# Patient Record
Sex: MALE | Race: WHITE | NOT HISPANIC OR LATINO | Employment: OTHER | URBAN - METROPOLITAN AREA
[De-identification: names, ages, dates, MRNs, and addresses within clinical notes are randomized per-mention and may not be internally consistent; named-entity substitution may affect disease eponyms.]

---

## 2017-03-08 DIAGNOSIS — M43.10 ACQUIRED SPONDYLOLISTHESIS: ICD-10-CM

## 2017-03-08 DIAGNOSIS — M50.20 DISPLACEMENT OF CERVICAL INTERVERTEBRAL DISC WITHOUT MYELOPATHY: ICD-10-CM

## 2017-03-08 DIAGNOSIS — M50.30 DEGENERATION OF CERVICAL INTERVERTEBRAL DISC: ICD-10-CM

## 2017-03-08 DIAGNOSIS — M47.812 CERVICAL SPONDYLOSIS WITHOUT MYELOPATHY: ICD-10-CM

## 2017-03-08 DIAGNOSIS — M54.2 CERVICALGIA: ICD-10-CM

## 2017-03-08 DIAGNOSIS — M47.812 CERVICAL SPONDYLOSIS: ICD-10-CM

## 2017-03-08 RX ORDER — GABAPENTIN 300 MG/1
CAPSULE ORAL
Qty: 90 CAPSULE | Refills: 2 | Status: SHIPPED | OUTPATIENT
Start: 2017-03-08 | End: 2017-12-16 | Stop reason: SDUPTHER

## 2017-04-07 DIAGNOSIS — M19.90 ARTHRITIS: ICD-10-CM

## 2017-04-07 RX ORDER — MELOXICAM 15 MG/1
TABLET ORAL
Qty: 30 TABLET | Refills: 7 | Status: SHIPPED | OUTPATIENT
Start: 2017-04-07 | End: 2017-12-25 | Stop reason: SDUPTHER

## 2017-08-28 ENCOUNTER — HOSPITAL ENCOUNTER (OUTPATIENT)
Dept: RADIOLOGY | Facility: HOSPITAL | Age: 62
Discharge: HOME | End: 2017-08-28
Attending: ORTHOPAEDIC SURGERY
Payer: COMMERCIAL

## 2017-08-28 ENCOUNTER — CLINICAL SUPPORT (OUTPATIENT)
Dept: INTERNAL MEDICINE | Facility: CLINIC | Age: 62
End: 2017-08-28
Attending: ORTHOPAEDIC SURGERY
Payer: COMMERCIAL

## 2017-08-28 ENCOUNTER — OFFICE VISIT (OUTPATIENT)
Dept: ORTHOPEDICS | Facility: CLINIC | Age: 62
End: 2017-08-28
Payer: COMMERCIAL

## 2017-08-28 ENCOUNTER — OFFICE VISIT (OUTPATIENT)
Dept: UROLOGY | Facility: CLINIC | Age: 62
End: 2017-08-28
Payer: COMMERCIAL

## 2017-08-28 VITALS
HEIGHT: 70 IN | WEIGHT: 154.56 LBS | BODY MASS INDEX: 22.13 KG/M2 | SYSTOLIC BLOOD PRESSURE: 124 MMHG | DIASTOLIC BLOOD PRESSURE: 77 MMHG | HEART RATE: 61 BPM

## 2017-08-28 DIAGNOSIS — N52.9 ERECTILE DYSFUNCTION OF ORGANIC ORIGIN: Primary | ICD-10-CM

## 2017-08-28 DIAGNOSIS — N50.89 SCROTAL MASS: Primary | ICD-10-CM

## 2017-08-28 DIAGNOSIS — M79.641 BILATERAL HAND PAIN: ICD-10-CM

## 2017-08-28 DIAGNOSIS — N52.9 ERECTILE DYSFUNCTION, UNSPECIFIED ERECTILE DYSFUNCTION TYPE: ICD-10-CM

## 2017-08-28 DIAGNOSIS — M19.049 ARTHRITIS PAIN, HAND: ICD-10-CM

## 2017-08-28 DIAGNOSIS — M79.642 BILATERAL HAND PAIN: ICD-10-CM

## 2017-08-28 DIAGNOSIS — M79.642 BILATERAL HAND PAIN: Primary | ICD-10-CM

## 2017-08-28 DIAGNOSIS — M79.641 BILATERAL HAND PAIN: Primary | ICD-10-CM

## 2017-08-28 LAB — COMPLEXED PSA SERPL-MCNC: 0.79 NG/ML

## 2017-08-28 PROCEDURE — 99999 PR PBB SHADOW E&M-EST. PATIENT-LVL III: CPT | Mod: PBBFAC,,, | Performed by: UROLOGY

## 2017-08-28 PROCEDURE — 99999 PR PBB SHADOW E&M-EST. PATIENT-LVL II: CPT | Mod: PBBFAC,,, | Performed by: PHYSICIAN ASSISTANT

## 2017-08-28 PROCEDURE — 84153 ASSAY OF PSA TOTAL: CPT

## 2017-08-28 PROCEDURE — 20600 DRAIN/INJ JOINT/BURSA W/O US: CPT | Mod: 50,S$GLB,, | Performed by: PHYSICIAN ASSISTANT

## 2017-08-28 PROCEDURE — 3008F BODY MASS INDEX DOCD: CPT | Mod: S$GLB,,, | Performed by: PHYSICIAN ASSISTANT

## 2017-08-28 PROCEDURE — 73130 X-RAY EXAM OF HAND: CPT | Mod: 50,TC

## 2017-08-28 PROCEDURE — 3008F BODY MASS INDEX DOCD: CPT | Mod: S$GLB,,, | Performed by: UROLOGY

## 2017-08-28 PROCEDURE — 73130 X-RAY EXAM OF HAND: CPT | Mod: 26,50,, | Performed by: RADIOLOGY

## 2017-08-28 PROCEDURE — 99213 OFFICE O/P EST LOW 20 MIN: CPT | Mod: 25,S$GLB,, | Performed by: PHYSICIAN ASSISTANT

## 2017-08-28 PROCEDURE — 99204 OFFICE O/P NEW MOD 45 MIN: CPT | Mod: S$GLB,,, | Performed by: UROLOGY

## 2017-08-28 RX ORDER — TADALAFIL 20 MG/1
20 TABLET ORAL EVERY OTHER DAY
Qty: 6 TABLET | Refills: 11 | Status: SHIPPED | OUTPATIENT
Start: 2017-08-28 | End: 2018-12-12 | Stop reason: SDUPTHER

## 2017-08-28 RX ORDER — BETAMETHASONE SODIUM PHOSPHATE AND BETAMETHASONE ACETATE 3; 3 MG/ML; MG/ML
6 INJECTION, SUSPENSION INTRA-ARTICULAR; INTRALESIONAL; INTRAMUSCULAR; SOFT TISSUE
Status: COMPLETED | OUTPATIENT
Start: 2017-08-28 | End: 2017-08-28

## 2017-08-28 RX ADMIN — BETAMETHASONE SODIUM PHOSPHATE AND BETAMETHASONE ACETATE 6 MG: 3; 3 INJECTION, SUSPENSION INTRA-ARTICULAR; INTRALESIONAL; INTRAMUSCULAR; SOFT TISSUE at 09:08

## 2017-08-28 NOTE — PROGRESS NOTES
Subjective:       Patient ID: Sabino Rubio is a 61 y.o. male.    Chief Complaint: scrotal mass (c/o growth on right testicle he state it have gotten larger he state he also need prostate check (lulú))    HPI patient is here for prostate check PSA right scrotal mass and erectile dysfunction.  He like to try some sialoliths.  He needs a PSA he has a history of a right spermatocele which is enlarging and causing more discomfort.  He was last seen 2013.  He has a good stream with nocturia ×1 but overall he is satisfied with his voiding symptoms    Past Medical History:   Diagnosis Date    Allergy     Arthritis     Family history of malignant neoplasm of gastrointestinal tract mat.gf    GERD (gastroesophageal reflux disease)     Restless leg syndrome     Tubulovillous adenoma 2013 due 2016 9/14/2015       Past Surgical History:   Procedure Laterality Date    CARPAL TUNNEL RELEASE         Family History   Problem Relation Age of Onset    Arthritis Mother      probable R.A.    Cancer Father      esophageal ca and brain tumor    Esophageal cancer Father     Melanoma Father     Cancer Brother      prostate cancer    Cancer Maternal Grandfather      colon    Colon cancer Maternal Grandfather     Irritable bowel syndrome Sister     Diabetes Neg Hx     Heart disease Neg Hx     Cirrhosis Neg Hx     Celiac disease Neg Hx     Crohn's disease Neg Hx     Ulcerative colitis Neg Hx     Stomach cancer Neg Hx     Rectal cancer Neg Hx     Liver cancer Neg Hx     Psoriasis Neg Hx     Lupus Neg Hx        Social History     Social History    Marital status: Single     Spouse name: N/A    Number of children: N/A    Years of education: N/A     Occupational History    Not on file.     Social History Main Topics    Smoking status: Never Smoker    Smokeless tobacco: Never Used      Comment: 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     Alcohol use 0.0 oz/week      Comment: 4 days weekly, up to 2 beers    Drug use: No    Sexual activity: Yes     Partners: Female     Other Topics Concern    Not on file     Social History Narrative    No narrative on file       Allergies:  Review of patient's allergies indicates no known allergies.    Medications:    Current Outpatient Prescriptions:     fluticasone (FLONASE) 50 mcg/actuation nasal spray, 1 spray by Each Nare route every 30 days., Disp: , Rfl:     gabapentin (NEURONTIN) 300 MG capsule, take 1 capsule by mouth three times a day, Disp: 90 capsule, Rfl: 2    meloxicam (MOBIC) 15 MG tablet, take 1 tablet by mouth once daily, Disp: 30 tablet, Rfl: 7    methocarbamol (ROBAXIN) 750 MG Tab, , Disp: , Rfl: 1    fexofenadine (ALLEGRA) 180 MG tablet, Take 1 tablet (180 mg total) by mouth once daily., Disp: , Rfl: 0    ropinirole (REQUIP) 1 MG tablet, TAKE 1 TABLET BY MOUTH 3 TIMES A DAY, Disp: 90 tablet, Rfl: 5    tadalafil (CIALIS) 20 MG Tab, Take 1 tablet (20 mg total) by mouth every other day. Take every other day as needed, Disp: 6 tablet, Rfl: 11    Review of Systems   Constitutional: Negative for activity change, appetite change, chills, diaphoresis, fatigue, fever and unexpected weight change.   HENT: Negative for congestion, dental problem, hearing loss, mouth sores, postnasal drip, rhinorrhea, sinus pressure and trouble swallowing.    Eyes: Negative for pain, discharge and itching.   Respiratory: Negative for apnea, cough, choking, chest tightness, shortness of breath and wheezing.    Cardiovascular: Negative for chest pain, palpitations and leg swelling.   Gastrointestinal: Negative for abdominal distention, abdominal pain, anal bleeding, blood in stool, constipation, diarrhea, nausea, rectal pain and vomiting.   Endocrine: Negative for polydipsia and polyuria.   Genitourinary: Negative for decreased urine volume, difficulty urinating, discharge, dysuria, enuresis, flank pain,  frequency, genital sores, hematuria, penile pain, penile swelling, scrotal swelling, testicular pain and urgency.   Musculoskeletal: Negative for arthralgias, back pain and myalgias.   Skin: Negative for color change, rash and wound.   Neurological: Negative for dizziness, syncope, speech difficulty, light-headedness and headaches.   Hematological: Negative for adenopathy. Does not bruise/bleed easily.   Psychiatric/Behavioral: Negative for behavioral problems, confusion, hallucinations and sleep disturbance.       Objective:      Physical Exam   Constitutional: He appears well-developed.   HENT:   Head: Normocephalic.   Cardiovascular: Normal rate.    Pulmonary/Chest: Effort normal.   Abdominal: Soft.   Genitourinary: Prostate normal.   Genitourinary Comments: 30 g benign no nodules   Neurological: He is alert.   Skin: Skin is warm.     Psychiatric: He has a normal mood and affect.       Assessment:       1. Scrotal mass    2. Erectile dysfunction, unspecified erectile dysfunction type        Plan:       Sabino was seen today for scrotal mass.    Diagnoses and all orders for this visit:    Scrotal mass  -     US Scrotum And Testicles; Future    Erectile dysfunction, unspecified erectile dysfunction type  -     Prostate Specific Antigen, Diagnostic; Future    Other orders  -     tadalafil (CIALIS) 20 MG Tab; Take 1 tablet (20 mg total) by mouth every other day. Take every other day as needed     await results of scrotal ultrasound and the patient can decide whether he wishes to have a spermatocele repair.  All risks and benefits were carefully explained to the patient including risk of recurrence loss of testicle bleeding infection, chronic pain etc.

## 2017-08-28 NOTE — PROGRESS NOTES
Subjective:      Patient ID: Sabino Rubio is a 61 y.o. male.    Chief Complaint: No chief complaint on file.    HPI    Patient is a 61 year old male who presents to clinic with chief complaint of a-traumatic intermittent bilateral hand pain. Pain is located mainly at CMC joint. Patient also reports intermittent tingling in fingers and clawing of his hands. Patient reports that he is taking Mobic as well as gabapentin without relief. He has history of neck pain. He has prior carpel tunnel release in his left hand.     Review of Systems   Constitution: Negative for chills and fever.   Cardiovascular: Negative for chest pain.   Respiratory: Negative for cough and shortness of breath.    Skin: Negative for color change, dry skin, itching, nail changes, poor wound healing and rash.   Musculoskeletal:        Bilateral hand pain.    Neurological: Negative for dizziness.   Psychiatric/Behavioral: Negative for altered mental status. The patient is not nervous/anxious.    All other systems reviewed and are negative.        Objective:      General    Constitutional: He is oriented to person, place, and time. He appears well-developed and well-nourished. No distress.   HENT:   Head: Atraumatic.   Eyes: Conjunctivae are normal.   Cardiovascular: Normal rate.    Pulmonary/Chest: Effort normal.   Neurological: He is alert and oriented to person, place, and time.   Psychiatric: He has a normal mood and affect. His behavior is normal.             Right Hand/Wrist Exam     Inspection   Scars: Hand -  present    Pain   Wrist - The patient exhibits pain of the CMC.    Tests   Phalens Sign: negative  Tinels Sign (Medial Nerve): negative  Finkelstein: negative        Left Hand/Wrist Exam     Inspection   Scars: Hand -  present    Pain   Wrist - The patient exhibits pain of the CMC.    Tests   Phalens Sign: negative  Tinels Sign (Medial Nerve): negative  Finkelstein: negative            Muscle Strength   Right Upper Extremity    Wrist Extension: 5/5/5   Wrist Flexion: 5/5/5   : 5/5/5   Index Finger: 5/5  Middle Finger: 5/5  Ring Finger: 5/5  Little Finger: 5/5  Thumb - APB: 5/5  Thumb - FPL: 5/5  Pinch Mechanism: 5/5  Left Upper Extremity  Wrist Extension: 5/5/5   Wrist Flexion: 5/5/5   :  5/5/5   Index Finger: 5/5  Middle Finger: 5/5  Ring Finger: 5/5  Little Finger: 5/5  Thumb - APB: 5/5  Thumb - FPL: 5/5  Pinch Mechanism: 5/5    Vascular Exam       Capillary Refill  Right Hand: normal capillary refill  Left Hand: normal capillary refill    RADS:  On the left there is narrowing at the first carpometacarpal joint.  Narrowing at several MCP and IP joints noted as well.  No erosions.  On the right again narrowing at the first carpometacarpal joint MCP and several IP joints.  No fractures.  Pression degenerative change.      Assessment:       Encounter Diagnoses   Name Primary?    Bilateral hand pain Yes    Arthritis pain, hand           Plan:       Discussed treatment options with patient. At this time he would like to get injection. He will consider surgical intervention and make appointment with the hand clinic of needed.     Procedure note: bilateral CMC joint injection  After sterile preparation of the skin with alcohol and surgical soap (betadine etc.)  A steroid injection was performed at CMC using 1% plain Lidocaine and 3mg Celestone. This was well tolerated.

## 2017-08-30 ENCOUNTER — HOSPITAL ENCOUNTER (OUTPATIENT)
Dept: RADIOLOGY | Facility: HOSPITAL | Age: 62
Discharge: HOME | End: 2017-08-30
Attending: UROLOGY
Payer: COMMERCIAL

## 2017-08-30 DIAGNOSIS — N50.89 SCROTAL MASS: ICD-10-CM

## 2017-08-30 PROCEDURE — 76870 US EXAM SCROTUM: CPT | Mod: 26,,, | Performed by: INTERNAL MEDICINE

## 2017-08-30 PROCEDURE — 76870 US EXAM SCROTUM: CPT | Mod: TC

## 2017-08-31 ENCOUNTER — TELEPHONE (OUTPATIENT)
Dept: UROLOGY | Facility: CLINIC | Age: 62
End: 2017-08-31

## 2017-08-31 NOTE — TELEPHONE ENCOUNTER
Pt informed. He is not sure how he would like to proceed. He has my name and number and will call me with a decision

## 2017-09-25 ENCOUNTER — OFFICE VISIT (OUTPATIENT)
Dept: INTERNAL MEDICINE | Facility: CLINIC | Age: 62
End: 2017-09-25
Payer: COMMERCIAL

## 2017-09-25 ENCOUNTER — CLINICAL SUPPORT (OUTPATIENT)
Dept: INTERNAL MEDICINE | Facility: CLINIC | Age: 62
End: 2017-09-25
Payer: COMMERCIAL

## 2017-09-25 ENCOUNTER — CLINICAL SUPPORT (OUTPATIENT)
Dept: INFECTIOUS DISEASES | Facility: CLINIC | Age: 62
End: 2017-09-25
Payer: COMMERCIAL

## 2017-09-25 VITALS
WEIGHT: 149.94 LBS | HEIGHT: 70 IN | SYSTOLIC BLOOD PRESSURE: 120 MMHG | BODY MASS INDEX: 21.47 KG/M2 | DIASTOLIC BLOOD PRESSURE: 80 MMHG

## 2017-09-25 DIAGNOSIS — D36.9 TUBULOVILLOUS ADENOMA: ICD-10-CM

## 2017-09-25 DIAGNOSIS — N28.1 KIDNEY CYST, ACQUIRED: ICD-10-CM

## 2017-09-25 DIAGNOSIS — Z00.00 ROUTINE GENERAL MEDICAL EXAMINATION AT A HEALTH CARE FACILITY: Primary | ICD-10-CM

## 2017-09-25 DIAGNOSIS — Z23 NEED FOR VACCINATION: Primary | ICD-10-CM

## 2017-09-25 DIAGNOSIS — M54.50 LUMBAR SPINE PAIN: ICD-10-CM

## 2017-09-25 DIAGNOSIS — M43.10 ACQUIRED SPONDYLOLISTHESIS: ICD-10-CM

## 2017-09-25 DIAGNOSIS — M25.511 ACUTE PAIN OF RIGHT SHOULDER: ICD-10-CM

## 2017-09-25 DIAGNOSIS — L57.8 ACTINIC SKIN DAMAGE: ICD-10-CM

## 2017-09-25 DIAGNOSIS — Z80.42 FAMILY HISTORY OF PROSTATE CANCER: ICD-10-CM

## 2017-09-25 DIAGNOSIS — Z00.00 ANNUAL PHYSICAL EXAM: Primary | ICD-10-CM

## 2017-09-25 LAB
ALBUMIN SERPL BCP-MCNC: 3.6 G/DL
ALP SERPL-CCNC: 63 U/L
ALT SERPL W/O P-5'-P-CCNC: 16 U/L
ANION GAP SERPL CALC-SCNC: 8 MMOL/L
AST SERPL-CCNC: 21 U/L
BILIRUB SERPL-MCNC: 0.6 MG/DL
BUN SERPL-MCNC: 17 MG/DL
CALCIUM SERPL-MCNC: 8.9 MG/DL
CHLORIDE SERPL-SCNC: 107 MMOL/L
CHOLEST SERPL-MCNC: 181 MG/DL
CHOLEST/HDLC SERPL: 2.2 {RATIO}
CO2 SERPL-SCNC: 25 MMOL/L
COMPLEXED PSA SERPL-MCNC: 0.74 NG/ML
CREAT SERPL-MCNC: 0.8 MG/DL
ERYTHROCYTE [DISTWIDTH] IN BLOOD BY AUTOMATED COUNT: 13.5 %
EST. GFR  (AFRICAN AMERICAN): >60 ML/MIN/1.73 M^2
EST. GFR  (NON AFRICAN AMERICAN): >60 ML/MIN/1.73 M^2
ESTIMATED AVG GLUCOSE: 105 MG/DL
GLUCOSE SERPL-MCNC: 126 MG/DL
HBA1C MFR BLD HPLC: 5.3 %
HCT VFR BLD AUTO: 43.7 %
HCV AB SERPL QL IA: NEGATIVE
HDLC SERPL-MCNC: 84 MG/DL
HDLC SERPL: 46.4 %
HGB BLD-MCNC: 14.7 G/DL
LDLC SERPL CALC-MCNC: 83 MG/DL
MCH RBC QN AUTO: 29.6 PG
MCHC RBC AUTO-ENTMCNC: 33.6 G/DL
MCV RBC AUTO: 88 FL
NONHDLC SERPL-MCNC: 97 MG/DL
PLATELET # BLD AUTO: 192 K/UL
PMV BLD AUTO: 10 FL
POTASSIUM SERPL-SCNC: 3.9 MMOL/L
PROT SERPL-MCNC: 6.9 G/DL
RBC # BLD AUTO: 4.96 M/UL
SODIUM SERPL-SCNC: 140 MMOL/L
TRIGL SERPL-MCNC: 70 MG/DL
TSH SERPL DL<=0.005 MIU/L-ACNC: 0.91 UIU/ML
WBC # BLD AUTO: 7.52 K/UL

## 2017-09-25 PROCEDURE — 86803 HEPATITIS C AB TEST: CPT

## 2017-09-25 PROCEDURE — 90471 IMMUNIZATION ADMIN: CPT | Mod: S$GLB,,, | Performed by: INTERNAL MEDICINE

## 2017-09-25 PROCEDURE — 85027 COMPLETE CBC AUTOMATED: CPT

## 2017-09-25 PROCEDURE — 99999 PR PBB SHADOW E&M-EST. PATIENT-LVL II: CPT | Mod: PBBFAC,,,

## 2017-09-25 PROCEDURE — 84153 ASSAY OF PSA TOTAL: CPT

## 2017-09-25 PROCEDURE — 80053 COMPREHEN METABOLIC PANEL: CPT

## 2017-09-25 PROCEDURE — 84443 ASSAY THYROID STIM HORMONE: CPT

## 2017-09-25 PROCEDURE — 99396 PREV VISIT EST AGE 40-64: CPT | Mod: S$GLB,,, | Performed by: INTERNAL MEDICINE

## 2017-09-25 PROCEDURE — 99999 PR PBB SHADOW E&M-EST. PATIENT-LVL IV: CPT | Mod: PBBFAC,,, | Performed by: INTERNAL MEDICINE

## 2017-09-25 PROCEDURE — 90686 IIV4 VACC NO PRSV 0.5 ML IM: CPT | Mod: S$GLB,,, | Performed by: INTERNAL MEDICINE

## 2017-09-25 PROCEDURE — 80061 LIPID PANEL: CPT

## 2017-09-25 PROCEDURE — 90736 HZV VACCINE LIVE SUBQ: CPT | Mod: S$GLB,,, | Performed by: INTERNAL MEDICINE

## 2017-09-25 PROCEDURE — 83036 HEMOGLOBIN GLYCOSYLATED A1C: CPT

## 2017-09-25 PROCEDURE — 90472 IMMUNIZATION ADMIN EACH ADD: CPT | Mod: S$GLB,,, | Performed by: INTERNAL MEDICINE

## 2017-09-25 RX ORDER — METHOCARBAMOL 750 MG/1
750 TABLET, FILM COATED ORAL 2 TIMES DAILY PRN
Qty: 42 TABLET | Refills: 1 | Status: SHIPPED | OUTPATIENT
Start: 2017-09-25 | End: 2017-11-06 | Stop reason: SDUPTHER

## 2017-09-25 NOTE — PATIENT INSTRUCTIONS
Causes of Lumbar (Low Back) Pain  Low back pain can be caused by problems with any part of the lumbar spine. A disk can herniate (push out) and press on a nerve. Vertebrae can rub against each other or slip out of place. This can irritate facet joints and nerves. It can also lead to stenosis, a narrowing of the spinal canal or foramen.  Pressure from a disk  Constant wear and tear on a disk can cause it to weaken and push outward. Part of the disk may then press on nearby nerves. There are two common types of herniated disks:  Contained means the soft nucleus is protruding outward.   Extruded means the firm annulus has torn, letting the soft center squeeze through.     Pressure from bone  An unstable spine   With age, a disk may thin and wear out. Vertebrae above and below the disk may begin to touch. This can put pressure on nerves. It can also cause bone spurs (growths) to form where the bones rub together.    Stenosis results when bone spurs narrow the foramen or spinal canal. This also puts pressure on nerves. Slipping vertebrae can irritate nerves and joints. They can also worsen stenosis.    In some cases, vertebrae become unstable and slip forward. This is called spondylolisthesis.     Date Last Reviewed: 10/12/2015  © 6353-6701 [employer]. [address] [place] [address], [place] [address]. All rights reserved. This information is not intended as a substitute for professional medical care. Always follow your healthcare professional's instructions.        Relieving Back Pain  Back pain is a common problem. You can strain back muscles by lifting too much weight or just by moving the wrong way. Back strain can be uncomfortable, even painful. And it can take weeks or months to improve. To help yourself feel better and prevent future back strains, try these tips.  Important Note: Do not give aspirin to children or teens without first discussing it with your healthcare provider.      ? Ice    Ice reduces  muscle pain and swelling. It helps most during the first 24 to 48 hours after an injury.  · Wrap an ice pack or a bag of frozen peas in a thin towel. (Never place ice directly on your skin.)  · Place the ice where your back hurts the most.  · Dont ice for more than 20 minutes at a time.  · You can use ice several times a day.  ? Medicines  Over-the-counter pain relievers can include acetaminophen and anti-inflammatory medicines, which includes aspirin or ibuprofen. They can help ease discomfort. Some also reduce swelling.  · Tell your healthcare provider about any medicines you are already taking.  · Take medicines only as directed.  ? Heat  After the first 48 hours, heat can relax sore muscles and improve blood flow.  · Try a warm bath or shower. Or use a heating pad set on low. To prevent a burn, keep a cloth between you and the heating pad.  · Dont use a heating pad for more than 15 minutes at a time. Never sleep on a heating pad.  Date Last Reviewed: 9/1/2015 © 2000-2017 [employer]. [address] [place] [address]. All rights reserved. This information is not intended as a substitute for professional medical care. Always follow your healthcare professional's instructions.        Caring for Your Back Throughout the Day  Take care of your back throughout the day. You will likely have fewer back problems if you do. Try to warm up before you move. Shift positions often. Also do your best to form healthy habits.    Warm up for the day  Do a few slow, catlike stretches before starting your day. This simple warmup can soften your disks, stretch your back muscles, and help prevent injuries.  Shift positions often  At work and at home, change positions often. This helps keep your body from getting stiff. Stand up or lean back while you sit. If you can, get up and move every 1/2 hour.  Form healthy habits  Here are some suggestions:   · Keep a healthy weight. When you weigh too much, your back  is under excess strain. But losing just a few extra pounds can help a lot.  · Try not to overeat. Learn about serving sizes. The size of a serving depends on the food and the food group. Many foods list serving sizes on the labels.  · Handle minor aches with cold and heat. Apply cold the first 24 to 48 hours. Use heat after that. Always place a thin cloth between your skin and the source of cold or heat.  · Take medicines as directed. This helps keep pain under control. Always read labels, and call your healthcare provider or pharmacist if you have any questions.  Walk each day  A daily walk keeps your back and thigh muscles stretched and strong. This gives your back better support. Be sure to walk with your spines three curves aligned, by keeping your head, hips, and toes connected by a vertical line.   Date Last Reviewed: 10/18/2015  © 4718-5437 [employer]. [address] [place] [address], [place] [address]. All rights reserved. This information is not intended as a substitute for professional medical care. Always follow your healthcare professional's instructions.        Prevention Guidelines, Men Ages 50 to 64  Screening tests and vaccines are an important part of managing your health. Health counseling is essential, too. Below are guidelines for these, for men ages 50 to 64. Talk with your healthcare provider to make sure youre up-to-date on what you need.  Screening Who needs it How often   Alcohol misuse All men in this age group At routine exams   Blood pressure All men in this age group Every 2 years if your blood pressure is less than 120/80 mm Hg; yearly if your systolic blood pressure is 120 to 139 mm Hg, or your diastolic blood pressure reading is 80 to 89 mm Hg   Colorectal cancer All men in this age group Flexible sigmoidoscopy every 5 years, or colonoscopy every 10 years, or double-contrast barium enema every 5 years; yearly fecal occult blood test or fecal immunochemical test; or a stool DNA  test as often as your healthcare provider advises; talk with your healthcare provider about which tests are best for you   Depression All men in this age group At routine exams   Type 2 diabetes or prediabetes All adults beginning at age 45 and adults without symptoms at any age who are overweight or obese and have 1 or more other risk factors for diabetes At least every 3 years (yearly if your blood sugar has already begun to rise)   Hepatitis C Men at increased risk for infection - talk with your healthcare provider At routine exams. All men ages 50 to 70 should be tested at least once for hepatitis C.   High cholesterol or triglycerides All men in this age group At least every 5 years   HIV Men at increased risk for infection - talk with your healthcare provider At routine exams   Lung cancer Adults age 55 to 80 who have smoked Yearly screening in smokers with 30 pack-year history of smoking or who quit within 15 years   Obesity All men in this age group At routine exams   Prostate cancer Starting at age 45, talk to healthcare provider about risks and benefits of digital rectal exam (KATHARINE) and prostate-specific antigen (PSA) screening1 At routine exams   Syphilis Men at increased risk for infection - talk with your healthcare provider At routine exams   Tuberculosis Men at increased risk for infection - talk with your healthcare provider Ask your healthcare provider   Vision All men in this age group Ask your healthcare provider   Vaccine Who needs it How often   Chickenpox (varicella) All men in this age group who have no record of this infection or vaccine 2 doses; second dose should be given at least 4 weeks after the first dose   Hepatitis A Men at increased risk for infection - talk with your healthcare provider 2 doses given at least 6 months apart   Hepatitis B Men at increased risk for infection - talk with your healthcare provider 3 doses over 6 months; second dose should be given 1 month after the first  dose; the third dose should be given at least 2 months after the second dose and at least 4 months after the first dose   Haemophilus influenzae Type B (HIB) Men at increased risk for infection - talk with your healthcare provider 1 to 3 doses   Influenza (flu) All men in this age group Once a year   Measles, mumps, rubella (MMR) Men in this age group through their late 50s who have no record of these infections or vaccines 1 or 2 doses; ask your healthcare provider   Meningococcal Men at increased risk for infection - talk with your healthcare provider 1 or more doses   Pneumococcal conjugate vaccine (PCV13) and pneumococcal polysaccharide vaccine (PPSV23) Men at increased risk for infection - talk with your healthcare provider PCV13: 1 dose ages 19 to 65 (protects against 13 types of pneumococcal bacteria)     PPSV23: 1 to 2 doses through age 64, or 1 dose at 65 or older (protects against 23 types of pneumococcal bacteria)      Tetanus/diphtheria/  pertussis (Td/Tdap) booster All men in this age group Td every 10 years, or a one-time dose of Tdap instead of a Td booster after age 18, then Td every 10 years   Zoster All men ages 60 and older 1 dose   Counseling Who needs it How often   Diet and exercise Men who are overweight or obese When diagnosed, and then at routine exams   Sexually transmitted infection prevention Men at increased risk for infection - talk with your healthcare provider At routine exams   Use of daily aspirin Men in this age group at risk for cardiovascular health problems At routine exams   Use of tobacco and the health effects it can cause All men in this age group Every visit   [address] [place] [address] [facility]  Date Last Reviewed: 2/1/2017  © 7802-2694 [employer]. [address] [place] [address], [place] [address]. All rights reserved. This information is not intended as a substitute for professional medical care. Always follow your healthcare professional's  instructions.

## 2017-09-25 NOTE — LETTER
September 25, 2017    Sabino Rubio  [facility] [address]             [facility] - Internal Medicine  [address]  [place] [address]  Phone: [phone]  Fax: [phone] Dear Mr. Rubio:    Thank you for allowing me to serve you and perform your Executive Health exam on 9/25/2017.  This letter will serve a brief summary of the history, physical findings, and laboratory/studies performed and recommendations at that time.    Reason for Visit: Executive Health Preventive Physical Examination    Past Medical History:   Diagnosis Date    Allergy     Arthritis     Family history of malignant neoplasm of gastrointestinal tract mat.gf    Family history of prostate cancer: 1/2 brother 9/25/2017    GERD (gastroesophageal reflux disease)     Kidney cyst, acquired: R 1.2 cm 2015 9/25/2017    Restless leg syndrome     Tubulovillous adenoma 2013 due 2016 9/14/2015       Past Surgical History:   Procedure Laterality Date    CARPAL TUNNEL RELEASE         Family History   Problem Relation Age of Onset    Arthritis Mother      probable R.A.    Cancer Father      esophageal ca and brain tumor    Esophageal cancer Father     Melanoma Father     Cancer Brother      prostate cancer    Cancer Maternal Grandfather      colon    Colon cancer Maternal Grandfather     Irritable bowel syndrome Sister     Arthritis Sister     No Known Problems Son     No Known Problems Son     Diabetes Neg Hx     Heart disease Neg Hx     Cirrhosis Neg Hx     Celiac disease Neg Hx     Crohn's disease Neg Hx     Ulcerative colitis Neg Hx     Stomach cancer Neg Hx     Rectal cancer Neg Hx     Liver cancer Neg Hx     Psoriasis Neg Hx     Lupus Neg Hx             Review of patient's allergies indicates:  No Known Allergies      Current Outpatient Prescriptions:     fluticasone (FLONASE) 50 mcg/actuation nasal spray, 1 spray by Each Nare route every 30 days., Disp: , Rfl:     gabapentin (NEURONTIN)  300 MG capsule, take 1 capsule by mouth three times a day, Disp: 90 capsule, Rfl: 2    meloxicam (MOBIC) 15 MG tablet, take 1 tablet by mouth once daily, Disp: 30 tablet, Rfl: 7    methocarbamol (ROBAXIN) 750 MG Tab, Take 1 tablet (750 mg total) by mouth 2 (two) times daily as needed., Disp: 42 tablet, Rfl: 1    ropinirole (REQUIP) 1 MG tablet, TAKE 1 TABLET BY MOUTH 3 TIMES A DAY, Disp: 90 tablet, Rfl: 5    tadalafil (CIALIS) 20 MG Tab, Take 1 tablet (20 mg total) by mouth every other day. Take every other day as needed, Disp: 6 tablet, Rfl: 11     Review of Systems  Review of Systems - Negative except for some shoulder pain on the right as well as ongoing neck and back pain.    Physical Exam:  General: General appearance: alert, well appearing, and in no distress.   Skin: Skin exam - normal coloration and turgor, no rashes, no suspicious skin lesions noted. Sun damaged skin noted.  HEENT: Ears - bilateral TM's and external ear canals normal. , ENT exam reveals - ENT exam normal, no neck nodes or sinus tenderness.   Lungs: Chest: clear to auscultation, no wheezes, rales or rhonchi, symmetric air entry.   Heart: CVS exam: normal rate, regular rhythm, normal S1, S2, no murmurs, rubs, clicks or gallops.   Extremities: Exam of extremities: peripheral pulses normal, no pedal edema, no clubbing or cyanosis    Labs:  Results for orders placed or performed in visit on 09/25/17   Comprehensive metabolic panel   Result Value Ref Range    Sodium 140 136 - 145 mmol/L    Potassium 3.9 3.5 - 5.1 mmol/L    Chloride 107 95 - 110 mmol/L    CO2 25 23 - 29 mmol/L    Glucose 126 (H) 70 - 110 mg/dL    BUN, Bld 17 6 - 20 mg/dL    Creatinine 0.8 0.5 - 1.4 mg/dL    Calcium 8.9 8.7 - 10.5 mg/dL    Total Protein 6.9 6.0 - 8.4 g/dL    Albumin 3.6 3.5 - 5.2 g/dL    Total Bilirubin 0.6 0.1 - 1.0 mg/dL    Alkaline Phosphatase 63 55 - 135 U/L    AST 21 10 - 40 U/L    ALT 16 10 - 44 U/L    Anion Gap 8 8 - 16 mmol/L    eGFR if African American  >60.0 >60 mL/min/1.73 m^2    eGFR if non African American >60.0 >60 mL/min/1.73 m^2   CBC Without Differential   Result Value Ref Range    WBC 7.52 3.90 - 12.70 K/uL    RBC 4.96 4.60 - 6.20 M/uL    Hemoglobin 14.7 14.0 - 18.0 g/dL    Hematocrit 43.7 40.0 - 54.0 %    MCV 88 82 - 98 fL    MCH 29.6 27.0 - 31.0 pg    MCHC 33.6 32.0 - 36.0 g/dL    RDW 13.5 11.5 - 14.5 %    Platelets 192 150 - 350 K/uL    MPV 10.0 9.2 - 12.9 fL   Lipid panel   Result Value Ref Range    Cholesterol 181 120 - 199 mg/dL    Triglycerides 70 30 - 150 mg/dL    HDL 84 (H) 40 - 75 mg/dL    LDL Cholesterol 83.0 63.0 - 159.0 mg/dL    HDL/Chol Ratio 46.4 20.0 - 50.0 %    Total Cholesterol/HDL Ratio 2.2 2.0 - 5.0    Non-HDL Cholesterol 97 mg/dL   TSH   Result Value Ref Range    TSH 0.914 0.400 - 4.000 uIU/mL   PSA, Screening (every year)   Result Value Ref Range    PSA, SCREEN 0.74 0.00 - 4.00 ng/mL   Hemoglobin A1c   Result Value Ref Range    Hemoglobin A1C 5.3 4.0 - 5.6 %    Estimated Avg Glucose 105 68 - 131 mg/dL   Hepatitis C antibody   Result Value Ref Range    Hepatitis C Ab Negative       Labs acceptable other than sugar was elevated at 126; you indicated this was nonfasting blood work.  A1c was acceptable at 5.3, this does not suggest diabetes.    You had a Urology assessment recently.  You also had a flu vaccine as well as a shingles vaccine today.    We discussed an Orthopedic assessment for your shoulder as well as a Spine clinic follow-up for your various spine issues.    Assessment/Recommendations:  Routine Health Maintenance: You are due for a colonoscopy.  I would also recommend a Dermatology follow-up.    At this time, you appear to be in good medical condition.  The next time that you do blood work, please arrange to have this done fasting.  I look forward to seeing you again next year.  Please contact me should you have any questions or concerns regarding physical findings, or my recommendations.      Sincerely,          Alba NETTLES  MD Bautista, FACP

## 2017-09-25 NOTE — PROGRESS NOTES
Subjective:       Patient ID: Sabino Rubio is a 61 y.o. male.    Chief Complaint: Executive Health     PE    Girlfriend is Ana Laura Webster.    Some C spine issues also low back pain    Over due for colonoscopy- adenoma 2013    Saw Urology this year; FH prostate cancer    Some allergies and sinus sx    Glucose elevated today but not fasting.    Lots of orthopedic issues- neck, R shoulder, back.  Has seen Ortho and Spine clinic.    CTS surgery L hand; got injections both hands recently for arthritis.    Patient Active Problem List:     Restless leg syndrome     Cervical spondylosis without myelopathy     Degeneration of cervical intervertebral disc     Brachial neuritis or radiculitis NOS     Acquired spondylolisthesis     Tubulovillous adenoma: 2013            Review of Systems   Constitutional: Negative.    HENT: Positive for postnasal drip and rhinorrhea. Negative for congestion and sinus pressure.    Eyes: Negative for redness and visual disturbance.   Respiratory: Negative for apnea, choking, shortness of breath and stridor.    Cardiovascular: Negative for chest pain, palpitations and leg swelling.   Gastrointestinal: Negative for abdominal pain, constipation, diarrhea and nausea.   Genitourinary: Negative for difficulty urinating, flank pain, frequency, testicular pain and urgency.   Musculoskeletal: Positive for arthralgias and back pain. Negative for myalgias.   Skin: Negative for color change and rash.   Neurological: Negative.    Psychiatric/Behavioral: Negative.        Objective:      Physical Exam   Constitutional: He is oriented to person, place, and time. He appears well-developed and well-nourished.   HENT:   Head: Normocephalic and atraumatic.   Right Ear: External ear normal.   Left Ear: External ear normal.   Eyes: Conjunctivae and EOM are normal. Pupils are equal, round, and reactive to light.   Neck: Normal range of motion. Neck supple. No thyromegaly present.   Cardiovascular: Normal rate  and regular rhythm.    No murmur heard.  Pulmonary/Chest: Effort normal and breath sounds normal. No respiratory distress. He has no wheezes.   Abdominal: Soft. He exhibits no distension. There is no tenderness.   Musculoskeletal: He exhibits no edema or tenderness.   Limited ROM R shoulder  Negative SLR  Strength UE and LE wnl  Scar L wrist   Lymphadenopathy:     He has no cervical adenopathy.   Neurological: He is alert and oriented to person, place, and time. No cranial nerve deficit.   Skin: Skin is warm and dry.   Actinic skin damage   Psychiatric: He has a normal mood and affect. His behavior is normal.       Assessment:       1. Annual physical exam    2. Tubulovillous adenoma: 2013    3. Actinic skin damage    4. Lumbar spine pain    5. Acute pain of right shoulder    6. Acquired spondylolisthesis    7. Kidney cyst, acquired: R 1.2 cm 2015    8. Family history of prostate cancer: 1/2 brother        Plan:         Annual physical exam    Tubulovillous adenoma: 2013  -     Case request GI: COLONOSCOPY    Actinic skin damage  -     Ambulatory referral to Dermatology    Lumbar spine pain  -     Ambulatory Consult to Back & Spine Clinic    Acute pain of right shoulder  -     Ambulatory referral to Orthopedics    Acquired spondylolisthesis    Kidney cyst, acquired: R 1.2 cm 2015    Family history of prostate cancer: 1/2 brother    Other orders  -     methocarbamol (ROBAXIN) 750 MG Tab; Take 1 tablet (750 mg total) by mouth 2 (two) times daily as needed.  Dispense: 42 tablet; Refill: 1

## 2017-09-29 ENCOUNTER — TELEPHONE (OUTPATIENT)
Dept: SPINE | Facility: CLINIC | Age: 62
End: 2017-09-29

## 2017-09-29 DIAGNOSIS — M54.5 LOW BACK PAIN, UNSPECIFIED BACK PAIN LATERALITY, UNSPECIFIED CHRONICITY, WITH SCIATICA PRESENCE UNSPECIFIED: Primary | ICD-10-CM

## 2017-10-03 ENCOUNTER — HOSPITAL ENCOUNTER (OUTPATIENT)
Dept: RADIOLOGY | Facility: OTHER | Age: 62
Discharge: HOME | End: 2017-10-03
Attending: PHYSICIAN ASSISTANT
Payer: COMMERCIAL

## 2017-10-03 DIAGNOSIS — M54.5 LOW BACK PAIN, UNSPECIFIED BACK PAIN LATERALITY, UNSPECIFIED CHRONICITY, WITH SCIATICA PRESENCE UNSPECIFIED: ICD-10-CM

## 2017-10-03 PROCEDURE — 72120 X-RAY BEND ONLY L-S SPINE: CPT | Mod: TC

## 2017-10-03 PROCEDURE — 72120 X-RAY BEND ONLY L-S SPINE: CPT | Mod: 26,,, | Performed by: RADIOLOGY

## 2017-10-03 PROCEDURE — 72100 X-RAY EXAM L-S SPINE 2/3 VWS: CPT | Mod: 26,,, | Performed by: RADIOLOGY

## 2017-10-04 ENCOUNTER — OFFICE VISIT (OUTPATIENT)
Dept: SPINE | Facility: CLINIC | Age: 62
End: 2017-10-04
Payer: COMMERCIAL

## 2017-10-04 VITALS
SYSTOLIC BLOOD PRESSURE: 121 MMHG | DIASTOLIC BLOOD PRESSURE: 80 MMHG | HEART RATE: 80 BPM | BODY MASS INDEX: 21.33 KG/M2 | HEIGHT: 70 IN | WEIGHT: 149 LBS

## 2017-10-04 DIAGNOSIS — M51.37 DDD (DEGENERATIVE DISC DISEASE), LUMBOSACRAL: ICD-10-CM

## 2017-10-04 DIAGNOSIS — M47.819 SPONDYLOSIS WITHOUT MYELOPATHY: ICD-10-CM

## 2017-10-04 DIAGNOSIS — G89.29 CHRONIC BILATERAL LOW BACK PAIN WITHOUT SCIATICA: ICD-10-CM

## 2017-10-04 DIAGNOSIS — M54.50 CHRONIC BILATERAL LOW BACK PAIN WITHOUT SCIATICA: ICD-10-CM

## 2017-10-04 PROCEDURE — 99999 PR PBB SHADOW E&M-EST. PATIENT-LVL III: CPT | Mod: PBBFAC,,, | Performed by: PHYSICIAN ASSISTANT

## 2017-10-04 PROCEDURE — 99214 OFFICE O/P EST MOD 30 MIN: CPT | Mod: S$GLB,,, | Performed by: PHYSICIAN ASSISTANT

## 2017-10-04 NOTE — LETTER
October 4, 2017      Alba Baum MD  [address]  [facility] [address]           [facility]  [address], [address]  [facility] [address]  Phone: [phone]  Fax: [phone]          Patient: Sabino Rubio   MR Number: [id number]   YOB: 1955   Date of Visit: 10/4/2017       Dear Dr. Alba Baum:    Thank you for referring Sabino Rubio to me for evaluation. Attached you will find relevant portions of my assessment and plan of care.    If you have questions, please do not hesitate to call me. I look forward to following Sabino Rubio along with you.    Sincerely,    Justa Moses PA-C    Enclosure  CC:  No Recipients    If you would like to receive this communication electronically, please contact [email or web address] or [phone] to request more information on [employer] Link access.    For providers and/or their staff who would like to refer a patient to [facility], please contact us through our one-stop-shop provider referral line, [facility] [place], at [phone].    If you feel you have received this communication in error or would no longer like to receive these types of communications, please e-mail [email or web address]

## 2017-10-04 NOTE — PROGRESS NOTES
Subjective:     Patient ID:  Sabino Rubio is a 61 y.o. male.    Patient referred by Dr. Baum    Chief Complaint: Back pain    HPI    Sabino Rubio is a 61 y.o. male who presents with the above CC.  I have seen him before for his neck and he is here today for his low back pain.  Pain started 2 years ago in the low back rated 4/10 that is worse when going from sitting to standing and especially in the morning and better with walking.  Pain eases up throughout the day some.  No leg pain.    Patient has not had PT or ESIs.  No spine surgery.  Patient is currently taking mobic, neurontin, and robaxin mainly for his neck but does help some for his low back.    Patient denies any recent accidents or trauma, no saddle anesthesias, and no bowel or bladder incontinence.      Review of Systems:  Please refer to page three of the spine center intake form for a complete review of systems.    Past Medical History:   Diagnosis Date    Allergy     Arthritis     Family history of malignant neoplasm of gastrointestinal tract mat.gf    Family history of prostate cancer: 1/2 brother 9/25/2017    GERD (gastroesophageal reflux disease)     Kidney cyst, acquired: R 1.2 cm 2015 9/25/2017    Restless leg syndrome     Tubulovillous adenoma 2013 due 2016 9/14/2015     Past Surgical History:   Procedure Laterality Date    CARPAL TUNNEL RELEASE       Current Outpatient Prescriptions on File Prior to Visit   Medication Sig Dispense Refill    fluticasone (FLONASE) 50 mcg/actuation nasal spray 1 spray by Each Nare route every 30 days.      gabapentin (NEURONTIN) 300 MG capsule take 1 capsule by mouth three times a day 90 capsule 2    meloxicam (MOBIC) 15 MG tablet take 1 tablet by mouth once daily 30 tablet 7    methocarbamol (ROBAXIN) 750 MG Tab Take 1 tablet (750 mg total) by mouth 2 (two) times daily as needed. 42 tablet 1    ropinirole (REQUIP) 1 MG tablet TAKE 1 TABLET BY MOUTH 3 TIMES A DAY 90 tablet 5    tadalafil  "(CIALIS) 20 MG Tab Take 1 tablet (20 mg total) by mouth every other day. Take every other day as needed 6 tablet 11     No current facility-administered medications on file prior to visit.      Review of patient's allergies indicates:  No Known Allergies  Social History     Social History    Marital status: Single     Spouse name: N/A    Number of children: N/A    Years of education: N/A     Occupational History    Not on file.     Social History Main Topics    Smoking status: Never Smoker    Smokeless tobacco: Never Used      Comment: 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    Alcohol use 0.0 oz/week      Comment: 4 days weekly, up to 2 beers    Drug use: No    Sexual activity: Yes     Partners: Female     Other Topics Concern    Not on file     Social History Narrative    No narrative on file     Family History   Problem Relation Age of Onset    Arthritis Mother      probable R.A.    Cancer Father      esophageal ca and brain tumor    Esophageal cancer Father     Melanoma Father     Cancer Brother      prostate cancer    Cancer Maternal Grandfather      colon    Colon cancer Maternal Grandfather     Irritable bowel syndrome Sister     Arthritis Sister     No Known Problems Son     No Known Problems Son     Diabetes Neg Hx     Heart disease Neg Hx     Cirrhosis Neg Hx     Celiac disease Neg Hx     Crohn's disease Neg Hx     Ulcerative colitis Neg Hx     Stomach cancer Neg Hx     Rectal cancer Neg Hx     Liver cancer Neg Hx     Psoriasis Neg Hx     Lupus Neg Hx        Objective:      Vitals:    10/04/17 0730   BP: 121/80   Pulse: 80   Weight: 67.6 kg (149 lb)   Height: 5' 10" (1.778 m)   PainSc:   4   PainLoc: Back         Physical Exam:    General:  Sabino Rubio is well-developed, well-nourished, appears stated age, in no acute distress, alert and oriented to person, place, and " time.    Pulmonary/Chest:  Respiratory effort normal  Abdominal: Exhibits no distension  Psychiatric:  Normal mood and affect.  Behavior is normal.  Judgement and thought content normal    Musculoskeletal:    Patient arises from a sitting to standing position without difficulty.  Patient walks to the door without evidence of limp, pain, or abnormality of gait. Patient is able to walk on heels and toes without difficulty.    Lumbar ROM:   Pain in lumbar flexion, extension, right lateral bending, and left lateral bending.  Worse in flexion and extension.    Lumbar Spine Inspection:  Normal with no surgical scars and no visible rashes.    Lumbar Spine Palpation:  No tenderness to low back palpation.    SI Joint Palpation:  No tenderness to SI Joint palpation.    Straight Leg Raise:  Negative right and left SLR.    Neurological: Alert and oriented to person, place, and time    Muscle strength against resistance:     Right Left   Hip flexion  5 / 5 5 / 5   Hip extension 5 / 5 5 / 5   Hip abduction 5 / 5 5 / 5   Hip adduction  5 / 5 5 / 5   Knee extension  5 / 5 5 / 5   Knee flexion 5 / 5 5 / 5   Dorsiflexion  5 / 5 5 / 5   EHL  5 / 5 5 / 5   Plantar flexion  5 / 5 5 / 5   Inversion of the feet 5 / 5 5 / 5   Eversion of the feet  5 / 5 5 / 5     Reflexes:     Right Left   Patellar 2+ 2+   Achilles 2+ 2+     Clonus:  Negative bilaterally    On gross examination of the bilateral upper extremities, patient has full painfree ROM with no signs of clubbing, cyanosis, edema, or weakness.     XRAY Interpretation:     Lumbar spine ap/lateral/flexion/extension xrays were personally reviewed today.  No fractures.  No movement on flexion and extension.  Multilevel DDD and spondylosis worse at L4-5 and L5-S1.  Old compression deformity at the superior endplate of L3.      Assessment:          1. Spondylosis without myelopathy    2. DDD (degenerative disc disease), lumbosacral    3. Chronic bilateral low back pain without sciatica              Plan:          Orders Placed This Encounter    Ambulatory referral to Physical Therapy - Lumbar       Lumbar spondylosis/DDD  Worse at L4-5 and L5-S1    -Continue Mobic, Neurontin, and Robaxin  -PT through [facility]  -Fu in three months and if no improvement will get MRI lumbar spine to assess for injections    Follow-Up:  Return in 3 months (on 1/4/2018). If there are any questions prior to this, the patient was instructed to contact the office.       GERARD Santos, PA-C  Neurosurgery  [facility]  [facility]

## 2017-10-05 ENCOUNTER — TELEPHONE (OUTPATIENT)
Dept: ORTHOPEDICS | Facility: CLINIC | Age: 62
End: 2017-10-05

## 2017-10-05 NOTE — TELEPHONE ENCOUNTER
Spoke to patient and made him aware that rachid will not be in clinic tomorrow due to an emergency. Patient agreed to reschedule for Monday 10/16.

## 2017-10-16 ENCOUNTER — OFFICE VISIT (OUTPATIENT)
Dept: ORTHOPEDICS | Facility: CLINIC | Age: 62
End: 2017-10-16
Payer: COMMERCIAL

## 2017-10-16 ENCOUNTER — HOSPITAL ENCOUNTER (OUTPATIENT)
Dept: RADIOLOGY | Facility: HOSPITAL | Age: 62
Discharge: HOME | End: 2017-10-16
Attending: PHYSICIAN ASSISTANT
Payer: COMMERCIAL

## 2017-10-16 VITALS
SYSTOLIC BLOOD PRESSURE: 135 MMHG | DIASTOLIC BLOOD PRESSURE: 82 MMHG | HEIGHT: 70 IN | BODY MASS INDEX: 21.74 KG/M2 | WEIGHT: 151.88 LBS | HEART RATE: 78 BPM

## 2017-10-16 DIAGNOSIS — R52 PAIN: Primary | ICD-10-CM

## 2017-10-16 DIAGNOSIS — M25.511 ACUTE PAIN OF RIGHT SHOULDER: ICD-10-CM

## 2017-10-16 DIAGNOSIS — R52 PAIN: ICD-10-CM

## 2017-10-16 PROCEDURE — 73030 X-RAY EXAM OF SHOULDER: CPT | Mod: TC,RT

## 2017-10-16 PROCEDURE — 20610 DRAIN/INJ JOINT/BURSA W/O US: CPT | Mod: RT,S$GLB,, | Performed by: PHYSICIAN ASSISTANT

## 2017-10-16 PROCEDURE — 73030 X-RAY EXAM OF SHOULDER: CPT | Mod: 26,RT,, | Performed by: RADIOLOGY

## 2017-10-16 PROCEDURE — 99214 OFFICE O/P EST MOD 30 MIN: CPT | Mod: 25,S$GLB,, | Performed by: PHYSICIAN ASSISTANT

## 2017-10-16 PROCEDURE — 99999 PR PBB SHADOW E&M-EST. PATIENT-LVL III: CPT | Mod: PBBFAC,,, | Performed by: PHYSICIAN ASSISTANT

## 2017-10-16 RX ORDER — BETAMETHASONE SODIUM PHOSPHATE AND BETAMETHASONE ACETATE 3; 3 MG/ML; MG/ML
6 INJECTION, SUSPENSION INTRA-ARTICULAR; INTRALESIONAL; INTRAMUSCULAR; SOFT TISSUE
Status: COMPLETED | OUTPATIENT
Start: 2017-10-16 | End: 2017-10-16

## 2017-10-16 RX ADMIN — BETAMETHASONE SODIUM PHOSPHATE AND BETAMETHASONE ACETATE 6 MG: 3; 3 INJECTION, SUSPENSION INTRA-ARTICULAR; INTRALESIONAL; INTRAMUSCULAR; SOFT TISSUE at 10:10

## 2017-10-16 NOTE — PROGRESS NOTES
SUBJECTIVE:     Chief Complaint & History of Present Illness:  Sabino uRbio is a  New  patient 61 y.o. male who is seen here today with a complaint of    Chief Complaint   Patient presents with    Right Shoulder - Pain    .  She developed pain and soreness in the lateral posterior aspect of his right shoulder over the past several months.  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  He notices the shoulders most bothersome after repeated overhead activities particularly lifting ladders.  He is currently on meloxicam for home cervical spondylosis and degenerative disc disease L but continues to have soreness in the shoulder  On a scale of 1-10, with 10 being worst pain imaginable, he rates this pain as 2 on good days and 7 on bad days.  he describes the pain as tender and sore.    Review of patient's allergies indicates:  No Known Allergies      Current Outpatient Prescriptions   Medication Sig Dispense Refill    fluticasone (FLONASE) 50 mcg/actuation nasal spray 1 spray by Each Nare route every 30 days.      gabapentin (NEURONTIN) 300 MG capsule take 1 capsule by mouth three times a day 90 capsule 2    meloxicam (MOBIC) 15 MG tablet take 1 tablet by mouth once daily 30 tablet 7    methocarbamol (ROBAXIN) 750 MG Tab Take 1 tablet (750 mg total) by mouth 2 (two) times daily as needed. 42 tablet 1    ropinirole (REQUIP) 1 MG tablet TAKE 1 TABLET BY MOUTH 3 TIMES A DAY 90 tablet 5    tadalafil (CIALIS) 20 MG Tab Take 1 tablet (20 mg total) by mouth every other day. Take every other day as needed 6 tablet 11     No current facility-administered medications for this visit.        Past Medical History:   Diagnosis Date    Allergy     Arthritis     Family history of malignant neoplasm of gastrointestinal tract mat.gf    Family history of prostate cancer: 1/2 brother 9/25/2017    GERD (gastroesophageal  "reflux disease)     Kidney cyst, acquired: R 1.2 cm 2015 9/25/2017    Restless leg syndrome     Tubulovillous adenoma 2013 due 2016 9/14/2015       Past Surgical History:   Procedure Laterality Date    CARPAL TUNNEL RELEASE         Vital Signs (Most Recent)  Vitals:    10/16/17 0931   BP: 135/82   Pulse: 78       Review of Systems:  ROS:  Constitutional: no fever or chills  Eyes: no visual changes  ENT: no nasal congestion or sore throat  Respiratory: no cough or shortness of breath  Cardiovascular: no chest pain or palpitations  Gastrointestinal: no nausea or vomiting, tolerating diet, Positive for GERD  Genitourinary: no hematuria or dysuria  Integument/Breast: no rash or pruritis  Hematologic/Lymphatic: no easy bruising or lymphadenopathy  Musculoskeletal: positive for arthralgias, back pain, myalgias, neck pain and stiff joints, negative for bone pain and muscle weakness  Neurological: no seizures or tremors  Behavioral/Psych: no auditory or visual hallucinations  Endocrine: no heat or cold intolerance      OBJECTIVE:     PHYSICAL EXAM:  Height: 5' 10" (177.8 cm) Weight: 68.9 kg (151 lb 14.4 oz), General Appearance: Well nourished, well developed, in no acute distress.  Neurological: Mood & affect are normal.  Shoulder exam: right  Tenderness: biceps tendon, lateral acromial  ROM: forward flexion 180/180, extension 45/45, full abduction 180/180, abduction-glenohumeral 90/90, external rotation 50/50  Shoulder Strength: biceps 5/5, triceps 5/5, abduction 5/5, adduction 5/5, external rotation 5/5 with shoulder at side, flexion 5/5, and extension 5/5  negative for tenderness about the glenohumeral joint, negative for tenderness over the acromioclavicular joint and negative for impingement sign  Stability tests: anterior apprehension test positive for pain only and posterior apprehension test negative  Special Tests:Cross-chest abduction: negative                     RADIOGRAPHS:  X-rays taken today films viewed " by me demonstrate mild glenohumeral joint space narrowing no other evidence of fracture dislocation or advanced degenerative joint disease    ASSESSMENT/PLAN:     Plan: We discussed with the patient at length all the different treatment options available for his rightshoulder including anti-inflammatories, acetaminophen, rest, ice, Physical therapy to include strengthening exercise, occasional cortisone injections for temporary relief, arthroscopic surgical repair, and finally shoulder arthroplasty.   We'll proceed with therapeutic diagnostic cortisone injection of the right shoulder      The injection site was identified and the skin was prepared with an ETOH solution. The    right  shoulder was injected with 1 ml of Celestone and 5 ml Lidocaine under sterile technique. Sabino Rubio tolerated the procedure well, he was advised to rest the  shoulder  today, ice and support. he did receive immediate relief of the pain in and about his  shoulder  he was told this would be short lived and is secondary to the lidocaine. he may have an increase in his discomfort tonight followed by steady improvement over the next several days. It may take 1-3 weeks following the injection to get the full benefit of the medication.  I will see him back in 3-6 months. Sooner if he has any problems or concerns.

## 2017-10-16 NOTE — LETTER
October 16, 2017      Alba Baum MD  [address]  [facility] [address]           [facility] Orthoped[facility]  [address]  [facility] [address]  Phone: [phone]          Patient: Sabino Rubio   MR Number: [id number]   YOB: 1955   Date of Visit: 10/16/2017       Dear Dr. Alba Baum:    Thank you for referring Sabino Rubio to me for evaluation. Attached you will find relevant portions of my assessment and plan of care.    If you have questions, please do not hesitate to call me. I look forward to following Sabino Rubio along with you.    Sincerely,    Donn Emery PA-C    Enclosure  CC:  No Recipients    If you would like to receive this communication electronically, please contact [email or web address] or [phone] to request more information on [employer] Link access.    For providers and/or their staff who would like to refer a patient to [facility], please contact us through our one-stop-shop provider referral line, [facility], at [phone].    If you feel you have received this communication in error or would no longer like to receive these types of communications, please e-mail [email or web address]

## 2017-10-24 ENCOUNTER — TELEPHONE (OUTPATIENT)
Dept: INTERNAL MEDICINE | Facility: CLINIC | Age: 62
End: 2017-10-24

## 2017-10-24 NOTE — TELEPHONE ENCOUNTER
He is overdue for colonoscopy, this was ordered in September.  Can you please contact him to get this scheduled, or we can have him do the fit kit?  Thank you

## 2017-10-25 NOTE — TELEPHONE ENCOUNTER
Spoke to pt. He attempted to schedule colonoscopy but was told to wait until 4/2018. Per last colonoscopy report that is when he is due. Pt says he will schedule closer to that time. Health maintenance updated.

## 2017-10-25 NOTE — TELEPHONE ENCOUNTER
Attempted to contact pt about colon cancer screening. No answer, left voice mail for return call.

## 2017-10-25 NOTE — TELEPHONE ENCOUNTER
----- Message from Doris Alvarez sent at 10/25/2017  9:49 AM CDT -----  Contact: pt [phone]  Patient is returning a phone call.  Who left a message for the patient: Marcosia  Does patient know what this is regarding: a message was left   Comments:

## 2017-10-30 ENCOUNTER — CLINICAL SUPPORT (OUTPATIENT)
Dept: REHABILITATION | Facility: HOSPITAL | Age: 62
End: 2017-10-30
Attending: PHYSICIAN ASSISTANT
Payer: COMMERCIAL

## 2017-10-30 DIAGNOSIS — G89.29 CHRONIC BILATERAL LOW BACK PAIN WITHOUT SCIATICA: ICD-10-CM

## 2017-10-30 DIAGNOSIS — M47.819 SPONDYLOSIS WITHOUT MYELOPATHY: ICD-10-CM

## 2017-10-30 DIAGNOSIS — M51.37 DDD (DEGENERATIVE DISC DISEASE), LUMBOSACRAL: ICD-10-CM

## 2017-10-30 DIAGNOSIS — M54.50 CHRONIC BILATERAL LOW BACK PAIN WITHOUT SCIATICA: ICD-10-CM

## 2017-10-30 PROCEDURE — 97161 PT EVAL LOW COMPLEX 20 MIN: CPT | Performed by: INTERNAL MEDICINE

## 2017-10-30 PROCEDURE — G8979 MOBILITY GOAL STATUS: HCPCS | Mod: CJ | Performed by: INTERNAL MEDICINE

## 2017-10-30 PROCEDURE — 97110 THERAPEUTIC EXERCISES: CPT | Performed by: INTERNAL MEDICINE

## 2017-10-30 PROCEDURE — G8978 MOBILITY CURRENT STATUS: HCPCS | Mod: CJ | Performed by: INTERNAL MEDICINE

## 2017-10-30 NOTE — PROGRESS NOTES
See treatment note    GERARD Santos, PA-C  Neurosurgery  [facility]  [facility]  10/31/17

## 2017-10-31 NOTE — PLAN OF CARE
Please sign and return plan of care. Thank you for this referral.    Physician:Justa Moses, *  Diagnosis: LBP  Orders:  Physical Therapy evaluate and treat  Date of eval: 10/30/2017    Subjective:    Onset of pain /Mechanism of Onset:  2 yrs chronic back pain. 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    Medication:  Reviewed in EPIC  Medical history : OA knees, shoulder  Special tests:    MRI:   na  Xray    10/3/2017 Results: Convex left curvature lumbar spine. There is a mild superior endplate deformity of the L3 vertebral body concerning for age-indeterminate fracture. The remaining lumbar vertebral body heights and contours are within normal limits allowing for degenerative change. There is prominent degenerative change L4/L5 and L5/S1 with endplate degeneration vacuum phenomena. No significant listhesis with flexion and extension positioning. There is slight convex left curvature of the lumbar spine. Further evaluation is warranted clinically. Electronic notification system activated    Educational needs:no barriers noted   Spiritual/Cultural: no specific needs identified      Work:     Construction work                     Job description includes  See current functional status  Pain ranges from 2 to 10 on VAS scale of 0- 10.    Pts goals:  Decrease pain    OBJECTIVE :      10/30 Functional Limitations Reports - G Codes  Category: mobility  Tool: FOTO   Score: 65  Current/ 20-40%  Goal/ : 20-40%  Discharge/   NA      MMT:    Hip flex B 5  Hip abd B 5  Hip  ext  R 4, L 5  Knee ext B 5  Knee flex B 5  DF B 5      AROM:  Lumbar flex 100 % discomfort lb  Lumbar ext 50 % tight  LB SB R 75% tight  LB SB L  75% tight      Flexibility testing:  HS flex wfl B  Piriformis flex mild tightness B  B calf tightness    Special tests:    slr neg B  fabers neg B    Posture- decreased LB lordosis, scoliosis LB      Palpation/ joint mob:  Decreased jm L spine, no ttp    TREATMENT:    Pt was provided with a written copy of exercises to perform as tolerated at home.  Pt performed rom and strengthening ex for le and core including:    Butt winks 10x  Sl clams 10x   SL transverse abdominals 10x  Quad UE/ LE 5x  gss wall 20 sec x 3  ltr stretch 20 sec x 3    Ther ex time: 14 min  Exercises were reviewed and pt was able to demonstrate them prior to the end of the session.   Modalities:  np    Pt   was provided educational information, including: mirella trans abd with ex,  adls such as car t/f , sit to stand, and lifting/ carrying. Ed in DN    ASSESSMENT:  PT diagnosis: LBP, core weakness, scoliosis   Patient can benefit from outpatient physical therapy and a home program  Prognosis is Good.    Medical necessity is demonstrated by the following  IMPAIRMENTS:  Unable to participate in daily activities, Pain limits function of effected part for some activities, Requires skilled supervision to complete and progress HEP and Weakness    GOALS:    12_   weeks. Pt agrees with goals.  1. Independent with HEP.  2. Report decreased  LBP    pain  <   / =  5  /10 with adls such as sit to stand, climbing ladders  3. Increased MMT  for  B HE   To 5/5  4. Increased arom  for  LB ext to 75%  5.   Improved FOTO score mobility  to 20-40%    History  Co-morbidities and personal factors that may impact the plan of care Low    Stable Clinical Presentation     Co-morbidities:       Personal Factors:     Body regions    Body systems    Activity Limitations    Participation Restrictons   No personal factors and/  or  comorbidites          Address 1-2 elements:     ROM impaired  MMT impaired    Decreased FOTO score           PLAN:  Outpatient physical therapy 1-2 times weekly to include: pt ed, hep, therapeutic exercises, neuromuscular re-education/ balance exercises, joint mobilizations, modalities prn, and aquatic therapy.  Pt may see PTA as part of treatment plan.  Cont PT for  6-12         weeks.   I certify the need for these services   furnished under this plan of treatment and while under my   care.____________________________________ Physician/Referring Practitioner Date   of Signature

## 2017-11-06 RX ORDER — METHOCARBAMOL 750 MG/1
750 TABLET, FILM COATED ORAL 2 TIMES DAILY PRN
Qty: 42 TABLET | Refills: 0 | Status: SHIPPED | OUTPATIENT
Start: 2017-11-06 | End: 2017-12-16 | Stop reason: SDUPTHER

## 2017-11-07 ENCOUNTER — CLINICAL SUPPORT (OUTPATIENT)
Dept: REHABILITATION | Facility: HOSPITAL | Age: 62
End: 2017-11-07
Attending: PHYSICIAN ASSISTANT
Payer: COMMERCIAL

## 2017-11-07 DIAGNOSIS — G89.29 CHRONIC BILATERAL LOW BACK PAIN WITHOUT SCIATICA: Primary | ICD-10-CM

## 2017-11-07 DIAGNOSIS — M54.50 CHRONIC BILATERAL LOW BACK PAIN WITHOUT SCIATICA: Primary | ICD-10-CM

## 2017-11-07 PROCEDURE — 97110 THERAPEUTIC EXERCISES: CPT | Performed by: INTERNAL MEDICINE

## 2017-11-09 NOTE — PROGRESS NOTES
Physician:Justa Moses, *  Diagnosis: LBP  Orders:  Physical Therapy evaluate and treat  Date of eval: 10/30/2017     Subjective:   No c/o presently, did hep a few times. Achy in am when getting oob.        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    Pts goals:  Decrease pain     OBJECTIVE :        10/30 Functional Limitations Reports - G Codes  Category: mobility  Tool: FOTO   Score: 65  Current/ 20-40%  Goal/ : 20-40%  Discharge/   NA        10/30 MMT:     Hip flex B 5  Hip abd B 5  Hip ext  R 4, L 5  Knee ext B 5  Knee flex B 5  DF B 5        10/30 AROM:  Lumbar flex 100 % discomfort lb  Lumbar ext 50 % tight  LB SB R 75% tight  LB SB L  75% tight        10/30 Flexibility testing:  HS flex wfl B  Piriformis flex mild tightness B  B calf tightness      Posture- decreased LB lordosis, scoliosis LB        Palpation/ joint mob:  Decreased jm L spine, no ttp     TREATMENT:     Pt was provided with a written copy of exercises to perform as tolerated at home.  Pt performed rom and strengthening ex for le and core including:     ube 6 min  Butt winks 10x np  B shd ext gtb 30x  Prone hip ext BTB 30x  Bridges tball 30x  hss seated 90 sec each  Sl clams 10x 3 rtb   SL transverse abdominals 10x np  Quad UE/ LE  3 min  gss slant  20 sec x 3  ltr stretch 20 sec x 3     Ther ex time:40 min  Exercises were reviewed and pt was able to demonstrate them prior to the end of the session.   Modalities:  np      ASSESSMENT:  Progressing with decreased pain and improved ex ability.   Patient can benefit from outpatient physical therapy and a home program  Prognosis is Good.     Medical necessity is demonstrated by the following  IMPAIRMENTS:  Unable to participate in daily activities, Pain limits function of effected part for some activities, Requires skilled supervision to complete and progress HEP  and Weakness     GOALS:    12_   weeks. Pt agrees with goals.  1. Independent with HEP.  2. Report decreased  LBP    pain  <   / =  5  /10 with adls such as sit to stand, climbing ladders  3. Increased MMT  for  B HE   To 5/5  4. Increased arom  for  LB ext to 75%            5.   Improved FOTO score mobility  to 20-40%      PLAN:  Outpatient physical therapy 1-2 times weekly to include: pt ed, hep, therapeutic exercises, neuromuscular re-education/ balance exercises, joint mobilizations, modalities prn, and aquatic therapy.  Pt may see PTA as part of treatment plan.  Cont PT for  11         weeks.

## 2017-11-14 ENCOUNTER — CLINICAL SUPPORT (OUTPATIENT)
Dept: REHABILITATION | Facility: HOSPITAL | Age: 62
End: 2017-11-14
Attending: PHYSICIAN ASSISTANT
Payer: COMMERCIAL

## 2017-11-14 DIAGNOSIS — M54.50 CHRONIC BILATERAL LOW BACK PAIN WITHOUT SCIATICA: Primary | ICD-10-CM

## 2017-11-14 DIAGNOSIS — G89.29 CHRONIC BILATERAL LOW BACK PAIN WITHOUT SCIATICA: Primary | ICD-10-CM

## 2017-11-14 PROCEDURE — 97110 THERAPEUTIC EXERCISES: CPT | Performed by: INTERNAL MEDICINE

## 2017-11-14 PROCEDURE — G8978 MOBILITY CURRENT STATUS: HCPCS | Mod: CJ | Performed by: INTERNAL MEDICINE

## 2017-11-14 PROCEDURE — G8979 MOBILITY GOAL STATUS: HCPCS | Mod: CJ | Performed by: INTERNAL MEDICINE

## 2017-11-14 NOTE — PROGRESS NOTES
Physician:Justa Moses, *  Diagnosis: LBP  Orders:  Physical Therapy evaluate and treat  Date of eval: 10/30/2017     Subjective:   No c/o presently.. Achy in am when getting oob, takes 15 min to loosen up.        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    Pts goals:  Decrease pain     OBJECTIVE :        11/14  Functional Limitations Reports - G Codes  Category: mobility  Tool: FOTO   Score: 72  Current/ 32%  Goal/ : 28%  Discharge/   NA        10/30 MMT:     Hip flex B 5  Hip abd B 5  Hip ext  R 4, L 5  Knee ext B 5  Knee flex B 5  DF B 5        10/30 AROM:  Lumbar flex 100 % discomfort lb  Lumbar ext 50 % tight  LB SB R 75% tight  LB SB L  75% tight        10/30 Flexibility testing:  HS flex wfl B  Piriformis flex mild tightness B  B calf tightness      Posture- decreased LB lordosis, scoliosis LB        Palpation/ joint mob:  Decreased jm L spine, no ttp     TREATMENT:     Pt was provided with a written copy of exercises to perform as tolerated at home.  Pt performed rom and strengthening ex for le and core including:     ube 6 min  Butt winks 10x np  B shd ext btb 30x  m squats tball 30x  Prone hip ext BTB 30x  Bridges U 10 sec x 10  hss seated 90 sec each  Sl clams 10x 3 rtb   SL transverse abdominals 10x np  Quad UE/ LE  15x  cord  gss slant  20 sec x 3  ltr stretch 20 sec x 3  np  ltr tball 20x  Obliques ball sq 15x  Plank elbows 30 sec x 3     Ther ex time:45 min  Exercises were reviewed and pt was able to demonstrate them prior to the end of the session.   Modalities:  np      ASSESSMENT:  Progressing with decreased pain and improved ex ability.   Patient can benefit from outpatient physical therapy and a home program  Prognosis is Good.     Medical necessity is demonstrated by the following  IMPAIRMENTS:  Unable to participate in daily activities, Pain limits function of  effected part for some activities, Requires skilled supervision to complete and progress HEP and Weakness     GOALS:    12_   weeks. Pt agrees with goals.  1. Independent with HEP.  2. Report decreased  LBP    pain  <   / =  5  /10 with adls such as sit to stand, climbing ladders  3. Increased MMT  for  B HE   To 5/5  4. Increased arom  for  LB ext to 75%            5.   Improved FOTO score mobility  to 20-40%      PLAN:  Outpatient physical therapy 1-2 times weekly to include: pt ed, hep, therapeutic exercises, neuromuscular re-education/ balance exercises, joint mobilizations, modalities prn, and aquatic therapy.  Pt may see PTA as part of treatment plan.  Cont PT for  9        weeks.

## 2017-11-21 ENCOUNTER — CLINICAL SUPPORT (OUTPATIENT)
Dept: REHABILITATION | Facility: HOSPITAL | Age: 62
End: 2017-11-21
Attending: PHYSICIAN ASSISTANT
Payer: COMMERCIAL

## 2017-11-21 DIAGNOSIS — G89.29 CHRONIC BILATERAL LOW BACK PAIN WITHOUT SCIATICA: Primary | ICD-10-CM

## 2017-11-21 DIAGNOSIS — M54.50 CHRONIC BILATERAL LOW BACK PAIN WITHOUT SCIATICA: Primary | ICD-10-CM

## 2017-11-21 PROCEDURE — 97110 THERAPEUTIC EXERCISES: CPT | Performed by: INTERNAL MEDICINE

## 2017-11-21 NOTE — PROGRESS NOTES
Physician:Justa Moses, *  Diagnosis: LBP  Orders:  Physical Therapy evaluate and treat  Date of eval: 10/30/2017     Subjective:   No c/o presently.. Achy in am when getting oob, takes 15 min to loosen up.        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    Pts goals:  Decrease pain     OBJECTIVE :     11/14  Functional Limitations Reports - G Codes  Category: mobility  Tool: FOTO   Score: 72  Current/ 32%  Goal/ : 28%  Discharge/   NA        11/21 MMT:     Hip flex B 5  Hip abd B 5  Hip ext  R 4+, L 5 ( tightness B hip flexors)  Knee ext B 5  Knee flex B 5  DF B 5      11/21  Lumbar ext 75 % tight     10/30 AROM:   Lumbar flex 100 % discomfort lb  Lumbar ext 50 % tight  LB SB R 75% tight  LB SB L  75% tight        10/30 Flexibility testing:  HS flex wfl B  Piriformis flex mild tightness B  B calf tightness      Posture- decreased LB lordosis, scoliosis LB        Palpation/ joint mob:  Decreased jm L spine, no ttp     TREATMENT:     Pt was provided with a written copy of exercises to perform as tolerated at home.  Pt performed rom and strengthening ex for le and core including:     ube 6 min  Butt winks 10x np  B shd ext 20x reformer 1 spring  m squats tball 30x  Prone quad stretch 1/2 foam roll 20 sec x 4  Bridges U 10 sec x 10  hss seated 90 sec each  Sl clams 10x 3 rtb np   SL transverse abdominals 10x np  Quad UE/ LE  20x  Cord purple  gss slant  20 sec x 3  ltr stretch 20 sec x 3  np   ltr tball 20x np  Obliques ball sq 20x  Plank elbows 30   Side plank 30 sec      Ther ex time:45 min  Exercises were reviewed and pt was able to demonstrate them prior to the end of the session.   Modalities:  np      ASSESSMENT:  Progressing with decreased pain and improved LB ext arom.   Patient can benefit from outpatient physical therapy and a home program  Prognosis is Good.     Medical  necessity is demonstrated by the following  IMPAIRMENTS:  Unable to participate in daily activities, Pain limits function of effected part for some activities, Requires skilled supervision to complete and progress HEP and Weakness     GOALS:    12_   weeks. Pt agrees with goals.  1. Independent with HEP.  2. Report decreased  LBP    pain  <   / =  5  /10 with adls such as sit to stand, climbing ladders  3. Increased MMT  for  B HE   To 5/5  4. Increased arom  for  LB ext to 75%            5.   Improved FOTO score mobility  to 20-40%      PLAN:  Outpatient physical therapy 1-2 times weekly to include: pt ed, hep, therapeutic exercises, neuromuscular re-education/ balance exercises, joint mobilizations, modalities prn, and aquatic therapy.  Pt may see PTA as part of treatment plan.  Cont PT for  9        weeks.

## 2017-11-28 ENCOUNTER — CLINICAL SUPPORT (OUTPATIENT)
Dept: REHABILITATION | Facility: HOSPITAL | Age: 62
End: 2017-11-28
Attending: PHYSICIAN ASSISTANT
Payer: COMMERCIAL

## 2017-11-28 DIAGNOSIS — G89.29 CHRONIC BILATERAL LOW BACK PAIN WITHOUT SCIATICA: Primary | ICD-10-CM

## 2017-11-28 DIAGNOSIS — M54.50 CHRONIC BILATERAL LOW BACK PAIN WITHOUT SCIATICA: Primary | ICD-10-CM

## 2017-11-28 PROCEDURE — G8979 MOBILITY GOAL STATUS: HCPCS | Mod: CJ | Performed by: INTERNAL MEDICINE

## 2017-11-28 PROCEDURE — 97110 THERAPEUTIC EXERCISES: CPT | Performed by: INTERNAL MEDICINE

## 2017-11-28 PROCEDURE — G8978 MOBILITY CURRENT STATUS: HCPCS | Mod: CJ | Performed by: INTERNAL MEDICINE

## 2017-11-28 NOTE — PROGRESS NOTES
Physician:Justa Moses, *  Diagnosis: LBP  Orders:  Physical Therapy evaluate and treat  Date of eval: 10/30/2017     Subjective:   No c/o presently.. Achy in am when getting oob, takes 15 min to loosen up. Will cont I and call with any change in status.        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    Pts goals:  Decrease pain     OBJECTIVE :     11/28 Functional Limitations Reports - G Codes  Category: mobility  Tool: FOTO   Score: 72  Current/ 32%  Goal/ : 28%  Discharge/   NA        11/27 MMT:     Hip flex B 5  Hip abd B 5  Hip ext  R 5, L 5  Knee ext B 5  Knee flex B 5  DF B 5      11/28  Lumbar ext 100 % tight   LB SB R 100% tight  LB SB L  100% tight        10/30 Flexibility testing:  HS flex wfl B  Piriformis flex mild tightness B  B calf tightness      Posture- decreased LB lordosis, scoliosis LB        Palpation/ joint mob:  Decreased jm L spine, no ttp     TREATMENT:     Pt was provided with a written copy of exercises to perform as tolerated at home.  Pt performed rom and strengthening ex for le and core including:     ube 6 min  B shd ext 20x reformer 1 spring  thball trunk rot 10# 15x B  m squats tball 30x  supine quad stretch  strap roll 20 sec x 4  Bridges U 10 sec x 10 np  hss seated 90 sec each  Sl clams 10x 3 rtb np  Quad UE/ LE  20x  Cord purple  gss slant  20 sec x 3  ltr stretch 20 sec x 3  np   ltr tball 20x np  Obliques ball sq 20x   Plank elbows 30   Side plank 30 sec  X 2     Ther ex time:45 min  Exercises were reviewed and pt was able to demonstrate them prior to the end of the session.   Modalities:  np      ASSESSMENT:  Progressing with decreased pain and improved LB ext arom.   Patient can benefit from outpatient physical therapy and a home program  Prognosis is Good.     Medical necessity is demonstrated by the following  IMPAIRMENTS:  Requires  skilled supervision to complete and progress HEP and Weakness     GOALS:    12_   weeks. Pt  Appears to have met goals.  1. Independent with HEP.  2. Report decreased  LBP    pain  <   / =  5  /10 with adls such as sit to stand, climbing ladders (MET)  3. Increased MMT  for  B HE   To 5/5 (MET)  4. Increased arom  for  LB ext to 75% (MET)            5.   Improved FOTO score mobility  to 20-40% (MET)      PLAN:  Pt will cont with HEP and call with change in status or if he feels additional PT is needed in next 1-2 mos.

## 2017-12-16 DIAGNOSIS — M54.2 CERVICALGIA: ICD-10-CM

## 2017-12-16 DIAGNOSIS — M47.812 CERVICAL SPONDYLOSIS WITHOUT MYELOPATHY: ICD-10-CM

## 2017-12-16 DIAGNOSIS — M50.30 DEGENERATION OF CERVICAL INTERVERTEBRAL DISC: ICD-10-CM

## 2017-12-16 DIAGNOSIS — M47.812 CERVICAL SPONDYLOSIS: ICD-10-CM

## 2017-12-16 DIAGNOSIS — M50.20 DISPLACEMENT OF CERVICAL INTERVERTEBRAL DISC WITHOUT MYELOPATHY: ICD-10-CM

## 2017-12-16 DIAGNOSIS — M43.10 ACQUIRED SPONDYLOLISTHESIS: ICD-10-CM

## 2017-12-17 RX ORDER — METHOCARBAMOL 750 MG/1
750 TABLET, FILM COATED ORAL 2 TIMES DAILY PRN
Qty: 42 TABLET | Refills: 0 | Status: SHIPPED | OUTPATIENT
Start: 2017-12-17 | End: 2018-01-10 | Stop reason: SDUPTHER

## 2017-12-18 RX ORDER — GABAPENTIN 300 MG/1
CAPSULE ORAL
Qty: 90 CAPSULE | Refills: 2 | Status: SHIPPED | OUTPATIENT
Start: 2017-12-18 | End: 2018-02-23 | Stop reason: SDUPTHER

## 2017-12-21 ENCOUNTER — DOCUMENTATION ONLY (OUTPATIENT)
Dept: REHABILITATION | Facility: HOSPITAL | Age: 62
End: 2017-12-21

## 2017-12-25 DIAGNOSIS — M19.90 ARTHRITIS: ICD-10-CM

## 2017-12-26 RX ORDER — MELOXICAM 15 MG/1
TABLET ORAL
Qty: 30 TABLET | Refills: 7 | Status: SHIPPED | OUTPATIENT
Start: 2017-12-26 | End: 2018-12-12 | Stop reason: SDUPTHER

## 2018-01-04 ENCOUNTER — TELEPHONE (OUTPATIENT)
Dept: SPINE | Facility: CLINIC | Age: 63
End: 2018-01-04

## 2018-01-04 ENCOUNTER — HOSPITAL ENCOUNTER (OUTPATIENT)
Dept: RADIOLOGY | Facility: OTHER | Age: 63
Discharge: HOME | End: 2018-01-04
Attending: PHYSICIAN ASSISTANT
Payer: COMMERCIAL

## 2018-01-04 ENCOUNTER — OFFICE VISIT (OUTPATIENT)
Dept: SPINE | Facility: CLINIC | Age: 63
End: 2018-01-04
Payer: COMMERCIAL

## 2018-01-04 VITALS
DIASTOLIC BLOOD PRESSURE: 70 MMHG | SYSTOLIC BLOOD PRESSURE: 125 MMHG | BODY MASS INDEX: 21.62 KG/M2 | HEIGHT: 70 IN | WEIGHT: 151 LBS | HEART RATE: 82 BPM

## 2018-01-04 DIAGNOSIS — M47.812 CERVICAL SPONDYLOSIS WITHOUT MYELOPATHY: ICD-10-CM

## 2018-01-04 DIAGNOSIS — M54.2 NECK PAIN: ICD-10-CM

## 2018-01-04 DIAGNOSIS — M50.20 HNP (HERNIATED NUCLEUS PULPOSUS), CERVICAL: ICD-10-CM

## 2018-01-04 DIAGNOSIS — M43.10 ACQUIRED SPONDYLOLISTHESIS: ICD-10-CM

## 2018-01-04 DIAGNOSIS — M54.12 CERVICAL RADICULITIS: ICD-10-CM

## 2018-01-04 DIAGNOSIS — M50.30 DEGENERATION OF CERVICAL INTERVERTEBRAL DISC: ICD-10-CM

## 2018-01-04 DIAGNOSIS — M50.20 HNP (HERNIATED NUCLEUS PULPOSUS), CERVICAL: Primary | ICD-10-CM

## 2018-01-04 PROCEDURE — 99999 PR PBB SHADOW E&M-EST. PATIENT-LVL IV: CPT | Mod: PBBFAC,,, | Performed by: PHYSICIAN ASSISTANT

## 2018-01-04 PROCEDURE — 72050 X-RAY EXAM NECK SPINE 4/5VWS: CPT | Mod: TC,FY

## 2018-01-04 PROCEDURE — 99214 OFFICE O/P EST MOD 30 MIN: CPT | Mod: S$GLB,,, | Performed by: PHYSICIAN ASSISTANT

## 2018-01-04 PROCEDURE — 72050 X-RAY EXAM NECK SPINE 4/5VWS: CPT | Mod: 26,FY,, | Performed by: RADIOLOGY

## 2018-01-04 NOTE — PROGRESS NOTES
"Subjective:     Patient ID:  Sabino Rubio is a 62 y.o. male.      Chief Complaint:  Neck pain, Bilateral hand paresthesias,  Low back pain and left leg pain    HPI    Sabino Rubio is a 62 y.o. male who presents for follow up for low back and left leg pain.  Has been going to PT which has been helping his low back pain.  Still has some pain and stiffness in the morning or with increased activity.  Occasionally has left lateral leg pain to the ankle that comes and goes.  No right leg pain.  Overall is doing better from a low back standpoint.    States that his neck is bothering him more at this point.  I saw him for neck pain in 2014 which had improved.  In the last 6 months he has started to have more neck pain that radiates up with a shocking pain behind the left ear.  He has some pain in the left arm to the elbow that comes and goes.  Paresthesias in the bilateral hand in the middle, ring, and pinky fingers right greater than left.    Neurontin TID, Mobic daily, and Robaxin all which help.    Pain today rated 4/10    Patient denies any recent accidents or trauma, no saddle anesthesias, and no bowel or bladder incontinence.    Review of Systems:  Constitution: Negative for chills, fever, night sweats and weight loss.   Musculoskeletal: Negative for falls.   Gastrointestinal: Negative for bowel incontinence, nausea and vomiting.   Genitourinary: Negative for bladder incontinence.   Neurological: Negative for disturbances in coordination and loss of balance.      Objective:      Vitals:    01/04/18 0726   BP: 125/70   Pulse: 82   Weight: 68.5 kg (151 lb)   Height: 5' 10" (1.778 m)   PainSc:   4   PainLoc: Neck       Physical Exam:    General:  Sabino Rubio is well-developed, well-nourished, appears stated age, in no acute distress, alert and oriented to person, place, and time.    Patient sits comfortably in the exam room and answers questions appropriately. Grossly patient is able to move bilateral " upper and lower extremities without difficulty.     XRAY Interpretation:      Lumbar spine ap/lateral/flexion/extension xrays were personally reviewed today.  No fractures.  No movement on flexion and extension.  Multilevel DDD and spondylosis worse at L4-5 and L5-S1.  Old compression deformity at the superior endplate of L3.    XRAY/MRI Interpretation:  (From 2014)  Cervical spine ap/lateral xrays were personally reviewed today. There is DDD at C3-4, C5-6, C6-7. No fractures. Slight retrolisthesis at C3-4 and anterolisthesis at C4-5. Anterolisthesis at C7-T1.     Cervical spine MRI was personally reviewed today. Multilevel DDD and spondylosis. Severe bilateral NFS at C3-4 from DOC and left HNP at C4-5 causing nerve compression.    Assessment:          1. HNP (herniated nucleus pulposus), cervical    2. Cervical radiculitis    3. Cervical spondylosis without myelopathy    4. Degeneration of cervical intervertebral disc    5. Neck pain    6. Acquired spondylolisthesis            Plan:          Orders Placed This Encounter    X-Ray Cervical Spine AP Lat with Flex Ex    Ambulatory referral to Physical Therapy - Cervical     Lumbar spondylosis/DDD  Worse at L4-5 and L5-S1     -Continue Mobic, Neurontin, and Robaxin  -PT exercises at home    Previous cervical spondylosis issue now worse in the last 6 months    -Update cspine xrays today  -PT through [facility]  -FU with Arabella Jackson PA-C in two months while I am out on leave  -If no improvement would need MRI cspine to assess for injections        Follow-Up:  Return in about 2 months (around 3/4/2018). If there are any questions prior to this, the patient was instructed to contact the office.       Justa Moses, [facility], PA-C  Neurosurgery  [facility]  [facility]

## 2018-01-04 NOTE — TELEPHONE ENCOUNTER
Please call patient.    Cervical xrays do not show any fractures.    More arthritis and degenerative disc disease from C3-7 compared to last set of xrays.    Slight movement at C2-3 on flexion and extension but no instability.    Ok to proceed with physical therapy.    Justa Moses, GERARD, PA-C  Neurosurgery  [facility]  [facility]

## 2018-01-11 RX ORDER — METHOCARBAMOL 750 MG/1
750 TABLET, FILM COATED ORAL 2 TIMES DAILY PRN
Qty: 42 TABLET | Refills: 0 | Status: SHIPPED | OUTPATIENT
Start: 2018-01-11 | End: 2018-03-18 | Stop reason: SDUPTHER

## 2018-01-27 DIAGNOSIS — M19.90 ARTHRITIS: ICD-10-CM

## 2018-01-27 RX ORDER — MELOXICAM 15 MG/1
15 TABLET ORAL DAILY
Qty: 30 TABLET | Refills: 11 | Status: SHIPPED | OUTPATIENT
Start: 2018-01-27 | End: 2018-12-12 | Stop reason: SDUPTHER

## 2018-01-29 ENCOUNTER — OFFICE VISIT (OUTPATIENT)
Dept: URGENT CARE | Facility: CLINIC | Age: 63
End: 2018-01-29
Payer: COMMERCIAL

## 2018-01-29 VITALS
SYSTOLIC BLOOD PRESSURE: 138 MMHG | HEIGHT: 70 IN | RESPIRATION RATE: 18 BRPM | TEMPERATURE: 99 F | WEIGHT: 150 LBS | OXYGEN SATURATION: 97 % | BODY MASS INDEX: 21.47 KG/M2 | HEART RATE: 100 BPM | DIASTOLIC BLOOD PRESSURE: 85 MMHG

## 2018-01-29 DIAGNOSIS — J10.1 INFLUENZA B: Primary | ICD-10-CM

## 2018-01-29 DIAGNOSIS — R05.9 COUGH: ICD-10-CM

## 2018-01-29 LAB
CTP QC/QA: YES
FLUAV AG NPH QL: NEGATIVE
FLUBV AG NPH QL: POSITIVE

## 2018-01-29 PROCEDURE — 99214 OFFICE O/P EST MOD 30 MIN: CPT | Mod: S$GLB,,, | Performed by: NURSE PRACTITIONER

## 2018-01-29 PROCEDURE — 87804 INFLUENZA ASSAY W/OPTIC: CPT | Mod: QW,S$GLB,, | Performed by: NURSE PRACTITIONER

## 2018-01-29 RX ORDER — FLUTICASONE PROPIONATE 50 MCG
1 SPRAY, SUSPENSION (ML) NASAL 2 TIMES DAILY
Qty: 1 BOTTLE | Refills: 3 | Status: SHIPPED | OUTPATIENT
Start: 2018-01-29 | End: 2018-12-12 | Stop reason: SDUPTHER

## 2018-01-29 NOTE — PATIENT INSTRUCTIONS
You may continue taking Tylenol (acetaminophen) or ibuprofen for pain and fever.      Influenza (Adult)    Influenza is also called the flu. It is a viral illness that affects the air passages of your lungs. It is different from the common cold. The flu can easily be passed from one to person to another. It may be spread through the air by coughing and sneezing. Or it can be spread by touching the sick person and then touching your own eyes, nose, or mouth.  The flu starts 1 to 3 days after you are exposed to the flu virus. It may last for 1 to 2 weeks but many people feel tired or fatigued for many weeks afterward. You usually dont need to take antibiotics unless you have a complication. This might be an ear or sinus infection or pneumonia.  Symptoms of the flu may be mild or severe. They can include extreme tiredness (wanting to stay in bed all day), chills, fevers, muscle aches, soreness with eye movement, headache, and a dry, hacking cough.  Home care  Follow these guidelines when caring for yourself at home:  · Avoid being around cigarette smoke, whether yours or other peoples.  · Acetaminophen or ibuprofen will help ease your fever, muscle aches, and headache. Dont give aspirin to anyone younger than 18 who has the flu. Aspirin can harm the liver.  · Nausea and loss of appetite are common with the flu. Eat light meals. Drink 6 to 8 glasses of liquids every day. Good choices are water, sport drinks, soft drinks without caffeine, juices, tea, and soup. Extra fluids will also help loosen secretions in your nose and lungs.  · Over-the-counter cold medicines will not make the flu go away faster. But the medicines may help with coughing, sore throat, and congestion in your nose and sinuses. Dont use a decongestant if you have high blood pressure.  · Stay home until your fever has been gone for at least 24 hours without using medicine to reduce fever.  Follow-up care  Follow up with your healthcare provider, or  as advised, if you are not getting better over the next week.  If you are age 65 or older, talk with your provider about getting a pneumococcal vaccine every 5 years. You should also get this vaccine if you have chronic asthma or COPD. All adults should get a flu vaccine every fall. Ask your provider about this.  When to seek medical advice  Call your healthcare provider right away if any of these occur:  · Cough with lots of colored mucus (sputum) or blood in your mucus  · Chest pain, shortness of breath, wheezing, or trouble breathing  · Severe headache, or face, neck, or ear pain  · New rash with fever  · Fever of 100.4°F (38°C) or higher, or as directed by your healthcare provider  · Confusion, behavior change, or seizure  · Severe weakness or dizziness  · You get a new fever or cough after getting better for a few days  Date Last Reviewed: 1/1/2017  © 3112-2348 [employer]. [address] [place] [address], [place] [address]. All rights reserved. This information is not intended as a substitute for professional medical care. Always follow your healthcare professional's instructions.

## 2018-01-29 NOTE — PROGRESS NOTES
"Subjective:       Patient ID: Sabino Rubio is a 62 y.o. male.    Vitals:  height is 5' 10" (1.778 m) and weight is 68 kg (150 lb). His temperature is 99.1 °F (37.3 °C). His blood pressure is 138/85 and his pulse is 100. His respiration is 18 and oxygen saturation is 97%.     Chief Complaint: Cough    3 days of myalgias, headache and cough. Taken mobic (prescribed for other issue) and Tylenol and OTC Cough and Cold with min results. Seen at Urgent Care yesterday and tested neg for flu.      Cough   This is a new problem. The current episode started in the past 7 days. The problem has been gradually worsening. The problem occurs every few minutes. The cough is productive of sputum. Associated symptoms include chills, a fever, myalgias, nasal congestion, postnasal drip and a sore throat. Pertinent negatives include no chest pain, ear pain, eye redness, headaches, shortness of breath or wheezing. Nothing aggravates the symptoms. He has tried OTC cough suppressant for the symptoms. The treatment provided no relief.     Review of Systems   Constitution: Positive for chills, fever and malaise/fatigue.   HENT: Positive for congestion, postnasal drip and sore throat. Negative for ear pain and hoarse voice.    Eyes: Negative for discharge and redness.   Cardiovascular: Negative for chest pain, dyspnea on exertion and leg swelling.   Respiratory: Positive for cough and sputum production. Negative for shortness of breath and wheezing.    Musculoskeletal: Positive for myalgias.   Gastrointestinal: Negative for abdominal pain and nausea.   Neurological: Negative for headaches.       Objective:      Physical Exam   Constitutional: He is oriented to person, place, and time. He appears well-developed and well-nourished. He is cooperative.  Non-toxic appearance. He does not appear ill. No distress.   HENT:   Head: Normocephalic and atraumatic.   Right Ear: Hearing, tympanic membrane, external ear and ear canal normal.   Left " Ear: Hearing, tympanic membrane, external ear and ear canal normal.   Nose: Nose normal. No mucosal edema, rhinorrhea or nasal deformity. No epistaxis. Right sinus exhibits no maxillary sinus tenderness and no frontal sinus tenderness. Left sinus exhibits no maxillary sinus tenderness and no frontal sinus tenderness.   Mouth/Throat: Uvula is midline and mucous membranes are normal. No trismus in the jaw. Normal dentition. No uvula swelling. Posterior oropharyngeal erythema present.   Eyes: Conjunctivae and lids are normal. No scleral icterus.   Sclera clear bilat   Neck: Trachea normal, full passive range of motion without pain and phonation normal. Neck supple.   Cardiovascular: Normal rate, regular rhythm, normal heart sounds, intact distal pulses and normal pulses.    Pulmonary/Chest: Effort normal and breath sounds normal. No respiratory distress. He has no wheezes. He has no rhonchi. He has no rales.   Abdominal: Soft. Normal appearance and bowel sounds are normal. He exhibits no distension. There is no tenderness. There is no guarding.   Musculoskeletal: Normal range of motion. He exhibits no edema or deformity.   Neurological: He is alert and oriented to person, place, and time. He exhibits normal muscle tone. Coordination normal.   Skin: Skin is warm, dry and intact. He is not diaphoretic. No pallor.   Psychiatric: He has a normal mood and affect. His speech is normal and behavior is normal. Judgment and thought content normal. Cognition and memory are normal.   Nursing note and vitals reviewed.        POCT Influenza A/B   Order: [id number]   Status:  Final result   Visible to patient:  No (Not Released)   Next appt:  02/21/2018 at 08:15 AM in Dermatology (Essence Emanuel MD)   Dx:  Cough    Ref Range & Units 09:48   Rapid Influenza A Ag Negative Negative    Rapid Influenza B Ag Negative Positive      Acceptable  Yes    Resulting Agency  [facility]             Assessment:       1. Influenza B    2.  Cough        Plan:         Influenza B  -     fluticasone (FLONASE) 50 mcg/actuation nasal spray; 1 spray (50 mcg total) by Each Nare route 2 (two) times daily.  Dispense: 1 Bottle; Refill: 3    Cough  -     POCT Influenza A/B      Patient Instructions     You may continue taking Tylenol (acetaminophen) or ibuprofen for pain and fever.      Influenza (Adult)    Influenza is also called the flu. It is a viral illness that affects the air passages of your lungs. It is different from the common cold. The flu can easily be passed from one to person to another. It may be spread through the air by coughing and sneezing. Or it can be spread by touching the sick person and then touching your own eyes, nose, or mouth.  The flu starts 1 to 3 days after you are exposed to the flu virus. It may last for 1 to 2 weeks but many people feel tired or fatigued for many weeks afterward. You usually dont need to take antibiotics unless you have a complication. This might be an ear or sinus infection or pneumonia.  Symptoms of the flu may be mild or severe. They can include extreme tiredness (wanting to stay in bed all day), chills, fevers, muscle aches, soreness with eye movement, headache, and a dry, hacking cough.  Home care  Follow these guidelines when caring for yourself at home:  · Avoid being around cigarette smoke, whether yours or other peoples.  · Acetaminophen or ibuprofen will help ease your fever, muscle aches, and headache. Dont give aspirin to anyone younger than 18 who has the flu. Aspirin can harm the liver.  · Nausea and loss of appetite are common with the flu. Eat light meals. Drink 6 to 8 glasses of liquids every day. Good choices are water, sport drinks, soft drinks without caffeine, juices, tea, and soup. Extra fluids will also help loosen secretions in your nose and lungs.  · Over-the-counter cold medicines will not make the flu go away faster. But the medicines may help with coughing, sore throat, and  congestion in your nose and sinuses. Dont use a decongestant if you have high blood pressure.  · Stay home until your fever has been gone for at least 24 hours without using medicine to reduce fever.  Follow-up care  Follow up with your healthcare provider, or as advised, if you are not getting better over the next week.  If you are age 65 or older, talk with your provider about getting a pneumococcal vaccine every 5 years. You should also get this vaccine if you have chronic asthma or COPD. All adults should get a flu vaccine every fall. Ask your provider about this.  When to seek medical advice  Call your healthcare provider right away if any of these occur:  · Cough with lots of colored mucus (sputum) or blood in your mucus  · Chest pain, shortness of breath, wheezing, or trouble breathing  · Severe headache, or face, neck, or ear pain  · New rash with fever  · Fever of 100.4°F (38°C) or higher, or as directed by your healthcare provider  · Confusion, behavior change, or seizure  · Severe weakness or dizziness  · You get a new fever or cough after getting better for a few days  Date Last Reviewed: 1/1/2017  © 7586-7002 [employer]. [address] [place] [address], [place] [address]. All rights reserved. This information is not intended as a substitute for professional medical care. Always follow your healthcare professional's instructions.

## 2018-02-21 ENCOUNTER — INITIAL CONSULT (OUTPATIENT)
Dept: DERMATOLOGY | Facility: CLINIC | Age: 63
End: 2018-02-21
Payer: COMMERCIAL

## 2018-02-21 DIAGNOSIS — D22.9 MULTIPLE BENIGN NEVI: ICD-10-CM

## 2018-02-21 DIAGNOSIS — Z12.83 SKIN CANCER SCREENING: ICD-10-CM

## 2018-02-21 DIAGNOSIS — D18.00 ANGIOMA: Primary | ICD-10-CM

## 2018-02-21 DIAGNOSIS — L82.1 SK (SEBORRHEIC KERATOSIS): ICD-10-CM

## 2018-02-21 PROCEDURE — 99213 OFFICE O/P EST LOW 20 MIN: CPT | Mod: S$GLB,,, | Performed by: DERMATOLOGY

## 2018-02-21 PROCEDURE — 3008F BODY MASS INDEX DOCD: CPT | Mod: S$GLB,,, | Performed by: DERMATOLOGY

## 2018-02-21 PROCEDURE — 99999 PR PBB SHADOW E&M-EST. PATIENT-LVL II: CPT | Mod: PBBFAC,,, | Performed by: DERMATOLOGY

## 2018-02-21 NOTE — LETTER
February 21, 2018      Alba Baum MD  [address]  [facility] [address]           [facility] - Dermatology  [address]  [facility] [address]  Phone: [phone]  Fax: [phone]          Patient: Sabino Rubio   MR Number: [id number]   YOB: 1955   Date of Visit: 2/21/2018       Dear Dr. Alba Baum:    Thank you for referring Sabino Rubio to me for evaluation. Attached you will find relevant portions of my assessment and plan of care.    If you have questions, please do not hesitate to call me. I look forward to following Sabino Rubio along with you.    Sincerely,    Essence Emanuel MD    Enclosure  CC:  No Recipients    If you would like to receive this communication electronically, please contact [email or web address] or [phone] to request more information on [employer] access.    For providers and/or their staff who would like to refer a patient to [facility], please contact us through our one-stop-shop provider referral line, [facility], at [phone].    If you feel you have received this communication in error or would no longer like to receive these types of communications, please e-mail [email or web address]

## 2018-02-21 NOTE — PATIENT INSTRUCTIONS

## 2018-02-21 NOTE — PROGRESS NOTES
Subjective:       Patient ID:  Sabino Rubio is a 62 y.o. male who presents for   Chief Complaint   Patient presents with    Skin Check     ubse     Patient presents for UBSE. Last seen 1/2016 at which time biopsy of lesion on right forearm showed scar. No lesions of concern today.  No personal history of skin cancer. Positive family history of skin cancer (father, pt believes he had melanoma).         Review of Systems   Constitutional: Negative for fever, chills and fatigue.   Skin: Positive for daily sunscreen use. Negative for itching and rash.   Hematologic/Lymphatic: Bruises/bleeds easily.        Objective:    Physical Exam   Constitutional: He appears well-developed and well-nourished. No distress.   Neurological: He is alert and oriented to person, place, and time. He is not disoriented.   Psychiatric: He has a normal mood and affect.   Skin:   Areas Examined (abnormalities noted in diagram):   Scalp / Hair Palpated and Inspected  Head / Face Inspection Performed  Neck Inspection Performed  Chest / Axilla Inspection Performed  Abdomen Inspection Performed  Back Inspection Performed  RUE Inspected  LUE Inspection Performed                   Diagram Legend     Erythematous scaling macule/papule c/w actinic keratosis       Vascular papule c/w angioma      Pigmented verrucoid papule/plaque c/w seborrheic keratosis      Yellow umbilicated papule c/w sebaceous hyperplasia      Irregularly shaped tan macule c/w lentigo     1-2 mm smooth white papules consistent with Milia      Movable subcutaneous cyst with punctum c/w epidermal inclusion cyst      Subcutaneous movable cyst c/w pilar cyst      Firm pink to brown papule c/w dermatofibroma      Pedunculated fleshy papule(s) c/w skin tag(s)      Evenly pigmented macule c/w junctional nevus     Mildly variegated pigmented, slightly irregular-bordered macule c/w mildly atypical nevus      Flesh colored to evenly pigmented papule c/w intradermal nevus       Pink  pearly papule/plaque c/w basal cell carcinoma      Erythematous hyperkeratotic cursted plaque c/w SCC      Surgical scar with no sign of skin cancer recurrence      Open and closed comedones      Inflammatory papules and pustules      Verrucoid papule consistent consistent with wart     Erythematous eczematous patches and plaques     Dystrophic onycholytic nail with subungual debris c/w onychomycosis     Umbilicated papule    Erythematous-base heme-crusted tan verrucoid plaque consistent with inflamed seborrheic keratosis     Erythematous Silvery Scaling Plaque c/w Psoriasis     See annotation      Assessment / Plan:        Angioma  This is a benign vascular lesion. Reassurance given. No treatment required. Treatment of benign, asymptomatic lesions may be considered cosmetic.    Multiple benign nevi  Benign-appearing on exam today. Counseled pt to monitor mole(s) and return to clinic if any changes noted or symptoms (bleeding, itching, pain, etc) noted. Brochure provided.    SK (seborrheic keratosis)  These are benign inherited growths without a malignant potential. Reassurance given to patient. No treatment is necessary.   Treatment of benign, asymptomatic lesions may be considered cosmetic.  Warned about risk of hypo- or hyperpigmentation with treatment and risk of recurrence.    Skin cancer screening  Upper body skin examination performed today including at least 6 points as noted in physical examination. No lesions suspicious for malignancy noted.  Patient instructed in importance of daily broad spectrum sunscreen use with spf at least 30. Sun avoidance and topical protection/protective clothing discussed.\      Follow-up in about 1 year (around 2/21/2019) for skin check or sooner for any concerns.

## 2018-02-23 DIAGNOSIS — M54.2 CERVICALGIA: ICD-10-CM

## 2018-02-23 DIAGNOSIS — M47.812 CERVICAL SPONDYLOSIS WITHOUT MYELOPATHY: ICD-10-CM

## 2018-02-23 DIAGNOSIS — M50.30 DEGENERATION OF CERVICAL INTERVERTEBRAL DISC: ICD-10-CM

## 2018-02-23 DIAGNOSIS — M43.10 ACQUIRED SPONDYLOLISTHESIS: ICD-10-CM

## 2018-02-23 DIAGNOSIS — M50.20 DISPLACEMENT OF CERVICAL INTERVERTEBRAL DISC WITHOUT MYELOPATHY: ICD-10-CM

## 2018-02-23 DIAGNOSIS — M47.812 SPONDYLOSIS OF CERVICAL REGION WITHOUT MYELOPATHY OR RADICULOPATHY: ICD-10-CM

## 2018-02-23 RX ORDER — GABAPENTIN 300 MG/1
300 CAPSULE ORAL 3 TIMES DAILY
Qty: 90 CAPSULE | Refills: 2 | Status: SHIPPED | OUTPATIENT
Start: 2018-02-23 | End: 2018-12-12 | Stop reason: SDUPTHER

## 2018-03-19 RX ORDER — METHOCARBAMOL 750 MG/1
750 TABLET, FILM COATED ORAL 2 TIMES DAILY PRN
Qty: 42 TABLET | Refills: 0 | Status: SHIPPED | OUTPATIENT
Start: 2018-03-19 | End: 2019-06-27 | Stop reason: CLARIF

## 2018-04-09 ENCOUNTER — OFFICE VISIT (OUTPATIENT)
Dept: URGENT CARE | Facility: CLINIC | Age: 63
End: 2018-04-09
Payer: COMMERCIAL

## 2018-04-09 VITALS
HEART RATE: 77 BPM | HEIGHT: 70 IN | SYSTOLIC BLOOD PRESSURE: 119 MMHG | TEMPERATURE: 99 F | WEIGHT: 154 LBS | RESPIRATION RATE: 18 BRPM | OXYGEN SATURATION: 97 % | BODY MASS INDEX: 22.05 KG/M2 | DIASTOLIC BLOOD PRESSURE: 81 MMHG

## 2018-04-09 DIAGNOSIS — S61.012A LACERATION OF LEFT THUMB WITHOUT FOREIGN BODY WITHOUT DAMAGE TO NAIL, INITIAL ENCOUNTER: Primary | ICD-10-CM

## 2018-04-09 PROCEDURE — 99214 OFFICE O/P EST MOD 30 MIN: CPT | Mod: 25,S$GLB,, | Performed by: NURSE PRACTITIONER

## 2018-04-09 PROCEDURE — 12001 RPR S/N/AX/GEN/TRNK 2.5CM/<: CPT | Mod: S$GLB,,, | Performed by: NURSE PRACTITIONER

## 2018-04-09 RX ORDER — MUPIROCIN 20 MG/G
OINTMENT TOPICAL
Qty: 22 G | Refills: 0 | Status: SHIPPED | OUTPATIENT
Start: 2018-04-09 | End: 2018-12-12

## 2018-04-09 RX ORDER — METHOCARBAMOL 750 MG/1
750 TABLET, FILM COATED ORAL 2 TIMES DAILY PRN
Qty: 42 TABLET | Refills: 0 | OUTPATIENT
Start: 2018-04-09

## 2018-04-09 NOTE — PROGRESS NOTES
"Subjective:       Patient ID: Sabino Rubio is a 62 y.o. male.    Vitals:  height is 5' 10" (1.778 m) and weight is 69.9 kg (154 lb). His temperature is 98.5 °F (36.9 °C). His blood pressure is 119/81 and his pulse is 77. His respiration is 18 and oxygen saturation is 97%.     Chief Complaint: Trauma    Trauma   The incident occurred less than 1 hour ago. The incident occurred at work. The injury mechanism was a direct blow. Context: Screw Gun. No protective equipment was used. There is an injury to the left thumb (Left Thumb). The pain is moderate. It is unknown if a foreign body is present. Pertinent negatives include no abdominal pain, chest pain, neck pain, numbness or weakness. There have been no prior injuries to these areas. His tetanus status is unknown.     Review of Systems   Constitution: Negative for weakness and malaise/fatigue.   HENT: Negative for nosebleeds.    Cardiovascular: Negative for chest pain and syncope.   Respiratory: Negative for shortness of breath.    Musculoskeletal: Positive for joint pain. Negative for back pain and neck pain.   Gastrointestinal: Negative for abdominal pain.   Genitourinary: Negative for hematuria.   Neurological: Negative for dizziness and numbness.       Objective:      Physical Exam   Constitutional: He is oriented to person, place, and time. He appears well-developed and well-nourished.   HENT:   Head: Normocephalic and atraumatic. Head is without abrasion, without contusion and without laceration.   Right Ear: External ear normal.   Left Ear: External ear normal.   Nose: Nose normal.   Mouth/Throat: Oropharynx is clear and moist.   Eyes: Conjunctivae, EOM and lids are normal. Pupils are equal, round, and reactive to light.   Neck: Trachea normal, full passive range of motion without pain and phonation normal. Neck supple.   Cardiovascular: Normal rate, regular rhythm and normal heart sounds.    Pulmonary/Chest: Effort normal and breath sounds normal. No " stridor. No respiratory distress.   Musculoskeletal: Normal range of motion.   Neurological: He is alert and oriented to person, place, and time.   Skin: Skin is warm and dry. Capillary refill takes less than 2 seconds. Laceration (1.5 CM CURVILINEAR SUPERFICIAL LACERATION ABOUT DORSUM OF LEFT THUMB OVERLYING DIP JOINT) noted. No abrasion, no bruising, no burn, no ecchymosis, no lesion and no rash noted. No erythema.   Psychiatric: He has a normal mood and affect. His speech is normal and behavior is normal. Judgment and thought content normal. Cognition and memory are normal.   Nursing note and vitals reviewed.    Laceration Repair  Date/Time: 2018 3:48 PM  Performed by: TEETEE HERNANDEZ  Authorized by: TEETEE HERNANDEZ   Consent Done: Yes  Consent: Verbal consent obtained.  Risks and benefits: risks, benefits and alternatives were discussed  Consent given by: patient  Patient understanding: patient states understanding of the procedure being performed  Patient consent: the patient's understanding of the procedure matches consent given  Procedure consent: procedure consent matches procedure scheduled  Patient identity confirmed:  and name  Body area: upper extremity  Location details: left thumb  Laceration length: 1.5 cm  Foreign bodies: no foreign bodies  Tendon involvement: none  Nerve involvement: none  Vascular damage: no  Anesthesia: digital block    Anesthesia:  Local Anesthetic: lidocaine 1% without epinephrine  Anesthetic total: 2.5 mL  Preparation: Patient was prepped and draped in the usual sterile fashion.  Irrigation solution: saline  Irrigation method: syringe  Amount of cleaning: standard  Debridement: none  Degree of undermining: none  Skin closure: 4-0 nylon  Number of sutures: 3  Technique: simple  Approximation: close  Approximation difficulty: simple  Dressing: antibiotic ointment and non-stick sterile dressing  Patient tolerance: Patient tolerated the procedure well with no immediate  complications        Assessment:       1. Laceration of left thumb without foreign body without damage to nail, initial encounter        Plan:         Laceration of left thumb without foreign body without damage to nail, initial encounter  -     mupirocin (BACTROBAN) 2 % ointment; Apply to affected area 2 times daily  Dispense: 22 g; Refill: 0      Patient Instructions   KEEP WOUND SITE CLEAN AND DRY OVER NIGHT.  BEGIN APPLYING BACTROBAN OINTMENT TWICE A DAY X 2 DAYS STARTING TOMORROW. THEN APPLY ONLY A DRY BANDAGE.  YOU MAY ALSO LEAVE OPEN TO AIR AT NIGHT IN 2 DAYS  Extremity Laceration: Sutures, Staples, or Tape  A laceration is a cut through the skin. If it is deep, it may require stitches (sutures) or staples to close so it can heal. Minor cuts may be treated with surgical tape closures.   X-rays may be done if something may have entered the skin through the cut. You may also need a tetanus shot if you are not up to date on this vaccination.  Home care  · Follow the health care providers instructions on how to care for the cut.  · Wash your hands with soap and warm water before and after caring for your wound. This is to help prevent infection.  · Keep the wound clean and dry. If a bandage was applied and it becomes wet or dirty, replace it. Otherwise, leave it in place for the first 24 hours, then change it once a day or as directed.  · If sutures or staples were used, clean the wound daily:  · After removing the bandage, wash the area with soap and water. Use a wet cotton swab to loosen and remove any blood or crust that forms.  · After cleaning, keep the wound clean and dry. Talk with your doctor before applying any antibiotic ointment to the wound. Reapply the bandage.  · You may remove the bandage to shower as usual after the first 24 hours, but do not soak the area in water (no swimming) until the stitches or staples are removed.  · If surgical tape closures were used, keep the area clean and dry. If it  becomes wet, blot it dry with a towel.  · The doctor may prescribe an antibiotic cream or ointment to prevent infection. Do not stop taking this medication until you have finished the prescribed course or the doctor tells you to stop. The doctor may also prescribe medications for pain. Follow the doctors instructions for taking these medications.  · Avoid activities that may reopen your wound.  Follow-up care  Follow up with your health care provider. Most skin wounds heal within ten days. However, an infection may sometimes occur despite proper treatment. Therefore, check the wound daily for the signs of infection listed below. Stitches and staples should be removed within 7-14 days. If surgical tape closures were used, you may remove them after 10 days if they have not fallen off by then.   When to seek medical advice  Call your health care provider right away if any of these occur:  · Wound bleeding not controlled by direct pressure  · Signs of infection, including increasing pain in the wound, increasing wound redness or swelling, or pus or bad odor coming from the wound  · Fever of 100.4°F (38ºC) or higher or as directed by your healthcare provider  · Stitches or staples come apart or fall out or surgical tape falls off before 7 days  · Wound edges re-open  · Wound changes colors  · Numbness around the wound   · Decreased movement around the injured area  Date Last Reviewed: 6/14/2015  © 0012-4873 [employer]. [address] [place] [address], [place] [address]. All rights reserved. This information is not intended as a substitute for professional medical care. Always follow your healthcare professional's instructions.

## 2018-04-09 NOTE — PATIENT INSTRUCTIONS
KEEP WOUND SITE CLEAN AND DRY OVER NIGHT.  BEGIN APPLYING BACTROBAN OINTMENT TWICE A DAY X 2 DAYS STARTING TOMORROW. THEN APPLY ONLY A DRY BANDAGE.  YOU MAY ALSO LEAVE OPEN TO AIR AT NIGHT IN 2 DAYS  Extremity Laceration: Sutures, Staples, or Tape  A laceration is a cut through the skin. If it is deep, it may require stitches (sutures) or staples to close so it can heal. Minor cuts may be treated with surgical tape closures.   X-rays may be done if something may have entered the skin through the cut. You may also need a tetanus shot if you are not up to date on this vaccination.  Home care  · Follow the health care providers instructions on how to care for the cut.  · Wash your hands with soap and warm water before and after caring for your wound. This is to help prevent infection.  · Keep the wound clean and dry. If a bandage was applied and it becomes wet or dirty, replace it. Otherwise, leave it in place for the first 24 hours, then change it once a day or as directed.  · If sutures or staples were used, clean the wound daily:  · After removing the bandage, wash the area with soap and water. Use a wet cotton swab to loosen and remove any blood or crust that forms.  · After cleaning, keep the wound clean and dry. Talk with your doctor before applying any antibiotic ointment to the wound. Reapply the bandage.  · You may remove the bandage to shower as usual after the first 24 hours, but do not soak the area in water (no swimming) until the stitches or staples are removed.  · If surgical tape closures were used, keep the area clean and dry. If it becomes wet, blot it dry with a towel.  · The doctor may prescribe an antibiotic cream or ointment to prevent infection. Do not stop taking this medication until you have finished the prescribed course or the doctor tells you to stop. The doctor may also prescribe medications for pain. Follow the doctors instructions for taking these medications.  · Avoid activities that  may reopen your wound.  Follow-up care  Follow up with your health care provider. Most skin wounds heal within ten days. However, an infection may sometimes occur despite proper treatment. Therefore, check the wound daily for the signs of infection listed below. Stitches and staples should be removed within 7-14 days. If surgical tape closures were used, you may remove them after 10 days if they have not fallen off by then.   When to seek medical advice  Call your health care provider right away if any of these occur:  · Wound bleeding not controlled by direct pressure  · Signs of infection, including increasing pain in the wound, increasing wound redness or swelling, or pus or bad odor coming from the wound  · Fever of 100.4°F (38ºC) or higher or as directed by your healthcare provider  · Stitches or staples come apart or fall out or surgical tape falls off before 7 days  · Wound edges re-open  · Wound changes colors  · Numbness around the wound   · Decreased movement around the injured area  Date Last Reviewed: 6/14/2015 © 2000-2017 [employer]. [address] [place] [address], [place] [address]. All rights reserved. This information is not intended as a substitute for professional medical care. Always follow your healthcare professional's instructions.

## 2018-04-24 RX ORDER — METHOCARBAMOL 750 MG/1
750 TABLET, FILM COATED ORAL 2 TIMES DAILY PRN
Qty: 42 TABLET | Refills: 0 | OUTPATIENT
Start: 2018-04-24

## 2018-05-01 RX ORDER — METHOCARBAMOL 750 MG/1
750 TABLET, FILM COATED ORAL 2 TIMES DAILY PRN
Qty: 42 TABLET | Refills: 0 | OUTPATIENT
Start: 2018-05-01

## 2018-06-13 DIAGNOSIS — J10.1 INFLUENZA B: ICD-10-CM

## 2018-06-13 RX ORDER — FLUTICASONE PROPIONATE 50 MCG
SPRAY, SUSPENSION (ML) NASAL
Qty: 16 ML | Refills: 3 | OUTPATIENT
Start: 2018-06-13

## 2018-09-18 DIAGNOSIS — Z12.11 SCREEN FOR COLON CANCER: Primary | ICD-10-CM

## 2018-10-02 DIAGNOSIS — J10.1 INFLUENZA B: ICD-10-CM

## 2018-10-04 RX ORDER — FLUTICASONE PROPIONATE 50 MCG
SPRAY, SUSPENSION (ML) NASAL
Qty: 16 ML | Refills: 3 | OUTPATIENT
Start: 2018-10-04

## 2018-12-12 ENCOUNTER — CLINICAL SUPPORT (OUTPATIENT)
Dept: INTERNAL MEDICINE | Facility: CLINIC | Age: 63
End: 2018-12-12
Payer: COMMERCIAL

## 2018-12-12 ENCOUNTER — OFFICE VISIT (OUTPATIENT)
Dept: INTERNAL MEDICINE | Facility: CLINIC | Age: 63
End: 2018-12-12
Payer: COMMERCIAL

## 2018-12-12 VITALS
BODY MASS INDEX: 21.6 KG/M2 | SYSTOLIC BLOOD PRESSURE: 125 MMHG | WEIGHT: 154.31 LBS | DIASTOLIC BLOOD PRESSURE: 70 MMHG | HEIGHT: 71 IN

## 2018-12-12 DIAGNOSIS — N28.1 KIDNEY CYST, ACQUIRED: ICD-10-CM

## 2018-12-12 DIAGNOSIS — Z00.00 ROUTINE GENERAL MEDICAL EXAMINATION AT A HEALTH CARE FACILITY: Primary | ICD-10-CM

## 2018-12-12 DIAGNOSIS — L57.8 ACTINIC DERMATITIS: ICD-10-CM

## 2018-12-12 DIAGNOSIS — M43.10 ACQUIRED SPONDYLOLISTHESIS: ICD-10-CM

## 2018-12-12 DIAGNOSIS — M50.30 DEGENERATION OF CERVICAL INTERVERTEBRAL DISC: ICD-10-CM

## 2018-12-12 DIAGNOSIS — M67.431 GANGLION CYST OF DORSUM OF RIGHT WRIST: ICD-10-CM

## 2018-12-12 DIAGNOSIS — N44.2 TESTICULAR CYST: ICD-10-CM

## 2018-12-12 DIAGNOSIS — Z00.00 ANNUAL PHYSICAL EXAM: Primary | ICD-10-CM

## 2018-12-12 LAB
ALBUMIN SERPL BCP-MCNC: 3.7 G/DL
ALP SERPL-CCNC: 57 U/L
ALT SERPL W/O P-5'-P-CCNC: 13 U/L
ANION GAP SERPL CALC-SCNC: 6 MMOL/L
AST SERPL-CCNC: 21 U/L
BILIRUB SERPL-MCNC: 0.5 MG/DL
BUN SERPL-MCNC: 16 MG/DL
CALCIUM SERPL-MCNC: 9.3 MG/DL
CHLORIDE SERPL-SCNC: 104 MMOL/L
CHOLEST SERPL-MCNC: 200 MG/DL
CHOLEST/HDLC SERPL: 2.1 {RATIO}
CO2 SERPL-SCNC: 29 MMOL/L
COMPLEXED PSA SERPL-MCNC: 0.64 NG/ML
CREAT SERPL-MCNC: 0.9 MG/DL
ERYTHROCYTE [DISTWIDTH] IN BLOOD BY AUTOMATED COUNT: 12.9 %
EST. GFR  (AFRICAN AMERICAN): >60 ML/MIN/1.73 M^2
EST. GFR  (NON AFRICAN AMERICAN): >60 ML/MIN/1.73 M^2
ESTIMATED AVG GLUCOSE: 103 MG/DL
GLUCOSE SERPL-MCNC: 94 MG/DL
HBA1C MFR BLD HPLC: 5.2 %
HCT VFR BLD AUTO: 44.9 %
HDLC SERPL-MCNC: 95 MG/DL
HDLC SERPL: 47.5 %
HGB BLD-MCNC: 14.8 G/DL
LDLC SERPL CALC-MCNC: 96.8 MG/DL
MCH RBC QN AUTO: 30.4 PG
MCHC RBC AUTO-ENTMCNC: 33 G/DL
MCV RBC AUTO: 92 FL
NONHDLC SERPL-MCNC: 105 MG/DL
PLATELET # BLD AUTO: 216 K/UL
PMV BLD AUTO: 9.5 FL
POTASSIUM SERPL-SCNC: 4.4 MMOL/L
PROT SERPL-MCNC: 6.9 G/DL
RBC # BLD AUTO: 4.87 M/UL
SODIUM SERPL-SCNC: 139 MMOL/L
TRIGL SERPL-MCNC: 41 MG/DL
TSH SERPL DL<=0.005 MIU/L-ACNC: 1.66 UIU/ML
WBC # BLD AUTO: 6.19 K/UL

## 2018-12-12 PROCEDURE — 80061 LIPID PANEL: CPT

## 2018-12-12 PROCEDURE — 99999 PR PBB SHADOW E&M-EST. PATIENT-LVL V: CPT | Mod: PBBFAC,,, | Performed by: INTERNAL MEDICINE

## 2018-12-12 PROCEDURE — 84443 ASSAY THYROID STIM HORMONE: CPT

## 2018-12-12 PROCEDURE — 99396 PREV VISIT EST AGE 40-64: CPT | Mod: S$GLB,,, | Performed by: INTERNAL MEDICINE

## 2018-12-12 PROCEDURE — 84153 ASSAY OF PSA TOTAL: CPT

## 2018-12-12 PROCEDURE — 80053 COMPREHEN METABOLIC PANEL: CPT

## 2018-12-12 PROCEDURE — 85027 COMPLETE CBC AUTOMATED: CPT

## 2018-12-12 PROCEDURE — 83036 HEMOGLOBIN GLYCOSYLATED A1C: CPT

## 2018-12-12 RX ORDER — GABAPENTIN 300 MG/1
300 CAPSULE ORAL 3 TIMES DAILY
Qty: 90 CAPSULE | Refills: 2 | Status: SHIPPED | OUTPATIENT
Start: 2018-12-12 | End: 2019-03-15 | Stop reason: SDUPTHER

## 2018-12-12 RX ORDER — MELOXICAM 15 MG/1
15 TABLET ORAL DAILY PRN
Qty: 30 TABLET | Refills: 3 | Status: SHIPPED | OUTPATIENT
Start: 2018-12-12 | End: 2019-06-30 | Stop reason: SDUPTHER

## 2018-12-12 RX ORDER — FLUTICASONE PROPIONATE 50 MCG
1 SPRAY, SUSPENSION (ML) NASAL 2 TIMES DAILY
Qty: 1 BOTTLE | Refills: 3 | Status: SHIPPED | OUTPATIENT
Start: 2018-12-12 | End: 2019-04-24 | Stop reason: SDUPTHER

## 2018-12-12 RX ORDER — TADALAFIL 20 MG/1
20 TABLET ORAL EVERY OTHER DAY
Qty: 20 TABLET | Refills: 11 | Status: SHIPPED | OUTPATIENT
Start: 2018-12-12 | End: 2019-04-30

## 2018-12-12 NOTE — PATIENT INSTRUCTIONS
Prevention Guidelines, Men Ages 50 to 64  Screening tests and vaccines are an important part of managing your health. Health counseling is essential, too. Below are guidelines for these, for men ages 50 to 64. Talk with your healthcare provider to make sure youre up-to-date on what you need.  Screening Who needs it How often   Alcohol misuse All men in this age group At routine exams   Blood pressure All men in this age group Every 2 years if your blood pressure is less than 120/80 mm Hg; yearly if your systolic blood pressure is 120 to 139 mm Hg, or your diastolic blood pressure reading is 80 to 89 mm Hg   Colorectal cancer All men in this age group Flexible sigmoidoscopy every 5 years, or colonoscopy every 10 years, or double-contrast barium enema every 5 years; yearly fecal occult blood test or fecal immunochemical test; or a stool DNA test as often as your healthcare provider advises; talk with your healthcare provider about which tests are best for you   Depression All men in this age group At routine exams   Type 2 diabetes or prediabetes All adults beginning at age 45 and adults without symptoms at any age who are overweight or obese and have 1 or more other risk factors for diabetes At least every 3 years (yearly if your blood sugar has already begun to rise)   Hepatitis C Men at increased risk for infection - talk with your healthcare provider At routine exams. All men ages 50 to 70 should be tested at least once for hepatitis C.   High cholesterol or triglycerides All men in this age group At least every 5 years   HIV Men at increased risk for infection - talk with your healthcare provider At routine exams   Lung cancer Adults age 55 to 80 who have smoked Yearly screening in smokers with 30 pack-year history of smoking or who quit within 15 years   Obesity All men in this age group At routine exams   Prostate cancer Starting at age 45, talk to healthcare provider about risks and benefits of digital  rectal exam (KATHARINE) and prostate-specific antigen (PSA) screening1 At routine exams   Syphilis Men at increased risk for infection - talk with your healthcare provider At routine exams   Tuberculosis Men at increased risk for infection - talk with your healthcare provider Ask your healthcare provider   Vision All men in this age group Ask your healthcare provider   Vaccine Who needs it How often   Chickenpox (varicella) All men in this age group who have no record of this infection or vaccine 2 doses; second dose should be given at least 4 weeks after the first dose   Hepatitis A Men at increased risk for infection - talk with your healthcare provider 2 doses given at least 6 months apart   Hepatitis B Men at increased risk for infection - talk with your healthcare provider 3 doses over 6 months; second dose should be given 1 month after the first dose; the third dose should be given at least 2 months after the second dose and at least 4 months after the first dose   Haemophilus influenzae Type B (HIB) Men at increased risk for infection - talk with your healthcare provider 1 to 3 doses   Influenza (flu) All men in this age group Once a year   Measles, mumps, rubella (MMR) Men in this age group through their late 50s who have no record of these infections or vaccines 1 or 2 doses; ask your healthcare provider   Meningococcal Men at increased risk for infection - talk with your healthcare provider 1 or more doses   Pneumococcal conjugate vaccine (PCV13) and pneumococcal polysaccharide vaccine (PPSV23) Men at increased risk for infection - talk with your healthcare provider PCV13: 1 dose ages 19 to 65 (protects against 13 types of pneumococcal bacteria)     PPSV23: 1 to 2 doses through age 64, or 1 dose at 65 or older (protects against 23 types of pneumococcal bacteria)      Tetanus/diphtheria/  pertussis (Td/Tdap) booster All men in this age group Td every 10 years, or a one-time dose of Tdap instead of a Td booster  after age 18, then Td every 10 years   Zoster All men ages 60 and older 1 dose   Counseling Who needs it How often   Diet and exercise Men who are overweight or obese When diagnosed, and then at routine exams   Sexually transmitted infection prevention Men at increased risk for infection - talk with your healthcare provider At routine exams   Use of daily aspirin Men in this age group at risk for cardiovascular health problems At routine exams   Use of tobacco and the health effects it can cause All men in this age group Every visit   [address] [place] [address] [facility]  Date Last Reviewed: 2/1/2017 © 2000-2017 [employer] [address] [place] [address]. All rights reserved. This information is not intended as a substitute for professional medical care. Always follow your healthcare professional's instructions.

## 2018-12-12 NOTE — PROGRESS NOTES
Subjective:       Patient ID: Sabino Rubio is a 63 y.o. male.    Chief Complaint: Executive Health     PE    Ongoing back pain; sometimes with climbing stairs has severe pain, almost brings him to his knees.  No recent injury or trauma.    Last xray showed curvature and possible fracture.  Has been out of meds.    Also neck pain, DJD    Testicular cyst has recurred then goes away; will see Urology soon.    Has seen DERM as well.    Ganglion cyst R wrist is painful.    Due for colonoscopy.    Patient Active Problem List:     Restless leg syndrome     Cervical spondylosis without myelopathy     Degeneration of cervical intervertebral disc     Brachial neuritis or radiculitis NOS     Acquired spondylolisthesis     Tubulovillous adenoma: 2013     Kidney cyst, acquired: R 1.2 cm 2015     Family history of prostate cancer: 1/2 brother     Chronic bilateral low back pain without sciatica        Review of Systems   Constitutional: Negative.    HENT: Negative for congestion, postnasal drip, rhinorrhea and sinus pressure.    Eyes: Negative for redness and visual disturbance.   Respiratory: Negative for apnea, choking, shortness of breath and stridor.    Cardiovascular: Negative for chest pain, palpitations and leg swelling.   Gastrointestinal: Negative for abdominal pain, constipation, diarrhea and nausea.   Genitourinary: Negative for difficulty urinating, flank pain, frequency, testicular pain and urgency.   Musculoskeletal: Positive for arthralgias, back pain and neck pain. Negative for myalgias.   Skin: Negative for color change and rash.   Neurological: Negative.    Psychiatric/Behavioral: Negative.        Objective:      Physical Exam   Constitutional: He is oriented to person, place, and time. He appears well-developed and well-nourished.   HENT:   Head: Normocephalic and atraumatic.   Right Ear: External ear normal.   Left Ear: External ear normal.   Eyes: Conjunctivae and EOM are normal. Pupils are equal,  round, and reactive to light.   Neck: Normal range of motion. Neck supple. No thyromegaly present.   Cardiovascular: Normal rate and regular rhythm.   No murmur heard.  Pulmonary/Chest: Effort normal and breath sounds normal. No respiratory distress. He has no wheezes.   Abdominal: Soft. He exhibits no distension. There is no tenderness.   Musculoskeletal: He exhibits no edema or tenderness.   Ganglion cyst R wrist   Lymphadenopathy:     He has no cervical adenopathy.   Neurological: He is alert and oriented to person, place, and time. No cranial nerve deficit.   Skin: Skin is warm and dry.   Actinic changes   Psychiatric: He has a normal mood and affect. His behavior is normal.       Assessment:       1. Annual physical exam    2. Degeneration of cervical intervertebral disc    3. Acquired spondylolisthesis    4. Ganglion cyst of dorsum of right wrist    5. Testicular cyst    6. Kidney cyst, acquired: R 1.2 cm 2015    7. Actinic dermatitis        Plan:         Sabino was seen today for [facility].    Diagnoses and all orders for this visit:    Annual physical exam    Degeneration of cervical intervertebral disc  -     meloxicam (MOBIC) 15 MG tablet; Take 1 tablet (15 mg total) by mouth daily as needed for Pain (Take with food or a meal).  -     gabapentin (NEURONTIN) 300 MG capsule; Take 1 capsule (300 mg total) by mouth 3 (three) times daily.  -     MRI Cervical Spine Without Contrast; Future  -     Ambulatory referral to Neurosurgery    Acquired spondylolisthesis  -     gabapentin (NEURONTIN) 300 MG capsule; Take 1 capsule (300 mg total) by mouth 3 (three) times daily.  -     MRI Lumbar Spine Without Contrast; Future  -     Ambulatory referral to Neurosurgery    Ganglion cyst of dorsum of right wrist  -     Ambulatory referral to Orthopedics    Testicular cyst  -     Ambulatory referral to Urology    Kidney cyst, acquired: R 1.2 cm 2015  -     US Retroperitoneal Complete (Kidney and; Future    Actinic  dermatitis  -     Ambulatory referral to Dermatology    Other orders  -     tadalafil (CIALIS) 20 MG Tab; Take 1 tablet (20 mg total) by mouth every other day. Take every other day as needed  -     fluticasone (FLONASE) 50 mcg/actuation nasal spray; 1 spray (50 mcg total) by Each Nare route 2 (two) times daily.    Flu shot today  I will review all studies and determine further tx depending on findings

## 2018-12-12 NOTE — LETTER
December 13, 2018    Sabino Rubio  [facility] [address]             [facility] - Internal Medicine  [address]  [place] [address]  Phone: [phone]  Fax: [phone] Dear Mr. Rubio:    Thank you for allowing me to serve you and perform your Executive Health exam on 12/12/2018.  This letter will serve a brief summary of the history, physical findings, and laboratory/studies performed and recommendations at that time.    Reason for Visit: Executive Health Preventive Physical Examination    Past Medical History:   Diagnosis Date    Allergy     Arthritis     Family history of malignant neoplasm of gastrointestinal tract mat.gf    Family history of prostate cancer: 1/2 brother 9/25/2017    GERD (gastroesophageal reflux disease)     Kidney cyst, acquired: R 1.2 cm 2015 9/25/2017    Restless leg syndrome     Tubulovillous adenoma 2013 due 2016 9/14/2015       Past Surgical History:   Procedure Laterality Date    CARPAL TUNNEL RELEASE      COLONOSCOPY N/A 4/5/2013    [facility]       Family History   Problem Relation Age of Onset    Arthritis Mother         probable R.A.    Cancer Father         esophageal ca and brain tumor    Esophageal cancer Father     Melanoma Father     Cancer Brother         prostate cancer    Cancer Maternal Grandfather         colon    Colon cancer Maternal Grandfather     Irritable bowel syndrome Sister     Arthritis Sister     No Known Problems Son     No Known Problems Son     Diabetes Neg Hx     Heart disease Neg Hx     Cirrhosis Neg Hx     Celiac disease Neg Hx     Crohn's disease Neg Hx     Ulcerative colitis Neg Hx     Stomach cancer Neg Hx     Rectal cancer Neg Hx     Liver cancer Neg Hx     Psoriasis Neg Hx     Lupus Neg Hx             Review of patient's allergies indicates:  No Known Allergies      Current Outpatient Medications:     fluticasone (FLONASE) 50 mcg/actuation nasal  spray, 1 spray (50 mcg total) by Each Nare route 2 (two) times daily., Disp: 1 Bottle, Rfl: 3    gabapentin (NEURONTIN) 300 MG capsule, Take 1 capsule (300 mg total) by mouth 3 (three) times daily., Disp: 90 capsule, Rfl: 2    meloxicam (MOBIC) 15 MG tablet, Take 1 tablet (15 mg total) by mouth daily as needed for Pain (Take with food or a meal)., Disp: 30 tablet, Rfl: 3    methocarbamol (ROBAXIN) 750 MG Tab, TAKE 1 TABLET (750 MG TOTAL) BY MOUTH 2 (TWO) TIMES DAILY AS NEEDED., Disp: 42 tablet, Rfl: 0    ropinirole (REQUIP) 1 MG tablet, TAKE 1 TABLET BY MOUTH 3 TIMES A DAY, Disp: 90 tablet, Rfl: 5    tadalafil (CIALIS) 20 MG Tab, Take 1 tablet (20 mg total) by mouth every other day. Take every other day as needed, Disp: 20 tablet, Rfl: 11     Review of Systems  Review of Systems - Negative except for back and neck pain, as well as a ganglion cyst    Physical Exam:  General: General appearance: alert, well appearing, and in no distress.   Skin: Skin exam - normal coloration and turgor, no rashes, no suspicious skin lesions noted.  Sun damaged skin  HEENT: Ears - bilateral TM's and external ear canals normal. , ENT exam reveals - ENT exam normal, no neck nodes or sinus tenderness.   Lungs: Chest: clear to auscultation, no wheezes, rales or rhonchi, symmetric air entry.   Heart: CVS exam: normal rate, regular rhythm, normal S1, S2, no murmurs, rubs, clicks or gallops.   Extremities: Exam of extremities: peripheral pulses normal, no pedal edema, no clubbing or cyanosis. Ganglion cyst    Labs:  Results for orders placed or performed in visit on 12/12/18   Comprehensive metabolic panel   Result Value Ref Range    Sodium 139 136 - 145 mmol/L    Potassium 4.4 3.5 - 5.1 mmol/L    Chloride 104 95 - 110 mmol/L    CO2 29 23 - 29 mmol/L    Glucose 94 70 - 110 mg/dL    BUN, Bld 16 8 - 23 mg/dL    Creatinine 0.9 0.5 - 1.4 mg/dL    Calcium 9.3 8.7 - 10.5 mg/dL    Total Protein 6.9 6.0 - 8.4 g/dL    Albumin 3.7 3.5 - 5.2 g/dL     Total Bilirubin 0.5 0.1 - 1.0 mg/dL    Alkaline Phosphatase 57 55 - 135 U/L    AST 21 10 - 40 U/L    ALT 13 10 - 44 U/L    Anion Gap 6 (L) 8 - 16 mmol/L    eGFR if African American >60.0 >60 mL/min/1.73 m^2    eGFR if non African American >60.0 >60 mL/min/1.73 m^2   CBC Without Differential   Result Value Ref Range    WBC 6.19 3.90 - 12.70 K/uL    RBC 4.87 4.60 - 6.20 M/uL    Hemoglobin 14.8 14.0 - 18.0 g/dL    Hematocrit 44.9 40.0 - 54.0 %    MCV 92 82 - 98 fL    MCH 30.4 27.0 - 31.0 pg    MCHC 33.0 32.0 - 36.0 g/dL    RDW 12.9 11.5 - 14.5 %    Platelets 216 150 - 350 K/uL    MPV 9.5 9.2 - 12.9 fL   Lipid panel   Result Value Ref Range    Cholesterol 200 (H) 120 - 199 mg/dL    Triglycerides 41 30 - 150 mg/dL    HDL 95 (H) 40 - 75 mg/dL    LDL Cholesterol 96.8 63.0 - 159.0 mg/dL    HDL/Chol Ratio 47.5 20.0 - 50.0 %    Total Cholesterol/HDL Ratio 2.1 2.0 - 5.0    Non-HDL Cholesterol 105 mg/dL   TSH   Result Value Ref Range    TSH 1.663 0.400 - 4.000 uIU/mL   PSA, Screening (every year)   Result Value Ref Range    PSA, SCREEN 0.64 0.00 - 4.00 ng/mL   Hemoglobin A1c   Result Value Ref Range    Hemoglobin A1C 5.2 4.0 - 5.6 %    Estimated Avg Glucose 103 68 - 131 mg/dL        Assessment/Recommendations:  Routine Health Maintenance:  You had a flu shot today.  Colonoscopy is scheduled.    I am recommending MRIs of the neck and lumbar spine as well as a follow-up in the Spine Clinic.  I am recommending an orthopedic assessment for the ganglion cyst as well as a Dermatology follow-up for your sun damaged skin.  Lastly, I am recommending an ultrasound to evaluate your kidney cysts.    At this time, you appear to be in good medical condition.  I look forward to seeing you again next year.  Please contact me should you have any questions or concerns regarding physical findings, or my recommendations.    Sincerely,            Alba Baum MD, FACP

## 2018-12-18 ENCOUNTER — TELEPHONE (OUTPATIENT)
Dept: ORTHOPEDICS | Facility: CLINIC | Age: 63
End: 2018-12-18

## 2019-01-09 ENCOUNTER — HOSPITAL ENCOUNTER (OUTPATIENT)
Dept: RADIOLOGY | Facility: HOSPITAL | Age: 64
Discharge: HOME | End: 2019-01-09
Attending: INTERNAL MEDICINE
Payer: COMMERCIAL

## 2019-01-09 DIAGNOSIS — N28.1 KIDNEY CYST, ACQUIRED: ICD-10-CM

## 2019-01-09 DIAGNOSIS — M50.30 DEGENERATION OF CERVICAL INTERVERTEBRAL DISC: ICD-10-CM

## 2019-01-09 DIAGNOSIS — M43.10 ACQUIRED SPONDYLOLISTHESIS: ICD-10-CM

## 2019-01-09 PROCEDURE — 76770 US EXAM ABDO BACK WALL COMP: CPT | Mod: 26,,, | Performed by: RADIOLOGY

## 2019-01-09 PROCEDURE — 76770 US EXAM ABDO BACK WALL COMP: CPT | Mod: TC

## 2019-01-09 PROCEDURE — 72141 MRI NECK SPINE W/O DYE: CPT | Mod: 26,,, | Performed by: RADIOLOGY

## 2019-01-09 PROCEDURE — 72141 MRI CERVICAL SPINE WITHOUT CONTRAST: ICD-10-PCS | Mod: 26,,, | Performed by: RADIOLOGY

## 2019-01-09 PROCEDURE — 72148 MRI LUMBAR SPINE W/O DYE: CPT | Mod: 26,,, | Performed by: RADIOLOGY

## 2019-01-09 PROCEDURE — 72148 MRI LUMBAR SPINE WITHOUT CONTRAST: ICD-10-PCS | Mod: 26,,, | Performed by: RADIOLOGY

## 2019-01-09 PROCEDURE — 72148 MRI LUMBAR SPINE W/O DYE: CPT | Mod: TC

## 2019-01-09 PROCEDURE — 72141 MRI NECK SPINE W/O DYE: CPT | Mod: TC

## 2019-01-09 PROCEDURE — 76770 US RETROPERITONEAL COMPLETE: ICD-10-PCS | Mod: 26,,, | Performed by: RADIOLOGY

## 2019-01-10 DIAGNOSIS — M25.531 RIGHT WRIST PAIN: Primary | ICD-10-CM

## 2019-01-11 ENCOUNTER — TELEPHONE (OUTPATIENT)
Dept: ORTHOPEDICS | Facility: CLINIC | Age: 64
End: 2019-01-11

## 2019-01-11 NOTE — TELEPHONE ENCOUNTER
Called patient to confirm appt on 1/14/2019 with Dr Birmingham at [facility]. Also informed patient that we need him to stop at the 1st floor of the [place] building prior to coming up to our office for some x-rays. Patient verbalized understanding of appt details.

## 2019-01-14 ENCOUNTER — OFFICE VISIT (OUTPATIENT)
Dept: ORTHOPEDICS | Facility: CLINIC | Age: 64
End: 2019-01-14
Payer: COMMERCIAL

## 2019-01-14 ENCOUNTER — HOSPITAL ENCOUNTER (OUTPATIENT)
Dept: RADIOLOGY | Facility: OTHER | Age: 64
Discharge: HOME | End: 2019-01-14
Attending: ORTHOPAEDIC SURGERY
Payer: COMMERCIAL

## 2019-01-14 ENCOUNTER — OFFICE VISIT (OUTPATIENT)
Dept: UROLOGY | Facility: CLINIC | Age: 64
End: 2019-01-14
Payer: COMMERCIAL

## 2019-01-14 ENCOUNTER — OFFICE VISIT (OUTPATIENT)
Dept: NEUROSURGERY | Facility: CLINIC | Age: 64
End: 2019-01-14
Payer: COMMERCIAL

## 2019-01-14 VITALS
WEIGHT: 160.25 LBS | SYSTOLIC BLOOD PRESSURE: 139 MMHG | BODY MASS INDEX: 22.35 KG/M2 | HEART RATE: 80 BPM | TEMPERATURE: 97 F | DIASTOLIC BLOOD PRESSURE: 82 MMHG

## 2019-01-14 VITALS
BODY MASS INDEX: 21.94 KG/M2 | HEART RATE: 75 BPM | WEIGHT: 156.75 LBS | SYSTOLIC BLOOD PRESSURE: 125 MMHG | DIASTOLIC BLOOD PRESSURE: 85 MMHG | HEIGHT: 71 IN

## 2019-01-14 VITALS
HEIGHT: 71 IN | BODY MASS INDEX: 21.94 KG/M2 | WEIGHT: 156.75 LBS | HEART RATE: 73 BPM | SYSTOLIC BLOOD PRESSURE: 126 MMHG | DIASTOLIC BLOOD PRESSURE: 78 MMHG

## 2019-01-14 DIAGNOSIS — N50.89 SCROTAL MASS: Primary | ICD-10-CM

## 2019-01-14 DIAGNOSIS — M50.30 DEGENERATION OF CERVICAL INTERVERTEBRAL DISC: ICD-10-CM

## 2019-01-14 DIAGNOSIS — M25.531 RIGHT WRIST PAIN: ICD-10-CM

## 2019-01-14 DIAGNOSIS — M18.11 ARTHRITIS OF CARPOMETACARPAL (CMC) JOINT OF RIGHT THUMB: Primary | ICD-10-CM

## 2019-01-14 DIAGNOSIS — M54.2 CHRONIC NECK AND BACK PAIN: ICD-10-CM

## 2019-01-14 DIAGNOSIS — M47.816 SPONDYLOSIS OF LUMBAR REGION WITHOUT MYELOPATHY OR RADICULOPATHY: ICD-10-CM

## 2019-01-14 DIAGNOSIS — M51.36 LUMBAR DEGENERATIVE DISC DISEASE: ICD-10-CM

## 2019-01-14 DIAGNOSIS — M47.812 CERVICAL SPONDYLOSIS WITHOUT MYELOPATHY: Primary | ICD-10-CM

## 2019-01-14 DIAGNOSIS — M54.9 CHRONIC NECK AND BACK PAIN: ICD-10-CM

## 2019-01-14 DIAGNOSIS — G89.29 CHRONIC NECK AND BACK PAIN: ICD-10-CM

## 2019-01-14 PROBLEM — M51.369 LUMBAR DEGENERATIVE DISC DISEASE: Status: ACTIVE | Noted: 2019-01-14

## 2019-01-14 PROCEDURE — 99213 OFFICE O/P EST LOW 20 MIN: CPT | Mod: S$GLB,,, | Performed by: UROLOGY

## 2019-01-14 PROCEDURE — 99999 PR PBB SHADOW E&M-EST. PATIENT-LVL IV: CPT | Mod: PBBFAC,,, | Performed by: PHYSICIAN ASSISTANT

## 2019-01-14 PROCEDURE — 99999 PR PBB SHADOW E&M-EST. PATIENT-LVL IV: ICD-10-PCS | Mod: PBBFAC,,, | Performed by: PHYSICIAN ASSISTANT

## 2019-01-14 PROCEDURE — 3008F PR BODY MASS INDEX (BMI) DOCUMENTED: ICD-10-PCS | Mod: CPTII,S$GLB,, | Performed by: UROLOGY

## 2019-01-14 PROCEDURE — 99203 PR OFFICE/OUTPT VISIT, NEW, LEVL III, 30-44 MIN: ICD-10-PCS | Mod: 25,S$GLB,, | Performed by: ORTHOPAEDIC SURGERY

## 2019-01-14 PROCEDURE — 73110 X-RAY EXAM OF WRIST: CPT | Mod: TC,FY,RT

## 2019-01-14 PROCEDURE — 3008F PR BODY MASS INDEX (BMI) DOCUMENTED: ICD-10-PCS | Mod: CPTII,S$GLB,, | Performed by: ORTHOPAEDIC SURGERY

## 2019-01-14 PROCEDURE — 20600 DRAIN/INJ JOINT/BURSA W/O US: CPT | Mod: F5,S$GLB,, | Performed by: ORTHOPAEDIC SURGERY

## 2019-01-14 PROCEDURE — 99999 PR PBB SHADOW E&M-EST. PATIENT-LVL III: CPT | Mod: PBBFAC,,, | Performed by: ORTHOPAEDIC SURGERY

## 2019-01-14 PROCEDURE — 73110 X-RAY EXAM OF WRIST: CPT | Mod: 26,RT,, | Performed by: RADIOLOGY

## 2019-01-14 PROCEDURE — 99999 PR PBB SHADOW E&M-EST. PATIENT-LVL III: CPT | Mod: PBBFAC,,, | Performed by: UROLOGY

## 2019-01-14 PROCEDURE — 99999 PR PBB SHADOW E&M-EST. PATIENT-LVL III: ICD-10-PCS | Mod: PBBFAC,,, | Performed by: ORTHOPAEDIC SURGERY

## 2019-01-14 PROCEDURE — 99213 PR OFFICE/OUTPT VISIT, EST, LEVL III, 20-29 MIN: ICD-10-PCS | Mod: S$GLB,,, | Performed by: UROLOGY

## 2019-01-14 PROCEDURE — 73110 XR WRIST COMPLETE 3 VIEWS RIGHT: ICD-10-PCS | Mod: 26,RT,, | Performed by: RADIOLOGY

## 2019-01-14 PROCEDURE — 3008F BODY MASS INDEX DOCD: CPT | Mod: CPTII,S$GLB,, | Performed by: UROLOGY

## 2019-01-14 PROCEDURE — 99214 OFFICE O/P EST MOD 30 MIN: CPT | Mod: S$GLB,,, | Performed by: PHYSICIAN ASSISTANT

## 2019-01-14 PROCEDURE — 99214 PR OFFICE/OUTPT VISIT, EST, LEVL IV, 30-39 MIN: ICD-10-PCS | Mod: S$GLB,,, | Performed by: PHYSICIAN ASSISTANT

## 2019-01-14 PROCEDURE — 99203 OFFICE O/P NEW LOW 30 MIN: CPT | Mod: 25,S$GLB,, | Performed by: ORTHOPAEDIC SURGERY

## 2019-01-14 PROCEDURE — 3008F PR BODY MASS INDEX (BMI) DOCUMENTED: ICD-10-PCS | Mod: CPTII,S$GLB,, | Performed by: PHYSICIAN ASSISTANT

## 2019-01-14 PROCEDURE — 99999 PR PBB SHADOW E&M-EST. PATIENT-LVL III: ICD-10-PCS | Mod: PBBFAC,,, | Performed by: UROLOGY

## 2019-01-14 PROCEDURE — 3008F BODY MASS INDEX DOCD: CPT | Mod: CPTII,S$GLB,, | Performed by: PHYSICIAN ASSISTANT

## 2019-01-14 PROCEDURE — 20600 SMALL JOINT ASPIRATION/INJECTION: ICD-10-PCS | Mod: F5,S$GLB,, | Performed by: ORTHOPAEDIC SURGERY

## 2019-01-14 PROCEDURE — 3008F BODY MASS INDEX DOCD: CPT | Mod: CPTII,S$GLB,, | Performed by: ORTHOPAEDIC SURGERY

## 2019-01-14 RX ORDER — DEXAMETHASONE SODIUM PHOSPHATE 4 MG/ML
4 INJECTION, SOLUTION INTRA-ARTICULAR; INTRALESIONAL; INTRAMUSCULAR; INTRAVENOUS; SOFT TISSUE
Status: DISCONTINUED | OUTPATIENT
Start: 2019-01-14 | End: 2019-01-14 | Stop reason: HOSPADM

## 2019-01-14 RX ADMIN — DEXAMETHASONE SODIUM PHOSPHATE 4 MG: 4 INJECTION, SOLUTION INTRA-ARTICULAR; INTRALESIONAL; INTRAMUSCULAR; INTRAVENOUS; SOFT TISSUE at 11:01

## 2019-01-14 NOTE — LETTER
January 18, 2019      Alba Baum MD  [address]  [facility] [address]           [facility] Fl  [address]  [facility] [address]  Phone: [phone]          Patient: Sabino Rubio   MR Number: [id number]   YOB: 1955   Date of Visit: 1/14/2019       Dear Dr. Alba Baum:    Thank you for referring Sabino Rubio to me for evaluation. Attached you will find relevant portions of my assessment and plan of care.    If you have questions, please do not hesitate to call me. I look forward to following Sabino Rubio along with you.    Sincerely,    ELIZABETH Booker    Enclosure  CC:  No Recipients    If you would like to receive this communication electronically, please contact [email or web address] or [phone] to request more information on [employer] Link access.    For providers and/or their staff who would like to refer a patient to [facility], please contact us through our one-stop-shop provider referral line, [facility] Dor[facility], at [phone].    If you feel you have received this communication in error or would no longer like to receive these types of communications, please e-mail [email or web address]

## 2019-01-14 NOTE — LETTER
January 14, 2019      Alba Baum MD  [address]  [facility] [address]           [facility]  [address]  [facility] [address]  Phone: [phone]          Patient: Sabino Rubio   MR Number: [id number]   YOB: 1955   Date of Visit: 1/14/2019       Dear Dr. Alba Baum:    Thank you for referring Sabino Rubio to me for evaluation. Attached you will find relevant portions of my assessment and plan of care.    If you have questions, please do not hesitate to call me. I look forward to following Sabino Rubio along with you.    Sincerely,    Juan Birmingham MD    Enclosure  CC:  No Recipients    If you would like to receive this communication electronically, please contact [email or web address] or [phone] to request more information on [employer] Link access.    For providers and/or their staff who would like to refer a patient to [facility], please contact us through our one-stop-shop provider referral line, [facility], at [phone].    If you feel you have received this communication in error or would no longer like to receive these types of communications, please e-mail [email or web address]

## 2019-01-14 NOTE — PROGRESS NOTES
[facility]  History & Physical  Orthopedics    SUBJECTIVE:      Chief Complaint: right thumb pain, right ganglion cyst    Referring Provider: Alba Baum MD       History of Present Illness:  Patient is a 63 y.o. right hand dominant male who presents today with complaints of right ganglion cyst over ulnar aspect of wrist, right 1st CMC pain. Both are chronic in nature and are not to the point that limits him significantly. Does take meloxicam for his back but has had no other therapy for this.      The patient is a/an contractor.    Onset of symptoms/DOI was 1 year ago and are intermittent.    Symptoms are aggravated by movement.    Symptoms are alleviated by activity.    Symptoms consist of pain and swelling.    The patient rates their pain as a 3/10.    Attempted treatment(s) and/or interventions include rest and anti-inflammatory medications.     The patient denies any fevers, chills, N/V, D/C and presents for evaluation.       Past Medical History:   Diagnosis Date    Allergy     Arthritis     Family history of malignant neoplasm of gastrointestinal tract mat.gf    Family history of prostate cancer: 1/2 brother 9/25/2017    GERD (gastroesophageal reflux disease)     Kidney cyst, acquired: R 1.2 cm 2015 9/25/2017    Restless leg syndrome     Tubulovillous adenoma 2013 due 2016 9/14/2015     Past Surgical History:   Procedure Laterality Date    CARPAL TUNNEL RELEASE      COLONOSCOPY N/A 4/5/2013    Performed by Ju[facility]arlos Foley MD at [facility] ([address] [place] [address])     Review of patient's allergies indicates:  No Known Allergies  Social History     Social History Narrative    Not on file     Family History   Problem Relation Age of Onset    Arthritis Mother         probable R.A.    Cancer Father         esophageal ca and brain tumor    Esophageal cancer Father     Melanoma Father     Cancer Brother         prostate cancer    Cancer Maternal Grandfather         colon    Colon cancer  "Maternal Grandfather     Irritable bowel syndrome Sister     Arthritis Sister     No Known Problems Son     No Known Problems Son     Diabetes Neg Hx     Heart disease Neg Hx     Cirrhosis Neg Hx     Celiac disease Neg Hx     Crohn's disease Neg Hx     Ulcerative colitis Neg Hx     Stomach cancer Neg Hx     Rectal cancer Neg Hx     Liver cancer Neg Hx     Psoriasis Neg Hx     Lupus Neg Hx          Current Outpatient Medications:     fluticasone (FLONASE) 50 mcg/actuation nasal spray, 1 spray (50 mcg total) by Each Nare route 2 (two) times daily., Disp: 1 Bottle, Rfl: 3    gabapentin (NEURONTIN) 300 MG capsule, Take 1 capsule (300 mg total) by mouth 3 (three) times daily., Disp: 90 capsule, Rfl: 2    meloxicam (MOBIC) 15 MG tablet, Take 1 tablet (15 mg total) by mouth daily as needed for Pain (Take with food or a meal)., Disp: 30 tablet, Rfl: 3    methocarbamol (ROBAXIN) 750 MG Tab, TAKE 1 TABLET (750 MG TOTAL) BY MOUTH 2 (TWO) TIMES DAILY AS NEEDED., Disp: 42 tablet, Rfl: 0    ropinirole (REQUIP) 1 MG tablet, TAKE 1 TABLET BY MOUTH 3 TIMES A DAY, Disp: 90 tablet, Rfl: 5    tadalafil (CIALIS) 20 MG Tab, Take 1 tablet (20 mg total) by mouth every other day. Take every other day as needed, Disp: 20 tablet, Rfl: 11      Review of Systems:  Constitutional: no fever or chills  Eyes: no visual changes  ENT: no nasal congestion or sore throat  Respiratory: no cough or shortness of breath  Cardiovascular: no chest pain  Gastrointestinal: no nausea or vomiting, tolerating diet  Musculoskeletal: arthralgias    OBJECTIVE:      Vital Signs (Most Recent):  Vitals:    01/14/19 1012   BP: 126/78   BP Location: Left arm   Patient Position: Sitting   BP Method: Medium (Automatic)   Pulse: 73   Weight: 71.1 kg (156 lb 12 oz)   Height: 5' 11" (1.803 m)     Body mass index is 21.86 kg/m².      Physical Exam:  Constitutional: The patient appears well-developed and well-nourished. No distress.   Head: Normocephalic " and atraumatic.   Nose: Nose normal.   Eyes: Conjunctivae and EOM are normal.   Neck: No tracheal deviation present.   Cardiovascular: Normal rate and intact distal pulses.    Pulmonary/Chest: Effort normal. No respiratory distress.   Abdominal: There is no guarding.   Neurological: The patient is alert.   Psychiatric: The patient has a normal mood and affect.     Right Hand/Wrist Examination:    Observation/Inspection:  Swelling  none    Deformity  There is ganglion cyst over ECU tendon at the wrist.   Discoloration  none     Scars   none    Atrophy  none    HAND/WRIST EXAMINATION:  Finkelstein's Test   Neg  Snuff box tenderness   Neg  Nam's Test    Neg  Hook of Hamate Tenderness  Neg  CMC grind    POSITIVE  Circumduction test   Neg    Neurovascular Exam:  Digits WWP, brisk CR < 3s throughout  NVI motor/LTS to M/R/U nerves, radial pulse 2+  Tinel's Test - Carpal Tunnel  Neg  Tinel's Test - Cubital Tunnel  Neg  Phalen's Test    Neg  Median Nerve Compression Test Neg    ROM hand/wrist/elbow full, painless      Diagnostic Results:     Xray - showing severe osteoarthritis right 1st CMC      ASSESSMENT/PLAN:      63 y.o. 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 Will likely U/S or MRI at some point to r/o solid mass as the ganglion is atypical in nature due to ulnar location over wrist.    3) F/U in 6 weeks        Juan Birmingham M.D.

## 2019-01-14 NOTE — LETTER
January 14, 2019      Alba Baum MD  [address]  [facility] [address]           [facility] - Urology 4th Floor  [address]  [facility] [address]  Phone: [phone]          Patient: Sabino Rubio   MR Number: [id number]   YOB: 1955   Date of Visit: 1/14/2019       Dear Dr. Alba Baum:    Thank you for referring Sabino Rubio to me for evaluation. Attached you will find relevant portions of my assessment and plan of care.    If you have questions, please do not hesitate to call me. I look forward to following Sabino Rubio along with you.    Sincerely,    Sheldon Araya Jr., MD    Enclosure  CC:  No Recipients    If you would like to receive this communication electronically, please contact [email or web address] or [phone] to request more information on [employer] Link access.    For providers and/or their staff who would like to refer a patient to [facility], please contact us through our one-stop-shop provider referral line, [facility] , at [phone].    If you feel you have received this communication in error or would no longer like to receive these types of communications, please e-mail [email or web address]

## 2019-01-14 NOTE — PROCEDURES
Small Joint Aspiration/Injection  Date/Time: 1/14/2019 11:16 AM  Performed by: Juan Birmingham MD  Authorized by: Juan Birmingham MD     Consent Done?:  Yes (Verbal)  Indications:  Pain  Site marked: The procedure site was marked    Timeout: Prior to procedure the correct patient, procedure, and site was verified      Location:  Thumb  Thumb joint: right thumb CMC.  Prep: Patient was prepped and draped in usual sterile fashion    Ultrasonic Guidance for needle placement: No  Needle size:  25 G  Approach:  Dorsal  Medications:  4 mg dexamethasone 4 mg/mL  Patient tolerance:  Patient tolerated the procedure well with no immediate complications

## 2019-01-14 NOTE — PROGRESS NOTES
Subjective:       Patient ID: Sabino Rubio is a 63 y.o. male.    Chief Complaint: scrotal mass (pt f/u  concern about scrotal mass he state it painful on if he sit wrong.)    HPI    Sabino Rubio is a 63 y.o. male with FHx of prostate cancer here for management and evaluation of scrotal mass. His PSA 1 month ago was 0.64. Testicular US on 08/30/17 shows possible right epididymal head cyst or spermatocele, and small left-sided epididymal head cyst. Notes over the past 6 months, his spermatocele disappeared and recently returned. Also notes he has testicular pain if he sits on it wrong.     Past Medical History:   Diagnosis Date    Allergy     Arthritis     Family history of malignant neoplasm of gastrointestinal tract mat.gf    Family history of prostate cancer: 1/2 brother 9/25/2017    GERD (gastroesophageal reflux disease)     Kidney cyst, acquired: R 1.2 cm 2015 9/25/2017    Restless leg syndrome     Tubulovillous adenoma 2013 due 2016 9/14/2015       Past Surgical History:   Procedure Laterality Date    CARPAL TUNNEL RELEASE      COLONOSCOPY N/A 4/5/2013    Performed by Juancarlos Foley MD at [facility] (4TH FLR)       Family History   Problem Relation Age of Onset    Arthritis Mother         probable R.A.    Cancer Father         esophageal ca and brain tumor    Esophageal cancer Father     Melanoma Father     Cancer Brother         prostate cancer    Cancer Maternal Grandfather         colon    Colon cancer Maternal Grandfather     Irritable bowel syndrome Sister     Arthritis Sister     No Known Problems Son     No Known Problems Son     Diabetes Neg Hx     Heart disease Neg Hx     Cirrhosis Neg Hx     Celiac disease Neg Hx     Crohn's disease Neg Hx     Ulcerative colitis Neg Hx     Stomach cancer Neg Hx     Rectal cancer Neg Hx     Liver cancer Neg Hx     Psoriasis Neg Hx     Lupus Neg Hx        Social History     Socioeconomic History    Marital status: Single     Spouse  name: Not on file    Number of children: Not on file    Years of education: Not on file    Highest education level: Not on file   Social Needs    Financial resource strain: Not on file    Food insecurity - worry: Not on file    Food insecurity - inability: Not on file    Transportation needs - medical: Not on file    Transportation needs - non-medical: Not on file   Occupational History    Not on file   Tobacco Use    Smoking status: Never Smoker    Smokeless tobacco: Never Used    Tobacco comment: 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   Substance and Sexual Activity    Alcohol use: Yes     Alcohol/week: 0.0 oz     Comment: 4 days weekly, up to 2 beers    Drug use: No    Sexual activity: Yes     Partners: Female   Other Topics Concern    Not on file   Social History Narrative    Not on file       Allergies:  Patient has no known allergies.    Medications:    Current Outpatient Medications:     fluticasone (FLONASE) 50 mcg/actuation nasal spray, 1 spray (50 mcg total) by Each Nare route 2 (two) times daily., Disp: 1 Bottle, Rfl: 3    gabapentin (NEURONTIN) 300 MG capsule, Take 1 capsule (300 mg total) by mouth 3 (three) times daily., Disp: 90 capsule, Rfl: 2    meloxicam (MOBIC) 15 MG tablet, Take 1 tablet (15 mg total) by mouth daily as needed for Pain (Take with food or a meal)., Disp: 30 tablet, Rfl: 3    ropinirole (REQUIP) 1 MG tablet, TAKE 1 TABLET BY MOUTH 3 TIMES A DAY, Disp: 90 tablet, Rfl: 5    tadalafil (CIALIS) 20 MG Tab, Take 1 tablet (20 mg total) by mouth every other day. Take every other day as needed, Disp: 20 tablet, Rfl: 11    methocarbamol (ROBAXIN) 750 MG Tab, TAKE 1 TABLET (750 MG TOTAL) BY MOUTH 2 (TWO) TIMES DAILY AS NEEDED., Disp: 42 tablet, Rfl: 0    Review of Systems   Constitutional: Negative for activity change, appetite change, chills, diaphoresis, fatigue, fever and unexpected weight  change.   HENT: Negative for congestion, dental problem, hearing loss, mouth sores, postnasal drip, rhinorrhea, sinus pressure and trouble swallowing.    Eyes: Negative for pain, discharge and itching.   Respiratory: Negative for apnea, cough, choking, chest tightness, shortness of breath and wheezing.    Cardiovascular: Negative for chest pain, palpitations and leg swelling.   Gastrointestinal: Negative for abdominal distention, abdominal pain, anal bleeding, blood in stool, constipation, diarrhea, nausea, rectal pain and vomiting.   Endocrine: Negative for polydipsia and polyuria.   Genitourinary: Positive for testicular pain. Negative for decreased urine volume, difficulty urinating, discharge, dysuria, enuresis, flank pain, frequency, genital sores, hematuria, penile pain, penile swelling and scrotal swelling.   Musculoskeletal: Negative for arthralgias, back pain and myalgias.   Skin: Negative for color change, rash and wound.   Neurological: Negative for dizziness, syncope, speech difficulty, light-headedness and headaches.   Hematological: Negative for adenopathy. Does not bruise/bleed easily.   Psychiatric/Behavioral: Negative for behavioral problems, confusion and sleep disturbance.       Objective:      Physical Exam   Constitutional: He appears well-developed.   HENT:   Head: Normocephalic.   Neck: Neck supple.   Cardiovascular: Normal rate.    Pulmonary/Chest: Effort normal.   Abdominal: Soft.   Genitourinary:   Genitourinary Comments: Moderate right spermatocele palpable.   Neurological: He is alert.   Skin: Skin is warm.     Psychiatric: He has a normal mood and affect.       Imaging:    Renal US 01/09/19  1.  Simple renal cyst on right.  No further follow-up is necessary.    2.  Mild prostatomegaly.    Testicular US 08/30/17  Large fluid collection within the right epididymal head, possibly reflecting presence of epididymal head cyst versus spermatocele.    Small left-sided epididymal head  cyst.    Imaging:     Ref Range & Units 1mo ago 1yr ago   PSA, SCREEN 0.00 - 4.00 ng/mL 0.64  0.74 CM     Assessment:       No diagnosis found.    Plan:       There are no diagnoses linked to this encounter.      Moderate right spermatocele palpable.  Discussed benefits and possible side effects of spermatocelectomy.  Patient will consider it and call when he decides.    IHilario, am acting as a scribe on this patient encounter in the presence and under the supervision of Dr. Araya.    01/14/2019 7:43 AM    I, Dr. Araya, personally performed the services described in this documentation.   All medical record entries made by the scribe were at my direction and in my presence.   I have reviewed the chart and agree that the record is accurate and complete.   Sheldon Araya MD.  7:54 AM 01/14/2019

## 2019-01-18 NOTE — PROGRESS NOTES
[facility] 7th Fl  Neurosurgery  Established Patient    Patient Name: Sabino Rubio  MRN: [id number]  Primary Care Provider: Alba Baum MD    Subjective:     Chief Complaint/Reason for Admission: Neck and back pain; transfer care from [facility] to [facility]    History of Present Illness:   Mr. Rubio is a 63 year old male with known neck and back pain, who presents to clinic today to establish care. [facility] was last seen [facility] on 1/4/18. At that time, he was referred to PT, given PO medications, and instructed to RTC in 2 months, but he was lost to follow up. He states that [facility] [place] a better location for him and that is why he would like to transfer care. He reports constant, aching low back pain (L=R), that worsens to sharp and stabbing with certain activities. Sometimes with prolonged standing or climbing, his back pain will increase and then shoot down the posterolateral aspect of his legs, L>R, causing them to feel as if they are going to give out. Reports near falls but denies any actual falls. Denies any other LE pain or paresthesias. 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 Pain decreases with lying down and stretching.     He also reports intermittent, left sided neck stiffness and tingling that radiate into his left shoulder. At times, the pain will also radiate into the left deltoid but he states this is rare. Denies any other UE pain or paresthesias. Denies any RUE complaints. He does notice that he is having increased difficulty opening jars/bottles, he is dropping items more frequently, and his hand writting has changed. He has known arthritis in his hands and received a cortisone injection in his right hand today. He is unsure if the hand symptoms are due to the progression of the arthritis or something else. His pain increases with looking up, extending his arms, and working over head.  Denies any balance or gait disturbance. Denies any bladder or bowel incontinence. Denies any symptoms of urinary retention (recent PVR was zero per patient). Back pain is more severe and limiting than the neck pain. He has not had any injections in his back or neck. He completed a course of PT for his back approximately 1 year ago but has not participated in therapy for his neck. Denies any history of anti plt or anti coag use.           (Not in a hospital admission)    Review of patient's allergies indicates:  No Known Allergies    Past Medical History:   Diagnosis Date    Allergy     Arthritis     Family history of malignant neoplasm of gastrointestinal tract mat.gf    Family history of prostate cancer: 1/2 brother 9/25/2017    GERD (gastroesophageal reflux disease)     Kidney cyst, acquired: R 1.2 cm 2015 9/25/2017    Restless leg syndrome     Tubulovillous adenoma 2013 due 2016 9/14/2015     Past Surgical History:   Procedure Laterality Date    CARPAL TUNNEL RELEASE      COLONOSCOPY N/A 4/5/2013    Performed by Juancarlos Foley MD at [facility] ([address] [place] [address])     Family History     Problem Relation (Age of Onset)    Arthritis Mother, Sister    Cancer Father, Brother, Maternal Grandfather    Colon cancer Maternal Grandfather    Esophageal cancer Father    Irritable bowel syndrome Sister    Melanoma Father    No Known Problems Son, Son        Tobacco Use    Smoking status: Never Smoker    Smokeless tobacco: Never Used    Tobacco comment: 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   Substance and Sexual Activity    Alcohol use: Yes     Alcohol/week: 0.0 oz     Comment: 4 days weekly, up to 2 beers    Drug use: No    Sexual activity: Yes     Partners: Female     Review of Systems  Objective:     Weight: 72.7 kg (160 lb 4.4 oz)  Body mass index is 22.35 kg/m².  Vital Signs (Most Recent):  Temp: 97.4 °F (36.3 °C) (01/14/19 1121)  Pulse: 80  (01/14/19 1121)  BP: 139/82 (01/14/19 1121) Vital Signs (24h Range):  [unfilled]       Neurosurgery Physical Exam  General: well developed, well nourished, no distress.   Head: normocephalic, atraumatic  Neurologic: Alert and oriented. Thought content appropriate.  GCS: Motor: 6/Verbal: 5/Eyes: 4 GCS Total: 15  Mental Status: Awake, Alert, Oriented x 4  Language: No aphasia  Speech: No dysarthria  Cranial nerves: face symmetric, tongue midline, CN II-XII grossly intact.   Eyes: pupils equal, round, reactive to light with accomodation, EOMI.   Pulmonary: normal respirations, no signs of respiratory distress  Abdomen: soft, non-distended, not tender to palpation    Sensory: intact to light touch throughout  Motor Strength:Moves all extremities spontaneously with good tone.  Full strength upper and lower extremities. No abnormal movements seen.   DTR's - 2 + and symmetric in UE and LE  Gallegos: absent  Clonus: absent    Skin: Skin is warm, dry and intact.  Straight leg raise: negative  Gait: normal  Tandem Gait: No difficulty         Able to walk on heels & toes  Cervical ROM: decreased; pain with extension    Lumbar ROM: decreased; pain with flexion and lateral bend. No pain with extension.  No cervical, thoracic, or lumbar TTP, midline or along paraspinal muscles   SI Joint tenderness: Negative     Greater trochanter TTP: Negative.  Tenderness with external/internal hip rotation: Negative.        Significant Diagnostics:  MRI cervical spine 1/9/2019:  I independently reviewed the imaging.     Mild progression of cervical lordosis compared to MRI cervical spine 02/24/2014.  Most significant level of degenerative change at C4-C5 which demonstrates moderate spinal canal stenosis and severe bilateral neural foraminal narrowing.  Additional details above.        MRI lumbar spine 1/9/2019:  I independently reviewed the imaging.     Multilevel degenerative change of the lumbar spine most significant at L3-L4 which  demonstrates moderate spinal canal stenosis and mild bilateral neural foraminal narrowing.  Moderate left and mild right neural foraminal narrowing seen at L4-L5 and L5-S1.  Additional details above.    Assessment/Plan:     Assessment:  Mr. Rubio is a 63 year old male with chronic neck and back pain that has progressed over the years. He has not participated in conservative treatment for his chronic pain besides one course of therapy over 1 year ago.    Plan:  -Patient neurologically stable on exam. No signs of myelopathy.   -MRI cervical spine shows multi level central and neuroforaminal narrowing most significant at C4/5  -MRI lumbar spine shows multi level degenerative disc disease and facet arthropathy contributing to central and neuroforaminal stenosis, most severe at L4/5 and L5/S1.  -No acute neurosurgical intervention indicated. Patient would likely benefit from a trial of conservative therapy.   -Referral to pain management to discuss cervical and lumbar injections  -Referral to PT for neck and back pain  -RTC in 4 months to discuss pain after injections and therapy. Will get xrays of cervical and lumbar spine to evaluate for any instability.   -Encouraged patient to call the clinic with any questions, concerns, or exam changes prior to follow up appt.       ELIZABETH Booker  Neurosurgery  [facility] - [facility] 7th Fl

## 2019-01-29 ENCOUNTER — CLINICAL SUPPORT (OUTPATIENT)
Dept: REHABILITATION | Facility: HOSPITAL | Age: 64
End: 2019-01-29
Attending: PHYSICIAN ASSISTANT
Payer: COMMERCIAL

## 2019-01-29 ENCOUNTER — OFFICE VISIT (OUTPATIENT)
Dept: SPINE | Facility: CLINIC | Age: 64
End: 2019-01-29
Attending: ANESTHESIOLOGY
Payer: COMMERCIAL

## 2019-01-29 VITALS
HEIGHT: 71 IN | DIASTOLIC BLOOD PRESSURE: 80 MMHG | SYSTOLIC BLOOD PRESSURE: 122 MMHG | BODY MASS INDEX: 22.4 KG/M2 | HEART RATE: 74 BPM | WEIGHT: 160 LBS

## 2019-01-29 DIAGNOSIS — M51.36 LUMBAR DEGENERATIVE DISC DISEASE: Primary | ICD-10-CM

## 2019-01-29 DIAGNOSIS — M47.812 CERVICAL SPONDYLOSIS WITHOUT MYELOPATHY: Primary | ICD-10-CM

## 2019-01-29 DIAGNOSIS — M47.816 LUMBAR SPONDYLOSIS: ICD-10-CM

## 2019-01-29 DIAGNOSIS — M50.30 DEGENERATION OF CERVICAL INTERVERTEBRAL DISC: ICD-10-CM

## 2019-01-29 DIAGNOSIS — M47.812 FACET ARTHROPATHY, CERVICAL: ICD-10-CM

## 2019-01-29 DIAGNOSIS — M51.36 DEGENERATION OF LUMBAR INTERVERTEBRAL DISC: ICD-10-CM

## 2019-01-29 DIAGNOSIS — M47.816 LUMBAR FACET ARTHROPATHY: ICD-10-CM

## 2019-01-29 DIAGNOSIS — M54.12 CERVICAL RADICULOPATHY: ICD-10-CM

## 2019-01-29 PROCEDURE — 99999 PR PBB SHADOW E&M-EST. PATIENT-LVL III: ICD-10-PCS | Mod: PBBFAC,,, | Performed by: ANESTHESIOLOGY

## 2019-01-29 PROCEDURE — 97161 PT EVAL LOW COMPLEX 20 MIN: CPT

## 2019-01-29 PROCEDURE — 99244 PR OFFICE CONSULTATION,LEVEL IV: ICD-10-PCS | Mod: S$GLB,,, | Performed by: ANESTHESIOLOGY

## 2019-01-29 PROCEDURE — 99244 OFF/OP CNSLTJ NEW/EST MOD 40: CPT | Mod: S$GLB,,, | Performed by: ANESTHESIOLOGY

## 2019-01-29 PROCEDURE — 99999 PR PBB SHADOW E&M-EST. PATIENT-LVL III: CPT | Mod: PBBFAC,,, | Performed by: ANESTHESIOLOGY

## 2019-01-29 PROCEDURE — 97110 THERAPEUTIC EXERCISES: CPT

## 2019-01-29 NOTE — LETTER
January 30, 2019      ELIZABETH Booker  [address]  [facility] [address]           [facility]  [address], [address]  [facility] [address]  Phone: [phone]  Fax: [phone]          Patient: Sabino Rubio   MR Number: [id number]   YOB: 1955   Date of Visit: 1/29/2019       Dear Jayna Childs:    Thank you for referring Sabino Rubio to me for evaluation. Attached you will find relevant portions of my assessment and plan of care.    If you have questions, please do not hesitate to call me. I look forward to following Sabino Rubio along with you.    Sincerely,    Kaiser Braxton MD    Enclosure  CC:  No Recipients    If you would like to receive this communication electronically, please contact [email or web address] or [phone] to request more information on [employer] Link access.    For providers and/or their staff who would like to refer a patient to [facility], please contact us through our one-stop-shop provider referral line, [facility], at [phone].    If you feel you have received this communication in error or would no longer like to receive these types of communications, please e-mail [email or web address]

## 2019-01-29 NOTE — PROGRESS NOTES
"[facility]  Physical Therapy Initial Evaluation    Name: Sabino Rubio  Clinic Number: [id number]    Therapy Diagnosis:   Encounter Diagnoses   Name Primary?    Cervical spondylosis without myelopathy Yes    Degeneration of cervical intervertebral disc     Degeneration of lumbar intervertebral disc     Lumbar spondylosis      Physician: Jayna Childs PA    Physician Orders: PT Eval and Treat   Medical Diagnosis: M47.812, M50.30, M51.36, M47.816  Evaluation Date: 1/29/2019  Authorization Period Expiration: 12/31/2019  Plan of Care Certification Period: 03/15/2019  Visit # / Visits authorized: 1/ 20    Time In: 07:15AM (pt arrived late)   Time Out: 08:20AM  Total Billable Time: 65 minutes    Precautions: standard     Subjective   Date of onset: LBP & neck for past 1.5 yrs with insidious onset   History of current condition - Sabino reports: stabbing B lumbar pain /c ambulating up stairs, sharp stinging pain. reports weakness in BLE.  C/o neck pain /c reaching overhead, tingling  & "spiders crawling over neck", radiating to L shoulder. H/o L CTS.  C/o difficulty with neck movements & performing job duties. PT prior for LBP 1 yr ago.        Past Medical History:   Diagnosis Date    Allergy     Arthritis     Family history of malignant neoplasm of gastrointestinal tract mat.gf    Family history of prostate cancer: 1/2 brother 9/25/2017    GERD (gastroesophageal reflux disease)     Kidney cyst, acquired: R 1.2 cm 2015 9/25/2017    Restless leg syndrome     Tubulovillous adenoma 2013 due 2016 9/14/2015     Sabino Rubio  has a past surgical history that includes Carpal tunnel release.    Sabino has a current medication list which includes the following prescription(s): fluticasone, gabapentin, meloxicam, methocarbamol, ropinirole, and tadalafil.    Review of patient's allergies indicates:  No Known Allergies     Imaging 12/12/18 MRI studies: Multilevel degenerative change of " the lumbar spine most significant at L3-L4 which demonstrates moderate spinal canal stenosis and mild bilateral neural foraminal narrowing.  Moderate left and mild right neural foraminal narrowing seen at L4-L5 and L5-S1.  Additional details above.    Mild progression of cervical lordosis compared to MRI cervical spine 02/24/2014.  Most significant level of degenerative change at C4-C5 which demonstrates moderate spinal canal stenosis and severe bilateral neural foraminal narrowing.  Additional details above.    Prior Therapy: PT 1 yr ago for LBP   Social History: single story story house, lives alone  Occupation: contractor full time   Prior Level of Function: (I), was long distance runner until 6 yrs ago   Current Level of Function: (I)     Pain:  Current 1/10, worst 10/10, best 0/10   Location: bilateral neck  and bilateral lumbar spine    Aggravating Factors: Standing and Morning, work duties (kneeling, climbing stairs/ladders, sitting on floor), forward bending, cervical movements  Easing Factors: heating pad, rest and stretching, leaning backwards, sitting     Pts goals: continue working with less pain     Objective         Range of Motion - MOVEMENT LOSS    ROM Loss   Flexion   45 deg   Extension   22 deg   Rotation Right   40 deg   Rotation Left   40 deg    Protraction within functional limits   Retraction  moderate loss       Upper Extremity Strength  (R) UE  (L) UE    Shoulder flexion: 4/5 Shoulder flexion: 4/5   Shoulder Abduction: 4/5 Shoulder abduction: 4/5   Shoulder IR 4/5 Shoulder IR 4/5   Shoulder ER 4/5 Shoulder ER 4/5    5/5 : 5/5       NEUROLOGICAL SCREEN    Sensory deficit: Negative    Special Tests:   Test Name  Testing Result   Compression (+)     POSTURE    Postural examination/scapula alignment: Rounded shoulder, Head forward, Posterior pelvic tilt and Slouched posture    Sitting: Poor  Standing: Fair  Correction of posture: reduced lumbar discomfort seated upright /c lumbar support  in chair       Lower Extremity Strength  Right LE  Left LE    Hip flexion: 4/5 Hip flexion: 4/5   Hip extension:  4-/5 Hip extension: 4-/5   Hip abduction: 4-/5 Hip abduction: 4-/5   Knee Flexion 4/5 Knee Flexion 4/5   Knee Extension 4/5 Knee Extension 4/5       GAIT:  Assistive Device used: None  Level of Assistance: Independent  Patient displays the following gait deviations: WNL gait       REPEATED TEST MOVEMENTS:  Repeated Extension in lying  end range pain  no worse       STATIC TESTS   Sitting slouched  worse   Sitting erect better   Lying prone in extension  no worse         CMS Impairment/Limitation/Restriction for FOTO ** Survey   TBA: will complete next visit     Therapist reviewed FOTO scores for Sabino Rubio on 1/29/2019.   FOTO documents entered into [employer] - see Media section.    Limitation Score: **%  Category: n/a    Current : n/a  Goal: n/a  Discharge: n/a         TREATMENT   Treatment Time In: 07:15AM  Treatment Time Out: 08:20AM  Total Treatment time separate from Evaluation time: 50    Sabino received therapeutic exercises to develop strength, ROM and posture for 50 minutes including:    scap rows x10  Seated chin tucks 2x10  Sustained prone on elbows  Prone press ups 2x10     Home Exercises and Patient Education Provided    Education provided re: Importance of seated posture & positioning /c use of lumbar roll and use of HEP to manage symptoms.     Written Home Exercises Provided: HEP2go handout code [id number]  Exercises were reviewed and Sabino was able to demonstrate them prior to the end of the session.   Pt received a written copy of exercises to perform at home. Sabino demonstrated good  understanding of the education provided.     Assessment   Sabino is a 63 y.o. male referred to outpatient Physical Therapy with a medical diagnosis of cervical & lumbar spondylosis with DDD . Pt presents with impaired cervical & lumbar AROM, strength deficits in B UE & B LE and impaired posture affecting ability  to perform work duties.     Pt prognosis is Good.   Pt will benefit from skilled outpatient Physical Therapy to address the deficits stated above and in the chart below, provide pt/family education, and to maximize pt's level of independence.     Plan of care discussed with patient: Yes  Pt's spiritual, cultural and educational needs considered and patient is agreeable to the plan of care and goals as stated below:     Anticipated Barriers for therapy: pain     Medical Necessity is demonstrated by the following  History  Co-morbidities and personal factors that may impact the plan of care Co-morbidities:   L carpal tunnel syndrome    Personal Factors:   lifestyle  work duties      low   Examination  Body Structures and Functions, activity limitations and participation restrictions that may impact the plan of care Body Regions:   neck  back  lower extremities  upper extremities    Body Systems:    ROM  strength  gross coordinated movement  motor learning    Participation Restrictions:   none    Activity limitations:     Mobility  lifting and carrying objects  walking  driving (bike, car, motorcycle)    Self care  no deficits    Domestic Life  no deficits           low   Clinical Presentation stable and uncomplicated low   Decision Making/ Complexity Score: low     Goals:  Short Term Goals: 2-3 weeks   1) pt will be compliant with HEP  2) pt will increase cervical Ext AROM by at least 10 deg in order to perform overhead work  3) pt will increase gross B UE strength to at least 4+/5 in order to improve posture & perform work duties    Long Term Goals: 6-8 weeks   1) pt will improve FOTO limitation score TBA**  2) pt will increase gross B LE strength by at least 1 MMT in order to perform work duties   3) pt will be able to climb stairs for work pain free     Plan   Certification Period/Plan of care expiration: 1/29/2019 to 03/15/2019.    Outpatient Physical Therapy 2 times weekly for 6 weeks to include the following  interventions: Cervical/Lumbar Traction, Electrical Stimulation TENS/IFC, Manual Therapy, Moist Heat/ Ice, Neuromuscular Re-ed, Patient Education, Therapeutic Activites and Therapeutic Exercise.     Sisi Soto, PT, DPT  Cosigner: Luis Miguel LOPEZT

## 2019-01-29 NOTE — PROGRESS NOTES
"[facility]  Physical Therapy Initial Evaluation    Name: Sabino Rubio  Clinic Number: [id number]    Therapy Diagnosis: No diagnosis found.  Physician: Jayna Childs PA    Physician Orders: PT Eval and Treat   Medical Diagnosis:   M47.812 (ICD-10-CM) - Cervical spondylosis without myelopathy   M50.30 (ICD-10-CM) - Degeneration of cervical intervertebral disc   M51.36 (ICD-10-CM) - Lumbar degenerative disc disease   M47.816 (ICD-10-CM) - Spondylosis of lumbar region without myelopathy or radiculopathy       Evaluation Date: 1/29/2019  Authorization Period Expiration: 12/31/18  Plan of Care Certification Period: 3/29/19  Visit # / Visits authorized: 1/ 20    Time In: ***  Time Out: ***  Total Billable Time: *** minutes    Precautions: ***    Subjective   Date of onset: ***  History of current condition - Sabino reports: ***       Past Medical History:   Diagnosis Date    Allergy     Arthritis     Family history of malignant neoplasm of gastrointestinal tract mat.gf    Family history of prostate cancer: 1/2 brother 9/25/2017    GERD (gastroesophageal reflux disease)     Kidney cyst, acquired: R 1.2 cm 2015 9/25/2017    Restless leg syndrome     Tubulovillous adenoma 2013 due 2016 9/14/2015     Sabino Rubio  has a past surgical history that includes Carpal tunnel release.    Sabino has a current medication list which includes the following prescription(s): fluticasone, gabapentin, meloxicam, methocarbamol, ropinirole, and tadalafil.    Review of patient's allergies indicates:  No Known Allergies     Imaging, {Mri/ctscan/bone scan:80484}: ***    Prior Therapy: None for current symptoms ***  Social History: *** {LIVES WITH:12998}  Occupation: ***  Prior Level of Function: ***  Current Level of Function: ***    Pain:  Current {0-10:20507::"0"}/10, worst {0-10:20507::"0"}/10, best {0-10:20507::"0"}/10   Location: {RIGHT LEFT BILATERAL:40600} {LOCATION ON BODY:32472}  Aggravating " "Factors: {Causes; Pain:88249}  Easing Factors: {Pain (activities that relieve):53304}    Pts goals: ***    Objective     **Posture Alignment: Sitting posture poor***.  Standing posture poor***.       Range of Motion - MOVEMENT LOSS    ROM Loss   Flexion {Movement Loss:90040}   Extension {Movement Loss:37812}   Side bending Right {Movement Loss:81369}   Side bending Left {Movement Loss:76820}   Rotation Right {Movement Loss:06640}   Rotation Left {Movement Loss:67405}   Protraction {Movement Loss:05977}   Retraction  {Movement Loss:03611}       Upper Extremity Strength  (R) UE  (L) UE    Shoulder flexion: 5/5 Shoulder flexion: 5/5   Shoulder Abduction: 5/5 Shoulder abduction: 5/5   Elbow flexion: 5/5 Elbow flexion: 5/5   Elbow extension: 5/5 Elbow extension: 5/5   Wrist flexion: 5/5 Wrist flexion: 5/5   Wrist extension: 5/5 Wrist extension: 5/5    5/5 : 5/5       NEUROLOGICAL SCREEN    Sensory deficit: Negative***    Special Tests:   Test Name  Testing Result   Compression {+ OR -:84714}   Distraction {+ OR -:37305}   Neural Tension Test {+ OR -:33958}   Saddle Sensation {+ OR -:45493}   *      CMS Impairment/Limitation/Restriction for FOTO *** Survey    Therapist reviewed FOTO scores for Sabino Rubio on 1/29/2019.   FOTO documents entered into [employer] - see Media section.    Limitation Score: ***%  Category: {Blank single:19490::"Other","Self Care","Body Position","Carrying","Mobility"}    Current : {G Codes:72368}  Goal: {G Codes:51451}  Discharge: {G Codes:14362}         TREATMENT   Treatment Time In: ***  Treatment Time Out: ***  Total Treatment time separate from Evaluation time:***    Sabino received therapeutic exercises to develop {AMB PT PROGRESS OBJECTIVE:49738} for *** minutes including:  ***    Sabino received the following manual therapy techniques: {AMB PT PROGRESS MANUAL THERAPY:62196} were applied to the: *** for {5-30:79539} minutes, including:  ***    Home Exercises and Patient Education " "Provided    Education provided re: Importance of ice and use of HEP to manage symptoms. ***    Written Home Exercises Provided: ***.  Exercises were reviewed and Sabino was able to demonstrate them prior to the end of the session.   Pt received a written copy of exercises to perform at home. Sabino demonstrated {Desc; good/fair/poor:88622} understanding of the education provided.     See EMR under {Blank single:79489::"Media","Patient Instructions"} for exercises provided {Blank single:05397::"1/29/2019","prior visit"}.  Assessment   Sabino is a 63 y.o. male referred to outpatient Physical Therapy with a medical diagnosis of ***. Pt presents with ***    Pt prognosis is {REHAB PROGNOSIS OHS:54465}.   Pt will benefit from skilled outpatient Physical Therapy to address the deficits stated above and in the chart below, provide pt/family education, and to maximize pt's level of independence.     Plan of care discussed with patient: {YES:02900}  Pt's spiritual, cultural and educational needs considered and patient is agreeable to the plan of care and goals as stated below:     Anticipated Barriers for therapy: None ***    Medical Necessity is demonstrated by the following  History  Co-morbidities and personal factors that may impact the plan of care Co-morbidities:   {Co-morbidities:45121}    Personal Factors:   {Personal Factors:95467}     {Desc; low/moderate/high:309688}   Examination  Body Structures and Functions, activity limitations and participation restrictions that may impact the plan of care Body Regions:   {Body Regions:64782}    Body Systems:    {Body Systems:28956}    Participation Restrictions:   ***    Activity limitations:     Mobility  {Mobility:65456}    Self care  {Self Care:27268}    Domestic Life  {Domestic Life:97354}           {Desc; low/moderate/high:128566}   Clinical Presentation {Clinical Presentation :97941} {Desc; low/moderate/high:313000}   Decision Making/ Complexity Score: {Desc; " "low/moderate/high:810669}     Goals:  Short Term Goals: 2-3 weeks   ***    Long Term Goals: 6-8 weeks   ***    Plan   Certification Period/Plan of care expiration: 1/29/2019 to ***.    Outpatient Physical Therapy {NUMBERS 1-5:10256} times weekly for {0-10:73113::"0"} weeks to include the following interventions: {TX PLAN:05210}.     Luis Miguel Braden PT    "

## 2019-01-29 NOTE — PLAN OF CARE
"[facility]  Physical Therapy Initial Evaluation    Name: Sabino Rubio  Clinic Number: [id number]    Therapy Diagnosis:   Encounter Diagnoses   Name Primary?    Cervical spondylosis without myelopathy Yes    Degeneration of cervical intervertebral disc     Degeneration of lumbar intervertebral disc     Lumbar spondylosis      Physician: Jayna Childs PA    Physician Orders: PT Eval and Treat   Medical Diagnosis: M47.812, M50.30, M51.36, M47.816  Evaluation Date: 1/29/2019  Authorization Period Expiration: 12/31/2019  Plan of Care Certification Period: 03/15/2019  Visit # / Visits authorized: 1/ 20    Time In: 07:15AM (pt arrived late)   Time Out: 08:20AM  Total Billable Time: 65 minutes    Precautions: standard     Subjective   Date of onset: LBP & neck for past 1.5 yrs with insidious onset   History of current condition - Sabino reports: stabbing B lumbar pain /c ambulating up stairs, sharp stinging pain. reports weakness in BLE.  C/o neck pain /c reaching overhead, tingling  & "spiders crawling over neck", radiating to L shoulder. H/o L CTS.  C/o difficulty with neck movements & performing job duties. PT prior for LBP 1 yr ago.        Past Medical History:   Diagnosis Date    Allergy     Arthritis     Family history of malignant neoplasm of gastrointestinal tract mat.gf    Family history of prostate cancer: 1/2 brother 9/25/2017    GERD (gastroesophageal reflux disease)     Kidney cyst, acquired: R 1.2 cm 2015 9/25/2017    Restless leg syndrome     Tubulovillous adenoma 2013 due 2016 9/14/2015     Sabino Rubio  has a past surgical history that includes Carpal tunnel release.    Sabino has a current medication list which includes the following prescription(s): fluticasone, gabapentin, meloxicam, methocarbamol, ropinirole, and tadalafil.    Review of patient's allergies indicates:  No Known Allergies     Imaging 12/12/18 MRI studies: Multilevel degenerative change of " the lumbar spine most significant at L3-L4 which demonstrates moderate spinal canal stenosis and mild bilateral neural foraminal narrowing.  Moderate left and mild right neural foraminal narrowing seen at L4-L5 and L5-S1.  Additional details above.    Mild progression of cervical lordosis compared to MRI cervical spine 02/24/2014.  Most significant level of degenerative change at C4-C5 which demonstrates moderate spinal canal stenosis and severe bilateral neural foraminal narrowing.  Additional details above.    Prior Therapy: PT 1 yr ago for LBP   Social History: single story story house, lives alone  Occupation: contractor full time   Prior Level of Function: (I), was long distance runner until 6 yrs ago   Current Level of Function: (I)     Pain:  Current 1/10, worst 10/10, best 0/10   Location: bilateral neck  and bilateral lumbar spine    Aggravating Factors: Standing and Morning, work duties (kneeling, climbing stairs/ladders, sitting on floor), forward bending, cervical movements  Easing Factors: heating pad, rest and stretching, leaning backwards, sitting     Pts goals: continue working with less pain     Objective         Range of Motion - MOVEMENT LOSS    ROM Loss   Flexion   45 deg   Extension   22 deg   Rotation Right   40 deg   Rotation Left   40 deg    Protraction within functional limits   Retraction  moderate loss       Upper Extremity Strength  (R) UE  (L) UE    Shoulder flexion: 4/5 Shoulder flexion: 4/5   Shoulder Abduction: 4/5 Shoulder abduction: 4/5   Shoulder IR 4/5 Shoulder IR 4/5   Shoulder ER 4/5 Shoulder ER 4/5    5/5 : 5/5       NEUROLOGICAL SCREEN    Sensory deficit: Negative    Special Tests:   Test Name  Testing Result   Compression (+)     POSTURE    Postural examination/scapula alignment: Rounded shoulder, Head forward, Posterior pelvic tilt and Slouched posture    Sitting: Poor  Standing: Fair  Correction of posture: reduced lumbar discomfort seated upright /c lumbar support  in chair       Lower Extremity Strength  Right LE  Left LE    Hip flexion: 4/5 Hip flexion: 4/5   Hip extension:  4-/5 Hip extension: 4-/5   Hip abduction: 4-/5 Hip abduction: 4-/5   Knee Flexion 4/5 Knee Flexion 4/5   Knee Extension 4/5 Knee Extension 4/5       GAIT:  Assistive Device used: None  Level of Assistance: Independent  Patient displays the following gait deviations: WNL gait       REPEATED TEST MOVEMENTS:  Repeated Extension in lying  end range pain  no worse       STATIC TESTS   Sitting slouched  worse   Sitting erect better   Lying prone in extension  no worse         CMS Impairment/Limitation/Restriction for FOTO ** Survey   TBA: will complete next visit     Therapist reviewed FOTO scores for Sabino Rubio on 1/29/2019.   FOTO documents entered into [employer] - see Media section.    Limitation Score: **%  Category: n/a    Current : n/a  Goal: n/a  Discharge: n/a         TREATMENT   Treatment Time In: 07:15AM  Treatment Time Out: 08:20AM  Total Treatment time separate from Evaluation time: 50    Sabino received therapeutic exercises to develop strength, ROM and posture for 50 minutes including:    scap rows x10  Seated chin tucks 2x10  Sustained prone on elbows  Prone press ups 2x10     Home Exercises and Patient Education Provided    Education provided re: Importance of seated posture & positioning /c use of lumbar roll and use of HEP to manage symptoms.     Written Home Exercises Provided: HEP2go handout code [id number]  Exercises were reviewed and Sabino was able to demonstrate them prior to the end of the session.   Pt received a written copy of exercises to perform at home. Sabino demonstrated good  understanding of the education provided.     Assessment   Sabino is a 63 y.o. male referred to outpatient Physical Therapy with a medical diagnosis of cervical & lumbar spondylosis with DDD . Pt presents with impaired cervical & lumbar AROM, strength deficits in B UE & B LE and impaired posture affecting ability  to perform work duties.     Pt prognosis is Good.   Pt will benefit from skilled outpatient Physical Therapy to address the deficits stated above and in the chart below, provide pt/family education, and to maximize pt's level of independence.     Plan of care discussed with patient: Yes  Pt's spiritual, cultural and educational needs considered and patient is agreeable to the plan of care and goals as stated below:     Anticipated Barriers for therapy: pain     Medical Necessity is demonstrated by the following  History  Co-morbidities and personal factors that may impact the plan of care Co-morbidities:   L carpal tunnel syndrome    Personal Factors:   lifestyle  work duties      low   Examination  Body Structures and Functions, activity limitations and participation restrictions that may impact the plan of care Body Regions:   neck  back  lower extremities  upper extremities    Body Systems:    ROM  strength  gross coordinated movement  motor learning    Participation Restrictions:   none    Activity limitations:     Mobility  lifting and carrying objects  walking  driving (bike, car, motorcycle)    Self care  no deficits    Domestic Life  no deficits           low   Clinical Presentation stable and uncomplicated low   Decision Making/ Complexity Score: low     Goals:  Short Term Goals: 2-3 weeks   1) pt will be compliant with HEP  2) pt will increase cervical Ext AROM by at least 10 deg in order to perform overhead work  3) pt will increase gross B UE strength to at least 4+/5 in order to improve posture & perform work duties    Long Term Goals: 6-8 weeks   1) pt will improve FOTO limitation score TBA**  2) pt will increase gross B LE strength by at least 1 MMT in order to perform work duties   3) pt will be able to climb stairs for work pain free     Plan   Certification Period/Plan of care expiration: 1/29/2019 to 03/15/2019.    Outpatient Physical Therapy 2 times weekly for 6 weeks to include the following  interventions: Cervical/Lumbar Traction, Electrical Stimulation TENS/IFC, Manual Therapy, Moist Heat/ Ice, Neuromuscular Re-ed, Patient Education, Therapeutic Activites and Therapeutic Exercise.     Sisi Soto, PT, DPT  Cosigner: Luis Miguel LOPEZT

## 2019-01-29 NOTE — PROGRESS NOTES
"Subjective:      Patient ID: Sabino Rubio is a 63 y.o. male.    Chief Complaint: Neck Pain    Referred by: Jayna Childs PA     Neck Pain    This is a chronic problem. The current episode started more than 1 year ago. The problem occurs constantly. The problem has been gradually worsening. Pain location: bilateral lateral posterior neck. The quality of the pain is described as aching and shooting (throbbing ). The pain is at a severity of 2/10. The pain is mild. The symptoms are aggravated by position (activity ). The pain is same all the time. Stiffness is present all day. He has tried muscle relaxants and NSAIDs for the symptoms.         History of Present Illness:   Mr. Rubio is a 63 year old right handed man who presents for initial evaluation of chronic neck and back pain. He is sent in consultation by ELIZABETH Childs with neurosurgery for consideration of neck and or back injections for his chronic pain. He reports constant, aching low back pain in the mid lumbar spine (L=R) that does not radiate. He also has occasional severe sharp stabbing pain in the mid sacral spine that radiates down the tip of the tailbone with stairs and is so painful that at times he thinks his legs may give out and he stops until this sensation passes. He denies any true radiation or paresthesias into the legs. Patient works as a contractor and has noticed that his pain increases when waking in the morning and with prolonged hunching, sitting/standing, kneeling, and working on the floor.     He also reports tingling that radiates into his left shoulder and intermittent, left-sided neck stiffness. Tingling is present about 80% of the time and stiffness and neck pain is only with certain positions. Pain in neck is posterior and bilateral. Neck is worse with overhead activities, looking up, and extending his arms. Denies any other UE pain or paresthesias. Denies any RUE complaints. Patient reports a feeling of "spiders" along the " "back of the neck that is frequent. Does report clumsiness in all finger tips and a loss of dexterity. He says this has been present for a number of years but thinks he has been dropping more objects than before. Has had CTS release on the left which cured n/t along the arm but did have some persistent weakness.  Denies any balance or gait disturbance. Denies any bladder or bowel incontinence or urinary retention. Back pain is more severe and limiting than the neck pain. He has not had any injections in his back or neck. He started PT today and tolerated it well. Does not take anticoagulation, has no contrast allergies, and has not required antibiotics recently. Denies f/c/unintentional weight loss.     Takes meloxicam, gabapentin, but is unsure if either helps as he takes meloxicam "religiously" and gabapentin he has only been taking generally BID is not able to compare what it would be like without these.       He had MRI of the Lspine and cspine recently on 1/9/19. MRI of the c spine shows decreased lordosis, degenerative changes at C4-C5, moderate spinal stenosis, and severe bilateral neural foraminal narrowing at this level. MRI of the Lspine shows degenerative changes, most severe at L3-L4 with moderate spinal canal stenosis. There are broad based disc bulges at all levels with mild bilateral neural foraminal narrowing at L3-L4, and moderate L and mild R neural foraminal narrowing at L4-5 and L5-S1. He has pending cspine and lspine flexion extension xrays ordered by his neurosurgery clinic.    Interventional Pain History  none  Past Medical History:   Diagnosis Date    Allergy     Arthritis     Family history of malignant neoplasm of gastrointestinal tract mat.gf    Family history of prostate cancer: 1/2 brother 9/25/2017    GERD (gastroesophageal reflux disease)     Kidney cyst, acquired: R 1.2 cm 2015 9/25/2017    Restless leg syndrome     Tubulovillous adenoma 2013 due 2016 9/14/2015       Past " Surgical History:   Procedure Laterality Date    CARPAL TUNNEL RELEASE      COLONOSCOPY N/A 4/5/2013    Performed by Juancarlos Foley MD at [facility] ([address] [place] [address])       Review of patient's allergies indicates:  No Known Allergies    Current Outpatient Medications   Medication Sig Dispense Refill    fluticasone (FLONASE) 50 mcg/actuation nasal spray 1 spray (50 mcg total) by Each Nare route 2 (two) times daily. 1 Bottle 3    gabapentin (NEURONTIN) 300 MG capsule Take 1 capsule (300 mg total) by mouth 3 (three) times daily. 90 capsule 2    meloxicam (MOBIC) 15 MG tablet Take 1 tablet (15 mg total) by mouth daily as needed for Pain (Take with food or a meal). 30 tablet 3    methocarbamol (ROBAXIN) 750 MG Tab TAKE 1 TABLET (750 MG TOTAL) BY MOUTH 2 (TWO) TIMES DAILY AS NEEDED. 42 tablet 0    ropinirole (REQUIP) 1 MG tablet TAKE 1 TABLET BY MOUTH 3 TIMES A DAY 90 tablet 5    tadalafil (CIALIS) 20 MG Tab Take 1 tablet (20 mg total) by mouth every other day. Take every other day as needed 20 tablet 11     No current facility-administered medications for this visit.        Family History   Problem Relation Age of Onset    Arthritis Mother         probable R.A.    Cancer Father         esophageal ca and brain tumor    Esophageal cancer Father     Melanoma Father     Cancer Brother         prostate cancer    Cancer Maternal Grandfather         colon    Colon cancer Maternal Grandfather     Irritable bowel syndrome Sister     Arthritis Sister     No Known Problems Son     No Known Problems Son     Diabetes Neg Hx     Heart disease Neg Hx     Cirrhosis Neg Hx     Celiac disease Neg Hx     Crohn's disease Neg Hx     Ulcerative colitis Neg Hx     Stomach cancer Neg Hx     Rectal cancer Neg Hx     Liver cancer Neg Hx     Psoriasis Neg Hx     Lupus Neg Hx        Social History     Socioeconomic History    Marital status: Single     Spouse name: Not on file    Number of children: Not on file    Years of  "education: Not on file    Highest education level: Not on file   Social Needs    Financial resource strain: Not on file    Food insecurity - worry: Not on file    Food insecurity - inability: Not on file    Transportation needs - medical: Not on file    Transportation needs - non-medical: Not on file   Occupational History    Not on file   Tobacco Use    Smoking status: Never Smoker    Smokeless tobacco: Never Used    Tobacco comment: 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   Substance and Sexual Activity    Alcohol use: Yes     Alcohol/week: 0.0 oz     Comment: 4 days weekly, up to 2 beers    Drug use: No    Sexual activity: Yes     Partners: Female   Other Topics Concern    Not on file   Social History Narrative    Not on file           Review of Systems   Musculoskeletal: Positive for back pain and neck pain.   All other systems reviewed and are negative.          Objective:   /80   Pulse 74   Ht 5' 11" (1.803 m)   Wt 72.6 kg (160 lb)   BMI 22.32 kg/m²   Pain Disability Index Review:  Last 3 PDI Scores 1/29/2019   Pain Disability Index (PDI) 13     Normocephalic.  Atraumatic.  Affect appropriate.  Breathing unlabored.  Extra ocular muscles intact.  /80   Pulse 74   Ht 5' 11" (1.803 m)   Wt 72.6 kg (160 lb)   BMI 22.32 kg/m²   GEN: No apparent distress. Affect normal.   HEENT: Normocephalic, Atraumatic  CV: S1 and S2 present  Resp:  no increased work of breathing  SKIN: intact, No rashes  C-spine:       Inspection: No erythema, bruising, surgical incisions. Straightening of c-spine      Palpation:. No TTP to cspine, paraspinals, ac joint b/l      ROM: Intact in flexion, extension, lateral bending or rotation.       (-) Spurling's    L-spine:       Inspection: No erythema, bruising, surgical incisions      Palpation:  No TTP of Lspine, paraspinals, SIJs, GTBs      ROM: Intact flexion, extension, lateral " bending with excellent ROM- left sided myofascial pain with twist      (+) Facet loading on the left      (-) SLR      (-) AJ      (-) Milgram's      (-) Femoral Nerve Stretch Test    DTRs: 2+ and symmetrical BUE and BLE   Sensation: Intact to light touch and symmetrical BUE/BLE  Strength: 4+/5 b/l HF, otherwise 5/5 and symmetrical BUE/BLE  Gait: Ambulates without difficulty without assistive device. Able to heel walk and toe walk.     Extremities: 2+pulses x 4.     Assessment:       Encounter Diagnoses   Name Primary?    Lumbar degenerative disc disease Yes    Degeneration of cervical intervertebral disc     Facet arthropathy, cervical     Lumbar facet arthropathy     Cervical radiculopathy          Plan:   We discussed with the patient the assessment and recommendations. The following is the plan we agreed on:    1. Patient would like to continue with therapy and will return in 6 weeks for follow-up. At that time if his pain continues to be bothersome, we will consider JOI C7-T1.  2. Patient's exam and history is more consistent with low back pain due to lumbar spondylosis and facet arthropathy and does not suggest cord compression. Will consider bilateral L4-5 and L5-S1 facet injections if pain is not significantly improved with therapy.   3. Will review cspine and lspine flexion extension films ordered by Neurosurgery at next visit and evaluate for instability.   4. Mr. Rubio currently appears to have no symptoms of compression. Reviewed alarm symptoms with him and discussed the need for urgent surgical evaluation if he does experience these symptoms.  4. RTC in 6 weeks.      Sabino was seen today for neck pain.    Diagnoses and all orders for this visit:    Lumbar degenerative disc disease    Degeneration of cervical intervertebral disc    Facet arthropathy, cervical    Lumbar facet arthropathy    Cervical radiculopathy       Jesusita Silva MD  PGY-2 [employer] [facility]  I have personally taken the history and  examined this patient and agree with the resident's note as stated above.

## 2019-02-13 ENCOUNTER — TELEPHONE (OUTPATIENT)
Dept: INTERNAL MEDICINE | Facility: CLINIC | Age: 64
End: 2019-02-13

## 2019-02-13 NOTE — TELEPHONE ENCOUNTER
Please contact him to schedule his colonoscopy which was ordered last year,  or we can offer brielle JAMES thanks

## 2019-02-14 NOTE — TELEPHONE ENCOUNTER
Spoke to pt. He is interested in having colonoscopy and would like to schedule now. Order is still good. Can you please call to schedule? I advised pt that endoscopy  would be contacting him soon.

## 2019-02-25 ENCOUNTER — OFFICE VISIT (OUTPATIENT)
Dept: ORTHOPEDICS | Facility: CLINIC | Age: 64
End: 2019-02-25
Payer: COMMERCIAL

## 2019-02-25 VITALS — BODY MASS INDEX: 22.41 KG/M2 | WEIGHT: 160.06 LBS | HEIGHT: 71 IN

## 2019-02-25 DIAGNOSIS — M25.831 MASS OF JOINT OF RIGHT WRIST: ICD-10-CM

## 2019-02-25 DIAGNOSIS — M18.11 ARTHRITIS OF CARPOMETACARPAL (CMC) JOINT OF RIGHT THUMB: Primary | ICD-10-CM

## 2019-02-25 PROBLEM — M18.12 ARTHRITIS OF CARPOMETACARPAL (CMC) JOINT OF LEFT THUMB: Status: ACTIVE | Noted: 2019-02-25

## 2019-02-25 PROCEDURE — 99999 PR PBB SHADOW E&M-EST. PATIENT-LVL III: CPT | Mod: PBBFAC,,, | Performed by: ORTHOPAEDIC SURGERY

## 2019-02-25 PROCEDURE — 99213 OFFICE O/P EST LOW 20 MIN: CPT | Mod: S$GLB,,, | Performed by: ORTHOPAEDIC SURGERY

## 2019-02-25 PROCEDURE — 99213 PR OFFICE/OUTPT VISIT, EST, LEVL III, 20-29 MIN: ICD-10-PCS | Mod: S$GLB,,, | Performed by: ORTHOPAEDIC SURGERY

## 2019-02-25 PROCEDURE — 99999 PR PBB SHADOW E&M-EST. PATIENT-LVL III: ICD-10-PCS | Mod: PBBFAC,,, | Performed by: ORTHOPAEDIC SURGERY

## 2019-02-25 PROCEDURE — 3008F PR BODY MASS INDEX (BMI) DOCUMENTED: ICD-10-PCS | Mod: CPTII,S$GLB,, | Performed by: ORTHOPAEDIC SURGERY

## 2019-02-25 PROCEDURE — 3008F BODY MASS INDEX DOCD: CPT | Mod: CPTII,S$GLB,, | Performed by: ORTHOPAEDIC SURGERY

## 2019-02-25 NOTE — PROGRESS NOTES
[facility]  History & Physical  Orthopedics    SUBJECTIVE:      Chief Complaint: right thumb pain, right ganglion cyst    Referring Provider: No ref. provider found     Interval History 2/25/19:  Here for 6 week follow-up of R first CMC pain and R ulnar sided ganglion cyst. Had injection on 1/14/19 to first CMC joint. Injection site was fairly sore for about 2 weeks, but then subsided. States pain has not changed much from injection. No pain from ulnar sided ganglion. Has not been using thumb spica much because interferes with his work. CMC joint particularly painful when working, eg swinging a hammer.    History of Present Illness 1/14/19:  Patient is a 63 y.o. right hand dominant male who presents today with complaints of right ganglion cyst over ulnar aspect of wrist, right 1st CMC pain. Both are chronic in nature and are not to the point that limits him significantly. Does take meloxicam for his back but has had no other therapy for this.      The patient is a/an contractor.    Onset of symptoms/DOI was 1 year ago and are intermittent.    Symptoms are aggravated by movement.    Symptoms are alleviated by activity.    Symptoms consist of pain and swelling.    The patient rates their pain as a 3/10.    Attempted treatment(s) and/or interventions include rest and anti-inflammatory medications.     The patient denies any fevers, chills, N/V, D/C and presents for evaluation.       Past Medical History:   Diagnosis Date    Allergy     Arthritis     Family history of malignant neoplasm of gastrointestinal tract mat.gf    Family history of prostate cancer: 1/2 brother 9/25/2017    GERD (gastroesophageal reflux disease)     Kidney cyst, acquired: R 1.2 cm 2015 9/25/2017    Restless leg syndrome     Tubulovillous adenoma 2013 due 2016 9/14/2015     Past Surgical History:   Procedure Laterality Date    CARPAL TUNNEL RELEASE      COLONOSCOPY N/A 4/5/2013    Performed by [facility] MD at [facility]  ENDO (4TH FLR)     Review of patient's allergies indicates:  No Known Allergies  Social History     Social History Narrative    Not on file     Family History   Problem Relation Age of Onset    Arthritis Mother         probable R.A.    Cancer Father         esophageal ca and brain tumor    Esophageal cancer Father     Melanoma Father     Cancer Brother         prostate cancer    Cancer Maternal Grandfather         colon    Colon cancer Maternal Grandfather     Irritable bowel syndrome Sister     Arthritis Sister     No Known Problems Son     No Known Problems Son     Diabetes Neg Hx     Heart disease Neg Hx     Cirrhosis Neg Hx     Celiac disease Neg Hx     Crohn's disease Neg Hx     Ulcerative colitis Neg Hx     Stomach cancer Neg Hx     Rectal cancer Neg Hx     Liver cancer Neg Hx     Psoriasis Neg Hx     Lupus Neg Hx          Current Outpatient Medications:     fluticasone (FLONASE) 50 mcg/actuation nasal spray, 1 spray (50 mcg total) by Each Nare route 2 (two) times daily., Disp: 1 Bottle, Rfl: 3    gabapentin (NEURONTIN) 300 MG capsule, Take 1 capsule (300 mg total) by mouth 3 (three) times daily., Disp: 90 capsule, Rfl: 2    meloxicam (MOBIC) 15 MG tablet, Take 1 tablet (15 mg total) by mouth daily as needed for Pain (Take with food or a meal)., Disp: 30 tablet, Rfl: 3    methocarbamol (ROBAXIN) 750 MG Tab, TAKE 1 TABLET (750 MG TOTAL) BY MOUTH 2 (TWO) TIMES DAILY AS NEEDED., Disp: 42 tablet, Rfl: 0    ropinirole (REQUIP) 1 MG tablet, TAKE 1 TABLET BY MOUTH 3 TIMES A DAY, Disp: 90 tablet, Rfl: 5    tadalafil (CIALIS) 20 MG Tab, Take 1 tablet (20 mg total) by mouth every other day. Take every other day as needed, Disp: 20 tablet, Rfl: 11      Review of Systems:  Constitutional: no fever or chills  Eyes: no visual changes  ENT: no nasal congestion or sore throat  Respiratory: no cough or shortness of breath  Cardiovascular: no chest pain  Gastrointestinal: no nausea or vomiting,  "tolerating diet  Musculoskeletal: arthralgias    OBJECTIVE:      Vital Signs (Most Recent):  Vitals:    02/25/19 1029   Weight: 72.6 kg (160 lb 0.9 oz)   Height: 5' 11" (1.803 m)     Body mass index is 22.32 kg/m².      Physical Exam:  Constitutional: The patient appears well-developed and well-nourished. No distress.   Head: Normocephalic and atraumatic.   Nose: Nose normal.   Eyes: Conjunctivae and EOM are normal.   Neck: No tracheal deviation present.   Cardiovascular: Normal rate and intact distal pulses.    Pulmonary/Chest: Effort normal. No respiratory distress.   Abdominal: There is no guarding.   Neurological: The patient is alert.   Psychiatric: The patient has a normal mood and affect.     Right Hand/Wrist Examination:    Observation/Inspection:  Swelling  none    Deformity  There is ganglion cyst over ECU tendon at the wrist.   Discoloration  none     Scars   none    Atrophy  none    HAND/WRIST EXAMINATION:  Finkelstein's Test   Neg  Snuff box tenderness   Neg  Nam's Test    Neg  Hook of Hamate Tenderness  Neg  CMC grind    POSITIVE  Circumduction test   Neg    Neurovascular Exam:  Digits WWP, brisk CR < 3s throughout  NVI motor/LTS to M/R/U nerves, radial pulse 2+  Tinel's Test - Carpal Tunnel  Neg  Tinel's Test - Cubital Tunnel  Neg  Phalen's Test    Neg  Median Nerve Compression Test Neg    ROM hand/wrist/elbow full, painless      Diagnostic Results:     Xray - showing severe osteoarthritis right 1st CMC      ASSESSMENT/PLAN:      63 y.o. 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 May order U/S or MRI at some point to r/o solid mass as the ganglion is atypical in nature due to ulnar location over wrist.    3) F/U in clinic PRN or when patient has time to 3 months to dedicate to surgery recovery        Juan Birmingham, " M.D.

## 2019-02-28 DIAGNOSIS — G89.29 CHRONIC PAIN OF RIGHT THUMB: Primary | ICD-10-CM

## 2019-02-28 DIAGNOSIS — M79.644 CHRONIC PAIN OF RIGHT THUMB: Primary | ICD-10-CM

## 2019-03-01 ENCOUNTER — HOSPITAL ENCOUNTER (OUTPATIENT)
Dept: RADIOLOGY | Facility: OTHER | Age: 64
Discharge: HOME | End: 2019-03-01
Attending: ORTHOPAEDIC SURGERY
Payer: COMMERCIAL

## 2019-03-01 DIAGNOSIS — M25.831 MASS OF JOINT OF RIGHT WRIST: ICD-10-CM

## 2019-03-01 PROCEDURE — A9585 GADOBUTROL INJECTION: HCPCS | Performed by: ORTHOPAEDIC SURGERY

## 2019-03-01 PROCEDURE — 73223 MRI JOINT UPR EXTR W/O&W/DYE: CPT | Mod: TC,RT

## 2019-03-01 PROCEDURE — 73223 MRI JOINT UPR EXTR W/O&W/DYE: CPT | Mod: 26,RT,, | Performed by: RADIOLOGY

## 2019-03-01 PROCEDURE — 73223 MRI WRIST W WO CONTRAST RIGHT: ICD-10-PCS | Mod: 26,RT,, | Performed by: RADIOLOGY

## 2019-03-01 PROCEDURE — 25500020 PHARM REV CODE 255: Performed by: ORTHOPAEDIC SURGERY

## 2019-03-01 RX ORDER — GADOBUTROL 604.72 MG/ML
7.5 INJECTION INTRAVENOUS
Status: COMPLETED | OUTPATIENT
Start: 2019-03-01 | End: 2019-03-01

## 2019-03-01 RX ADMIN — GADOBUTROL 7.5 ML: 604.72 INJECTION INTRAVENOUS at 12:03

## 2019-03-04 ENCOUNTER — TELEPHONE (OUTPATIENT)
Dept: ORTHOPEDICS | Facility: CLINIC | Age: 64
End: 2019-03-04

## 2019-03-15 DIAGNOSIS — M50.30 DEGENERATION OF CERVICAL INTERVERTEBRAL DISC: ICD-10-CM

## 2019-03-15 DIAGNOSIS — M43.10 ACQUIRED SPONDYLOLISTHESIS: ICD-10-CM

## 2019-03-15 RX ORDER — GABAPENTIN 300 MG/1
CAPSULE ORAL
Qty: 90 CAPSULE | Refills: 2 | Status: SHIPPED | OUTPATIENT
Start: 2019-03-15 | End: 2019-06-14 | Stop reason: SDUPTHER

## 2019-03-28 ENCOUNTER — TELEPHONE (OUTPATIENT)
Dept: INTERNAL MEDICINE | Facility: CLINIC | Age: 64
End: 2019-03-28

## 2019-03-28 NOTE — TELEPHONE ENCOUNTER
Please contact him to schedule his colonoscopy which was ordered last year.  Last month he said he was ready to get it done but has not yet been scheduled.  Order should be good.  Or we can offer him a FITKIT thanks

## 2019-04-02 NOTE — TELEPHONE ENCOUNTER
Attempted to contact pt to discuss colon cancer screening. No answer, left voice mail for return call.

## 2019-04-05 ENCOUNTER — TELEPHONE (OUTPATIENT)
Dept: INTERNAL MEDICINE | Facility: CLINIC | Age: 64
End: 2019-04-05

## 2019-04-05 NOTE — TELEPHONE ENCOUNTER
----- Message from Rosemary Jansen sent at 4/4/2019  4:20 PM CDT -----  Contact: Patient [phone]  Type: Returning a call    Who left a message?Leilani Rodrigues LPN    When did the practice call?03/28/19    Comments:Please call back.      Thanks

## 2019-04-08 DIAGNOSIS — Z12.11 SPECIAL SCREENING FOR MALIGNANT NEOPLASMS, COLON: Primary | ICD-10-CM

## 2019-04-08 RX ORDER — POLYETHYLENE GLYCOL 3350, SODIUM SULFATE ANHYDROUS, SODIUM BICARBONATE, SODIUM CHLORIDE, POTASSIUM CHLORIDE 236; 22.74; 6.74; 5.86; 2.97 G/4L; G/4L; G/4L; G/4L; G/4L
4 POWDER, FOR SOLUTION ORAL ONCE
Qty: 4000 ML | Refills: 0 | Status: SHIPPED | OUTPATIENT
Start: 2019-04-08 | End: 2019-04-08

## 2019-04-16 ENCOUNTER — INITIAL CONSULT (OUTPATIENT)
Dept: DERMATOLOGY | Facility: CLINIC | Age: 64
End: 2019-04-16
Payer: COMMERCIAL

## 2019-04-16 DIAGNOSIS — Z12.83 SCREENING EXAM FOR SKIN CANCER: ICD-10-CM

## 2019-04-16 DIAGNOSIS — L82.1 SEBORRHEIC KERATOSES: ICD-10-CM

## 2019-04-16 DIAGNOSIS — D18.00 ANGIOMA: ICD-10-CM

## 2019-04-16 DIAGNOSIS — D48.5 NEOPLASM OF UNCERTAIN BEHAVIOR OF SKIN: Primary | ICD-10-CM

## 2019-04-16 PROCEDURE — 99213 OFFICE O/P EST LOW 20 MIN: CPT | Mod: 25,S$GLB,, | Performed by: DERMATOLOGY

## 2019-04-16 PROCEDURE — 88305 TISSUE SPECIMEN TO PATHOLOGY, DERMATOLOGY: ICD-10-PCS | Mod: 26,,, | Performed by: PATHOLOGY

## 2019-04-16 PROCEDURE — 88305 TISSUE EXAM BY PATHOLOGIST: CPT | Performed by: PATHOLOGY

## 2019-04-16 PROCEDURE — 11102 TANGNTL BX SKIN SINGLE LES: CPT | Mod: S$GLB,,, | Performed by: DERMATOLOGY

## 2019-04-16 PROCEDURE — 99999 PR PBB SHADOW E&M-EST. PATIENT-LVL II: ICD-10-PCS | Mod: PBBFAC,,, | Performed by: DERMATOLOGY

## 2019-04-16 PROCEDURE — 99999 PR PBB SHADOW E&M-EST. PATIENT-LVL II: CPT | Mod: PBBFAC,,, | Performed by: DERMATOLOGY

## 2019-04-16 PROCEDURE — 99213 PR OFFICE/OUTPT VISIT, EST, LEVL III, 20-29 MIN: ICD-10-PCS | Mod: 25,S$GLB,, | Performed by: DERMATOLOGY

## 2019-04-16 PROCEDURE — 11102 PR TANGENTIAL BIOPSY, SKIN, SINGLE LESION: ICD-10-PCS | Mod: S$GLB,,, | Performed by: DERMATOLOGY

## 2019-04-16 RX ORDER — POLYETHYLENE GLYCOL-3350 AND ELECTROLYTES 236; 6.74; 5.86; 2.97; 22.74 G/274.31G; G/274.31G; G/274.31G; G/274.31G; G/274.31G
POWDER, FOR SOLUTION ORAL
Refills: 0 | Status: ON HOLD | COMMUNITY
Start: 2019-04-08 | End: 2019-05-22 | Stop reason: HOSPADM

## 2019-04-16 NOTE — PROGRESS NOTES
Subjective:       Patient ID:  Sabino Rubio is a 63 y.o. male who presents for   Chief Complaint   Patient presents with    Skin Check     TBSE     62 yo male presents today for a skin check. No history of skin cancer. Skin cancer in his father. No new/enlarging lesions. Works outdoors in construction; wears hat and long sleeves.  The patient denies any moles or growths of the skin that are rapidly growing, hurting, itching, bleeding, or changing colors.      Review of Systems   Skin: Positive for activity-related sunscreen use and wears hat. Negative for daily sunscreen use and recent sunburn.   Hematologic/Lymphatic: Bruises/bleeds easily.        Objective:    Physical Exam   Constitutional: He appears well-developed and well-nourished. No distress.   Neurological: He is alert and oriented to person, place, and time. He is not disoriented.   Psychiatric: He has a normal mood and affect.   Skin:   Areas Examined (abnormalities noted in diagram):   Scalp / Hair Palpated and Inspected  Head / Face Inspection Performed  Neck Inspection Performed  Chest / Axilla Inspection Performed  Abdomen Inspection Performed  Genitals / Buttocks / Groin Inspection Performed  Back Inspection Performed  RUE Inspected  LUE Inspection Performed  RLE Inspected  LLE Inspection Performed  Nails and Digits Inspection Performed                           Diagram Legend     Erythematous scaling macule/papule c/w actinic keratosis       Vascular papule c/w angioma      Pigmented verrucoid papule/plaque c/w seborrheic keratosis      Yellow umbilicated papule c/w sebaceous hyperplasia      Irregularly shaped tan macule c/w lentigo     1-2 mm smooth white papules consistent with Milia      Movable subcutaneous cyst with punctum c/w epidermal inclusion cyst      Subcutaneous movable cyst c/w pilar cyst      Firm pink to brown papule c/w dermatofibroma      Pedunculated fleshy papule(s) c/w skin tag(s)      Evenly pigmented macule c/w  junctional nevus     Mildly variegated pigmented, slightly irregular-bordered macule c/w mildly atypical nevus      Flesh colored to evenly pigmented papule c/w intradermal nevus       Pink pearly papule/plaque c/w basal cell carcinoma      Erythematous hyperkeratotic cursted plaque c/w SCC      Surgical scar with no sign of skin cancer recurrence      Open and closed comedones      Inflammatory papules and pustules      Verrucoid papule consistent consistent with wart     Erythematous eczematous patches and plaques     Dystrophic onycholytic nail with subungual debris c/w onychomycosis     Umbilicated papule    Erythematous-base heme-crusted tan verrucoid plaque consistent with inflamed seborrheic keratosis     Erythematous Silvery Scaling Plaque c/w Psoriasis     See annotation          Assessment / Plan:      Pathology Orders:     Normal Orders This Visit    Tissue Specimen To Pathology, Dermatology     Questions:    Directional Terms:  Other(comment)    Clinical Information:  stuck on pink waxy papule ISK r/o BCC 3 mm    Specific Site:  left upper arm        Neoplasm of uncertain behavior of skin - left upper arm  -     Tissue Specimen To Pathology, Dermatology  Shave biopsy procedure note:    Shave biopsy performed after verbal consent including risk of infection, scar, recurrence, need for additional treatment of site. Area prepped with alcohol, anesthetized with approximately 1.0cc of 1% lidocaine with epinephrine. Lesional tissue shaved with razor blade. Hemostasis achieved with application of aluminum chloride. No complications. Dressing applied. Wound care explained.      Screening exam for skin cancer      Total body skin examination performed today including at least 12 points as noted in physical examination. Suspicious lesions noted.    Angioma  This is a benign vascular lesion. Reassurance given. No treatment required.     Seborrheic keratoses  These are benign inherited growths without a malignant  potential. Reassurance given to patient. No treatment is necessary.     Patient instructed in importance in daily sun protection of at least spf 30. Sun avoidance and topical protection discussed.     Patient encouraged to wear hat for all outdoor exposure.     Also discussed sun protective clothing.             Follow up in about 1 year (around 4/16/2020) for for TBSE.

## 2019-04-16 NOTE — LETTER
April 16, 2019      Alba Baum MD  [address]  [place] [address]           [facility] - Dermatology  [address]  [facility] [address]  Phone: [phone]  Fax: [phone]          Patient: Sabino Rubio   MR Number: [id number]   YOB: 1955   Date of Visit: 4/16/2019       Dear Dr. Alba Baum:    Thank you for referring Sabino Rubio to me for evaluation. Attached you will find relevant portions of my assessment and plan of care.    If you have questions, please do not hesitate to call me. I look forward to following Sabino Rubio along with you.    Sincerely,    Jayna Dubois MD    Enclosure  CC:  No Recipients    If you would like to receive this communication electronically, please contact [email or web address] or [phone] to request more information on [employer] Link access.    For providers and/or their staff who would like to refer a patient to [facility], please contact us through our one-stop-shop provider referral line, [facility], at [phone].    If you feel you have received this communication in error or would no longer like to receive these types of communications, please e-mail [email or web address]

## 2019-04-24 DIAGNOSIS — J30.89 ALLERGIC RHINITIS DUE TO OTHER ALLERGIC TRIGGER, UNSPECIFIED SEASONALITY: Primary | ICD-10-CM

## 2019-04-24 PROBLEM — J30.9 ALLERGIC RHINITIS: Status: ACTIVE | Noted: 2019-04-24

## 2019-04-24 RX ORDER — FLUTICASONE PROPIONATE 50 MCG
1 SPRAY, SUSPENSION (ML) NASAL 2 TIMES DAILY
Qty: 16 ML | Refills: 3 | Status: SHIPPED | OUTPATIENT
Start: 2019-04-24 | End: 2019-08-20 | Stop reason: SDUPTHER

## 2019-04-30 ENCOUNTER — OFFICE VISIT (OUTPATIENT)
Dept: ORTHOPEDICS | Facility: CLINIC | Age: 64
End: 2019-04-30
Payer: COMMERCIAL

## 2019-04-30 VITALS
DIASTOLIC BLOOD PRESSURE: 80 MMHG | SYSTOLIC BLOOD PRESSURE: 131 MMHG | WEIGHT: 160 LBS | HEART RATE: 73 BPM | BODY MASS INDEX: 22.32 KG/M2

## 2019-04-30 DIAGNOSIS — M18.11 ARTHRITIS OF CARPOMETACARPAL (CMC) JOINT OF RIGHT THUMB: Primary | ICD-10-CM

## 2019-04-30 PROCEDURE — 3008F PR BODY MASS INDEX (BMI) DOCUMENTED: ICD-10-PCS | Mod: CPTII,S$GLB,, | Performed by: ORTHOPAEDIC SURGERY

## 2019-04-30 PROCEDURE — 99999 PR PBB SHADOW E&M-EST. PATIENT-LVL III: CPT | Mod: PBBFAC,,, | Performed by: ORTHOPAEDIC SURGERY

## 2019-04-30 PROCEDURE — 99214 PR OFFICE/OUTPT VISIT, EST, LEVL IV, 30-39 MIN: ICD-10-PCS | Mod: S$GLB,,, | Performed by: ORTHOPAEDIC SURGERY

## 2019-04-30 PROCEDURE — 3008F BODY MASS INDEX DOCD: CPT | Mod: CPTII,S$GLB,, | Performed by: ORTHOPAEDIC SURGERY

## 2019-04-30 PROCEDURE — 99999 PR PBB SHADOW E&M-EST. PATIENT-LVL III: ICD-10-PCS | Mod: PBBFAC,,, | Performed by: ORTHOPAEDIC SURGERY

## 2019-04-30 PROCEDURE — 99214 OFFICE O/P EST MOD 30 MIN: CPT | Mod: S$GLB,,, | Performed by: ORTHOPAEDIC SURGERY

## 2019-04-30 NOTE — PROGRESS NOTES
[facility]  History & Physical  Orthopedics    SUBJECTIVE:      Chief Complaint: right thumb pain, right ganglion cyst    Referring Provider: No ref. provider found     Interval History 4/30/19:   Patient here for follow-up of right CMC arthritis. Patient was previously seen by Dr. Birmingham, but decided to follow-up with us at the recommendation of his girlfriend. Received a brace previously, but does not use now secondary to difficulty with using at work. Denies numbness or tingling.  No change in ulnar sided wrist mass.    Interval History 2/25/19:  Here for 6 week follow-up of R first CMC pain and R ulnar sided ganglion cyst. Had injection on 1/14/19 to first CMC joint. Injection site was fairly sore for about 2 weeks, but then subsided. States pain has not changed much from injection. No pain from ulnar sided ganglion. Has not been using thumb spica much because interferes with his work. CMC joint particularly painful when working, eg swinging a hammer.    History of Present Illness 1/14/19:  Patient is a 63 y.o. right hand dominant male who presents today with complaints of right ganglion cyst over ulnar aspect of wrist, right 1st CMC pain. Both are chronic in nature and are not to the point that limits him significantly. Does take meloxicam for his back but has had no other therapy for this.      The patient is a/an contractor.    Onset of symptoms/DOI was 1 year ago and are intermittent.    Symptoms are aggravated by movement.    Symptoms are alleviated by activity.    Symptoms consist of pain and swelling.    The patient rates their pain as a 3/10.    Attempted treatment(s) and/or interventions include rest and anti-inflammatory medications.     The patient denies any fevers, chills, N/V, D/C and presents for evaluation.       Past Medical History:   Diagnosis Date    Allergy     Arthritis     Family history of malignant neoplasm of gastrointestinal tract mat.gf    Family history of  prostate cancer: 1/2 brother 9/25/2017    GERD (gastroesophageal reflux disease)     Kidney cyst, acquired: R 1.2 cm 2015 9/25/2017    Restless leg syndrome     Tubulovillous adenoma 2013 due 2016 9/14/2015     Past Surgical History:   Procedure Laterality Date    CARPAL TUNNEL RELEASE      COLONOSCOPY N/A 4/5/2013    Performed by Juancarlos Foley MD at [facility] ([facility])     Review of patient's allergies indicates:  No Known Allergies  Social History     Social History Narrative    Not on file     Family History   Problem Relation Age of Onset    Arthritis Mother         probable R.A.    Cancer Father         esophageal ca and brain tumor    Esophageal cancer Father     Melanoma Father     Cancer Brother         prostate cancer    Cancer Maternal Grandfather         colon    Colon cancer Maternal Grandfather     Irritable bowel syndrome Sister     Arthritis Sister     No Known Problems Son     No Known Problems Son     Diabetes Neg Hx     Heart disease Neg Hx     Cirrhosis Neg Hx     Celiac disease Neg Hx     Crohn's disease Neg Hx     Ulcerative colitis Neg Hx     Stomach cancer Neg Hx     Rectal cancer Neg Hx     Liver cancer Neg Hx     Psoriasis Neg Hx     Lupus Neg Hx          Current Outpatient Medications:     fluticasone (FLONASE) 50 mcg/actuation nasal spray, 1 SPRAY (50 MCG TOTAL) BY EACH NARE ROUTE 2 (TWO) TIMES DAILY., Disp: 16 mL, Rfl: 3    gabapentin (NEURONTIN) 300 MG capsule, TAKE 1 CAPSULE BY MOUTH THREE TIMES A DAY, Disp: 90 capsule, Rfl: 2    meloxicam (MOBIC) 15 MG tablet, Take 1 tablet (15 mg total) by mouth daily as needed for Pain (Take with food or a meal)., Disp: 30 tablet, Rfl: 3    ropinirole (REQUIP) 1 MG tablet, TAKE 1 TABLET BY MOUTH 3 TIMES A DAY, Disp: 90 tablet, Rfl: 5    GAVILYTE-G 236-22.74-6.74 -5.86 gram suspension, TAKE 4,000 MLS (4 L TOTAL) BY MOUTH ONCE. FOR 1 DOSE, Disp: , Rfl: 0    methocarbamol (ROBAXIN) 750 MG Tab, TAKE 1 TABLET (750 MG TOTAL)  BY MOUTH 2 (TWO) TIMES DAILY AS NEEDED., Disp: 42 tablet, Rfl: 0      Review of Systems:  Constitutional: no fever or chills  Eyes: no visual changes  ENT: no nasal congestion or sore throat  Respiratory: no cough or shortness of breath  Cardiovascular: no chest pain  Gastrointestinal: no nausea or vomiting, tolerating diet  Musculoskeletal: arthralgias    OBJECTIVE:      Vital Signs (Most Recent):  Vitals:    04/30/19 1403   BP: 131/80   Pulse: 73   Weight: 72.6 kg (160 lb)     Body mass index is 22.32 kg/m².      Physical Exam:  Constitutional: The patient appears well-developed and well-nourished. No distress.   Head: Normocephalic and atraumatic.   Nose: Nose normal.   Eyes: Conjunctivae and EOM are normal.   Neck: No tracheal deviation present.   Cardiovascular: Normal rate and intact distal pulses.    Pulmonary/Chest: Effort normal. No respiratory distress.   Abdominal: There is no guarding.   Neurological: The patient is alert.   Psychiatric: The patient has a normal mood and affect.     Right Hand/Wrist Examination:    Observation/Inspection:  Swelling  none    Deformity  There is ganglion cyst over ECU tendon at the wrist.   Discoloration  none     Scars   none    Atrophy  none    HAND/WRIST EXAMINATION:  Finkelstein's Test   Neg  Snuff box tenderness   Neg  Nam's Test    Neg  Hook of Hamate Tenderness  Neg  CMC grind    POSITIVE  Circumduction test   Neg    Neurovascular Exam:  Digits WWP, brisk CR < 3s throughout  NVI motor/LTS to M/R/U nerves, radial pulse 2+  Tinel's Test - Carpal Tunnel  Neg  Tinel's Test - Cubital Tunnel  Neg  Phalen's Test    Neg  Median Nerve Compression Test Neg    ROM hand/wrist/elbow full, painless      Diagnostic Results:     Xray - showing severe osteoarthritis right 1st CMC  MRI right wrist:   1. Subcutaneous ulnar-sided mass favored to represent ganglion cyst, though evaluation is limited due to absence of contrast (likely IV infiltration).  Patient will be brought back  for additional imaging with intravenous contrast.  2. Multifocal degenerative changes, most severe at the base of thumb.  3. Possible TFCC tear.  4. Several ganglion cysts.  5. Flexor pollicis longus tenosynovitis.  6. Additional findings above.    ASSESSMENT/PLAN:      63 y.o. 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 Surgical consents signed in clinic. I have explained the risks, benefits, and alternatives of the procedure in detail.  The patient voices understanding and all questions have been answered.  The patient agrees to proceed as planned.

## 2019-05-01 ENCOUNTER — HOSPITAL ENCOUNTER (OUTPATIENT)
Dept: RADIOLOGY | Facility: HOSPITAL | Age: 64
Discharge: HOME | End: 2019-05-01
Attending: RADIOLOGY
Payer: COMMERCIAL

## 2019-05-01 DIAGNOSIS — M25.831 MASS OF JOINT OF RIGHT WRIST: ICD-10-CM

## 2019-05-01 PROCEDURE — A9585 GADOBUTROL INJECTION: HCPCS | Performed by: RADIOLOGY

## 2019-05-01 PROCEDURE — 25500020 PHARM REV CODE 255: Performed by: RADIOLOGY

## 2019-05-01 RX ORDER — GADOBUTROL 604.72 MG/ML
8 INJECTION INTRAVENOUS
Status: COMPLETED | OUTPATIENT
Start: 2019-05-01 | End: 2019-05-01

## 2019-05-01 RX ADMIN — GADOBUTROL 8 ML: 604.72 INJECTION INTRAVENOUS at 07:05

## 2019-05-06 NOTE — PROGRESS NOTES
I have personally taken the history and examined this patient. I agree witthe resident's note as stated above.  63 y.o. yo male with with end stage OA 1st CMC right wrist, right ganglion cyst that is intermittently painful     1) Repeat MRI given lack of contrast  2) Follow-up in clinic after MRI  3) Patient would like to proceed with R 1st CMC arthroplasty and ganglion cyst removal. Surgical consents signed in clinic. I have explained the risks, benefits, and alternatives of the procedure in detail.  The patient voices understanding and all questions have been answered.  The patient agrees to proceed as planned.h the resident's note as stated above.

## 2019-05-21 ENCOUNTER — ANESTHESIA EVENT (OUTPATIENT)
Dept: ENDOSCOPY | Facility: HOSPITAL | Age: 64
End: 2019-05-21
Payer: COMMERCIAL

## 2019-05-22 ENCOUNTER — ANESTHESIA (OUTPATIENT)
Dept: ENDOSCOPY | Facility: HOSPITAL | Age: 64
End: 2019-05-22
Payer: COMMERCIAL

## 2019-05-22 ENCOUNTER — HOSPITAL ENCOUNTER (OUTPATIENT)
Facility: HOSPITAL | Age: 64
Discharge: HOME | End: 2019-05-22
Attending: INTERNAL MEDICINE | Admitting: INTERNAL MEDICINE
Payer: COMMERCIAL

## 2019-05-22 VITALS
OXYGEN SATURATION: 97 % | RESPIRATION RATE: 16 BRPM | SYSTOLIC BLOOD PRESSURE: 134 MMHG | HEART RATE: 91 BPM | BODY MASS INDEX: 21.47 KG/M2 | HEIGHT: 70 IN | WEIGHT: 150 LBS | TEMPERATURE: 98 F | DIASTOLIC BLOOD PRESSURE: 77 MMHG

## 2019-05-22 DIAGNOSIS — K63.5 COLON POLYPS: ICD-10-CM

## 2019-05-22 DIAGNOSIS — K63.5 POLYP OF COLON, UNSPECIFIED PART OF COLON, UNSPECIFIED TYPE: Primary | ICD-10-CM

## 2019-05-22 PROCEDURE — 45380 COLONOSCOPY AND BIOPSY: CPT | Performed by: INTERNAL MEDICINE

## 2019-05-22 PROCEDURE — 88305 TISSUE SPECIMEN TO PATHOLOGY - SURGERY: ICD-10-PCS | Mod: 26,,, | Performed by: PATHOLOGY

## 2019-05-22 PROCEDURE — 25000003 PHARM REV CODE 250: Performed by: INTERNAL MEDICINE

## 2019-05-22 PROCEDURE — 45380 PR COLONOSCOPY,BIOPSY: ICD-10-PCS | Mod: 33,,, | Performed by: INTERNAL MEDICINE

## 2019-05-22 PROCEDURE — 88305 TISSUE EXAM BY PATHOLOGIST: CPT | Performed by: PATHOLOGY

## 2019-05-22 PROCEDURE — 27201012 HC FORCEPS, HOT/COLD, DISP: Performed by: INTERNAL MEDICINE

## 2019-05-22 PROCEDURE — 37000008 HC ANESTHESIA 1ST 15 MINUTES: Performed by: INTERNAL MEDICINE

## 2019-05-22 PROCEDURE — E9220 PRA ENDO ANESTHESIA: ICD-10-PCS | Mod: 33,,, | Performed by: NURSE ANESTHETIST, CERTIFIED REGISTERED

## 2019-05-22 PROCEDURE — 37000009 HC ANESTHESIA EA ADD 15 MINS: Performed by: INTERNAL MEDICINE

## 2019-05-22 PROCEDURE — 63600175 PHARM REV CODE 636 W HCPCS: Performed by: NURSE ANESTHETIST, CERTIFIED REGISTERED

## 2019-05-22 PROCEDURE — E9220 PRA ENDO ANESTHESIA: HCPCS | Mod: 33,,, | Performed by: NURSE ANESTHETIST, CERTIFIED REGISTERED

## 2019-05-22 PROCEDURE — 45380 COLONOSCOPY AND BIOPSY: CPT | Mod: 33,,, | Performed by: INTERNAL MEDICINE

## 2019-05-22 RX ORDER — PROPOFOL 10 MG/ML
VIAL (ML) INTRAVENOUS CONTINUOUS PRN
Status: DISCONTINUED | OUTPATIENT
Start: 2019-05-22 | End: 2019-05-22

## 2019-05-22 RX ORDER — LIDOCAINE HCL/PF 100 MG/5ML
SYRINGE (ML) INTRAVENOUS
Status: DISCONTINUED | OUTPATIENT
Start: 2019-05-22 | End: 2019-05-22

## 2019-05-22 RX ORDER — SODIUM CHLORIDE 9 MG/ML
INJECTION, SOLUTION INTRAVENOUS CONTINUOUS
Status: DISCONTINUED | OUTPATIENT
Start: 2019-05-22 | End: 2019-05-22 | Stop reason: HOSPADM

## 2019-05-22 RX ORDER — PROPOFOL 10 MG/ML
VIAL (ML) INTRAVENOUS
Status: DISCONTINUED | OUTPATIENT
Start: 2019-05-22 | End: 2019-05-22

## 2019-05-22 RX ADMIN — PROPOFOL 50 MG: 10 INJECTION, EMULSION INTRAVENOUS at 09:05

## 2019-05-22 RX ADMIN — LIDOCAINE HYDROCHLORIDE 40 MG: 20 INJECTION, SOLUTION INTRAVENOUS at 09:05

## 2019-05-22 RX ADMIN — PROPOFOL 150 MCG/KG/MIN: 10 INJECTION, EMULSION INTRAVENOUS at 09:05

## 2019-05-22 RX ADMIN — SODIUM CHLORIDE: 0.9 INJECTION, SOLUTION INTRAVENOUS at 08:05

## 2019-05-22 RX ADMIN — SODIUM CHLORIDE: 0.9 INJECTION, SOLUTION INTRAVENOUS at 09:05

## 2019-05-22 RX ADMIN — PROPOFOL 80 MG: 10 INJECTION, EMULSION INTRAVENOUS at 09:05

## 2019-05-22 NOTE — ANESTHESIA PREPROCEDURE EVALUATION
"                                                                                                             05/22/2019  Sabino Rubio is a 63 y.o., male.  Patient Active Problem List   Diagnosis    Restless leg syndrome    Cervical spondylosis without myelopathy    Degeneration of cervical intervertebral disc    Brachial neuritis or radiculitis NOS    Acquired spondylolisthesis    Tubulovillous adenoma: 2013    Kidney cyst, acquired: R 1.2 cm 2015    Family history of prostate cancer: 1/2 brother    Chronic bilateral low back pain without sciatica    Lumbar degenerative disc disease    Spondylosis of lumbar region without myelopathy or radiculopathy    Arthritis of carpometacarpal (CMC) joint of right thumb    Allergic rhinitis    Colon polyps         Anesthesia Evaluation    I have reviewed the Patient Summary Reports.     I have reviewed the Medications.     Review of Systems  Anesthesia Hx:  No problems with previous Anesthesia  Denies Family Hx of Anesthesia complications.   Denies Personal Hx of Anesthesia complications.   Social:  Alcohol Use "a beer or 2 every day"     Hematology/Oncology:  Hematology Normal   Oncology Normal     EENT/Dental:EENT/Dental Normal   Cardiovascular:   Exercise tolerance: good    Pulmonary:  Pulmonary Normal    Renal/:   Chronic Renal Disease    Hepatic/GI:   Bowel Prep. GERD    Musculoskeletal:   Arthritis     Neurological:   Neuromuscular Disease,    Endocrine:  Endocrine Normal    Dermatological:  Skin Normal    Psych:  Psychiatric Normal           Physical Exam  General:  Well nourished    Airway/Jaw/Neck:  Airway Findings: Mouth Opening: Normal Tongue: Normal  General Airway Assessment: Adult  Mallampati: II  TM Distance: Normal, at least 6 cm       Chest/Lungs:  Chest/Lungs Clear    Heart/Vascular:  Heart Findings: Normal Heart murmur: negative            Anesthesia Plan  Type of Anesthesia, risks & benefits discussed:  Anesthesia Type:  " general  Patient's Preference:   Intra-op Monitoring Plan: standard ASA monitors  Intra-op Monitoring Plan Comments:   Post Op Pain Control Plan:   Post Op Pain Control Plan Comments:   Induction:   IV  Beta Blocker:  Patient is not currently on a Beta-Blocker (No further documentation required).       Informed Consent: Patient understands risks and agrees with Anesthesia plan.  Questions answered. Anesthesia consent signed with patient.  ASA Score: 2     Day of Surgery Review of History & Physical:    H&P update referred to the surgeon.         Ready For Surgery From Anesthesia Perspective.

## 2019-05-22 NOTE — H&P
Short Stay Endoscopy History and Physical    PCP - Alba Baum MD    Procedure - Colonoscopy  ASA - per anesthesia  Mallampati - per anesthesia  History of Anesthesia problems - no  Family history Anesthesia problems - no   Plan of anesthesia - General    HPI:  63 year old male with a history of GERD who presents for colonoscopy.      ROS:  Constitutional: No fevers, chills, No weight loss  CV: No chest pain  Pulm: No cough, No shortness of breath  GI: see HPI  Derm: No rash    Medical History:  has a past medical history of Allergy, Arthritis, Family history of malignant neoplasm of gastrointestinal tract (mat.gf), Family history of prostate cancer: 1/2 brother (9/25/2017), GERD (gastroesophageal reflux disease), Kidney cyst, acquired: R 1.2 cm 2015 (9/25/2017), Restless leg syndrome, and Tubulovillous adenoma 2013 due 2016 (9/14/2015).    Surgical History:  has a past surgical history that includes Carpal tunnel release.    Family History: family history includes Arthritis in his mother and sister; Cancer in his brother, father, and maternal grandfather; Colon cancer in his maternal grandfather; Esophageal cancer in his father; Irritable bowel syndrome in his sister; Melanoma in his father; No Known Problems in his son and son.. Otherwise no colon cancer, inflammatory bowel disease, or GI malignancies.    Social History:  reports that he has never smoked. He has never used smokeless tobacco. He reports that he drinks alcohol. He reports that he does not use drugs.    Review of patient's allergies indicates:  No Known Allergies    Medications:   Medications Prior to Admission   Medication Sig Dispense Refill Last Dose    fluticasone (FLONASE) 50 mcg/actuation nasal spray 1 SPRAY (50 MCG TOTAL) BY EACH NARE ROUTE 2 (TWO) TIMES DAILY. 16 mL 3 5/21/2019 at Unknown time    gabapentin (NEURONTIN) 300 MG capsule TAKE 1 CAPSULE BY MOUTH THREE TIMES A DAY 90 capsule 2 5/21/2019 at Unknown time    GAVILYTE-G  236-22.74-6.74 -5.86 gram suspension TAKE 4,000 MLS (4 L TOTAL) BY MOUTH ONCE. FOR 1 DOSE  0 5/22/2019 at Unknown time    meloxicam (MOBIC) 15 MG tablet Take 1 tablet (15 mg total) by mouth daily as needed for Pain (Take with food or a meal). 30 tablet 3 5/21/2019 at Unknown time    methocarbamol (ROBAXIN) 750 MG Tab TAKE 1 TABLET (750 MG TOTAL) BY MOUTH 2 (TWO) TIMES DAILY AS NEEDED. 42 tablet 0 5/21/2019 at Unknown time    ropinirole (REQUIP) 1 MG tablet TAKE 1 TABLET BY MOUTH 3 TIMES A DAY 90 tablet 5 More than a month at Unknown time         Physical Exam:    Vital Signs:   Vitals:    05/22/19 0858   BP: (!) 150/85   Pulse: 110   Resp: 16   Temp: 97.9 °F (36.6 °C)       General Appearance: Well appearing in no acute distress  Eyes:    No scleral icterus  ENT: Neck supple, Lips, mucosa, and tongue normal; teeth and gums normal  Lungs: CTA bilaterally  Heart:  Sinus tachycardia  Abdomen: Soft, non tender, non distended with positive bowel sounds. No hepatosplenomegaly, ascites, or mass.  Extremities: 2+ pulses, no clubbing, cyanosis or edema  Skin: No rash      Labs:  Lab Results   Component Value Date    WBC 6.19 12/12/2018    HGB 14.8 12/12/2018    HCT 44.9 12/12/2018     12/12/2018    CHOL 200 (H) 12/12/2018    TRIG 41 12/12/2018    HDL 95 (H) 12/12/2018    ALT 13 12/12/2018    AST 21 12/12/2018     03/01/2019    K 4.4 03/01/2019     03/01/2019    CREATININE 1.0 03/01/2019    BUN 19 03/01/2019    CO2 25 03/01/2019    TSH 1.663 12/12/2018    PSA 0.64 12/12/2018    HGBA1C 5.2 12/12/2018       I have explained the risks and benefits of endoscopy procedures to the patient including but not limited to bleeding, perforation, infection, and death.      Giovanni Swift M.D.  Gastroenterology Fellow, PGY-V  Pager: [phone]  [facility]

## 2019-05-22 NOTE — ANESTHESIA POSTPROCEDURE EVALUATION
Anesthesia Post Evaluation    Patient: Sabino Rubio    Procedure(s) Performed: Procedure(s) (LRB):  COLONOSCOPY (N/A)    Final Anesthesia Type: general  Patient location during evaluation: GI PACU  Patient participation: Yes- Able to Participate  Level of consciousness: awake and alert, awake and oriented  Post-procedure vital signs: reviewed and stable  Pain management: adequate  Airway patency: patent  PONV status at discharge: No PONV  Anesthetic complications: no      Cardiovascular status: stable  Respiratory status: unassisted, spontaneous ventilation and room air  Hydration status: euvolemic  Follow-up not needed.          Vitals Value Taken Time   /77 5/22/2019 10:12 AM   Temp 36.9 °C (98.4 °F) 5/22/2019  9:49 AM   Pulse 91 5/22/2019 10:12 AM   Resp 16 5/22/2019 10:12 AM   SpO2 97 % 5/22/2019 10:12 AM         Event Time     Out of Recovery 10:19:25          Pain/Pete Score: Pete Score: 10 (5/22/2019 10:17 AM)

## 2019-05-22 NOTE — DISCHARGE INSTRUCTIONS
Understanding Colon and Rectal Polyps    The colon (also called the large intestine) is a muscular tube that forms the last part of the digestive tract. It absorbs water and stores food waste. The colon is about 4 to 6 feet long. The rectum is the last 6 inches of the colon. The colon and rectum have a smooth lining composed of millions of cells. Changes in these cells can lead to growths in the colon that can become cancerous and should be removed. Multiple tests are available to screen for colon cancer, but the colonoscopy is the most recommended test. During colonoscopy, these polyps can be removed. How often you need this test depends on many things including your condition, your family history, symptoms, and what the findings were at the previous colonoscopy.   When the colon lining changes  Changes that happen in the cells that line the colon or rectum can lead to growths called polyps. Over a period of years, polyps can turn cancerous. Removing polyps early may prevent cancer from ever forming.  Polyps  Polyps are fleshy clumps of tissue that form on the lining of the colon or rectum. Small polyps are usually benign (not cancerous). However, over time, cells in a polyp can change and become cancerous. Certain types of polyps known as adenomatous polyps are premalignant. The risk for invasive cancer increases with the size of the polyp and certain cell and gene features. This means that they can become cancerous if they're not removed. Hyperplastic polyps are benign. They can grow quite large and not turn cancerous.   Cancer  Almost all colorectal cancers start when polyp cells begin growing abnormally. As a cancerous tumor grows, it may involve more and more of the colon or rectum. In time, cancer can also grow beyond the colon or rectum and spread to nearby organs or to glands called lymph nodes. The cells can also travel to other parts of the body. This is known as metastasis. The earlier a cancerous  tumor is removed, the better the chance of preventing its spread.    Date Last Reviewed: 8/1/2016  © 8933-0791 [employer]. [address] [place] [address], [place] [address]. All rights reserved. This information is not intended as a substitute for professional medical care. Always follow your healthcare professional's instructions.        Colonoscopy     A camera attached to a flexible tube with a viewing lens is used to take video pictures.     Colonoscopy is a test to view the inside of your lower digestive tract (colon and rectum). Sometimes it can show the last part of the small intestine (ileum). During the test, small pieces of tissue may be removed for testing. This is called a biopsy. Small growths, such as polyps, may also be removed.   Why is colonoscopy done?  The test is done to help look for colon cancer. And it can help find the source of abdominal pain, bleeding, and changes in bowel habits. It may be needed once a year, depending on factors such as your:  · Age  · Health history  · Family health history  · Symptoms  · Results from any prior colonoscopy  Risks and possible complications  These include:  · Bleeding               · A puncture or tear in the colon   · Risks of anesthesia  · A cancer lesion not being seen  Getting ready   To prepare for the test:  · Talk with your healthcare provider about the risks of the test (see below). Also ask your healthcare provider about alternatives to the test.  · Tell your healthcare provider about any medicines you take. Also tell him or her about any health conditions you may have.  · Make sure your rectum and colon are empty for the test. Follow the diet and bowel prep instructions exactly. If you dont, the test may need to be rescheduled.  · Plan for a friend or family member to drive you home after the test.     Colonoscopy provides an inside view of the entire colon.     You may discuss the results with your doctor right away or at a future  visit.  During the test   The test is usually done in the hospital on an outpatient basis. This means you go home the same day. The procedure takes about 30 minutes. During that time:  · You are given relaxing (sedating) medicine through an IV line. You may be drowsy, or fully asleep.  · The healthcare provider will first give you a physical exam to check for anal and rectal problems.  · Then the anus is lubricated and the scope inserted.  · If you are awake, you may have a feeling similar to needing to have a bowel movement. You may also feel pressure as air is pumped into the colon. Its OK to pass gas during the procedure.  · Biopsy, polyp removal, or other treatments may be done during the test.  After the test   You may have gas right after the test. It can help to try to pass it to help prevent later bloating. Your healthcare provider may discuss the results with you right away. Or you may need to schedule a follow-up visit to talk about the results. After the test, you can go back to your normal eating and other activities. You may be tired from the sedation and need to rest for a few hours.  Date Last Reviewed: 11/1/2016  © 8789-2836 [employer]. [address] [place] [address], [place] [address]. All rights reserved. This information is not intended as a substitute for professional medical care. Always follow your healthcare professional's instructions.

## 2019-05-22 NOTE — TRANSFER OF CARE
"Anesthesia Transfer of Care Note    Patient: Sabino Rubio    Procedure(s) Performed: Procedure(s) (LRB):  COLONOSCOPY (N/A)    Patient location: PACU    Anesthesia Type: general    Transport from OR: Transported from OR on room air with adequate spontaneous ventilation    Post pain: adequate analgesia    Post assessment: tolerated procedure well and no apparent anesthetic complications    Post vital signs: stable    Level of consciousness: awake, alert and oriented    Nausea/Vomiting: no nausea/vomiting    Complications: none    Transfer of care protocol was followed      Last vitals:   Visit Vitals  /74   Pulse 91   Temp 36.9 °C (98.4 °F)   Resp 16   Ht 5' 10" (1.778 m)   Wt 68 kg (150 lb)   SpO2 98%   BMI 21.52 kg/m²     "

## 2019-05-22 NOTE — PLAN OF CARE
Pt aaox3, fiance at bedside, d/c instructions reviewed, no distress noted.     SUBJECTIVE:                                                    Arielle Stallworth is a 86 year old female who presents to clinic today for the following health issues:    Chief Complaint   Patient presents with     RECHECK     check in       Problem list and histories reviewed & adjusted, as indicated.  Additional history: here for her 3 month check. Will check sodium and also will check her cbc she has been doing ok. No change in her medications per the psychiatrist   She is having some extra heartburn and she is no longer on the ppi she has been pretty stable on the ranitidine. Would like to take anextra dose when she eats something a little disagreeable.   Things are mostly stable. The skin is great no rash so far this winter.,       Patient Active Problem List   Diagnosis     Cardiac pacemaker in situ     Advanced directives, counseling/discussion     Hyponatremia     Intertrigo     Depression, major     Arthritis     Cellulitis of left leg     Sepsis (H)     [facility]     Anxiety     Anemia     Joint prosthesis infection or inflammation (H)     Lymphedema of left lower extremity     Hyponatremia     Past Surgical History   Procedure Laterality Date     Joint replacement       Left x2 and rt x1     Hysterectomy       Cystocele repair       Hysterectomy radical       2002     Orthopedic surgery       Bilat. knees     Hernia repair       Twice     Carpal tunnel release rt/lt Left 3/2015     and excision mucous cyst lt thumb     Hernia repair  3/2010     Reduction of a strangulated incisional inguinal hernia with repair of multiple incisional hernias with mesh.        Social History   Substance Use Topics     Smoking status: Former Smoker     Packs/day: 0.50     Years: 30.00     Types: Cigarettes     Quit date: 1/1/1960     Smokeless tobacco: Never Used     Alcohol use Yes      Comment: Occasional     History reviewed. No pertinent family history.        ROS:  Constitutional, HEENT, cardiovascular, pulmonary,  "gi and gu systems are negative, except as otherwise noted.    OBJECTIVE:                                                    /84 (BP Location: Right arm, Cuff Size: Adult Small)  Pulse 84  Temp 98.4  F (36.9  C) (Tympanic)  Ht 5' 5\" (1.651 m)  Wt 149 lb (67.6 kg)  BMI 24.79 kg/m2 Body mass index is 24.79 kg/(m^2).   GENERAL APPEARANCE: healthy, alert and no distress  NECK: no adenopathy, no asymmetry, masses, or scars and thyroid normal to palpation  RESP: lungs clear to auscultation - no rales, rhonchi or wheezes  CV: regular rates and rhythm, normal S1 S2, no S3 or S4 and no murmur, click or rub  ABDOMEN: soft, nontender, without hepatosplenomegaly or masses and bowel sounds normal  SKIN: no suspicious lesions or rashes and no eczema over left knee        ASSESSMENT/PLAN:                                                      1. Hyponatremia  Check today   - Comprehensive metabolic panel    2. Iron deficiency anemia due to chronic blood loss    - CBC with platelets  Due for recheck   3. Arthritis    - Comprehensive metabolic panel    4. Joint prosthesis infection or inflammation, sequela    - CBC with platelets  - Comprehensive metabolic panel    5. Gastroesophageal reflux disease without esophagitis  Ok to take a 3rd dose of the zantac prn   - CBC with platelets  - ranitidine (ZANTAC) 150 MG tablet; Take 1 tablet (150 mg) by mouth 2 times daily May have an additional third dose after lunch as needed  Dispense: 270 tablet; Refill: 3    6. Functional dyspepsia    - CBC with platelets  - Comprehensive metabolic panel  - ranitidine (ZANTAC) 150 MG tablet; Take 1 tablet (150 mg) by mouth 2 times daily May have an additional third dose after lunch as needed  Dispense: 270 tablet; Refill: 3     reports that she quit smoking about 57 years ago. Her smoking use included Cigarettes. She has a 15.00 pack-year smoking history. She has never used smokeless tobacco.          Constance Cohen M.D.    [facility]"

## 2019-05-23 DIAGNOSIS — M18.11 ARTHRITIS OF CARPOMETACARPAL (CMC) JOINT OF RIGHT THUMB: Primary | ICD-10-CM

## 2019-05-24 ENCOUNTER — OFFICE VISIT (OUTPATIENT)
Dept: SPINE | Facility: CLINIC | Age: 64
End: 2019-05-24
Payer: COMMERCIAL

## 2019-05-24 ENCOUNTER — TELEPHONE (OUTPATIENT)
Dept: PAIN MEDICINE | Facility: CLINIC | Age: 64
End: 2019-05-24

## 2019-05-24 ENCOUNTER — HOSPITAL ENCOUNTER (OUTPATIENT)
Dept: RADIOLOGY | Facility: OTHER | Age: 64
Discharge: HOME | End: 2019-05-24
Attending: PHYSICIAN ASSISTANT
Payer: COMMERCIAL

## 2019-05-24 VITALS — HEART RATE: 71 BPM | DIASTOLIC BLOOD PRESSURE: 67 MMHG | SYSTOLIC BLOOD PRESSURE: 107 MMHG

## 2019-05-24 DIAGNOSIS — M47.816 FACET HYPERTROPHY OF LUMBAR REGION: ICD-10-CM

## 2019-05-24 DIAGNOSIS — M47.816 SPONDYLOSIS OF LUMBAR REGION WITHOUT MYELOPATHY OR RADICULOPATHY: ICD-10-CM

## 2019-05-24 DIAGNOSIS — M51.37 DDD (DEGENERATIVE DISC DISEASE), LUMBOSACRAL: ICD-10-CM

## 2019-05-24 DIAGNOSIS — M47.816 FACET HYPERTROPHY OF LUMBAR REGION: Primary | ICD-10-CM

## 2019-05-24 DIAGNOSIS — M54.50 CHRONIC BILATERAL LOW BACK PAIN WITHOUT SCIATICA: ICD-10-CM

## 2019-05-24 DIAGNOSIS — G89.29 CHRONIC BILATERAL LOW BACK PAIN WITHOUT SCIATICA: ICD-10-CM

## 2019-05-24 PROCEDURE — 99213 PR OFFICE/OUTPT VISIT, EST, LEVL III, 20-29 MIN: ICD-10-PCS | Mod: S$GLB,,, | Performed by: PHYSICIAN ASSISTANT

## 2019-05-24 PROCEDURE — 99999 PR PBB SHADOW E&M-EST. PATIENT-LVL III: CPT | Mod: PBBFAC,,, | Performed by: PHYSICIAN ASSISTANT

## 2019-05-24 PROCEDURE — 72100 X-RAY EXAM L-S SPINE 2/3 VWS: CPT | Mod: 26,,, | Performed by: INTERNAL MEDICINE

## 2019-05-24 PROCEDURE — 99213 OFFICE O/P EST LOW 20 MIN: CPT | Mod: S$GLB,,, | Performed by: PHYSICIAN ASSISTANT

## 2019-05-24 PROCEDURE — 72120 XR LUMBAR SPINE AP AND LAT WITH FLEX/EXT: ICD-10-PCS | Mod: 26,,, | Performed by: INTERNAL MEDICINE

## 2019-05-24 PROCEDURE — 72120 X-RAY BEND ONLY L-S SPINE: CPT | Mod: TC,FY

## 2019-05-24 PROCEDURE — 72100 XR LUMBAR SPINE AP AND LAT WITH FLEX/EXT: ICD-10-PCS | Mod: 26,,, | Performed by: INTERNAL MEDICINE

## 2019-05-24 PROCEDURE — 72120 X-RAY BEND ONLY L-S SPINE: CPT | Mod: 26,,, | Performed by: INTERNAL MEDICINE

## 2019-05-24 PROCEDURE — 99999 PR PBB SHADOW E&M-EST. PATIENT-LVL III: ICD-10-PCS | Mod: PBBFAC,,, | Performed by: PHYSICIAN ASSISTANT

## 2019-05-24 RX ORDER — TRAMADOL HYDROCHLORIDE 50 MG/1
50 TABLET ORAL EVERY 8 HOURS PRN
Qty: 21 TABLET | Refills: 0 | Status: SHIPPED | OUTPATIENT
Start: 2019-05-24 | End: 2019-05-31

## 2019-05-24 NOTE — PATIENT INSTRUCTIONS
It was good to meet you today!    You have degeneration of the discs and arthritis of the joints at L4-L5 and L5-S1. This is likely causing your back pain.     I will call you with your xray results.     They will call you about facet injections with Dr. Braxton. You can continue on mobic, neurontin, and robaxin as needed. I gave you ultram to take for pain. Be careful, this can make you sleepy.     I will have you follow up with Dr. Braxton after your injections. Call me if you need anything.     Arabella

## 2019-05-24 NOTE — PROGRESS NOTES
"Subjective:      Patient ID: Sabino Rubio is a 63 y.o. male.    Chief Complaint: Back Pain      HPI  (Kalyvas)    History of chronic back/neck pain.     Known multi level degenerative disc disease and facet arthropathy contributing to central and neuroforaminal stenosis, most severe at L4/5 and L5/S1. Seen by Deb in January and referred to pain management. They sent him to PT and discussed possible facet injections.     He stopped PT after pulling a muscle in his back. He is also have surgery for his right thumb in June.     He has severe pain last Friday when his back "went out." He's had 3 episodes of this severe pain since Friday. This is a very sharp pinching pain that brings him to his knees that can last for about 5 minutes. He has constant pain in the back with no leg pain. No numbness, tingling, or weakness in his legs. Pain is a 4 now, it was a 7 walking up here, and its 10++++ when he has a flare up. Pain is sharp, shooting. Pain is better with laying flat, sitting. Pain is aggravated with going up stairs.     He is on mobic, neurontin, and robaxin. No lumbar ESIs or surgery. PT as above.       Review of Systems   Constitution: Negative for chills, fever, night sweats and weight gain.   Gastrointestinal: Negative for bowel incontinence, nausea and vomiting.   Genitourinary: Negative for bladder incontinence.   Neurological: Negative for disturbances in coordination and loss of balance.           Objective:        General: Sabino is well-developed, well-nourished, appears stated age, in no acute distress, alert and oriented to time, place and person.     Ortho/SPM Exam    Gait: normal    On exam of the lumbar spine, Inspection of back is normal, tenderness central lower lumbar spine.     Skin in lumbar region is warm to the touch without visible rashes.     muscle tone normal without spasm, limited range of motion with pain  Pain in extension.    Strength testing of the bilateral LEs shows  Right hip " "abduction:  +5/5  Left hip abduction:  +5/5  Right hip flexion:  +5/5   Left hip flexion:  +5/5  Right hip extensors:  +5/5  Left hip extensors:  +5/5  Right quadriceps:  +5/5  Left quadriceps:  +5/5  Right hamstring:  +5/5  Left hamstring:  +5/5  Right dorsiflexion:  +5/5  Left dorsiflexion:  +5/5  Right plantar flexion:  +5/5  Left plantar flexion:  +5/5   Right EHL:  +5/5   Left EHL:  +5/5    negative clonus of bilateral LEs.     negative straight leg raise on bilateral LEs.     DTRs:  Right patellar:  +2     Left patellar:  +2  Right achilles:  +2   Left achilles:  +2    Sensation is grossly intact in L2, L3, L4, L5, and S1 distribution.    Right hip has no pain with IR/ER. Left hip has no pain with IR/ER.          Assessment:       1. Facet hypertrophy of lumbar region    2. Spondylosis of lumbar region without myelopathy or radiculopathy    3. DDD (degenerative disc disease), lumbosacral    4. Chronic bilateral low back pain without sciatica           Plan:       Orders Placed This Encounter    Procedure Order to [facility]    X-Ray Lumbar Spine Ap Lateral w/Flex Ext    traMADol (ULTRAM) 50 mg tablet       He has severe pain last Friday when his back "went out." He's had 3 episodes of this severe pain since Friday. This is a very sharp pinching pain that brings him to his knees that can last for about 5 minutes. He has constant pain in the back with no leg pain. No numbness, tingling, or weakness in his legs. Known multi level degenerative disc disease and facet arthropathy contributing to central and neuroforaminal stenosis, most severe at L4/5 and L5/S1. LBP likely due to DDD/facets with myofascial component. Treatment options reviewed with patient and following plan made:     - Setup for bilateral L4-L5 and L5-S1 facet injections with Dr. Braxton.    - Continue on mobic, neurontin, and robaxin from PCP.   - One time prescription given for ultram to use for severe pain. Reviewed dosing and side " effects.  reviewed and is appropriate.   - No surgery recommended at this point, he will f/u with pain management after injection for continued care (Eissa).     Follow-up: Follow up if symptoms worsen or fail to improve. If there are any questions prior to this, the patient was instructed to contact the office.

## 2019-05-24 NOTE — LETTER
May 26, 2019      ELIZABETH Booker  [address]  [facility] [address]           [address]  [address], [address]  [facility] [address]  Phone: [phone]  Fax: [phone]          Patient: Sabino Rubio   MR Number: [id number]   YOB: 1955   Date of Visit: 5/24/2019       Dear Jayna Childs:    Thank you for referring Sabino Rubio to me for evaluation. Attached you will find relevant portions of my assessment and plan of care.    If you have questions, please do not hesitate to call me. I look forward to following Sabino Rubio along with you.    Sincerely,    Arabella Jackson PA-C    Enclosure  CC:  No Recipients    If you would like to receive this communication electronically, please contact [email or web address] or [phone] to request more information on [employer] access.    For providers and/or their staff who would like to refer a patient to [facility], please contact us through our one-stop-shop provider referral line, [facility], at [phone].    If you feel you have received this communication in error or would no longer like to receive these types of communications, please e-mail [email or web address]

## 2019-05-24 NOTE — TELEPHONE ENCOUNTER
Patient returned call to schedule injection. Date, time, and instructions given and mailed. Patient verbalized understanding.

## 2019-05-29 ENCOUNTER — TELEPHONE (OUTPATIENT)
Dept: ENDOSCOPY | Facility: HOSPITAL | Age: 64
End: 2019-05-29

## 2019-06-06 ENCOUNTER — HOSPITAL ENCOUNTER (OUTPATIENT)
Facility: OTHER | Age: 64
Discharge: HOME | End: 2019-06-06
Attending: ANESTHESIOLOGY | Admitting: ANESTHESIOLOGY
Payer: COMMERCIAL

## 2019-06-06 VITALS
BODY MASS INDEX: 21.76 KG/M2 | TEMPERATURE: 98 F | SYSTOLIC BLOOD PRESSURE: 153 MMHG | HEART RATE: 59 BPM | WEIGHT: 152 LBS | RESPIRATION RATE: 18 BRPM | HEIGHT: 70 IN | OXYGEN SATURATION: 99 % | DIASTOLIC BLOOD PRESSURE: 90 MMHG

## 2019-06-06 DIAGNOSIS — M47.9 OSTEOARTHRITIS OF SPINE, UNSPECIFIED SPINAL OSTEOARTHRITIS COMPLICATION STATUS, UNSPECIFIED SPINAL REGION: Primary | ICD-10-CM

## 2019-06-06 DIAGNOSIS — G89.29 CHRONIC PAIN: ICD-10-CM

## 2019-06-06 DIAGNOSIS — M47.816 SPONDYLOSIS OF LUMBAR REGION WITHOUT MYELOPATHY OR RADICULOPATHY: ICD-10-CM

## 2019-06-06 PROCEDURE — 25000003 PHARM REV CODE 250: Performed by: ANESTHESIOLOGY

## 2019-06-06 PROCEDURE — 64493 INJ PARAVERT F JNT L/S 1 LEV: CPT | Mod: 50 | Performed by: ANESTHESIOLOGY

## 2019-06-06 PROCEDURE — 25500020 PHARM REV CODE 255: Performed by: ANESTHESIOLOGY

## 2019-06-06 PROCEDURE — 64493 INJ PARAVERT F JNT L/S 1 LEV: CPT | Mod: 50,,, | Performed by: ANESTHESIOLOGY

## 2019-06-06 PROCEDURE — 64494 INJ PARAVERT F JNT L/S 2 LEV: CPT | Mod: 50,,, | Performed by: ANESTHESIOLOGY

## 2019-06-06 PROCEDURE — 64494 INJ PARAVERT F JNT L/S 2 LEV: CPT | Mod: 50 | Performed by: ANESTHESIOLOGY

## 2019-06-06 PROCEDURE — 64494 PR INJ DX/THER AGNT PARAVERT FACET JOINT,IMG GUIDE,LUMBAR/SAC, 2ND LEVEL: ICD-10-PCS | Mod: 50,,, | Performed by: ANESTHESIOLOGY

## 2019-06-06 PROCEDURE — 64493 PR INJ DX/THER AGNT PARAVERT FACET JOINT,IMG GUIDE,LUMBAR/SAC,1ST LVL: ICD-10-PCS | Mod: 50,,, | Performed by: ANESTHESIOLOGY

## 2019-06-06 PROCEDURE — 63600175 PHARM REV CODE 636 W HCPCS: Performed by: ANESTHESIOLOGY

## 2019-06-06 RX ORDER — BUPIVACAINE HYDROCHLORIDE 2.5 MG/ML
INJECTION, SOLUTION EPIDURAL; INFILTRATION; INTRACAUDAL
Status: DISCONTINUED | OUTPATIENT
Start: 2019-06-06 | End: 2019-06-06 | Stop reason: HOSPADM

## 2019-06-06 RX ORDER — TRIAMCINOLONE ACETONIDE 40 MG/ML
INJECTION, SUSPENSION INTRA-ARTICULAR; INTRAMUSCULAR
Status: DISCONTINUED | OUTPATIENT
Start: 2019-06-06 | End: 2019-06-06 | Stop reason: HOSPADM

## 2019-06-06 RX ORDER — ALPRAZOLAM 0.5 MG/1
1 TABLET ORAL ONCE
Status: COMPLETED | OUTPATIENT
Start: 2019-06-06 | End: 2019-06-06

## 2019-06-06 RX ORDER — ALPRAZOLAM 0.5 MG/1
0.5 TABLET ORAL
Status: DISCONTINUED | OUTPATIENT
Start: 2019-06-06 | End: 2019-06-06 | Stop reason: HOSPADM

## 2019-06-06 RX ORDER — LIDOCAINE HYDROCHLORIDE 10 MG/ML
INJECTION INFILTRATION; PERINEURAL
Status: DISCONTINUED | OUTPATIENT
Start: 2019-06-06 | End: 2019-06-06 | Stop reason: HOSPADM

## 2019-06-06 RX ADMIN — ALPRAZOLAM 1 MG: 0.5 TABLET ORAL at 09:06

## 2019-06-06 NOTE — DISCHARGE INSTRUCTIONS
Thank you for allowing us to care for you today. You may receive a survey about the care we provided. Your feedback is valuable and helps us provide excellent care throughout the community.     Home Care Instructions for Pain Management:    1. DIET:   You may resume your normal diet today.   2. BATHING:   You may shower with luke warm water. No tub baths or anything that will soak injection sites under water for the next 24 hours.  3. DRESSING:   You may remove your bandage today.   4. ACTIVITY LEVEL:   You may resume your normal activities 24 hrs after your procedure. Nothing strenuous today.  5. MEDICATIONS:   You may resume your normal medications today. To restart blood thinners, ask your doctor.  6. DRIVING    If you have received any sedatives by mouth today, you may not drive for 12 hours.    If you have received any sedation through your IV, you may not drive for 24 hrs.   7. SPECIAL INSTRUCTIONS:   No heat to the injection site for 24 hrs including, hot bath or shower, heating pad, moist heat, or hot tubs.    Use ice pack to injection site for any pain or discomfort.  Apply ice packs for 20 minute intervals as needed.    IF you have diabetes, be sure to monitor your blood sugar more closely. IF your injection contained steroids your blood sugar levels may become higher than normal.    If you are still having pain upon discharge:  Your pain may improve over the next 48 hours. The anesthetic (numbing medication) works immediately to 48 hours. IF your injection contained a steroid (anti-inflammatory medication), it takes approximately 3 days to start feeling relief and 7-10 days to see your greatest results from the medication. It is possible you may need subsequent injections. This would be discussed at your follow up appointment with pain management or your referring doctor.      PLEASE CALL YOUR DOCTOR IF:  1. Redness or swelling around the injection site.  2. Fever of 101 degrees or more  3. Drainage  (pus) from the injection site.  4. For any continuous bleeding (some dried blood over the incision is normal.)    FOR EMERGENCIES:   If any unusual problems or difficulties occur during clinic hours, call [phone] or [phone].

## 2019-06-06 NOTE — OP NOTE
Thoracic/Lumbar Facet Joint Injection Under Fluoroscopy  Time-out taken to identify patient and procedure side prior to starting the procedure.            I attest that I have reviewed the patient's home medications prior to the procedure and no contraindication have been identified. I  re-evaluated the patient after the patient was positioned for the procedure in the procedure room immediately before the procedural time-out. The vital signs are current and represent the current state of the patient which has not significantly changed since the preprocedure assessment.   Date of Service: 06/06/2019    PCP: Alba Baum MD    Referring Physician:                                                        PROCEDURE:  bilateral facet joint injection at L4-5 & L5-S1 under fluoroscopy.    REASON FOR PROCEDURE: Facet hypertrophy of lumbar region [M47.896]  1. Osteoarthritis of spine, unspecified spinal osteoarthritis complication status, unspecified spinal region    2. Spondylosis of lumbar region without myelopathy or radiculopathy    3. Chronic pain      POSTOP DIAGNOSIS: Facet hypertrophy of lumbar region [M47.896]  1. Osteoarthritis of spine, unspecified spinal osteoarthritis complication status, unspecified spinal region    2. Spondylosis of lumbar region without myelopathy or radiculopathy    3. Chronic pain      PHYSICIAN: Kaiser Braxton MD  ASSISTANTS: Mari Heath MD - fellow         MEDICATIONS INJECTED:  0.5ml Kenalog 40mg and Bupivacaine 0.25% 10ml. Injected 1.5ml  per level.  LOCAL ANESTHETIC USED:   Xylocaine 1% 9ml with Sodium Bicarbonate 1ml. 3ml per site.  SEDATION MEDICATIONS: None    ESTIMATED BLOOD LOSS:  None.    COMPLICATIONS:  None.    TECHNIQUE:   Lying in a prone position, the patient was prepped and draped in the usual sterile fashion using ChloraPrep and fenestrated drape.  The level was determined under fluoroscopic guidance.  Local anesthetic was given by going down to the hub of the 27-gauge  1.25in needle and raising a wheel.  The 3.5in 22-gauge needle was introduced into the  facet joints.  Negative pressure applied to make sure that there was no intravascular placement.  Omnipaque was injected to confirm placement.  It was also done to confirm that there was no vascular runoff.  Medication was then injected slowly.  The patient tolerated the procedure well.     PAIN BEFORE THE PROCEDURE:  5/10.    PAIN AFTER THE PROCEDURE: 2/10.    The patient was monitored after the procedure.  Patient was given post procedure and discharge instructions to follow at home.  We will see the patient back in two weeks or the patient may call to inform of status. The patient was discharged in a stable condition

## 2019-06-14 DIAGNOSIS — M43.10 ACQUIRED SPONDYLOLISTHESIS: ICD-10-CM

## 2019-06-14 DIAGNOSIS — M50.30 DEGENERATION OF CERVICAL INTERVERTEBRAL DISC: ICD-10-CM

## 2019-06-14 RX ORDER — GABAPENTIN 300 MG/1
CAPSULE ORAL
Qty: 90 CAPSULE | Refills: 2 | Status: SHIPPED | OUTPATIENT
Start: 2019-06-14 | End: 2019-09-16 | Stop reason: SDUPTHER

## 2019-06-27 ENCOUNTER — TELEPHONE (OUTPATIENT)
Dept: ORTHOPEDICS | Facility: CLINIC | Age: 64
End: 2019-06-27

## 2019-06-27 ENCOUNTER — ANESTHESIA EVENT (OUTPATIENT)
Dept: SURGERY | Facility: HOSPITAL | Age: 64
End: 2019-06-27
Payer: COMMERCIAL

## 2019-06-27 NOTE — TELEPHONE ENCOUNTER
Sabino Rubio notified of arrival time 0645 for surgery on 6/28/19 with Dr. LIANA Penn. At [facility]. Post Op appointment made, slip in mail. Reminded of need for a ride home from surgery.

## 2019-06-28 ENCOUNTER — ANESTHESIA (OUTPATIENT)
Dept: SURGERY | Facility: HOSPITAL | Age: 64
End: 2019-06-28
Payer: COMMERCIAL

## 2019-06-28 ENCOUNTER — HOSPITAL ENCOUNTER (OUTPATIENT)
Facility: HOSPITAL | Age: 64
Discharge: HOME | End: 2019-06-28
Attending: ORTHOPAEDIC SURGERY | Admitting: ORTHOPAEDIC SURGERY
Payer: COMMERCIAL

## 2019-06-28 VITALS
OXYGEN SATURATION: 99 % | RESPIRATION RATE: 18 BRPM | DIASTOLIC BLOOD PRESSURE: 73 MMHG | HEART RATE: 69 BPM | WEIGHT: 150 LBS | TEMPERATURE: 98 F | HEIGHT: 70 IN | SYSTOLIC BLOOD PRESSURE: 115 MMHG | BODY MASS INDEX: 21.47 KG/M2

## 2019-06-28 DIAGNOSIS — M19.049 CMC ARTHRITIS: ICD-10-CM

## 2019-06-28 PROCEDURE — 25111 REMOVE WRIST TENDON LESION: CPT | Mod: 51,RT,, | Performed by: ORTHOPAEDIC SURGERY

## 2019-06-28 PROCEDURE — D9220A PRA ANESTHESIA: ICD-10-PCS | Mod: ANES,,, | Performed by: ANESTHESIOLOGY

## 2019-06-28 PROCEDURE — 25111 PR EXCIS PRIMARY GANGLION WRIST: ICD-10-PCS | Mod: 51,RT,, | Performed by: ORTHOPAEDIC SURGERY

## 2019-06-28 PROCEDURE — 63600175 PHARM REV CODE 636 W HCPCS: Performed by: ORTHOPAEDIC SURGERY

## 2019-06-28 PROCEDURE — 36000708 HC OR TIME LEV III 1ST 15 MIN: Performed by: ORTHOPAEDIC SURGERY

## 2019-06-28 PROCEDURE — 64415 NJX AA&/STRD BRCH PLXS IMG: CPT | Mod: 59,RT,, | Performed by: ANESTHESIOLOGY

## 2019-06-28 PROCEDURE — 76942 ECHO GUIDE FOR BIOPSY: CPT | Mod: 26,,, | Performed by: ANESTHESIOLOGY

## 2019-06-28 PROCEDURE — 25000003 PHARM REV CODE 250: Performed by: ORTHOPAEDIC SURGERY

## 2019-06-28 PROCEDURE — 64415 INFRACLAVICULAR SINGLE SHOT: ICD-10-PCS | Mod: 59,RT,, | Performed by: ANESTHESIOLOGY

## 2019-06-28 PROCEDURE — 37000008 HC ANESTHESIA 1ST 15 MINUTES: Performed by: ORTHOPAEDIC SURGERY

## 2019-06-28 PROCEDURE — 88304 TISSUE EXAM BY PATHOLOGIST: CPT | Performed by: PATHOLOGY

## 2019-06-28 PROCEDURE — C1713 ANCHOR/SCREW BN/BN,TIS/BN: HCPCS | Performed by: ORTHOPAEDIC SURGERY

## 2019-06-28 PROCEDURE — D9220A PRA ANESTHESIA: Mod: ANES,,, | Performed by: ANESTHESIOLOGY

## 2019-06-28 PROCEDURE — S0020 INJECTION, BUPIVICAINE HYDRO: HCPCS | Performed by: STUDENT IN AN ORGANIZED HEALTH CARE EDUCATION/TRAINING PROGRAM

## 2019-06-28 PROCEDURE — 25447 PR REPAIR INTERCARP/CARP-METACARP JT: ICD-10-PCS | Mod: RT,,, | Performed by: ORTHOPAEDIC SURGERY

## 2019-06-28 PROCEDURE — 64415 NJX AA&/STRD BRCH PLXS IMG: CPT | Performed by: STUDENT IN AN ORGANIZED HEALTH CARE EDUCATION/TRAINING PROGRAM

## 2019-06-28 PROCEDURE — 37000009 HC ANESTHESIA EA ADD 15 MINS: Performed by: ORTHOPAEDIC SURGERY

## 2019-06-28 PROCEDURE — 36000709 HC OR TIME LEV III EA ADD 15 MIN: Performed by: ORTHOPAEDIC SURGERY

## 2019-06-28 PROCEDURE — 25000003 PHARM REV CODE 250: Performed by: NURSE ANESTHETIST, CERTIFIED REGISTERED

## 2019-06-28 PROCEDURE — 25000003 PHARM REV CODE 250: Performed by: STUDENT IN AN ORGANIZED HEALTH CARE EDUCATION/TRAINING PROGRAM

## 2019-06-28 PROCEDURE — 76942 ECHO GUIDE FOR BIOPSY: CPT | Performed by: STUDENT IN AN ORGANIZED HEALTH CARE EDUCATION/TRAINING PROGRAM

## 2019-06-28 PROCEDURE — D9220A PRA ANESTHESIA: ICD-10-PCS | Mod: CRNA,,, | Performed by: NURSE ANESTHETIST, CERTIFIED REGISTERED

## 2019-06-28 PROCEDURE — 71000044 HC DOSC ROUTINE RECOVERY FIRST HOUR: Performed by: ORTHOPAEDIC SURGERY

## 2019-06-28 PROCEDURE — 27201423 OPTIME MED/SURG SUP & DEVICES STERILE SUPPLY: Performed by: ORTHOPAEDIC SURGERY

## 2019-06-28 PROCEDURE — 71000015 HC POSTOP RECOV 1ST HR: Performed by: ORTHOPAEDIC SURGERY

## 2019-06-28 PROCEDURE — 63600175 PHARM REV CODE 636 W HCPCS: Performed by: NURSE ANESTHETIST, CERTIFIED REGISTERED

## 2019-06-28 PROCEDURE — D9220A PRA ANESTHESIA: Mod: CRNA,,, | Performed by: NURSE ANESTHETIST, CERTIFIED REGISTERED

## 2019-06-28 PROCEDURE — 63600175 PHARM REV CODE 636 W HCPCS: Performed by: STUDENT IN AN ORGANIZED HEALTH CARE EDUCATION/TRAINING PROGRAM

## 2019-06-28 PROCEDURE — 88304 TISSUE SPECIMEN TO PATHOLOGY - SURGERY: ICD-10-PCS | Mod: 26,,, | Performed by: PATHOLOGY

## 2019-06-28 PROCEDURE — 25000 PR INCIS TENDON SHEATH,RADIAL STYLOID: ICD-10-PCS | Mod: 51,RT,, | Performed by: ORTHOPAEDIC SURGERY

## 2019-06-28 PROCEDURE — 25447 ARTHRP NTRCRP/CRP/MTCR NTRPS: CPT | Mod: RT,,, | Performed by: ORTHOPAEDIC SURGERY

## 2019-06-28 PROCEDURE — 25000 INCISION OF TENDON SHEATH: CPT | Mod: 51,RT,, | Performed by: ORTHOPAEDIC SURGERY

## 2019-06-28 PROCEDURE — 76942 INFRACLAVICULAR SINGLE SHOT: ICD-10-PCS | Mod: 26,,, | Performed by: ANESTHESIOLOGY

## 2019-06-28 DEVICE — SYS CMC LIG RECON IMPLANT: Type: IMPLANTABLE DEVICE | Site: WRIST | Status: FUNCTIONAL

## 2019-06-28 DEVICE — SCREW PEEK TENODESIS 4 X 10: Type: IMPLANTABLE DEVICE | Site: WRIST | Status: FUNCTIONAL

## 2019-06-28 RX ORDER — HYDROMORPHONE HYDROCHLORIDE 1 MG/ML
0.2 INJECTION, SOLUTION INTRAMUSCULAR; INTRAVENOUS; SUBCUTANEOUS EVERY 5 MIN PRN
Status: DISCONTINUED | OUTPATIENT
Start: 2019-06-28 | End: 2019-06-28 | Stop reason: HOSPADM

## 2019-06-28 RX ORDER — ONDANSETRON 2 MG/ML
4 INJECTION INTRAMUSCULAR; INTRAVENOUS EVERY 12 HOURS PRN
Status: DISCONTINUED | OUTPATIENT
Start: 2019-06-28 | End: 2019-06-28 | Stop reason: HOSPADM

## 2019-06-28 RX ORDER — CEFAZOLIN SODIUM 1 G/3ML
2 INJECTION, POWDER, FOR SOLUTION INTRAMUSCULAR; INTRAVENOUS
Status: COMPLETED | OUTPATIENT
Start: 2019-06-28 | End: 2019-06-28

## 2019-06-28 RX ORDER — BACITRACIN 500 [USP'U]/G
OINTMENT TOPICAL
Status: DISCONTINUED
Start: 2019-06-28 | End: 2019-06-28 | Stop reason: HOSPADM

## 2019-06-28 RX ORDER — HYDROCODONE BITARTRATE AND ACETAMINOPHEN 10; 325 MG/1; MG/1
1 TABLET ORAL EVERY 6 HOURS
Qty: 40 TABLET | Refills: 0 | Status: SHIPPED | OUTPATIENT
Start: 2019-06-28 | End: 2019-11-07

## 2019-06-28 RX ORDER — HYDROCODONE BITARTRATE AND ACETAMINOPHEN 5; 325 MG/1; MG/1
1 TABLET ORAL EVERY 4 HOURS PRN
Status: DISCONTINUED | OUTPATIENT
Start: 2019-06-28 | End: 2019-06-28 | Stop reason: HOSPADM

## 2019-06-28 RX ORDER — HYDROCODONE BITARTRATE AND ACETAMINOPHEN 10; 325 MG/1; MG/1
1 TABLET ORAL EVERY 4 HOURS PRN
Status: DISCONTINUED | OUTPATIENT
Start: 2019-06-28 | End: 2019-06-28 | Stop reason: HOSPADM

## 2019-06-28 RX ORDER — BACITRACIN ZINC 500 UNIT/G
OINTMENT (GRAM) TOPICAL
Status: DISCONTINUED | OUTPATIENT
Start: 2019-06-28 | End: 2019-06-28 | Stop reason: HOSPADM

## 2019-06-28 RX ORDER — LIDOCAINE HYDROCHLORIDE 10 MG/ML
1 INJECTION, SOLUTION EPIDURAL; INFILTRATION; INTRACAUDAL; PERINEURAL ONCE
Status: DISCONTINUED | OUTPATIENT
Start: 2019-06-28 | End: 2019-06-28 | Stop reason: HOSPADM

## 2019-06-28 RX ORDER — BUPIVACAINE HYDROCHLORIDE 5 MG/ML
INJECTION, SOLUTION EPIDURAL; INTRACAUDAL
Status: COMPLETED | OUTPATIENT
Start: 2019-06-28 | End: 2019-06-28

## 2019-06-28 RX ORDER — SODIUM CHLORIDE 9 MG/ML
INJECTION, SOLUTION INTRAVENOUS CONTINUOUS
Status: DISCONTINUED | OUTPATIENT
Start: 2019-06-28 | End: 2019-06-28 | Stop reason: HOSPADM

## 2019-06-28 RX ORDER — LIDOCAINE HCL/PF 100 MG/5ML
SYRINGE (ML) INTRAVENOUS
Status: DISCONTINUED | OUTPATIENT
Start: 2019-06-28 | End: 2019-06-28

## 2019-06-28 RX ORDER — PROPOFOL 10 MG/ML
VIAL (ML) INTRAVENOUS
Status: DISCONTINUED | OUTPATIENT
Start: 2019-06-28 | End: 2019-06-28

## 2019-06-28 RX ORDER — PROPOFOL 10 MG/ML
VIAL (ML) INTRAVENOUS CONTINUOUS PRN
Status: DISCONTINUED | OUTPATIENT
Start: 2019-06-28 | End: 2019-06-28

## 2019-06-28 RX ORDER — SODIUM CHLORIDE 0.9 % (FLUSH) 0.9 %
10 SYRINGE (ML) INJECTION
Status: DISCONTINUED | OUTPATIENT
Start: 2019-06-28 | End: 2019-06-28 | Stop reason: HOSPADM

## 2019-06-28 RX ORDER — MIDAZOLAM HYDROCHLORIDE 1 MG/ML
0.5 INJECTION INTRAMUSCULAR; INTRAVENOUS
Status: DISCONTINUED | OUTPATIENT
Start: 2019-06-28 | End: 2019-06-28 | Stop reason: HOSPADM

## 2019-06-28 RX ORDER — SODIUM CHLORIDE 0.9 % (FLUSH) 0.9 %
5 SYRINGE (ML) INJECTION
Status: DISCONTINUED | OUTPATIENT
Start: 2019-06-28 | End: 2019-06-28 | Stop reason: HOSPADM

## 2019-06-28 RX ORDER — MUPIROCIN 20 MG/G
1 OINTMENT TOPICAL 2 TIMES DAILY
Status: DISCONTINUED | OUTPATIENT
Start: 2019-06-28 | End: 2019-06-28 | Stop reason: HOSPADM

## 2019-06-28 RX ORDER — MUPIROCIN 20 MG/G
OINTMENT TOPICAL
Status: DISCONTINUED | OUTPATIENT
Start: 2019-06-28 | End: 2019-06-28 | Stop reason: HOSPADM

## 2019-06-28 RX ORDER — FENTANYL CITRATE 50 UG/ML
25 INJECTION, SOLUTION INTRAMUSCULAR; INTRAVENOUS EVERY 5 MIN PRN
Status: DISCONTINUED | OUTPATIENT
Start: 2019-06-28 | End: 2019-06-28 | Stop reason: HOSPADM

## 2019-06-28 RX ORDER — KETAMINE HCL IN 0.9 % NACL 50 MG/5 ML
SYRINGE (ML) INTRAVENOUS
Status: DISCONTINUED | OUTPATIENT
Start: 2019-06-28 | End: 2019-06-28

## 2019-06-28 RX ADMIN — PROPOFOL 50 MG: 10 INJECTION, EMULSION INTRAVENOUS at 09:06

## 2019-06-28 RX ADMIN — MUPIROCIN: 20 OINTMENT TOPICAL at 07:06

## 2019-06-28 RX ADMIN — MIDAZOLAM HYDROCHLORIDE 2 MG: 1 INJECTION, SOLUTION INTRAMUSCULAR; INTRAVENOUS at 07:06

## 2019-06-28 RX ADMIN — Medication 10 MG: at 10:06

## 2019-06-28 RX ADMIN — LIDOCAINE HYDROCHLORIDE 75 MG: 20 INJECTION, SOLUTION INTRAVENOUS at 09:06

## 2019-06-28 RX ADMIN — Medication 20 MG: at 09:06

## 2019-06-28 RX ADMIN — CEFAZOLIN 2 G: 330 INJECTION, POWDER, FOR SOLUTION INTRAMUSCULAR; INTRAVENOUS at 09:06

## 2019-06-28 RX ADMIN — HYDROCODONE BITARTRATE AND ACETAMINOPHEN 1 TABLET: 5; 325 TABLET ORAL at 11:06

## 2019-06-28 RX ADMIN — SODIUM CHLORIDE 1000 ML: 0.9 INJECTION, SOLUTION INTRAVENOUS at 07:06

## 2019-06-28 RX ADMIN — BUPIVACAINE HYDROCHLORIDE 30 ML: 5 INJECTION, SOLUTION EPIDURAL; INTRACAUDAL; PERINEURAL at 08:06

## 2019-06-28 RX ADMIN — PROPOFOL 100 MCG/KG/MIN: 10 INJECTION, EMULSION INTRAVENOUS at 09:06

## 2019-06-28 RX ADMIN — Medication 10 MG: at 09:06

## 2019-06-28 RX ADMIN — PROPOFOL 40 MG: 10 INJECTION, EMULSION INTRAVENOUS at 09:06

## 2019-06-28 NOTE — ANESTHESIA POSTPROCEDURE EVALUATION
Anesthesia Post Evaluation    Patient: Sabino Rubio    Procedure(s) Performed: Procedure(s) (LRB):  INTERPOSITION ARTHROPLASTY, CMC JOINT right (Right)  EXCISION, GANGLION CYST, WRIST right (Right)    Final Anesthesia Type: general  Patient location during evaluation: PACU  Patient participation: Yes- Able to Participate  Level of consciousness: awake and alert and oriented  Post-procedure vital signs: reviewed and stable  Pain management: adequate  Airway patency: patent  PONV status at discharge: No PONV  Anesthetic complications: no      Cardiovascular status: blood pressure returned to baseline  Respiratory status: unassisted  Hydration status: euvolemic  Follow-up not needed.          Vitals Value Taken Time   /73 6/28/2019 12:02 PM   Temp 36.6 °C (97.9 °F) 6/28/2019 12:00 PM   Pulse 67 6/28/2019 12:07 PM   Resp 19 6/28/2019 12:05 PM   SpO2 100 % 6/28/2019 12:07 PM   Vitals shown include unvalidated device data.      No case tracking events are documented in the log.      Pain/Pete Score: Pain Rating Prior to Med Admin: 0 (6/28/2019 11:39 AM)  Pete Score: 9 (6/28/2019 11:30 AM)

## 2019-06-28 NOTE — INTERVAL H&P NOTE
The patient has been examined and the H&P has been reviewed:    I concur with the findings and no changes have occurred since H&P was written.    Anesthesia/Surgery risks, benefits and alternative options discussed and understood by patient/family.          Active Hospital Problems    Diagnosis  POA    CMC arthritis [M19.049]  Yes      Resolved Hospital Problems   No resolved problems to display.

## 2019-06-28 NOTE — H&P
06/28/2019  No chief complaint on file.    Interval History of Present Illness: Sabino Rubio is a 63 y.o. year old male patient here for operative management of right 1st CMC arthritis, right ganglion cyst. Symptoms have been refractory to conservative management and the pt has elected to proceed with surgical intervention. Symptoms continue to interfere with ADLs. Denies fevers, chills, nausea, vomiting. No significant change to medical history since last clinic visit.    Interval History 4/30/19:   Patient here for follow-up of right CMC arthritis. Patient was previously seen by Dr. [place], but decided to follow-up with us at the recommendation of his girlfriend. Received a brace previously, but does not use now secondary to difficulty with using at work. Denies numbness or tingling.  No change in ulnar sided wrist mass.     Interval History 2/25/19:  Here for 6 week follow-up of R first CMC pain and R ulnar sided ganglion cyst. Had injection on 1/14/19 to first CMC joint. Injection site was fairly sore for about 2 weeks, but then subsided. States pain has not changed much from injection. No pain from ulnar sided ganglion. Has not been using thumb spica much because interferes with his work. CMC joint particularly painful when working, eg swinging a hammer.     History of Present Illness 1/14/19:  Patient is a 63 y.o. right hand dominant male who presents today with complaints of right ganglion cyst over ulnar aspect of wrist, right 1st CMC pain. Both are chronic in nature and are not to the point that limits him significantly. Does take meloxicam for his back but has had no other therapy for this.       The patient is a/an contractor.     Onset of symptoms/DOI was 1 year ago and are intermittent.     Symptoms are aggravated by movement.     Symptoms are alleviated by activity.     Symptoms consist of pain and swelling.     The patient rates their pain as a 3/10.     Attempted treatment(s) and/or  interventions include rest and anti-inflammatory medications.     The patient denies any fevers, chills, N/V, D/C and presents for evaluation.      Past Medical History:  Active Ambulatory Problems     Diagnosis Date Noted    Restless leg syndrome 12/21/2012    Cervical spondylosis without myelopathy 01/05/2015    Degeneration of cervical intervertebral disc 01/05/2015    Brachial neuritis or radiculitis NOS 01/05/2015    Acquired spondylolisthesis 01/05/2015    Tubulovillous adenoma: 2013 09/14/2015    Kidney cyst, acquired: R 1.2 cm 2015 09/25/2017    Family history of prostate cancer: 1/2 brother 09/25/2017    Chronic bilateral low back pain without sciatica 10/30/2017    Lumbar degenerative disc disease 01/14/2019    Spondylosis of lumbar region without myelopathy or radiculopathy 01/14/2019    Arthritis of carpometacarpal (CMC) joint of right thumb 02/25/2019    Allergic rhinitis 04/24/2019    Colon polyps 05/22/2019    Chronic pain 06/06/2019     Resolved Ambulatory Problems     Diagnosis Date Noted    Osteoarthritis 12/21/2012    Foraminal stenosis of cervical region 01/05/2015    Cervicalgia 01/05/2015    Diarrhea 10/06/2015     Past Medical History:   Diagnosis Date    Allergy     Arthritis     Family history of malignant neoplasm of gastrointestinal tract mat.gf    Family history of prostate cancer: 1/2 brother 9/25/2017    GERD (gastroesophageal reflux disease)     Kidney cyst, acquired: R 1.2 cm 2015 9/25/2017    Restless leg syndrome     Tubulovillous adenoma 2013 due 2016 9/14/2015     Past Surgical History:   Procedure Laterality Date    CARPAL TUNNEL RELEASE      COLONOSCOPY N/A 5/22/2019    Performed [facility] (4TH FLR)    COLONOSCOPY N/A 4/5/2013    Performed by [facility] at [facility] END[facility] (4TH FLR)    INJECTION, FACET JOINT INJECTION (LUMBAR BLOCK) BILATERAL L4-L5 AND L5-S1 FACET INJECTIONS Bilateral 6/6/2019    [facility] by [facility] at [facility]  PAIN MGT     Medications:  Entered into EMR  Review of patient's allergies indicates:  No Known Allergies  ROS:   Negative except as stated in HPI.    Social History     Socioeconomic History    Marital status: Single     Spouse name: Not on file    Number of children: Not on file    Years of education: Not on file    Highest education level: Not on file   Occupational History    Not on file   Social Needs    Financial resource strain: Not on file    Food insecurity:     Worry: Not on file     Inability: Not on file    Transportation needs:     Medical: Not on file     Non-medical: Not on file   Tobacco Use    Smoking status: Never Smoker    Smokeless tobacco: Never Used    Tobacco comment: 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   Substance and Sexual Activity    Alcohol use: Yes     Alcohol/week: 0.0 oz     Comment: 4 days weekly, up to 2 beers    Drug use: No    Sexual activity: Yes     Partners: Female   Lifestyle    Physical activity:     Days per week: Not on file     Minutes per session: Not on file    Stress: Not on file   Relationships    Social connections:     Talks on phone: Not on file     Gets together: Not on file     Attends Rastafari service: Not on file     Active member of club or organization: Not on file     Attends meetings of clubs or organizations: Not on file     Relationship status: Not on file   Other Topics Concern    Not on file   Social History Narrative    Not on file     Family History   Problem Relation Age of Onset    Arthritis Mother         probable R.A.    Cancer Father         esophageal ca and brain tumor    Esophageal cancer Father     Melanoma Father     Cancer Brother         pancreatic cancer    Cancer Maternal Grandfather         colon    Colon cancer Maternal Grandfather     Irritable bowel syndrome Sister     Arthritis Sister     No Known Problems Son     No Known  Problems Son     Diabetes Neg Hx     Heart disease Neg Hx     Cirrhosis Neg Hx     Celiac disease Neg Hx     Crohn's disease Neg Hx     Ulcerative colitis Neg Hx     Stomach cancer Neg Hx     Rectal cancer Neg Hx     Liver cancer Neg Hx     Psoriasis Neg Hx     Lupus Neg Hx      Physical Examination:  Vital Signs: There were no vitals filed for this visit.  General: Awake, alert, oriented x3. NAD  Psych: Mood and affect normal  HEENT: NC/AT  Cardio: Regular rate  Respiratory: Clear, equal, and unlabored respirations  Skin/Neuro/MSK:  Ganglion cyst over ECU tendon at the wrist  Positive CMC grind    Xray - showing severe osteoarthritis right 1st CMC  MRI right wrist:   1. Subcutaneous ulnar-sided mass favored to represent ganglion cyst, though evaluation is limited due to absence of contrast (likely IV infiltration).  Patient will be brought back for additional imaging with intravenous contrast.  2. Multifocal degenerative changes, most severe at the base of thumb.  3. Possible TFCC tear.  4. Several ganglion cysts.  5. Flexor pollicis longus tenosynovitis.  6. Additional findings above.    Impression:  1st CMC arthritis right  RIght ganglion cyst     Discussion/Plan:  - NPO  - To OR today for CMC arthroplasty, ganglion cyst removal

## 2019-06-28 NOTE — ANESTHESIA PREPROCEDURE EVALUATION
06/28/2019  Sabino Rubio is a 63 y.o., male.    Anesthesia Evaluation    I have reviewed the Patient Summary Reports.    I have reviewed the Nursing Notes.   I have reviewed the Medications.     Review of Systems  Anesthesia Hx:  No problems with previous Anesthesia  History of prior surgery of interest to airway management or planning: Denies Family Hx of Anesthesia complications.   Denies Personal Hx of Anesthesia complications.   Hematology/Oncology:  Hematology Normal   Oncology Normal     EENT/Dental:EENT/Dental Normal   Cardiovascular:  Cardiovascular Normal     Pulmonary:  Pulmonary Normal    Renal/:   Chronic Renal Disease    Hepatic/GI:   GERD    Musculoskeletal:   Arthritis     Neurological:   Neuromuscular Disease,    Endocrine:  Endocrine Normal    Dermatological:  Skin Normal    Psych:  Psychiatric Normal           Physical Exam  General:  Well nourished    Airway/Jaw/Neck:  Airway Findings: Mouth Opening: Normal Tongue: Normal  General Airway Assessment: Adult  Mallampati: III  Improves to II with phonation.  TM Distance: Normal, at least 6 cm     Eyes/Ears/Nose:  EYES/EARS/NOSE FINDINGS: Normal    Chest/Lungs:  Chest/Lungs Clear    Heart/Vascular:  Heart Findings: Normal       Mental Status:  Mental Status Findings: Normal        Anesthesia Plan  Type of Anesthesia, risks & benefits discussed:  Anesthesia Type:  regional, MAC, general  Patient's Preference:   Intra-op Monitoring Plan: standard ASA monitors  Intra-op Monitoring Plan Comments:   Post Op Pain Control Plan: per primary service following discharge from PACU, IV/PO Opioids PRN, multimodal analgesia and peripheral nerve block  Post Op Pain Control Plan Comments:   Induction:   IV  Beta Blocker:  Patient is not currently on a Beta-Blocker (No further documentation required).       Informed Consent: Patient understands risks and  agrees with Anesthesia plan.  Questions answered. Anesthesia consent signed with patient.  ASA Score: 2     Day of Surgery Review of History & Physical:            Ready For Surgery From Anesthesia Perspective.

## 2019-06-28 NOTE — BRIEF OP NOTE
[facility]  Brief Operative Note     SUMMARY     Surgery Date: 6/28/2019     Surgeon(s) and Role:     * Alba Penn MD - Primary     * Louann Mendoza MD - Resident - Assisting        Pre-op Diagnosis:  Arthritis of carpometacarpal (CMC) joint of right thumb [M18.11]    Post-op Diagnosis:  Post-Op Diagnosis Codes:     * Arthritis of carpometacarpal (CMC) joint of right thumb [M18.11]    Procedure(s) (LRB):  INTERPOSITION ARTHROPLASTY, CMC JOINT right (Right)  EXCISION, GANGLION CYST, WRIST right (Right)    Anesthesia: Regional    Description of the findings of the procedure: see op note    Findings/Key Components: see op note    Estimated Blood Loss: minimal         Specimens:   Specimen (12h ago, onward)    Start     Ordered    06/28/19 1007  Specimen to Pathology - Surgery  Once     Comments:  1) right ganglion cyst (perm.)     Start Status     06/28/19 1007 Collected (06/28/19 1007) Order ID: [id number]       06/28/19 1007          Discharge Note    SUMMARY     Admit Date: 6/28/2019    Discharge Date and Time:  06/28/2019 11:08 AM    Hospital Course (synopsis of major diagnoses, care, treatment, and services provided during the course of the hospital stay): Patient presented for above procedure.  Tolerated it well and was discharged home POD0 after voiding, tolerating diet, ambulating, pain controlled.  Discharge instructions, follow-up appointment, and med rec are below.       Final Diagnosis: Post-Op Diagnosis Codes:     * Arthritis of carpometacarpal (CMC) joint of right thumb [M18.11]    Disposition: Home or Self Care    Follow Up/Patient Instructions:     Medications:  Reconciled Home Medications:      Medication List      START taking these medications    HYDROcodone-acetaminophen  mg per tablet  Commonly known as:  NORCO  Take 1 tablet by mouth every 6 (six) hours.        CONTINUE taking these medications    fluticasone propionate 50 mcg/actuation nasal  spray  Commonly known as:  FLONASE  1 SPRAY (50 MCG TOTAL) BY EACH NARE ROUTE 2 (TWO) TIMES DAILY.     gabapentin 300 MG capsule  Commonly known as:  NEURONTIN  TAKE 1 CAPSULE BY MOUTH THREE TIMES A DAY     meloxicam 15 MG tablet  Commonly known as:  MOBIC  Take 1 tablet (15 mg total) by mouth daily as needed for Pain (Take with food or a meal).     rOPINIRole 1 MG tablet  Commonly known as:  REQUIP  TAKE 1 TABLET BY MOUTH 3 TIMES A DAY          Discharge Procedure Orders   Diet general     Sponge bath only until clinic visit     Call MD for:  temperature >100.4     Call MD for:  persistent nausea and vomiting     Call MD for:  severe uncontrolled pain     Call MD for:  difficulty breathing, headache or visual disturbances     Call MD for:  redness, tenderness, or signs of infection (pain, swelling, redness, odor or green/yellow discharge around incision site)     Call MD for:  hives     Call MD for:  persistent dizziness or light-headedness     Call MD for:  extreme fatigue     Leave dressing on - Keep it clean, dry, and intact until clinic visit     Follow-up Information     ELIZABETH Mi In 2 weeks.    Specialties:  Hand Surgery, Orthopedic Surgery  Why:  For wound re-check  Contact information:  [address] [id number] [phone]

## 2019-06-28 NOTE — DISCHARGE INSTRUCTIONS
Discharge Instructions for Wrist Arthroscopy  You had a wrist arthroscopy. This is a surgical procedure that helps the doctor diagnose and treat wrist problems, such as fractures, cysts, and ligament and cartilage tears. Here are some instructions to help you care for your wrist after surgery.  Activity  · Avoid gripping objects tightly or lifting with your affected arm.  · Wear your bandage, splint, cast, or sling as directed by your doctor.  · Keep your hand raised above the level of your heart as much as possible for the first 2 to 3 days after surgery. This will help reduce swelling.  · Do the exercises taught to you in the hospital, or as instructed by your doctor.  · Do not drive a car until your doctor says its OK. And never drive if taking narcotic pain medicine.  · Ask your doctor when you can return to work. If your job requires heavy lifting, you may not be able to return for several weeks.  · Keep in mind that full recovery can take 3 to 6 weeks.  Home care  · Keep the dressing clean and dry. Your doctor will tell you when and how to change your dressing.  · Shower as needed. Cover your wrist with plastic to keep the dressing dry.  · Use an ice pack or bag of frozen peas wrapped in a thin towel to reduce swelling. Keep the ice pack in place for 20 minutes, then leave it off for 20 minutes. Repeat as needed.  · Take pain medicine as directed.     When to call your healthcare provider  Call 911 right away if you have:  · Chest pain  · Shortness of breath  Otherwise, call your doctor immediately if you have:  · Fever of 100.4 °F (38 °C) or higher, or as advised  · Shaking chills  · Fingers that are pale or blue  · Inability to move your fingers or hand  · Increased redness, tenderness, or swelling of the incision  · Drainage from or opening of the incision  · Increased pain with or without activity   Date Last Reviewed: 7/27/2016  © 3290-4551 [employer], [employer]. [address] [place] [address], [place]  [address]. All rights reserved. This information is not intended as a substitute for professional medical care. Always follow your healthcare professional's instructions.

## 2019-06-28 NOTE — ANESTHESIA PROCEDURE NOTES
Infraclavicular single shot    Patient location during procedure: pre-op   Block not for primary anesthetic.  Reason for block: at surgeon's request and post-op pain management   Post-op Pain Location: right hand pain  Start time: 6/28/2019 7:54 AM  Timeout: 6/28/2019 7:49 AM   End time: 6/28/2019 8:05 AM    Staffing  Authorizing Provider: Vikki Casey MD  Performing Provider: Meli Gilman MD    Preanesthetic Checklist  Completed: patient identified, site marked, surgical consent, pre-op evaluation, timeout performed, IV checked, risks and benefits discussed and monitors and equipment checked  Peripheral Block  Patient position: sitting  Prep: ChloraPrep  Patient monitoring: heart rate, cardiac monitor, continuous pulse ox, continuous capnometry and frequent blood pressure checks  Block type: infraclavicular  Laterality: right  Injection technique: single shot  Needle  Needle type: Stimuplex   Needle gauge: 21 G  Needle length: 4 in  Needle localization: ultrasound guidance and anatomical landmarks   -ultrasound image captured on disc.  Assessment  Injection assessment: negative aspiration and negative parasthesia  Paresthesia pain: none  Heart rate change: no  Slow fractionated injection: yes  Additional Notes  VSS.  [facility] RN monitoring vitals throughout procedure.  Patient tolerated procedure well.    30cc 0.5% bupi with epi

## 2019-06-28 NOTE — TRANSFER OF CARE
"Anesthesia Transfer of Care Note    Patient: Sabino Rubio    Procedure(s) Performed: Procedure(s) (LRB):  INTERPOSITION ARTHROPLASTY, CMC JOINT right (Right)  EXCISION, GANGLION CYST, WRIST right (Right)    Patient location: PACU    Anesthesia Type: general    Transport from OR: Transported from OR on room air with adequate spontaneous ventilation    Post pain: adequate analgesia    Post assessment: no apparent anesthetic complications and tolerated procedure well    Post vital signs: stable    Level of consciousness: awake, alert and oriented    Nausea/Vomiting: no nausea/vomiting    Complications: none    Transfer of care protocol was followed      Last vitals:   Visit Vitals  BP (!) 113/55 (BP Location: Left arm, Patient Position: Lying)   Pulse 86   Temp 37.1 °C (98.8 °F) (Oral)   Resp 13   Ht 5' 10" (1.778 m)   Wt 68 kg (150 lb)   SpO2 100%   BMI 21.52 kg/m²     "

## 2019-06-29 NOTE — OP NOTE
DATE OF PROCEDURE:  06/28/2019    SERVICE:  Orthopedics.    ATTENDING SURGEON:  Alba Penn M.D.    RESIDENT SURGEON:  Louann Mendoza M.D. (RES)    PREOPERATIVE DIAGNOSES:  1.  Right thumb CMC joint arthritis.  2.  Right volar wrist ganglion cyst.    POSTOPERATIVE DIAGNOSES:  1.  Right thumb CMC joint arthritis.  2.  Right volar wrist ganglion cyst.    PROCEDURES PERFORMED:  1.  Right thumb joint CMC arthroplasty.  2.  Right volar wrist ganglion cyst excisional biopsy.  3.  First dorsal compartment release, right wrist.  4.  [place] APL tendon for graft.  5.  Splint application, right upper extremity.  6.  Fluoro use 1 hour.    PACKS AND DRAINS:  None.    IMPLANTS:  Arthrex anchors x2.    COMPLICATIONS:  None.    INDICATION FOR PROCEDURE:  Mr. Rubio is a 63-year-old male.  He has failed   conservative treatment for CMC arthritis.  In addition, he had enlarging volar   ganglion cyst.  It was painful to the patient.  After much discussion with the   patient, we elected for surgical intervention.  Risks and benefits were   explained to the patient in clinic.  Consents were performed in the clinic.    PROCEDURE IN DETAIL:  After the correct site was marked with the patient's   participation in the holding area, the patient was brought to the Operating   Room, placed in supine position and underwent MAC anesthesia.  He had undergone   general anesthesia while in the holding area.  A well-padded nonsterile   tourniquet was placed on the right upper extremity.  The right upper extremity   was prepped and draped in normal sterile fashion.  A timeout was conducted for   the correct site and the procedure to be indicated.  IV antibiotics have been   given to the patient preoperatively.  Incision was marked out over the CMC joint   that extended over the first dorsal compartment as well as a longitudinal   incision over the volar ganglion.  Arm was exsanguinated with an Esmarch.    Tourniquet was insufflated to 250  mmHg.  An incision was made first over the   volar wrist ganglion.  Incision was made.  Careful dissection around the ganglia   was maintained.  The ganglion was on the ulnar aspect of the wrist.  It was a   ganglion cyst.  It was excised in completion, passed off to the back table and   measured about 2.5 x 2 cm.  The area was irrigated with copious amounts of   normal saline.  The stalk was cauterized and curette did extend down into the   wrist capsule.  Vicryl, Monocryl, Dermabond closed the skin.  Our attention was   then turned to the CMC joint.  The incision was made.  Careful dissection down   to the CMC joint was maintained.  The first dorsal compartment was first   released.  One of the slips of the APL tendon was harvested and tagged with a   FiberWire suture.  The capsule of the CMC joint was opened and under C-arm   fluoroscopy, the trapezium was removed in completion, passed off in the back   table.  Using the Arthrex system, a first metacarpal osseous tunnel was created   first with a pin and then the over reamer and once that was completed and all   bone debris was removed, the APL tendon was passed through the first metacarpal.    Once that was completed, our attention was then turned to the second   metacarpal.  Under C-arm fluoroscopy, the incision was marked out.  The incision   was made.  Careful dissection down to the base of the second metacarpal was   maintained.  The guidewire was first placed under C-arm fluoroscopy to confirm   placement of the guidewire.  It was over reamed at that point.  Bone debris was   then removed from the second metacarpal osseous tunnel.  Using a tendon passer,   the APL tendon was then slung from the first metacarpal osseous tunnel to second   metacarpal osseous tunnel.  Good tensioning was achieved.  The Arthrex anchors   were then placed as a tenodesis technique on the second metacarpal osseous   tunnel and the first metacarpal osseous tunnel.  MCP was assessed  at that time   and showed that it did not hyperextend greater than 30 degrees and it was   stable.  The area was irrigated with copious amounts of normal saline.  Vicryl,   Monocryl, Dermabond closed the skin.  Sterile dressing was applied.  Tourniquet   was deflated.  Brisk capillary refill ensued.  The patient was placed in a   well-padded thumb spica splint, tolerated the procedure well and was brought to   the recovery area in stable condition.    POSTOPERATIVE PLAN FOR THIS PATIENT:  Keep the dressing clean, dry and intact.    We will see him back in two weeks' time.  He will be placed in a cast at that   time.  Sutures to be clipped mainly at that time.  Pathology report to be given   to the patient.      LES/HN  dd: 06/28/2019 14:55:35 (CDT)  td: 06/28/2019 23:44:02 (CDT)  Doc ID   #[id number]  Job ID #[id number]    CC:

## 2019-06-30 DIAGNOSIS — M50.30 DEGENERATION OF CERVICAL INTERVERTEBRAL DISC: ICD-10-CM

## 2019-07-01 RX ORDER — MELOXICAM 15 MG/1
15 TABLET ORAL DAILY PRN
Qty: 30 TABLET | Refills: 3 | Status: SHIPPED | OUTPATIENT
Start: 2019-07-01 | End: 2019-12-09 | Stop reason: SDUPTHER

## 2019-07-12 ENCOUNTER — OFFICE VISIT (OUTPATIENT)
Dept: ORTHOPEDICS | Facility: CLINIC | Age: 64
End: 2019-07-12
Payer: COMMERCIAL

## 2019-07-12 ENCOUNTER — DOCUMENTATION ONLY (OUTPATIENT)
Dept: ORTHOPEDICS | Facility: CLINIC | Age: 64
End: 2019-07-12

## 2019-07-12 VITALS
DIASTOLIC BLOOD PRESSURE: 66 MMHG | RESPIRATION RATE: 18 BRPM | TEMPERATURE: 99 F | SYSTOLIC BLOOD PRESSURE: 116 MMHG | BODY MASS INDEX: 21.33 KG/M2 | HEART RATE: 76 BPM | WEIGHT: 149 LBS | HEIGHT: 70 IN

## 2019-07-12 DIAGNOSIS — M67.40 GANGLION CYST: ICD-10-CM

## 2019-07-12 DIAGNOSIS — M18.11 ARTHRITIS OF CARPOMETACARPAL (CMC) JOINT OF RIGHT THUMB: Primary | ICD-10-CM

## 2019-07-12 PROCEDURE — 29075 PR APPLY FOREARM CAST: ICD-10-PCS | Mod: 58,RT,S$GLB, | Performed by: PHYSICIAN ASSISTANT

## 2019-07-12 PROCEDURE — 99024 PR POST-OP FOLLOW-UP VISIT: ICD-10-PCS | Mod: S$GLB,,, | Performed by: PHYSICIAN ASSISTANT

## 2019-07-12 PROCEDURE — 99999 PR PBB SHADOW E&M-EST. PATIENT-LVL IV: ICD-10-PCS | Mod: PBBFAC,,, | Performed by: PHYSICIAN ASSISTANT

## 2019-07-12 PROCEDURE — 99999 PR PBB SHADOW E&M-EST. PATIENT-LVL IV: CPT | Mod: PBBFAC,,, | Performed by: PHYSICIAN ASSISTANT

## 2019-07-12 PROCEDURE — 29075 APPL CST ELBW FNGR SHORT ARM: CPT | Mod: 58,RT,S$GLB, | Performed by: PHYSICIAN ASSISTANT

## 2019-07-12 PROCEDURE — 99024 POSTOP FOLLOW-UP VISIT: CPT | Mod: S$GLB,,, | Performed by: PHYSICIAN ASSISTANT

## 2019-07-12 NOTE — ADDENDUM NOTE
Addendum  created 07/12/19 1300 by Meli Gilman MD    Diagnosis association updated, Intraprocedure Blocks edited, Sign clinical note

## 2019-07-12 NOTE — PROGRESS NOTES
"Mr. Rubio is here today for a post-operative visit.  He is 14 days status post Right thumb joint CMC arthroplasty, Right volar wrist ganglion cyst excisional biopsy, First dorsal compartment release, right wrist, [place] APL tendon for graft by Dr. Penn on 6/28/19. He reports that he is doing well.  Pain is mild, intermittent.  He is taking pain medication, Norco as needed, about 1-2 times daily.  He denies fever, chills, and sweats since the time of the surgery.     Physical exam:    Vitals:    07/12/19 0922   BP: 116/66   Pulse: 76   Resp: 18   Temp: 99.2 °F (37.3 °C)   Weight: 67.6 kg (149 lb)   Height: 5' 10" (1.778 m)   PainSc:   1     Vital signs are stable, patient is afebrile.  Patient is well dressed and well groomed, no acute distress.  Alert and oriented to person, place, and time.  Post op dressing taken down.  Incision is clean, dry and intact.  There is no erythema or exudate.  There is no sign of any infection. He is NVI. Sutures removed without difficulty. There is mild wound dehiscence of the ganglion incision.     FINAL PATHOLOGIC DIAGNOSIS  RIGHT GANGLION CYST, EXCISION:  Consistent with ganglion cyst    Assessment:  status post Right thumb joint CMC arthroplasty, Right volar wrist ganglion cyst excisional biopsy, First dorsal compartment release, right wrist, [place] APL tendon for graft by Dr. Penn on 6/28/19    Plan:  Sabino was seen today for hand pain.    Diagnoses and all orders for this visit:    Arthritis of carpometacarpal (CMC) joint of right thumb  -     X-Ray Hand Complete Right; Future  -     Ambulatory Referral to Physical/Occupational Therapy    Ganglion cyst  -     X-Ray Hand Complete Right; Future  -     Ambulatory Referral to Physical/Occupational Therapy    - PO instruction reviewed and provided to patient  -pt placed in right thumb spica cast  -OT ordered to begin after 6 wk post op   -pathology report reviewed  -norco  #30 refill given, can try taking half a " pill   -RTC 1 wk for incision check, 4 wks with reji Briceno PA-C  [facility]   [facility]  [place], [place]

## 2019-07-17 ENCOUNTER — TELEPHONE (OUTPATIENT)
Dept: ORTHOPEDICS | Facility: CLINIC | Age: 64
End: 2019-07-17

## 2019-07-17 ENCOUNTER — OFFICE VISIT (OUTPATIENT)
Dept: ORTHOPEDICS | Facility: CLINIC | Age: 64
End: 2019-07-17
Payer: COMMERCIAL

## 2019-07-17 ENCOUNTER — DOCUMENTATION ONLY (OUTPATIENT)
Dept: ORTHOPEDICS | Facility: CLINIC | Age: 64
End: 2019-07-17

## 2019-07-17 DIAGNOSIS — Z98.890 POST-OPERATIVE STATE: Primary | ICD-10-CM

## 2019-07-17 PROCEDURE — 99024 POSTOP FOLLOW-UP VISIT: CPT | Mod: S$GLB,,, | Performed by: PHYSICIAN ASSISTANT

## 2019-07-17 PROCEDURE — 99024 PR POST-OP FOLLOW-UP VISIT: ICD-10-PCS | Mod: S$GLB,,, | Performed by: PHYSICIAN ASSISTANT

## 2019-07-17 PROCEDURE — 99999 PR PBB SHADOW E&M-EST. PATIENT-LVL II: CPT | Mod: PBBFAC,,, | Performed by: PHYSICIAN ASSISTANT

## 2019-07-17 PROCEDURE — 29075 PR APPLY FOREARM CAST: ICD-10-PCS | Mod: 58,RT,S$GLB, | Performed by: PHYSICIAN ASSISTANT

## 2019-07-17 PROCEDURE — 99999 PR PBB SHADOW E&M-EST. PATIENT-LVL II: ICD-10-PCS | Mod: PBBFAC,,, | Performed by: PHYSICIAN ASSISTANT

## 2019-07-17 PROCEDURE — 29075 APPL CST ELBW FNGR SHORT ARM: CPT | Mod: 58,RT,S$GLB, | Performed by: PHYSICIAN ASSISTANT

## 2019-07-17 NOTE — TELEPHONE ENCOUNTER
----- Message from Dariela Burrell sent at 7/17/2019  8:02 AM CDT -----  Contact: pt   Name of Who is Calling: MARYLOU GERARD [[id number]]    What is the request in detail:Patient is calling in regards to discomfort from cast and would like to come today, tomorrow, or Friday to have it look at..... Please contact to further discuss and advise      Can the clinic reply by [facility]: No     What Number to Call Back [facility]: [phone]

## 2019-07-17 NOTE — PROGRESS NOTES
Mr. Rubio is here today for a cast change. He is 14 days status post Right thumb joint CMC arthroplasty, Right volar wrist ganglion cyst excisional biopsy, First dorsal compartment release, right wrist, [place] APL tendon for graft by Dr. Penn on 6/28/19. He feels the cast is rubbing on the wrist.     Cast removed. Incisions are clean, dry, intact. Prior mild wound dehiscence of the ganglion incision is much improved.     Plan:  -new right thumb spica cast  -RTC for 6 wk post op visit, will cancel upcoming appt for incision check as it looks good today       Lindsay Briceno PA-C  [facility]   [facility]  [place], [place]

## 2019-08-08 ENCOUNTER — TELEPHONE (OUTPATIENT)
Dept: ORTHOPEDICS | Facility: CLINIC | Age: 64
End: 2019-08-08

## 2019-08-08 DIAGNOSIS — M79.642 BILATERAL HAND PAIN: Primary | ICD-10-CM

## 2019-08-08 DIAGNOSIS — M79.641 BILATERAL HAND PAIN: Primary | ICD-10-CM

## 2019-08-08 NOTE — TELEPHONE ENCOUNTER
----- Message from Irwin Tong sent at 8/8/2019 11:57 AM CDT -----  Contact: Pt   Name of Who is Calling: MARYLOU GERARD [[id number]]    What is the request in detail:Pt is requesting a call back regarding his xray tomorrow 8/9 Pt will like to know can he also get his left hand x-ray also because he starting to have problems with it ....... Please contact to further discuss and advise      Can the clinic reply by [facility]: No     What Number to Call Back if not in [facility]:  [phone]

## 2019-08-08 NOTE — TELEPHONE ENCOUNTER
Called patient back in regard to phone message. I informed him that we will make sure that the x-ray for both hands is in the system prior to his appt tomorrow. He verbalized understanding.

## 2019-08-09 ENCOUNTER — HOSPITAL ENCOUNTER (OUTPATIENT)
Dept: RADIOLOGY | Facility: OTHER | Age: 64
Discharge: HOME | End: 2019-08-09
Attending: PHYSICIAN ASSISTANT
Payer: COMMERCIAL

## 2019-08-09 ENCOUNTER — OFFICE VISIT (OUTPATIENT)
Dept: ORTHOPEDICS | Facility: CLINIC | Age: 64
End: 2019-08-09
Payer: COMMERCIAL

## 2019-08-09 VITALS
BODY MASS INDEX: 21.33 KG/M2 | WEIGHT: 149 LBS | SYSTOLIC BLOOD PRESSURE: 122 MMHG | DIASTOLIC BLOOD PRESSURE: 75 MMHG | HEIGHT: 70 IN | HEART RATE: 80 BPM

## 2019-08-09 DIAGNOSIS — M79.641 BILATERAL HAND PAIN: ICD-10-CM

## 2019-08-09 DIAGNOSIS — M18.12 ARTHRITIS OF CARPOMETACARPAL (CMC) JOINT OF LEFT THUMB: ICD-10-CM

## 2019-08-09 DIAGNOSIS — M79.642 BILATERAL HAND PAIN: ICD-10-CM

## 2019-08-09 DIAGNOSIS — M18.11 ARTHRITIS OF CARPOMETACARPAL (CMC) JOINT OF RIGHT THUMB: Primary | ICD-10-CM

## 2019-08-09 PROCEDURE — 73130 X-RAY EXAM OF HAND: CPT | Mod: 50,TC,FY

## 2019-08-09 PROCEDURE — 99999 PR PBB SHADOW E&M-EST. PATIENT-LVL III: ICD-10-PCS | Mod: PBBFAC,,, | Performed by: PHYSICIAN ASSISTANT

## 2019-08-09 PROCEDURE — 99024 POSTOP FOLLOW-UP VISIT: CPT | Mod: S$GLB,,, | Performed by: PHYSICIAN ASSISTANT

## 2019-08-09 PROCEDURE — 73130 XR HAND COMPLETE 3 VIEWS BILATERAL: ICD-10-PCS | Mod: 26,,, | Performed by: INTERNAL MEDICINE

## 2019-08-09 PROCEDURE — 73130 X-RAY EXAM OF HAND: CPT | Mod: 26,,, | Performed by: INTERNAL MEDICINE

## 2019-08-09 PROCEDURE — 99024 PR POST-OP FOLLOW-UP VISIT: ICD-10-PCS | Mod: S$GLB,,, | Performed by: PHYSICIAN ASSISTANT

## 2019-08-09 PROCEDURE — 99999 PR PBB SHADOW E&M-EST. PATIENT-LVL III: CPT | Mod: PBBFAC,,, | Performed by: PHYSICIAN ASSISTANT

## 2019-08-09 NOTE — PROGRESS NOTES
"Mr. Rubio is here today for a post-operative visit.  He is 6 weeks status post Right thumb joint CMC arthroplasty, Right volar wrist ganglion cyst excisional biopsy, First dorsal compartment release, right wrist, [place] APL tendon for graft by Dr. Penn on 6/28/19. He reports that he is doing well.  Pain is mild, intermittent.  He is taking Ibuprofen as needed. Therapy scheduled to begin soon.  He denies fever, chills, and sweats since the time of the surgery.     He notes similar pain in the left thumb CMC joint. It is not as painful as the right side was prior to surgery.    Physical exam:    Vitals:    08/09/19 0827   BP: 122/75   Pulse: 80   Weight: 67.6 kg (149 lb)   Height: 5' 10" (1.778 m)   PainSc:   2     Vital signs are stable, patient is afebrile.  Patient is well dressed and well groomed, no acute distress.  Alert and oriented to person, place, and time.  Cast removed.  Incision is clean, dry and intact.  There is no erythema or exudate.  There is no sign of any infection. He is NVI.     FINAL PATHOLOGIC DIAGNOSIS  RIGHT GANGLION CYST, EXCISION:  Consistent with ganglion cyst    Xray bl hands 8/9/19  FINDINGS:  Cast material has now been placed about the distal forearm and proximal hand.  Cast material partly obscures osseous detail.  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  There is osteoarthritic degenerative change at the 1st carpal/metacarpal joint.  There is narrowing of the radiocarpal joint.  Well corticated osseous density adjacent to the ulnar styloid has been better demonstrated on prior exam.    Assessment:  status post Right thumb joint CMC arthroplasty, Right volar wrist ganglion cyst excisional biopsy, First dorsal compartment release, right wrist, [place] APL tendon for graft by Dr. Penn on 6/28/19    Plan:  Sabino was seen today for follow-up, pain, numbness, injury and pain.    Diagnoses and all orders for this visit:    Arthritis of " carpometacarpal (CMC) joint of right thumb    Arthritis of carpometacarpal (CMC) joint of left thumb    - PO instruction reviewed and provided to patient  -pt placed in right thumb spica brace  -OT to begin   -info on paraffin and bracing given for left CMC, will also have them include this in OT  -RTC 6 weeks         Lindsay Briceno PA-C  [facility]   [facility]  [place], [place]

## 2019-08-14 ENCOUNTER — CLINICAL SUPPORT (OUTPATIENT)
Dept: REHABILITATION | Facility: HOSPITAL | Age: 64
End: 2019-08-14
Attending: ORTHOPAEDIC SURGERY
Payer: COMMERCIAL

## 2019-08-14 DIAGNOSIS — M25.631 DECREASED RANGE OF MOTION OF RIGHT WRIST: ICD-10-CM

## 2019-08-14 DIAGNOSIS — M25.641 DECREASED RANGE OF MOTION OF RIGHT THUMB: Primary | ICD-10-CM

## 2019-08-14 DIAGNOSIS — M79.644 PAIN OF RIGHT THUMB: ICD-10-CM

## 2019-08-14 DIAGNOSIS — M25.431 EFFUSION, RIGHT WRIST: ICD-10-CM

## 2019-08-14 PROCEDURE — 97110 THERAPEUTIC EXERCISES: CPT

## 2019-08-14 PROCEDURE — 97165 OT EVAL LOW COMPLEX 30 MIN: CPT

## 2019-08-14 NOTE — PATIENT INSTRUCTIONS
"Active Wrist Flex/Ext with a Loose Fist        Make a loose fist and actively bend wrist forward. Hold 2-3 seconds. Keeping your loose fist, bend your wrist back and hold for 2-3 seconds.  Repeat ____ times per set. Do ____ sets per session. Do ____ sessions per day.    Copyright © [facility]. All rights reserved.     Wrist / Hand Radial / Ulnar Deviation        With fingers together, palm down, move right hand toward thumb with motion at wrist only. Then move toward little finger.  Repeat sequence ____ times per session. Do ____ sessions per week.  Variation: Palms facing each other, move right hand up toward thumb, then down toward little finger.    Copyright © [employer]. All rights reserved.   Tendon Glides        Start at position A and move through each position slowly attempting to achieve full glide.  A-E is ONE repetition.     IP Flexion (Active Blocked)        Brace thumb below tip joint. Bend joint as far as possible.  Repeat ____ times. Do ____ sessions per day.    Copyright © [employer]. All rights reserved.     Composite Extension (Active)        Bring thumb up and out in hitchhiker position. Hold ____ seconds.  Repeat ____ times. Do ____ sessions per day.    Copyright © [employer]. All rights reserved.     MP Flexion (Active Blocked)        Using other hand to brace base of thumb, bend as far as possible but do not let the tip of the thumb bend.  Repeat ____ times. Do ____ sessions per day.    Opposition (Active)        Touch tip of thumb to nail tip of each finger in turn, making an "O" shape. Going from index finger through small finger is ONE repetition.    AROM: Thumb Abduction / Adduction        Actively pull right thumb away from palm as far as possible. Then bring thumb back to touch fingers. Try not to bend fingers toward thumb.    Scar Massage      Massage scar using vitamin E cream (ex- [place] war[place] butter) for 3-4 minutes, 2x/day.                "

## 2019-08-14 NOTE — PLAN OF CARE
[facility] Occupational [facility]  Initial Evaluation     Date: 8/14/2019  Name: Sabino Rubio  Clinic Number: [id number]    Therapy Diagnosis:   Encounter Diagnoses   Name Primary?    Decreased range of motion of right thumb Yes    Decreased range of motion of right wrist     Effusion, right wrist     Pain of right thumb      Physician: Alba Penn, *    Physician Orders: status post Right thumb joint CMC arthroplasty, Right volar wrist ganglion cyst excisional biopsy, First dorsal compartment release, right wrist, [place] APL tendon for graft by Dr. Penn on 6/28/19; 2x/wk x 6 weeks; Modalities prn  Medical Diagnosis:   M18.11 (ICD-10-CM) - Arthritis of carpometacarpal (CMC) joint of right thumb   M67.40 (ICD-10-CM) - Ganglion cyst     Surgical Procedure and Date: R CMC arthroplasty, R 1st DC release, R ganglion cyst excision on 6/28/19  Evaluation Date: 8/14/2019  Insurance Authorization Period Expiration: 12/31/19  Plan of Care Certification Period: 10/18/19  Date of Return to MD: 9/16/19    Visit # / Visits authorized: 1/ 20   Time In:7:00AM  Time Out: 8:00AM  Total Billable Time: 60 minutes    Precautions:  Standard    S/p 6 weeks 5 days    Subjective     Involved Side: Right  Dominant Side: Right  Date of Onset: 6/28/19  History of Current Condition/Mechanism of Injury: Pt referred to OT 11 weeks 1 day s/p R CMC arthroplasty, R 1st DC release, and R ganglion cyst excision. He reports pain in thumb and wrist beginning several years ago with minimal response to steroid injections. He underwent above surgeries and transitioned into a hard thumb spica cast 2 weeks post op. He states the cast held his wrist in an ulnarly deviated position. He was casted for 4 weeks and transitioned into a pre deana thumb spica brace 8/9/19.  Imaging: X-ray on 8/9/19: Cast material has now been placed about the distal forearm and proximal hand.  Cast material partly obscures osseous detail.  " There are osteoarthritic degenerative changes within interphalangeal joints.  There is osteoarthritic degenerative change at the 1st carpal/metacarpal joint.  There is narrowing of the radiocarpal joint.  Well corticated osseous density adjacent to the ulnar styloid has been better demonstrated on prior exam.  Previous Therapy: no    Past Medical History/Physical Systems Review:   Sabino Rubio  has a past medical history of Allergy, Arthritis, Family history of malignant neoplasm of gastrointestinal tract, Family history of prostate cancer: 1/2 brother, GERD (gastroesophageal reflux disease), Kidney cyst, acquired: R 1.2 cm 2015, Restless leg syndrome, and Tubulovillous adenoma 2013 due 2016.    Sabino Rubio  has a past surgical history that includes Carpal tunnel release; Colonoscopy (N/A, 5/22/2019); Injection of facet joint (Bilateral, 6/6/2019); Interposition arthroplasty of carpometacarpal joints (Right, 6/28/2019); and Excision of ganglion of wrist (Right, 6/28/2019).    Sabino has a current medication list which includes the following prescription(s): fluticasone propionate, gabapentin, hydrocodone-acetaminophen, meloxicam, and ropinirole.    Review of patient's allergies indicates:  No Known Allergies     Patient's Goals for Therapy: "Get more range of motion and alleviate the pain as best I can."    Pain:  Functional Pain Scale Rating 0-10:   2/10 on average; 10/10 at worst  Location: R thumb  Description: Sharp    Occupation:  Construction  Duties: Manual, heavy lifting, working with tools    Functional Limitations/Social History:    Previous functional status includes: Independent with all ADLs.     Current FunctionalStatus   Home/Living environment : lives with their family      Limitation of Functional Status as follows:    ADLs: Dressing-buttoning pants/shirts, Bathing-holding washcloth painful, Eating-holding silverware and cutting food    IADLs: Opening doors and jars     Work: turning " screwdriver    Objective     Observation: Pt presents with pre fabricated thumb spica brace donned. Upon removal, surgical scars are well healed. There is moderate diffuse edema of the wrist and hand.     Palpation: Surgical scar on ulnar wrist moderately adhered to bone    RIGHT Wrist and Thumb AROM Measured in degrees   8/14/19   Wrist Ext/Flex 50/20   Wrist UD/RD 18/3   Thumb:  MP 35                  IP 20   Thumb Morales Abd 45   *Thumb opposition to within 2 cm of the small finger distal tip    CMS Impairment/Limitation/Restriction for FOTO Hand Survey    Therapist reviewed FOTO scores for Sabino Rubio on 8/14/2019.   FOTO documents entered into [employer] - see Media section.    Limitation Score: 60%  Category: Carrying    Current : 60%  Goal: 38%       Treatment     Treatment Time In: 7:40AM  Treatment Time Out: 8:00AM  Total Treatment time separate from Evaluation time:20 minutes    Sabino received the following supervised modalities after being cleared for contradictions for 8 minutes:   -Patient received paraffin bath to  hand to increase blood flow, circulation, pain management and for tissue elasticity prior to therex.     Sabino received therapeutic exercises for 12 minutes including:  -Initial HEP instruction and demonstration  -Patient fitted with size E tubigrip for swelling    Home Exercise Program/Education:  Issued HEP (see patient instructions in EMR) and educated on modality use for pain management . Exercises were reviewed and Sabino was able to demonstrate them prior to the end of the session.   Pt received a written copy of exercises to perform at home. Sabino demonstrated good  understanding of the education provided.  Pt was advised to perform these exercises free of pain, and to stop performing them if pain occurs.    Patient/Family Education: role of OT, goals for OT, scheduling/cancellations - pt verbalized understanding. Discussed insurance limitations with patient.    Additional Education  provided: Edema mgmt, lifting precautions    Assessment     Sabino Rubio is a 63 y.o. male referred to outpatient occupational therapy 6 weeks 5 days s/p above mentioned surgeries resulting in Decreased ROM, Decreased muscle strength, Decreased functional hand use, Increased pain, Edema, Joint Stiffness, Scar Adhesions and Diminished/Impaired Sensation and demonstrates limitations as described in the chart below. Following medical record review it is determined that pt will benefit from occupational therapy services in order to maximize pain free and/or functional use of right UE. The following goals were discussed with the patient and patient is in agreement with them as to be addressed in the treatment plan. The patient's rehab potential is Excellent.     Anticipated barriers to occupational therapy: Length of time immobilized in hard cast following surgery  Pt has no cultural, educational or language barriers to learning provided.    Profile and History Assessment of Occupational Performance Level of Clinical Decision Making Complexity Score   Occupational Profile:   Sabino Rubio is a 63 y.o. 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 Sabino Rubio has difficulty with  ADLs and IADLs as listed previously, which  affecting his/her daily functional abilities.      Comorbidities:    has a past medical history of Allergy, Arthritis, Family history of malignant neoplasm of gastrointestinal tract, Family history of prostate cancer: 1/2 brother, GERD (gastroesophageal reflux disease), Kidney cyst, acquired: R 1.2 cm 2015, Restless leg syndrome, and Tubulovillous adenoma 2013 due 2016.    Medical and Therapy History Review:   Brief Performance Deficits    Physical:  Joint Mobility  Joint Stability  Skin Integrity/Scar Formation  Edema  Fine Motor Coordination  Pain    Cognitive:  No Deficits  Communication    Psychosocial:    No Deficits     Clinical Decision Making:  low    Assessment  Process:  Problem-Focused Assessments    Modification/Need for Assistance:  Not Necessary    Intervention Selection:  Limited Treatment Options       low  Based on PMHX, co morbidities , data from assessments and functional level of assistance required with task and clinical presentation directly impacting function.       The following goals were discussed with the patient and patient is in agreement with them as to be addressed in the treatment plan.     Short Term Goals:   To be completed in 1 week:  1) Patient will be independent in HEP   To be completed in 2-4 weeks:  2) Increase thumb opposition AROM to the distal tip of the small finger to manipulate silverware while eating  3) Increase wrist flexion AROM to 45 degrees for ADL performance  4) Increase wrist radial deviation AROM to at least 10 degrees for computer typing performance  5) Increase thumb IP AROM to 45 degrees for fastening buttons while dressing  6) Assess /pinch strength and set appropriate goals  Long Term Goals:   To be completed by discharge:  7) Return to previous ADLs, IADLs, and work activities independently and without increased pain  8) Decrease FOTO limitation score to 38%, indicative of improved functional independence      Plan   Certification Period/Plan of care expiration: 8/14/2019 to 10/18/19.    Outpatient Occupational Therapy 2 times weekly for 10 visits to include the following interventions: Paraffin, Fluidotherapy, Manual therapy/joint mobilizations, Modalities for pain management, US 3 mhz, Therapeutic exercises/activities., Strengthening, Edema Control, Scar Management and Joint Protection.      Madina Pelayo, OT

## 2019-08-16 ENCOUNTER — CLINICAL SUPPORT (OUTPATIENT)
Dept: REHABILITATION | Facility: HOSPITAL | Age: 64
End: 2019-08-16
Attending: ORTHOPAEDIC SURGERY
Payer: COMMERCIAL

## 2019-08-16 DIAGNOSIS — M25.631 DECREASED RANGE OF MOTION OF RIGHT WRIST: ICD-10-CM

## 2019-08-16 DIAGNOSIS — M25.431 EFFUSION, RIGHT WRIST: ICD-10-CM

## 2019-08-16 DIAGNOSIS — M25.641 DECREASED RANGE OF MOTION OF RIGHT THUMB: Primary | ICD-10-CM

## 2019-08-16 DIAGNOSIS — M79.644 PAIN OF RIGHT THUMB: ICD-10-CM

## 2019-08-16 PROCEDURE — 97022 WHIRLPOOL THERAPY: CPT

## 2019-08-16 PROCEDURE — 97110 THERAPEUTIC EXERCISES: CPT

## 2019-08-16 NOTE — PROGRESS NOTES
Occupational Therapy Daily Treatment Note     Date: 8/16/2019  Name: Sabino Rubio  Clinic Number: [id number]    Therapy Diagnosis:   Encounter Diagnoses   Name Primary?    Decreased range of motion of right thumb Yes    Decreased range of motion of right wrist     Effusion, right wrist     Pain of right thumb      Physician: Jayna Childs PA    Physician Orders: status post Right thumb joint CMC arthroplasty, Right volar wrist ganglion cyst excisional biopsy, First dorsal compartment release, right wrist, [place] APL tendon for graft by Dr. Penn on 6/28/19; 2x/wk x 6 weeks; Modalities prn  Medical Diagnosis:   M18.11 (ICD-10-CM) - Arthritis of carpometacarpal (CMC) joint of right thumb   M67.40 (ICD-10-CM) - Ganglion cyst      Surgical Procedure and Date: R CMC arthroplasty, R 1st DC release, R ganglion cyst excision on 6/28/19  Evaluation Date: 8/14/2019  Insurance Authorization Period Expiration: 12/31/19  Plan of Care Certification Period: 10/18/19  Date of Return to MD: 9/16/19     Visit # / Visits authorized: 2/ 20   Time In:8:00AM  Time Out: 9:00AM  Total Billable Time: 60 minutes      Subjective     Patient reports compliance with HEP.    Pain: 2/10; 10/10 at worst  Location: R thumb    Objective     RIGHT Wrist and Thumb AROM Measured in degrees    8/14/19   Wrist Ext/Flex 50/20   Wrist UD/RD 18/3   Thumb:  MP 35                  IP 20   Thumb Morales Abd 45   *Thumb opposition to within 2 cm of the small finger distal tip    Sabino received the following supervised modalities after being cleared for contradictions for 15 minutes:   -Patient received fluidotherapy to RUE increase blood flow, circulation, desensitization, sensory re-education and for pain management.     Sabino received therapeutic exercises for 45 minutes including:  -AROM wrist flex/ext, with and without composite fist, wrist ulnar/radial deviation, wrist circles  -TGEs x 10  -Scar massage, retrograde massage  -AROM thumb IP  joint blocking, MP joint blocking, radial/mills abd/add, composite flex/ext, circles, opposition to fingertips  -wrist wheel flex/ext x 2'  -nesting with large pom poms x 2  -opposition to ring and small fingers with large pom poms x 2  -towel walking x 2'  -intrinsic pumping with yellow foam x 2'  -coban applied to thumb and thenar eminence for edema    Home Exercises and Education Provided     Education provided: Reviewed HEP    Written Home Exercises Provided: Yes  Exercises were reviewed and Sabino was able to demonstrate them prior to the end of the session.  Sabino demonstrated good  understanding of the HEP provided.   .   See EMR under Patient Instructions for exercises provided during initial evaluation.     Assessment     Pt demos good understanding of exercises. Improved motion today.     Sabino is progressing well towards his goals and there are no updates to goals at this time. Pt prognosis is Excellent.     Pt will continue to benefit from skilled outpatient occupational therapy to address the deficits listed in the problem list on initial evaluation provide pt/family education and to maximize pt's level of independence in the home and community environment.     Pt's spiritual, cultural and educational needs considered and pt agreeable to plan of care and goals.    Goals:  Short Term Goals:   To be completed in 1 week:  1) Patient will be independent in HEP   To be completed in 2-4 weeks:  2) Increase thumb opposition AROM to the distal tip of the small finger to manipulate silverware while eating  3) Increase wrist flexion AROM to 45 degrees for ADL performance  4) Increase wrist radial deviation AROM to at least 10 degrees for computer typing performance  5) Increase thumb IP AROM to 45 degrees for fastening buttons while dressing  6) Assess /pinch strength and set appropriate goals  Long Term Goals:   To be completed by discharge:  7) Return to previous ADLs, IADLs, and work activities independently  and without increased pain  8) Decrease FOTO limitation score to 38%, indicative of improved functional independence      Plan   Outpatient Occupational Therapy 2 times weekly for 10 visits     Updates/Grading for next session:       Madina Pelayo, OT

## 2019-08-20 DIAGNOSIS — J30.89 ALLERGIC RHINITIS DUE TO OTHER ALLERGIC TRIGGER, UNSPECIFIED SEASONALITY: ICD-10-CM

## 2019-08-20 RX ORDER — FLUTICASONE PROPIONATE 50 MCG
1 SPRAY, SUSPENSION (ML) NASAL 2 TIMES DAILY
Qty: 16 ML | Refills: 3 | Status: SHIPPED | OUTPATIENT
Start: 2019-08-20 | End: 2019-12-09 | Stop reason: SDUPTHER

## 2019-08-21 ENCOUNTER — CLINICAL SUPPORT (OUTPATIENT)
Dept: REHABILITATION | Facility: HOSPITAL | Age: 64
End: 2019-08-21
Attending: ORTHOPAEDIC SURGERY
Payer: COMMERCIAL

## 2019-08-21 DIAGNOSIS — M25.431 EFFUSION, RIGHT WRIST: ICD-10-CM

## 2019-08-21 DIAGNOSIS — M25.631 DECREASED RANGE OF MOTION OF RIGHT WRIST: ICD-10-CM

## 2019-08-21 DIAGNOSIS — M25.641 DECREASED RANGE OF MOTION OF RIGHT THUMB: Primary | ICD-10-CM

## 2019-08-21 DIAGNOSIS — M79.644 PAIN OF RIGHT THUMB: ICD-10-CM

## 2019-08-21 PROCEDURE — 97022 WHIRLPOOL THERAPY: CPT

## 2019-08-21 PROCEDURE — 97110 THERAPEUTIC EXERCISES: CPT

## 2019-08-21 NOTE — PROGRESS NOTES
Occupational Therapy Daily Treatment Note     Date: 8/21/2019  Name: Sabino Rubio  Clinic Number: [id number]    Therapy Diagnosis:   No diagnosis found.  Physician: Alba Penn, *    Physician Orders: status post Right thumb joint CMC arthroplasty, Right volar wrist ganglion cyst excisional biopsy, First dorsal compartment release, right wrist, [place] APL tendon for graft by Dr. Penn on 6/28/19; 2x/wk x 6 weeks; Modalities prn  Medical Diagnosis:   M18.11 (ICD-10-CM) - Arthritis of carpometacarpal (CMC) joint of right thumb   M67.40 (ICD-10-CM) - Ganglion cyst      Surgical Procedure and Date: R CMC arthroplasty, R 1st DC release, R ganglion cyst excision on 6/28/19  Evaluation Date: 8/14/2019  Insurance Authorization Period Expiration: 12/31/19  Plan of Care Certification Period: 10/18/19  Date of Return to MD: 9/16/19     Visit # / Visits authorized: 3/ 20   Time In:8:30AM  Time Out: 9:30AM  Total Billable Time: 60 minutes    S/p 7 weeks 5 days on 8/21/19    Subjective     Patient reports compliance with HEP.    Pain: 2/10; 10/10 at worst  Location: R thumb    Objective     RIGHT Wrist and Thumb AROM Measured in degrees    8/14/19   Wrist Ext/Flex 50/20   Wrist UD/RD 18/3   Thumb:  MP 35                  IP 20   Thumb Mills Abd 45   *Thumb opposition to within 2 cm of the small finger distal tip    Sabino received the following supervised modalities after being cleared for contradictions for 15 minutes:   -Patient received fluidotherapy to RUE increase blood flow, circulation, desensitization, sensory re-education and for pain management.     Sabino received therapeutic exercises for 45 minutes including:  -AROM wrist flex/ext, with and without composite fist, wrist ulnar/radial deviation, wrist circles  -TGEs x 10  -Scar massage, retrograde massage  -AROM thumb IP joint blocking, MP joint blocking, radial/mills abd/add, composite flex/ext, circles, opposition to fingertips  -wrist wheel  flex/ext x 2'  -nesting with small pom poms x 2'  -intrinsic pumping with yellow foam x 2'  -dextercisor x 2'  -isospheres palm down x 2'  -yellow putty molding x 2'  -coban applied to thumb and thenar eminence for edema    Home Exercises and Education Provided     Education provided: Reviewed HEP    Written Home Exercises Provided: Yes  Exercises were reviewed and Sabino was able to demonstrate them prior to the end of the session.  Sabino demonstrated good  understanding of the HEP provided.   .   See EMR under Patient Instructions for exercises provided during initial evaluation.     Assessment     Pt making gradual progress with ROM.    Sabino is progressing well towards his goals and there are no updates to goals at this time. Pt prognosis is Excellent.     Pt will continue to benefit from skilled outpatient occupational therapy to address the deficits listed in the problem list on initial evaluation provide pt/family education and to maximize pt's level of independence in the home and community environment.     Pt's spiritual, cultural and educational needs considered and pt agreeable to plan of care and goals.    Goals:  Short Term Goals:   To be completed in 1 week:  1) Patient will be independent in HEP   To be completed in 2-4 weeks:  2) Increase thumb opposition AROM to the distal tip of the small finger to manipulate silverware while eating  3) Increase wrist flexion AROM to 45 degrees for ADL performance  4) Increase wrist radial deviation AROM to at least 10 degrees for computer typing performance  5) Increase thumb IP AROM to 45 degrees for fastening buttons while dressing  6) Assess /pinch strength and set appropriate goals  Long Term Goals:   To be completed by discharge:  7) Return to previous ADLs, IADLs, and work activities independently and without increased pain  8) Decrease FOTO limitation score to 38%, indicative of improved functional independence      Plan   Outpatient Occupational Therapy 2  times weekly for 10 visits     Updates/Grading for next session:       Madina Pelayo, OT

## 2019-08-23 ENCOUNTER — CLINICAL SUPPORT (OUTPATIENT)
Dept: REHABILITATION | Facility: HOSPITAL | Age: 64
End: 2019-08-23
Attending: INTERNAL MEDICINE
Payer: COMMERCIAL

## 2019-08-23 DIAGNOSIS — M79.644 PAIN OF RIGHT THUMB: ICD-10-CM

## 2019-08-23 DIAGNOSIS — M25.431 EFFUSION, RIGHT WRIST: ICD-10-CM

## 2019-08-23 DIAGNOSIS — M25.631 DECREASED RANGE OF MOTION OF RIGHT WRIST: ICD-10-CM

## 2019-08-23 DIAGNOSIS — M25.641 DECREASED RANGE OF MOTION OF RIGHT THUMB: Primary | ICD-10-CM

## 2019-08-23 PROCEDURE — 97022 WHIRLPOOL THERAPY: CPT

## 2019-08-23 PROCEDURE — 97110 THERAPEUTIC EXERCISES: CPT

## 2019-08-23 NOTE — PROGRESS NOTES
Occupational Therapy Daily Treatment Note     Date: 8/23/2019  Name: Sabino Rubio  Clinic Number: [id number]    Therapy Diagnosis:   No diagnosis found.  Physician: Alba Penn, *    Physician Orders: status post Right thumb joint CMC arthroplasty, Right volar wrist ganglion cyst excisional biopsy, First dorsal compartment release, right wrist, [place] APL tendon for graft by Dr. Penn on 6/28/19; 2x/wk x 6 weeks; Modalities prn  Medical Diagnosis:   M18.11 (ICD-10-CM) - Arthritis of carpometacarpal (CMC) joint of right thumb   M67.40 (ICD-10-CM) - Ganglion cyst      Surgical Procedure and Date: R CMC arthroplasty, R 1st DC release, R ganglion cyst excision on 6/28/19  Evaluation Date: 8/14/2019  Insurance Authorization Period Expiration: 12/31/19  Plan of Care Certification Period: 10/18/19  Date of Return to MD: 9/16/19     Visit # / Visits authorized: 4/ 20   Time In:8:00AM  Time Out: 9:30AM  Total Billable Time: 60 minutes    S/p 8 weeks 0 days on 8/23/19    Subjective     Patient reports compliance with HEP.    Pain: 2/10; 10/10 at worst  Location: R thumb    Objective     RIGHT Wrist and Thumb AROM Measured in degrees    8/14/19   Wrist Ext/Flex 50/20   Wrist UD/RD 18/3   Thumb:  MP 35                  IP 20   Thumb Mills Abd 45   *Thumb opposition to within 2 cm of the small finger distal tip    Sabino received the following supervised modalities after being cleared for contradictions for 15 minutes:   -Patient received fluidotherapy to RUE increase blood flow, circulation, desensitization, sensory re-education and for pain management.     Sabino received therapeutic exercises for 45 minutes including:  -AROM wrist flex/ext, with and without composite fist, wrist ulnar/radial deviation, wrist circles  -TGEs x 10  -Scar massage, retrograde massage  -AROM thumb IP joint blocking, MP joint blocking, radial/mills abd/add, composite flex/ext, circles, opposition to fingertips  -wrist wheel  flex/ext x 2'  -nesting with small pom poms x 2'  -intrinsic pumping with yellow foam x 2'  -dextercisor x 2'  -isospheres palm down x 2'  -yellow putty molding x 2'  -coban applied to thumb and thenar eminence for edema    Home Exercises and Education Provided     Education provided: Reviewed HEP    Written Home Exercises Provided: Yes  Exercises were reviewed and Sabino was able to demonstrate them prior to the end of the session.  Sabino demonstrated good  understanding of the HEP provided.   .   See EMR under Patient Instructions for exercises provided during initial evaluation.     Assessment     Pt making gradual progress with ROM.    Sabino is progressing well towards his goals and there are no updates to goals at this time. Pt prognosis is Excellent.     Pt will continue to benefit from skilled outpatient occupational therapy to address the deficits listed in the problem list on initial evaluation provide pt/family education and to maximize pt's level of independence in the home and community environment.     Pt's spiritual, cultural and educational needs considered and pt agreeable to plan of care and goals.    Goals:  Short Term Goals:   To be completed in 1 week:  1) Patient will be independent in HEP   To be completed in 2-4 weeks:  2) Increase thumb opposition AROM to the distal tip of the small finger to manipulate silverware while eating  3) Increase wrist flexion AROM to 45 degrees for ADL performance  4) Increase wrist radial deviation AROM to at least 10 degrees for computer typing performance  5) Increase thumb IP AROM to 45 degrees for fastening buttons while dressing  6) Assess /pinch strength and set appropriate goals  Long Term Goals:   To be completed by discharge:  7) Return to previous ADLs, IADLs, and work activities independently and without increased pain  8) Decrease FOTO limitation score to 38%, indicative of improved functional independence      Plan   Outpatient Occupational Therapy 2  times weekly for 10 visits     Updates/Grading for next session:       Madina Pelayo, OT

## 2019-08-28 ENCOUNTER — CLINICAL SUPPORT (OUTPATIENT)
Dept: REHABILITATION | Facility: HOSPITAL | Age: 64
End: 2019-08-28
Attending: ORTHOPAEDIC SURGERY
Payer: COMMERCIAL

## 2019-08-28 DIAGNOSIS — M79.644 PAIN OF RIGHT THUMB: ICD-10-CM

## 2019-08-28 DIAGNOSIS — M25.431 EFFUSION, RIGHT WRIST: ICD-10-CM

## 2019-08-28 DIAGNOSIS — M25.631 DECREASED RANGE OF MOTION OF RIGHT WRIST: ICD-10-CM

## 2019-08-28 DIAGNOSIS — M25.641 DECREASED RANGE OF MOTION OF RIGHT THUMB: Primary | ICD-10-CM

## 2019-08-28 PROCEDURE — 97110 THERAPEUTIC EXERCISES: CPT

## 2019-08-28 PROCEDURE — 97022 WHIRLPOOL THERAPY: CPT

## 2019-08-30 ENCOUNTER — CLINICAL SUPPORT (OUTPATIENT)
Dept: REHABILITATION | Facility: HOSPITAL | Age: 64
End: 2019-08-30
Attending: ORTHOPAEDIC SURGERY
Payer: COMMERCIAL

## 2019-08-30 DIAGNOSIS — M25.641 DECREASED RANGE OF MOTION OF RIGHT THUMB: Primary | ICD-10-CM

## 2019-08-30 DIAGNOSIS — M25.431 EFFUSION, RIGHT WRIST: ICD-10-CM

## 2019-08-30 DIAGNOSIS — M79.644 PAIN OF RIGHT THUMB: ICD-10-CM

## 2019-08-30 DIAGNOSIS — M25.631 DECREASED RANGE OF MOTION OF RIGHT WRIST: ICD-10-CM

## 2019-08-30 PROCEDURE — 97022 WHIRLPOOL THERAPY: CPT

## 2019-08-30 PROCEDURE — 97110 THERAPEUTIC EXERCISES: CPT

## 2019-08-30 NOTE — PROGRESS NOTES
Occupational Therapy Daily Treatment Note     Date: 8/30/2019  Name: Sabino Rubio  Clinic Number: [id number]    Therapy Diagnosis:   No diagnosis found.  Physician: Alba Penn, *    Physician Orders: status post Right thumb joint CMC arthroplasty, Right volar wrist ganglion cyst excisional biopsy, First dorsal compartment release, right wrist, [place] APL tendon for graft by Dr. Penn on 6/28/19; 2x/wk x 6 weeks; Modalities prn  Medical Diagnosis:   M18.11 (ICD-10-CM) - Arthritis of carpometacarpal (CMC) joint of right thumb   M67.40 (ICD-10-CM) - Ganglion cyst      Surgical Procedure and Date: R CMC arthroplasty, R 1st DC release, R ganglion cyst excision on 6/28/19  Evaluation Date: 8/14/2019  Insurance Authorization Period Expiration: 12/31/19  Plan of Care Certification Period: 10/18/19  Date of Return to MD: 9/16/19     Visit # / Visits authorized: 6/ 20   Time In:10:30AM  Time Out: 11:30AM  Total Billable Time: 60 minutes    S/p 9 weeks 0 days on 8/30/19    Subjective     Patient reports compliance with HEP.    Pain: 2/10; 10/10 at worst  Location: R thumb    Objective     RIGHT Wrist and Thumb AROM Measured in degrees    8/14/19   Wrist Ext/Flex 50/20   Wrist UD/RD 18/3   Thumb:  MP 35                  IP 20   Thumb Mills Abd 45   *Thumb opposition to within 2 cm of the small finger distal tip    Sabino received the following supervised modalities after being cleared for contradictions for 15 minutes:   -Patient received fluidotherapy to RUE increase blood flow, circulation, desensitization, sensory re-education and for pain management.     Sabino received therapeutic exercises for 45 minutes including:  -AROM wrist flex/ext, with and without composite fist, wrist ulnar/radial deviation, wrist circles  -TGEs x 10  -Scar massage, retrograde massage  -AROM thumb IP joint blocking, MP joint blocking, radial/mills abd/add, composite flex/ext, circles, opposition to fingertips  -wrist wheel  flex/ext x 2'  -nesting with small pom poms x 2'  -intrinsic pumping with yellow foam x 2'  -dextercisor x 2'  -isospheres palm down x 2'  -yellow putty gripping x 2'  -yellow putty rolling and pinching, key and 3 pt  -wrist flex/ext/rd 1# x 10  -coban applied to thumb and thenar eminence for edema    Home Exercises and Education Provided     Education provided: Reviewed HEP    Written Home Exercises Provided: Yes  Exercises were reviewed and Sabino was able to demonstrate them prior to the end of the session.  Sabino demonstrated good  understanding of the HEP provided.   .   See EMR under Patient Instructions for exercises provided during initial evaluation.     Assessment     Pt making gradual progress with ROM. Began light strengthening with good tolerance.    Sabino is progressing well towards his goals and there are no updates to goals at this time. Pt prognosis is Excellent.     Pt will continue to benefit from skilled outpatient occupational therapy to address the deficits listed in the problem list on initial evaluation provide pt/family education and to maximize pt's level of independence in the home and community environment.     Pt's spiritual, cultural and educational needs considered and pt agreeable to plan of care and goals.    Goals:  Short Term Goals:   To be completed in 1 week:  1) Patient will be independent in HEP   To be completed in 2-4 weeks:  2) Increase thumb opposition AROM to the distal tip of the small finger to manipulate silverware while eating  3) Increase wrist flexion AROM to 45 degrees for ADL performance  4) Increase wrist radial deviation AROM to at least 10 degrees for computer typing performance  5) Increase thumb IP AROM to 45 degrees for fastening buttons while dressing  6) Assess /pinch strength and set appropriate goals  Long Term Goals:   To be completed by discharge:  7) Return to previous ADLs, IADLs, and work activities independently and without increased pain  8)  Decrease FOTO limitation score to 38%, indicative of improved functional independence      Plan   Outpatient Occupational Therapy 2 times weekly for 10 visits     Updates/Grading for next session:       Madina Pelayo, OT

## 2019-09-04 ENCOUNTER — CLINICAL SUPPORT (OUTPATIENT)
Dept: REHABILITATION | Facility: HOSPITAL | Age: 64
End: 2019-09-04
Attending: ORTHOPAEDIC SURGERY
Payer: COMMERCIAL

## 2019-09-04 DIAGNOSIS — M79.644 PAIN OF RIGHT THUMB: ICD-10-CM

## 2019-09-04 DIAGNOSIS — M25.631 DECREASED RANGE OF MOTION OF RIGHT WRIST: ICD-10-CM

## 2019-09-04 DIAGNOSIS — M25.641 DECREASED RANGE OF MOTION OF RIGHT THUMB: Primary | ICD-10-CM

## 2019-09-04 DIAGNOSIS — M25.431 EFFUSION, RIGHT WRIST: ICD-10-CM

## 2019-09-04 PROCEDURE — 97022 WHIRLPOOL THERAPY: CPT

## 2019-09-04 PROCEDURE — 97110 THERAPEUTIC EXERCISES: CPT

## 2019-09-04 NOTE — PROGRESS NOTES
Occupational Therapy Daily Treatment Note     Date: 9/4/2019  Name: Sabino Rubio  Clinic Number: [id number]    Therapy Diagnosis:   No diagnosis found.  Physician: Alba Penn, *    Physician Orders: status post Right thumb joint CMC arthroplasty, Right volar wrist ganglion cyst excisional biopsy, First dorsal compartment release, right wrist, [place] APL tendon for graft by Dr. Penn on 6/28/19; 2x/wk x 6 weeks; Modalities prn  Medical Diagnosis:   M18.11 (ICD-10-CM) - Arthritis of carpometacarpal (CMC) joint of right thumb   M67.40 (ICD-10-CM) - Ganglion cyst      Surgical Procedure and Date: R CMC arthroplasty, R 1st DC release, R ganglion cyst excision on 6/28/19  Evaluation Date: 8/14/2019  Insurance Authorization Period Expiration: 12/31/19  Plan of Care Certification Period: 10/18/19  Date of Return to MD: 9/16/19     Visit # / Visits authorized: 7/ 20   Time In:7AM  Time Out: 8:10AM  Total Billable Time: 70 minutes    S/p 9 weeks 5 days on 9/4/19    Subjective     Patient reports pain at ulnar wrist has not improved since cast removal. However, thumb cmc pain has is improving.    Pain: 5/10  Location: R thumb    Objective     RIGHT Wrist and Thumb AROM Measured in degrees    8/14/19   Wrist Ext/Flex 50/20   Wrist UD/RD 18/3   Thumb:  MP 35                  IP 20   Thumb Mills Abd 45   *Thumb opposition to within 2 cm of the small finger distal tip    Sabino received the following supervised modalities after being cleared for contradictions for 15 minutes:   -Patient received fluidotherapy to RUE increase blood flow, circulation, desensitization, sensory re-education and for pain management.     Sabino received therapeutic exercises for 55 minutes including:  -A/AAROM wrist flex/ext, with and without composite fist, wrist ulnar/radial deviation, wrist circles  -Scar massage, retrograde massage  -A/AAROM thumb IP joint blocking, MP joint blocking, radial/mills abd/add, composite flex/ext,  circles, opposition to fingertips  -wrist wheel flex/ext x 2'--not performed today  -nesting with small pom poms x 2'--not performed today  -dextercisor x 2'--not performed today  -isospheres palm down x 2'--not performed today  -yellow putty rolling and pinching, key and 3 pt  -wrist flex/ext/rd 1# x 10  -LLPS 2# wrist flex and ext x 1.5' ea.  -Yellow putty drags for extension stretch x 2'  - Yellow putty blooming x 10  -Hand helper 1 red band x 20  -Red gripper x 20    Home Exercises and Education Provided     Education provided: Reviewed HEP    Written Home Exercises Provided: Yes  Exercises were reviewed and Sabino was able to demonstrate them prior to the end of the session.  Sabino demonstrated good  understanding of the HEP provided.   .   See EMR under Patient Instructions for exercises provided during initial evaluation.     Assessment     -Patient mushtaq continued pain and soft tissue restriction at ulnar wrist, limiting active and passive radial deviation of the wrist. Discussed importance of maintaining neutral wrist during gripping activities to decrease ulnar deviation deformity.    Sabino is progressing well towards his goals and there are no updates to goals at this time. Pt prognosis is Excellent.     Pt will continue to benefit from skilled outpatient occupational therapy to address the deficits listed in the problem list on initial evaluation provide pt/family education and to maximize pt's level of independence in the home and community environment.     Pt's spiritual, cultural and educational needs considered and pt agreeable to plan of care and goals.    Goals:  Short Term Goals:   To be completed in 1 week:  1) Patient will be independent in HEP-Ongoing   To be completed in 2-4 weeks:  2) Increase thumb opposition AROM to the distal tip of the small finger to manipulate silverware while eating--  3) Increase wrist flexion AROM to 45 degrees for ADL performance--  4) Increase wrist radial deviation  AROM to at least 10 degrees for computer typing performance--  5) Increase thumb IP AROM to 45 degrees for fastening buttons while dressing--  6) Assess /pinch strength and set appropriate goals--  Long Term Goals:   To be completed by discharge:  7) Return to previous ADLs, IADLs, and work activities independently and without increased pain  8) Decrease FOTO limitation score to 38%, indicative of improved functional independence      Plan   Outpatient Occupational Therapy 2 times weekly for 10 visits     Updates/Grading for next session:       Madina Pelayo, OT

## 2019-09-06 ENCOUNTER — CLINICAL SUPPORT (OUTPATIENT)
Dept: REHABILITATION | Facility: HOSPITAL | Age: 64
End: 2019-09-06
Attending: ORTHOPAEDIC SURGERY
Payer: COMMERCIAL

## 2019-09-06 DIAGNOSIS — M25.431 EFFUSION, RIGHT WRIST: ICD-10-CM

## 2019-09-06 DIAGNOSIS — M25.631 DECREASED RANGE OF MOTION OF RIGHT WRIST: ICD-10-CM

## 2019-09-06 DIAGNOSIS — M25.641 DECREASED RANGE OF MOTION OF RIGHT THUMB: Primary | ICD-10-CM

## 2019-09-06 DIAGNOSIS — M79.644 PAIN OF RIGHT THUMB: ICD-10-CM

## 2019-09-06 PROCEDURE — 97022 WHIRLPOOL THERAPY: CPT

## 2019-09-06 PROCEDURE — 97110 THERAPEUTIC EXERCISES: CPT

## 2019-09-06 NOTE — PROGRESS NOTES
Occupational Therapy Daily Treatment Note     Date: 9/6/2019  Name: Sabino Rubio  Clinic Number: [id number]    Therapy Diagnosis:   No diagnosis found.  Physician: Alba Penn, *    Physician Orders: status post Right thumb joint CMC arthroplasty, Right volar wrist ganglion cyst excisional biopsy, First dorsal compartment release, right wrist, [place] APL tendon for graft by Dr. Penn on 6/28/19; 2x/wk x 6 weeks; Modalities prn  Medical Diagnosis:   M18.11 (ICD-10-CM) - Arthritis of carpometacarpal (CMC) joint of right thumb   M67.40 (ICD-10-CM) - Ganglion cyst      Surgical Procedure and Date: R CMC arthroplasty, R 1st DC release, R ganglion cyst excision on 6/28/19  Evaluation Date: 8/14/2019  Insurance Authorization Period Expiration: 12/31/19  Plan of Care Certification Period: 10/18/19  Date of Return to MD: 9/16/19     Visit # / Visits authorized: 7/ 20   Time In:9:15AM  Time Out:10:05 AM  Total Billable Time: 50 minutes    S/p 9 weeks 5 days on 9/4/19    Subjective     Patient reports pain at ulnar wrist has not improved since cast removal. However, thumb cmc pain has is improving.    Pain: 5/10  Location: R thumb    Objective     RIGHT Wrist and Thumb AROM Measured in degrees    8/14/19   Wrist Ext/Flex 50/20   Wrist UD/RD 18/3   Thumb:  MP 35                  IP 20   Thumb Mills Abd 45   *Thumb opposition to within 2 cm of the small finger distal tip    Sabino received the following supervised modalities after being cleared for contradictions for 15 minutes:   -Patient received fluidotherapy to RUE increase blood flow, circulation, desensitization, sensory re-education and for pain management.     Sabino received therapeutic exercises for 35 minutes including:  -A/AAROM wrist flex/ext, with and without composite fist, wrist ulnar/radial deviation, wrist circles  -Scar massage, cupping performed to ulnar scar  -A/AAROM thumb IP joint blocking, MP joint blocking, radial/mills abd/add,  composite flex/ext, circles, opposition to fingertips  -wrist wheel flex/ext x 2'--not performed today  -nesting with small pom poms x 2'--not performed today  -dextercisor x 2'  -yellow putty rolling and pinching, key and 3 pt --not performed today  -wrist flex/ext/rd 1# x 1'  -LLPS 2# wrist flex and ext x 2' ea.  -Yellow putty drags for extension stretch x 2' --not performed today  - Yellow putty blooming x 10 --not performed today  -red clothespin pinching small pom poms x 2  -Red gripper x 20    Home Exercises and Education Provided     Education provided: Reviewed HEP    Written Home Exercises Provided: Yes  Exercises were reviewed and Sabino was able to demonstrate them prior to the end of the session.  Sabino demonstrated good  understanding of the HEP provided.   .   See EMR under Patient Instructions for exercises provided during initial evaluation.     Assessment     -Patient demos continued pain and soft tissue restriction at ulnar wrist, limiting active and passive radial deviation of the wrist. Discussed importance of maintaining neutral wrist during gripping activities to decrease ulnar deviation deformity.    Sabino is progressing well towards his goals and there are no updates to goals at this time. Pt prognosis is Excellent.     Pt will continue to benefit from skilled outpatient occupational therapy to address the deficits listed in the problem list on initial evaluation provide pt/family education and to maximize pt's level of independence in the home and community environment.     Pt's spiritual, cultural and educational needs considered and pt agreeable to plan of care and goals.    Goals:  Short Term Goals:   To be completed in 1 week:  1) Patient will be independent in HEP-Ongoing   To be completed in 2-4 weeks:  2) Increase thumb opposition AROM to the distal tip of the small finger to manipulate silverware while eating--  3) Increase wrist flexion AROM to 45 degrees for ADL performance--  4)  Increase wrist radial deviation AROM to at least 10 degrees for computer typing performance--  5) Increase thumb IP AROM to 45 degrees for fastening buttons while dressing--  6) Assess /pinch strength and set appropriate goals--  Long Term Goals:   To be completed by discharge:  7) Return to previous ADLs, IADLs, and work activities independently and without increased pain  8) Decrease FOTO limitation score to 38%, indicative of improved functional independence      Plan   Outpatient Occupational Therapy 2 times weekly for 10 visits     Updates/Grading for next session:       Madina Pelayo, OT

## 2019-09-11 ENCOUNTER — CLINICAL SUPPORT (OUTPATIENT)
Dept: REHABILITATION | Facility: HOSPITAL | Age: 64
End: 2019-09-11
Attending: ORTHOPAEDIC SURGERY
Payer: COMMERCIAL

## 2019-09-11 DIAGNOSIS — M25.431 EFFUSION, RIGHT WRIST: ICD-10-CM

## 2019-09-11 DIAGNOSIS — M79.644 PAIN OF RIGHT THUMB: ICD-10-CM

## 2019-09-11 DIAGNOSIS — M25.631 DECREASED RANGE OF MOTION OF RIGHT WRIST: ICD-10-CM

## 2019-09-11 DIAGNOSIS — M25.641 DECREASED RANGE OF MOTION OF RIGHT THUMB: Primary | ICD-10-CM

## 2019-09-11 PROCEDURE — 97110 THERAPEUTIC EXERCISES: CPT

## 2019-09-11 PROCEDURE — 97018 PARAFFIN BATH THERAPY: CPT

## 2019-09-11 NOTE — PROGRESS NOTES
Occupational Therapy Daily Treatment Note     Date: 9/11/2019  Name: Sabino Rubio  Clinic Number: [id number]    Therapy Diagnosis:   Encounter Diagnoses   Name Primary?    Decreased range of motion of right thumb Yes    Decreased range of motion of right wrist     Effusion, right wrist     Pain of right thumb      Physician: Alba Penn, *    Physician Orders: status post Right thumb joint CMC arthroplasty, Right volar wrist ganglion cyst excisional biopsy, First dorsal compartment release, right wrist, [place] APL tendon for graft by Dr. Penn on 6/28/19; 2x/wk x 6 weeks; Modalities prn  Medical Diagnosis:   M18.11 (ICD-10-CM) - Arthritis of carpometacarpal (CMC) joint of right thumb   M67.40 (ICD-10-CM) - Ganglion cyst      Surgical Procedure and Date: R CMC arthroplasty, R 1st DC release, R ganglion cyst excision on 6/28/19  Evaluation Date: 8/14/2019  Insurance Authorization Period Expiration: 12/31/19  Plan of Care Certification Period: 10/18/19  Date of Return to MD: 9/16/19     Visit # / Visits authorized: 8/ 20   Time In:7AM  Time Out:8AM  Total Billable Time: 60 minutes    S/p 10 weeks 5 days on 9/11/19    Subjective     Patient reports pain at ulnar wrist has not improved since cast removal. However, thumb cmc pain has is improving.    Pain: 3/10  Location: R thumb    Objective     RIGHT Wrist and Thumb AROM Measured in degrees    8/14/19 9/11/19   Wrist Ext/Flex 50/20 50/46   Wrist UD/RD 18/3 25/4   Thumb:  MP 35 52                  IP 20 28   Thumb Morales Abd 45 37      Strength: (FILOMENA Dynamometer in lbs.) Average 3 trials, Position II:  9/11/2019 Left Right    84 48     Pinch Strength (Measured in psi)  9/11/19 Left Right   Lateral 20 13   3 Jaw Kole 20 8     9/11/19: Post treatment wrist extension/flexion= 60/53    Sabino received the following supervised modalities after being cleared for contradictions for 15 minutes:   Patient received paraffin bath to R hand to increase  blood flow, circulation, pain management and for tissue elasticity prior to therex.     Sabino received therapeutic exercises for 45 minutes including:  -A/AA/PROM wrist flex/ext, with and without composite fist, wrist ulnar/radial deviation, wrist circles  -Scar massage, cupping performed to ulnar scar  -A/AAROM thumb IP joint blocking, MP joint blocking, radial/mills abd/add, composite flex/ext, circles, opposition to fingertips  -wrist wheel flex/ext x 2'  -nesting with small pom poms x 2'--not performed today  -dextercisor x 2'  -yellow putty rolling and pinching, key and 3 pt   -wrist flex/ext/rd 1# x 1' --not performed today  -LLPS 2# wrist flex and ext x 2' ea.  -Yellow putty drags for extension stretch x 2' --not performed today  - Yellow putty blooming x 10 --not performed today  -red clothespin pinching small pom poms x 2 --not performed today  -Red gripper x 20  -yellow flexbar sup/pro/twist x 10 each  -palm pushes into yellow putty for wrist extension and partial weightbearing x 2'    Home Exercises and Education Provided     Education provided: Reviewed HEP    Written Home Exercises Provided: Yes  Exercises were reviewed and Sabino was able to demonstrate them prior to the end of the session.  Sabino demonstrated good  understanding of the HEP provided.   .   See EMR under Patient Instructions for exercises provided during initial evaluation.     Assessment     Patient demos continued pain and soft tissue restriction at ulnar wrist, limiting active and passive radial deviation of the wrist. Discussed importance of maintaining neutral wrist during gripping activities to decrease ulnar deviation deformity.    Sabino is progressing well towards his goals and there are no updates to goals at this time. Pt prognosis is Excellent.     Pt will continue to benefit from skilled outpatient occupational therapy to address the deficits listed in the problem list on initial evaluation provide pt/family education and to  maximize pt's level of independence in the home and community environment.     Pt's spiritual, cultural and educational needs considered and pt agreeable to plan of care and goals.    Goals:  Short Term Goals:   To be completed in 1 week:  1) Patient will be independent in HEP-Ongoing   To be completed in 2-4 weeks:  2) Increase thumb opposition AROM to the distal tip of the small finger to manipulate silverware while eating--Met 9/11/19  3) Increase wrist flexion AROM to 45 degrees for ADL performance--Met 9/11/19  4) Increase wrist radial deviation AROM to at least 10 degrees for computer typing performance--Progressing  5) Increase thumb IP AROM to 45 degrees for fastening buttons while dressing--Progressing  6) Assess /pinch strength and set appropriate goals--Met 9/11/19  Added 9/11/19- To be completed 2-4 weeks:  7) Increase  strength R hand to 60 lbs for improved safety during yardwork  8) Increase key and 3 pt pinch by 4 lbs each for IADL performance  9) Increase wrist flexion and extension TROM by 15 degrees for ADL performance  Long Term Goals:   To be completed by discharge:  1) Return to previous ADLs, IADLs, and work activities independently and without increased pain--Progressing  2) Decrease FOTO limitation score to 38%, indicative of improved functional independence      Plan   Outpatient Occupational Therapy 2 times weekly for 10 visits     Updates/Grading for next session:       Madina Pelayo OT

## 2019-09-11 NOTE — PLAN OF CARE
[facility] Occupational Therapy  Plan of Care Update     Date: 9/11/2019  Patient: Sabino Rubio  Chart Number: [id number]  Referring Physician: Alba Penn, *  Therapy Diagnosis:   1. Decreased range of motion of right thumb     2. Decreased range of motion of right wrist     3. Effusion, right wrist     4. Pain of right thumb       Medical Diagnosis:   M18.11 (ICD-10-CM) - Arthritis of carpometacarpal (CMC) joint of right thumb   M67.40 (ICD-10-CM) - Ganglion cyst     Physician Orders: status post Right thumb joint CMC arthroplasty, Right volar wrist ganglion cyst excisional biopsy, First dorsal compartment release, right wrist, [place] APL tendon for graft by Dr. Penn on 6/28/19; 2x/wk x 6 weeks; Modalities prn  Evaluation Date: 9/11/2019  NEW Plan of Care Certification Date: 10/18/19  Authorization Period: 12/31/19  Date of Return to MD: 9/16/19    Visit #: 8 of 20  Time In: 7AM  Time Out: 8AM  Total Billable Time: 60    Precautions:  Standard    Subjective     Update: Pt reports improved motion and decreased pain at base of thumb; however, continued pain at ulnar wrist.    Pain: 3/10  Location: R thumb cmc and ulnar wrist    Objective     Update:   RIGHT Wrist and Thumb AROM Measured in degrees    8/14/19 9/11/19   Wrist Ext/Flex 50/20 50/46   Wrist UD/RD 18/3 25/4   Thumb:  MP 35 52                  IP 20 28   Thumb Morales Abd 45 37      Strength: (FILOMENA Dynamometer in lbs.) Average 3 trials, Position II:  9/11/2019 Left Right    84 48     Pinch Strength (Measured in psi)  9/11/19 Left Right   Lateral 20 13   3 Jaw Kole 20 8     9/11/19: Post treatment wrist extension/flexion= 60/53      Assessment     Since beginning OT, pt has made good, steady progress with his OT treatments and has worked hard towards all of his OT goals as evidenced by subjective and objective improvements. Despite these improvements,he remains with deficits with wrist/thumb ROM and /pinch  strength and will continue to benefit from skilled OT consisting of to address remaining limitations and increase functional mobility.    Goals:  Short Term Goals:   To be completed in 1 week:  1) Patient will be independent in HEP-Ongoing   To be completed in 2-4 weeks:  2) Increase thumb opposition AROM to the distal tip of the small finger to manipulate silverware while eating--Met 9/11/19  3) Increase wrist flexion AROM to 45 degrees for ADL performance--Met 9/11/19  4) Increase wrist radial deviation AROM to at least 10 degrees for computer typing performance--Progressing  5) Increase thumb IP AROM to 45 degrees for fastening buttons while dressing--Progressing  6) Assess /pinch strength and set appropriate goals--Met 9/11/19  Added 9/11/19- To be completed 2-4 weeks:  7) Increase  strength R hand to 60 lbs for improved safety during yardwork  8) Increase key and 3 pt pinch by 4 lbs each for IADL performance  9) Increase wrist flexion and extension TROM by 15 degrees for ADL performance  Long Term Goals:   To be completed by discharge:  1) Return to previous ADLs, IADLs, and work activities independently and without increased pain--Progressing  2) Decrease FOTO limitation score to 38%, indicative of improved functional independence      Reasons for Recertification of Therapy: Decreased ROM, Decreased  strength, Decreased pinch strength, Decreased functional hand use, Increased pain, Joint Stiffness and Scar Adhesions  New Certification Period: 9/11/2019 to 10/18/19  Recommended Treatment Plan: 2 times per week for 6 more visits   Other Recommendations: Paraffin, Fluidotherapy, Manual therapy/joint mobilizations, Modalities for pain management, Therapeutic exercises/activities., Strengthening, Edema Control, Scar Management and Joint Protection      I CERTIFY THE NEED FOR THESE SERVICES FURNISHED UNDER THIS PLAN OF TREATMENT AND WHILE UNDER MY CARE    Physician's comments:  ____________________________________________________________________________________________________________________________________________    Physician's Name: ___________________________________

## 2019-09-13 ENCOUNTER — CLINICAL SUPPORT (OUTPATIENT)
Dept: REHABILITATION | Facility: HOSPITAL | Age: 64
End: 2019-09-13
Attending: ORTHOPAEDIC SURGERY
Payer: COMMERCIAL

## 2019-09-13 DIAGNOSIS — M25.641 DECREASED RANGE OF MOTION OF RIGHT THUMB: Primary | ICD-10-CM

## 2019-09-13 DIAGNOSIS — M25.631 DECREASED RANGE OF MOTION OF RIGHT WRIST: ICD-10-CM

## 2019-09-13 DIAGNOSIS — M25.431 EFFUSION, RIGHT WRIST: ICD-10-CM

## 2019-09-13 DIAGNOSIS — M79.644 PAIN OF RIGHT THUMB: ICD-10-CM

## 2019-09-13 PROCEDURE — 97110 THERAPEUTIC EXERCISES: CPT

## 2019-09-13 PROCEDURE — 97022 WHIRLPOOL THERAPY: CPT

## 2019-09-13 NOTE — PATIENT INSTRUCTIONS
"Putty  Strengthening:       Squeeze for 3-4 minutes 1x/day  Squeeze putty in hand trying to keep it round by rotating putty after each squeeze. Push fingers through putty to palm each time. Do not "bear down" when squeezing.     3 Point Pinching:    Roll putty into a log and pinch the putty between your middle and index fingers and thumb. Repeat moving down the log. 15 pinches 1x/day.      Key Pinching:    Hold the putty at the top of your hand. Squeeze the putty between your thumb and the side of your 2nd finger as shown. 15 pinches 1x/day.   "

## 2019-09-13 NOTE — PROGRESS NOTES
Occupational Therapy Daily Treatment Note     Date: 9/13/2019  Name: Sabino Rubio  Clinic Number: [id number]    Therapy Diagnosis:   No diagnosis found.  Physician: Alba Penn, *    Physician Orders: status post Right thumb joint CMC arthroplasty, Right volar wrist ganglion cyst excisional biopsy, First dorsal compartment release, right wrist, [place] APL tendon for graft by Dr. Penn on 6/28/19; 2x/wk x 6 weeks; Modalities prn  Medical Diagnosis:   M18.11 (ICD-10-CM) - Arthritis of carpometacarpal (CMC) joint of right thumb   M67.40 (ICD-10-CM) - Ganglion cyst      Surgical Procedure and Date: R CMC arthroplasty, R 1st DC release, R ganglion cyst excision on 6/28/19  Evaluation Date: 8/14/2019  Insurance Authorization Period Expiration: 12/31/19  Plan of Care Certification Period: 10/18/19  Date of Return to MD: 9/16/19     Visit # / Visits authorized: 9/ 20   Time In:9AM  Time Out:10AM  Total Billable Time: 60 minutes    S/p 10 weeks 5 days on 9/11/19    Subjective     Patient reports pain at ulnar wrist has not improved since cast removal. However, thumb cmc pain has is improving.    Pain: 3/10  Location: R thumb    Objective     RIGHT Wrist and Thumb AROM Measured in degrees    8/14/19 9/11/19   Wrist Ext/Flex 50/20 50/46   Wrist UD/RD 18/3 25/4   Thumb:  MP 35 52                  IP 20 28   Thumb Morales Abd 45 37      Strength: (FILOMENA Dynamometer in lbs.) Average 3 trials, Position II:  9/11/2019 Left Right    84 48     Pinch Strength (Measured in psi)  9/11/19 Left Right   Lateral 20 13   3 Jaw Kole 20 8     9/11/19: Post treatment wrist extension/flexion= 60/53    Sabino received the following supervised modalities after being cleared for contradictions for 15 minutes:   Patient received paraffin bath to R hand to increase blood flow, circulation, pain management and for tissue elasticity prior to therex.     Sabino received therapeutic exercises for 45 minutes including:  -A/AA/PROM  Received refill request for fluoxetine.    Last refill:  1-28-17.  #90 with 0 refills.  Last CMP:  11-15-16  Last office visit:  10-14-16.   wrist flex/ext, with and without composite fist, wrist ulnar/radial deviation, wrist circles  -Scar massage, cupping performed to ulnar scar  -A/AAROM thumb IP joint blocking, MP joint blocking, radial/mills abd/add, composite flex/ext, circles, opposition to fingertips  -wrist wheel flex/ext x 2'  -nesting with small pom poms x 2'--not performed today  -dextercisor x 2'  -yellow putty rolling and pinching, key and 3 pt   -wrist flex/ext/rd 1# x 1' --not performed today  -LLPS 2# wrist flex and ext x 2' ea.  -Yellow putty drags for extension stretch x 2' --not performed today  - Yellow putty blooming x 10 --not performed today  -red clothespin pinching small pom poms x 2 --not performed today  -Red gripper x 20  -yellow flexbar sup/pro/twist x 10 each  -palm pushes into yellow putty for wrist extension and partial weightbearing x 2'    Home Exercises and Education Provided     Education provided: Reviewed HEP    Written Home Exercises Provided: Yes  Exercises were reviewed and Sabino was able to demonstrate them prior to the end of the session.  Sabino demonstrated good  understanding of the HEP provided.   .   See EMR under Patient Instructions for exercises provided during initial evaluation.     Assessment     Patient demos continued pain and soft tissue restriction at ulnar wrist, limiting active and passive radial deviation of the wrist. Discussed importance of maintaining neutral wrist during gripping activities to decrease ulnar deviation deformity.    Sabino is progressing well towards his goals and there are no updates to goals at this time. Pt prognosis is Excellent.     Pt will continue to benefit from skilled outpatient occupational therapy to address the deficits listed in the problem list on initial evaluation provide pt/family education and to maximize pt's level of independence in the home and community environment.     Pt's spiritual, cultural and educational needs considered and pt agreeable to plan of  care and goals.    Goals:  Short Term Goals:   To be completed in 1 week:  1) Patient will be independent in HEP-Ongoing   To be completed in 2-4 weeks:  2) Increase thumb opposition AROM to the distal tip of the small finger to manipulate silverware while eating--Met 9/11/19  3) Increase wrist flexion AROM to 45 degrees for ADL performance--Met 9/11/19  4) Increase wrist radial deviation AROM to at least 10 degrees for computer typing performance--Progressing  5) Increase thumb IP AROM to 45 degrees for fastening buttons while dressing--Progressing  6) Assess /pinch strength and set appropriate goals--Met 9/11/19  Added 9/11/19- To be completed 2-4 weeks:  7) Increase  strength R hand to 60 lbs for improved safety during yardwork  8) Increase key and 3 pt pinch by 4 lbs each for IADL performance  9) Increase wrist flexion and extension TROM by 15 degrees for ADL performance  Long Term Goals:   To be completed by discharge:  1) Return to previous ADLs, IADLs, and work activities independently and without increased pain--Progressing  2) Decrease FOTO limitation score to 38%, indicative of improved functional independence    Plan   Outpatient Occupational Therapy 2 times weekly for 16 visits     Updates/Grading for next session:       Madina Pelayo OT

## 2019-09-14 ENCOUNTER — PATIENT OUTREACH (OUTPATIENT)
Dept: ADMINISTRATIVE | Facility: OTHER | Age: 64
End: 2019-09-14

## 2019-09-16 ENCOUNTER — HOSPITAL ENCOUNTER (OUTPATIENT)
Dept: RADIOLOGY | Facility: OTHER | Age: 64
Discharge: HOME | End: 2019-09-16
Attending: PHYSICIAN ASSISTANT
Payer: COMMERCIAL

## 2019-09-16 ENCOUNTER — OFFICE VISIT (OUTPATIENT)
Dept: ORTHOPEDICS | Facility: CLINIC | Age: 64
End: 2019-09-16
Payer: COMMERCIAL

## 2019-09-16 VITALS
HEIGHT: 70 IN | DIASTOLIC BLOOD PRESSURE: 88 MMHG | WEIGHT: 149 LBS | HEART RATE: 67 BPM | BODY MASS INDEX: 21.33 KG/M2 | SYSTOLIC BLOOD PRESSURE: 142 MMHG

## 2019-09-16 DIAGNOSIS — Z98.890 POST-OPERATIVE STATE: Primary | ICD-10-CM

## 2019-09-16 DIAGNOSIS — M50.30 DEGENERATION OF CERVICAL INTERVERTEBRAL DISC: ICD-10-CM

## 2019-09-16 DIAGNOSIS — M43.10 ACQUIRED SPONDYLOLISTHESIS: ICD-10-CM

## 2019-09-16 DIAGNOSIS — M18.11 ARTHRITIS OF CARPOMETACARPAL (CMC) JOINT OF RIGHT THUMB: ICD-10-CM

## 2019-09-16 DIAGNOSIS — M67.40 GANGLION CYST: ICD-10-CM

## 2019-09-16 PROCEDURE — 99999 PR PBB SHADOW E&M-EST. PATIENT-LVL III: CPT | Mod: PBBFAC,,, | Performed by: PHYSICIAN ASSISTANT

## 2019-09-16 PROCEDURE — 99999 PR PBB SHADOW E&M-EST. PATIENT-LVL III: ICD-10-PCS | Mod: PBBFAC,,, | Performed by: PHYSICIAN ASSISTANT

## 2019-09-16 PROCEDURE — 73130 X-RAY EXAM OF HAND: CPT | Mod: 26,RT,, | Performed by: RADIOLOGY

## 2019-09-16 PROCEDURE — 73130 X-RAY EXAM OF HAND: CPT | Mod: TC,FY,RT

## 2019-09-16 PROCEDURE — 99024 PR POST-OP FOLLOW-UP VISIT: ICD-10-PCS | Mod: S$GLB,,, | Performed by: PHYSICIAN ASSISTANT

## 2019-09-16 PROCEDURE — 73130 XR HAND COMPLETE 3 VIEW RIGHT: ICD-10-PCS | Mod: 26,RT,, | Performed by: RADIOLOGY

## 2019-09-16 PROCEDURE — 99024 POSTOP FOLLOW-UP VISIT: CPT | Mod: S$GLB,,, | Performed by: PHYSICIAN ASSISTANT

## 2019-09-16 RX ORDER — GABAPENTIN 300 MG/1
CAPSULE ORAL
Qty: 90 CAPSULE | Refills: 2 | Status: SHIPPED | OUTPATIENT
Start: 2019-09-16 | End: 2020-09-30

## 2019-09-16 NOTE — PROGRESS NOTES
"Mr. Rubio is here today for a post-operative visit.  He is 3 mos status post Right thumb joint CMC arthroplasty, Right volar wrist ganglion cyst excisional biopsy, First dorsal compartment release, right wrist, [place] APL tendon for graft by Dr. Penn on 6/28/19. He reports that he is doing well. He has been attending occupational therapy. He has mild decreased wrist motion compared to the left side with occasional soreness, this is gradually improving. He notes intermittent numbness over the radial sensory distribution, especially when wearing his brace- he continues to wear his brace at work. He denies fever, chills, and sweats since the time of the surgery.     He continues to have mild, intermittent pain in the left thumb CMC joint.    Physical exam:    Vitals:    09/16/19 0802   BP: (!) 142/88   Pulse: 67   Weight: 67.6 kg (149 lb)   Height: 5' 10" (1.778 m)   PainSc: 0-No pain     Vital signs are stable, patient is afebrile.  Patient is well dressed and well groomed, no acute distress.  Alert and oriented to person, place, and time.  Incision is clean, dry and intact.  There is no erythema or exudate.  There is no sign of any infection. He is NVI.     FINAL PATHOLOGIC DIAGNOSIS  RIGHT GANGLION CYST, EXCISION:  Consistent with ganglion cyst    Xray bl hands 8/9/19  FINDINGS:  Cast material has now been placed about the distal forearm and proximal hand.  Cast material partly obscures osseous detail.  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  There is osteoarthritic degenerative change at the 1st carpal/metacarpal joint.  There is narrowing of the radiocarpal joint.  Well corticated osseous density adjacent to the ulnar styloid has been better demonstrated on prior exam.    Assessment:  status post Right thumb joint CMC arthroplasty, Right volar wrist ganglion cyst excisional biopsy, First dorsal compartment release, right wrist, [place] APL tendon for graft by Dr." Demetrius-Wise on 6/28/19    Plan:  Sabino was seen today for pain and pain.    Diagnoses and all orders for this visit:    Post-operative state    -continue OT/HEP   -RTC if no improvement or if needed for the left side         Lindsay Briceno PA-C  [facility]   [facility]  [place], [place]

## 2019-09-18 ENCOUNTER — CLINICAL SUPPORT (OUTPATIENT)
Dept: REHABILITATION | Facility: HOSPITAL | Age: 64
End: 2019-09-18
Attending: ORTHOPAEDIC SURGERY
Payer: COMMERCIAL

## 2019-09-18 DIAGNOSIS — M25.431 EFFUSION, RIGHT WRIST: ICD-10-CM

## 2019-09-18 DIAGNOSIS — M25.641 DECREASED RANGE OF MOTION OF RIGHT THUMB: Primary | ICD-10-CM

## 2019-09-18 DIAGNOSIS — M25.631 DECREASED RANGE OF MOTION OF RIGHT WRIST: ICD-10-CM

## 2019-09-18 DIAGNOSIS — M79.644 PAIN OF RIGHT THUMB: ICD-10-CM

## 2019-09-18 PROCEDURE — 97140 MANUAL THERAPY 1/> REGIONS: CPT

## 2019-09-18 PROCEDURE — 97110 THERAPEUTIC EXERCISES: CPT

## 2019-09-18 PROCEDURE — 97022 WHIRLPOOL THERAPY: CPT

## 2019-09-18 NOTE — PROGRESS NOTES
Occupational Therapy Daily Treatment Note     Date: 9/18/2019  Name: Sabino Rubio  Clinic Number: [id number]    Therapy Diagnosis:   Encounter Diagnoses   Name Primary?    Decreased range of motion of right thumb Yes    Decreased range of motion of right wrist     Effusion, right wrist     Pain of right thumb      Physician: Alba Penn, *    Physician Orders: status post Right thumb joint CMC arthroplasty, Right volar wrist ganglion cyst excisional biopsy, First dorsal compartment release, right wrist, [place] APL tendon for graft by Dr. Penn on 6/28/19; 2x/wk x 6 weeks; Modalities prn  Medical Diagnosis:   M18.11 (ICD-10-CM) - Arthritis of carpometacarpal (CMC) joint of right thumb   M67.40 (ICD-10-CM) - Ganglion cyst      Surgical Procedure and Date: R CMC arthroplasty, R 1st DC release, R ganglion cyst excision on 6/28/19  Evaluation Date: 8/14/2019  Insurance Authorization Period Expiration: 12/31/19  Plan of Care Certification Period: 10/18/19  Date of Return to MD: 9/16/19     Visit # / Visits authorized: 10/ 20   Time In:7AM  Time Out:8AM  Total Billable Time: 60 minutes    Subjective     Patient reports pain at ulnar wrist has not improved since cast removal. However, thumb cmc pain has is improving.    Pain: 3/10  Location: R thumb    Objective     RIGHT Wrist and Thumb AROM Measured in degrees    8/14/19 9/11/19   Wrist Ext/Flex 50/20 50/46   Wrist UD/RD 18/3 25/4   Thumb:  MP 35 52                  IP 20 28   Thumb Morales Abd 45 37      Strength: (FILOMENA Dynamometer in lbs.) Average 3 trials, Position II:  9/11/2019 Left Right    84 48     Pinch Strength (Measured in psi)  9/11/19 Left Right   Lateral 20 13   3 Jaw Kole 20 8     9/11/19: Post treatment wrist extension/flexion= 60/53    Sabino received the following supervised modalities after being cleared for contradictions for 15 minutes:   Patient received paraffin bath to R hand to increase blood flow, circulation, pain  management and for tissue elasticity prior to therex.     Sabino received the following manual therapy techniques for 15 minutes:   -Distraction/grade 1 mobilization wrist, passive flexion/ext  -Passive thumb comp flex/ext  -IASTM framing both surgical scars to decrease adhesion    Sabino received therapeutic exercises for 35 minutes including:  -A/AA/PROM wrist flex/ext, with and without composite fist, wrist ulnar/radial deviation, wrist circles  -Scar massage, cupping performed to ulnar scar  -A/AAROM thumb IP joint blocking, MP joint blocking, radial/mills abd/add, composite flex/ext, circles, opposition to fingertips  -grooved pegboard x 1  -dextercisor x 2'  -pink putty gripping x 2'  -Weight well 1.5# flex/ext x 3  -LLPS 2# wrist flex and ext x 2' ea.  -Yellow putty drags for extension stretch x 2' --not performed today  - Yellow putty blooming x 10 --not performed today  -red clothespin pinching small pom poms x 2 --not performed today  -Forearm bar 1# sup/pro x 30  -yellow flexbar sup/pro/twist x 10 each --not performed today  -palm pushes into yellow putty for wrist extension and partial weightbearing x 2'    Home Exercises and Education Provided     Education provided: Addressed patient's concern regarding soreness    Written Home Exercises Provided: Yes  Exercises were reviewed and Sabino was able to demonstrate them prior to the end of the session.  Sabino demonstrated good  understanding of the HEP provided.   .   See EMR under Patient Instructions for exercises provided during initial evaluation.     Assessment     Patient with increased soreness secondary to decreased splint use.     Sabino is progressing well towards his goals and there are no updates to goals at this time. Pt prognosis is Excellent.     Pt will continue to benefit from skilled outpatient occupational therapy to address the deficits listed in the problem list on initial evaluation provide pt/family education and to maximize pt's level of  independence in the home and community environment.     Pt's spiritual, cultural and educational needs considered and pt agreeable to plan of care and goals.    Goals:  Short Term Goals:   To be completed in 1 week:  1) Patient will be independent in HEP-Ongoing   To be completed in 2-4 weeks:  2) Increase thumb opposition AROM to the distal tip of the small finger to manipulate silverware while eating--Met 9/11/19  3) Increase wrist flexion AROM to 45 degrees for ADL performance--Met 9/11/19  4) Increase wrist radial deviation AROM to at least 10 degrees for computer typing performance--Progressing  5) Increase thumb IP AROM to 45 degrees for fastening buttons while dressing--Progressing  6) Assess /pinch strength and set appropriate goals--Met 9/11/19  Added 9/11/19- To be completed 2-4 weeks:  7) Increase  strength R hand to 60 lbs for improved safety during yardwork  8) Increase key and 3 pt pinch by 4 lbs each for IADL performance  9) Increase wrist flexion and extension TROM by 15 degrees for ADL performance  Long Term Goals:   To be completed by discharge:  1) Return to previous ADLs, IADLs, and work activities independently and without increased pain--Progressing  2) Decrease FOTO limitation score to 38%, indicative of improved functional independence    Plan   Outpatient Occupational Therapy 2 times weekly for 16 visits     Updates/Grading for next session:       Madina Pelayo OT

## 2019-09-20 ENCOUNTER — CLINICAL SUPPORT (OUTPATIENT)
Dept: REHABILITATION | Facility: HOSPITAL | Age: 64
End: 2019-09-20
Attending: ORTHOPAEDIC SURGERY
Payer: COMMERCIAL

## 2019-09-20 DIAGNOSIS — M25.641 DECREASED RANGE OF MOTION OF RIGHT THUMB: Primary | ICD-10-CM

## 2019-09-20 DIAGNOSIS — M79.644 PAIN OF RIGHT THUMB: ICD-10-CM

## 2019-09-20 DIAGNOSIS — M25.431 EFFUSION, RIGHT WRIST: ICD-10-CM

## 2019-09-20 DIAGNOSIS — M25.631 DECREASED RANGE OF MOTION OF RIGHT WRIST: ICD-10-CM

## 2019-09-20 PROCEDURE — 97022 WHIRLPOOL THERAPY: CPT

## 2019-09-20 PROCEDURE — 97140 MANUAL THERAPY 1/> REGIONS: CPT

## 2019-09-20 PROCEDURE — 97110 THERAPEUTIC EXERCISES: CPT

## 2019-09-20 NOTE — PROGRESS NOTES
Occupational Therapy Daily Treatment Note     Date: 9/20/2019  Name: Sabino Rubio  Clinic Number: [id number]    Therapy Diagnosis:   No diagnosis found.  Physician: Alba Penn, *    Physician Orders: status post Right thumb joint CMC arthroplasty, Right volar wrist ganglion cyst excisional biopsy, First dorsal compartment release, right wrist, [place] APL tendon for graft by Dr. Penn on 6/28/19; 2x/wk x 6 weeks; Modalities prn  Medical Diagnosis:   M18.11 (ICD-10-CM) - Arthritis of carpometacarpal (CMC) joint of right thumb   M67.40 (ICD-10-CM) - Ganglion cyst      Surgical Procedure and Date: R CMC arthroplasty, R 1st DC release, R ganglion cyst excision on 6/28/19  Evaluation Date: 8/14/2019  Insurance Authorization Period Expiration: 12/31/19  Plan of Care Certification Period: 10/18/19  Date of Return to MD: 9/16/19     Visit # / Visits authorized: 10/ 20   Time In:9AM  Time Out:10AM  Total Billable Time: 60 minutes    Subjective     Patient reports pain at ulnar wrist has not improved since cast removal. However, thumb cmc pain has is improving.    Pain: 4/10  Location: R thumb    Objective     RIGHT Wrist and Thumb AROM Measured in degrees    8/14/19 9/11/19   Wrist Ext/Flex 50/20 50/46   Wrist UD/RD 18/3 25/4   Thumb:  MP 35 52                  IP 20 28   Thumb Morales Abd 45 37      Strength: (FILOMENA Dynamometer in lbs.) Average 3 trials, Position II:  9/11/2019 Left Right    84 48     Pinch Strength (Measured in psi)  9/11/19 Left Right   Lateral 20 13   3 Jaw Kole 20 8     9/11/19: Post treatment wrist extension/flexion= 60/53    Sabino received the following supervised modalities after being cleared for contradictions for 15 minutes:   Patient received paraffin bath to R hand to increase blood flow, circulation, pain management and for tissue elasticity prior to therex.     Sabino received the following manual therapy techniques for 15 minutes:   -Distraction/grade 1 mobilization  wrist, passive flexion/ext  -Passive thumb comp flex/ext  -IASTM framing both surgical scars to decrease adhesion    Sabino received therapeutic exercises for 35 minutes including:  -A/AA/PROM wrist flex/ext, with and without composite fist, wrist ulnar/radial deviation, wrist circles  -Scar massage, cupping performed to ulnar scar  -A/AAROM thumb IP joint blocking, MP joint blocking, radial/mills abd/add, composite flex/ext, circles, opposition to fingertips  -grooved pegboard x 1  -dextercisor x 2'  -pink putty gripping x 2'  -Weight well 1.5# flex/ext x 3  -LLPS 2# wrist flex and ext x 2' ea.  -Yellow putty drags for extension stretch x 2' --not performed today  - Yellow putty blooming x 10 --not performed today  -red clothespin pinching small pom poms x 2 --not performed today  -Forearm bar 1# sup/pro x 30  -yellow flexbar sup/pro/twist x 10 each --not performed today  -palm pushes into yellow putty for wrist extension and partial weightbearing x 2'    Home Exercises and Education Provided     Education provided: Addressed patient's concern regarding soreness    Written Home Exercises Provided: Yes  Exercises were reviewed and Sabino was able to demonstrate them prior to the end of the session.  Sabino demonstrated good  understanding of the HEP provided.   .   See EMR under Patient Instructions for exercises provided during initial evaluation.     Assessment     Patient with increased soreness secondary to decreased splint use.     Sabino is progressing well towards his goals and there are no updates to goals at this time. Pt prognosis is Excellent.     Pt will continue to benefit from skilled outpatient occupational therapy to address the deficits listed in the problem list on initial evaluation provide pt/family education and to maximize pt's level of independence in the home and community environment.     Pt's spiritual, cultural and educational needs considered and pt agreeable to plan of care and  goals.    Goals:  Short Term Goals:   To be completed in 1 week:  1) Patient will be independent in HEP-Ongoing   To be completed in 2-4 weeks:  2) Increase thumb opposition AROM to the distal tip of the small finger to manipulate silverware while eating--Met 9/11/19  3) Increase wrist flexion AROM to 45 degrees for ADL performance--Met 9/11/19  4) Increase wrist radial deviation AROM to at least 10 degrees for computer typing performance--Progressing  5) Increase thumb IP AROM to 45 degrees for fastening buttons while dressing--Progressing  6) Assess /pinch strength and set appropriate goals--Met 9/11/19  Added 9/11/19- To be completed 2-4 weeks:  7) Increase  strength R hand to 60 lbs for improved safety during yardwork  8) Increase key and 3 pt pinch by 4 lbs each for IADL performance  9) Increase wrist flexion and extension TROM by 15 degrees for ADL performance  Long Term Goals:   To be completed by discharge:  1) Return to previous ADLs, IADLs, and work activities independently and without increased pain--Progressing  2) Decrease FOTO limitation score to 38%, indicative of improved functional independence    Plan   Outpatient Occupational Therapy 2 times weekly for 16 visits     Updates/Grading for next session:       Madina Pelayo OT

## 2019-09-25 ENCOUNTER — CLINICAL SUPPORT (OUTPATIENT)
Dept: REHABILITATION | Facility: HOSPITAL | Age: 64
End: 2019-09-25
Attending: ORTHOPAEDIC SURGERY
Payer: COMMERCIAL

## 2019-09-25 DIAGNOSIS — M25.431 EFFUSION, RIGHT WRIST: ICD-10-CM

## 2019-09-25 DIAGNOSIS — M79.644 PAIN OF RIGHT THUMB: ICD-10-CM

## 2019-09-25 DIAGNOSIS — M25.631 DECREASED RANGE OF MOTION OF RIGHT WRIST: ICD-10-CM

## 2019-09-25 DIAGNOSIS — M25.641 DECREASED RANGE OF MOTION OF RIGHT THUMB: Primary | ICD-10-CM

## 2019-09-25 PROCEDURE — 97022 WHIRLPOOL THERAPY: CPT

## 2019-09-25 PROCEDURE — 97110 THERAPEUTIC EXERCISES: CPT

## 2019-09-25 PROCEDURE — 97140 MANUAL THERAPY 1/> REGIONS: CPT

## 2019-09-25 NOTE — PROGRESS NOTES
Occupational Therapy Daily Treatment Note     Date: 9/25/2019  Name: Sabino Rubio  Clinic Number: [id number]    Therapy Diagnosis:   Encounter Diagnoses   Name Primary?    Decreased range of motion of right thumb Yes    Decreased range of motion of right wrist     Pain of right thumb     Effusion, right wrist      Physician: Alba Penn, *    Physician Orders: status post Right thumb joint CMC arthroplasty, Right volar wrist ganglion cyst excisional biopsy, First dorsal compartment release, right wrist, [place] APL tendon for graft by Dr. Penn on 6/28/19; 2x/wk x 6 weeks; Modalities prn  Medical Diagnosis:   M18.11 (ICD-10-CM) - Arthritis of carpometacarpal (CMC) joint of right thumb   M67.40 (ICD-10-CM) - Ganglion cyst      Surgical Procedure and Date: R CMC arthroplasty, R 1st DC release, R ganglion cyst excision on 6/28/19  Evaluation Date: 8/14/2019  Insurance Authorization Period Expiration: 12/31/19  Plan of Care Certification Period: 10/18/19  Date of Return to MD: 9/16/19     Visit # / Visits authorized: 13/ 20   Time In:7AM  Time Out:8AM  Total Billable Time: 60 minutes    Subjective     Patient reports pain at ulnar wrist has not improved since cast removal. However, thumb cmc pain has is improving.    Pain: 4/10  Location: R thumb    Objective     RIGHT Wrist and Thumb AROM Measured in degrees    8/14/19 9/11/19   Wrist Ext/Flex 50/20 50/46   Wrist UD/RD 18/3 25/4   Thumb:  MP 35 52                  IP 20 28   Thumb Morales Abd 45 37      Strength: (FILOMENA Dynamometer in lbs.) Average 3 trials, Position II:  9/11/2019 Left Right    84 48     Pinch Strength (Measured in psi)  9/11/19 Left Right   Lateral 20 13   3 Jaw Kole 20 8     9/11/19: Post treatment wrist extension/flexion= 60/53    Sabino received the following supervised modalities after being cleared for contradictions for 15 minutes:   Patient received fluidotherapy to RUE increase blood flow, circulation,  desensitization, sensory re-education and for pain management.     Sabino received the following manual therapy techniques for 15 minutes:   -Distraction/grade 1 mobilization wrist, passive flexion/ext  -Passive thumb comp flex/ext  -IASTM framing both surgical scars to decrease adhesion --not performed today  -manual scar mobilization    Sabino received therapeutic exercises for 35 minutes including:  -AROM wrist and thumb, all planes  -grooved pegboard x 1 --not performed today  -dextercisor x 2'  -thumbcisor x 15  -pink putty gripping x 2'  -Weight well 1.5# flex/ext x 3  -LLPS 2# wrist flex and ext x 2' ea.  - Digi extend yellow band 2 x 15  -Hand helper 2 red 2 yellow bands x 20  -green clothespin pinching small pom poms x 2   -Forearm bar 1# sup/pro x 30  -Wrist flex/ext/rd 2# x 20 each  -yellow flexbar sup/pro/twist x 10 each --not performed today  -palm pushes into yellow putty for wrist extension and partial weightbearing x 2' --not performed today    Home Exercises and Education Provided     Education provided: cont HEP    Written Home Exercises Provided: Yes  Exercises were reviewed and Sabino was able to demonstrate them prior to the end of the session.  Sabino demonstrated good  understanding of the HEP provided.   .   See EMR under Patient Instructions for exercises provided during initial evaluation.     Assessment     Patient tolerated treatment well today. Wrist ROM continues to improve.    Sabino is progressing well towards his goals and there are no updates to goals at this time. Pt prognosis is Excellent.     Pt will continue to benefit from skilled outpatient occupational therapy to address the deficits listed in the problem list on initial evaluation provide pt/family education and to maximize pt's level of independence in the home and community environment.     Pt's spiritual, cultural and educational needs considered and pt agreeable to plan of care and goals.    Goals:  Short Term Goals:   To be  completed in 1 week:  1) Patient will be independent in HEP-Ongoing   To be completed in 2-4 weeks:  2) Increase thumb opposition AROM to the distal tip of the small finger to manipulate silverware while eating--Met 9/11/19  3) Increase wrist flexion AROM to 45 degrees for ADL performance--Met 9/11/19  4) Increase wrist radial deviation AROM to at least 10 degrees for computer typing performance--Progressing  5) Increase thumb IP AROM to 45 degrees for fastening buttons while dressing--Progressing  6) Assess /pinch strength and set appropriate goals--Met 9/11/19  Added 9/11/19- To be completed 2-4 weeks:  7) Increase  strength R hand to 60 lbs for improved safety during yardwork  8) Increase key and 3 pt pinch by 4 lbs each for IADL performance  9) Increase wrist flexion and extension TROM by 15 degrees for ADL performance  Long Term Goals:   To be completed by discharge:  1) Return to previous ADLs, IADLs, and work activities independently and without increased pain--Progressing  2) Decrease FOTO limitation score to 38%, indicative of improved functional independence    Plan   Outpatient Occupational Therapy 2 times weekly for 16 visits     Updates/Grading for next session:       Madina Pelayo, OT

## 2019-09-27 ENCOUNTER — CLINICAL SUPPORT (OUTPATIENT)
Dept: REHABILITATION | Facility: HOSPITAL | Age: 64
End: 2019-09-27
Attending: ORTHOPAEDIC SURGERY
Payer: COMMERCIAL

## 2019-09-27 DIAGNOSIS — M25.631 DECREASED RANGE OF MOTION OF RIGHT WRIST: ICD-10-CM

## 2019-09-27 DIAGNOSIS — M79.644 PAIN OF RIGHT THUMB: ICD-10-CM

## 2019-09-27 DIAGNOSIS — M25.431 EFFUSION, RIGHT WRIST: ICD-10-CM

## 2019-09-27 DIAGNOSIS — M25.641 DECREASED RANGE OF MOTION OF RIGHT THUMB: Primary | ICD-10-CM

## 2019-09-27 PROCEDURE — 97110 THERAPEUTIC EXERCISES: CPT

## 2019-09-27 PROCEDURE — 97140 MANUAL THERAPY 1/> REGIONS: CPT

## 2019-09-27 PROCEDURE — 97018 PARAFFIN BATH THERAPY: CPT

## 2019-09-27 NOTE — PROGRESS NOTES
Occupational Therapy Daily Treatment Note     Date: 9/27/2019  Name: Sabino Rubio  Clinic Number: [id number]    Therapy Diagnosis:   No diagnosis found.  Physician: Alba Penn, *    Physician Orders: status post Right thumb joint CMC arthroplasty, Right volar wrist ganglion cyst excisional biopsy, First dorsal compartment release, right wrist, [place] APL tendon for graft by Dr. Penn on 6/28/19; 2x/wk x 6 weeks; Modalities prn  Medical Diagnosis:   M18.11 (ICD-10-CM) - Arthritis of carpometacarpal (CMC) joint of right thumb   M67.40 (ICD-10-CM) - Ganglion cyst      Surgical Procedure and Date: R CMC arthroplasty, R 1st DC release, R ganglion cyst excision on 6/28/19  Evaluation Date: 8/14/2019  Insurance Authorization Period Expiration: 12/31/19  Plan of Care Certification Period: 10/18/19  Date of Return to MD: prn per MD     Visit # / Visits authorized: 13/ 20   Time In:9AM  Time Out:10:05AM  Total Billable Time: 65 minutes    Subjective     Pt reports soreness following last tx session.    Pain: 4/10  Location: R thumb    Objective     RIGHT Wrist and Thumb AROM Measured in degrees    8/14/19 9/11/19   Wrist Ext/Flex 50/20 50/46   Wrist UD/RD 18/3 25/4   Thumb:  MP 35 52                  IP 20 28   Thumb Morales Abd 45 37      Strength: (FILOMENA Dynamometer in lbs.) Average 3 trials, Position II:   Left Right   9/11/2019 84 48     Pinch Strength (Measured in psi)  9/11/19 Left Right   Lateral 20 13   3 Jaw Kole 20 8     9/11/19: Post treatment wrist extension/flexion= 60/53    Sabino received the following supervised modalities after being cleared for contradictions for 15 minutes:   Patient received paraffin bath with MHP to right hand w thumb taped into composite flexion to increase blood flow, circulation, pain management and for tissue elasticity prior to therex.     Sabino received the following manual therapy techniques for 10 minutes:   -Distraction/grade 1 mobilization wrist, passive  flexion/ext  -Passive thumb comp flex/ext  -IASTM framing both surgical scars to decrease adhesion --not performed today  -scar massage    Sabino received therapeutic exercises for 45 minutes including:  -AROM wrist and thumb, all planes  -grooved pegboard x 1 --not performed today  -dextercisor x 2'  -thumbcisor x 15 --not performed today  -pink putty gripping x 2'  -Weight well 1.5# flex/ext x 3  -LLPS 2# wrist flex and ext x 2' ea.  - Digi extend green band 2 x 15  -Hand helper 1 red 2 yellow bands x 20  -green clothespin key pinch x 15  -Forearm bar 1# sup/pro x 30 --not performed today  -Wrist flex/ext/rd 2# x 20 each --not performed today  -red flexbar sup/pro/twist x 10 each   -palm pushes into yellow putty for wrist extension and partial weightbearing x 2'     Home Exercises and Education Provided     Education provided: cont HEP    Written Home Exercises Provided: Yes  Exercises were reviewed and Sabino was able to demonstrate them prior to the end of the session.  Sabino demonstrated good  understanding of the HEP provided.   .   See EMR under Patient Instructions for exercises provided during initial evaluation.     Assessment     Patient tolerating progressive strengthening activities well and with mild c/o soreness. Wrist ROM continues to improve.    Sabino is progressing well towards his goals and there are no updates to goals at this time. Pt prognosis is Excellent.     Pt will continue to benefit from skilled outpatient occupational therapy to address the deficits listed in the problem list on initial evaluation provide pt/family education and to maximize pt's level of independence in the home and community environment.     Pt's spiritual, cultural and educational needs considered and pt agreeable to plan of care and goals.    Goals:  Short Term Goals:   To be completed in 1 week:  1) Patient will be independent in HEP-Ongoing   To be completed in 2-4 weeks:  2) Increase thumb opposition AROM to the distal  tip of the small finger to manipulate silverware while eating--Met 9/11/19  3) Increase wrist flexion AROM to 45 degrees for ADL performance--Met 9/11/19  4) Increase wrist radial deviation AROM to at least 10 degrees for computer typing performance--Progressing  5) Increase thumb IP AROM to 45 degrees for fastening buttons while dressing--Progressing  6) Assess /pinch strength and set appropriate goals--Met 9/11/19  Added 9/11/19- To be completed 2-4 weeks:  7) Increase  strength R hand to 60 lbs for improved safety during yardwork  8) Increase key and 3 pt pinch by 4 lbs each for IADL performance  9) Increase wrist flexion and extension TROM by 15 degrees for ADL performance  Long Term Goals:   To be completed by discharge:  1) Return to previous ADLs, IADLs, and work activities independently and without increased pain--Progressing  2) Decrease FOTO limitation score to 38%, indicative of improved functional independence    Plan   Outpatient Occupational Therapy 2 times weekly for 16 visits     Updates/Grading for next session: poc update      Madina Pelayo, OT

## 2019-10-02 ENCOUNTER — CLINICAL SUPPORT (OUTPATIENT)
Dept: REHABILITATION | Facility: HOSPITAL | Age: 64
End: 2019-10-02
Attending: ORTHOPAEDIC SURGERY
Payer: COMMERCIAL

## 2019-10-02 DIAGNOSIS — M25.431 EFFUSION, RIGHT WRIST: ICD-10-CM

## 2019-10-02 DIAGNOSIS — M79.644 PAIN OF RIGHT THUMB: ICD-10-CM

## 2019-10-02 DIAGNOSIS — M25.631 DECREASED RANGE OF MOTION OF RIGHT WRIST: ICD-10-CM

## 2019-10-02 DIAGNOSIS — M25.641 DECREASED RANGE OF MOTION OF RIGHT THUMB: Primary | ICD-10-CM

## 2019-10-02 PROCEDURE — 97110 THERAPEUTIC EXERCISES: CPT

## 2019-10-02 PROCEDURE — 97022 WHIRLPOOL THERAPY: CPT

## 2019-10-02 PROCEDURE — 97140 MANUAL THERAPY 1/> REGIONS: CPT

## 2019-10-02 NOTE — PROGRESS NOTES
Occupational Therapy Daily Treatment Note     Date: 10/2/2019  Name: Sabino Rubio  Clinic Number: [id number]    Therapy Diagnosis:   No diagnosis found.  Physician: Alba Penn, *    Physician Orders: status post Right thumb joint CMC arthroplasty, Right volar wrist ganglion cyst excisional biopsy, First dorsal compartment release, right wrist, [place] APL tendon for graft by Dr. Penn on 6/28/19; 2x/wk x 6 weeks; Modalities prn  Medical Diagnosis:   M18.11 (ICD-10-CM) - Arthritis of carpometacarpal (CMC) joint of right thumb   M67.40 (ICD-10-CM) - Ganglion cyst      Surgical Procedure and Date: R CMC arthroplasty, R 1st DC release, R ganglion cyst excision on 6/28/19  Evaluation Date: 8/14/2019  Insurance Authorization Period Expiration: 12/31/19  Plan of Care Certification Period: 10/18/19  Date of Return to MD: prn per MD     Visit # / Visits authorized: 14/ 20   Time In:7AM  Time Out:8AM  Total Billable Time: 60 minutes    Subjective     Pt reports soreness following last tx session.    Pain: 4/10  Location: R thumb    Objective     RIGHT Wrist and Thumb AROM Measured in degrees    8/14/19 9/11/19   Wrist Ext/Flex 50/20 50/46   Wrist UD/RD 18/3 25/4   Thumb:  MP 35 52                  IP 20 28   Thumb Morales Abd 45 37      Strength: (FILOMENA Dynamometer in lbs.) Average 3 trials, Position II:   Left Right   9/11/2019 84 48     Pinch Strength (Measured in psi)  9/11/19 Left Right   Lateral 20 13   3 Jaw Kole 20 8     9/11/19: Post treatment wrist extension/flexion= 60/53    Sabino received the following supervised modalities after being cleared for contradictions for 15 minutes:   Patient received fluidotherapy to RUE increase blood flow, circulation, desensitization, sensory re-education and for pain management.     Sabino received the following manual therapy techniques for 10 minutes:   -Distraction/grade 1 mobilization wrist, passive flexion/ext  -Passive thumb comp flex/ext  -IASTM framing  both surgical scars to decrease adhesion --not performed today  -scar massage    Sabino received therapeutic exercises for 35 minutes including:  -AROM wrist and thumb, all planes  -grooved pegboard x 1 --not performed today  -dextercisor x 2'  -thumbcisor x 15 --not performed today  -pink putty gripping x 2'  -Weight well 1.5# flex/ext x 3  -LLPS 2# wrist flex and ext x 2' ea.  - Digi extend green band 2 x 15  -Hand helper 1 red 2 yellow bands x 20  -green clothespin key pinch x 15  -Forearm bar 1# sup/pro x 30 --not performed today  -Wrist flex/ext/rd 2# x 20 each --not performed today  -red flexbar sup/pro/twist x 10 each --not performed today  -palm pushes into yellow putty for wrist extension and partial weightbearing x 2'     Home Exercises and Education Provided     Education provided: cont HEP    Written Home Exercises Provided: Yes  Exercises were reviewed and Sabino was able to demonstrate them prior to the end of the session.  Sabino demonstrated good  understanding of the HEP provided.   .   See EMR under Patient Instructions for exercises provided during initial evaluation.     Assessment     Patient tolerating progressive strengthening activities well and with mild c/o soreness. Wrist ROM continues to improve.    Sabino is progressing well towards his goals and there are no updates to goals at this time. Pt prognosis is Excellent.     Pt will continue to benefit from skilled outpatient occupational therapy to address the deficits listed in the problem list on initial evaluation provide pt/family education and to maximize pt's level of independence in the home and community environment.     Pt's spiritual, cultural and educational needs considered and pt agreeable to plan of care and goals.    Goals:  Short Term Goals:   To be completed in 1 week:  1) Patient will be independent in HEP-Ongoing   To be completed in 2-4 weeks:  2) Increase thumb opposition AROM to the distal tip of the small finger to manipulate  silverware while eating--Met 9/11/19  3) Increase wrist flexion AROM to 45 degrees for ADL performance--Met 9/11/19  4) Increase wrist radial deviation AROM to at least 10 degrees for computer typing performance--Progressing  5) Increase thumb IP AROM to 45 degrees for fastening buttons while dressing--Progressing  6) Assess /pinch strength and set appropriate goals--Met 9/11/19  Added 9/11/19- To be completed 2-4 weeks:  7) Increase  strength R hand to 60 lbs for improved safety during yardwork  8) Increase key and 3 pt pinch by 4 lbs each for IADL performance  9) Increase wrist flexion and extension TROM by 15 degrees for ADL performance  Long Term Goals:   To be completed by discharge:  1) Return to previous ADLs, IADLs, and work activities independently and without increased pain--Progressing  2) Decrease FOTO limitation score to 38%, indicative of improved functional independence    Plan   Outpatient Occupational Therapy 2 times weekly for 16 visits     Updates/Grading for next session: poc update      Madina Pelayo, OT

## 2019-10-04 ENCOUNTER — CLINICAL SUPPORT (OUTPATIENT)
Dept: REHABILITATION | Facility: HOSPITAL | Age: 64
End: 2019-10-04
Attending: ORTHOPAEDIC SURGERY
Payer: COMMERCIAL

## 2019-10-04 DIAGNOSIS — M25.431 EFFUSION, RIGHT WRIST: ICD-10-CM

## 2019-10-04 DIAGNOSIS — M25.641 DECREASED RANGE OF MOTION OF RIGHT THUMB: Primary | ICD-10-CM

## 2019-10-04 DIAGNOSIS — M25.631 DECREASED RANGE OF MOTION OF RIGHT WRIST: ICD-10-CM

## 2019-10-04 DIAGNOSIS — M79.644 PAIN OF RIGHT THUMB: ICD-10-CM

## 2019-10-04 PROCEDURE — 97110 THERAPEUTIC EXERCISES: CPT

## 2019-10-04 PROCEDURE — 97140 MANUAL THERAPY 1/> REGIONS: CPT

## 2019-10-04 PROCEDURE — 97022 WHIRLPOOL THERAPY: CPT

## 2019-10-04 NOTE — PROGRESS NOTES
Occupational Therapy Daily Treatment Note     Date: 10/4/2019  Name: Sabino Rubio  Clinic Number: [id number]    Therapy Diagnosis:   No diagnosis found.  Physician: Alba Penn, *    Physician Orders: status post Right thumb joint CMC arthroplasty, Right volar wrist ganglion cyst excisional biopsy, First dorsal compartment release, right wrist, [place] APL tendon for graft by Dr. Penn on 6/28/19; 2x/wk x 6 weeks; Modalities prn  Medical Diagnosis:   M18.11 (ICD-10-CM) - Arthritis of carpometacarpal (CMC) joint of right thumb   M67.40 (ICD-10-CM) - Ganglion cyst      Surgical Procedure and Date: R CMC arthroplasty, R 1st DC release, R ganglion cyst excision on 6/28/19  Evaluation Date: 8/14/2019  Insurance Authorization Period Expiration: 12/31/19  Plan of Care Certification Period: 10/18/19  Date of Return to MD: prn per MD     Visit # / Visits authorized: 14/ 20   Time In:9AM  Time Out:10AM  Total Billable Time: 60 minutes    Subjective     Continued soreness and wrist pain.    Pain: 4/10  Location: R thumb    Objective     RIGHT Wrist and Thumb AROM Measured in degrees    8/14/19 9/11/19 10/4/19   Wrist Ext/Flex 50/20 50/46 55/55   Wrist UD/RD 18/3 25/4 20/5   Thumb:  MP 35 52 58                  IP 20 28 66   Thumb Morales Abd 45 37 nt      Strength: (FILOMENA Dynamometer in lbs.) Average 3 trials, Position II:   Left Right   9/11/2019 84 48   10/4/19 91 58     Pinch Strength (Measured in psi)  9/11/19 Left Right   Lateral 20 13   3 Jaw Kole 20 8   10/4/19     Lateral 21 17   3 Jaw Kole 20 13     9/11/19: Post treatment wrist extension/flexion= 60/53    Sabino received the following supervised modalities after being cleared for contradictions for 15 minutes:   Patient received fluidotherapy to RUE increase blood flow, circulation, desensitization, sensory re-education and for pain management.     Sabino received the following manual therapy techniques for 15 minutes:   -Wrist distraction, joint  mobs  -Passive wrist extension, flex, radial deviation  -Passive thumb comp flex/ext  -IASTM framing both surgical scars to decrease adhesion --not performed today  -scar massage --not performed today    Sabino received therapeutic exercises for 30 minutes including:  -AROM wrist and thumb, all planes  -grooved pegboard x 1 --not performed today  -dextercisor x 2'  -thumbcisor x 15 --not performed today  -green putty gripping x 2'  -Weight well 1.5# flex/ext x 3 --not performed today  -LLPS 3# wrist flex and ext x 2' ea.  - Digi extend green band x 20  -Hand helper 1 red 2 yellow bands x 20  -green clothespin key pinch x 15  -Forearm bar 2# sup/pro x 30   -Wrist flex/ext/rd 2# x 1' each   -red flexbar sup/pro/twist x 10 each --not performed today  -palm pushes into yellow putty for wrist extension and partial weightbearing x 2' --not performed today    Home Exercises and Education Provided     Education provided: cont HEP    Written Home Exercises Provided: Yes  Exercises were reviewed and Sabino was able to demonstrate them prior to the end of the session.  Sabino demonstrated good  understanding of the HEP provided.   .   See EMR under Patient Instructions for exercises provided during initial evaluation.     Assessment     Patient demos good progress with ROM and strength measurements since last progress note. Greatest improvement demonstrated with wrist flexion, thumb IP flexion,  strength, and pinch strength. Despite improvements, pt continues to demo functional limitations secondary to decreased wrist and thumb ROM, decreased /pinch strength, and pain.     Sabino is progressing well towards his goals and there are no updates to goals at this time. Pt prognosis is Excellent.     Pt will continue to benefit from skilled outpatient occupational therapy to address the deficits listed in the problem list on initial evaluation provide pt/family education and to maximize pt's level of independence in the home and  community environment.     Pt's spiritual, cultural and educational needs considered and pt agreeable to plan of care and goals.    Goals:  Short Term Goals:   To be completed in 1 week:  1) Patient will be independent in HEP-Ongoing   To be completed in 2-4 weeks:  2) Increase thumb opposition AROM to the distal tip of the small finger to manipulate silverware while eating--Met 9/11/19  3) Increase wrist flexion AROM to 45 degrees for ADL performance--Met 9/11/19  4) Increase wrist radial deviation AROM to at least 10 degrees for computer typing performance--Progressing  5) Increase thumb IP AROM to 45 degrees for fastening buttons while dressing--Met 10/4/19  6) Assess /pinch strength and set appropriate goals--Met 9/11/19  Added 9/11/19- To be completed 2-4 weeks:  7) Increase  strength R hand to 60 lbs for improved safety during yardwork--Progressing  8) Increase key and 3 pt pinch by 4 lbs each for IADL performance--Met 10/4/19  9) Increase wrist flexion and extension TROM by 15 degrees for ADL performance--Progressing  Long Term Goals:   To be completed by discharge:  1) Return to previous ADLs, IADLs, and work activities independently and without increased pain--Not met  2) Decrease FOTO limitation score to 38%, indicative of improved functional independence    Plan   Outpatient Occupational Therapy 2 times weekly for 16 visits     Updates/Grading for next session: poc update      Madina Pelayo, OT

## 2019-10-09 ENCOUNTER — CLINICAL SUPPORT (OUTPATIENT)
Dept: REHABILITATION | Facility: HOSPITAL | Age: 64
End: 2019-10-09
Attending: ORTHOPAEDIC SURGERY
Payer: COMMERCIAL

## 2019-10-09 DIAGNOSIS — M79.644 PAIN OF RIGHT THUMB: ICD-10-CM

## 2019-10-09 DIAGNOSIS — M25.631 DECREASED RANGE OF MOTION OF RIGHT WRIST: ICD-10-CM

## 2019-10-09 DIAGNOSIS — M25.641 DECREASED RANGE OF MOTION OF RIGHT THUMB: Primary | ICD-10-CM

## 2019-10-09 DIAGNOSIS — M25.431 EFFUSION, RIGHT WRIST: ICD-10-CM

## 2019-10-09 PROCEDURE — 97022 WHIRLPOOL THERAPY: CPT

## 2019-10-09 PROCEDURE — 97110 THERAPEUTIC EXERCISES: CPT

## 2019-10-09 PROCEDURE — 97140 MANUAL THERAPY 1/> REGIONS: CPT

## 2019-10-09 NOTE — PROGRESS NOTES
"  Occupational Therapy Daily Treatment Note     Date: 10/9/2019  Name: Sabino Rubio  Clinic Number: [id number]    Therapy Diagnosis:   No diagnosis found.  Physician: Alba Penn, *    Physician Orders: status post Right thumb joint CMC arthroplasty, Right volar wrist ganglion cyst excisional biopsy, First dorsal compartment release, right wrist, [place] APL tendon for graft by Dr. Penn on 6/28/19; 2x/wk x 6 weeks; Modalities prn  Medical Diagnosis:   M18.11 (ICD-10-CM) - Arthritis of carpometacarpal (CMC) joint of right thumb   M67.40 (ICD-10-CM) - Ganglion cyst      Surgical Procedure and Date: R CMC arthroplasty, R 1st DC release, R ganglion cyst excision on 6/28/19  Evaluation Date: 8/14/2019  Insurance Authorization Period Expiration: 12/31/19  Plan of Care Certification Period: 10/18/19  Date of Return to MD: prn per MD     Visit # / Visits authorized: 15/ 20   Time In:7AM  Time Out:8AM  Total Billable Time: 60 minutes    Subjective     "The wrist is starting to move a little better"    Pain: 4/10  Location: R thumb    Objective     RIGHT Wrist and Thumb AROM Measured in degrees    8/14/19 9/11/19 10/4/19   Wrist Ext/Flex 50/20 50/46 55/55   Wrist UD/RD 18/3 25/4 20/5   Thumb:  MP 35 52 58                  IP 20 28 66   Thumb Morales Abd 45 37 nt      Strength: (FILOMENA Dynamometer in lbs.) Average 3 trials, Position II:   Left Right   9/11/2019 84 48   10/4/19 91 58     Pinch Strength (Measured in psi)  9/11/19 Left Right   Lateral 20 13   3 Jaw Kole 20 8   10/4/19     Lateral 21 17   3 Jaw Kole 20 13     9/11/19: Post treatment wrist extension/flexion= 60/53    Sabino received the following supervised modalities after being cleared for contradictions for 15 minutes:   Patient received fluidotherapy to RUE increase blood flow, circulation, desensitization, sensory re-education and for pain management.     Sabino received the following manual therapy techniques for 15 minutes:   -Wrist " distraction, joint mobs  -Passive wrist extension, flex, radial deviation  -Passive thumb comp flex/ext  -IASTM framing both surgical scars to decrease adhesion --not performed today  -scar massage    Sabino received therapeutic exercises for 30 minutes including:  -AROM composite wrist flex/ext  -AROM wrist radial deviation with 2 sec holds  -dextercisor x 2'  -thumbcisor x 15 --not performed today  -green putty gripping x 2'  -Weight well 1.5# flex/ext x 3 --not performed today  -LLPS 3# wrist flex and ext x 2' ea.  - Digi extend green band x 20  -Hand helper 1 red 2 yellow bands x 20  -green clothespin key pinch x 15 --not performed today  -Forearm bar 2# sup/pro x 30   -Wrist flex/ext/rd 3# x 15 each   -red flexbar sup/pro/twist x 10 each  -palm pushes into pink putty for wrist extension and partial weightbearing x 2'   -Large pegboard with power gripper level 3  -pink putty rolling and pinching, key and 3 pt    Home Exercises and Education Provided     Education provided: cont HEP    Written Home Exercises Provided: Yes  Exercises were reviewed and Sabino was able to demonstrate them prior to the end of the session.  Sabino demonstrated good  understanding of the HEP provided.   .   See EMR under Patient Instructions for exercises provided during initial evaluation.     Assessment     Patient demos good progress with ROM and strength measurements since last progress note. Greatest improvement demonstrated with wrist flexion, thumb IP flexion,  strength, and pinch strength. Despite improvements, pt continues to demo functional limitations secondary to decreased wrist and thumb ROM, decreased /pinch strength, and pain.     Sabino is progressing well towards his goals and there are no updates to goals at this time. Pt prognosis is Excellent.     Pt will continue to benefit from skilled outpatient occupational therapy to address the deficits listed in the problem list on initial evaluation provide pt/family  education and to maximize pt's level of independence in the home and community environment.     Pt's spiritual, cultural and educational needs considered and pt agreeable to plan of care and goals.    Goals:  Short Term Goals:   To be completed in 1 week:  1) Patient will be independent in HEP-Ongoing   To be completed in 2-4 weeks:  2) Increase thumb opposition AROM to the distal tip of the small finger to manipulate silverware while eating--Met 9/11/19  3) Increase wrist flexion AROM to 45 degrees for ADL performance--Met 9/11/19  4) Increase wrist radial deviation AROM to at least 10 degrees for computer typing performance--Progressing  5) Increase thumb IP AROM to 45 degrees for fastening buttons while dressing--Met 10/4/19  6) Assess /pinch strength and set appropriate goals--Met 9/11/19  Added 9/11/19- To be completed 2-4 weeks:  7) Increase  strength R hand to 60 lbs for improved safety during yardwork--Progressing  8) Increase key and 3 pt pinch by 4 lbs each for IADL performance--Met 10/4/19  9) Increase wrist flexion and extension TROM by 15 degrees for ADL performance--Progressing  Long Term Goals:   To be completed by discharge:  1) Return to previous ADLs, IADLs, and work activities independently and without increased pain--Not met  2) Decrease FOTO limitation score to 38%, indicative of improved functional independence    Plan   Outpatient Occupational Therapy 2 times weekly for 16 visits     Updates/Grading for next session: poc update      Madina Pelayo, OT

## 2019-10-16 ENCOUNTER — PATIENT OUTREACH (OUTPATIENT)
Dept: ADMINISTRATIVE | Facility: OTHER | Age: 64
End: 2019-10-16

## 2019-10-16 ENCOUNTER — CLINICAL SUPPORT (OUTPATIENT)
Dept: REHABILITATION | Facility: HOSPITAL | Age: 64
End: 2019-10-16
Attending: ORTHOPAEDIC SURGERY
Payer: COMMERCIAL

## 2019-10-16 DIAGNOSIS — M25.641 DECREASED RANGE OF MOTION OF RIGHT THUMB: Primary | ICD-10-CM

## 2019-10-16 DIAGNOSIS — M25.531 PAIN IN RIGHT WRIST: ICD-10-CM

## 2019-10-16 DIAGNOSIS — M25.631 DECREASED RANGE OF MOTION OF RIGHT WRIST: ICD-10-CM

## 2019-10-16 PROCEDURE — 97140 MANUAL THERAPY 1/> REGIONS: CPT

## 2019-10-16 PROCEDURE — 97022 WHIRLPOOL THERAPY: CPT

## 2019-10-16 PROCEDURE — 97110 THERAPEUTIC EXERCISES: CPT

## 2019-10-16 NOTE — PROGRESS NOTES
Occupational Therapy Daily Treatment Note     Date: 10/16/2019  Name: Sabino Rubio  Clinic Number: [id number]    Therapy Diagnosis:   Encounter Diagnoses   Name Primary?    Decreased range of motion of right thumb Yes    Decreased range of motion of right wrist     Pain in right wrist      Physician: Alba Penn, *    Physician Orders: status post Right thumb joint CMC arthroplasty, Right volar wrist ganglion cyst excisional biopsy, First dorsal compartment release, right wrist, [place] APL tendon for graft by Dr. Penn on 6/28/19; 2x/wk x 6 weeks; Modalities prn  Medical Diagnosis:   M18.11 (ICD-10-CM) - Arthritis of carpometacarpal (CMC) joint of right thumb   M67.40 (ICD-10-CM) - Ganglion cyst      Surgical Procedure and Date: R CMC arthroplasty, R 1st DC release, R ganglion cyst excision on 6/28/19  Evaluation Date: 8/14/2019  Insurance Authorization Period Expiration: 12/31/19  Plan of Care Certification Period: 10/18/19  Date of Return to MD: prn per MD     Visit # / Visits authorized: 16/ 20   Time In:7:30AM  Time Out:8:30AM  Total Billable Time: 60 minutes    Subjective     Patient reports he attempted to go fishing yesterday with moderate difficulty.    Pain: 4/10  Location: R thumb    Objective     RIGHT Wrist and Thumb AROM Measured in degrees    8/14/19 9/11/19 10/4/19 10/16/19   Wrist Ext/Flex 50/20 50/46 55/55 55/60   Wrist UD/RD 18/3 25/4 20/5 25/10   Thumb:  MP 35 52 58 60                  IP 20 28 66 67   Thumb Morales Abd 45 37 nt nt      Strength: (FILOMENA Dynamometer in lbs.) Average 3 trials, Position II:   Left Right   9/11/2019 84 48   10/4/19 91 58     Pinch Strength (Measured in psi)  9/11/19 Left Right   Lateral 20 13   3 Jaw Kole 20 8   10/4/19 -- --   Lateral 21 17   3 Jaw Kole 20 13     9/11/19: Post treatment wrist extension/flexion= 60/53    Sabino received the following supervised modalities after being cleared for contradictions for 15 minutes:   Patient  received fluidotherapy to RUE increase blood flow, circulation, desensitization, sensory re-education and for pain management.     Sabino received the following manual therapy techniques for 15 minutes:   -Wrist distraction, joint mobs  -Passive wrist extension, flex, radial deviation  -Passive thumb comp flex/ext  -IASTM framing both surgical scars to decrease adhesion --not performed today  -scar massage    Sabino received therapeutic exercises for 30 minutes including:  -AROM composite wrist flex/ext  -AROM wrist radial deviation with 2 sec holds  -dextercisor x 2'  -thumbcisor x 15 --not performed today  -green putty gripping x 2'  -Weight well 1.5# flex/ext x 3 --not performed today  -LLPS 3# wrist flex and ext x 2' ea.  - Digi extend green band x 20  -Hand helper 1 red 2 yellow bands x 20  -green clothespin key pinch x 15 --not performed today  -Forearm bar 2# sup/pro x 30   -Wrist flex/ext/rd 3# x 15 each   -red flexbar sup/pro/twist x 10 each  -palm pushes into pink putty for wrist extension and partial weightbearing x 2'   -Large pegboard with power gripper level 3  -pink putty rolling and pinching, key and 3 pt    Home Exercises and Education Provided     Education provided: cont HEP    Written Home Exercises Provided: Yes  Exercises were reviewed and Sabino was able to demonstrate them prior to the end of the session.  Sabino demonstrated good  understanding of the HEP provided.   .   See EMR under Patient Instructions for exercises provided during initial evaluation.     Assessment     Patient demos good progress with ROM and strength measurements since last progress note. Greatest improvement demonstrated with wrist flexion, thumb IP flexion,  strength, and pinch strength. Despite improvements, pt continues to demo functional limitations secondary to decreased wrist and thumb ROM, decreased /pinch strength, and pain.     Sabino is progressing well towards his goals and there are no updates to goals at  this time. Pt prognosis is Excellent.     Pt will continue to benefit from skilled outpatient occupational therapy to address the deficits listed in the problem list on initial evaluation provide pt/family education and to maximize pt's level of independence in the home and community environment.     Pt's spiritual, cultural and educational needs considered and pt agreeable to plan of care and goals.    Goals:  To be completed in 1 week:  1) Patient will be independent in [facility]-Met  To be completed in 2-4 weeks:  2) Increase thumb opposition AROM to the distal tip of the small finger to manipulate silverware while eating--Met 9/11/19  3) Increase wrist flexion AROM to 45 degrees for ADL performance--Met 9/11/19  4) Increase wrist radial deviation AROM to at least 10 degrees for computer typing performance--Met 10/16/19  5) Increase thumb IP AROM to 45 degrees for fastening buttons while dressing--Met 10/4/19  6) Assess /pinch strength and set appropriate goals--Met 9/11/19  Added 9/11/19- To be completed 2-4 weeks:  7) Increase  strength R hand to 60 lbs for improved safety during yardwork--Progressing  8) Increase key and 3 pt pinch by 4 lbs each for IADL performance--Met 10/4/19  9) Increase wrist flexion and extension TROM by 15 degrees for ADL performance--Progressing  Long Term Goals:   To be completed by discharge:  1) Return to previous ADLs, IADLs, and work activities independently and without increased pain--Not met  2) Decrease FOTO limitation score to 38%, indicative of improved functional independence    Plan   Outpatient Occupational Therapy 2 times weekly for 16 visits     Updates/Grading for next session:       Madina Pelayo, OT

## 2019-10-16 NOTE — PLAN OF CARE
Occupational Therapy POC Update     Date: 10/16/2019  Name: Sabino Rubio  Clinic Number: [id number]    Therapy Diagnosis:   Encounter Diagnoses   Name Primary?    Decreased range of motion of right thumb Yes    Decreased range of motion of right wrist     Pain in right wrist      Physician: Alba Penn, *    Physician Orders: status post Right thumb joint CMC arthroplasty, Right volar wrist ganglion cyst excisional biopsy, First dorsal compartment release, right wrist, [place] APL tendon for graft by Dr. Penn on 6/28/19; 2x/wk x 6 weeks; Modalities prn  Medical Diagnosis:   M18.11 (ICD-10-CM) - Arthritis of carpometacarpal (CMC) joint of right thumb   M67.40 (ICD-10-CM) - Ganglion cyst      Surgical Procedure and Date: R CMC arthroplasty, R 1st DC release, R ganglion cyst excision on 6/28/19  Evaluation Date: 8/14/2019  Insurance Authorization Period Expiration: 12/31/19  Date of Return to MD: prn per MD     Visit # / Visits authorized: 16/ 20   Time In:7:30AM  Time Out:8:30AM  Total Billable Time: 60 minutes    Subjective     Patient reports he attempted to go fishing yesterday with moderate difficulty.    Pain: 4/10  Location: R thumb    Objective     RIGHT Wrist and Thumb AROM Measured in degrees    8/14/19 9/11/19 10/4/19 10/16/19   Wrist Ext/Flex 50/20 50/46 55/55 55/60   Wrist UD/RD 18/3 25/4 20/5 25/10   Thumb:  MP 35 52 58 60                  IP 20 28 66 67   Thumb Morales Abd 45 37 nt nt      Strength: (FILOMENA Dynamometer in lbs.) Average 3 trials, Position II:   Left Right   9/11/2019 84 48   10/4/19 91 58     Pinch Strength (Measured in psi)  9/11/19 Left Right   Lateral 20 13   3 Jaw Kole 20 8   10/4/19 -- --   Lateral 21 17   3 Jaw Kole 20 13       Sabino received the following supervised modalities after being cleared for contradictions for 15 minutes:   Patient received fluidotherapy to RUE increase blood flow, circulation, desensitization, sensory re-education and for pain  management.     Sabino received the following manual therapy techniques for 15 minutes:   -Wrist distraction, joint mobs  -Passive wrist extension, flex, radial deviation  -Passive thumb comp flex/ext  -IASTM framing both surgical scars to decrease adhesion --not performed today  -scar massage    Sabino received therapeutic exercises for 30 minutes including:  -AROM composite wrist flex/ext  -AROM wrist radial deviation with 2 sec holds  -dextercisor x 2'  -thumbcisor x 15 --not performed today  -green putty gripping x 2'  -Weight well 1.5# flex/ext x 3 --not performed today  -LLPS 3# wrist flex and ext x 2' ea.  - Digi extend green band x 20  -Hand helper 1 red 2 yellow bands x 20  -green clothespin key pinch x 15 --not performed today  -Forearm bar 2# sup/pro x 30   -Wrist flex/ext/rd 3# x 15 each   -red flexbar sup/pro/twist x 10 each  -palm pushes into pink putty for wrist extension and partial weightbearing x 2'   -Large pegboard with power gripper level 3  -pink putty rolling and pinching, key and 3 pt    Home Exercises and Education Provided     Education provided: cont HEP    Written Home Exercises Provided: Yes  Exercises were reviewed and Sabino was able to demonstrate them prior to the end of the session.  Sabino demonstrated good  understanding of the HEP provided.   .   See EMR under Patient Instructions for exercises provided during initial evaluation.     Assessment     Since beginning therapy, merlin has been seen 16 times since initial evaluation on 8/14/19. Overall, he has made good, steady progress with hisOT treatments and has worked hard towards all of his goals as evidenced by subjective and objective improvements. Despite these improvements,he remains with deficits with wrist flexion, extension, and radial deviation ROM, thumb stiffness, increased pain, and /pinch weakness and will continue to benefit from skilled therapy to address remaining limitations and increase functional  mobility.      Goals:  To be completed in 1 week:  1) Patient will be independent in HEP-Met  To be completed in 2-4 weeks:  2) Increase thumb opposition AROM to the distal tip of the small finger to manipulate silverware while eating--Met 9/11/19  3) Increase wrist flexion AROM to 45 degrees for ADL performance--Met 9/11/19  4) Increase wrist radial deviation AROM to at least 10 degrees for computer typing performance--Met 10/16/19  5) Increase thumb IP AROM to 45 degrees for fastening buttons while dressing--Met 10/4/19  6) Assess /pinch strength and set appropriate goals--Met 9/11/19  Added 9/11/19- To be completed 2-4 weeks:  7) Increase  strength R hand to 60 lbs for improved safety during yardwork--Progressing  8) Increase key and 3 pt pinch by 4 lbs each for IADL performance--Met 10/4/19  9) Increase wrist flexion and extension TROM by 15 degrees for ADL performance--Progressing  Long Term Goals:   To be completed by discharge:  1) Return to previous ADLs, IADLs, and work activities independently and without increased pain--Not met  2) Decrease FOTO limitation score to 38%, indicative of improved functional independence    Reasons for Recertification of Therapy: Decreased ROM, Decreased  strength, Decreased pinch strength, Decreased muscle strength, Decreased functional hand use, Increased pain, Joint Stiffness and Scar Adhesions  New Certification Period: 10/16/2019 to 11/29/19  Recommended Treatment Plan: 2 times per week for 4 weeks:   Other Recommendations: Paraffin, Fluidotherapy, Manual therapy/joint mobilizations, Modalities for pain management, Therapeutic exercises/activities., Iontophoresis with 2.0 cc Dexamethasone, Strengthening, Scar Management and Joint Protection            I CERTIFY THE NEED FOR THESE SERVICES FURNISHED UNDER THIS PLAN OF TREATMENT AND WHILE UNDER MY CARE    Physician's comments:  ____________________________________________________________________________________________________________________________________________    Physician's Name: ___________________________________

## 2019-10-18 ENCOUNTER — CLINICAL SUPPORT (OUTPATIENT)
Dept: REHABILITATION | Facility: HOSPITAL | Age: 64
End: 2019-10-18
Attending: ORTHOPAEDIC SURGERY
Payer: COMMERCIAL

## 2019-10-18 DIAGNOSIS — M25.531 PAIN IN RIGHT WRIST: ICD-10-CM

## 2019-10-18 PROCEDURE — 97140 MANUAL THERAPY 1/> REGIONS: CPT

## 2019-10-18 PROCEDURE — 97022 WHIRLPOOL THERAPY: CPT

## 2019-10-18 PROCEDURE — 97110 THERAPEUTIC EXERCISES: CPT

## 2019-10-18 NOTE — PROGRESS NOTES
Occupational Therapy Daily Treatment Note     Date: 10/18/2019  Name: Sabino Rubio  Clinic Number: [id number]    Therapy Diagnosis:   Encounter Diagnosis   Name Primary?    Pain in right wrist      Physician: Alba Penn, *    Physician Orders: status post Right thumb joint CMC arthroplasty, Right volar wrist ganglion cyst excisional biopsy, First dorsal compartment release, right wrist, [place] APL tendon for graft by Dr. Penn on 6/28/19; 2x/wk x 6 weeks; Modalities prn  Medical Diagnosis:   M18.11 (ICD-10-CM) - Arthritis of carpometacarpal (CMC) joint of right thumb   M67.40 (ICD-10-CM) - Ganglion cyst      Surgical Procedure and Date: R CMC arthroplasty, R 1st DC release, R ganglion cyst excision on 6/28/19  Evaluation Date: 8/14/2019  Insurance Authorization Period Expiration: 12/31/19  Plan of Care Certification Period: 10/18/19  Date of Return to MD: prn per MD     Visit # / Visits authorized: 19/ 20   Time In:10:30AM  Time Out:11:30AM  Total Billable Time: 60 minutes    Subjective     Patient noted tender bony area at posterior left elbow, where he is currently experiencing bursitis. Discussed following up with MD.    Pain: 4/10  Location: R thumb    Objective     RIGHT Wrist and Thumb AROM Measured in degrees    8/14/19 9/11/19 10/4/19 10/16/19   Wrist Ext/Flex 50/20 50/46 55/55 55/60   Wrist UD/RD 18/3 25/4 20/5 25/10   Thumb:  MP 35 52 58 60                  IP 20 28 66 67   Thumb Morales Abd 45 37 nt nt      Strength: (FILOMENA Dynamometer in lbs.) Average 3 trials, Position II:   Left Right   9/11/2019 84 48   10/4/19 91 58     Pinch Strength (Measured in psi)  9/11/19 Left Right   Lateral 20 13   3 Jaw Kole 20 8   10/4/19 -- --   Lateral 21 17   3 Jaw Kole 20 13     9/11/19: Post treatment wrist extension/flexion= 60/53    Sabino received the following supervised modalities after being cleared for contradictions for 15 minutes:   Patient received fluidotherapy to RUE increase blood  flow, circulation, desensitization, sensory re-education and for pain management.     Sabino received the following manual therapy techniques for 10 minutes:   -Wrist distraction, joint mobs  -Passive wrist extension, flex, radial deviation  -Passive thumb comp flex/ext  -IASTM framing both surgical scars to decrease adhesion --not performed today  -scar massage    Sabino received therapeutic exercises for 35 minutes including:  -AROM composite wrist flex/ext  -AROM wrist radial deviation with 2 sec holds  -dextercisor x 2'  -green putty gripping x 2'  -Weight well 1.5# flex/ext x 3 --not performed today  -LLPS 3# wrist flex and ext x 2' ea.  - Digi extend green band x 20  -Hand helper 2 red 2 yellow bands x 20  -green clothespin key pinch x 15 --not performed today  -Forearm bar 2# sup/pro x 30   -Wrist flex/ext/rd 3# x 15 each   -green flexbar sup/pro/twist x 10 each  -palm pushes into pink putty for wrist extension and partial weightbearing x 2'   -Large pegboard with power gripper level 3 --not performed today  -pink putty rolling and pinching or green clothespin, key and 3 pt  -green power web passive flexor/extensor stretching    Home Exercises and Education Provided     Education provided: cont HEP    Written Home Exercises Provided: Yes  Exercises were reviewed and Sabino was able to demonstrate them prior to the end of the session.  Sabino demonstrated good  understanding of the HEP provided.   .   See EMR under Patient Instructions for exercises provided during initial evaluation.     Assessment     Patient reports increased soreness post session.    Patient demos good progress with ROM and strength measurements since last progress note. Greatest improvement demonstrated with wrist flexion, thumb IP flexion,  strength, and pinch strength. Despite improvements, pt continues to demo functional limitations secondary to decreased wrist and thumb ROM, decreased /pinch strength, and pain.     Sabino is progressing  well towards his goals and there are no updates to goals at this time. Pt prognosis is Excellent.     Pt will continue to benefit from skilled outpatient occupational therapy to address the deficits listed in the problem list on initial evaluation provide pt/family education and to maximize pt's level of independence in the home and community environment.     Pt's spiritual, cultural and educational needs considered and pt agreeable to plan of care and goals.    Goals:  To be completed in 1 week:  1) Patient will be independent in HEP-Met  To be completed in 2-4 weeks:  2) Increase thumb opposition AROM to the distal tip of the small finger to manipulate silverware while eating--Met 9/11/19  3) Increase wrist flexion AROM to 45 degrees for ADL performance--Met 9/11/19  4) Increase wrist radial deviation AROM to at least 10 degrees for computer typing performance--Met 10/16/19  5) Increase thumb IP AROM to 45 degrees for fastening buttons while dressing--Met 10/4/19  6) Assess /pinch strength and set appropriate goals--Met 9/11/19  Added 9/11/19- To be completed 2-4 weeks:  7) Increase  strength R hand to 60 lbs for improved safety during yardwork--Progressing  8) Increase key and 3 pt pinch by 4 lbs each for IADL performance--Met 10/4/19  9) Increase wrist flexion and extension TROM by 15 degrees for ADL performance--Progressing  Long Term Goals:   To be completed by discharge:  1) Return to previous ADLs, IADLs, and work activities independently and without increased pain--Not met  2) Decrease FOTO limitation score to 38%, indicative of improved functional independence    Plan   Outpatient Occupational Therapy 2 times weekly for 16 visits     Updates/Grading for next session:       Madina Pelayo, OT

## 2019-10-21 ENCOUNTER — TELEPHONE (OUTPATIENT)
Dept: UROLOGY | Facility: CLINIC | Age: 64
End: 2019-10-21

## 2019-10-21 ENCOUNTER — OFFICE VISIT (OUTPATIENT)
Dept: UROLOGY | Facility: CLINIC | Age: 64
End: 2019-10-21
Payer: COMMERCIAL

## 2019-10-21 VITALS — HEIGHT: 70 IN | BODY MASS INDEX: 22.25 KG/M2 | WEIGHT: 155.44 LBS

## 2019-10-21 DIAGNOSIS — N43.40 SPERMATOCELE: Primary | ICD-10-CM

## 2019-10-21 DIAGNOSIS — N50.89 SCROTAL MASS: Primary | ICD-10-CM

## 2019-10-21 PROCEDURE — 99999 PR PBB SHADOW E&M-EST. PATIENT-LVL III: ICD-10-PCS | Mod: PBBFAC,,, | Performed by: UROLOGY

## 2019-10-21 PROCEDURE — 3008F BODY MASS INDEX DOCD: CPT | Mod: CPTII,S$GLB,, | Performed by: UROLOGY

## 2019-10-21 PROCEDURE — 99214 PR OFFICE/OUTPT VISIT, EST, LEVL IV, 30-39 MIN: ICD-10-PCS | Mod: S$GLB,,, | Performed by: UROLOGY

## 2019-10-21 PROCEDURE — 3008F PR BODY MASS INDEX (BMI) DOCUMENTED: ICD-10-PCS | Mod: CPTII,S$GLB,, | Performed by: UROLOGY

## 2019-10-21 PROCEDURE — 99999 PR PBB SHADOW E&M-EST. PATIENT-LVL III: CPT | Mod: PBBFAC,,, | Performed by: UROLOGY

## 2019-10-21 PROCEDURE — 99214 OFFICE O/P EST MOD 30 MIN: CPT | Mod: S$GLB,,, | Performed by: UROLOGY

## 2019-10-21 NOTE — PROGRESS NOTES
Subjective:       Patient ID: Sabino Rubio is a 63 y.o. male.    Chief Complaint: scrotal mass (pt would like to discss possible surgery with Dr. linder to remove the scrotal mass he state he had it for 2years now . he say his brother had the same problem.)    HPI   Sabino Rubio is a 63 y.o. male with a PMHx of GERD and a renal cyst who presents to the clinic for evaluation of a scrotal mass. He explains that he has had the scrotal mass for two years now and would like to discuss surgery options to remove the mass. His AUA Sx score is a 10. US Scrotum and Testicle on 8/30/17 indicates a right spermatocele. He explains that the spermatocele bothers him if he sits on it and believes it to have increased in size. His PSA 10 months ago was 0.64.      Past Medical History:   Diagnosis Date    Allergy     Arthritis     Family history of malignant neoplasm of gastrointestinal tract mat.gf    Family history of prostate cancer: 1/2 brother 9/25/2017    GERD (gastroesophageal reflux disease)     Kidney cyst, acquired: R 1.2 cm 2015 9/25/2017    Restless leg syndrome     Tubulovillous adenoma 2013 due 2016 9/14/2015       Past Surgical History:   Procedure Laterality Date    CARPAL TUNNEL RELEASE      COLONOSCOPY N/A 5/22/2019    Procedure: COLONOSCOPY;  Surgeon: Juancarlos Foley MD;  Location: [facility] ([address] [place] [address]);  Service: Endoscopy;  Laterality: N/A;    EXCISION OF GANGLION OF WRIST Right 6/28/2019    Procedure: EXCISION, GANGLION CYST, WRIST right;  Surgeon: Alba Penn MD;  Location: [address];  Service: Orthopedics;  Laterality: Right;  regional MAC, stretcher, supine, hand pan 1 and 2,MINI C-arm, call Arthrex    INJECTION OF FACET JOINT Bilateral 6/6/2019    Procedure: INJECTION, FACET JOINT INJECTION (LUMBAR BLOCK) BILATERAL L4-L5 AND L5-S1 FACET INJECTIONS;  Surgeon: Kaiser Braxton MD;  [facility];  Service: Pain Management;  Laterality: Bilateral;  NEEDS CONSENT     INTERPOSITION ARTHROPLASTY OF CARPOMETACARPAL JOINTS Right 6/28/2019    Procedure: INTERPOSITION ARTHROPLASTY, CMC JOINT right;  Surgeon: Alba Penn MD;  Location: [facility] OR [address] [place] [address];  Service: Orthopedics;  Laterality: Right;  regional MAC, stretcher, supine, hand pan 1 and 2,MINI C-arm, call Arthrex       Family History   Problem Relation Age of Onset    Arthritis Mother         probable R.A.    Cancer Father         esophageal ca and brain tumor    Esophageal cancer Father     Melanoma Father     Cancer Brother         pancreatic cancer    Cancer Maternal Grandfather         colon    Colon cancer Maternal Grandfather     Irritable bowel syndrome Sister     Arthritis Sister     No Known Problems Son     No Known Problems Son     Diabetes Neg Hx     Heart disease Neg Hx     Cirrhosis Neg Hx     Celiac disease Neg Hx     Crohn's disease Neg Hx     Ulcerative colitis Neg Hx     Stomach cancer Neg Hx     Rectal cancer Neg Hx     Liver cancer Neg Hx     Psoriasis Neg Hx     Lupus Neg Hx        Social History     Socioeconomic History    Marital status: Single     Spouse name: Not on file    Number of children: Not on file    Years of education: Not on file    Highest education level: Not on file   Occupational History    Not on file   Social Needs    Financial resource strain: Not on file    Food insecurity:     Worry: Not on file     Inability: Not on file    Transportation needs:     Medical: Not on file     Non-medical: Not on file   Tobacco Use    Smoking status: Never Smoker    Smokeless tobacco: Never Used    Tobacco comment: 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   Substance and Sexual Activity    Alcohol use: Yes     Alcohol/week: 0.0 standard drinks     Comment: 4 days weekly, up to 2 beers    Drug use: No    Sexual activity: Yes     Partners: Female   Lifestyle    Physical activity:      Days per week: Not on file     Minutes per session: Not on file    Stress: Not on file   Relationships    Social connections:     Talks on phone: Not on file     Gets together: Not on file     Attends Quaker service: Not on file     Active member of club or organization: Not on file     Attends meetings of clubs or organizations: Not on file     Relationship status: Not on file   Other Topics Concern    Not on file   Social History Narrative    Not on file       Allergies:  Patient has no known allergies.    Medications:    Current Outpatient Medications:     fluticasone propionate (FLONASE) 50 mcg/actuation nasal spray, 1 SPRAY (50 MCG TOTAL) BY EACH NARE ROUTE 2 (TWO) TIMES DAILY., Disp: 16 mL, Rfl: 3    gabapentin (NEURONTIN) 300 MG capsule, TAKE 1 CAPSULE BY MOUTH THREE TIMES A DAY, Disp: 90 capsule, Rfl: 2    HYDROcodone-acetaminophen (NORCO)  mg per tablet, Take 1 tablet by mouth every 6 (six) hours., Disp: 40 tablet, Rfl: 0    meloxicam (MOBIC) 15 MG tablet, TAKE 1 TABLET (15 MG TOTAL) BY MOUTH DAILY AS NEEDED FOR PAIN (TAKE WITH FOOD OR A MEAL)., Disp: 30 tablet, Rfl: 3    ropinirole (REQUIP) 1 MG tablet, TAKE 1 TABLET BY MOUTH 3 TIMES A DAY, Disp: 90 tablet, Rfl: 5    Review of Systems   Constitutional: Negative for activity change, appetite change, chills, diaphoresis, fatigue, fever and unexpected weight change.   HENT: Negative for congestion, dental problem, hearing loss, mouth sores, postnasal drip, rhinorrhea, sinus pressure and trouble swallowing.    Eyes: Negative for pain, discharge and itching.   Respiratory: Negative for apnea, cough, choking, chest tightness, shortness of breath and wheezing.    Cardiovascular: Negative for chest pain, palpitations and leg swelling.   Gastrointestinal: Negative for abdominal distention, abdominal pain, anal bleeding, blood in stool, constipation, diarrhea, nausea, rectal pain and vomiting.   Endocrine: Negative for polydipsia and polyuria.    Genitourinary: Negative for decreased urine volume, difficulty urinating, discharge, dysuria, enuresis, flank pain, frequency, genital sores, hematuria, penile pain, penile swelling, scrotal swelling and urgency.   Musculoskeletal: Negative for arthralgias and back pain.   Skin: Negative for color change, rash and wound.   Neurological: Negative for dizziness, syncope, speech difficulty, light-headedness and headaches.   Hematological: Negative for adenopathy. Does not bruise/bleed easily.   Psychiatric/Behavioral: Negative for behavioral problems and confusion.       Objective:      Physical Exam   Constitutional: He appears well-developed.   HENT:   Head: Normocephalic.   Neck: Neck supple.   Cardiovascular: Normal rate.    Pulmonary/Chest: Effort normal.   Abdominal: Soft.   Genitourinary:   Genitourinary Comments: Spermatocele on right side appears to have increased in size  No hernia observed   Neurological: He is alert.   Skin: Skin is warm.     Psychiatric: He has a normal mood and affect.         Imaging  US Scrotum and Testicle 8/30/17  Impression       Large fluid collection within the right epididymal head, possibly reflecting presence of epididymal head cyst versus spermatocele.    Small left-sided epididymal head cyst.     Labs   Ref Range & Units 10mo ago   PSA, SCREEN 0.00 - 4.00 ng/mL 0.64        Assessment:       No diagnosis found.    Plan:       There are no diagnoses linked to this encounter.      Repeat US Scrotum and Testicle  Schedule Spermatocelectomy    Maile BECK, am acting as a scribe on this patient encounter in the presence and under the supervision of Dr. Araya.    10/21/2019 8:08 AM    I, Dr. Araya, personally performed the services described in this documentation.   All medical record entries made by the scribe were at my direction and in my presence.   I have reviewed the chart and agree that the record is accurate and complete.   Sheldon Araya MD.  8:08 AM 10/21/2019

## 2019-10-21 NOTE — H&P (VIEW-ONLY)
Subjective:       Patient ID: Sabino Rubio is a 63 y.o. male.    Chief Complaint: scrotal mass (pt would like to discss possible surgery with Dr. linder to remove the scrotal mass he state he had it for 2years now . he say his brother had the same problem.)    HPI   Sabino Rubio is a 63 y.o. male with a PMHx of GERD and a renal cyst who presents to the clinic for evaluation of a scrotal mass. He explains that he has had the scrotal mass for two years now and would like to discuss surgery options to remove the mass. His AUA Sx score is a 10. US Scrotum and Testicle on 8/30/17 indicates a right spermatocele. He explains that the spermatocele bothers him if he sits on it and believes it to have increased in size. His PSA 10 months ago was 0.64.      Past Medical History:   Diagnosis Date    Allergy     Arthritis     Family history of malignant neoplasm of gastrointestinal tract mat.gf    Family history of prostate cancer: 1/2 brother 9/25/2017    GERD (gastroesophageal reflux disease)     Kidney cyst, acquired: R 1.2 cm 2015 9/25/2017    Restless leg syndrome     Tubulovillous adenoma 2013 due 2016 9/14/2015       Past Surgical History:   Procedure Laterality Date    CARPAL TUNNEL RELEASE      COLONOSCOPY N/A 5/22/2019    Procedure: COLONOSCOPY;  Surgeon: Juancarlos Foley MD;  Location: [facility] ([address] [place] [address]);  Service: Endoscopy;  Laterality: N/A;    EXCISION OF GANGLION OF WRIST Right 6/28/2019    Procedure: EXCISION, GANGLION CYST, WRIST right;  Surgeon: Alba Penn MD;  Location: [address];  Service: Orthopedics;  Laterality: Right;  regional MAC, stretcher, supine, hand pan 1 and 2,MINI C-arm, call Arthrex    INJECTION OF FACET JOINT Bilateral 6/6/2019    Procedure: INJECTION, FACET JOINT INJECTION (LUMBAR BLOCK) BILATERAL L4-L5 AND L5-S1 FACET INJECTIONS;  Surgeon: Kaiser Braxton MD;  [place] [facility] [place] [facility]  Service: Pain Management;  Laterality: Bilateral;  NEEDS CONSENT     INTERPOSITION ARTHROPLASTY OF CARPOMETACARPAL JOINTS Right 6/28/2019    Procedure: INTERPOSITION ARTHROPLASTY, CMC JOINT right;  Surgeon: Alba Penn MD;  Location: [facility] OR [address] [place] [address];  Service: Orthopedics;  Laterality: Right;  regional MAC, stretcher, supine, hand pan 1 and 2,MINI C-arm, call Arthrex       Family History   Problem Relation Age of Onset    Arthritis Mother         probable R.A.    Cancer Father         esophageal ca and brain tumor    Esophageal cancer Father     Melanoma Father     Cancer Brother         pancreatic cancer    Cancer Maternal Grandfather         colon    Colon cancer Maternal Grandfather     Irritable bowel syndrome Sister     Arthritis Sister     No Known Problems Son     No Known Problems Son     Diabetes Neg Hx     Heart disease Neg Hx     Cirrhosis Neg Hx     Celiac disease Neg Hx     Crohn's disease Neg Hx     Ulcerative colitis Neg Hx     Stomach cancer Neg Hx     Rectal cancer Neg Hx     Liver cancer Neg Hx     Psoriasis Neg Hx     Lupus Neg Hx        Social History     Socioeconomic History    Marital status: Single     Spouse name: Not on file    Number of children: Not on file    Years of education: Not on file    Highest education level: Not on file   Occupational History    Not on file   Social Needs    Financial resource strain: Not on file    Food insecurity:     Worry: Not on file     Inability: Not on file    Transportation needs:     Medical: Not on file     Non-medical: Not on file   Tobacco Use    Smoking status: Never Smoker    Smokeless tobacco: Never Used    Tobacco comment: 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   Substance and Sexual Activity    Alcohol use: Yes     Alcohol/week: 0.0 standard drinks     Comment: 4 days weekly, up to 2 beers    Drug use: No    Sexual activity: Yes     Partners: Female   Lifestyle    Physical activity:      Days per week: Not on file     Minutes per session: Not on file    Stress: Not on file   Relationships    Social connections:     Talks on phone: Not on file     Gets together: Not on file     Attends Yazidism service: Not on file     Active member of club or organization: Not on file     Attends meetings of clubs or organizations: Not on file     Relationship status: Not on file   Other Topics Concern    Not on file   Social History Narrative    Not on file       Allergies:  Patient has no known allergies.    Medications:    Current Outpatient Medications:     fluticasone propionate (FLONASE) 50 mcg/actuation nasal spray, 1 SPRAY (50 MCG TOTAL) BY EACH NARE ROUTE 2 (TWO) TIMES DAILY., Disp: 16 mL, Rfl: 3    gabapentin (NEURONTIN) 300 MG capsule, TAKE 1 CAPSULE BY MOUTH THREE TIMES A DAY, Disp: 90 capsule, Rfl: 2    HYDROcodone-acetaminophen (NORCO)  mg per tablet, Take 1 tablet by mouth every 6 (six) hours., Disp: 40 tablet, Rfl: 0    meloxicam (MOBIC) 15 MG tablet, TAKE 1 TABLET (15 MG TOTAL) BY MOUTH DAILY AS NEEDED FOR PAIN (TAKE WITH FOOD OR A MEAL)., Disp: 30 tablet, Rfl: 3    ropinirole (REQUIP) 1 MG tablet, TAKE 1 TABLET BY MOUTH 3 TIMES A DAY, Disp: 90 tablet, Rfl: 5    Review of Systems   Constitutional: Negative for activity change, appetite change, chills, diaphoresis, fatigue, fever and unexpected weight change.   HENT: Negative for congestion, dental problem, hearing loss, mouth sores, postnasal drip, rhinorrhea, sinus pressure and trouble swallowing.    Eyes: Negative for pain, discharge and itching.   Respiratory: Negative for apnea, cough, choking, chest tightness, shortness of breath and wheezing.    Cardiovascular: Negative for chest pain, palpitations and leg swelling.   Gastrointestinal: Negative for abdominal distention, abdominal pain, anal bleeding, blood in stool, constipation, diarrhea, nausea, rectal pain and vomiting.   Endocrine: Negative for polydipsia and polyuria.    Genitourinary: Negative for decreased urine volume, difficulty urinating, discharge, dysuria, enuresis, flank pain, frequency, genital sores, hematuria, penile pain, penile swelling, scrotal swelling and urgency.   Musculoskeletal: Negative for arthralgias and back pain.   Skin: Negative for color change, rash and wound.   Neurological: Negative for dizziness, syncope, speech difficulty, light-headedness and headaches.   Hematological: Negative for adenopathy. Does not bruise/bleed easily.   Psychiatric/Behavioral: Negative for behavioral problems and confusion.       Objective:      Physical Exam   Constitutional: He appears well-developed.   HENT:   Head: Normocephalic.   Neck: Neck supple.   Cardiovascular: Normal rate.    Pulmonary/Chest: Effort normal.   Abdominal: Soft.   Genitourinary:   Genitourinary Comments: Spermatocele on right side appears to have increased in size  No hernia observed   Neurological: He is alert.   Skin: Skin is warm.     Psychiatric: He has a normal mood and affect.         Imaging  US Scrotum and Testicle 8/30/17  Impression       Large fluid collection within the right epididymal head, possibly reflecting presence of epididymal head cyst versus spermatocele.    Small left-sided epididymal head cyst.     Labs   Ref Range & Units 10mo ago   PSA, SCREEN 0.00 - 4.00 ng/mL 0.64        Assessment:       No diagnosis found.    Plan:       There are no diagnoses linked to this encounter.      Repeat US Scrotum and Testicle  Schedule Spermatocelectomy    Maile BECK, am acting as a scribe on this patient encounter in the presence and under the supervision of Dr. Araya.    10/21/2019 8:08 AM    I, Dr. Araya, personally performed the services described in this documentation.   All medical record entries made by the scribe were at my direction and in my presence.   I have reviewed the chart and agree that the record is accurate and complete.   Sheldon Araya MD.  8:08 AM 10/21/2019

## 2019-10-22 ENCOUNTER — HOSPITAL ENCOUNTER (OUTPATIENT)
Dept: RADIOLOGY | Facility: HOSPITAL | Age: 64
Discharge: HOME | End: 2019-10-22
Attending: PHYSICIAN ASSISTANT
Payer: COMMERCIAL

## 2019-10-22 ENCOUNTER — PATIENT OUTREACH (OUTPATIENT)
Dept: ADMINISTRATIVE | Facility: OTHER | Age: 64
End: 2019-10-22

## 2019-10-22 ENCOUNTER — OFFICE VISIT (OUTPATIENT)
Dept: ORTHOPEDICS | Facility: CLINIC | Age: 64
End: 2019-10-22
Payer: COMMERCIAL

## 2019-10-22 VITALS — WEIGHT: 155.44 LBS | BODY MASS INDEX: 22.25 KG/M2 | HEIGHT: 70 IN

## 2019-10-22 DIAGNOSIS — M25.522 LEFT ELBOW PAIN: Primary | ICD-10-CM

## 2019-10-22 DIAGNOSIS — M25.522 LEFT ELBOW PAIN: ICD-10-CM

## 2019-10-22 PROCEDURE — 99999 PR PBB SHADOW E&M-EST. PATIENT-LVL III: ICD-10-PCS | Mod: PBBFAC,,, | Performed by: PHYSICIAN ASSISTANT

## 2019-10-22 PROCEDURE — 73080 X-RAY EXAM OF ELBOW: CPT | Mod: TC,LT

## 2019-10-22 PROCEDURE — 73080 X-RAY EXAM OF ELBOW: CPT | Mod: 26,LT,, | Performed by: RADIOLOGY

## 2019-10-22 PROCEDURE — 73080 XR ELBOW COMPLETE 3 VIEW LEFT: ICD-10-PCS | Mod: 26,LT,, | Performed by: RADIOLOGY

## 2019-10-22 PROCEDURE — 99213 PR OFFICE/OUTPT VISIT, EST, LEVL III, 20-29 MIN: ICD-10-PCS | Mod: S$GLB,,, | Performed by: PHYSICIAN ASSISTANT

## 2019-10-22 PROCEDURE — 3008F BODY MASS INDEX DOCD: CPT | Mod: CPTII,S$GLB,, | Performed by: PHYSICIAN ASSISTANT

## 2019-10-22 PROCEDURE — 3008F PR BODY MASS INDEX (BMI) DOCUMENTED: ICD-10-PCS | Mod: CPTII,S$GLB,, | Performed by: PHYSICIAN ASSISTANT

## 2019-10-22 PROCEDURE — 99999 PR PBB SHADOW E&M-EST. PATIENT-LVL III: CPT | Mod: PBBFAC,,, | Performed by: PHYSICIAN ASSISTANT

## 2019-10-22 PROCEDURE — 99213 OFFICE O/P EST LOW 20 MIN: CPT | Mod: S$GLB,,, | Performed by: PHYSICIAN ASSISTANT

## 2019-10-23 ENCOUNTER — CLINICAL SUPPORT (OUTPATIENT)
Dept: REHABILITATION | Facility: HOSPITAL | Age: 64
End: 2019-10-23
Attending: ORTHOPAEDIC SURGERY
Payer: COMMERCIAL

## 2019-10-23 DIAGNOSIS — M25.531 PAIN IN RIGHT WRIST: ICD-10-CM

## 2019-10-23 PROCEDURE — 97140 MANUAL THERAPY 1/> REGIONS: CPT

## 2019-10-23 PROCEDURE — 97022 WHIRLPOOL THERAPY: CPT

## 2019-10-23 PROCEDURE — 97110 THERAPEUTIC EXERCISES: CPT

## 2019-10-23 NOTE — PROGRESS NOTES
Occupational Therapy Daily Treatment Note     Date: 10/23/2019  Name: Sabino Rubio  Clinic Number: [id number]    Therapy Diagnosis:   Encounter Diagnosis   Name Primary?    Pain in right wrist      Physician: Alba Penn, *    Physician Orders: status post Right thumb joint CMC arthroplasty, Right volar wrist ganglion cyst excisional biopsy, First dorsal compartment release, right wrist, [place] APL tendon for graft by Dr. Penn on 6/28/19; 2x/wk x 6 weeks; Modalities prn  Medical Diagnosis:   M18.11 (ICD-10-CM) - Arthritis of carpometacarpal (CMC) joint of right thumb   M67.40 (ICD-10-CM) - Ganglion cyst      Surgical Procedure and Date: R CMC arthroplasty, R 1st DC release, R ganglion cyst excision on 6/28/19  Evaluation Date: 8/14/2019  Insurance Authorization Period Expiration: 12/31/19  Plan of Care Certification Period: 10/18/19  Date of Return to MD: prn per MD     Visit # / Visits authorized: 20/ 20   Time In:7:30AM  Time Out:8:30AM  Total Billable Time: 60 minutes    Subjective     Patient reports he was seen at MD office for L elbow and they recommended continued compression.    Pain: 4/10  Location: R thumb    Objective     RIGHT Wrist and Thumb AROM Measured in degrees    8/14/19 9/11/19 10/4/19 10/16/19   Wrist Ext/Flex 50/20 50/46 55/55 55/60   Wrist UD/RD 18/3 25/4 20/5 25/10   Thumb:  MP 35 52 58 60                  IP 20 28 66 67   Thumb Morales Abd 45 37 nt nt      Strength: (FILOMENA Dynamometer in lbs.) Average 3 trials, Position II:   Left Right   9/11/2019 84 48   10/4/19 91 58     Pinch Strength (Measured in psi)  9/11/19 Left Right   Lateral 20 13   3 Jaw Kole 20 8   10/4/19 -- --   Lateral 21 17   3 Jaw Kole 20 13     9/11/19: Post treatment wrist extension/flexion= 60/53    Sabino received the following supervised modalities after being cleared for contradictions for 15 minutes:   Patient received fluidotherapy to RUE increase blood flow, circulation, desensitization,  sensory re-education and for pain management.     Sabino received the following manual therapy techniques for 15 minutes:   -Wrist distraction, joint mobs  -Passive wrist extension, flex, radial deviation  -Passive thumb comp flex/ext  -IASTM framing both surgical scars to decrease adhesion   -scar massage    Sabino received therapeutic exercises for 30 minutes including:  -AROM composite wrist flex/ext  -AROM wrist radial deviation with 2 sec holds  -dextercisor x 2'  -green putty gripping x 2'  -Weight well 1.5# flex/ext x 3   -LLPS 3# wrist flex and ext x 2' ea.  - Digi extend green band x 20  -Hand helper 2 red 2 yellow bands x 20  -green clothespin key pinch x 15 --not performed today  -Forearm bar 2# sup/pro x 30   -Wrist flex/ext/rd 3# x 15 each   -green flexbar sup/pro/twist x 10 each  -palm pushes into pink putty for wrist extension and partial weightbearing x 2'   -Large pegboard with power gripper level 3   -pink putty rolling and pinching or green clothespin, key and 3 pt  -green power web passive flexor/extensor stretching    Home Exercises and Education Provided     Education provided: cont HEP    Written Home Exercises Provided: Yes  Exercises were reviewed and Sabino was able to demonstrate them prior to the end of the session.  Sabino demonstrated good  understanding of the HEP provided.   .   See EMR under Patient Instructions for exercises provided during initial evaluation.     Assessment     IASTM performed today to address soreness at the radial wrist. Improved pain post session per pt report.    Patient demos good progress with ROM and strength measurements since last progress note. Greatest improvement demonstrated with wrist flexion, thumb IP flexion,  strength, and pinch strength. Despite improvements, pt continues to demo functional limitations secondary to decreased wrist and thumb ROM, decreased /pinch strength, and pain.     Sabino is progressing well towards his goals and there are no  updates to goals at this time. Pt prognosis is Excellent.     Pt will continue to benefit from skilled outpatient occupational therapy to address the deficits listed in the problem list on initial evaluation provide pt/family education and to maximize pt's level of independence in the home and community environment.     Pt's spiritual, cultural and educational needs considered and pt agreeable to plan of care and goals.    Goals:  To be completed in 1 week:  1) Patient will be independent in HEP-Met  To be completed in 2-4 weeks:  2) Increase thumb opposition AROM to the distal tip of the small finger to manipulate silverware while eating--Met 9/11/19  3) Increase wrist flexion AROM to 45 degrees for ADL performance--Met 9/11/19  4) Increase wrist radial deviation AROM to at least 10 degrees for computer typing performance--Met 10/16/19  5) Increase thumb IP AROM to 45 degrees for fastening buttons while dressing--Met 10/4/19  6) Assess /pinch strength and set appropriate goals--Met 9/11/19  Added 9/11/19- To be completed 2-4 weeks:  7) Increase  strength R hand to 60 lbs for improved safety during yardwork--Progressing  8) Increase key and 3 pt pinch by 4 lbs each for IADL performance--Met 10/4/19  9) Increase wrist flexion and extension TROM by 15 degrees for ADL performance--Progressing  Long Term Goals:   To be completed by discharge:  1) Return to previous ADLs, IADLs, and work activities independently and without increased pain--Not met  2) Decrease FOTO limitation score to 38%, indicative of improved functional independence    Plan   Outpatient Occupational Therapy 2 times weekly for 16 visits     Updates/Grading for next session:       Madina Pelayo, OT

## 2019-10-24 NOTE — PROGRESS NOTES
Subjective:      Patient ID: Sabino Rubio is a 63 y.o. male.    Chief Complaint: Pain of the Left Elbow    HPI  63 year old male presents with chief complaint of intermittent left elbow pain and swelling x couple of months. He thinks he fell and hit the elbow a couple of months ago. The swelling was bigger but he has been ace wrapping it and this has helped it to go down. He would have some discomfort if he hit the elbow or rested on it. He reports hitting the elbow a couple of times and he has some increased pain whenever he hits it now. He denies pain with use. He has to hit or touch a particular spot on the elbow for it to be painful. He denies n/t. He takes mobic daily.   Review of Systems   Constitution: Negative for chills, fever and night sweats.   Cardiovascular: Negative for chest pain.   Respiratory: Negative for cough and shortness of breath.    Hematologic/Lymphatic: Does not bruise/bleed easily.   Skin: Negative for color change.   Gastrointestinal: Negative for heartburn.   Genitourinary: Negative for dysuria.   Neurological: Negative for numbness and paresthesias.   Psychiatric/Behavioral: Negative for altered mental status.   Allergic/Immunologic: Negative for persistent infections.         Objective:            General    Vitals reviewed.  Constitutional: He is oriented to person, place, and time. He appears well-developed and well-nourished.   Cardiovascular: Normal rate.    Neurological: He is alert and oriented to person, place, and time.         Left Elbow Exam     Range of Motion   The patient has normal left elbow ROM.    Other   Sensation: normal    Comments:  TTP at the olecranon where a small nodule can be palpated. No swelling. No warmth or erythema.         Muscle Strength   Left Upper Extremity  Elbow Extension: 5/5  Elbow Flexion: 5/5      X-ray: ordered and reviewed by myself. No fx or dislocation.         Assessment:       Encounter Diagnosis   Name Primary?    Left elbow pain  Yes          Plan:       Discussed treatment options with patient. He will continue ace wrap and ice elbow. Avoid leaning on elbow. If symptoms worsen or do not improve, he will f/u with Dr. Romo.

## 2019-10-29 ENCOUNTER — PATIENT OUTREACH (OUTPATIENT)
Dept: ADMINISTRATIVE | Facility: OTHER | Age: 64
End: 2019-10-29

## 2019-10-30 ENCOUNTER — CLINICAL SUPPORT (OUTPATIENT)
Dept: REHABILITATION | Facility: HOSPITAL | Age: 64
End: 2019-10-30
Attending: ORTHOPAEDIC SURGERY
Payer: COMMERCIAL

## 2019-10-30 DIAGNOSIS — M25.531 PAIN IN RIGHT WRIST: ICD-10-CM

## 2019-10-30 PROCEDURE — 97110 THERAPEUTIC EXERCISES: CPT

## 2019-10-30 PROCEDURE — 97022 WHIRLPOOL THERAPY: CPT

## 2019-10-30 PROCEDURE — 97140 MANUAL THERAPY 1/> REGIONS: CPT

## 2019-10-30 NOTE — PROGRESS NOTES
"  Occupational Therapy Daily Treatment Note     Date: 10/30/2019  Name: Sabino Rubio  Clinic Number: [id number]    Therapy Diagnosis:   Encounter Diagnosis   Name Primary?    Pain in right wrist      Physician: Alba Penn, *    Physician Orders: status post Right thumb joint CMC arthroplasty, Right volar wrist ganglion cyst excisional biopsy, First dorsal compartment release, right wrist, [place] APL tendon for graft by Dr. Penn on 6/28/19; 2x/wk x 6 weeks; Modalities prn  Medical Diagnosis:   M18.11 (ICD-10-CM) - Arthritis of carpometacarpal (CMC) joint of right thumb   M67.40 (ICD-10-CM) - Ganglion cyst      Surgical Procedure and Date: R CMC arthroplasty, R 1st DC release, R ganglion cyst excision on 6/28/19  Evaluation Date: 8/14/2019  Insurance Authorization Period Expiration: 12/31/19  Plan of Care Certification Period: 10/18/19  Date of Return to MD: prn per MD     Visit #: 21   Time In:7:AM  Time Out:8AM  Total Billable Time: 60 minutes    Subjective     Patient reports a new "stinging" pain along dorsum of 1st proximal phalanx.    Pain: 4/10  Location: R thumb    Objective     RIGHT Wrist and Thumb AROM Measured in degrees    8/14/19 9/11/19 10/4/19 10/16/19   Wrist Ext/Flex 50/20 50/46 55/55 55/60   Wrist UD/RD 18/3 25/4 20/5 25/10   Thumb:  MP 35 52 58 60                  IP 20 28 66 67   Thumb Morales Abd 45 37 nt nt      Strength: (FILOMENA Dynamometer in lbs.) Average 3 trials, Position II:   Left Right   9/11/2019 84 48   10/4/19 91 58     Pinch Strength (Measured in psi)  9/11/19 Left Right   Lateral 20 13   3 Jaw Kole 20 8   10/4/19 -- --   Lateral 21 17   3 Jaw Kole 20 13     9/11/19: Post treatment wrist extension/flexion= 60/53    Sabino received the following supervised modalities after being cleared for contradictions for 15 minutes:   Patient received fluidotherapy to RUE increase blood flow, circulation, desensitization, sensory re-education and for pain management. "     Sabino received the following manual therapy techniques for 15 minutes:   -Wrist distraction, joint mobs  -Passive wrist extension, flex, radial deviation  -Passive thumb comp flex/ext  -IASTM framing both surgical scars to decrease adhesion --not performed today  -scar massage    Sabino received therapeutic exercises for 30 minutes including:  -AROM composite wrist flex/ext  -AROM wrist radial deviation with 2 sec holds  -dextercisor x 2'   -green putty gripping x 2'  np-Weight well 1.5# flex/ext x 3   np-LLPS 3# wrist flex and ext x 2' ea.  - Digi extend green band x 20  -Hand helper 2 red 2 yellow bands x 20  np-Forearm bar 2# sup/pro x 30   -Wrist flex/ext/rd 2# x 15 each   -green flexbar sup/pro/twist x 10 each  np-palm pushes into pink putty for wrist extension and partial weightbearing x 2'   np-Large pegboard with power gripper level 3   -green putty rolling and pinching or green clothespin, key and 3 pt  -green power web passive flexor/extensor stretching  -dart thrower motion with hammer x 20    Home Exercises and Education Provided     Education provided:  -Continue HEP  -Progress toward goals    Written Home Exercises Provided: Yes  Exercises were reviewed and Sabino was able to demonstrate them prior to the end of the session.  Sabino demonstrated good  understanding of the HEP provided.   .   See EMR under Patient Instructions for exercises provided during initial evaluation.     Assessment     Pt continues to make steady progress with ROM and strengtht. Pain gradually improving.    Patient demos good progress with ROM and strength measurements since last progress note. Greatest improvement demonstrated with wrist flexion, thumb IP flexion,  strength, and pinch strength. Despite improvements, pt continues to demo functional limitations secondary to decreased wrist and thumb ROM, decreased /pinch strength, and pain.     Sabino is progressing well towards his goals and there are no updates to goals at  this time. Pt prognosis is Excellent.     Pt will continue to benefit from skilled outpatient occupational therapy to address the deficits listed in the problem list on initial evaluation provide pt/family education and to maximize pt's level of independence in the home and community environment.     Pt's spiritual, cultural and educational needs considered and pt agreeable to plan of care and goals.    Goals:  To be completed in 1 week:  1) Patient will be independent in [facility]-Met  To be completed in 2-4 weeks:  2) Increase thumb opposition AROM to the distal tip of the small finger to manipulate silverware while eating--Met 9/11/19  3) Increase wrist flexion AROM to 45 degrees for ADL performance--Met 9/11/19  4) Increase wrist radial deviation AROM to at least 10 degrees for computer typing performance--Met 10/16/19  5) Increase thumb IP AROM to 45 degrees for fastening buttons while dressing--Met 10/4/19  6) Assess /pinch strength and set appropriate goals--Met 9/11/19  Added 9/11/19- To be completed 2-4 weeks:  7) Increase  strength R hand to 60 lbs for improved safety during yardwork--Progressing  8) Increase key and 3 pt pinch by 4 lbs each for IADL performance--Met 10/4/19  9) Increase wrist flexion and extension TROM by 15 degrees for ADL performance--Progressing  Long Term Goals:   To be completed by discharge:  1) Return to previous ADLs, IADLs, and work activities independently and without increased pain--Not met  2) Decrease FOTO limitation score to 38%, indicative of improved functional independence    Plan   Outpatient Occupational Therapy 2 times weekly for 16 visits     Updates/Grading for next session:       Madina Pelayo, OT

## 2019-11-01 ENCOUNTER — TELEPHONE (OUTPATIENT)
Dept: OTOLARYNGOLOGY | Facility: CLINIC | Age: 64
End: 2019-11-01

## 2019-11-01 ENCOUNTER — CLINICAL SUPPORT (OUTPATIENT)
Dept: AUDIOLOGY | Facility: CLINIC | Age: 64
End: 2019-11-01
Payer: COMMERCIAL

## 2019-11-01 ENCOUNTER — HOSPITAL ENCOUNTER (OUTPATIENT)
Dept: RADIOLOGY | Facility: HOSPITAL | Age: 64
Discharge: HOME | End: 2019-11-01
Attending: UROLOGY
Payer: COMMERCIAL

## 2019-11-01 ENCOUNTER — CLINICAL SUPPORT (OUTPATIENT)
Dept: REHABILITATION | Facility: HOSPITAL | Age: 64
End: 2019-11-01
Attending: ORTHOPAEDIC SURGERY
Payer: COMMERCIAL

## 2019-11-01 DIAGNOSIS — N50.89 SCROTAL MASS: ICD-10-CM

## 2019-11-01 DIAGNOSIS — H90.3 BILATERAL SENSORINEURAL HEARING LOSS: Primary | ICD-10-CM

## 2019-11-01 DIAGNOSIS — M25.531 PAIN IN RIGHT WRIST: ICD-10-CM

## 2019-11-01 PROCEDURE — 97110 THERAPEUTIC EXERCISES: CPT

## 2019-11-01 PROCEDURE — 76870 US EXAM SCROTUM: CPT | Mod: 26,,, | Performed by: RADIOLOGY

## 2019-11-01 PROCEDURE — 92567 PR TYMPA2METRY: ICD-10-PCS | Mod: S$GLB,,, | Performed by: AUDIOLOGIST

## 2019-11-01 PROCEDURE — 92557 COMPREHENSIVE HEARING TEST: CPT | Mod: S$GLB,,, | Performed by: AUDIOLOGIST

## 2019-11-01 PROCEDURE — 97140 MANUAL THERAPY 1/> REGIONS: CPT

## 2019-11-01 PROCEDURE — 97022 WHIRLPOOL THERAPY: CPT

## 2019-11-01 PROCEDURE — 76870 US EXAM SCROTUM: CPT | Mod: TC

## 2019-11-01 PROCEDURE — 92567 TYMPANOMETRY: CPT | Mod: S$GLB,,, | Performed by: AUDIOLOGIST

## 2019-11-01 PROCEDURE — 76870 US SCROTUM AND TESTICLES: ICD-10-PCS | Mod: 26,,, | Performed by: RADIOLOGY

## 2019-11-01 PROCEDURE — 92557 PR COMPREHENSIVE HEARING TEST: ICD-10-PCS | Mod: S$GLB,,, | Performed by: AUDIOLOGIST

## 2019-11-01 NOTE — PROGRESS NOTES
Occupational Therapy Daily Treatment Note & Discharge Summary     Date: 11/1/2019  Name: Sabino Rubio  Clinic Number: [id number]    Therapy Diagnosis:   Encounter Diagnosis   Name Primary?    Pain in right wrist      Physician: Alba Penn, *    Physician Orders: status post Right thumb joint CMC arthroplasty, Right volar wrist ganglion cyst excisional biopsy, First dorsal compartment release, right wrist, [place] APL tendon for graft by Dr. 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    Pain: 1/10  Location: R thumb    Objective     RIGHT Wrist and Thumb AROM Measured in degrees    8/14/19 9/11/19 10/4/19 10/16/19 11/1/19   Wrist Ext/Flex 50/20 50/46 55/55 55/60 60/66   Wrist UD/RD 18/3 25/4 20/5 25/10 20/12   Thumb:  MP 35 52 58 60 60                  IP 20 28 66 67 70   Thumb Morales Abd 45 37 nt nt nt      Strength: (FILOMENA Dynamometer in lbs.) Average 3 trials, Position II:   Left Right   9/11/2019 84 48   10/4/19 91 58     Pinch Strength (Measured in psi)  9/11/19 Left Right   Lateral 20 13   3 Jaw Kole 20 8   10/4/19 -- --   Lateral 21 17   3 Jaw Kole 20 13     * and pinch not assessed today secondary to pain*    CMS Impairment/Limitation/Restriction for FOTO Hand Survey    Therapist reviewed FOTO scores for Sabino Rubio on 11/1/2019.   FOTO documents entered  into [facility] - see Media section.    Limitation Score: 38%  Category: Carrying    Current : 38%  Goal: 38%  Discharge: 38%          Sabino received the following supervised modalities after being cleared for contradictions for 15 minutes:   Patient received fluidotherapy to RUE increase blood flow, circulation, desensitization, sensory re-education and for pain management.     Sabino received the following manual therapy techniques for 20 minutes:   -Passive wrist extension, flex, radial deviation  -Passive thumb comp flex/ext  -IASTM to hand and wrist to decrease pain      Sabino received therapeutic exercises for 15 minutes including:  -AROM composite wrist flex/ext  -AROM wrist radial deviation with 2 sec holds  -TGEs, joint blocking PIP and DIP  -Patient education    Home Exercises and Education Provided     Education provided:  -Continue HEP  -Progress toward goals    Written Home Exercises Provided: Yes  Exercises were reviewed and Sabino was able to demonstrate them prior to the end of the session.  Sabino demonstrated good  understanding of the HEP provided.   .   See EMR under Patient Instructions for exercises provided during initial evaluation.     Assessment     Overall patient has made good progress with ROM, strength, and pain with treatment. He has returned to all previous activities independently, with some soreness following forceful and repetitive activities. Pt has reached maximum benefit from OT and is appropriate for discharge to [facility] at this time.    Pt's spiritual, cultural and educational needs considered and pt agreeable to plan of care and goals.    Goals:  To be completed in 1 week:  1) Patient will be independent in HEP-Met  To be completed in 2-4 weeks:  2) Increase thumb opposition AROM to the distal tip of the small finger to manipulate silverware while eating--Met 9/11/19  3) Increase wrist flexion AROM to 45 degrees for ADL performance--Met 9/11/19  4) Increase wrist radial deviation AROM to at  least 10 degrees for computer typing performance--Met 10/16/19  5) Increase thumb IP AROM to 45 degrees for fastening buttons while dressing--Met 10/4/19  6) Assess /pinch strength and set appropriate goals--Met 9/11/19  Added 9/11/19- To be completed 2-4 weeks:  7) Increase  strength R hand to 60 lbs for improved safety during yardwork--Progressing  8) Increase key and 3 pt pinch by 4 lbs each for IADL performance--Met 10/4/19  9) Increase wrist flexion and extension TROM by 15 degrees for ADL performance--Progressing  Long Term Goals:   To be completed by discharge:  1) Return to previous ADLs, IADLs, and work activities independently and without increased pain--Not met  2) Decrease FOTO limitation score to 38%, indicative of improved functional independence --Met 11/1/19    Plan   Discharge to [facility]      Madina Pelayo, OT

## 2019-11-07 ENCOUNTER — TELEPHONE (OUTPATIENT)
Dept: UROLOGY | Facility: CLINIC | Age: 64
End: 2019-11-07

## 2019-11-07 NOTE — PRE-PROCEDURE INSTRUCTIONS
PREOP INSTRUCTIONS:No solid food ,milk or milk products for 8 hours prior to procedure.Clear liquids are allowed up to 2 hours beforeprocedure.Clear liquids are:water,apple juice,pedialyte,gatorade,& jello.Shower instructions as well as directions to the [facility] were given.Patient encouraged to wear loose fitting,comfortable clothing.Medication instructions for pm prior to and am of procedure reviewed.Instructed patient to avoid taking vitamins,supplements,aspirin and ibuprofen the morning of surgery.Patient stated an understanding.    Patient denies any side effects or issues with anesthesia or sedation.

## 2019-11-07 NOTE — TELEPHONE ENCOUNTER
Called pt to confirm arrival time of 730am for procedure on 11-8-19. Gave pt NPO instructions and gave pt opportunity to ask questions. Pt verbalized understanding.

## 2019-11-08 ENCOUNTER — HOSPITAL ENCOUNTER (OUTPATIENT)
Facility: HOSPITAL | Age: 64
Discharge: HOME | End: 2019-11-08
Attending: UROLOGY | Admitting: UROLOGY
Payer: COMMERCIAL

## 2019-11-08 ENCOUNTER — ANESTHESIA EVENT (OUTPATIENT)
Dept: SURGERY | Facility: HOSPITAL | Age: 64
End: 2019-11-08
Payer: COMMERCIAL

## 2019-11-08 ENCOUNTER — ANESTHESIA (OUTPATIENT)
Dept: SURGERY | Facility: HOSPITAL | Age: 64
End: 2019-11-08
Payer: COMMERCIAL

## 2019-11-08 VITALS
DIASTOLIC BLOOD PRESSURE: 63 MMHG | TEMPERATURE: 98 F | OXYGEN SATURATION: 99 % | HEART RATE: 70 BPM | BODY MASS INDEX: 21.9 KG/M2 | RESPIRATION RATE: 18 BRPM | WEIGHT: 153 LBS | HEIGHT: 70 IN | SYSTOLIC BLOOD PRESSURE: 110 MMHG

## 2019-11-08 DIAGNOSIS — N43.40 SPERMATOCELE: Primary | ICD-10-CM

## 2019-11-08 PROCEDURE — 25000003 PHARM REV CODE 250: Performed by: NURSE ANESTHETIST, CERTIFIED REGISTERED

## 2019-11-08 PROCEDURE — 71000016 HC POSTOP RECOV ADDL HR: Performed by: UROLOGY

## 2019-11-08 PROCEDURE — 88305 TISSUE EXAM BY PATHOLOGIST: CPT | Performed by: PATHOLOGY

## 2019-11-08 PROCEDURE — 63600175 PHARM REV CODE 636 W HCPCS: Performed by: STUDENT IN AN ORGANIZED HEALTH CARE EDUCATION/TRAINING PROGRAM

## 2019-11-08 PROCEDURE — D9220A PRA ANESTHESIA: Mod: ANES,,, | Performed by: ANESTHESIOLOGY

## 2019-11-08 PROCEDURE — 63600175 PHARM REV CODE 636 W HCPCS: Performed by: NURSE ANESTHETIST, CERTIFIED REGISTERED

## 2019-11-08 PROCEDURE — D9220A PRA ANESTHESIA: Mod: CRNA,,, | Performed by: NURSE ANESTHETIST, CERTIFIED REGISTERED

## 2019-11-08 PROCEDURE — 37000008 HC ANESTHESIA 1ST 15 MINUTES: Performed by: UROLOGY

## 2019-11-08 PROCEDURE — 36000706: Performed by: UROLOGY

## 2019-11-08 PROCEDURE — 88305 TISSUE EXAM BY PATHOLOGIST: CPT | Mod: 26,,, | Performed by: PATHOLOGY

## 2019-11-08 PROCEDURE — 88305 TISSUE SPECIMEN TO PATHOLOGY - SURGERY: ICD-10-PCS | Mod: 26,,, | Performed by: PATHOLOGY

## 2019-11-08 PROCEDURE — 71000044 HC DOSC ROUTINE RECOVERY FIRST HOUR: Performed by: UROLOGY

## 2019-11-08 PROCEDURE — 71000015 HC POSTOP RECOV 1ST HR: Performed by: UROLOGY

## 2019-11-08 PROCEDURE — 54840 REMOVE EPIDIDYMIS LESION: CPT | Mod: RT,,, | Performed by: UROLOGY

## 2019-11-08 PROCEDURE — 63600175 PHARM REV CODE 636 W HCPCS: Performed by: ANESTHESIOLOGY

## 2019-11-08 PROCEDURE — S0028 INJECTION, FAMOTIDINE, 20 MG: HCPCS | Performed by: NURSE ANESTHETIST, CERTIFIED REGISTERED

## 2019-11-08 PROCEDURE — 36000707: Performed by: UROLOGY

## 2019-11-08 PROCEDURE — 25000003 PHARM REV CODE 250: Performed by: STUDENT IN AN ORGANIZED HEALTH CARE EDUCATION/TRAINING PROGRAM

## 2019-11-08 PROCEDURE — D9220A PRA ANESTHESIA: ICD-10-PCS | Mod: ANES,,, | Performed by: ANESTHESIOLOGY

## 2019-11-08 PROCEDURE — 37000009 HC ANESTHESIA EA ADD 15 MINS: Performed by: UROLOGY

## 2019-11-08 PROCEDURE — D9220A PRA ANESTHESIA: ICD-10-PCS | Mod: CRNA,,, | Performed by: NURSE ANESTHETIST, CERTIFIED REGISTERED

## 2019-11-08 PROCEDURE — 54840 PR EXCIS SPERMATOCELE: ICD-10-PCS | Mod: RT,,, | Performed by: UROLOGY

## 2019-11-08 RX ORDER — FAMOTIDINE 10 MG/ML
INJECTION INTRAVENOUS
Status: DISCONTINUED | OUTPATIENT
Start: 2019-11-08 | End: 2019-11-08

## 2019-11-08 RX ORDER — HYDROMORPHONE HYDROCHLORIDE 1 MG/ML
0.2 INJECTION, SOLUTION INTRAMUSCULAR; INTRAVENOUS; SUBCUTANEOUS EVERY 5 MIN PRN
Status: DISCONTINUED | OUTPATIENT
Start: 2019-11-08 | End: 2019-11-08 | Stop reason: HOSPADM

## 2019-11-08 RX ORDER — SODIUM CHLORIDE 9 MG/ML
INJECTION, SOLUTION INTRAVENOUS CONTINUOUS PRN
Status: DISCONTINUED | OUTPATIENT
Start: 2019-11-08 | End: 2019-11-08

## 2019-11-08 RX ORDER — HYDROMORPHONE HYDROCHLORIDE 1 MG/ML
INJECTION, SOLUTION INTRAMUSCULAR; INTRAVENOUS; SUBCUTANEOUS
Status: DISCONTINUED
Start: 2019-11-08 | End: 2019-11-08 | Stop reason: HOSPADM

## 2019-11-08 RX ORDER — MIDAZOLAM HYDROCHLORIDE 1 MG/ML
INJECTION, SOLUTION INTRAMUSCULAR; INTRAVENOUS
Status: DISCONTINUED | OUTPATIENT
Start: 2019-11-08 | End: 2019-11-08

## 2019-11-08 RX ORDER — SODIUM CHLORIDE 9 MG/ML
INJECTION, SOLUTION INTRAVENOUS CONTINUOUS
Status: DISCONTINUED | OUTPATIENT
Start: 2019-11-08 | End: 2019-11-08 | Stop reason: HOSPADM

## 2019-11-08 RX ORDER — CEFAZOLIN SODIUM 1 G/3ML
2 INJECTION, POWDER, FOR SOLUTION INTRAMUSCULAR; INTRAVENOUS
Status: COMPLETED | OUTPATIENT
Start: 2019-11-08 | End: 2019-11-08

## 2019-11-08 RX ORDER — SODIUM CHLORIDE 0.9 % (FLUSH) 0.9 %
3 SYRINGE (ML) INJECTION
Status: DISCONTINUED | OUTPATIENT
Start: 2019-11-08 | End: 2019-11-08 | Stop reason: HOSPADM

## 2019-11-08 RX ORDER — HYDROCODONE BITARTRATE AND ACETAMINOPHEN 5; 325 MG/1; MG/1
TABLET ORAL
Status: DISCONTINUED
Start: 2019-11-08 | End: 2019-11-08 | Stop reason: HOSPADM

## 2019-11-08 RX ORDER — HYDROCODONE BITARTRATE AND ACETAMINOPHEN 5; 325 MG/1; MG/1
1 TABLET ORAL EVERY 6 HOURS PRN
Qty: 10 TABLET | Refills: 0 | Status: SHIPPED | OUTPATIENT
Start: 2019-11-08 | End: 2019-11-18

## 2019-11-08 RX ORDER — LIDOCAINE HCL/PF 100 MG/5ML
SYRINGE (ML) INTRAVENOUS
Status: DISCONTINUED | OUTPATIENT
Start: 2019-11-08 | End: 2019-11-08

## 2019-11-08 RX ORDER — FENTANYL CITRATE 50 UG/ML
INJECTION, SOLUTION INTRAMUSCULAR; INTRAVENOUS
Status: DISCONTINUED | OUTPATIENT
Start: 2019-11-08 | End: 2019-11-08

## 2019-11-08 RX ORDER — PROPOFOL 10 MG/ML
VIAL (ML) INTRAVENOUS
Status: DISCONTINUED | OUTPATIENT
Start: 2019-11-08 | End: 2019-11-08

## 2019-11-08 RX ORDER — ONDANSETRON 2 MG/ML
INJECTION INTRAMUSCULAR; INTRAVENOUS
Status: DISCONTINUED | OUTPATIENT
Start: 2019-11-08 | End: 2019-11-08

## 2019-11-08 RX ORDER — HYDROCODONE BITARTRATE AND ACETAMINOPHEN 5; 325 MG/1; MG/1
1 TABLET ORAL EVERY 6 HOURS PRN
Status: DISCONTINUED | OUTPATIENT
Start: 2019-11-08 | End: 2019-11-08 | Stop reason: HOSPADM

## 2019-11-08 RX ORDER — DEXAMETHASONE SODIUM PHOSPHATE 4 MG/ML
INJECTION, SOLUTION INTRA-ARTICULAR; INTRALESIONAL; INTRAMUSCULAR; INTRAVENOUS; SOFT TISSUE
Status: DISCONTINUED | OUTPATIENT
Start: 2019-11-08 | End: 2019-11-08

## 2019-11-08 RX ADMIN — MIDAZOLAM HYDROCHLORIDE 2 MG: 1 INJECTION, SOLUTION INTRAMUSCULAR; INTRAVENOUS at 10:11

## 2019-11-08 RX ADMIN — SODIUM CHLORIDE: 0.9 INJECTION, SOLUTION INTRAVENOUS at 09:11

## 2019-11-08 RX ADMIN — HYDROMORPHONE HYDROCHLORIDE 0.2 MG: 1 INJECTION, SOLUTION INTRAMUSCULAR; INTRAVENOUS; SUBCUTANEOUS at 12:11

## 2019-11-08 RX ADMIN — FENTANYL CITRATE 25 MCG: 50 INJECTION, SOLUTION INTRAMUSCULAR; INTRAVENOUS at 10:11

## 2019-11-08 RX ADMIN — ONDANSETRON 4 MG: 2 INJECTION INTRAMUSCULAR; INTRAVENOUS at 10:11

## 2019-11-08 RX ADMIN — CEFAZOLIN 2 G: 330 INJECTION, POWDER, FOR SOLUTION INTRAMUSCULAR; INTRAVENOUS at 10:11

## 2019-11-08 RX ADMIN — FAMOTIDINE 20 MG: 10 INJECTION, SOLUTION INTRAVENOUS at 10:11

## 2019-11-08 RX ADMIN — FENTANYL CITRATE 25 MCG: 50 INJECTION, SOLUTION INTRAMUSCULAR; INTRAVENOUS at 11:11

## 2019-11-08 RX ADMIN — HYDROMORPHONE HYDROCHLORIDE 0.2 MG: 1 INJECTION, SOLUTION INTRAMUSCULAR; INTRAVENOUS; SUBCUTANEOUS at 11:11

## 2019-11-08 RX ADMIN — DEXAMETHASONE SODIUM PHOSPHATE 4 MG: 4 INJECTION, SOLUTION INTRAMUSCULAR; INTRAVENOUS at 10:11

## 2019-11-08 RX ADMIN — LIDOCAINE HYDROCHLORIDE 50 MG: 20 INJECTION, SOLUTION INTRAVENOUS at 10:11

## 2019-11-08 RX ADMIN — SODIUM CHLORIDE, SODIUM GLUCONATE, SODIUM ACETATE, POTASSIUM CHLORIDE, MAGNESIUM CHLORIDE, SODIUM PHOSPHATE, DIBASIC, AND POTASSIUM PHOSPHATE: .53; .5; .37; .037; .03; .012; .00082 INJECTION, SOLUTION INTRAVENOUS at 11:11

## 2019-11-08 RX ADMIN — HYDROCODONE BITARTRATE AND ACETAMINOPHEN 1 TABLET: 5; 325 TABLET ORAL at 11:11

## 2019-11-08 RX ADMIN — PROPOFOL 150 MG: 10 INJECTION, EMULSION INTRAVENOUS at 10:11

## 2019-11-08 NOTE — PLAN OF CARE
Patient states they are ready to be discharged. Instructions and prescription given to patient and family. Both verbalize understanding. Patient tolerating po liquids with no difficulty. Patient states pain is at a tolerable level for them. Anesthesia consent and surgical consent in chart upon patient's discharge from [facility].

## 2019-11-08 NOTE — INTERVAL H&P NOTE
The patient has been examined and the H&P has been reviewed:    I concur with the findings and no changes have occurred since H&P was written.    Anesthesia/Surgery risks, benefits and alternative options discussed and understood by patient/family.          Active Hospital Problems    Diagnosis  POA    Spermatocele [N43.40]  Yes      Resolved Hospital Problems   No resolved problems to display.

## 2019-11-08 NOTE — OP NOTE
[facility]  Operative Note    Date: 11/08/2019    Pre-Op Diagnosis: Right spermatocele    Post-Op Diagnosis: same    Procedure(s) Performed:   Right spermatocelectomy    Specimen(s):    Right spermatocele    Staff Surgeon: Sheldon Araya MD    Assistant Surgeon: Grayson Myles MD    Anesthesia: General LMA anesthesia    Indications: Adeline Rubio is a 63 y.o. male with a right sided spermatocele that has grown in size and is bothersome and affecting him putting his pants on.     Findings:  Spermatocele removed intact    Estimated Blood Loss: min    Drains: none    Procedure in detail:  After risks benefits and possible complications of hydrocelectomy were explained, the patient elected to undergo the procedure and consent was obtained. All questions were answered in the pre-operative area. The patient was transferred to the operative suite and placed in supine position on the operative table.  SCDs were applied and working.  Anesthesia was administered and the patient was prepped and draped in the usual sterile fashion. Time out was performed, lenny-procedural antibiotics were confirmed.     The patient's spermatocele was palpated and brought to the anterior scrotal skin. A marking pen was used to adeline a 4 cm incision along the midline raphe.  A 15 blade was used to sharply incise the incision.  The underlying dartos and spermatic fascia was dissected with electrocautery until the spermatocele and testicle could be delivered through the scrotal incision. The spermatocele was dissected using blunt and electrosurgical dissection. Care was taken to preserve the spermatic cord and testicle. The stalk was identified and incised using bovie electrocautery. The spermatocele was sent off intact for pathologic analysis. The remnant of the tunica vaginalis was oversewn with 3-0 Vicryl running. The epididymis, vas, and vessels were inspected and found to be intact.     The scrotum was irrigated with NS.  Hemostasis was obtained with electrocautery. The testicle was returned to its orthotopic position. The dartos fascia was closed with a 3.0 chromic in a running fashion. The skin was re approximated with a 3-0 chromic suture in a horizontal matress fashion. Dermabond, fluffs and scrotal support was applied    The patient tolerated the procedure well and was transferred to the PACU in stable condition    Disposition:  The patient will follow up with Dr. Araya in 4 weeks .  He was given prescriptions for hydrocodone.  He was instructed to avoid heavy lifting x 2 weeks, ice his scrotum x 48 hours and wear scrotal support x 2 weeks.      Grayson Myles MD

## 2019-11-08 NOTE — DISCHARGE SUMMARY
[facility]  Discharge Note  Short Stay    Admit Date: 11/8/2019    Discharge Date and Time: 11/08/2019 11:37 AM      Attending Physician: Sheldon Araya Jr., MD     Discharge Provider: Grayson Myles    Diagnoses:  Active Hospital Problems    Diagnosis  POA    *Spermatocele [N43.40]  Yes      Resolved Hospital Problems   No resolved problems to display.       Discharged Condition: good    Hospital Course: Patient was admitted for spermatocelectomy and tolerated the procedure well with no complications. The patient was discharged home in good condition on the same day.       Final Diagnoses: Same as principal problem.    Disposition: Home or Self Care    Follow up/Patient Instructions:    Medications:  Reconciled Home Medications:   Current Discharge Medication List      START taking these medications    Details   HYDROcodone-acetaminophen (NORCO) 5-325 mg per tablet Take 1 tablet by mouth every 6 (six) hours as needed.  Qty: 10 tablet, Refills: 0    Comments: Quantity prescribed more than 7 day supply? No         CONTINUE these medications which have NOT CHANGED    Details   fluticasone propionate (FLONASE) 50 mcg/actuation nasal spray 1 SPRAY (50 MCG TOTAL) BY EACH NARE ROUTE 2 (TWO) TIMES DAILY.  Qty: 16 mL, Refills: 3    Associated Diagnoses: Allergic rhinitis due to other allergic trigger, unspecified seasonality      gabapentin (NEURONTIN) 300 MG capsule TAKE 1 CAPSULE BY MOUTH THREE TIMES A DAY  Qty: 90 capsule, Refills: 2    Associated Diagnoses: Degeneration of cervical intervertebral disc; Acquired spondylolisthesis      meloxicam (MOBIC) 15 MG tablet TAKE 1 TABLET (15 MG TOTAL) BY MOUTH DAILY AS NEEDED FOR PAIN (TAKE WITH FOOD OR A MEAL).  Qty: 30 tablet, Refills: 3    Associated Diagnoses: Degeneration of cervical intervertebral disc      ropinirole (REQUIP) 1 MG tablet TAKE 1 TABLET BY MOUTH 3 TIMES A DAY  Qty: 90 tablet, Refills: 5           Discharge Procedure Orders   Call MD for:   persistent nausea and vomiting or diarrhea     Call MD for:  severe uncontrolled pain     Call MD for:  persistent dizziness, light-headedness, or visual disturbances     Call MD for:   Order Comments: Temperature > 101F     Follow-up Information     Sheldon Araya Jr, MD In 4 weeks.    Specialty:  Urology  Why:  post op check  Contact information:  [address]  [facility] [address]  [phone]                   Discharge Procedure Orders (must include Diet, Follow-up, Activity):   Discharge Procedure Orders (must include Diet, Follow-up, Activity)   Call MD for:  persistent nausea and vomiting or diarrhea     Call MD for:  severe uncontrolled pain     Call MD for:  persistent dizziness, light-headedness, or visual disturbances     Call MD for:   Order Comments: Temperature > 101F

## 2019-11-08 NOTE — PROGRESS NOTES
Reviewed discharge instructions with pt and spouse, explained that prescription for pain medication was sent to his pharmacy of choice for . Printed education was given regarding procedure that was performed and how to care for self at home. Reminded pt of scheduled follow up appointment, pt and spouse state they understand and have no further questions.

## 2019-11-08 NOTE — DISCHARGE INSTRUCTIONS
Post Scrotal Surgery Instructions  Do not strain to have a bowel movement  No strenuous exercise x 7 days  No driving while you are on narcotic pain medications  catheter is in place    You can expect:  Some swelling in your scrotum but significant swelling or pain should be evaluated by an MD or ER  Swelling should resolve in the next few weeks    You can shower in 2 days    You can place ice on your scrotum for 30 min to 1 hour at a time for swelling  You can also elevate your scrotum under towels to help with swelling when you are sitting  You can wear a jock strap or tight white underwear to help with swelling      Call the doctor if:   Temperature is greater than 101F   Persistent vomiting and inability to keep food down   Inability to pee          PATIENT INSTRUCTIONS  POST-ANESTHESIA    IMMEDIATELY FOLLOWING SURGERY:  Do not drive or operate machinery for the first twenty four hours after surgery.  Do not make any important decisions for twenty four hours after surgery or while taking narcotic pain medications or sedatives.  If you develop intractable nausea and vomiting or a severe headache please notify your doctor immediately.    FOLLOW-UP:  Please make an appointment with your surgeon as instructed. You do not need to follow up with anesthesia unless specifically instructed to do so.    WOUND CARE INSTRUCTIONS (if applicable):  Keep a dry clean dressing on the anesthesia/puncture wound site if there is drainage.  Once the wound has quit draining you may leave it open to air.  Generally you should leave the bandage intact for twenty four hours unless there is drainage.  If the epidural site drains for more than 36-48 hours please call the anesthesia department.    QUESTIONS?:  Please feel free to call your physician or the hospital  if you have any questions, and they will be happy to assist you.       [facility] Anesthesia Department  [address]  [phone]

## 2019-11-08 NOTE — ANESTHESIA PREPROCEDURE EVALUATION
11/08/2019  Sabino Rubio is a 63 y.o., male.    Anesthesia Evaluation    I have reviewed the Patient Summary Reports.    I have reviewed the Nursing Notes.   I have reviewed the Medications.     Review of Systems  Anesthesia Hx:  No problems with previous Anesthesia    Cardiovascular:   Denies Hypertension.  Denies MI.  Denies CAD.       Pulmonary:   Denies COPD.  Denies Asthma.  Denies Sleep Apnea.    Hepatic/GI:   GERD    Musculoskeletal:   Arthritis  Cervical spondylosis   Neurological:   Denies TIA. Denies CVA. Denies Seizures.    Endocrine:   Denies Diabetes.        Physical Exam  General:  Well nourished    Airway/Jaw/Neck:  Airway Findings: Mouth Opening: Normal Tongue: Normal  General Airway Assessment: Adult  Mallampati: I  TM Distance: Normal, at least 6 cm  Jaw/Neck Findings:  Neck ROM: Normal ROM      Dental:  Dental Findings: In tact        Mental Status:  Mental Status Findings:  Cooperative, Alert and Oriented         Anesthesia Plan  Type of Anesthesia, risks & benefits discussed:  Anesthesia Type:  general  Patient's Preference:   Intra-op Monitoring Plan: standard ASA monitors  Intra-op Monitoring Plan Comments:   Post Op Pain Control Plan: per primary service following discharge from PACU and multimodal analgesia  Post Op Pain Control Plan Comments:   Induction:   IV  Beta Blocker:  Patient is not currently on a Beta-Blocker (No further documentation required).       Informed Consent: Patient understands risks and agrees with Anesthesia plan.  Questions answered. Anesthesia consent signed with patient.  ASA Score: 2     Day of Surgery Review of History & Physical:    H&P update referred to the surgeon.         Ready For Surgery From Anesthesia Perspective.

## 2019-11-10 ENCOUNTER — PATIENT OUTREACH (OUTPATIENT)
Dept: ADMINISTRATIVE | Facility: OTHER | Age: 64
End: 2019-11-10

## 2019-11-13 ENCOUNTER — OFFICE VISIT (OUTPATIENT)
Dept: OTOLARYNGOLOGY | Facility: CLINIC | Age: 64
End: 2019-11-13
Payer: COMMERCIAL

## 2019-11-13 VITALS — HEIGHT: 70 IN | WEIGHT: 153.44 LBS | BODY MASS INDEX: 21.97 KG/M2

## 2019-11-13 DIAGNOSIS — H83.3X3 NOISE-INDUCED HEARING LOSS OF BOTH EARS: ICD-10-CM

## 2019-11-13 DIAGNOSIS — H90.3 SENSORINEURAL HEARING LOSS (SNHL) OF BOTH EARS: Primary | ICD-10-CM

## 2019-11-13 PROCEDURE — 99999 PR PBB SHADOW E&M-EST. PATIENT-LVL II: ICD-10-PCS | Mod: PBBFAC,,, | Performed by: OTOLARYNGOLOGY

## 2019-11-13 PROCEDURE — 3008F PR BODY MASS INDEX (BMI) DOCUMENTED: ICD-10-PCS | Mod: CPTII,S$GLB,, | Performed by: OTOLARYNGOLOGY

## 2019-11-13 PROCEDURE — 99999 PR PBB SHADOW E&M-EST. PATIENT-LVL II: CPT | Mod: PBBFAC,,, | Performed by: OTOLARYNGOLOGY

## 2019-11-13 PROCEDURE — 3008F BODY MASS INDEX DOCD: CPT | Mod: CPTII,S$GLB,, | Performed by: OTOLARYNGOLOGY

## 2019-11-13 PROCEDURE — 99204 OFFICE O/P NEW MOD 45 MIN: CPT | Mod: S$GLB,,, | Performed by: OTOLARYNGOLOGY

## 2019-11-13 PROCEDURE — 99204 PR OFFICE/OUTPT VISIT, NEW, LEVL IV, 45-59 MIN: ICD-10-PCS | Mod: S$GLB,,, | Performed by: OTOLARYNGOLOGY

## 2019-11-13 NOTE — PROGRESS NOTES
Subjective:       Patient ID: Sabino Rubio is a 63 y.o. male.    Chief Complaint: Hearing Loss and Other (at night hears whistling sound, crickets)    HPI: Hx of B HL ?tinn.    Not too bothersome.    For yrs.    Hx of Noise exp.    Past Medical History: Patient has a past medical history of Allergy, Arthritis, Family history of malignant neoplasm of gastrointestinal tract (mat.gf), Family history of prostate cancer: 1/2 brother (9/25/2017), GERD (gastroesophageal reflux disease), Kidney cyst, acquired: R 1.2 cm 2015 (9/25/2017), Restless leg syndrome, and Tubulovillous adenoma 2013 due 2016 (9/14/2015).    Past Surgical History: Patient has a past surgical history that includes Carpal tunnel release; Colonoscopy (N/A, 5/22/2019); Injection of facet joint (Bilateral, 6/6/2019); Interposition arthroplasty of carpometacarpal joints (Right, 6/28/2019); Excision of ganglion of wrist (Right, 6/28/2019); and Spermatocelectomy (Right, 11/8/2019).    Social History: Patient reports that he has never smoked. He has never used smokeless tobacco. He reports that he drinks alcohol. He reports that he does not use drugs.    Family History: family history includes Arthritis in his mother and sister; Cancer in his brother, father, and maternal grandfather; Colon cancer in his maternal grandfather; Esophageal cancer in his father; Irritable bowel syndrome in his sister; Melanoma in his father; No Known Problems in his son and son.    Medications:   Current Outpatient Medications   Medication Sig    fluticasone propionate (FLONASE) 50 mcg/actuation nasal spray 1 SPRAY (50 MCG TOTAL) BY EACH NARE ROUTE 2 (TWO) TIMES DAILY.    gabapentin (NEURONTIN) 300 MG capsule TAKE 1 CAPSULE BY MOUTH THREE TIMES A DAY    HYDROcodone-acetaminophen (NORCO) 5-325 mg per tablet Take 1 tablet by mouth every 6 (six) hours as needed.    meloxicam (MOBIC) 15 MG tablet TAKE 1 TABLET (15 MG TOTAL) BY MOUTH DAILY AS NEEDED FOR PAIN (TAKE WITH FOOD  OR A MEAL).    ropinirole (REQUIP) 1 MG tablet TAKE 1 TABLET BY MOUTH 3 TIMES A DAY     No current facility-administered medications for this visit.        Allergies: Patient has No Known Allergies.      Review of Systems   Constitutional: Negative for activity change, appetite change, chills, diaphoresis, fatigue, fever and unexpected weight change.   HENT: Positive for hearing loss and tinnitus. Negative for congestion, ear discharge, ear pain, facial swelling, nosebleeds, postnasal drip, rhinorrhea, sinus pressure, sneezing, sore throat, trouble swallowing and voice change.    Eyes: Negative for photophobia, pain, discharge, redness and visual disturbance.   Respiratory: Negative for cough, chest tightness, shortness of breath and wheezing.    Cardiovascular: Negative for chest pain and palpitations.   Gastrointestinal: Negative for abdominal pain, constipation, diarrhea and nausea.   Genitourinary: Negative for dysuria and frequency.   Musculoskeletal: Negative for arthralgias, back pain, gait problem, joint swelling, myalgias, neck pain and neck stiffness.   Skin: Negative for color change, pallor and rash.   Neurological: Negative for dizziness, tremors, seizures, syncope, facial asymmetry, speech difficulty, weakness, light-headedness, numbness and headaches.   Hematological: Negative for adenopathy. Does not bruise/bleed easily.   Psychiatric/Behavioral: Negative for agitation, confusion, decreased concentration, dysphoric mood and sleep disturbance. The patient is not nervous/anxious and is not hyperactive.        Objective:      Physical Exam   Constitutional: He is oriented to person, place, and time. He appears well-developed and well-nourished.  Non-toxic appearance. He does not have a sickly appearance. He does not appear ill. No distress.   HENT:   Head: Not macrocephalic and not microcephalic. Head is without raccoon's eyes, without Herrera's sign, without abrasion, without contusion, without  laceration, without right periorbital erythema and without left periorbital erythema. Hair is normal.   Right Ear: No lacerations. No drainage, swelling or tenderness. No foreign bodies. No mastoid tenderness. Tympanic membrane is not injected, not scarred, not perforated, not erythematous, not retracted and not bulging. Tympanic membrane mobility is normal. No middle ear effusion. No hemotympanum. Decreased hearing is noted.   Left Ear: No lacerations. No drainage, swelling or tenderness. No foreign bodies. No mastoid tenderness. Tympanic membrane is not injected, not scarred, not perforated, not erythematous, not retracted and not bulging. Tympanic membrane mobility is normal.  No middle ear effusion. No hemotympanum. Decreased hearing is noted.   Nose: No mucosal edema, rhinorrhea, nose lacerations, sinus tenderness, nasal deformity, septal deviation or nasal septal hematoma. No epistaxis.  No foreign bodies. Right sinus exhibits no maxillary sinus tenderness. Left sinus exhibits no maxillary sinus tenderness.   Mouth/Throat: Uvula is midline and oropharynx is clear and moist. Mucous membranes are not pale, not dry and not cyanotic. No oral lesions. No trismus in the jaw. Normal dentition. No dental abscesses, uvula swelling, lacerations or dental caries. No oropharyngeal exudate or tonsillar abscesses.   Eyes: Right eye exhibits no chemosis, no discharge and no exudate. No foreign body present in the right eye. Left eye exhibits no chemosis, no discharge and no exudate. No foreign body present in the left eye. Right conjunctiva is not injected. Right conjunctiva has no hemorrhage. Left conjunctiva is not injected. Left conjunctiva has no hemorrhage. No scleral icterus. Right eye exhibits normal extraocular motion and no nystagmus. Left eye exhibits normal extraocular motion and no nystagmus. Right pupil is round and reactive. Left pupil is round and reactive. Pupils are equal.   Neck: No JVD present. No tracheal  tenderness and no muscular tenderness present. No neck rigidity. No tracheal deviation, no edema, no erythema and normal range of motion present. No thyroid mass and no thyromegaly present.   Cardiovascular: Normal rate and regular rhythm.   Pulmonary/Chest: Breath sounds normal. No accessory muscle usage or stridor. No apnea, no tachypnea and no bradypnea. No respiratory distress.   Abdominal: Soft. Normal appearance.   Musculoskeletal: Normal range of motion.   Lymphadenopathy:        Head (right side): No submental, no submandibular, no tonsillar, no preauricular, no posterior auricular and no occipital adenopathy present.        Head (left side): No submental, no submandibular, no tonsillar, no preauricular, no posterior auricular and no occipital adenopathy present.     He has no cervical adenopathy.        Right cervical: No superficial cervical, no deep cervical and no posterior cervical adenopathy present.       Left cervical: No superficial cervical, no deep cervical and no posterior cervical adenopathy present.   Neurological: He is alert and oriented to person, place, and time. He is not disoriented. He displays no atrophy and no tremor. No cranial nerve deficit or sensory deficit. He exhibits normal muscle tone. He displays no seizure activity.   Skin: No abrasion, no bruising, no burn, no ecchymosis, no laceration, no lesion and no rash noted. He is not diaphoretic. No erythema. No pallor.   Psychiatric: His behavior is normal. Thought content normal. His mood appears not anxious. His affect is not angry, not blunt, not labile and not inappropriate. His speech is not rapid and/or pressured, not delayed, not tangential and not slurred. He is not agitated, not aggressive, not hyperactive, not withdrawn and not combative. Cognition and memory are not impaired. He does not exhibit a depressed mood. He is communicative. He exhibits normal recent memory and normal remote memory.               Assessment:        1. Sensorineural hearing loss (SNHL) of both ears    2. Noise-induced hearing loss of both ears        Plan:         Discussed with patient multiple tinnitus management strategies including the use of maskers, instruments, background sound enrichment and other means. All questions answered and appropriate references given.    Patient to see Audiology for hearing aid evaluation.

## 2019-11-18 NOTE — ANESTHESIA POSTPROCEDURE EVALUATION
Anesthesia Post Evaluation    Patient: Sabino Rubio    Procedure(s) Performed: Procedure(s) (LRB):  EXCISION, SPERMATOCELE (Right)    Final Anesthesia Type: general    Patient location during evaluation: PACU  Patient participation: Yes- Able to Participate  Level of consciousness: awake and alert and oriented  Post-procedure vital signs: reviewed and stable  Pain management: adequate  Airway patency: patent    PONV status at discharge: No PONV  Anesthetic complications: no      Cardiovascular status: hemodynamically stable  Respiratory status: unassisted and spontaneous ventilation  Hydration status: euvolemic  Follow-up not needed.            No case tracking events are documented in the log.      Pain/Pete Score: No data recorded

## 2019-11-25 ENCOUNTER — CLINICAL SUPPORT (OUTPATIENT)
Dept: AUDIOLOGY | Facility: CLINIC | Age: 64
End: 2019-11-25
Payer: COMMERCIAL

## 2019-11-25 DIAGNOSIS — H90.3 BILATERAL SENSORINEURAL HEARING LOSS: Primary | ICD-10-CM

## 2019-11-25 PROCEDURE — 99499 UNLISTED E&M SERVICE: CPT | Mod: S$GLB,,, | Performed by: AUDIOLOGIST

## 2019-11-25 PROCEDURE — 99499 NO LOS: ICD-10-PCS | Mod: S$GLB,,, | Performed by: AUDIOLOGIST

## 2019-11-25 NOTE — PROGRESS NOTES
Mr. Rubio was seen for a hearing aid consultation. Recommended binaural FÁTIMA amplification. Discussed pros and cons of various styles and technology levels. Patient was most interested in [employer] M-50 Rs in silver grey with a TV connector. Patient would like to discuss with his jose alfredo, who is a nurse here, prior to proceeding. Patient acknowledged understanding of the 30 day trial period, $250 restocking fee from the time of order, and the fact that we do not file insurance on behalf of the patient. Patient was advised to call or email with any questions.

## 2019-12-02 ENCOUNTER — OFFICE VISIT (OUTPATIENT)
Dept: UROLOGY | Facility: CLINIC | Age: 64
End: 2019-12-02
Payer: COMMERCIAL

## 2019-12-02 VITALS
BODY MASS INDEX: 22.41 KG/M2 | SYSTOLIC BLOOD PRESSURE: 130 MMHG | HEIGHT: 70 IN | WEIGHT: 156.5 LBS | DIASTOLIC BLOOD PRESSURE: 68 MMHG | HEART RATE: 86 BPM

## 2019-12-02 DIAGNOSIS — Z98.890 POST-OPERATIVE STATE: Primary | ICD-10-CM

## 2019-12-02 PROCEDURE — 99024 PR POST-OP FOLLOW-UP VISIT: ICD-10-PCS | Mod: S$GLB,,, | Performed by: UROLOGY

## 2019-12-02 PROCEDURE — 99024 POSTOP FOLLOW-UP VISIT: CPT | Mod: S$GLB,,, | Performed by: UROLOGY

## 2019-12-02 PROCEDURE — 99999 PR PBB SHADOW E&M-EST. PATIENT-LVL III: CPT | Mod: PBBFAC,,, | Performed by: UROLOGY

## 2019-12-02 PROCEDURE — 99999 PR PBB SHADOW E&M-EST. PATIENT-LVL III: ICD-10-PCS | Mod: PBBFAC,,, | Performed by: UROLOGY

## 2019-12-02 NOTE — PROGRESS NOTES
Subjective:       Patient ID: Sabino Rubio is a 64 y.o. male.    Chief Complaint: Post-op Evaluation (excision of spermatocele. pt state no pain he doing well.)    HPI   Sabino Rubio is a 64 y.o. male with a PMHx of kidney cyst, spermatocele, and GERD who presents to the clinic s/p Right Spermatocelectomy on 11/8/19 here for a post-op evaluation. His PSA 11 months ago was 0.64. Patient explains he has mild discomfort and some groin/scrotal swelling.       Past Medical History:   Diagnosis Date    Allergy     Arthritis     Family history of malignant neoplasm of gastrointestinal tract mat.gf    Family history of prostate cancer: 1/2 brother 9/25/2017    GERD (gastroesophageal reflux disease)     Kidney cyst, acquired: R 1.2 cm 2015 9/25/2017    Restless leg syndrome     Tubulovillous adenoma 2013 due 2016 9/14/2015       Past Surgical History:   Procedure Laterality Date    CARPAL TUNNEL RELEASE      COLONOSCOPY N/A 5/22/2019    Procedure: COLONOSCOPY;  Surgeon: Juancarlos Foley MD;  [facility]  Service: Endoscopy;  Laterality: N/A;    EXCISION OF GANGLION OF WRIST Right 6/28/2019    Procedure: EXCISION, GANGLION CYST, WRIST right;  Surgeon: Alba Penn MD;  Location: [address];  Service: Orthopedics;  Laterality: Right;  regional MAC, stretcher, supine, hand pan 1 and 2,MINI C-arm, call [employer]    INJECTION OF FACET JOINT Bilateral 6/6/2019    Procedure: INJECTION, FACET JOINT INJECTION (LUMBAR BLOCK) BILATERAL L4-L5 AND L5-S1 FACET INJECTIONS;  Surgeon: aKiser Braxton MD;  Location: [facility];  Service: Pain Management;  Laterality: Bilateral;  NEEDS CONSENT    INTERPOSITION ARTHROPLASTY OF CARPOMETACARPAL JOINTS Right 6/28/2019    Procedure: INTERPOSITION ARTHROPLASTY, CMC JOINT right;  Surgeon: Alba Penn MD;  Location: [address];  Service: Orthopedics;  Laterality: Right;  regional MAC, stretcher, supine, hand pan 1 and 2,MINI C-arm, call  Ar[employer]    SPERMATOCELECTOMY Right 11/8/2019    Procedure: EXCISION, SPERMATOCELE;  Surgeon: Sheldon Araya Jr., MD;  Location: [facility] [address] [place] [address];  Service: Urology;  Laterality: Right;  1hr       Family History   Problem Relation Age of Onset    Arthritis Mother         probable R.A.    Cancer Father         esophageal ca and brain tumor    Esophageal cancer Father     Melanoma Father     Cancer Brother         pancreatic cancer    Cancer Maternal Grandfather         colon    Colon cancer Maternal Grandfather     Irritable bowel syndrome Sister     Arthritis Sister     No Known Problems Son     No Known Problems Son     Diabetes Neg Hx     Heart disease Neg Hx     Cirrhosis Neg Hx     Celiac disease Neg Hx     Crohn's disease Neg Hx     Ulcerative colitis Neg Hx     Stomach cancer Neg Hx     Rectal cancer Neg Hx     Liver cancer Neg Hx     Psoriasis Neg Hx     Lupus Neg Hx        Social History     Socioeconomic History    Marital status: Single     Spouse name: Not on file    Number of children: Not on file    Years of education: Not on file    Highest education level: Not on file   Occupational History    Not on file   Social Needs    Financial resource strain: Not on file    Food insecurity:     Worry: Not on file     Inability: Not on file    Transportation needs:     Medical: Not on file     Non-medical: Not on file   Tobacco Use    Smoking status: Never Smoker    Smokeless tobacco: Never Used    Tobacco comment: 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   Substance and Sexual Activity    Alcohol use: Yes     Alcohol/week: 0.0 standard drinks     Comment: 4 days weekly, up to 2 beers    Drug use: No    Sexual activity: Yes     Partners: Female   Lifestyle    Physical activity:     Days per week: Not on file     Minutes per session: Not on file    Stress: Not on file   Relationships    Social  connections:     Talks on phone: Not on file     Gets together: Not on file     Attends Jain service: Not on file     Active member of club or organization: Not on file     Attends meetings of clubs or organizations: Not on file     Relationship status: Not on file   Other Topics Concern    Not on file   Social History Narrative    Not on file       Allergies:  Patient has no known allergies.    Medications:    Current Outpatient Medications:     fluticasone propionate (FLONASE) 50 mcg/actuation nasal spray, 1 SPRAY (50 MCG TOTAL) BY EACH NARE ROUTE 2 (TWO) TIMES DAILY., Disp: 16 mL, Rfl: 3    gabapentin (NEURONTIN) 300 MG capsule, TAKE 1 CAPSULE BY MOUTH THREE TIMES A DAY, Disp: 90 capsule, Rfl: 2    meloxicam (MOBIC) 15 MG tablet, TAKE 1 TABLET (15 MG TOTAL) BY MOUTH DAILY AS NEEDED FOR PAIN (TAKE WITH FOOD OR A MEAL)., Disp: 30 tablet, Rfl: 3    ropinirole (REQUIP) 1 MG tablet, TAKE 1 TABLET BY MOUTH 3 TIMES A DAY, Disp: 90 tablet, Rfl: 5    Review of Systems   Constitutional: Negative for activity change, appetite change, chills, diaphoresis, fatigue, fever and unexpected weight change.   HENT: Negative for congestion, dental problem, hearing loss, mouth sores, postnasal drip, rhinorrhea, sinus pressure and trouble swallowing.    Eyes: Negative for pain, discharge and itching.   Respiratory: Negative for apnea, cough, choking, chest tightness, shortness of breath and wheezing.    Cardiovascular: Negative for chest pain, palpitations and leg swelling.   Gastrointestinal: Negative for abdominal distention, abdominal pain, anal bleeding, blood in stool, constipation, diarrhea, nausea, rectal pain and vomiting.   Endocrine: Negative for polydipsia and polyuria.   Genitourinary: Positive for scrotal swelling (post-surgical swelling). Negative for decreased urine volume, difficulty urinating, discharge, dysuria, enuresis, flank pain, frequency, genital sores, hematuria, penile pain, penile swelling and  urgency.   Musculoskeletal: Negative for arthralgias and back pain.   Skin: Negative for color change, rash and wound.   Neurological: Negative for dizziness, syncope, speech difficulty, light-headedness and headaches.   Hematological: Negative for adenopathy. Does not bruise/bleed easily.   Psychiatric/Behavioral: Negative for behavioral problems and confusion.       Objective:      Physical Exam   Constitutional: He appears well-developed.   HENT:   Head: Normocephalic.   Neck: Neck supple.   Cardiovascular: Normal rate.    Pulmonary/Chest: Effort normal.   Abdominal: Soft.   Genitourinary:   Genitourinary Comments: Small right hematoma with ecchymotic skin in upper scrotum.  Left side normal.  No hernias.   Neurological: He is alert.   Skin: Skin is warm.     Psychiatric: He has a normal mood and affect.         Procedures  Right Spermatocelectomy 11/8/19  Pre-Op Diagnosis: Right spermatocele     Post-Op Diagnosis: same     Procedure(s) Performed:   Right spermatocelectomy     Specimen(s):    Right spermatocele  Indications: Sabino Rubio is a 63 y.o. male with a right sided spermatocele that has grown in size and is bothersome and affecting him putting his pants on.      Findings:  Spermatocele removed intact  Disposition:  The patient will follow up with Dr. Araya in 4 weeks .  He was given prescriptions for hydrocodone.  He was instructed to avoid heavy lifting x 2 weeks, ice his scrotum x 48 hours and wear scrotal support x 2 weeks.       Labs  Ref Range & Units 11mo ago    PSA, SCREEN 0.00 - 4.00 ng/mL 0.64        Assessment:       1. Post-operative state        Plan:       Sabino was seen today for post-op evaluation.    Diagnoses and all orders for this visit:    Post-operative state          Suggested patient wear a jock strap and use sitz bath.   RTC prn.     Maile BECK, am acting as a scribe on this patient encounter in the presence and under the supervision of Dr. Araya.    12/02/2019 8:39  AM    I, Dr. Araya, personally performed the services described in this documentation.   All medical record entries made by the scribe were at my direction and in my presence.   I have reviewed the chart and agree that the record is accurate and complete.   Sheldon Araya MD.  8:39 AM 12/02/2019

## 2019-12-02 NOTE — LETTER
December 2, 2019      Alba Baum MD  [address]  [facility] [address]           [facility] - Urology 4th Floor  [address]  [facility] [address]  Phone: [phone]          Patient: Sabino Rubio   MR Number: [id number]   YOB: 1955   Date of Visit: 12/2/2019       Dear Dr. Alba Baum:    Thank you for referring Sabino Rubio to me for evaluation. Attached you will find relevant portions of my assessment and plan of care.    If you have questions, please do not hesitate to call me. I look forward to following Sabino Rbuio along with you.    Sincerely,    Sheldon Araya Jr., MD    Enclosure  CC:  No Recipients    If you would like to receive this communication electronically, please contact [email or web address] or [phone] to request more information on [facility] access.    For providers and/or their staff who would like to refer a patient to [facility], please contact us through our one-stop-shop provider referral line, [facility] , at [phone].    If you feel you have received this communication in error or would no longer like to receive these types of communications, please e-mail [email or web address]

## 2019-12-04 ENCOUNTER — PATIENT OUTREACH (OUTPATIENT)
Dept: ADMINISTRATIVE | Facility: HOSPITAL | Age: 64
End: 2019-12-04

## 2019-12-09 DIAGNOSIS — M50.30 DEGENERATION OF CERVICAL INTERVERTEBRAL DISC: ICD-10-CM

## 2019-12-09 DIAGNOSIS — J30.89 ALLERGIC RHINITIS DUE TO OTHER ALLERGIC TRIGGER, UNSPECIFIED SEASONALITY: ICD-10-CM

## 2019-12-09 RX ORDER — FLUTICASONE PROPIONATE 50 MCG
1 SPRAY, SUSPENSION (ML) NASAL 2 TIMES DAILY
Qty: 48 ML | Refills: 1 | Status: SHIPPED | OUTPATIENT
Start: 2019-12-09 | End: 2020-12-20

## 2019-12-09 RX ORDER — MELOXICAM 15 MG/1
15 TABLET ORAL DAILY PRN
Qty: 90 TABLET | Refills: 1 | Status: SHIPPED | OUTPATIENT
Start: 2019-12-09 | End: 2020-06-29

## 2019-12-12 ENCOUNTER — CLINICAL SUPPORT (OUTPATIENT)
Dept: AUDIOLOGY | Facility: CLINIC | Age: 64
End: 2019-12-12
Payer: COMMERCIAL

## 2019-12-12 DIAGNOSIS — H90.3 BILATERAL SENSORINEURAL HEARING LOSS: Primary | ICD-10-CM

## 2019-12-12 NOTE — PROGRESS NOTES
Mr. Rubio was seen today for a hearing aid delivery. Feedback was run at today's appointment. The patient was counseled on acclimatization. Insertion/removal of hearing aids in to ears, charging, turning on/off hearing aid and cleaning (changing wax traps, changing domes, cleaning microphones), and water precautions were reviewed at today's appointment. Manual VC was activated at today's appointment. Use of TV connector was reviewed at today's appointment. TV connector was paired to hearing aids. Hearing aids were paired to Mr. Rubio's phone and use of [employer] was reviewed at today's appointment. Mr. Rubio will return on for a HAFU. He was encouraged to call or e-mail if he has any issues prior to this appointment.      Hearing Aid Information:  Right ear  : Phonak   Model:  Audeo M50-R  Type:  FÁTIMA  Color: Silvery Grey  Battery: Rechargeable   Tube/ length & power: 2M  Dome size & style:  Small vented  Serial number: [id number]  Warranty expiration:  2/24/23   L and D expiration:  2/24/23      Left ear  : Phonak   Model:  Audeo M50-R  Type:  FÁTIMA  Color: Silvery Grey  Battery: Rechargeable   Tube/ length & power: 2M  Dome size & style:  Small vented  Serial number: [id number]  Warranty expiration:  2/24/23   L and D expiration:  2/24/23     TV Connector   SN [id number]

## 2019-12-30 ENCOUNTER — CLINICAL SUPPORT (OUTPATIENT)
Dept: AUDIOLOGY | Facility: CLINIC | Age: 64
End: 2019-12-30
Payer: COMMERCIAL

## 2019-12-30 DIAGNOSIS — H90.3 BILATERAL SENSORINEURAL HEARING LOSS: Primary | ICD-10-CM

## 2019-12-30 PROCEDURE — 99499 NO LOS: ICD-10-PCS | Mod: S$GLB,,, | Performed by: AUDIOLOGIST

## 2019-12-30 PROCEDURE — 99499 UNLISTED E&M SERVICE: CPT | Mod: S$GLB,,, | Performed by: AUDIOLOGIST

## 2019-12-30 NOTE — PROGRESS NOTES
Patient seen for HAFU. Increased to 90% of target gain, increased background noise reduction, decreased high frequency MPO slightly. Changed domes to small occluded smokey domes to see if this allows for further insertion. Patient did not feel the need to review cleaning at today's appointment. Activated button to allow for patient to answer calls with hearing aids. Reviewed use of button. He will return on 1/13/2019 for HAFU. He was encouraged to call or message if he has any issues prior to this appointment.

## 2020-01-17 ENCOUNTER — CLINICAL SUPPORT (OUTPATIENT)
Dept: AUDIOLOGY | Facility: CLINIC | Age: 65
End: 2020-01-17
Payer: COMMERCIAL

## 2020-01-17 DIAGNOSIS — H90.3 SENSORINEURAL HEARING LOSS, BILATERAL: Primary | ICD-10-CM

## 2020-01-17 PROCEDURE — 99499 NO LOS: ICD-10-PCS | Mod: S$GLB,,, | Performed by: AUDIOLOGIST

## 2020-01-17 PROCEDURE — 99499 UNLISTED E&M SERVICE: CPT | Mod: S$GLB,,, | Performed by: AUDIOLOGIST

## 2020-01-17 NOTE — PROGRESS NOTES
Pt was seen today for an end of trial check.  He reported that he felt he was hearing well overall.  He still felt that background noises were a bit loud.  He also noted that his bluetooth connection was coming un-paired several times a week and required him to repair the device.  Aids were adjusted to 100% and gain for soft sounds were additionally reduced.  A call was placed to [employer] support and on there recommendation we confirmed that the software was up to date and touch settings were correct.  We unpaired the device, rebooted the phone and repaired.  Bluetooth was working properly at the conclusion of today's visit.  Pt is doing well overall.  He will return for annual visits unless issues arise.

## 2020-01-29 ENCOUNTER — OFFICE VISIT (OUTPATIENT)
Dept: INTERNAL MEDICINE | Facility: CLINIC | Age: 65
End: 2020-01-29
Payer: COMMERCIAL

## 2020-01-29 ENCOUNTER — CLINICAL SUPPORT (OUTPATIENT)
Dept: INTERNAL MEDICINE | Facility: CLINIC | Age: 65
End: 2020-01-29
Payer: COMMERCIAL

## 2020-01-29 VITALS
DIASTOLIC BLOOD PRESSURE: 60 MMHG | WEIGHT: 158.75 LBS | HEIGHT: 70 IN | SYSTOLIC BLOOD PRESSURE: 100 MMHG | BODY MASS INDEX: 22.73 KG/M2

## 2020-01-29 DIAGNOSIS — L57.8 ACTINIC DERMATITIS: ICD-10-CM

## 2020-01-29 DIAGNOSIS — Z00.00 ROUTINE GENERAL MEDICAL EXAMINATION AT A HEALTH CARE FACILITY: Primary | ICD-10-CM

## 2020-01-29 DIAGNOSIS — Z00.00 ANNUAL PHYSICAL EXAM: Primary | ICD-10-CM

## 2020-01-29 DIAGNOSIS — M47.816 SPONDYLOSIS OF LUMBAR REGION WITHOUT MYELOPATHY OR RADICULOPATHY: ICD-10-CM

## 2020-01-29 PROBLEM — M25.531 PAIN IN RIGHT WRIST: Status: RESOLVED | Noted: 2019-10-16 | Resolved: 2020-01-29

## 2020-01-29 PROBLEM — K63.5 COLON POLYPS: Status: RESOLVED | Noted: 2019-05-22 | Resolved: 2020-01-29

## 2020-01-29 PROBLEM — K57.90 DIVERTICULOSIS: Status: ACTIVE | Noted: 2020-01-29

## 2020-01-29 PROBLEM — D12.6 TUBULAR ADENOMA OF COLON: Status: ACTIVE | Noted: 2020-01-29

## 2020-01-29 PROBLEM — G89.29 CHRONIC PAIN: Status: RESOLVED | Noted: 2019-06-06 | Resolved: 2020-01-29

## 2020-01-29 LAB
ALBUMIN SERPL BCP-MCNC: 3.8 G/DL (ref 3.5–5.2)
ALP SERPL-CCNC: 64 U/L (ref 55–135)
ALT SERPL W/O P-5'-P-CCNC: 15 U/L (ref 10–44)
ANION GAP SERPL CALC-SCNC: 6 MMOL/L (ref 8–16)
AST SERPL-CCNC: 23 U/L (ref 10–40)
BILIRUB SERPL-MCNC: 0.5 MG/DL (ref 0.1–1)
BUN SERPL-MCNC: 17 MG/DL (ref 8–23)
CALCIUM SERPL-MCNC: 8.8 MG/DL (ref 8.7–10.5)
CHLORIDE SERPL-SCNC: 106 MMOL/L (ref 95–110)
CHOLEST SERPL-MCNC: 181 MG/DL (ref 120–199)
CHOLEST/HDLC SERPL: 1.8 {RATIO} (ref 2–5)
CO2 SERPL-SCNC: 29 MMOL/L (ref 23–29)
COMPLEXED PSA SERPL-MCNC: 0.57 NG/ML (ref 0–4)
CREAT SERPL-MCNC: 0.8 MG/DL (ref 0.5–1.4)
ERYTHROCYTE [DISTWIDTH] IN BLOOD BY AUTOMATED COUNT: 13.2 % (ref 11.5–14.5)
EST. GFR  (AFRICAN AMERICAN): >60 ML/MIN/1.73 M^2
EST. GFR  (NON AFRICAN AMERICAN): >60 ML/MIN/1.73 M^2
ESTIMATED AVG GLUCOSE: 105 MG/DL (ref 68–131)
GLUCOSE SERPL-MCNC: 85 MG/DL (ref 70–110)
HBA1C MFR BLD HPLC: 5.3 % (ref 4–5.6)
HCT VFR BLD AUTO: 46.4 % (ref 40–54)
HDLC SERPL-MCNC: 99 MG/DL (ref 40–75)
HDLC SERPL: 54.7 % (ref 20–50)
HGB BLD-MCNC: 14.3 G/DL (ref 14–18)
LDLC SERPL CALC-MCNC: 75.8 MG/DL (ref 63–159)
MCH RBC QN AUTO: 29.4 PG (ref 27–31)
MCHC RBC AUTO-ENTMCNC: 30.8 G/DL (ref 32–36)
MCV RBC AUTO: 95 FL (ref 82–98)
NONHDLC SERPL-MCNC: 82 MG/DL
PLATELET # BLD AUTO: 221 K/UL (ref 150–350)
PMV BLD AUTO: 9.9 FL (ref 9.2–12.9)
POTASSIUM SERPL-SCNC: 4.4 MMOL/L (ref 3.5–5.1)
PROT SERPL-MCNC: 6.7 G/DL (ref 6–8.4)
RBC # BLD AUTO: 4.87 M/UL (ref 4.6–6.2)
SODIUM SERPL-SCNC: 141 MMOL/L (ref 136–145)
TRIGL SERPL-MCNC: 31 MG/DL (ref 30–150)
TSH SERPL DL<=0.005 MIU/L-ACNC: 0.91 UIU/ML (ref 0.4–4)
WBC # BLD AUTO: 5.64 K/UL (ref 3.9–12.7)

## 2020-01-29 PROCEDURE — 99396 PR PREVENTIVE VISIT,EST,40-64: ICD-10-PCS | Mod: S$GLB,,, | Performed by: INTERNAL MEDICINE

## 2020-01-29 PROCEDURE — 83036 HEMOGLOBIN GLYCOSYLATED A1C: CPT

## 2020-01-29 PROCEDURE — 84443 ASSAY THYROID STIM HORMONE: CPT

## 2020-01-29 PROCEDURE — 99396 PREV VISIT EST AGE 40-64: CPT | Mod: S$GLB,,, | Performed by: INTERNAL MEDICINE

## 2020-01-29 PROCEDURE — 80061 LIPID PANEL: CPT

## 2020-01-29 PROCEDURE — 80053 COMPREHEN METABOLIC PANEL: CPT

## 2020-01-29 PROCEDURE — 99999 PR PBB SHADOW E&M-EST. PATIENT-LVL V: CPT | Mod: PBBFAC,,, | Performed by: INTERNAL MEDICINE

## 2020-01-29 PROCEDURE — 85027 COMPLETE CBC AUTOMATED: CPT

## 2020-01-29 PROCEDURE — 84153 ASSAY OF PSA TOTAL: CPT

## 2020-01-29 PROCEDURE — 99999 PR PBB SHADOW E&M-EST. PATIENT-LVL V: ICD-10-PCS | Mod: PBBFAC,,, | Performed by: INTERNAL MEDICINE

## 2020-01-29 NOTE — PATIENT INSTRUCTIONS
Prevention Guidelines, Men Ages 50 to 64  Screening tests and vaccines are an important part of managing your health. Health counseling is essential, too. Below are guidelines for these, for men ages 50 to 64. Talk with your healthcare provider to make sure youre up-to-date on what you need.  Screening Who needs it How often   Alcohol misuse All men in this age group At routine exams   Blood pressure All men in this age group Every 2 years if your blood pressure is less than 120/80 mm Hg; yearly if your systolic blood pressure is 120 to 139 mm Hg, or your diastolic blood pressure reading is 80 to 89 mm Hg   Colorectal cancer All men in this age group Flexible sigmoidoscopy every 5 years, or colonoscopy every 10 years, or double-contrast barium enema every 5 years; yearly fecal occult blood test or fecal immunochemical test; or a stool DNA test as often as your healthcare provider advises; talk with your healthcare provider about which tests are best for you   Depression All men in this age group At routine exams   Type 2 diabetes or prediabetes All adults beginning at age 45 and adults without symptoms at any age who are overweight or obese and have 1 or more other risk factors for diabetes At least every 3 years (yearly if your blood sugar has already begun to rise)   Hepatitis C Men at increased risk for infection - talk with your healthcare provider At routine exams. All men ages 50 to 70 should be tested at least once for hepatitis C.   High cholesterol or triglycerides All men in this age group At least every 5 years   HIV Men at increased risk for infection - talk with your healthcare provider At routine exams   Lung cancer Adults age 55 to 80 who have smoked Yearly screening in smokers with 30 pack-year history of smoking or who quit within 15 years   Obesity All men in this age group At routine exams   Prostate cancer Starting at age 45, talk to healthcare provider about risks and benefits of digital  rectal exam (KATHARINE) and prostate-specific antigen (PSA) screening1 At routine exams   Syphilis Men at increased risk for infection - talk with your healthcare provider At routine exams   Tuberculosis Men at increased risk for infection - talk with your healthcare provider Ask your healthcare provider   Vision All men in this age group Ask your healthcare provider   Vaccine Who needs it How often   Chickenpox (varicella) All men in this age group who have no record of this infection or vaccine 2 doses; second dose should be given at least 4 weeks after the first dose   Hepatitis A Men at increased risk for infection - talk with your healthcare provider 2 doses given at least 6 months apart   Hepatitis B Men at increased risk for infection - talk with your healthcare provider 3 doses over 6 months; second dose should be given 1 month after the first dose; the third dose should be given at least 2 months after the second dose and at least 4 months after the first dose   Haemophilus influenzae Type B (HIB) Men at increased risk for infection - talk with your healthcare provider 1 to 3 doses   Influenza (flu) All men in this age group Once a year   Measles, mumps, rubella (MMR) Men in this age group through their late 50s who have no record of these infections or vaccines 1 or 2 doses; ask your healthcare provider   Meningococcal Men at increased risk for infection - talk with your healthcare provider 1 or more doses   Pneumococcal conjugate vaccine (PCV13) and pneumococcal polysaccharide vaccine (PPSV23) Men at increased risk for infection - talk with your healthcare provider PCV13: 1 dose ages 19 to 65 (protects against 13 types of pneumococcal bacteria)     PPSV23: 1 to 2 doses through age 64, or 1 dose at 65 or older (protects against 23 types of pneumococcal bacteria)      Tetanus/diphtheria/  pertussis (Td/Tdap) booster All men in this age group Td every 10 years, or a one-time dose of Tdap instead of a Td booster  after age 18, then Td every 10 years   Zoster All men ages 60 and older 1 dose   Counseling Who needs it How often   Diet and exercise Men who are overweight or obese When diagnosed, and then at routine exams   Sexually transmitted infection prevention Men at increased risk for infection - talk with your healthcare provider At routine exams   Use of daily aspirin Men in this age group at risk for cardiovascular health problems At routine exams   Use of tobacco and the health effects it can cause All men in this age group Every visit   [address] [place] [address] [facility]  Date Last Reviewed: 2/1/2017 © 2000-2017 [employer]. [address] [place] [address]. All rights reserved. This information is not intended as a substitute for professional medical care. Always follow your healthcare professional's instructions.        Understanding Diverticulosis and Diverticulitis     Pouches or diverticula usually occur in the lower part of the colon called the sigmoid.     The colon (large intestine) is the last part of the digestive tract. It absorbs water from stool and changes it from a liquid to a solid. In certain cases, small pouches called diverticula can form in the colon wall. This condition is called diverticulosis. The pouches can become infected. If this happens, it becomes a more serious problem called diverticulitis. These problems can be painful. But they can be managed.  Managing your condition  Diet changes or medicines may be prescribed.   If you have diverticulosis  Recommendations include:  · Diet changes are often enough to control symptoms. The main changes are adding fiber (roughage) and drinking more water. Fiber absorbs water as it travels through your colon. This helps your stool stay soft and move smoothly. Water helps this process.  · If needed, you may be told to take over-the-counter stool softeners.  · To help relieve pain, antispasmodic medicines may be  prescribed.  · Watch for changes in your bowel movements. Tell the healthcare provider if you notice any changes.  · Begin an exercise program. Ask your healthcare provider how to get started.  · Get plenty of rest and sleep.   If you have diverticulitis  Treatment depends on how bad your symptoms are.  · For mild symptoms. You may be put on a liquid diet for a short time. Antibiotics are usually prescribed. If these two steps relieve your symptoms, you may then be prescribed a high-fiber diet. If you still have symptoms, your healthcare provider will discuss more treatment choices with you.  · For severe symptoms. You may need to be admitted to the hospital. There, you can be given IV antibiotics and fluids. You will also be put on a low-fiber or liquid diet. Although not common, surgery is needed in some people with severe symptoms.  [place] to colon health     Diverticulitis occurs when the pouches become infected or inflamed.     Help keep your colon healthy with a diet that includes plenty of high-fiber fruits, vegetables, and whole grains. Drink plenty of liquids like water and juice. Maintain a healthy lifestyle including regular exercise, stress management, and adequate rest and sleep.   Date Last Reviewed: 7/1/2016  © 3203-0205 [employer]. [address] [place] [address], [place] [address]. All rights reserved. This information is not intended as a substitute for professional medical care. Always follow your healthcare professional's instructions.        Diverticulosis    Diverticulosis means that small pouches have formed in the wall of your large intestine (colon). Most often, this problem causes no symptoms and is common as people age. But the pouches in the colon are at risk of becoming infected. When this happens, the condition is called diverticulitis. Although most people with diverticulosis never develop diverticulitis, it is still not uncommon. Rectal bleeding can also occur and in less common  situations, a type of colon inflammation called colitis.  While most people do not have symptoms, some people with diverticulosis may have:  · Abdominal cramps and pain  · Bloating  · Constipation  · Change in bowel habits  Causes  The exact cause of diverticulosis (and diverticulitis) has not been proved, but a few things are associated with the condition:  · Low-fiber diet  · Constipation  · Lack of exercise  Your healthcare provider will talk with you about how to manage your condition. Diet changes may be all that are needed to help control diverticulosis and prevent progression to diverticulitis. If you develop diverticulitis, you will likely need other treatments.  Home care  You may be told to take fiber supplements daily. Fiber adds bulk to the stool so that it passes through the colon more easily. Stool softeners may be recommended. You may also be given medications for pain relief. Be sure to take all medications as directed.  In the past, people were told to avoid corn, nuts, and seeds. This is no longer necessary.  Follow these guidelines when caring for yourself at home:  · Eat unprocessed foods that are high in fiber. Whole grains, fruits, and vegetables are good choices.  · Drink 6 to 8 glasses of water every day unless your healthcare provider has you limit how much fluid you should have.  · Watch for changes in your bowel movements. Tell your provider if you notice any changes.  · Begin an exercise program. Ask your provider how to get started. Generally, walking is the best.  · Get plenty of rest and sleep.  Follow-up care  Follow up with your healthcare provider, or as advised. Regular visits may be needed to check on your health. Sometimes special procedures such as colonoscopy, are needed after an episode of diverticulitis or blooding. Be sure to keep all your appointments.  If a stool sample was taken, or cultures were done, you should be told if they are positive, or if your treatment needs to  be changed. You can call as directed for the results.  If X-rays were done, a radiologist will look at them. You will be told if there is a change in your treatment.  If antibiotics were prescribed, be sure to finish them all.  When to seek medical advice  Call your healthcare provider right away if any of these occur:  · Fever of 100.4°F (38°C) or higher, or as directed by your healthcare provider  · Severe cramps in the lower left side of the abdomen or pain that is getting worse  · Tenderness in the lower left side of the abdomen or worsening pain throughout the abdomen  · Diarrhea or constipation that doesn't get better within 24 hours  · Nausea and vomiting  · Bleeding from the rectum  Call 911  Call emergency services if any of the following occur:  · Trouble breathing  · Confusion  · Very drowsy or trouble awakening  · Fainting or loss of consciousness  · Rapid heart rate  · Chest pain  Date Last Reviewed: 12/30/2015  © 5783-7762 [employer]. [address] [place] [address], [place] [address]. All rights reserved. This information is not intended as a substitute for professional medical care. Always follow your healthcare professional's instructions.

## 2020-01-29 NOTE — LETTER
January 29, 2020    Sabino Rubio  [address] [facility] [address]             [facility] - Internal Medicine  [address]  [place] [address]  Phone: [phone]  Fax: [phone] Dear Mr. Rubio:    Thank you for allowing me to serve you and perform your Executive Health exam on 1/29/2020.  This letter will serve a brief summary of the history, physical findings, and laboratory/studies performed and recommendations at that time.    Reason for Visit: Executive Health Preventive Physical Examination    Past Medical History:   Diagnosis Date    Allergy     Arthritis     Family history of malignant neoplasm of gastrointestinal tract mat.gf    Family history of prostate cancer: 1/2 brother 9/25/2017    GERD (gastroesophageal reflux disease)     Kidney cyst, acquired: R 1.2 cm 2015 9/25/2017    Restless leg syndrome     Tubulovillous adenoma 2013 due 2016 9/14/2015       Past Surgical History:   Procedure Laterality Date    CARPAL TUNNEL RELEASE      COLONOSCOPY N/A 5/22/2019    Procedure: COLONOSCOPY;  Surgeon: Juancarlos Foley MD;  [facility]  Service: Endoscopy;  Laterality: N/A;    EXCISION OF GANGLION OF WRIST Right 6/28/2019    Procedure: EXCISION, GANGLION CYST, WRIST right;  Surgeon: Alba Penn MD;  Location: [address];  Service: Orthopedics;  Laterality: Right;  regional MAC, stretcher, supine, hand pan 1 and 2,MINI C-arm, call Arthrex    INJECTION OF FACET JOINT Bilateral 6/6/2019    Procedure: INJECTION, FACET JOINT INJECTION (LUMBAR BLOCK) BILATERAL L4-L5 AND L5-S1 FACET INJECTIONS;  Surgeon: Kaiser Braxton MD;  Location: [facility] PAIN T;  Service: Pain Management;  Laterality: Bilateral;  NEEDS CONSENT    INTERPOSITION ARTHROPLASTY OF CARPOMETACARPAL JOINTS Right 6/28/2019    Procedure: INTERPOSITION ARTHROPLASTY, CMC JOINT right;  Surgeon: Alba Penn MD;  Location: [address];  Service: Orthopedics;  Laterality: Right;  regional  MAC, stretcher, supine, hand pan 1 and 2,MINI C-arm, call Arthrex    SPERMATOCELECTOMY Right 11/8/2019    Procedure: EXCISION, SPERMATOCELE;  Surgeon: Sheldon Araya Jr., MD;  Location: [facility] OR [address] [place] [address];  Service: Urology;  Laterality: Right;  1hr       Family History   Problem Relation Age of Onset    Arthritis Mother         probable R.A.    Cancer Father         esophageal ca and brain tumor    Esophageal cancer Father     Melanoma Father     Cancer Brother         pancreatic cancer    Cancer Maternal Grandfather         colon    Colon cancer Maternal Grandfather     Irritable bowel syndrome Sister     Arthritis Sister     No Known Problems Son     No Known Problems Brother     No Known Problems Son     Cancer Sister     No Known Problems Brother     Diabetes Neg Hx     Heart disease Neg Hx     Cirrhosis Neg Hx     Celiac disease Neg Hx     Crohn's disease Neg Hx     Ulcerative colitis Neg Hx     Stomach cancer Neg Hx     Rectal cancer Neg Hx     Liver cancer Neg Hx     Psoriasis Neg Hx     Lupus Neg Hx             Review of patient's allergies indicates:  No Known Allergies      Current Outpatient Medications:     fluticasone propionate (FLONASE) 50 mcg/actuation nasal spray, 1 SPRAY (50 MCG TOTAL) BY EACH NARE ROUTE 2 (TWO) TIMES DAILY., Disp: 48 mL, Rfl: 1    gabapentin (NEURONTIN) 300 MG capsule, TAKE 1 CAPSULE BY MOUTH THREE TIMES A DAY, Disp: 90 capsule, Rfl: 2    meloxicam (MOBIC) 15 MG tablet, TAKE 1 TABLET (15 MG TOTAL) BY MOUTH DAILY AS NEEDED FOR PAIN (TAKE WITH FOOD OR A MEAL)., Disp: 90 tablet, Rfl: 1    ropinirole (REQUIP) 1 MG tablet, TAKE 1 TABLET BY MOUTH 3 TIMES A DAY, Disp: 90 tablet, Rfl: 5     Review of Systems  Review of Systems - Negative except for ongoing orthopedic issues, particularly back pain. You also have had some skin issues and would like to see Dermatology.    Physical Exam:  General: General appearance: alert, well appearing, and in no distress.    Skin: Skin exam - normal coloration and turgor, no rashes, no suspicious skin lesions noted.  Actinic changes noted  HEENT: Ears - bilateral TM's and external ear canals normal. , ENT exam reveals - ENT exam normal, no neck nodes or sinus tenderness.   Lungs: Chest: clear to auscultation, no wheezes, rales or rhonchi, symmetric air entry.   Heart: CVS exam: normal rate, regular rhythm, normal S1, S2, no murmurs, rubs, clicks or gallops.   Extremities: Exam of extremities: peripheral pulses normal, no pedal edema, no clubbing or cyanosis.  Trauma to great toe, no evidence of nail fungus.    Labs:  Results for orders placed or performed in visit on 01/29/20   Comprehensive metabolic panel   Result Value Ref Range    Sodium 141 136 - 145 mmol/L    Potassium 4.4 3.5 - 5.1 mmol/L    Chloride 106 95 - 110 mmol/L    CO2 29 23 - 29 mmol/L    Glucose 85 70 - 110 mg/dL    BUN, Bld 17 8 - 23 mg/dL    Creatinine 0.8 0.5 - 1.4 mg/dL    Calcium 8.8 8.7 - 10.5 mg/dL    Total Protein 6.7 6.0 - 8.4 g/dL    Albumin 3.8 3.5 - 5.2 g/dL    Total Bilirubin 0.5 0.1 - 1.0 mg/dL    Alkaline Phosphatase 64 55 - 135 U/L    AST 23 10 - 40 U/L    ALT 15 10 - 44 U/L    Anion Gap 6 (L) 8 - 16 mmol/L    eGFR if African American >60.0 >60 mL/min/1.73 m^2    eGFR if non African American >60.0 >60 mL/min/1.73 m^2   CBC Without Differential   Result Value Ref Range    WBC 5.64 3.90 - 12.70 K/uL    RBC 4.87 4.60 - 6.20 M/uL    Hemoglobin 14.3 14.0 - 18.0 g/dL    Hematocrit 46.4 40.0 - 54.0 %    Mean Corpuscular Volume 95 82 - 98 fL    Mean Corpuscular Hemoglobin 29.4 27.0 - 31.0 pg    Mean Corpuscular Hemoglobin Conc 30.8 (L) 32.0 - 36.0 g/dL    RDW 13.2 11.5 - 14.5 %    Platelets 221 150 - 350 K/uL    MPV 9.9 9.2 - 12.9 fL   Lipid panel   Result Value Ref Range    Cholesterol 181 120 - 199 mg/dL    Triglycerides 31 30 - 150 mg/dL    HDL 99 (H) 40 - 75 mg/dL    LDL Cholesterol 75.8 63.0 - 159.0 mg/dL    Hdl/Cholesterol Ratio 54.7 (H) 20.0 - 50.0 %     Total Cholesterol/HDL Ratio 1.8 (L) 2.0 - 5.0    Non-HDL Cholesterol 82 mg/dL   TSH   Result Value Ref Range    TSH 0.908 0.400 - 4.000 uIU/mL   PSA, Screening (every year)   Result Value Ref Range    PSA, SCREEN 0.57 0.00 - 4.00 ng/mL   Hemoglobin A1c   Result Value Ref Range    Hemoglobin A1C 5.3 4.0 - 5.6 %    Estimated Avg Glucose 105 68 - 131 mg/dL      The 10-year ASCVD risk score (Eloragianni MOTTA Jr., et al., 2013) is: 4.3%    Values used to calculate the score:      Age: 64 years      Sex: Male      Is Non- : No      Diabetic: No      Tobacco smoker: No      Systolic Blood Pressure: 100 mmHg      Is BP treated: No      HDL Cholesterol: 99 mg/dL      Total Cholesterol: 181 mg/dL     Assessment/Recommendations:  Routine Health Maintenance:  I recommend the shingles vaccine series of 2.  We also discussed 1 time HIV testing per routine.    At this time, you appear to be in good medical condition.  I recommend the healthy back program which is an intensive physical therapy program for chronic back issues.  I also recommended Dermatology assessment.    I look forward to seeing you again next year.  Please contact me should you have any questions or concerns regarding physical findings, or my recommendations.      Sincerely,          Alba Baum MD, FACP

## 2020-01-29 NOTE — PROGRESS NOTES
Subjective:       Patient ID: Sabino Rubio is a 64 y.o. male.    Chief Complaint: Executive Health    Executive physical    Had colonoscopy last year, 3 year follow-up recommended.    Labs from today pending    Surgery on hand, still with issues; ongoing back problems as well.  Has had injections, also surgery.  Mainly did PT for hand not too much for back.  May consider Healthy Back program- discussed.    Seen in Urology.  Scrotal cyst removed.  Doing well overall.    Toenail discoloration after having a heavy door fallen at years ago.  He is not sure if he may have toenail fungus?      Patient Active Problem List:     Restless leg syndrome     Cervical spondylosis without myelopathy     Degeneration of cervical intervertebral disc     Brachial neuritis or radiculitis NOS     Acquired spondylolisthesis     Tubulovillous adenoma: 2013     Kidney cyst, acquired: R 1.2 cm 2015     Family history of prostate cancer: 1/2 brother     Chronic bilateral low back pain without sciatica     Lumbar degenerative disc disease     Spondylosis of lumbar region without myelopathy or radiculopathy     Arthritis of carpometacarpal (CMC) joint of right thumb     Allergic rhinitis     CMC arthritis     Pain in right wrist     Spermatocele     Tubular adenoma of colon: see colonoscopy 2019 repeat 2022      Review of Systems   Constitutional: Negative.    HENT: Negative for congestion, postnasal drip, rhinorrhea and sinus pressure.    Eyes: Negative for redness and visual disturbance.   Respiratory: Negative for apnea, choking, shortness of breath and stridor.    Cardiovascular: Negative for chest pain, palpitations and leg swelling.   Gastrointestinal: Negative for abdominal pain, constipation, diarrhea and nausea.        Minimal GERD   Genitourinary: Negative for difficulty urinating, flank pain, frequency, testicular pain and urgency.   Musculoskeletal: Positive for arthralgias and back pain. Negative for myalgias.   Skin:  Negative for color change and rash.        One toenail discolored and others brittle; has had trauma  Would like a skin assessment given some new changes on his scalp   Neurological: Negative.    Psychiatric/Behavioral: Negative.        Objective:      Physical Exam   Constitutional: He is oriented to person, place, and time. He appears well-developed and well-nourished.   HENT:   Head: Normocephalic and atraumatic.   Right Ear: External ear normal.   Left Ear: External ear normal.   Eyes: Conjunctivae and EOM are normal.   Neck: Normal range of motion. Neck supple. No thyromegaly present.   Cardiovascular: Normal rate and regular rhythm.   No murmur heard.  Pulmonary/Chest: Effort normal and breath sounds normal. No respiratory distress. He has no wheezes.   Abdominal: Soft. He exhibits no distension. There is no tenderness.   Musculoskeletal: He exhibits no edema or tenderness.   Lymphadenopathy:     He has no cervical adenopathy.   Neurological: He is alert and oriented to person, place, and time. No cranial nerve deficit.   Skin: Skin is warm and dry.   Discolored great toe right foot; no evidence of nail fungus  Actinic dermatitis   Psychiatric: He has a normal mood and affect. His behavior is normal.       Assessment:       1. Annual physical exam    2. Spondylosis of lumbar region without myelopathy or radiculopathy    3. Actinic dermatitis        Plan:         Sabino was seen today for [facility].    Diagnoses and all orders for this visit:    Annual physical exam    Spondylosis of lumbar region without myelopathy or radiculopathy  -     Ambulatory consult to [facility]    Actinic dermatitis  -     Ambulatory referral to Dermatology    Nataliogurdeep recommended  Nail issues reviewed  HIV screening recommended, he declines today

## 2020-03-16 ENCOUNTER — INITIAL CONSULT (OUTPATIENT)
Dept: DERMATOLOGY | Facility: CLINIC | Age: 65
End: 2020-03-16
Payer: COMMERCIAL

## 2020-03-16 DIAGNOSIS — Z12.83 SCREENING EXAM FOR SKIN CANCER: Primary | ICD-10-CM

## 2020-03-16 DIAGNOSIS — L82.1 SEBORRHEIC KERATOSES: ICD-10-CM

## 2020-03-16 DIAGNOSIS — D18.01 CHERRY ANGIOMA: ICD-10-CM

## 2020-03-16 DIAGNOSIS — D22.9 MULTIPLE BENIGN NEVI: ICD-10-CM

## 2020-03-16 PROCEDURE — 99214 OFFICE O/P EST MOD 30 MIN: CPT | Mod: S$GLB,,, | Performed by: DERMATOLOGY

## 2020-03-16 PROCEDURE — 99214 PR OFFICE/OUTPT VISIT, EST, LEVL IV, 30-39 MIN: ICD-10-PCS | Mod: S$GLB,,, | Performed by: DERMATOLOGY

## 2020-03-16 PROCEDURE — 99999 PR PBB SHADOW E&M-EST. PATIENT-LVL II: CPT | Mod: PBBFAC,,, | Performed by: DERMATOLOGY

## 2020-03-16 PROCEDURE — 99999 PR PBB SHADOW E&M-EST. PATIENT-LVL II: ICD-10-PCS | Mod: PBBFAC,,, | Performed by: DERMATOLOGY

## 2020-03-16 NOTE — LETTER
March 16, 2020      Alba Baum MD  [address]  [facility] [address]           [facility] - Dermatology  [address]  [facility] [address]  Phone: [phone]  Fax: [phone]          Patient: Sabino Rubio   MR Number: [id number]   YOB: 1955   Date of Visit: 3/16/2020       Dear Dr. Alba Baum:    Thank you for referring Sabino Rubio to me for evaluation. Attached you will find relevant portions of my assessment and plan of care.    If you have questions, please do not hesitate to call me. I look forward to following Sabino Rubio along with you.    Sincerely,    Jayna Dubois MD    Enclosure  CC:  No Recipients    If you would like to receive this communication electronically, please contact [email or web address] or [phone] to request more information on [facility] Link access.    For providers and/or their staff who would like to refer a patient to [facility], please contact us through our one-stop-shop provider referral line, [facility], at [phone].    If you feel you have received this communication in error or would no longer like to receive these types of communications, please e-mail [email or web address]

## 2020-03-16 NOTE — PROGRESS NOTES
Subjective:       Patient ID:  Sabino Rubio is a 64 y.o. male who presents for   Chief Complaint   Patient presents with    Skin Check     TBSE     63 yo male present today for a TBSE.  Last seen 4/2019 at which time biopsied a benign growth.  No personal history of skin cancer.  Spot in scalp that is not bleeding but he does not know what it is.  Otherwise, The patient denies any moles or growths of the skin that are rapidly growing, hurting, itching, bleeding, or changing colors.        Review of Systems   Skin: Positive for activity-related sunscreen use. Negative for daily sunscreen use, recent sunburn and wears hat.   Hematologic/Lymphatic: Does not bruise/bleed easily.        Objective:    Physical Exam   Constitutional: He appears well-developed and well-nourished. No distress.   Neurological: He is alert and oriented to person, place, and time. He is not disoriented.   Psychiatric: He has a normal mood and affect.   Skin:   Areas Examined (abnormalities noted in diagram):   Scalp / Hair Palpated and Inspected  Head / Face Inspection Performed  Neck Inspection Performed  Chest / Axilla Inspection Performed  Abdomen Inspection Performed  Genitals / Buttocks / Groin Inspection Performed  Back Inspection Performed  RUE Inspected  LUE Inspection Performed  RLE Inspected  LLE Inspection Performed  Nails and Digits Inspection Performed                   Diagram Legend     Erythematous scaling macule/papule c/w actinic keratosis       Vascular papule c/w angioma      Pigmented verrucoid papule/plaque c/w seborrheic keratosis      Yellow umbilicated papule c/w sebaceous hyperplasia      Irregularly shaped tan macule c/w lentigo     1-2 mm smooth white papules consistent with Milia      Movable subcutaneous cyst with punctum c/w epidermal inclusion cyst      Subcutaneous movable cyst c/w pilar cyst      Firm pink to brown papule c/w dermatofibroma      Pedunculated fleshy papule(s) c/w skin tag(s)      Evenly  pigmented macule c/w junctional nevus     Mildly variegated pigmented, slightly irregular-bordered macule c/w mildly atypical nevus      Flesh colored to evenly pigmented papule c/w intradermal nevus       Pink pearly papule/plaque c/w basal cell carcinoma      Erythematous hyperkeratotic cursted plaque c/w SCC      Surgical scar with no sign of skin cancer recurrence      Open and closed comedones      Inflammatory papules and pustules      Verrucoid papule consistent consistent with wart     Erythematous eczematous patches and plaques     Dystrophic onycholytic nail with subungual debris c/w onychomycosis     Umbilicated papule    Erythematous-base heme-crusted tan verrucoid plaque consistent with inflamed seborrheic keratosis     Erythematous Silvery Scaling Plaque c/w Psoriasis     See annotation      Assessment / Plan:        Screening exam for skin cancer  Total body skin examination performed today including at least 12 points as noted in physical examination. No lesions suspicious for malignancy noted.      The following benign skin lesions were found on today's skin exam and do not require treatment:    Multiple benign nevi    Seborrheic keratoses    Cherry angioma         Patient instructed in importance in daily sun protection of at least spf 30. Sun avoidance and topical protection discussed.     Patient encouraged to wear hat for all outdoor exposure.     Also discussed sun protective clothing.        Follow up if symptoms worsen or fail to improve, for for TBSE.

## 2020-05-12 ENCOUNTER — OFFICE VISIT (OUTPATIENT)
Dept: PODIATRY | Facility: CLINIC | Age: 65
End: 2020-05-12
Payer: COMMERCIAL

## 2020-05-12 VITALS
TEMPERATURE: 73 F | HEIGHT: 70 IN | DIASTOLIC BLOOD PRESSURE: 88 MMHG | SYSTOLIC BLOOD PRESSURE: 131 MMHG | BODY MASS INDEX: 22.73 KG/M2 | WEIGHT: 158.75 LBS

## 2020-05-12 DIAGNOSIS — M19.072 PRIMARY OSTEOARTHRITIS OF LEFT FOOT: ICD-10-CM

## 2020-05-12 DIAGNOSIS — M20.22 HALLUX RIGIDUS OF LEFT FOOT: Primary | ICD-10-CM

## 2020-05-12 PROCEDURE — 99999 PR PBB SHADOW E&M-EST. PATIENT-LVL III: CPT | Mod: PBBFAC,,, | Performed by: PODIATRIST

## 2020-05-12 PROCEDURE — 3008F BODY MASS INDEX DOCD: CPT | Mod: CPTII,S$GLB,, | Performed by: PODIATRIST

## 2020-05-12 PROCEDURE — 3008F PR BODY MASS INDEX (BMI) DOCUMENTED: ICD-10-PCS | Mod: CPTII,S$GLB,, | Performed by: PODIATRIST

## 2020-05-12 PROCEDURE — 99999 PR PBB SHADOW E&M-EST. PATIENT-LVL III: ICD-10-PCS | Mod: PBBFAC,,, | Performed by: PODIATRIST

## 2020-05-12 PROCEDURE — 99203 OFFICE O/P NEW LOW 30 MIN: CPT | Mod: S$GLB,,, | Performed by: PODIATRIST

## 2020-05-12 PROCEDURE — 99203 PR OFFICE/OUTPT VISIT, NEW, LEVL III, 30-44 MIN: ICD-10-PCS | Mod: S$GLB,,, | Performed by: PODIATRIST

## 2020-05-12 RX ORDER — DICLOFENAC SODIUM 10 MG/G
2 GEL TOPICAL 4 TIMES DAILY
Qty: 1 TUBE | Refills: 2 | Status: SHIPPED | OUTPATIENT
Start: 2020-05-12 | End: 2021-06-02

## 2020-05-12 NOTE — LETTER
May 20, 2020      Alba Baum MD  [address]  [facility] [address]           [facility] - Podiatry  [address]  [facility] [address]  Phone: [phone]          Patient: Sabino Rubio   MR Number: [id number]   YOB: 1955   Date of Visit: 5/12/2020       Dear Dr. Alba Baum:    Thank you for referring Sabino Rubio to me for evaluation. Attached you will find relevant portions of my assessment and plan of care.    If you have questions, please do not hesitate to call me. I look forward to following Sabino Rubio along with you.    Sincerely,    Arturo Almanza, DPPONCHO    Enclosure  CC:  No Recipients    If you would like to receive this communication electronically, please contact [email or web address] or [phone] to request more information on [email or web address] Link access.    For providers and/or their staff who would like to refer a patient to [facility], please contact us through our one-stop-shop provider referral line, [facility], at [phone].    If you feel you have received this communication in error or would no longer like to receive these types of communications, please e-mail [email or web address]

## 2020-05-20 NOTE — PROGRESS NOTES
Subjective:      Patient ID: Sabino Rubio is a 64 y.o. male.    Chief Complaint:   Foot Pain (left toe )    Sabino is a 64 y.o. male who presents to the podiatry clinic  with complaint of  left foot pain. Onset of the symptoms was several months ago. Precipitating event: none known. Current symptoms include: ability to bear weight, but with some pain. Aggravating factors: any weight bearing. Symptoms have gradually worsened. Patient has had no prior foot problems. Evaluation to date: none. Treatment to date: rest. Patients rates pain 5/10 on pain scale.        Review of Systems   Constitution: Negative for chills, decreased appetite, fever and malaise/fatigue.   HENT: Negative for congestion, hearing loss, nosebleeds and tinnitus.    Eyes: Negative for double vision, pain, photophobia and visual disturbance.   Cardiovascular: Negative for chest pain, claudication, cyanosis and leg swelling.   Respiratory: Negative for cough, hemoptysis, shortness of breath and wheezing.    Endocrine: Negative for cold intolerance and heat intolerance.   Hematologic/Lymphatic: Negative for adenopathy and bleeding problem.   Skin: Negative for color change, dry skin, itching, nail changes and suspicious lesions.   Musculoskeletal: Positive for arthritis, joint pain and stiffness. Negative for myalgias.   Gastrointestinal: Negative for abdominal pain, jaundice, nausea and vomiting.   Genitourinary: Negative for dysuria, frequency and hematuria.   Neurological: Negative for difficulty with concentration, loss of balance, numbness, paresthesias and sensory change.   Psychiatric/Behavioral: Negative for altered mental status, hallucinations and suicidal ideas. The patient is not nervous/anxious.    Allergic/Immunologic: Negative for environmental allergies and persistent infections.           Objective:      Physical Exam   Constitutional: He is oriented to person, place, and time. Vital signs are normal. He appears well-developed  and well-nourished.   HENT:   Head: Normocephalic and atraumatic.   Cardiovascular:   Pulses:       Dorsalis pedis pulses are 2+ on the right side, and 2+ on the left side.        Posterior tibial pulses are 2+ on the right side, and 2+ on the left side.   Pulmonary/Chest: Effort normal.   Musculoskeletal:        Right foot: There is normal range of motion and no deformity.        Left foot: There is normal range of motion and no deformity.   Inspection and palpation of the muscles joints and bones of both lower extremities reveal that muscle strength for the anterior, lateral, and posterior muscle groups and intrinsic muscle groups of the foot are all 5 over 5 symmetrical.   Tenderness to the left 1st metatarsophalangeal joint with obvious palpable exostoses to the dorsal medial aspect of the joint.  Limited range of motion with tenderness at end range of motion with dorsiflexion.       Feet:   Right Foot:   Skin Integrity: Negative for skin breakdown or erythema.   Left Foot:   Skin Integrity: Negative for skin breakdown or erythema.   Neurological: He is alert and oriented to person, place, and time. He has normal strength.   Reflex Scores:       Patellar reflexes are 2+ on the right side and 2+ on the left side.       Achilles reflexes are 2+ on the right side and 2+ on the left side.  Sharp, dull, light touch, vibratory, and proprioceptive sensation are intact bilaterally. Deep tendon reflexes to patellar and Achilles tendon are symmetrical, 2/4 bilaterally. No ankle clonus or Babinski reflexes noted bilaterally. Coordination is normal to both feet and lower extremities.   Skin: Skin is warm, dry and intact. No cyanosis. Nails show no clubbing.   Skin turgor is normal bilaterally. Skin texture is well hydrated to both lower extremities. No lesions or rashes or wounds appreciated bilaterally. Nail plates 1 through 5 bilaterally are within normal limits for length and thickness. No nail clubbing or incurvation  noted.   Psychiatric: He has a normal mood and affect. His behavior is normal.             Assessment:       Encounter Diagnoses   Name Primary?    Hallux rigidus of left foot Yes    Primary osteoarthritis of left foot      Independent visualization of imaging was performed.  Results were reviewed in detail with patient.       Plan:       Sabino was seen today for foot pain.    Diagnoses and all orders for this visit:    Hallux rigidus of left foot  -     diclofenac sodium (VOLTAREN) 1 % Gel; Apply 2 g topically 4 (four) times daily.  -     ORTHOTIC DEVICE (DME)    Primary osteoarthritis of left foot  -     diclofenac sodium (VOLTAREN) 1 % Gel; Apply 2 g topically 4 (four) times daily.  -     ORTHOTIC DEVICE (DME)      I counseled the patient on his conditions, their implications and medical management.    I recommended patient be fitted for orthoses.  I explained that orthoses may improve function of the foot, reduce pain, decrease pronation, increase efficiency of muscle function of the foot and ankle and prevent surgery.  Alternative forms of biomechanical control of the foot and ankle were discussed with the patient.      The nature of the condition, options for management, as well as potential risks and complications were discussed in detail with patient. Patient was amenable to my recommendations and left my office fully informed and will follow up as instructed or sooner if necessary.      Follow-up in 3 months.

## 2020-10-13 ENCOUNTER — TELEPHONE (OUTPATIENT)
Dept: ORTHOPEDICS | Facility: CLINIC | Age: 65
End: 2020-10-13

## 2020-10-13 DIAGNOSIS — R52 PAIN: Primary | ICD-10-CM

## 2020-10-14 ENCOUNTER — PATIENT OUTREACH (OUTPATIENT)
Dept: ADMINISTRATIVE | Facility: OTHER | Age: 65
End: 2020-10-14

## 2020-10-14 NOTE — PROGRESS NOTES
Health Maintenance Due   Topic Date Due    Shingles Vaccine (2 of 3) 11/20/2017    Influenza Vaccine (1) 08/01/2020     Updates were requested from care everywhere.  Chart was reviewed for overdue Proactive [facility] Encounters ([facility]) topics (CRS, Breast Cancer Screening, Eye exam)  Health Maintenance has been updated.  LINKS immunization registry triggered.  Immunizations were reconciled.

## 2020-10-15 ENCOUNTER — HOSPITAL ENCOUNTER (OUTPATIENT)
Dept: RADIOLOGY | Facility: OTHER | Age: 65
Discharge: HOME | End: 2020-10-15
Attending: PHYSICIAN ASSISTANT
Payer: COMMERCIAL

## 2020-10-15 ENCOUNTER — OFFICE VISIT (OUTPATIENT)
Dept: ORTHOPEDICS | Facility: CLINIC | Age: 65
End: 2020-10-15
Payer: COMMERCIAL

## 2020-10-15 VITALS
HEIGHT: 70 IN | WEIGHT: 158 LBS | DIASTOLIC BLOOD PRESSURE: 79 MMHG | BODY MASS INDEX: 22.62 KG/M2 | HEART RATE: 76 BPM | SYSTOLIC BLOOD PRESSURE: 127 MMHG

## 2020-10-15 DIAGNOSIS — M25.531 RIGHT WRIST PAIN: Primary | ICD-10-CM

## 2020-10-15 DIAGNOSIS — R52 PAIN: ICD-10-CM

## 2020-10-15 DIAGNOSIS — M79.645 FINGER PAIN, LEFT: ICD-10-CM

## 2020-10-15 DIAGNOSIS — M18.12 ARTHRITIS OF CARPOMETACARPAL (CMC) JOINT OF LEFT THUMB: ICD-10-CM

## 2020-10-15 PROCEDURE — 73130 XR HAND COMPLETE 3 VIEWS BILATERAL: ICD-10-PCS | Mod: 26,,, | Performed by: RADIOLOGY

## 2020-10-15 PROCEDURE — 99213 PR OFFICE/OUTPT VISIT, EST, LEVL III, 20-29 MIN: ICD-10-PCS | Mod: S$GLB,,, | Performed by: PHYSICIAN ASSISTANT

## 2020-10-15 PROCEDURE — 73130 X-RAY EXAM OF HAND: CPT | Mod: 26,,, | Performed by: RADIOLOGY

## 2020-10-15 PROCEDURE — 3008F PR BODY MASS INDEX (BMI) DOCUMENTED: ICD-10-PCS | Mod: CPTII,S$GLB,, | Performed by: PHYSICIAN ASSISTANT

## 2020-10-15 PROCEDURE — 99999 PR PBB SHADOW E&M-EST. PATIENT-LVL III: CPT | Mod: PBBFAC,,, | Performed by: PHYSICIAN ASSISTANT

## 2020-10-15 PROCEDURE — 73130 X-RAY EXAM OF HAND: CPT | Mod: TC,50,FY

## 2020-10-15 PROCEDURE — 3008F BODY MASS INDEX DOCD: CPT | Mod: CPTII,S$GLB,, | Performed by: PHYSICIAN ASSISTANT

## 2020-10-15 PROCEDURE — 99999 PR PBB SHADOW E&M-EST. PATIENT-LVL III: ICD-10-PCS | Mod: PBBFAC,,, | Performed by: PHYSICIAN ASSISTANT

## 2020-10-15 PROCEDURE — 99213 OFFICE O/P EST LOW 20 MIN: CPT | Mod: S$GLB,,, | Performed by: PHYSICIAN ASSISTANT

## 2020-10-15 NOTE — PATIENT INSTRUCTIONS
Understanding Carpal Tunnel Syndrome    The carpal tunnel is a narrow space inside the wrist. It is ringed by bone and a band of tough tissue called the transverse carpal ligament. A major nerve called the median nerve runs from the forearm into the hand through the carpal tunnel. Tendons also run through the carpal tunnel.  With carpal tunnel syndrome, the tendons or nearby tissues within the carpal tunnel may swell or thicken. Or the transverse carpal ligament may harden and shorten. This narrows the space in the carpal tunnel and puts pressure on the median nerve. This pressure leads to tingling and numbness of the hand and wrist. In time, the condition can make even simple tasks hard to do.  What causes carpal tunnel syndrome?  Doctors arent entirely clear why the condition occurs. Certain things may make a person more likely to have it. These include:  · Being female  · Being pregnant  · Being overweight  · Having diabetes or rheumatoid arthritis  Symptoms of carpal tunnel syndrome  Symptoms often come and go. At first, symptoms may occur mainly at night. Later, they may be noticed during the day as well. They may get worse with activities such as driving, reading, typing, or holding a phone. Symptoms can include:  · Tingling and numbness in the hand or wrist  · Sharp pain that shoots up the arm or down to the fingers  · Hand stiffness or cramping, especially in the morning  · Trouble making a fist  · Hand weakness and clumsiness  Treatment for carpal tunnel syndrome  Certain treatments help reduce the pressure on the median nerve and relieve symptoms. Choices for treatment may include one or more of the following:  · Wrist splint. This involves wearing a special brace on the wrist and hand. The splint holds the wrist straight, in a neutral position. This helps keep the carpal tunnel as open as possible.  · Cortisone shots. Cortisone is a medicine that helps reduce swelling. It is injected directly into the  wrist. It helps shrink tissues inside the carpal tunnel. This relieves symptoms for a time.  · Pain medicines. You may take over-the-counter or prescription medicines to help reduce swelling and relieve symptoms.  · Surgery. If the condition doesnt respond to other treatments and doesnt go away on its own, you may need surgery. During surgery, the surgeon cuts the transverse carpal ligament to relieve pressure on the median nerve.     When to call your healthcare provider  Call your healthcare provider right away if you have any of these:  · Fever of 100.4°F (38°C) or higher, or as directed  · Symptoms that dont get better, or get worse  · New symptoms   Date Last Reviewed: 3/10/2016  © 3556-9330 [employer]. [address] [place] [address], [place] [address]. All rights reserved. This information is not intended as a substitute for professional medical care. Always follow your healthcare professional's instructions.

## 2020-10-15 NOTE — LETTER
October 15, 2020      Alba Baum MD  [address]  [facility] [address]           [place] [id number], [address]  [facility] [address]  Phone: [phone]          Patient: Sabino Rubio   MR Number: [id number]   YOB: 1955   Date of Visit: 10/15/2020       Dear Dr. Alba Baum:    Thank you for referring Sabino Rubio to me for evaluation. Attached you will find relevant portions of my assessment and plan of care.    If you have questions, please do not hesitate to call me. I look forward to following Sabino Rubio along with you.    Sincerely,    ELIZABETH Mi    Enclosure  CC:  No Recipients    If you would like to receive this communication electronically, please contact [email or web address] or [phone] to request more information on [email or web address] Link access.    For providers and/or their staff who would like to refer a patient to [facility], please contact us through our one-stop-shop provider referral line, [facility], at [phone].    If you feel you have received this communication in error or would no longer like to receive these types of communications, please e-mail [email or web address]

## 2020-10-15 NOTE — PROGRESS NOTES
Subjective:      Patient ID: Sabino Rubio is a 64 y.o. male.    Chief Complaint: Pain of the Left Hand and Pain of the Right Hand      HPI  Sabino Rubio is a right hand dominant 64 y.o. male presenting today for bilateral hand pain.  He reports pain in the ulnar aspect of the right hand and at the left ring finger.  He reports that 1 week ago his finger was pulled ulnarly, he has intermittent pain at the base of the ring finger since that injury.  Pain is most intense with any ulnar deviation of the ring finger.  His ulnar right hand pain has been present for 3-6 months, reports that it is intermittent and worse with weight-bearing and activity.  He works in construction, has increased pain with using a hammer.  He has had intermittent finger numbness and tingling for 2-3 years, since before his prior surgery.  Tingling is most prevalent in the right thumb, occurs intermittently during the day and at night.  He is status post Right thumb joint CMC arthroplasty, Right volar wrist ganglion cyst excisional biopsy, and first dorsal compartment release by Dr. Penn on 6/28/19.      Review of patient's allergies indicates:  No Known Allergies      Current Outpatient Medications   Medication Sig Dispense Refill    diclofenac sodium (VOLTAREN) 1 % Gel Apply 2 g topically 4 (four) times daily. 1 Tube 2    fluticasone propionate (FLONASE) 50 mcg/actuation nasal spray 1 SPRAY (50 MCG TOTAL) BY EACH NARE ROUTE 2 (TWO) TIMES DAILY. 48 mL 1    gabapentin (NEURONTIN) 300 MG capsule TAKE 1 CAPSULE BY MOUTH THREE TIMES A DAY 90 capsule 2    meloxicam (MOBIC) 15 MG tablet TAKE 1 TABLET (15 MG TOTAL) BY MOUTH DAILY AS NEEDED FOR PAIN (TAKE WITH FOOD OR A MEAL). 90 tablet 0    ropinirole (REQUIP) 1 MG tablet TAKE 1 TABLET BY MOUTH 3 TIMES A DAY 90 tablet 5     No current facility-administered medications for this visit.        Past Medical History:   Diagnosis Date    Allergy     Arthritis     Family history of  malignant neoplasm of gastrointestinal tract mat.gf    Family history of prostate cancer: 1/2 brother 9/25/2017    GERD (gastroesophageal reflux disease)     Kidney cyst, acquired: R 1.2 cm 2015 9/25/2017    Restless leg syndrome     Tubulovillous adenoma 2013 due 2016 9/14/2015       Past Surgical History:   Procedure Laterality Date    CARPAL TUNNEL RELEASE      COLONOSCOPY N/A 5/22/2019    Procedure: COLONOSCOPY;  Surgeon: Juancarlos Foley MD;  Location: [facility] (4TH FLR);  Service: Endoscopy;  Laterality: N/A;    EXCISION OF GANGLION OF WRIST Right 6/28/2019    Procedure: EXCISION, GANGLION CYST, WRIST right;  Surgeon: Alba Penn MD;  Location: [facility] OR 1ST FLR;  Service: Orthopedics;  Laterality: Right;  regional MAC, stretcher, supine, hand pan 1 and 2,MINI C-arm, call Arthrex    INJECTION OF FACET JOINT Bilateral 6/6/2019    Procedure: INJECTION, FACET JOINT INJECTION (LUMBAR BLOCK) BILATERAL L4-L5 AND L5-S1 FACET INJECTIONS;  Surgeon: Kaiser Braxton MD;  Location: [facility] PAIN MGT;  Service: Pain Management;  Laterality: Bilateral;  NEEDS CONSENT    INTERPOSITION ARTHROPLASTY OF CARPOMETACARPAL JOINTS Right 6/28/2019    Procedure: INTERPOSITION ARTHROPLASTY, CMC JOINT right;  Surgeon: Alba Penn MD;  Location: [facility] OR [facility] FLR;  Service: Orthopedics;  Laterality: Right;  regional MAC, stretcher, supine, hand pan 1 and 2,MINI C-arm, call Arthrex    SPERMATOCELECTOMY Right 11/8/2019    Procedure: EXCISION, SPERMATOCELE;  Surgeon: Sheldon Araya Jr., MD;  [facility];  Service: Urology;  Laterality: Right;  1hr         Review of Systems:  Review of Systems   Constitution: Negative for chills and fever.   Skin: Negative for rash and suspicious lesions.   Musculoskeletal:        See HPI   Neurological: Negative for dizziness, headaches, light-headedness, numbness and paresthesias.   Psychiatric/Behavioral: Negative for depression. The patient is not nervous/anxious.   "        OBJECTIVE:     PHYSICAL EXAM:  Height: 5' 10" (177.8 cm) Weight: 71.7 kg (158 lb)  Vitals:    10/15/20 1553   BP: 127/79   Pulse: 76   Weight: 71.7 kg (158 lb)   Height: 5' 10" (1.778 m)   PainSc:   4     General    Vitals reviewed.  Constitutional: He is oriented to person, place, and time. He appears well-developed and well-nourished.   HENT:   Head: Normocephalic and atraumatic.   Neck: Normal range of motion.   Cardiovascular: Normal rate.    Pulmonary/Chest: Effort normal. No respiratory distress.   Neurological: He is alert and oriented to person, place, and time.   Psychiatric: He has a normal mood and affect. His behavior is normal. Judgment and thought content normal.             Musculoskeletal:  Well-healed surgical scars.  No significant tenderness to palpation.  He has good finger and wrist range of motion.  Pain ulnarly with right wrist hyperextension and hyperflexion.  On palpation there is clicking, possible subluxation over the right ulnar styloid with wrist motion.  No instability appreciated at the left 4th MCP, discomfort with ulnar deviation at the MCP. Neurovascularly intact-good sensation and motor function, good capillary refill, 2+ radial pulses.  Negative Tinel's bilaterally over the carpal tunnel, Guyon's canal, and cubital tunnel.  Negative Durkan's bilaterally.    RADIOGRAPHS:  Bilateral hand x-ray, 10/15/2020  FINDINGS:  No evidence of acute fracture or osseous destructive process.  No dislocation.     Postsurgical change prior right-sided 1st carpometacarpal arthroplasty.  Mild osteoarthritic change elsewhere throughout the right hand including the distal interphalangeal joints.     Moderate degenerative change of the left 1st carpometacarpal joint.  Mild degenerative change elsewhere including the 1st metacarpophalangeal joint and few distal interphalangeal joints.     No focal soft tissue swelling or radiopaque foreign body.     Impression:  Postsurgical and degenerative " change as above.  No evidence of acute fracture or dislocation.    Comments: I have personally reviewed the imaging and I agree with the above radiologist's report.    ASSESSMENT/PLAN:   Sabino was seen today for pain and pain.    Diagnoses and all orders for this visit:    Right wrist pain    Arthritis of carpometacarpal (CMC) joint of left thumb    Finger pain, left           - We talked at length about the anatomy and pathophysiology of   Encounter Diagnoses   Name Primary?    Right wrist pain Yes    Arthritis of carpometacarpal (CMC) joint of left thumb     Finger pain, left        - discussed patient's symptoms and physical exam findings, discussed possible left ring finger MCP strain.  Discussed rest and buddy tape.  Discussed concern for nerve compression/carpal tunnel, discussed use of wrist brace.  Patient states he has a carpal tunnel brace at home, discussed restarting nighttime use of the brace.  We discussed activity modification, ice, and the possibility of restarting OT for right wrist pain with possible tendinopathy.  - x-rays reviewed with the patient, discussed arthritic changes.  - patient will call if he would like to restart OT  - call if symptoms worsen or do not improve  - follow-up as needed    Disclaimer: This note has been generated using voice-recognition software. There may be typographical errors that have been missed during proof-reading.

## 2020-12-03 ENCOUNTER — CLINICAL SUPPORT (OUTPATIENT)
Dept: URGENT CARE | Facility: CLINIC | Age: 65
End: 2020-12-03
Payer: COMMERCIAL

## 2020-12-03 DIAGNOSIS — Z13.9 ENCOUNTER FOR SCREENING: Primary | ICD-10-CM

## 2020-12-03 LAB
CTP QC/QA: YES
SARS-COV-2 RDRP RESP QL NAA+PROBE: NEGATIVE

## 2020-12-03 PROCEDURE — U0002 COVID-19 LAB TEST NON-CDC: HCPCS | Mod: QW,S$GLB,, | Performed by: NURSE PRACTITIONER

## 2020-12-03 PROCEDURE — U0002: ICD-10-PCS | Mod: QW,S$GLB,, | Performed by: NURSE PRACTITIONER

## 2020-12-03 NOTE — PATIENT INSTRUCTIONS
"Your test was NEGATIVE for COVID-19 (coronavirus).      You may leave home and/or return to work when the following conditions are met:   24 hours fever free without fever-reducing medications AND   Improved symptoms   You have not met the conditions of a closed exposure       A "close exposure" is defined as anyone who has had an exposure (masked or unmasked) to a known COVID -19 positive person within 6 ft for longer than 15 minutes. If your exposure meets this definition you are required by CDC guidelines to quarantine for 14 days from time of exposure regardless of test status.      Additional instructions:  · Social distance per your local guidelines  · Call ahead before visiting your doctor.  · Wear a facemask when around others who do not live in your household.  · Cover your coughs and sneezes.  · Wash your hands often with soap and water; hand  can be used, too.      If your symptoms worsen or if you have any other concerns, please contact [facility] at [phone].     Sincerely,    Radha Ureña MA  "

## 2020-12-21 ENCOUNTER — LAB VISIT (OUTPATIENT)
Dept: INTERNAL MEDICINE | Facility: CLINIC | Age: 65
End: 2020-12-21
Payer: COMMERCIAL

## 2020-12-21 DIAGNOSIS — Z91.89 AT INCREASED RISK OF EXPOSURE TO COVID-19 VIRUS: Primary | ICD-10-CM

## 2020-12-21 DIAGNOSIS — Z91.89 AT INCREASED RISK OF EXPOSURE TO COVID-19 VIRUS: ICD-10-CM

## 2020-12-21 PROCEDURE — U0003 INFECTIOUS AGENT DETECTION BY NUCLEIC ACID (DNA OR RNA); SEVERE ACUTE RESPIRATORY SYNDROME CORONAVIRUS 2 (SARS-COV-2) (CORONAVIRUS DISEASE [COVID-19]), AMPLIFIED PROBE TECHNIQUE, MAKING USE OF HIGH THROUGHPUT TECHNOLOGIES AS DESCRIBED BY CMS-2020-01-R: HCPCS

## 2020-12-22 LAB — SARS-COV-2 RNA RESP QL NAA+PROBE: NOT DETECTED

## 2021-03-06 ENCOUNTER — IMMUNIZATION (OUTPATIENT)
Dept: PRIMARY CARE CLINIC | Facility: CLINIC | Age: 66
End: 2021-03-06
Payer: COMMERCIAL

## 2021-03-06 DIAGNOSIS — Z23 NEED FOR VACCINATION: Primary | ICD-10-CM

## 2021-03-06 PROCEDURE — 91300 PR SARS-COV- 2 COVID-19 VACCINE, NO PRSV, 30MCG/0.3ML, IM: ICD-10-PCS | Mod: S$GLB,,, | Performed by: INTERNAL MEDICINE

## 2021-03-06 PROCEDURE — 0001A PR IMMUNIZ ADMIN, SARS-COV-2 COVID-19 VACC, 30MCG/0.3ML, 1ST DOSE: CPT | Mod: CV19,S$GLB,, | Performed by: INTERNAL MEDICINE

## 2021-03-06 PROCEDURE — 91300 PR SARS-COV- 2 COVID-19 VACCINE, NO PRSV, 30MCG/0.3ML, IM: CPT | Mod: S$GLB,,, | Performed by: INTERNAL MEDICINE

## 2021-03-06 PROCEDURE — 0001A PR IMMUNIZ ADMIN, SARS-COV-2 COVID-19 VACC, 30MCG/0.3ML, 1ST DOSE: ICD-10-PCS | Mod: CV19,S$GLB,, | Performed by: INTERNAL MEDICINE

## 2021-03-06 RX ADMIN — Medication 0.3 ML: at 08:03

## 2021-03-27 ENCOUNTER — IMMUNIZATION (OUTPATIENT)
Dept: PRIMARY CARE CLINIC | Facility: CLINIC | Age: 66
End: 2021-03-27
Payer: COMMERCIAL

## 2021-03-27 DIAGNOSIS — Z23 NEED FOR VACCINATION: Primary | ICD-10-CM

## 2021-03-27 PROCEDURE — 91300 PR SARS-COV- 2 COVID-19 VACCINE, NO PRSV, 30MCG/0.3ML, IM: ICD-10-PCS | Mod: S$GLB,,, | Performed by: INTERNAL MEDICINE

## 2021-03-27 PROCEDURE — 0002A PR IMMUNIZ ADMIN, SARS-COV-2 COVID-19 VACC, 30MCG/0.3ML, 2ND DOSE: ICD-10-PCS | Mod: CV19,S$GLB,, | Performed by: INTERNAL MEDICINE

## 2021-03-27 PROCEDURE — 91300 PR SARS-COV- 2 COVID-19 VACCINE, NO PRSV, 30MCG/0.3ML, IM: CPT | Mod: S$GLB,,, | Performed by: INTERNAL MEDICINE

## 2021-03-27 PROCEDURE — 0002A PR IMMUNIZ ADMIN, SARS-COV-2 COVID-19 VACC, 30MCG/0.3ML, 2ND DOSE: CPT | Mod: CV19,S$GLB,, | Performed by: INTERNAL MEDICINE

## 2021-03-27 RX ADMIN — Medication 0.3 ML: at 09:03

## 2021-04-05 ENCOUNTER — PATIENT MESSAGE (OUTPATIENT)
Dept: ADMINISTRATIVE | Facility: HOSPITAL | Age: 66
End: 2021-04-05

## 2021-06-02 ENCOUNTER — OFFICE VISIT (OUTPATIENT)
Dept: URGENT CARE | Facility: CLINIC | Age: 66
End: 2021-06-02
Payer: COMMERCIAL

## 2021-06-02 VITALS
TEMPERATURE: 98 F | HEART RATE: 71 BPM | HEIGHT: 70 IN | WEIGHT: 150 LBS | SYSTOLIC BLOOD PRESSURE: 128 MMHG | BODY MASS INDEX: 21.47 KG/M2 | DIASTOLIC BLOOD PRESSURE: 83 MMHG | OXYGEN SATURATION: 97 % | RESPIRATION RATE: 19 BRPM

## 2021-06-02 DIAGNOSIS — L03.113 CELLULITIS OF RIGHT ELBOW: Primary | ICD-10-CM

## 2021-06-02 PROCEDURE — 3008F BODY MASS INDEX DOCD: CPT | Mod: CPTII,S$GLB,, | Performed by: STUDENT IN AN ORGANIZED HEALTH CARE EDUCATION/TRAINING PROGRAM

## 2021-06-02 PROCEDURE — 99214 PR OFFICE/OUTPT VISIT, EST, LEVL IV, 30-39 MIN: ICD-10-PCS | Mod: S$GLB,,, | Performed by: STUDENT IN AN ORGANIZED HEALTH CARE EDUCATION/TRAINING PROGRAM

## 2021-06-02 PROCEDURE — 99214 OFFICE O/P EST MOD 30 MIN: CPT | Mod: S$GLB,,, | Performed by: STUDENT IN AN ORGANIZED HEALTH CARE EDUCATION/TRAINING PROGRAM

## 2021-06-02 PROCEDURE — 3008F PR BODY MASS INDEX (BMI) DOCUMENTED: ICD-10-PCS | Mod: CPTII,S$GLB,, | Performed by: STUDENT IN AN ORGANIZED HEALTH CARE EDUCATION/TRAINING PROGRAM

## 2021-06-02 RX ORDER — FLUTICASONE PROPIONATE 50 MCG
1 SPRAY, SUSPENSION (ML) NASAL 2 TIMES DAILY
COMMUNITY
End: 2022-07-05 | Stop reason: SDUPTHER

## 2021-06-02 RX ORDER — SULFAMETHOXAZOLE AND TRIMETHOPRIM 800; 160 MG/1; MG/1
1 TABLET ORAL EVERY 12 HOURS
Qty: 20 TABLET | Refills: 0 | Status: SHIPPED | OUTPATIENT
Start: 2021-06-02 | End: 2021-06-12

## 2021-06-02 RX ORDER — CLINDAMYCIN PHOSPHATE 150 MG/ML
600 INJECTION, SOLUTION INTRAVENOUS
Status: COMPLETED | OUTPATIENT
Start: 2021-06-02 | End: 2021-06-02

## 2021-06-02 RX ADMIN — CLINDAMYCIN PHOSPHATE 600 MG: 150 INJECTION, SOLUTION INTRAVENOUS at 10:06

## 2021-06-16 ENCOUNTER — TELEPHONE (OUTPATIENT)
Dept: INTERNAL MEDICINE | Facility: CLINIC | Age: 66
End: 2021-06-16

## 2021-06-16 DIAGNOSIS — M25.529 ELBOW PAIN, UNSPECIFIED LATERALITY: Primary | ICD-10-CM

## 2021-06-16 RX ORDER — AMOXICILLIN AND CLAVULANATE POTASSIUM 500; 125 MG/1; MG/1
1 TABLET, FILM COATED ORAL 2 TIMES DAILY
COMMUNITY
Start: 2021-06-01 | End: 2021-06-16

## 2021-06-16 RX ORDER — CEPHALEXIN 500 MG/1
500 CAPSULE ORAL 4 TIMES DAILY
Qty: 40 CAPSULE | Refills: 0 | OUTPATIENT
Start: 2021-06-16 | End: 2021-11-03

## 2021-06-30 ENCOUNTER — TELEPHONE (OUTPATIENT)
Dept: SPORTS MEDICINE | Facility: CLINIC | Age: 66
End: 2021-06-30

## 2021-07-06 ENCOUNTER — PATIENT MESSAGE (OUTPATIENT)
Dept: ADMINISTRATIVE | Facility: HOSPITAL | Age: 66
End: 2021-07-06

## 2021-07-19 ENCOUNTER — PATIENT OUTREACH (OUTPATIENT)
Dept: ADMINISTRATIVE | Facility: OTHER | Age: 66
End: 2021-07-19

## 2021-07-20 ENCOUNTER — HOSPITAL ENCOUNTER (OUTPATIENT)
Dept: RADIOLOGY | Facility: HOSPITAL | Age: 66
Discharge: HOME | End: 2021-07-20
Attending: ORTHOPAEDIC SURGERY
Payer: COMMERCIAL

## 2021-07-20 ENCOUNTER — OFFICE VISIT (OUTPATIENT)
Dept: SPORTS MEDICINE | Facility: CLINIC | Age: 66
End: 2021-07-20
Payer: COMMERCIAL

## 2021-07-20 VITALS
WEIGHT: 152 LBS | HEART RATE: 69 BPM | SYSTOLIC BLOOD PRESSURE: 130 MMHG | BODY MASS INDEX: 21.76 KG/M2 | HEIGHT: 70 IN | DIASTOLIC BLOOD PRESSURE: 76 MMHG

## 2021-07-20 DIAGNOSIS — M25.512 LEFT SHOULDER PAIN, UNSPECIFIED CHRONICITY: ICD-10-CM

## 2021-07-20 DIAGNOSIS — M25.512 CHRONIC LEFT SHOULDER PAIN: Primary | ICD-10-CM

## 2021-07-20 DIAGNOSIS — M25.512 CHRONIC LEFT SHOULDER PAIN: ICD-10-CM

## 2021-07-20 DIAGNOSIS — G89.29 CHRONIC LEFT SHOULDER PAIN: ICD-10-CM

## 2021-07-20 DIAGNOSIS — G89.29 CHRONIC LEFT SHOULDER PAIN: Primary | ICD-10-CM

## 2021-07-20 PROCEDURE — 73030 X-RAY EXAM OF SHOULDER: CPT | Mod: TC,LT

## 2021-07-20 PROCEDURE — 3008F BODY MASS INDEX DOCD: CPT | Mod: CPTII,S$GLB,, | Performed by: ORTHOPAEDIC SURGERY

## 2021-07-20 PROCEDURE — 73030 X-RAY EXAM OF SHOULDER: CPT | Mod: 26,LT,, | Performed by: RADIOLOGY

## 2021-07-20 PROCEDURE — 99999 PR PBB SHADOW E&M-EST. PATIENT-LVL III: CPT | Mod: PBBFAC,,, | Performed by: ORTHOPAEDIC SURGERY

## 2021-07-20 PROCEDURE — 99999 PR PBB SHADOW E&M-EST. PATIENT-LVL III: ICD-10-PCS | Mod: PBBFAC,,, | Performed by: ORTHOPAEDIC SURGERY

## 2021-07-20 PROCEDURE — 1125F PR PAIN SEVERITY QUANTIFIED, PAIN PRESENT: ICD-10-PCS | Mod: CPTII,S$GLB,, | Performed by: ORTHOPAEDIC SURGERY

## 2021-07-20 PROCEDURE — 99203 PR OFFICE/OUTPT VISIT, NEW, LEVL III, 30-44 MIN: ICD-10-PCS | Mod: S$GLB,,, | Performed by: ORTHOPAEDIC SURGERY

## 2021-07-20 PROCEDURE — 99203 OFFICE O/P NEW LOW 30 MIN: CPT | Mod: S$GLB,,, | Performed by: ORTHOPAEDIC SURGERY

## 2021-07-20 PROCEDURE — 3008F PR BODY MASS INDEX (BMI) DOCUMENTED: ICD-10-PCS | Mod: CPTII,S$GLB,, | Performed by: ORTHOPAEDIC SURGERY

## 2021-07-20 PROCEDURE — 1125F AMNT PAIN NOTED PAIN PRSNT: CPT | Mod: CPTII,S$GLB,, | Performed by: ORTHOPAEDIC SURGERY

## 2021-07-20 PROCEDURE — 73030 XR SHOULDER COMPLETE 2 OR MORE VIEWS LEFT: ICD-10-PCS | Mod: 26,LT,, | Performed by: RADIOLOGY

## 2021-07-22 ENCOUNTER — HOSPITAL ENCOUNTER (OUTPATIENT)
Dept: RADIOLOGY | Facility: HOSPITAL | Age: 66
Discharge: HOME | End: 2021-07-22
Attending: ORTHOPAEDIC SURGERY
Payer: COMMERCIAL

## 2021-07-22 PROCEDURE — 73221 MRI JOINT UPR EXTREM W/O DYE: CPT | Mod: 26,LT,, | Performed by: RADIOLOGY

## 2021-07-22 PROCEDURE — 73221 MRI SHOULDER WITHOUT CONTRAST LEFT: ICD-10-PCS | Mod: 26,LT,, | Performed by: RADIOLOGY

## 2021-07-22 PROCEDURE — 73221 MRI JOINT UPR EXTREM W/O DYE: CPT | Mod: TC,LT

## 2021-08-03 ENCOUNTER — OFFICE VISIT (OUTPATIENT)
Dept: SPORTS MEDICINE | Facility: CLINIC | Age: 66
End: 2021-08-03
Payer: COMMERCIAL

## 2021-08-03 DIAGNOSIS — S46.812D PARTIAL TEAR OF SUBSCAPULARIS TENDON, LEFT, SUBSEQUENT ENCOUNTER: Primary | ICD-10-CM

## 2021-08-03 PROCEDURE — 99213 OFFICE O/P EST LOW 20 MIN: CPT | Mod: 25,S$GLB,, | Performed by: ORTHOPAEDIC SURGERY

## 2021-08-03 PROCEDURE — 99213 PR OFFICE/OUTPT VISIT, EST, LEVL III, 20-29 MIN: ICD-10-PCS | Mod: 25,S$GLB,, | Performed by: ORTHOPAEDIC SURGERY

## 2021-08-03 PROCEDURE — 20610 LARGE JOINT ASPIRATION/INJECTION: L GLENOHUMERAL: ICD-10-PCS | Mod: LT,S$GLB,, | Performed by: ORTHOPAEDIC SURGERY

## 2021-08-03 PROCEDURE — 99999 PR PBB SHADOW E&M-EST. PATIENT-LVL I: CPT | Mod: PBBFAC,,, | Performed by: ORTHOPAEDIC SURGERY

## 2021-08-03 PROCEDURE — 20610 DRAIN/INJ JOINT/BURSA W/O US: CPT | Mod: LT,S$GLB,, | Performed by: ORTHOPAEDIC SURGERY

## 2021-08-03 PROCEDURE — 99999 PR PBB SHADOW E&M-EST. PATIENT-LVL I: ICD-10-PCS | Mod: PBBFAC,,, | Performed by: ORTHOPAEDIC SURGERY

## 2021-08-03 RX ADMIN — TRIAMCINOLONE ACETONIDE 40 MG: 40 INJECTION, SUSPENSION INTRA-ARTICULAR; INTRAMUSCULAR at 07:08

## 2021-08-04 ENCOUNTER — CLINICAL SUPPORT (OUTPATIENT)
Dept: REHABILITATION | Facility: HOSPITAL | Age: 66
End: 2021-08-04
Payer: COMMERCIAL

## 2021-08-04 DIAGNOSIS — S46.812D PARTIAL TEAR OF SUBSCAPULARIS TENDON, LEFT, SUBSEQUENT ENCOUNTER: ICD-10-CM

## 2021-08-04 PROCEDURE — 97161 PT EVAL LOW COMPLEX 20 MIN: CPT

## 2021-08-04 PROCEDURE — 97110 THERAPEUTIC EXERCISES: CPT

## 2021-08-11 ENCOUNTER — CLINICAL SUPPORT (OUTPATIENT)
Dept: REHABILITATION | Facility: HOSPITAL | Age: 66
End: 2021-08-11
Payer: COMMERCIAL

## 2021-08-11 DIAGNOSIS — M25.512 ACUTE PAIN OF LEFT SHOULDER: ICD-10-CM

## 2021-08-11 PROCEDURE — 97110 THERAPEUTIC EXERCISES: CPT

## 2021-08-18 ENCOUNTER — CLINICAL SUPPORT (OUTPATIENT)
Dept: REHABILITATION | Facility: HOSPITAL | Age: 66
End: 2021-08-18
Payer: COMMERCIAL

## 2021-08-18 DIAGNOSIS — M25.512 ACUTE PAIN OF LEFT SHOULDER: ICD-10-CM

## 2021-08-18 PROCEDURE — 97110 THERAPEUTIC EXERCISES: CPT

## 2021-09-05 ENCOUNTER — OFFICE VISIT (OUTPATIENT)
Dept: URGENT CARE | Facility: CLINIC | Age: 66
End: 2021-09-05
Payer: COMMERCIAL

## 2021-09-05 VITALS
RESPIRATION RATE: 16 BRPM | HEIGHT: 70 IN | BODY MASS INDEX: 21.47 KG/M2 | SYSTOLIC BLOOD PRESSURE: 131 MMHG | OXYGEN SATURATION: 96 % | TEMPERATURE: 98 F | DIASTOLIC BLOOD PRESSURE: 78 MMHG | HEART RATE: 76 BPM | WEIGHT: 150 LBS

## 2021-09-05 DIAGNOSIS — L03.115 CELLULITIS OF RIGHT KNEE: Primary | ICD-10-CM

## 2021-09-05 PROCEDURE — 3075F PR MOST RECENT SYSTOLIC BLOOD PRESS GE 130-139MM HG: ICD-10-PCS | Mod: CPTII,S$GLB,, | Performed by: NURSE PRACTITIONER

## 2021-09-05 PROCEDURE — 3008F BODY MASS INDEX DOCD: CPT | Mod: CPTII,S$GLB,, | Performed by: NURSE PRACTITIONER

## 2021-09-05 PROCEDURE — 3008F PR BODY MASS INDEX (BMI) DOCUMENTED: ICD-10-PCS | Mod: CPTII,S$GLB,, | Performed by: NURSE PRACTITIONER

## 2021-09-05 PROCEDURE — 99214 PR OFFICE/OUTPT VISIT, EST, LEVL IV, 30-39 MIN: ICD-10-PCS | Mod: S$GLB,,, | Performed by: NURSE PRACTITIONER

## 2021-09-05 PROCEDURE — 3078F DIAST BP <80 MM HG: CPT | Mod: CPTII,S$GLB,, | Performed by: NURSE PRACTITIONER

## 2021-09-05 PROCEDURE — 1160F PR REVIEW ALL MEDS BY PRESCRIBER/CLIN PHARMACIST DOCUMENTED: ICD-10-PCS | Mod: CPTII,S$GLB,, | Performed by: NURSE PRACTITIONER

## 2021-09-05 PROCEDURE — 1159F MED LIST DOCD IN RCRD: CPT | Mod: CPTII,S$GLB,, | Performed by: NURSE PRACTITIONER

## 2021-09-05 PROCEDURE — 1160F RVW MEDS BY RX/DR IN RCRD: CPT | Mod: CPTII,S$GLB,, | Performed by: NURSE PRACTITIONER

## 2021-09-05 PROCEDURE — 3075F SYST BP GE 130 - 139MM HG: CPT | Mod: CPTII,S$GLB,, | Performed by: NURSE PRACTITIONER

## 2021-09-05 PROCEDURE — 99214 OFFICE O/P EST MOD 30 MIN: CPT | Mod: S$GLB,,, | Performed by: NURSE PRACTITIONER

## 2021-09-05 PROCEDURE — 1159F PR MEDICATION LIST DOCUMENTED IN MEDICAL RECORD: ICD-10-PCS | Mod: CPTII,S$GLB,, | Performed by: NURSE PRACTITIONER

## 2021-09-05 PROCEDURE — 3078F PR MOST RECENT DIASTOLIC BLOOD PRESSURE < 80 MM HG: ICD-10-PCS | Mod: CPTII,S$GLB,, | Performed by: NURSE PRACTITIONER

## 2021-09-05 RX ORDER — DOXYCYCLINE 100 MG/1
100 CAPSULE ORAL EVERY 12 HOURS
Qty: 14 CAPSULE | Refills: 0 | Status: SHIPPED | OUTPATIENT
Start: 2021-09-05 | End: 2021-09-13

## 2021-09-06 RX ORDER — TRIAMCINOLONE ACETONIDE 40 MG/ML
40 INJECTION, SUSPENSION INTRA-ARTICULAR; INTRAMUSCULAR
Status: DISCONTINUED | OUTPATIENT
Start: 2021-08-03 | End: 2021-09-06 | Stop reason: HOSPADM

## 2021-09-10 ENCOUNTER — TELEPHONE (OUTPATIENT)
Dept: SPORTS MEDICINE | Facility: CLINIC | Age: 66
End: 2021-09-10

## 2021-10-04 ENCOUNTER — PATIENT MESSAGE (OUTPATIENT)
Dept: ADMINISTRATIVE | Facility: HOSPITAL | Age: 66
End: 2021-10-04

## 2021-11-03 ENCOUNTER — HOSPITAL ENCOUNTER (EMERGENCY)
Facility: OTHER | Age: 66
Discharge: HOME | End: 2021-11-03
Attending: EMERGENCY MEDICINE
Payer: COMMERCIAL

## 2021-11-03 VITALS
RESPIRATION RATE: 18 BRPM | HEART RATE: 74 BPM | OXYGEN SATURATION: 96 % | TEMPERATURE: 98 F | DIASTOLIC BLOOD PRESSURE: 84 MMHG | SYSTOLIC BLOOD PRESSURE: 132 MMHG

## 2021-11-03 DIAGNOSIS — S62.639B OPEN FRACTURE OF TUFT OF DISTAL PHALANX OF FINGER: ICD-10-CM

## 2021-11-03 DIAGNOSIS — S69.91XA FINGER INJURY, RIGHT, INITIAL ENCOUNTER: ICD-10-CM

## 2021-11-03 DIAGNOSIS — S69.92XA FINGER INJURY, LEFT, INITIAL ENCOUNTER: ICD-10-CM

## 2021-11-03 DIAGNOSIS — S61.311A LACERATION OF LEFT INDEX FINGER WITH DAMAGE TO NAIL, FOREIGN BODY PRESENCE UNSPECIFIED, INITIAL ENCOUNTER: Primary | ICD-10-CM

## 2021-11-03 PROCEDURE — 12002 RPR S/N/AX/GEN/TRNK2.6-7.5CM: CPT

## 2021-11-03 PROCEDURE — 96374 THER/PROPH/DIAG INJ IV PUSH: CPT

## 2021-11-03 PROCEDURE — 63600175 PHARM REV CODE 636 W HCPCS: Performed by: NURSE PRACTITIONER

## 2021-11-03 PROCEDURE — 25000003 PHARM REV CODE 250: Performed by: NURSE PRACTITIONER

## 2021-11-03 PROCEDURE — 99284 EMERGENCY DEPT VISIT MOD MDM: CPT | Mod: 25

## 2021-11-03 RX ORDER — MUPIROCIN 20 MG/G
OINTMENT TOPICAL ONCE
Status: DISCONTINUED | OUTPATIENT
Start: 2021-11-03 | End: 2021-11-03 | Stop reason: HOSPADM

## 2021-11-03 RX ORDER — CEPHALEXIN 500 MG/1
500 CAPSULE ORAL 4 TIMES DAILY
Qty: 28 CAPSULE | Refills: 0 | Status: ON HOLD | OUTPATIENT
Start: 2021-11-03 | End: 2021-11-08 | Stop reason: HOSPADM

## 2021-11-03 RX ORDER — CEFAZOLIN SODIUM 1 G/3ML
2 INJECTION, POWDER, FOR SOLUTION INTRAMUSCULAR; INTRAVENOUS
Status: COMPLETED | OUTPATIENT
Start: 2021-11-03 | End: 2021-11-03

## 2021-11-03 RX ORDER — HYDROCODONE BITARTRATE AND ACETAMINOPHEN 5; 325 MG/1; MG/1
1 TABLET ORAL EVERY 6 HOURS PRN
Qty: 8 TABLET | Refills: 0 | Status: SHIPPED | OUTPATIENT
Start: 2021-11-03 | End: 2021-11-05

## 2021-11-03 RX ORDER — HYDROCODONE BITARTRATE AND ACETAMINOPHEN 5; 325 MG/1; MG/1
1 TABLET ORAL
Status: COMPLETED | OUTPATIENT
Start: 2021-11-03 | End: 2021-11-03

## 2021-11-03 RX ORDER — LIDOCAINE HYDROCHLORIDE 10 MG/ML
10 INJECTION INFILTRATION; PERINEURAL
Status: COMPLETED | OUTPATIENT
Start: 2021-11-03 | End: 2021-11-03

## 2021-11-03 RX ADMIN — LIDOCAINE HYDROCHLORIDE 10 ML: 10 INJECTION, SOLUTION INFILTRATION; PERINEURAL at 10:11

## 2021-11-03 RX ADMIN — CEFAZOLIN 2 G: 330 INJECTION, POWDER, FOR SOLUTION INTRAMUSCULAR; INTRAVENOUS at 11:11

## 2021-11-03 RX ADMIN — HYDROCODONE BITARTRATE AND ACETAMINOPHEN 1 TABLET: 5; 325 TABLET ORAL at 10:11

## 2021-11-04 ENCOUNTER — TELEPHONE (OUTPATIENT)
Dept: ORTHOPEDICS | Facility: CLINIC | Age: 66
End: 2021-11-04
Payer: COMMERCIAL

## 2021-11-05 ENCOUNTER — OFFICE VISIT (OUTPATIENT)
Dept: ORTHOPEDICS | Facility: CLINIC | Age: 66
End: 2021-11-05
Payer: COMMERCIAL

## 2021-11-05 ENCOUNTER — ANESTHESIA EVENT (OUTPATIENT)
Dept: SURGERY | Facility: HOSPITAL | Age: 66
End: 2021-11-05
Payer: COMMERCIAL

## 2021-11-05 VITALS — BODY MASS INDEX: 21.47 KG/M2 | WEIGHT: 150 LBS | HEIGHT: 70 IN

## 2021-11-05 DIAGNOSIS — S61.311A LACERATION OF LEFT INDEX FINGER WITH DAMAGE TO NAIL, FOREIGN BODY PRESENCE UNSPECIFIED, INITIAL ENCOUNTER: ICD-10-CM

## 2021-11-05 DIAGNOSIS — S61.311A LACERATION OF LEFT INDEX FINGER WITH DAMAGE TO NAIL, FOREIGN BODY PRESENCE UNSPECIFIED, INITIAL ENCOUNTER: Primary | ICD-10-CM

## 2021-11-05 PROCEDURE — 3008F BODY MASS INDEX DOCD: CPT | Mod: CPTII,S$GLB,, | Performed by: PHYSICIAN ASSISTANT

## 2021-11-05 PROCEDURE — 99214 PR OFFICE/OUTPT VISIT, EST, LEVL IV, 30-39 MIN: ICD-10-PCS | Mod: S$GLB,,, | Performed by: PHYSICIAN ASSISTANT

## 2021-11-05 PROCEDURE — 99214 OFFICE O/P EST MOD 30 MIN: CPT | Mod: S$GLB,,, | Performed by: PHYSICIAN ASSISTANT

## 2021-11-05 PROCEDURE — 99999 PR PBB SHADOW E&M-EST. PATIENT-LVL III: CPT | Mod: PBBFAC,,, | Performed by: PHYSICIAN ASSISTANT

## 2021-11-05 PROCEDURE — 1160F RVW MEDS BY RX/DR IN RCRD: CPT | Mod: CPTII,S$GLB,, | Performed by: PHYSICIAN ASSISTANT

## 2021-11-05 PROCEDURE — 3008F PR BODY MASS INDEX (BMI) DOCUMENTED: ICD-10-PCS | Mod: CPTII,S$GLB,, | Performed by: PHYSICIAN ASSISTANT

## 2021-11-05 PROCEDURE — 1159F PR MEDICATION LIST DOCUMENTED IN MEDICAL RECORD: ICD-10-PCS | Mod: CPTII,S$GLB,, | Performed by: PHYSICIAN ASSISTANT

## 2021-11-05 PROCEDURE — 1160F PR REVIEW ALL MEDS BY PRESCRIBER/CLIN PHARMACIST DOCUMENTED: ICD-10-PCS | Mod: CPTII,S$GLB,, | Performed by: PHYSICIAN ASSISTANT

## 2021-11-05 PROCEDURE — 99999 PR PBB SHADOW E&M-EST. PATIENT-LVL III: ICD-10-PCS | Mod: PBBFAC,,, | Performed by: PHYSICIAN ASSISTANT

## 2021-11-05 PROCEDURE — 1159F MED LIST DOCD IN RCRD: CPT | Mod: CPTII,S$GLB,, | Performed by: PHYSICIAN ASSISTANT

## 2021-11-05 RX ORDER — HYDROCODONE BITARTRATE AND ACETAMINOPHEN 5; 325 MG/1; MG/1
1 TABLET ORAL EVERY 6 HOURS PRN
Qty: 20 TABLET | Refills: 0 | Status: SHIPPED | OUTPATIENT
Start: 2021-11-05 | End: 2022-01-05

## 2021-11-07 ENCOUNTER — TELEPHONE (OUTPATIENT)
Dept: ORTHOPEDICS | Facility: CLINIC | Age: 66
End: 2021-11-07
Payer: COMMERCIAL

## 2021-11-08 ENCOUNTER — ANESTHESIA (OUTPATIENT)
Dept: SURGERY | Facility: HOSPITAL | Age: 66
End: 2021-11-08
Payer: COMMERCIAL

## 2021-11-08 ENCOUNTER — HOSPITAL ENCOUNTER (OUTPATIENT)
Facility: HOSPITAL | Age: 66
Discharge: HOME | End: 2021-11-08
Attending: ORTHOPAEDIC SURGERY | Admitting: ORTHOPAEDIC SURGERY
Payer: COMMERCIAL

## 2021-11-08 VITALS
DIASTOLIC BLOOD PRESSURE: 72 MMHG | WEIGHT: 150 LBS | SYSTOLIC BLOOD PRESSURE: 115 MMHG | HEIGHT: 70 IN | RESPIRATION RATE: 18 BRPM | HEART RATE: 70 BPM | BODY MASS INDEX: 21.47 KG/M2 | OXYGEN SATURATION: 99 % | TEMPERATURE: 98 F

## 2021-11-08 DIAGNOSIS — S69.90XA INJURY OF NAIL BED OF FINGER: ICD-10-CM

## 2021-11-08 PROBLEM — S61.311A: Status: ACTIVE | Noted: 2021-11-08

## 2021-11-08 PROCEDURE — D9220A PRA ANESTHESIA: Mod: ,,, | Performed by: ANESTHESIOLOGY

## 2021-11-08 PROCEDURE — 71000015 HC POSTOP RECOV 1ST HR: Performed by: ORTHOPAEDIC SURGERY

## 2021-11-08 PROCEDURE — 71000033 HC RECOVERY, INTIAL HOUR: Performed by: ORTHOPAEDIC SURGERY

## 2021-11-08 PROCEDURE — D9220A PRA ANESTHESIA: ICD-10-PCS | Mod: ,,, | Performed by: NURSE ANESTHETIST, CERTIFIED REGISTERED

## 2021-11-08 PROCEDURE — 25000003 PHARM REV CODE 250: Performed by: ORTHOPAEDIC SURGERY

## 2021-11-08 PROCEDURE — 63600175 PHARM REV CODE 636 W HCPCS: Performed by: NURSE ANESTHETIST, CERTIFIED REGISTERED

## 2021-11-08 PROCEDURE — 15275 PR SKIN SUB GRAFT FACE/NK/HF/G UP TO 100 SQCM: ICD-10-PCS | Mod: 51,,, | Performed by: ORTHOPAEDIC SURGERY

## 2021-11-08 PROCEDURE — 37000008 HC ANESTHESIA 1ST 15 MINUTES: Performed by: ORTHOPAEDIC SURGERY

## 2021-11-08 PROCEDURE — 11760 REPAIR OF NAIL BED: CPT | Mod: F1,,, | Performed by: ORTHOPAEDIC SURGERY

## 2021-11-08 PROCEDURE — D9220A PRA ANESTHESIA: Mod: ,,, | Performed by: NURSE ANESTHETIST, CERTIFIED REGISTERED

## 2021-11-08 PROCEDURE — 36000706: Performed by: ORTHOPAEDIC SURGERY

## 2021-11-08 PROCEDURE — 25000003 PHARM REV CODE 250: Performed by: NURSE ANESTHETIST, CERTIFIED REGISTERED

## 2021-11-08 PROCEDURE — 37000009 HC ANESTHESIA EA ADD 15 MINS: Performed by: ORTHOPAEDIC SURGERY

## 2021-11-08 PROCEDURE — 36000708 HC OR TIME LEV III 1ST 15 MIN: Performed by: ORTHOPAEDIC SURGERY

## 2021-11-08 PROCEDURE — 36000709 HC OR TIME LEV III EA ADD 15 MIN: Performed by: ORTHOPAEDIC SURGERY

## 2021-11-08 PROCEDURE — 94761 N-INVAS EAR/PLS OXIMETRY MLT: CPT

## 2021-11-08 PROCEDURE — 99900035 HC TECH TIME PER 15 MIN (STAT)

## 2021-11-08 PROCEDURE — 15275 SKIN SUB GRAFT FACE/NK/HF/G: CPT | Mod: 51,,, | Performed by: ORTHOPAEDIC SURGERY

## 2021-11-08 PROCEDURE — 63600175 PHARM REV CODE 636 W HCPCS: Performed by: STUDENT IN AN ORGANIZED HEALTH CARE EDUCATION/TRAINING PROGRAM

## 2021-11-08 PROCEDURE — 25000003 PHARM REV CODE 250: Performed by: STUDENT IN AN ORGANIZED HEALTH CARE EDUCATION/TRAINING PROGRAM

## 2021-11-08 PROCEDURE — D9220A PRA ANESTHESIA: ICD-10-PCS | Mod: ,,, | Performed by: ANESTHESIOLOGY

## 2021-11-08 PROCEDURE — 36000707: Performed by: ORTHOPAEDIC SURGERY

## 2021-11-08 PROCEDURE — 11760 PR RECONSTRUC OF NAIL BED: ICD-10-PCS | Mod: F1,,, | Performed by: ORTHOPAEDIC SURGERY

## 2021-11-08 DEVICE — PATCH DERMAL MATRIX 2 X 2: Type: IMPLANTABLE DEVICE | Site: FINGER | Status: FUNCTIONAL

## 2021-11-08 RX ORDER — BUPIVACAINE HYDROCHLORIDE 2.5 MG/ML
INJECTION, SOLUTION EPIDURAL; INFILTRATION; INTRACAUDAL
Status: DISCONTINUED | OUTPATIENT
Start: 2021-11-08 | End: 2021-11-08 | Stop reason: HOSPADM

## 2021-11-08 RX ORDER — CEFAZOLIN SODIUM 1 G/3ML
2 INJECTION, POWDER, FOR SOLUTION INTRAMUSCULAR; INTRAVENOUS
Status: COMPLETED | OUTPATIENT
Start: 2021-11-08 | End: 2021-11-08

## 2021-11-08 RX ORDER — FENTANYL CITRATE 50 UG/ML
INJECTION, SOLUTION INTRAMUSCULAR; INTRAVENOUS
Status: DISCONTINUED | OUTPATIENT
Start: 2021-11-08 | End: 2021-11-08

## 2021-11-08 RX ORDER — ONDANSETRON 2 MG/ML
INJECTION INTRAMUSCULAR; INTRAVENOUS
Status: DISCONTINUED | OUTPATIENT
Start: 2021-11-08 | End: 2021-11-08

## 2021-11-08 RX ORDER — ACETAMINOPHEN 500 MG
1000 TABLET ORAL
Status: COMPLETED | OUTPATIENT
Start: 2021-11-08 | End: 2021-11-08

## 2021-11-08 RX ORDER — LIDOCAINE HYDROCHLORIDE 10 MG/ML
INJECTION, SOLUTION EPIDURAL; INFILTRATION; INTRACAUDAL; PERINEURAL
Status: DISCONTINUED | OUTPATIENT
Start: 2021-11-08 | End: 2021-11-08 | Stop reason: HOSPADM

## 2021-11-08 RX ORDER — BACITRACIN ZINC 500 UNIT/G
OINTMENT (GRAM) TOPICAL
Status: DISCONTINUED | OUTPATIENT
Start: 2021-11-08 | End: 2021-11-08 | Stop reason: HOSPADM

## 2021-11-08 RX ORDER — PROPOFOL 10 MG/ML
INJECTION, EMULSION INTRAVENOUS CONTINUOUS PRN
Status: DISCONTINUED | OUTPATIENT
Start: 2021-11-08 | End: 2021-11-08

## 2021-11-08 RX ORDER — LIDOCAINE HCL/PF 100 MG/5ML
SYRINGE (ML) INTRAVENOUS
Status: DISCONTINUED | OUTPATIENT
Start: 2021-11-08 | End: 2021-11-08

## 2021-11-08 RX ORDER — CELECOXIB 200 MG/1
400 CAPSULE ORAL ONCE
Status: COMPLETED | OUTPATIENT
Start: 2021-11-08 | End: 2021-11-08

## 2021-11-08 RX ORDER — PROPOFOL 10 MG/ML
INJECTION, EMULSION INTRAVENOUS
Status: DISCONTINUED | OUTPATIENT
Start: 2021-11-08 | End: 2021-11-08

## 2021-11-08 RX ORDER — FAMOTIDINE 10 MG/ML
INJECTION INTRAVENOUS
Status: DISCONTINUED | OUTPATIENT
Start: 2021-11-08 | End: 2021-11-08

## 2021-11-08 RX ORDER — MIDAZOLAM HYDROCHLORIDE 1 MG/ML
INJECTION INTRAMUSCULAR; INTRAVENOUS
Status: DISCONTINUED | OUTPATIENT
Start: 2021-11-08 | End: 2021-11-08

## 2021-11-08 RX ORDER — SULFAMETHOXAZOLE AND TRIMETHOPRIM 800; 160 MG/1; MG/1
1 TABLET ORAL 2 TIMES DAILY
Qty: 20 TABLET | Refills: 0 | Status: SHIPPED | OUTPATIENT
Start: 2021-11-08 | End: 2021-11-18

## 2021-11-08 RX ADMIN — CEFAZOLIN 2 G: 330 INJECTION, POWDER, FOR SOLUTION INTRAMUSCULAR; INTRAVENOUS at 12:11

## 2021-11-08 RX ADMIN — CELECOXIB 400 MG: 200 CAPSULE ORAL at 10:11

## 2021-11-08 RX ADMIN — PROPOFOL 100 MCG/KG/MIN: 10 INJECTION, EMULSION INTRAVENOUS at 12:11

## 2021-11-08 RX ADMIN — SODIUM CHLORIDE: 9 INJECTION, SOLUTION INTRAVENOUS at 10:11

## 2021-11-08 RX ADMIN — FAMOTIDINE 20 MG: 10 INJECTION, SOLUTION INTRAVENOUS at 12:11

## 2021-11-08 RX ADMIN — PROPOFOL 50 MG: 10 INJECTION, EMULSION INTRAVENOUS at 12:11

## 2021-11-08 RX ADMIN — LIDOCAINE HYDROCHLORIDE 60 MG: 20 INJECTION, SOLUTION INTRAVENOUS at 12:11

## 2021-11-08 RX ADMIN — ACETAMINOPHEN 1000 MG: 500 TABLET ORAL at 10:11

## 2021-11-08 RX ADMIN — ONDANSETRON 4 MG: 2 INJECTION, SOLUTION INTRAMUSCULAR; INTRAVENOUS at 12:11

## 2021-11-08 RX ADMIN — MIDAZOLAM HYDROCHLORIDE 2 MG: 1 INJECTION, SOLUTION INTRAMUSCULAR; INTRAVENOUS at 12:11

## 2021-11-08 RX ADMIN — FENTANYL CITRATE 50 MCG: 50 INJECTION, SOLUTION INTRAMUSCULAR; INTRAVENOUS at 12:11

## 2021-11-10 NOTE — PROGRESS NOTES
Occupational Therapy Daily Treatment Note     Date: 8/28/2019  Name: Sabino Rubio  Clinic Number: [id number]    Therapy Diagnosis:   No diagnosis found.  Physician: Alba Penn, *    Physician Orders: status post Right thumb joint CMC arthroplasty, Right volar wrist ganglion cyst excisional biopsy, First dorsal compartment release, right wrist, [place] APL tendon for graft by Dr. Penn on 6/28/19; 2x/wk x 6 weeks; Modalities prn  Medical Diagnosis:   M18.11 (ICD-10-CM) - Arthritis of carpometacarpal (CMC) joint of right thumb   M67.40 (ICD-10-CM) - Ganglion cyst      Surgical Procedure and Date: R CMC arthroplasty, R 1st DC release, R ganglion cyst excision on 6/28/19  Evaluation Date: 8/14/2019  Insurance Authorization Period Expiration: 12/31/19  Plan of Care Certification Period: 10/18/19  Date of Return to MD: 9/16/19     Visit # / Visits authorized: 5/ 20   Time In:8:30AM  Time Out: 9:30AM  Total Billable Time: 60 minutes    S/p 8 weeks 5 days on 8/28/19    Subjective     Patient reports compliance with HEP.    Pain: 2/10; 10/10 at worst  Location: R thumb    Objective     RIGHT Wrist and Thumb AROM Measured in degrees    8/14/19   Wrist Ext/Flex 50/20   Wrist UD/RD 18/3   Thumb:  MP 35                  IP 20   Thumb Mills Abd 45   *Thumb opposition to within 2 cm of the small finger distal tip    Sabino received the following supervised modalities after being cleared for contradictions for 15 minutes:   -Patient received fluidotherapy to RUE increase blood flow, circulation, desensitization, sensory re-education and for pain management.     Sabino received therapeutic exercises for 45 minutes including:  -AROM wrist flex/ext, with and without composite fist, wrist ulnar/radial deviation, wrist circles  -TGEs x 10  -Scar massage, retrograde massage  -AROM thumb IP joint blocking, MP joint blocking, radial/mills abd/add, composite flex/ext, circles, opposition to fingertips  -wrist wheel  I was present and supervised Ro Escalante PTA. Notes were reviewed and care agreed upon.    flex/ext x 2'  -nesting with small pom poms x 2'  -intrinsic pumping with yellow foam x 2'  -dextercisor x 2'  -isospheres palm down x 2'  -yellow putty gripping x 2'  -coban applied to thumb and thenar eminence for edema    Home Exercises and Education Provided     Education provided: Reviewed HEP    Written Home Exercises Provided: Yes  Exercises were reviewed and Sabino was able to demonstrate them prior to the end of the session.  Sabino demonstrated good  understanding of the HEP provided.   .   See EMR under Patient Instructions for exercises provided during initial evaluation.     Assessment     Pt making gradual progress with ROM. Began light strengthening with good tolerance.    Sabino is progressing well towards his goals and there are no updates to goals at this time. Pt prognosis is Excellent.     Pt will continue to benefit from skilled outpatient occupational therapy to address the deficits listed in the problem list on initial evaluation provide pt/family education and to maximize pt's level of independence in the home and community environment.     Pt's spiritual, cultural and educational needs considered and pt agreeable to plan of care and goals.    Goals:  Short Term Goals:   To be completed in 1 week:  1) Patient will be independent in HEP   To be completed in 2-4 weeks:  2) Increase thumb opposition AROM to the distal tip of the small finger to manipulate silverware while eating  3) Increase wrist flexion AROM to 45 degrees for ADL performance  4) Increase wrist radial deviation AROM to at least 10 degrees for computer typing performance  5) Increase thumb IP AROM to 45 degrees for fastening buttons while dressing  6) Assess /pinch strength and set appropriate goals  Long Term Goals:   To be completed by discharge:  7) Return to previous ADLs, IADLs, and work activities independently and without increased pain  8) Decrease FOTO limitation score to 38%, indicative of improved functional  independence      Plan   Outpatient Occupational Therapy 2 times weekly for 10 visits     Updates/Grading for next session:       Madina Pelayo, OT

## 2021-11-16 ENCOUNTER — DOCUMENTATION ONLY (OUTPATIENT)
Dept: ORTHOPEDICS | Facility: CLINIC | Age: 66
End: 2021-11-16

## 2021-11-16 ENCOUNTER — OFFICE VISIT (OUTPATIENT)
Dept: ORTHOPEDICS | Facility: CLINIC | Age: 66
End: 2021-11-16
Payer: COMMERCIAL

## 2021-11-16 VITALS
DIASTOLIC BLOOD PRESSURE: 83 MMHG | HEIGHT: 70 IN | SYSTOLIC BLOOD PRESSURE: 127 MMHG | HEART RATE: 78 BPM | BODY MASS INDEX: 21.47 KG/M2 | WEIGHT: 150 LBS

## 2021-11-16 DIAGNOSIS — S61.311A LACERATION OF LEFT INDEX FINGER WITH DAMAGE TO NAIL, FOREIGN BODY PRESENCE UNSPECIFIED, INITIAL ENCOUNTER: Primary | ICD-10-CM

## 2021-11-16 PROCEDURE — 1160F RVW MEDS BY RX/DR IN RCRD: CPT | Mod: CPTII,S$GLB,, | Performed by: PHYSICIAN ASSISTANT

## 2021-11-16 PROCEDURE — 3008F PR BODY MASS INDEX (BMI) DOCUMENTED: ICD-10-PCS | Mod: CPTII,S$GLB,, | Performed by: PHYSICIAN ASSISTANT

## 2021-11-16 PROCEDURE — 1160F PR REVIEW ALL MEDS BY PRESCRIBER/CLIN PHARMACIST DOCUMENTED: ICD-10-PCS | Mod: CPTII,S$GLB,, | Performed by: PHYSICIAN ASSISTANT

## 2021-11-16 PROCEDURE — 3074F SYST BP LT 130 MM HG: CPT | Mod: CPTII,S$GLB,, | Performed by: PHYSICIAN ASSISTANT

## 2021-11-16 PROCEDURE — 99024 POSTOP FOLLOW-UP VISIT: CPT | Mod: S$GLB,,, | Performed by: PHYSICIAN ASSISTANT

## 2021-11-16 PROCEDURE — 99024 PR POST-OP FOLLOW-UP VISIT: ICD-10-PCS | Mod: S$GLB,,, | Performed by: PHYSICIAN ASSISTANT

## 2021-11-16 PROCEDURE — 3008F BODY MASS INDEX DOCD: CPT | Mod: CPTII,S$GLB,, | Performed by: PHYSICIAN ASSISTANT

## 2021-11-16 PROCEDURE — 99999 PR PBB SHADOW E&M-EST. PATIENT-LVL III: ICD-10-PCS | Mod: PBBFAC,,, | Performed by: PHYSICIAN ASSISTANT

## 2021-11-16 PROCEDURE — 1159F PR MEDICATION LIST DOCUMENTED IN MEDICAL RECORD: ICD-10-PCS | Mod: CPTII,S$GLB,, | Performed by: PHYSICIAN ASSISTANT

## 2021-11-16 PROCEDURE — 3079F PR MOST RECENT DIASTOLIC BLOOD PRESSURE 80-89 MM HG: ICD-10-PCS | Mod: CPTII,S$GLB,, | Performed by: PHYSICIAN ASSISTANT

## 2021-11-16 PROCEDURE — 29125 APPL SHORT ARM SPLINT STATIC: CPT | Mod: 58,S$GLB,, | Performed by: PHYSICIAN ASSISTANT

## 2021-11-16 PROCEDURE — 99999 PR PBB SHADOW E&M-EST. PATIENT-LVL III: CPT | Mod: PBBFAC,,, | Performed by: PHYSICIAN ASSISTANT

## 2021-11-16 PROCEDURE — 29125 PR APPLY FOREARM SPLINT,STATIC: ICD-10-PCS | Mod: 58,S$GLB,, | Performed by: PHYSICIAN ASSISTANT

## 2021-11-16 PROCEDURE — 3074F PR MOST RECENT SYSTOLIC BLOOD PRESSURE < 130 MM HG: ICD-10-PCS | Mod: CPTII,S$GLB,, | Performed by: PHYSICIAN ASSISTANT

## 2021-11-16 PROCEDURE — 3079F DIAST BP 80-89 MM HG: CPT | Mod: CPTII,S$GLB,, | Performed by: PHYSICIAN ASSISTANT

## 2021-11-16 PROCEDURE — 1159F MED LIST DOCD IN RCRD: CPT | Mod: CPTII,S$GLB,, | Performed by: PHYSICIAN ASSISTANT

## 2021-11-19 ENCOUNTER — IMMUNIZATION (OUTPATIENT)
Dept: INTERNAL MEDICINE | Facility: CLINIC | Age: 66
End: 2021-11-19
Payer: COMMERCIAL

## 2021-11-19 DIAGNOSIS — Z23 NEED FOR VACCINATION: Primary | ICD-10-CM

## 2021-11-19 PROCEDURE — 91300 COVID-19, MRNA, LNP-S, PF, 30 MCG/0.3 ML DOSE VACCINE: CPT | Mod: PBBFAC | Performed by: INTERNAL MEDICINE

## 2021-11-19 PROCEDURE — 0003A COVID-19, MRNA, LNP-S, PF, 30 MCG/0.3 ML DOSE VACCINE: CPT | Mod: PBBFAC | Performed by: INTERNAL MEDICINE

## 2021-11-23 ENCOUNTER — OFFICE VISIT (OUTPATIENT)
Dept: ORTHOPEDICS | Facility: CLINIC | Age: 66
End: 2021-11-23
Payer: COMMERCIAL

## 2021-11-23 VITALS
WEIGHT: 150 LBS | HEIGHT: 70 IN | DIASTOLIC BLOOD PRESSURE: 69 MMHG | SYSTOLIC BLOOD PRESSURE: 113 MMHG | BODY MASS INDEX: 21.47 KG/M2 | HEART RATE: 80 BPM

## 2021-11-23 DIAGNOSIS — S61.311A LACERATION OF LEFT INDEX FINGER WITH DAMAGE TO NAIL, FOREIGN BODY PRESENCE UNSPECIFIED, INITIAL ENCOUNTER: Primary | ICD-10-CM

## 2021-11-23 PROCEDURE — 99999 PR PBB SHADOW E&M-EST. PATIENT-LVL III: CPT | Mod: PBBFAC,,, | Performed by: ORTHOPAEDIC SURGERY

## 2021-11-23 PROCEDURE — 99024 POSTOP FOLLOW-UP VISIT: CPT | Mod: S$GLB,,, | Performed by: ORTHOPAEDIC SURGERY

## 2021-11-23 PROCEDURE — 99999 PR PBB SHADOW E&M-EST. PATIENT-LVL III: ICD-10-PCS | Mod: PBBFAC,,, | Performed by: ORTHOPAEDIC SURGERY

## 2021-11-23 PROCEDURE — 99024 PR POST-OP FOLLOW-UP VISIT: ICD-10-PCS | Mod: S$GLB,,, | Performed by: ORTHOPAEDIC SURGERY

## 2021-11-30 ENCOUNTER — CLINICAL SUPPORT (OUTPATIENT)
Dept: REHABILITATION | Facility: HOSPITAL | Age: 66
End: 2021-11-30
Payer: COMMERCIAL

## 2021-11-30 DIAGNOSIS — M79.645 FINGER PAIN, LEFT: ICD-10-CM

## 2021-11-30 DIAGNOSIS — S61.311A LACERATION OF LEFT INDEX FINGER WITH DAMAGE TO NAIL, FOREIGN BODY PRESENCE UNSPECIFIED, INITIAL ENCOUNTER: ICD-10-CM

## 2021-11-30 PROCEDURE — 97166 OT EVAL MOD COMPLEX 45 MIN: CPT | Mod: PO

## 2021-11-30 PROCEDURE — 97010 HOT OR COLD PACKS THERAPY: CPT | Mod: PO

## 2021-11-30 PROCEDURE — 97110 THERAPEUTIC EXERCISES: CPT | Mod: PO

## 2021-12-07 ENCOUNTER — CLINICAL SUPPORT (OUTPATIENT)
Dept: REHABILITATION | Facility: HOSPITAL | Age: 66
End: 2021-12-07
Payer: MEDICARE

## 2021-12-07 DIAGNOSIS — M79.645 FINGER PAIN, LEFT: ICD-10-CM

## 2021-12-07 PROCEDURE — 97022 WHIRLPOOL THERAPY: CPT

## 2021-12-07 PROCEDURE — 97110 THERAPEUTIC EXERCISES: CPT

## 2021-12-14 ENCOUNTER — CLINICAL SUPPORT (OUTPATIENT)
Dept: REHABILITATION | Facility: HOSPITAL | Age: 66
End: 2021-12-14
Payer: MEDICARE

## 2021-12-14 DIAGNOSIS — M79.645 FINGER PAIN, LEFT: ICD-10-CM

## 2021-12-14 PROCEDURE — 97022 WHIRLPOOL THERAPY: CPT

## 2021-12-14 PROCEDURE — 97110 THERAPEUTIC EXERCISES: CPT

## 2021-12-21 ENCOUNTER — HOSPITAL ENCOUNTER (OUTPATIENT)
Dept: RADIOLOGY | Facility: HOSPITAL | Age: 66
Discharge: HOME | End: 2021-12-21
Attending: PHYSICIAN ASSISTANT
Payer: MEDICARE

## 2021-12-21 ENCOUNTER — TELEPHONE (OUTPATIENT)
Dept: ORTHOPEDICS | Facility: CLINIC | Age: 66
End: 2021-12-21

## 2021-12-21 ENCOUNTER — OFFICE VISIT (OUTPATIENT)
Dept: ORTHOPEDICS | Facility: CLINIC | Age: 66
End: 2021-12-21
Payer: MEDICARE

## 2021-12-21 ENCOUNTER — CLINICAL SUPPORT (OUTPATIENT)
Dept: REHABILITATION | Facility: HOSPITAL | Age: 66
End: 2021-12-21
Payer: MEDICARE

## 2021-12-21 VITALS
DIASTOLIC BLOOD PRESSURE: 86 MMHG | HEART RATE: 87 BPM | WEIGHT: 150 LBS | BODY MASS INDEX: 21.47 KG/M2 | HEIGHT: 70 IN | SYSTOLIC BLOOD PRESSURE: 133 MMHG

## 2021-12-21 DIAGNOSIS — S61.311A LACERATION OF LEFT INDEX FINGER WITH DAMAGE TO NAIL, FOREIGN BODY PRESENCE UNSPECIFIED, INITIAL ENCOUNTER: ICD-10-CM

## 2021-12-21 DIAGNOSIS — M79.645 FINGER PAIN, LEFT: ICD-10-CM

## 2021-12-21 DIAGNOSIS — Z47.89 ORTHOPEDIC AFTERCARE: ICD-10-CM

## 2021-12-21 DIAGNOSIS — S61.311A LACERATION OF LEFT INDEX FINGER WITH DAMAGE TO NAIL, FOREIGN BODY PRESENCE UNSPECIFIED, INITIAL ENCOUNTER: Primary | ICD-10-CM

## 2021-12-21 PROCEDURE — 99024 PR POST-OP FOLLOW-UP VISIT: ICD-10-PCS | Mod: S$GLB,,, | Performed by: PHYSICIAN ASSISTANT

## 2021-12-21 PROCEDURE — 97110 THERAPEUTIC EXERCISES: CPT

## 2021-12-21 PROCEDURE — 99999 PR PBB SHADOW E&M-EST. PATIENT-LVL III: CPT | Mod: PBBFAC,,, | Performed by: PHYSICIAN ASSISTANT

## 2021-12-21 PROCEDURE — 99024 POSTOP FOLLOW-UP VISIT: CPT | Mod: S$GLB,,, | Performed by: PHYSICIAN ASSISTANT

## 2021-12-21 PROCEDURE — 73140 X-RAY EXAM OF FINGER(S): CPT | Mod: 26,LT,, | Performed by: RADIOLOGY

## 2021-12-21 PROCEDURE — 73140 XR FINGER 2 OR MORE VIEWS LEFT: ICD-10-PCS | Mod: 26,LT,, | Performed by: RADIOLOGY

## 2021-12-21 PROCEDURE — 99999 PR PBB SHADOW E&M-EST. PATIENT-LVL III: ICD-10-PCS | Mod: PBBFAC,,, | Performed by: PHYSICIAN ASSISTANT

## 2021-12-21 PROCEDURE — 97140 MANUAL THERAPY 1/> REGIONS: CPT

## 2021-12-21 PROCEDURE — 97022 WHIRLPOOL THERAPY: CPT

## 2021-12-21 PROCEDURE — 73140 X-RAY EXAM OF FINGER(S): CPT | Mod: TC,LT

## 2021-12-28 ENCOUNTER — CLINICAL SUPPORT (OUTPATIENT)
Dept: REHABILITATION | Facility: HOSPITAL | Age: 66
End: 2021-12-28
Payer: MEDICARE

## 2021-12-28 DIAGNOSIS — M79.645 FINGER PAIN, LEFT: Primary | ICD-10-CM

## 2021-12-28 PROCEDURE — 97110 THERAPEUTIC EXERCISES: CPT

## 2021-12-28 PROCEDURE — 97022 WHIRLPOOL THERAPY: CPT

## 2022-01-04 ENCOUNTER — PATIENT MESSAGE (OUTPATIENT)
Dept: ORTHOPEDICS | Facility: CLINIC | Age: 67
End: 2022-01-04
Payer: MEDICARE

## 2022-01-04 ENCOUNTER — CLINICAL SUPPORT (OUTPATIENT)
Dept: INTERNAL MEDICINE | Facility: CLINIC | Age: 67
End: 2022-01-04
Payer: MEDICARE

## 2022-01-04 ENCOUNTER — TELEPHONE (OUTPATIENT)
Dept: ORTHOPEDICS | Facility: CLINIC | Age: 67
End: 2022-01-04
Payer: MEDICARE

## 2022-01-04 DIAGNOSIS — R53.83 OTHER FATIGUE: ICD-10-CM

## 2022-01-04 DIAGNOSIS — Z12.5 ENCOUNTER FOR SCREENING FOR MALIGNANT NEOPLASM OF PROSTATE: ICD-10-CM

## 2022-01-04 DIAGNOSIS — Z00.00 ROUTINE GENERAL MEDICAL EXAMINATION AT A HEALTH CARE FACILITY: Primary | ICD-10-CM

## 2022-01-04 DIAGNOSIS — R79.9 ABNORMAL FINDING OF BLOOD CHEMISTRY, UNSPECIFIED: ICD-10-CM

## 2022-01-04 LAB
ALBUMIN SERPL BCP-MCNC: 4.2 G/DL (ref 3.5–5.2)
ALP SERPL-CCNC: 69 U/L (ref 55–135)
ALT SERPL W/O P-5'-P-CCNC: 21 U/L (ref 10–44)
ANION GAP SERPL CALC-SCNC: 8 MMOL/L (ref 8–16)
AST SERPL-CCNC: 25 U/L (ref 10–40)
BILIRUB SERPL-MCNC: 0.6 MG/DL (ref 0.1–1)
BUN SERPL-MCNC: 16 MG/DL (ref 8–23)
CALCIUM SERPL-MCNC: 9.6 MG/DL (ref 8.7–10.5)
CHLORIDE SERPL-SCNC: 100 MMOL/L (ref 95–110)
CHOLEST SERPL-MCNC: 215 MG/DL (ref 120–199)
CHOLEST/HDLC SERPL: 2.5 {RATIO} (ref 2–5)
CO2 SERPL-SCNC: 27 MMOL/L (ref 23–29)
COMPLEXED PSA SERPL-MCNC: 0.77 NG/ML (ref 0–4)
CREAT SERPL-MCNC: 0.9 MG/DL (ref 0.5–1.4)
ERYTHROCYTE [DISTWIDTH] IN BLOOD BY AUTOMATED COUNT: 13.1 % (ref 11.5–14.5)
EST. GFR  (AFRICAN AMERICAN): >60 ML/MIN/1.73 M^2
EST. GFR  (NON AFRICAN AMERICAN): >60 ML/MIN/1.73 M^2
ESTIMATED AVG GLUCOSE: 111 MG/DL (ref 68–131)
GLUCOSE SERPL-MCNC: 122 MG/DL (ref 70–110)
HBA1C MFR BLD: 5.5 % (ref 4–5.6)
HCT VFR BLD AUTO: 47.2 % (ref 40–54)
HDLC SERPL-MCNC: 87 MG/DL (ref 40–75)
HDLC SERPL: 40.5 % (ref 20–50)
HGB BLD-MCNC: 15.2 G/DL (ref 14–18)
LDLC SERPL CALC-MCNC: 114.8 MG/DL (ref 63–159)
MCH RBC QN AUTO: 30 PG (ref 27–31)
MCHC RBC AUTO-ENTMCNC: 32.2 G/DL (ref 32–36)
MCV RBC AUTO: 93 FL (ref 82–98)
NONHDLC SERPL-MCNC: 128 MG/DL
PLATELET # BLD AUTO: 226 K/UL (ref 150–450)
PMV BLD AUTO: 9.7 FL (ref 9.2–12.9)
POTASSIUM SERPL-SCNC: 4.7 MMOL/L (ref 3.5–5.1)
PROT SERPL-MCNC: 7.3 G/DL (ref 6–8.4)
RBC # BLD AUTO: 5.06 M/UL (ref 4.6–6.2)
SODIUM SERPL-SCNC: 135 MMOL/L (ref 136–145)
TRIGL SERPL-MCNC: 66 MG/DL (ref 30–150)
TSH SERPL DL<=0.005 MIU/L-ACNC: 1.6 UIU/ML (ref 0.4–4)
WBC # BLD AUTO: 6.31 K/UL (ref 3.9–12.7)

## 2022-01-04 PROCEDURE — 84443 ASSAY THYROID STIM HORMONE: CPT | Performed by: INTERNAL MEDICINE

## 2022-01-04 PROCEDURE — 84153 ASSAY OF PSA TOTAL: CPT | Performed by: INTERNAL MEDICINE

## 2022-01-04 PROCEDURE — 83036 HEMOGLOBIN GLYCOSYLATED A1C: CPT | Performed by: INTERNAL MEDICINE

## 2022-01-04 PROCEDURE — 80061 LIPID PANEL: CPT | Performed by: INTERNAL MEDICINE

## 2022-01-04 PROCEDURE — G0008 ADMIN INFLUENZA VIRUS VAC: HCPCS | Mod: PBBFAC

## 2022-01-04 PROCEDURE — 85027 COMPLETE CBC AUTOMATED: CPT | Performed by: INTERNAL MEDICINE

## 2022-01-04 PROCEDURE — 80053 COMPREHEN METABOLIC PANEL: CPT | Performed by: INTERNAL MEDICINE

## 2022-01-04 NOTE — TELEPHONE ENCOUNTER
Spoke with pt and he stated his finger is more swollen then before.  Pt does attend therapy.  He will send a picture for review.

## 2022-01-04 NOTE — TELEPHONE ENCOUNTER
----- Message from Alda Crump sent at 1/4/2022 10:19 AM CST -----  Who Called: MARYLOU GERARD    Symptoms (please be specific): Swelling in left index finger    How long has patient had these symptoms:  3 days    Pharmacy name and phone #:  [id number] - [place] - [address]    Best Call Back Number: [phone]

## 2022-01-05 ENCOUNTER — OFFICE VISIT (OUTPATIENT)
Dept: INTERNAL MEDICINE | Facility: CLINIC | Age: 67
End: 2022-01-05
Payer: MEDICARE

## 2022-01-05 ENCOUNTER — HOSPITAL ENCOUNTER (OUTPATIENT)
Dept: RADIOLOGY | Facility: HOSPITAL | Age: 67
Discharge: HOME | End: 2022-01-05
Attending: INTERNAL MEDICINE
Payer: MEDICARE

## 2022-01-05 ENCOUNTER — OFFICE VISIT (OUTPATIENT)
Dept: DERMATOLOGY | Facility: CLINIC | Age: 67
End: 2022-01-05
Payer: MEDICARE

## 2022-01-05 ENCOUNTER — PATIENT MESSAGE (OUTPATIENT)
Dept: PHARMACY | Facility: CLINIC | Age: 67
End: 2022-01-05
Payer: MEDICARE

## 2022-01-05 VITALS
SYSTOLIC BLOOD PRESSURE: 118 MMHG | OXYGEN SATURATION: 99 % | WEIGHT: 153.88 LBS | HEIGHT: 70 IN | HEART RATE: 91 BPM | BODY MASS INDEX: 22.03 KG/M2 | DIASTOLIC BLOOD PRESSURE: 80 MMHG

## 2022-01-05 DIAGNOSIS — D36.9 TUBULOVILLOUS ADENOMA: ICD-10-CM

## 2022-01-05 DIAGNOSIS — M50.30 DEGENERATION OF CERVICAL INTERVERTEBRAL DISC: ICD-10-CM

## 2022-01-05 DIAGNOSIS — M47.816 SPONDYLOSIS OF LUMBAR REGION WITHOUT MYELOPATHY OR RADICULOPATHY: ICD-10-CM

## 2022-01-05 DIAGNOSIS — R07.89 ATYPICAL CHEST PAIN: ICD-10-CM

## 2022-01-05 DIAGNOSIS — L81.4 LENTIGO: ICD-10-CM

## 2022-01-05 DIAGNOSIS — E78.2 MIXED HYPERLIPIDEMIA: ICD-10-CM

## 2022-01-05 DIAGNOSIS — D12.6 TUBULAR ADENOMA OF COLON: ICD-10-CM

## 2022-01-05 DIAGNOSIS — R73.01 IFG (IMPAIRED FASTING GLUCOSE): ICD-10-CM

## 2022-01-05 DIAGNOSIS — R10.9 RIGHT FLANK PAIN: ICD-10-CM

## 2022-01-05 DIAGNOSIS — L82.1 SEBORRHEIC KERATOSES: ICD-10-CM

## 2022-01-05 DIAGNOSIS — D18.00 HEMANGIOMA, UNSPECIFIED SITE: ICD-10-CM

## 2022-01-05 DIAGNOSIS — Z00.00 ANNUAL PHYSICAL EXAM: Primary | ICD-10-CM

## 2022-01-05 DIAGNOSIS — Z12.83 ENCOUNTER FOR SCREENING FOR MALIGNANT NEOPLASM OF SKIN: Primary | ICD-10-CM

## 2022-01-05 DIAGNOSIS — D23.9 DERMAL NEVUS: ICD-10-CM

## 2022-01-05 PROBLEM — S69.90XA INJURY OF NAIL BED OF FINGER: Status: RESOLVED | Noted: 2021-11-08 | Resolved: 2022-01-05

## 2022-01-05 PROBLEM — S61.311A: Status: RESOLVED | Noted: 2021-11-08 | Resolved: 2022-01-05

## 2022-01-05 PROBLEM — Z80.42 FAMILY HISTORY OF PROSTATE CANCER: Status: RESOLVED | Noted: 2017-09-25 | Resolved: 2022-01-05

## 2022-01-05 PROBLEM — M25.512 LEFT SHOULDER PAIN: Status: RESOLVED | Noted: 2021-08-11 | Resolved: 2022-01-05

## 2022-01-05 PROBLEM — M51.36 LUMBAR DEGENERATIVE DISC DISEASE: Status: RESOLVED | Noted: 2019-01-14 | Resolved: 2022-01-05

## 2022-01-05 PROBLEM — G89.29 CHRONIC BILATERAL LOW BACK PAIN WITHOUT SCIATICA: Status: RESOLVED | Noted: 2017-10-30 | Resolved: 2022-01-05

## 2022-01-05 PROBLEM — M79.645 FINGER PAIN, LEFT: Status: RESOLVED | Noted: 2021-11-30 | Resolved: 2022-01-05

## 2022-01-05 PROBLEM — M54.50 CHRONIC BILATERAL LOW BACK PAIN WITHOUT SCIATICA: Status: RESOLVED | Noted: 2017-10-30 | Resolved: 2022-01-05

## 2022-01-05 PROBLEM — M19.049 CMC ARTHRITIS: Status: RESOLVED | Noted: 2019-06-28 | Resolved: 2022-01-05

## 2022-01-05 PROBLEM — M51.369 LUMBAR DEGENERATIVE DISC DISEASE: Status: RESOLVED | Noted: 2019-01-14 | Resolved: 2022-01-05

## 2022-01-05 LAB
BILIRUB UR QL STRIP: NEGATIVE
CLARITY UR REFRACT.AUTO: CLEAR
COLOR UR AUTO: YELLOW
GLUCOSE UR QL STRIP: NEGATIVE
HGB UR QL STRIP: NEGATIVE
KETONES UR QL STRIP: NEGATIVE
LEUKOCYTE ESTERASE UR QL STRIP: NEGATIVE
NITRITE UR QL STRIP: NEGATIVE
PH UR STRIP: 7 [PH] (ref 5–8)
PROT UR QL STRIP: NEGATIVE
SP GR UR STRIP: 1.01 (ref 1–1.03)
URN SPEC COLLECT METH UR: NORMAL

## 2022-01-05 PROCEDURE — 76770 US EXAM ABDO BACK WALL COMP: CPT | Mod: TC

## 2022-01-05 PROCEDURE — 99999 PR PBB SHADOW E&M-EST. PATIENT-LVL II: ICD-10-PCS | Mod: PBBFAC,,, | Performed by: STUDENT IN AN ORGANIZED HEALTH CARE EDUCATION/TRAINING PROGRAM

## 2022-01-05 PROCEDURE — 93010 EKG 12-LEAD: ICD-10-PCS | Mod: S$PBB,,, | Performed by: INTERNAL MEDICINE

## 2022-01-05 PROCEDURE — 99213 PR OFFICE/OUTPT VISIT, EST, LEVL III, 20-29 MIN: ICD-10-PCS | Mod: S$GLB,,, | Performed by: STUDENT IN AN ORGANIZED HEALTH CARE EDUCATION/TRAINING PROGRAM

## 2022-01-05 PROCEDURE — 81003 URINALYSIS AUTO W/O SCOPE: CPT | Performed by: INTERNAL MEDICINE

## 2022-01-05 PROCEDURE — 99397 PR PREVENTIVE VISIT,EST,65 & OVER: ICD-10-PCS | Mod: S$GLB,,, | Performed by: INTERNAL MEDICINE

## 2022-01-05 PROCEDURE — 93005 ELECTROCARDIOGRAM TRACING: CPT | Mod: PBBFAC | Performed by: INTERNAL MEDICINE

## 2022-01-05 PROCEDURE — 99999 PR PBB SHADOW E&M-EST. PATIENT-LVL II: CPT | Mod: PBBFAC,,, | Performed by: STUDENT IN AN ORGANIZED HEALTH CARE EDUCATION/TRAINING PROGRAM

## 2022-01-05 PROCEDURE — 99397 PER PM REEVAL EST PAT 65+ YR: CPT | Mod: S$GLB,,, | Performed by: INTERNAL MEDICINE

## 2022-01-05 PROCEDURE — 99212 OFFICE O/P EST SF 10 MIN: CPT | Mod: PBBFAC,27,25 | Performed by: STUDENT IN AN ORGANIZED HEALTH CARE EDUCATION/TRAINING PROGRAM

## 2022-01-05 PROCEDURE — 93010 ELECTROCARDIOGRAM REPORT: CPT | Mod: S$PBB,,, | Performed by: INTERNAL MEDICINE

## 2022-01-05 PROCEDURE — 99213 OFFICE O/P EST LOW 20 MIN: CPT | Mod: S$GLB,,, | Performed by: STUDENT IN AN ORGANIZED HEALTH CARE EDUCATION/TRAINING PROGRAM

## 2022-01-05 PROCEDURE — 99999 PR PBB SHADOW E&M-EST. PATIENT-LVL IV: ICD-10-PCS | Mod: PBBFAC,,, | Performed by: INTERNAL MEDICINE

## 2022-01-05 PROCEDURE — 76770 US EXAM ABDO BACK WALL COMP: CPT | Mod: 26,,, | Performed by: RADIOLOGY

## 2022-01-05 PROCEDURE — 99214 OFFICE O/P EST MOD 30 MIN: CPT | Mod: PBBFAC,25 | Performed by: INTERNAL MEDICINE

## 2022-01-05 PROCEDURE — 76770 US RETROPERITONEAL COMPLETE: ICD-10-PCS | Mod: 26,,, | Performed by: RADIOLOGY

## 2022-01-05 PROCEDURE — 99999 PR PBB SHADOW E&M-EST. PATIENT-LVL IV: CPT | Mod: PBBFAC,,, | Performed by: INTERNAL MEDICINE

## 2022-01-05 RX ORDER — GABAPENTIN 300 MG/1
300 CAPSULE ORAL 3 TIMES DAILY
Qty: 90 CAPSULE | Refills: 2 | Status: SHIPPED | OUTPATIENT
Start: 2022-01-05 | End: 2022-08-17

## 2022-01-05 NOTE — PATIENT INSTRUCTIONS
"Patient Education       Osteoarthritis   The Basics   Written by the doctors and editors at [facility]   What is osteoarthritis? -- Arthritis is a general term that means inflammation of the joints. There are dozens of types of arthritis. Osteoarthritis is the most common type. It often comes with age, and it often affects the hands, knees, and hips.  The place where 2 bones meet is normally covered with a rubbery material called cartilage. This material allows the bones to slide over each other without causing pain. When osteoarthritis sets in, the cartilage begins to break down. As it wears away, the bones in the joint start to rub against each other (figure 1). This can cause pain, stiffness, and swelling (table 1).  What can I do to feel better? -- To ease your symptoms, you can:  · Rest for several minutes when your pain is at its worst - But don't rest too long. That can make your muscles weak and your pain worse.  · Lose weight (if you are overweight) - Being overweight puts extra strain on your joints. Your doctor or nurse can talk to you about healthy ways to lose weight.  · Get plenty of physical activity - Having strong muscles takes some of the strain off of your joints. It can reduce your pain in the long run, even if it hurts to do at first. There are lots of different ways to get physical activity. Your doctor or nurse can help you come up with an exercise routine that works for you. They might also suggest you work with a physical therapist (exercise expert).   · Use "assistive devices" if your doctor recommends them - These devices can help keep your joints stable or take weight off them. Examples include shoe inserts, splints, canes, and walkers.  · Use hot or cold packs - Some people find that heat or cold helps relieves pain for a short time.  · Learn about arthritis - Learning about your condition allows you to work with your doctor or nurse to find the things that you are most helpful for you. It " can also help you better understand what to expect with your symptoms and treatment.  Can herbs, vitamins, or supplements help? -- There is no strong evidence that supplements of any sort work on arthritis symptoms. That's true even for glucosamine and chondroitin, which are supplements people seem to think help with arthritis. If you want to try any supplements or herbs, check with your doctor or nurse before taking them.  Are there medicines I can take? -- Usually, doctors suggest trying things like physical activity, weight loss, and assistive devices first. If these do not help with pain, they might recommend medicine. Options include medicines that come as pills as well as creams and gels that go on the skin.  In some situations, doctors might suggest shots that go into the joint to relieve pain. But these are not usually used as a main treatment, because they only work for a few weeks.  What about surgery? -- When other treatments do not help enough, some people with osteoarthritis get surgery. For instance, some people have surgery to replace a knee or a hip. Surgeons are working on other types of surgery for arthritis, too.  Try different things until you find what works -- The symptoms of osteoarthritis can be hard to handle. But don't lose hope. You might need to try different combinations of medicines, exercises, and devices to find the approach that works for you. But most people do find ways to go back to doing many of things they like to do.  All topics are updated as new evidence becomes available and our peer review process is complete.  This topic retrieved from [employer] on: Sep 21, 2021.  Topic 89777 Version 15.0  Release: 29.4.2 - C29.263  © 2021 UpToDate, Inc. and/or its affiliates. All rights reserved.  figure 1: Knee osteoarthritis     This drawing shows a normal knee joint next to a knee joint with osteoarthritis (OA). In the OA joint, the cartilage covering the ends of the bones roughens and  "becomes thin, while the bone underneath the cartilage grows thicker. Bony growths called "osteophytes" can form. The space between the bones also becomes narrower.  Graphic 168774 Version 2.0    table 1: Effects of osteoarthritis  Age when symptoms start    Usually after 40   Commonly affected body parts    Neck and lower back   Joint at the base of the thumb   Knuckles in the middle and near the tip of the fingers   Hip   Knee   Ankle joint   Knuckle at the base of the big toe   Less commonly affected body parts    Shoulder   Wrist   Elbow   Knuckles at the base of the fingers   Symptoms    Pain   Stiffness   Crackling or clicking sounds in the joints   Extra bone growth (for example, knuckles that look swollen or knobby)   Decreased range of motion   Problems with the alignment of certain joints   Tenderness to the touch   Graphic 97356 Version 2.0  Consumer Information Use and Disclaimer   This information is not specific medical advice and does not replace information you receive from your health care provider. This is only a brief summary of general information. It does NOT include all information about conditions, illnesses, injuries, tests, procedures, treatments, therapies, discharge instructions or life-style choices that may apply to you. You must talk with your health care provider for complete information about your health and treatment options. This information should not be used to decide whether or not to accept your health care provider's advice, instructions or recommendations. Only your health care provider has the knowledge and training to provide advice that is right for you. The use of this information is governed by the [employer] End User License Agreement, available at https://www.[employer]/en/solutions/[employer]/about/mireya.The use of [employer] content is governed by the [employer] Terms of Use. ©2021 UpToDate, Inc. All rights reserved.  Copyright   © 2021 UpToDate, Inc. and/or its " affiliates. All rights reserved.

## 2022-01-05 NOTE — LETTER
January 5, 2022    Sabino Rubio  [address] [facility] [address]             Jesus Chaves [facility]  [address]  [facility] [address]  Phone: [phone]  Fax: [phone] Dear Mr. Rubio:    Thank you for allowing me to serve you and perform your Executive Health exam on 1/5/2022.  This letter will serve a brief summary of the history, physical findings, and laboratory/studies performed and recommendations at that time.    Reason for Visit: Executive Health Preventive Physical Examination    Past Medical History:   Diagnosis Date    Allergy     Arthritis     Family history of malignant neoplasm of gastrointestinal tract mat.gf    Family history of prostate cancer: 1/2 brother 9/25/2017    GERD (gastroesophageal reflux disease)     Kidney cyst, acquired: R 1.2 cm 2015 9/25/2017    Restless leg syndrome     Tubulovillous adenoma 2013 due 2016 9/14/2015       Past Surgical History:   Procedure Laterality Date    APPLICATION OF BONE GRAFT TO FINGER Left 11/8/2021    Procedure: APPLICATION INTEGRA GRAFT, TO FINGER;  Surgeon: Alba Penn MD;  Location: [facility];  Service: Orthopedics;  Laterality: Left;    CARPAL TUNNEL RELEASE      COLONOSCOPY N/A 5/22/2019    Procedure: COLONOSCOPY;  Surgeon: Juancarlos Foley MD;  Location: [facility] (4TH FLR);  Service: Endoscopy;  Laterality: N/A;    EXCISION OF GANGLION OF WRIST Right 6/28/2019    Procedure: EXCISION, GANGLION CYST, WRIST right;  Surgeon: Alba Penn MD;  Location: [facility] OR 1ST FLR;  Service: Orthopedics;  Laterality: Right;  regional MAC, stretcher, supine, hand pan 1 and 2,MINI C-arm, call Arthrex    INJECTION OF FACET JOINT Bilateral 6/6/2019    Procedure: INJECTION, FACET JOINT INJECTION (LUMBAR BLOCK) BILATERAL L4-L5 AND L5-S1 FACET INJECTIONS;  Surgeon: Kaiser Braxton MD;  Location: [facility] PAIN MGT;  Service: Pain Management;  Laterality: Bilateral;  NEEDS CONSENT    INTERPOSITION ARTHROPLASTY OF  CARPOMETACARPAL JOINTS Right 6/28/2019    Procedure: INTERPOSITION ARTHROPLASTY, CMC JOINT right;  Surgeon: Alba Penn MD;  Location: [facility] OR 1ST FLR;  Service: Orthopedics;  Laterality: Right;  regional MAC, stretcher, supine, hand pan 1 and 2,MINI C-arm, call Arthrex    IRRIGATION AND DEBRIDEMENT OF UPPER EXTREMITY Left 11/8/2021    Procedure: IRRIGATION AND DEBRIDEMENT, UPPER EXTREMITY, left index finger;  Surgeon: Alba Penn MD;  Location: [facility] OR;  Service: Orthopedics;  Laterality: Left;    REPAIR OF NAIL BED Left 11/8/2021    Procedure: REPAIR, NAIL BED, left index;  Surgeon: Alba Penn MD;  Location: [facility] OR;  Service: Orthopedics;  Laterality: Left;    SPERMATOCELECTOMY Right 11/8/2019    Procedure: EXCISION, SPERMATOCELE;  Surgeon: Sheldon Araya Jr., MD;  Location: [facility] OR 1ST FLR;  Service: Urology;  Laterality: Right;  1hr       Family History   Problem Relation Age of Onset    Arthritis Mother         probable R.A.    Cancer Father         esophageal ca and brain tumor    Esophageal cancer Father     Melanoma Father     Cancer Brother         pancreatic cancer    Cancer Maternal Grandfather         colon    Colon cancer Maternal Grandfather     Irritable bowel syndrome Sister     Arthritis Sister     No Known Problems Son     No Known Problems Brother     No Known Problems Son     Cancer Sister     No Known Problems Brother     Diabetes Neg Hx     Heart disease Neg Hx     Cirrhosis Neg Hx     Celiac disease Neg Hx     Crohn's disease Neg Hx     Ulcerative colitis Neg Hx     Stomach cancer Neg Hx     Rectal cancer Neg Hx     Liver cancer Neg Hx     Psoriasis Neg Hx     Lupus Neg Hx        Social History     Socioeconomic History    Marital status: Single   Tobacco Use    Smoking status: Never Smoker    Smokeless tobacco: Never Used    Tobacco comment: The patient works in the construction industry.  He is very active at work but does not  engage in outside activities.   Substance and Sexual Activity    Alcohol use: Yes     Alcohol/week: 0.0 standard drinks     Comment: 2-3 days weekly, up to 2 beers    Drug use: No    Sexual activity: Yes     Partners: Female        Review of patient's allergies indicates:  No Known Allergies      Current Outpatient Medications:     fluticasone propionate (FLONASE) 50 mcg/actuation nasal spray, 1 spray by Each Nostril route 2 (two) times a day., Disp: , Rfl:     meloxicam (MOBIC) 15 MG tablet, TAKE 1 TABLET (15 MG TOTAL) BY MOUTH DAILY AS NEEDED FOR PAIN (TAKE WITH FOOD OR A MEAL)., Disp: 90 tablet, Rfl: 3    gabapentin (NEURONTIN) 300 MG capsule, Take 1 capsule (300 mg total) by mouth 3 (three) times daily., Disp: 90 capsule, Rfl: 2    pneumoc 13-aaron conj-dip cr,PF, (PREVNAR 13, PF,) 0.5 mL Syrg, Inject 0.5 mLs into the muscle once. for 1 dose, Disp: 0.5 mL, Rfl: 0    varicella-zoster gE-AS01B, PF, (SHINGRIX, PF,) 50 mcg/0.5 mL injection, Inject 0.5 mLs into the muscle once. for 1 dose, Disp: 1 each, Rfl: 0     Review of Systems  Review of Systems - Negative except for multiple orthopedic issues last year including a nail bed laceration, elbow cellulitis as well as ongoing neck and back pain.  You also have had some discomfort in the right flank.  Lastly, you had some atypical chest discomfort that is a burning sensation, often occurring at rest, roughly once weekly for the past year    Physical Exam:  General: General appearance: alert, well appearing, and in no distress.   Skin: Skin exam - normal coloration and turgor, no rashes, no suspicious skin lesions noted.  HEENT: Ears - bilateral TM's and external ear canals normal. , ENT exam reveals - ENT exam normal, no neck nodes or sinus tenderness.   Lungs: Chest: clear to auscultation, no wheezes, rales or rhonchi, symmetric air entry.   Heart: CVS exam: normal rate, regular rhythm, normal S1, S2, no murmurs, rubs, clicks or gallops.   Extremities: Exam of  extremities: peripheral pulses normal, no pedal edema, no clubbing or cyanosis    Labs:  Results for orders placed or performed in visit on 01/04/22   Comprehensive metabolic panel   Result Value Ref Range    Sodium 135 (L) 136 - 145 mmol/L    Potassium 4.7 3.5 - 5.1 mmol/L    Chloride 100 95 - 110 mmol/L    CO2 27 23 - 29 mmol/L    Glucose 122 (H) 70 - 110 mg/dL    BUN 16 8 - 23 mg/dL    Creatinine 0.9 0.5 - 1.4 mg/dL    Calcium 9.6 8.7 - 10.5 mg/dL    Total Protein 7.3 6.0 - 8.4 g/dL    Albumin 4.2 3.5 - 5.2 g/dL    Total Bilirubin 0.6 0.1 - 1.0 mg/dL    Alkaline Phosphatase 69 55 - 135 U/L    AST 25 10 - 40 U/L    ALT 21 10 - 44 U/L    Anion Gap 8 8 - 16 mmol/L    eGFR if African American >60.0 >60 mL/min/1.73 m^2    eGFR if non African American >60.0 >60 mL/min/1.73 m^2   CBC Without Differential   Result Value Ref Range    WBC 6.31 3.90 - 12.70 K/uL    RBC 5.06 4.60 - 6.20 M/uL    Hemoglobin 15.2 14.0 - 18.0 g/dL    Hematocrit 47.2 40.0 - 54.0 %    MCV 93 82 - 98 fL    MCH 30.0 27.0 - 31.0 pg    MCHC 32.2 32.0 - 36.0 g/dL    RDW 13.1 11.5 - 14.5 %    Platelets 226 150 - 450 K/uL    MPV 9.7 9.2 - 12.9 fL   Lipid panel   Result Value Ref Range    Cholesterol 215 (H) 120 - 199 mg/dL    Triglycerides 66 30 - 150 mg/dL    HDL 87 (H) 40 - 75 mg/dL    LDL Cholesterol 114.8 63.0 - 159.0 mg/dL    HDL/Cholesterol Ratio 40.5 20.0 - 50.0 %    Total Cholesterol/HDL Ratio 2.5 2.0 - 5.0    Non-HDL Cholesterol 128 mg/dL   TSH   Result Value Ref Range    TSH 1.603 0.400 - 4.000 uIU/mL   PSA, Screening (every year)   Result Value Ref Range    PSA, Screen 0.77 0.00 - 4.00 ng/mL   Hemoglobin A1c   Result Value Ref Range    Hemoglobin A1C 5.5 4.0 - 5.6 %    Estimated Avg Glucose 111 68 - 131 mg/dL      EKG was acceptable.    Assessment/Recommendations:  Routine Health Maintenance:  Is up-to-date.  You got the first of the 2 shingles vaccines today; the second shingles vaccine will be due in 2-6 months.  You also got the Prevnar 13  today.  Next year you should have the Pneumovax 23 vaccine.     We talked about having you get reestablished in the Spine Clinic given the neck and spine symptoms that you are experiencing.  I also recommend that you resume gabapentin, primarily at night, as this will help with neck symptoms.  You could increase it to twice or 3 times a day slowly if necessary.     Given your atypical chest symptoms, EKG was done today which was normal.  I would recommend a coronary CT and determination of whether you be a candidate for cholesterol treatment.  If your chest symptoms change and occur with exertion, please let me know as we would want to do a stress test or further evaluation with a cardiologist.    I have ordered a urinalysis and a renal ultrasound to evaluate the flank pain and the cyst that you have on the right side.  Further testing may be necessary pending those results.     You are due for colonoscopy later this year, and this has been ordered; please schedule this at your convenience.      Please contact me should you have any questions or concerns regarding physical findings, or my recommendations.        Sincerely,          Alba Baum MD, FACP

## 2022-01-05 NOTE — PROGRESS NOTES
Subjective:       Patient ID: Sabino Rubio is a 66 y.o. male.    Chief Complaint: Executive Health    Executive physical    Busy year in terms of orthopedic issues.  Had a nail bed injury with a saw and also has had shoulder issues and a cellulitis of the elbow.    Will be due for colonoscopy later this year.    Prevnar 13 and shingles vaccines recommended.  He is willing.    Labs acceptable other than lipids, reviewed; intermediate risk.  EKG today acceptable.  Over the past year, on rare occasions, he has had a slight burning in the left side of his chest.  This seems to occur mainly at rest and he can often press on the chest and duplicate the symptoms; this may occur once a week if that.  He does not have any jaw pain or syncope, sweating or nausea.  I recommend a coronary CT for risk stratification and he is willing.    Other issues include both neck and back pain for which se would like to be seen in the Spine Clinic.     He also has had some right flank discomfort which seems to be worse when he lies on his left side.  Recall that he has a right kidney cyst.  He has not had any blood in his urine or any known kidney stones.  He denies any GI symptoms.    The 10-year ASCVD risk score (Nayeli KALIA Jr., et al., 2013) is: 8.5%    Values used to calculate the score:      Age: 66 years      Sex: Male      Is Non- : No      Diabetic: No      Tobacco smoker: No      Systolic Blood Pressure: 118 mmHg      Is BP treated: No      HDL Cholesterol: 87 mg/dL      Total Cholesterol: 215 mg/dL    Patient Active Problem List:     Restless leg syndrome     Cervical spondylosis without myelopathy     Brachial neuritis or radiculitis NOS     Tubulovillous adenoma: 2013     Kidney cyst, acquired: R 1.2 cm 2015; stable 1/19 no follow up recommended     Spondylosis of lumbar region without myelopathy or radiculopathy     Arthritis of carpometacarpal (CMC) joint of right thumb     Allergic rhinitis      Spermatocele     Tubular adenoma of colon: see colonoscopy 2019 repeat 2022     Diverticulosis: see colonoscopy 2019     IFG (impaired fasting glucose)      Review of Systems   Constitutional: Negative.    HENT: Negative for congestion, postnasal drip, rhinorrhea and sinus pressure.    Eyes: Negative for redness and visual disturbance.   Respiratory: Negative for choking, shortness of breath and stridor.    Cardiovascular: Negative for chest pain, palpitations and leg swelling.        See above   Gastrointestinal: Negative for abdominal pain, constipation, diarrhea and nausea.   Genitourinary: Positive for flank pain. Negative for difficulty urinating, frequency, testicular pain and urgency.        See above  Occasional slight hesitancy with urination and some nocturia   Musculoskeletal: Positive for arthralgias, back pain and neck pain. Negative for myalgias.        See above   Skin: Negative for color change and rash.   Neurological: Negative.    Psychiatric/Behavioral: Negative.        Objective:      Physical Exam  Constitutional:       Appearance: He is well-developed.   HENT:      Head: Normocephalic and atraumatic.      Right Ear: External ear normal.      Left Ear: External ear normal.   Eyes:      Extraocular Movements: Extraocular movements intact.      Conjunctiva/sclera: Conjunctivae normal.   Neck:      Thyroid: No thyromegaly.   Cardiovascular:      Rate and Rhythm: Normal rate and regular rhythm.      Heart sounds: No murmur heard.      Pulmonary:      Effort: Pulmonary effort is normal. No respiratory distress.      Breath sounds: Normal breath sounds. No wheezing.   Abdominal:      General: There is no distension.      Palpations: Abdomen is soft.      Tenderness: There is no abdominal tenderness.   Musculoskeletal:         General: No tenderness.      Cervical back: Normal range of motion and neck supple.      Right lower leg: No edema.      Left lower leg: No edema.      Comments: No CVA  tenderness  Gait and strength within normal limits   Lymphadenopathy:      Cervical: No cervical adenopathy.   Skin:     General: Skin is warm and dry.   Neurological:      General: No focal deficit present.      Mental Status: He is alert and oriented to person, place, and time.      Cranial Nerves: No cranial nerve deficit.   Psychiatric:         Mood and Affect: Mood normal.         Behavior: Behavior normal.         Thought Content: Thought content normal.         Judgment: Judgment normal.         Assessment:       1. Annual physical exam    2. Tubular adenoma of colon: see colonoscopy 2019 repeat 2022    3. Tubulovillous adenoma: 2013    4. IFG (impaired fasting glucose)    5. Spondylosis of lumbar region without myelopathy or radiculopathy    6. Right flank pain    7. Mixed hyperlipidemia    8. Atypical chest pain    9. Degeneration of cervical intervertebral disc        Plan:         Sabino was seen today for [facility].    Diagnoses and all orders for this visit:    Annual physical exam    Tubular adenoma of colon: see colonoscopy 2019 repeat 2022  -     Case Request Endoscopy: COLONOSCOPY    Tubulovillous adenoma: 2013  -     Case Request Endoscopy: COLONOSCOPY    IFG (impaired fasting glucose)    Spondylosis of lumbar region without myelopathy or radiculopathy  -     Ambulatory referral/consult to [facility]; Future    Right flank pain  -     US Retroperitoneal Complete; Future  -     Urinalysis, Reflex to Urine Culture Urine, Clean Catch    Mixed hyperlipidemia  -     CT Cardiac Scoring; Future    Atypical chest pain  -     EKG 12-lead:  Acceptable    Degeneration of cervical intervertebral disc  -     gabapentin (NEURONTIN) 300 MG capsule; Take 1 capsule (300 mg total) by mouth 3 (three) times daily.      Prevnar 13 and shingles vaccines recommended  Exercise as tolerated  May use gabapentin again for next symptoms, cautions and side effects reviewed.  Recommended he started only at night  and then gradually increase as tolerated  Schedule colonoscopy  I will review all studies and determine further tx depending on findings

## 2022-01-05 NOTE — PROGRESS NOTES
Subjective:       Patient ID:  Sabino Rubio is a 66 y.o. male who presents for   Chief Complaint   Patient presents with    Skin Check     UBSE     History of Present Illness: The patient presents for follow up of skin check.    The patient was last seen on: 3/16/20 for TBSE by Dr Dubois.     Other skin complaints: none    Notes a spot on the L temple, scaling, crusting, no other sxs.      Review of Systems   Skin: Positive for activity-related sunscreen use and wears hat (90%). Negative for daily sunscreen use and recent sunburn.   Hematologic/Lymphatic: Bruises/bleeds easily.        Objective:    Physical Exam   Constitutional: He appears well-developed and well-nourished. No distress.   Neurological: He is alert and oriented to person, place, and time. He is not disoriented.   Psychiatric: He has a normal mood and affect.   Skin:   Areas Examined (abnormalities noted in diagram):   Scalp / Hair Palpated and Inspected  Head / Face Inspection Performed  Neck Inspection Performed  Chest / Axilla Inspection Performed  Abdomen Inspection Performed  Back Inspection Performed  RUE Inspected  LUE Inspection Performed  Nails and Digits Inspection Performed                   Diagram Legend     Erythematous scaling macule/papule c/w actinic keratosis       Vascular papule c/w angioma      Pigmented verrucoid papule/plaque c/w seborrheic keratosis      Yellow umbilicated papule c/w sebaceous hyperplasia      Irregularly shaped tan macule c/w lentigo     1-2 mm smooth white papules consistent with Milia      Movable subcutaneous cyst with punctum c/w epidermal inclusion cyst      Subcutaneous movable cyst c/w pilar cyst      Firm pink to brown papule c/w dermatofibroma      Pedunculated fleshy papule(s) c/w skin tag(s)      Evenly pigmented macule c/w junctional nevus     Mildly variegated pigmented, slightly irregular-bordered macule c/w mildly atypical nevus      Flesh colored to evenly pigmented papule c/w  intradermal nevus       Pink pearly papule/plaque c/w basal cell carcinoma      Erythematous hyperkeratotic cursted plaque c/w SCC      Surgical scar with no sign of skin cancer recurrence      Open and closed comedones      Inflammatory papules and pustules      Verrucoid papule consistent consistent with wart     Erythematous eczematous patches and plaques     Dystrophic onycholytic nail with subungual debris c/w onychomycosis     Umbilicated papule    Erythematous-base heme-crusted tan verrucoid plaque consistent with inflamed seborrheic keratosis     Erythematous Silvery Scaling Plaque c/w Psoriasis     See annotation      Assessment / Plan:        Encounter for screening for malignant neoplasm of skin  Upper body skin examination performed today including at least 6 points as noted in physical examination. No lesions suspicious for malignancy noted.    Recommend daily sun protection/avoidance and use of at least SPF 30, broad spectrum sunscreen (OTC drug).     Seborrheic keratoses  These are benign inherited growths without a malignant potential. Reassurance given to patient. No treatment is necessary.     Hemangioma, unspecified site  This is a benign vascular lesion. Reassurance given. No treatment required.     Lentigo  This is a benign hyperpigmented sun induced lesion. Recommend daily sun protection/avoidance and use of at least SPF 30, broad spectrum sunscreen (OTC drug) will reduce the number of new lesions. Treatment of these benign lesions are considered cosmetic.      Dermal nevus  Discussed ABCDE's of nevi.  Monitor for new mole or moles that are becoming bigger, darker, irritated, or developing irregular borders. Brochure provided. Instructed patient to observe lesion(s) for changes and follow up in clinic if changes are noted. Patient to monitor skin at home for new or changing lesions.       Recommend daily sun protection/avoidance and use of at least SPF 30, broad spectrum sunscreen (OTC drug).               No follow-ups on file.

## 2022-01-10 DIAGNOSIS — N28.1 KIDNEY CYST, ACQUIRED: Primary | ICD-10-CM

## 2022-01-11 ENCOUNTER — CLINICAL SUPPORT (OUTPATIENT)
Dept: REHABILITATION | Facility: HOSPITAL | Age: 67
End: 2022-01-11
Payer: MEDICARE

## 2022-01-11 DIAGNOSIS — R52 PAIN: ICD-10-CM

## 2022-01-11 DIAGNOSIS — M25.60 DECREASED RANGE OF MOTION: ICD-10-CM

## 2022-01-11 PROCEDURE — 97110 THERAPEUTIC EXERCISES: CPT

## 2022-01-11 PROCEDURE — 97022 WHIRLPOOL THERAPY: CPT

## 2022-01-11 NOTE — PROGRESS NOTES
"[facility]  Occupational Therapy Treatment Note    Date: 1/11/2022  Name: Sabino Rubio  Clinic Number: [id number]    Therapy Diagnosis:   Encounter Diagnoses   Name Primary?    Decreased range of motion     Pain      Physician: Albert Mai MD    Physician Orders: OT Eval and Treat s/p nail bed injury and integra placement left index, range of motion  12/21/21: Xray- fracture shows healing since the time of injury.  I don't see any concerning findings, but based on the radiologist's report let's plan to see him back one more time with repeat XRay in 3-4 weeks.   Continue OT as scheduled and they can advance his strengthening as tolerated in OT.   Medical Diagnosis: S61.311A (ICD-10-CM) - Laceration of left index finger with damage to nail, foreign body presence unspecified, initial encounter  Surgical Procedure and Date: S/p nail bed injury and integra placement left index, 11/8/2021 Date of Injury/Onset: 11/3/2021   Evaluation Date: 11/30/2021  Insurance Authorization Period Expiration: 11/23/2022  Plan of Care Expiration: 3/1/2022   Progress Note Due: 12/14/2021  Date of Return to MD: 12/21/2021  Visit # / Visits authorized: 4 / 12   FOTO: 12/7/2021    Precautions:  Standard    Time In: 07:03  am   Time Out: 07:53 am   Total Time:  50 minutes  Total Billable Time: 50  minutes    SUBJECTIVE     Pt reports: "I was sore last week. It was swollen.   He was compliant with home exercise program given last session.   Response to previous treatment: Good - improving motion and strength   Functional change: increased use of left hand for functional tasks at work     Pain: 0/10  Location: left index finger       OBJECTIVE   Objective Measures updated at progress report unless specified.    Mental status: alert     Observation:   Moderate scabbing over if nail, tip, C/D/I     Sensation:   WNL     Edema: Circumferential measurements: In centimters       11/30/2021 11/30/2021 12/14/2021     " Left Right Left   Index:          P1 7.9 7.2 7.5    PIP 8.1 7.7 7.7   P2 7.1 6.1 6.7   Long:          P1 7.5 7.0 7.3    PIP 8.1 7.5 7.7   P2 6.8 6.3 6.6      Range of Motion:   Left     Finger AROM    INDEX  LONG  Left Index Right Index Left Index Left Index    MP  0/75 0/86 0/80 0/85 0/85 83   PIP  0/80 0/96 0/90 0/95 0/90 101   DIP  0/24 0/45 0/45 0/70 0/55 52   CARTAGENA 179 227 215 250 230 (+15)        Strength: (FILOMENA Dynamometer in psi.)        11/30/2021 11/30/2021 12/7/2021 12/14/2021 1/11/2022     Left Right Left Left Left    Rung II 56 93 72 86 85      Pinch Strength (Measured in psi)       11/30/2021 11/30/2021 12/7/2021 12/7/2021 12/14/2021 1/11/2022     Left Right Left Right Left Left    Key Pinch N/T  17  19 20 21 22 (+1)   3pt Pinch N/T  15  14 18 15 18 (+3)   Tip Pinch   5 15 7 13 (+6)     Treatment     Sabino received the treatments listed below:     Supervised modalities after being cleared for contradictions: Fluidotherapy - 115 degrees and 50 speed, to L hand for 10 minutes with tip of finger covered to increase blood flow and circulation, desensitization and sensory re-education, pain management, and increased tissue extensibility prior to therex. Pt instructed on performing active range of motion exercises while receiving heat to increase active motion.     Therapeutic exercises to develop strength, endurance, ROM and flexibility for 40 minutes, including:  Exercise Reps/Time   Gentle digit PROM to L IF: ORL stretch, hook stretch, composite flexion  10    TGEs (performed in fluido) x 10   Joint blocking L IF DIP/PIP 2 sets x 10   Finger scrapes (pink putty) Forward/backward x 8 each    Pinky putty with marbles (using index finger)   X 6 marbles    Theraputty HEP: green @ home 2 min molding  2 min gripping  PIP flexion x 10 reps   Ultra gripper 4th setting with silver ring; pegboard 2 sets        Patient Education and Home Exercises      Education provided:   - Cont prior HEP provided; added finger  scrapes   - Added to HEP: Home Desensitization program, 2 x a day with sensory stick provided. Gentle digit PROM to pain tolerance, 4 x a day.  - Upgraded to green theraputty for hand strengthening HEP.   - Silicone gel scar sheet applied with digit sleeve size XL for wound/edema management, compression, and support. Pt educated on purpose, wear, care, and precautions.   - Progress towards goals     Written Home Exercises Provided: Patient instructed to cont prior HEP.  Exercises were reviewed and Sabino was able to demonstrate them prior to the end of the session.  Sabino demonstrated good  understanding of the HEP provided. See EMR under Patient Instructions for exercises provided during therapy sessions.      ASSESSMENT     Pt would continue to benefit from skilled OT. Minor sensitivity following putty scrapes.  Nail bed cont to be healing well with continued decreasing sensitivity. Able to michelle all exercises without increase in pain.  Overall, pt is progressing well towards personal goals.     Sabino is progressing well towards his goals and there are no updates to goals at this time. Pt prognosis is Excellent.     Pt will continue to benefit from skilled outpatient occupational therapy to address the deficits listed in the problem list on initial evaluation, provide pt/family education and to maximize pt's level of independence in the home and community environment.     Pt's spiritual, cultural and educational needs considered and pt agreeable to plan of care and goals.    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 ------ met 1/11/2022   2)   Increase  strength to 75 lbs. to grasp hammer. Met 1/11/2022  3)   Increase 3J pinch to 12 psis for opening fastener containers. Met 12/7/2021  4)   Decrease complaints of pain to  2 out of 10 at  worst to increase functional hand use for ADL/work/leisure activities. ------t met 1/11/2022  5)   Pt will return to near to prior level of function for ADLs and household management reporting I or Mod I with ADLs (dressing, feeding, grooming, toileting). ------ met 1/11/2022      STG's (4 weeks)  1)   Patient to be IND with HEP and modalities for pain/edema managment. ------ met 1/11/2022  2)   Increase ROM left IF to 90% WNL grossly all planes to increase functional hand use for ADLs/work/leisure activities. Met 12/14/2021  3)   Increase  strength to 60 lbs. to improve functional grasp for ADLs/work/leisure activities. Met 12/7/2021  4)   Increase 3J pinch to 7 psi's to increase independence with button and FM Coordination. Met 12/7/2021  5)   Decrease complaints of pain to  3 out of 10 at worst to increase functional hand use for ADL/work/leisure activities. Met 12/14/2021    PLAN     Continue skilled occupational therapy with individualized plan of care focusing on increasing ROM, strength, and functional use of patient's left hand.     Updates/Grading for next session: plan for discharge     Shaunna Hitchcock OTR/L

## 2022-01-13 ENCOUNTER — OFFICE VISIT (OUTPATIENT)
Dept: SPINE | Facility: CLINIC | Age: 67
End: 2022-01-13
Payer: MEDICARE

## 2022-01-13 ENCOUNTER — TELEPHONE (OUTPATIENT)
Dept: ADMINISTRATIVE | Facility: OTHER | Age: 67
End: 2022-01-13
Payer: MEDICARE

## 2022-01-13 VITALS
HEIGHT: 70 IN | WEIGHT: 153.88 LBS | BODY MASS INDEX: 22.03 KG/M2 | SYSTOLIC BLOOD PRESSURE: 117 MMHG | DIASTOLIC BLOOD PRESSURE: 76 MMHG | HEART RATE: 83 BPM

## 2022-01-13 DIAGNOSIS — M50.30 DDD (DEGENERATIVE DISC DISEASE), CERVICAL: Primary | ICD-10-CM

## 2022-01-13 DIAGNOSIS — M47.816 SPONDYLOSIS OF LUMBAR REGION WITHOUT MYELOPATHY OR RADICULOPATHY: ICD-10-CM

## 2022-01-13 DIAGNOSIS — M47.812 SPONDYLOSIS OF CERVICAL REGION WITHOUT MYELOPATHY OR RADICULOPATHY: ICD-10-CM

## 2022-01-13 DIAGNOSIS — M51.36 DDD (DEGENERATIVE DISC DISEASE), LUMBAR: ICD-10-CM

## 2022-01-13 PROCEDURE — 99999 PR PBB SHADOW E&M-EST. PATIENT-LVL III: CPT | Mod: PBBFAC,,, | Performed by: NURSE PRACTITIONER

## 2022-01-13 PROCEDURE — 99214 PR OFFICE/OUTPT VISIT, EST, LEVL IV, 30-39 MIN: ICD-10-PCS | Mod: S$GLB,,, | Performed by: NURSE PRACTITIONER

## 2022-01-13 PROCEDURE — 99214 OFFICE O/P EST MOD 30 MIN: CPT | Mod: S$GLB,,, | Performed by: NURSE PRACTITIONER

## 2022-01-13 PROCEDURE — 99999 PR PBB SHADOW E&M-EST. PATIENT-LVL III: ICD-10-PCS | Mod: PBBFAC,,, | Performed by: NURSE PRACTITIONER

## 2022-01-13 PROCEDURE — 99213 OFFICE O/P EST LOW 20 MIN: CPT | Mod: PBBFAC | Performed by: NURSE PRACTITIONER

## 2022-01-13 NOTE — H&P (VIEW-ONLY)
Subjective:      Patient ID: Sabino Rubio is a 66 y.o. male.    Chief Complaint: Neck Pain and Low-back Pain    HPI Mr. Rubio is a 66 year old male here for evaluation of neck and low back pain. He was referred by his PCP, Dr. Baum, for consultation.     History of chronic back/neck pain.      Known multi level degenerative disc disease and facet arthropathy contributing to central and neuroforaminal stenosis, most severe at L4/5 and L5/S1.      He reports bilateral neck pain which started about one month ago with ZULY. He denies arm pain or numbness and tingling. The neck feels stiff.   He reports low back pain, R>L. He denies leg pain or numbness and tingling in the LEs.      He has constant pain in the back with no leg pain. No numbness, tingling, or weakness in his legs. Pain is a 2 now, and its 10++++ when he has a flare up. Pain is sharp, shooting. Pain is better with rest. 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     He is on mobic, neurontin. He did PT in the past with some benefit. Did have bilateral L4-5 and L5-S1 facet joint injection with Dr. Braxton in 2019 with good, but temporary relief.     Lumbar MRI 2018    FINDINGS:  Alignment: Mild straightening of normal lumbar lordosis.     Vertebrae: Mild vertebral height loss and degenerative signal change.  No evidence of acute fracture or infiltrative marrow process.     Discs: Intervertebral disc height loss and degenerative signal change, most prominent within the lower lumbar spine.     Cord: Normal.  Conus terminates at L1-L2.     Degenerative findings:     T12-L1: Mild broad-based disc bulge and facet arthropathy without significant spinal canal stenosis or neural foraminal narrowing.     L1-L2: Mild broad-based disc bulge and facet arthropathy without significant spinal canal stenosis or neural foraminal narrowing.     L2-L3: Broad-based disc bulge, ligamentum flavum thickening, and facet arthropathy  without significant spinal canal stenosis or neural foraminal narrowing.     L3-L4: Broad-based disc bulge, ligamentum flavum thickening, and facet arthropathy resulting in moderate spinal canal stenosis and mild bilateral neural foraminal narrowing.     L4-L5: Broad-based disc bulge, ligamentum flavum thickening, and facet arthropathy resulting in moderate spinal canal stenosis, moderate left neural foraminal narrowing, and mild right neural foraminal narrowing.     L5-S1: Broad-based disc bulge, ligamentum flavum thickening, and facet arthropathy resulting in moderate left and mild right neural foraminal narrowing.     Paraspinal muscles & soft tissues: Unremarkable.     IMPRESSION:      Multilevel degenerative change of the lumbar spine most significant at L3-L4 which demonstrates moderate spinal canal stenosis and mild bilateral neural foraminal narrowing.  Moderate left and mild right neural foraminal narrowing seen at L4-L5 and L5-S1.  Additional details above.    Cervical MRI 2018    FINDINGS:  Alignment: Grade 1 retrolisthesis of C3 on C4/anterolisthesis of C4 on C5 as well as grade 1 retrolisthesis of C6 on C7.     Vertebrae: Relative preservation of vertebral body heights without acute fracture or infiltrative marrow process.  Mild scattered areas of degenerative signal change.     Discs: Intervertebral disc height loss and degenerative signal changes, most prominent within the mid cervical spine.     Cord: Within normal limits without abnormal cord signal.     Skull base and craniocervical junction: Normal.     Degenerative findings:     C2-C3: No significant spinal canal stenosis or neural foraminal narrowing.     C3-C4: Mild retrolisthesis of C3 on C4, posterior disc osteophyte complex, and facet arthropathy resulting in near complete effacement of the anterior and posterior thecal sac with mild contour changes of the spinal cord and without internal cord signal change suggesting moderate spinal canal  stenosis.  Severe right and moderate left neural foraminal narrowing.     C4-C5: Mild anterolisthesis of C4 on C5, posterior disc osteophyte complex, and facet arthropathy resulting in near complete effacement of the anterior and posterior thecal sac with mild contour changes of the spinal cord and without internal cord signal change suggesting moderate spinal canal stenosis.  Severe bilateral neural foraminal narrowing.     C5-C6: Minimal posterior disc osteophyte complex and facet arthropathy resulting in mild spinal canal stenosis.     C6-C7: Mild retrolisthesis of C6 on C7, posterior disc osteophyte complex, and facet arthropathy resulting in mild spinal canal stenosis and mild bilateral neural foraminal narrowing.     C7-T1: Minimal posterior disc osteophyte complex and facet arthropathy resulting in mild left neural foraminal narrowing.     Paraspinal muscles & soft tissues: Unremarkable.     IMPRESSION:      Mild progression of cervical lordosis compared to MRI cervical spine 02/24/2014.  Most significant level of degenerative change at C4-C5 which demonstrates moderate spinal canal stenosis and severe bilateral neural foraminal narrowing.  Additional details above.    Lumbar xray 2019    FINDINGS:  There is mild curvature of the lumbar spine.  The vertebral bodies are normally aligned.  There is mild concavity along the superior endplate of L3 similar to prior study.  There are facet degenerative changes..  There is multilevel disc space narrowing and overall mild to moderate osteophytic spurring.  No evidence of instability with flexion or extension.    Cervical xray 2018    4 views cervical spine, comparison 2014.  C1-C2 normal.  Airway soft tissues normal.  Mild levoscoliosis cervicothoracic junction.  Limited range of motion on flexion and extension.  Progressive degenerative disc spondylosis C3-C6 levels.  Progression of facet joint arthropathy particularly C3 through C5, C6-C7.  Primary anterolisthesis  C4 on C3.  IMPRESSION:    Progression of degenerative disc spondylosis facet arthropathy cervical spine.  No fracture.     Past Medical History:   Diagnosis Date    Allergy     Arthritis     Family history of malignant neoplasm of gastrointestinal tract mat.gf    Family history of prostate cancer: 1/2 brother 9/25/2017    GERD (gastroesophageal reflux disease)     Kidney cyst, acquired: R 1.2 cm 2015 9/25/2017    Restless leg syndrome     Tubulovillous adenoma 2013 due 2016 9/14/2015       Past Surgical History:   Procedure Laterality Date    APPLICATION OF BONE GRAFT TO FINGER Left 11/8/2021    Procedure: APPLICATION INTEGRA GRAFT, TO FINGER;  Surgeon: Alba Penn MD;  Location[facility];  Service: Orthopedics;  Laterality: Left;    CARPAL TUNNEL RELEASE      COLONOSCOPY N/A 5/22/2019    Procedure: COLONOSCOPY;  Surgeon: Juancarlos Foley MD;  Location: [facility] (4TH FLR);  Service: Endoscopy;  Laterality: N/A;    EXCISION OF GANGLION OF WRIST Right 6/28/2019    Procedure: EXCISION, GANGLION CYST, WRIST right;  Surgeon: Alba Penn MD;  [address];  Service: Orthopedics;  Laterality: Right;  regional MAC, stretcher, supine, hand pan 1 and 2,MINI C-arm, call [employer]    INJECTION OF FACET JOINT Bilateral 6/6/2019    Procedure: INJECTION, FACET JOINT INJECTION (LUMBAR BLOCK) BILATERAL L4-L5 AND L5-S1 FACET INJECTIONS;  Surgeon: Kaiser Braxton MD;  Location: [facility] PAIN MGT;  Service: Pain Management;  Laterality: Bilateral;  NEEDS CONSENT    INTERPOSITION ARTHROPLASTY OF CARPOMETACARPAL JOINTS Right 6/28/2019    Procedure: INTERPOSITION ARTHROPLASTY, CMC JOINT right;  Surgeon: Alba Penn MD;  [address];  Service: Orthopedics;  Laterality: Right;  regional MAC, stretcher, supine, hand pan 1 and 2,MINI C-arm, call [employer]rex    IRRIGATION AND DEBRIDEMENT OF UPPER EXTREMITY Left 11/8/2021    Procedure: IRRIGATION AND DEBRIDEMENT, UPPER EXTREMITY, left index  finger;  Surgeon: Alba Penn MD;  Location: [facility] OR;  Service: Orthopedics;  Laterality: Left;    REPAIR OF NAIL BED Left 11/8/2021    Procedure: REPAIR, NAIL BED, left index;  Surgeon: Alba Penn MD;  Location: [facility] OR;  Service: Orthopedics;  Laterality: Left;    SPERMATOCELECTOMY Right 11/8/2019    Procedure: EXCISION, SPERMATOCELE;  Surgeon: Sheldon Araya Jr., MD;  [address];  Service: Urology;  Laterality: Right;  1hr       Family History   Problem Relation Age of Onset    Arthritis Mother         probable R.A.    Cancer Father         esophageal ca and brain tumor    Esophageal cancer Father     Melanoma Father     Cancer Brother         pancreatic cancer    Cancer Maternal Grandfather         colon    Colon cancer Maternal Grandfather     Irritable bowel syndrome Sister     Arthritis Sister     No Known Problems Son     No Known Problems Brother     No Known Problems Son     Cancer Sister     No Known Problems Brother     Diabetes Neg Hx     Heart disease Neg Hx     Cirrhosis Neg Hx     Celiac disease Neg Hx     Crohn's disease Neg Hx     Ulcerative colitis Neg Hx     Stomach cancer Neg Hx     Rectal cancer Neg Hx     Liver cancer Neg Hx     Psoriasis Neg Hx     Lupus Neg Hx        Social History     Socioeconomic History    Marital status: Single   Tobacco Use    Smoking status: Never Smoker    Smokeless tobacco: Never Used    Tobacco comment: 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   Substance and Sexual Activity    Alcohol use: Yes     Alcohol/week: 0.0 standard drinks     Comment: 2-3 days weekly, up to 2 beers    Drug use: No    Sexual activity: Yes     Partners: Female       Current Outpatient Medications   Medication Sig Dispense Refill    fluticasone propionate (FLONASE) 50 mcg/actuation nasal spray 1 spray by Each Nostril route 2 (two) times a day.       gabapentin (NEURONTIN) 300 MG capsule Take 1 capsule (300 mg total) by mouth 3 (three) times daily. 90 capsule 2    meloxicam (MOBIC) 15 MG tablet TAKE 1 TABLET (15 MG TOTAL) BY MOUTH DAILY AS NEEDED FOR PAIN (TAKE WITH FOOD OR A MEAL). 90 tablet 3     No current facility-administered medications for this visit.       Review of patient's allergies indicates:  No Known Allergies      Review of Systems   Constitutional: Negative for fever, weight gain and weight loss.   Cardiovascular: Negative for chest pain.   Respiratory: Negative for shortness of breath.    Musculoskeletal: Positive for back pain and neck pain. Negative for falls.   Gastrointestinal: Negative for abdominal pain, bowel incontinence, nausea and vomiting.   Genitourinary: Negative for bladder incontinence.   Neurological: Negative for focal weakness, numbness, paresthesias, sensory change and weakness.         Objective:        General: Sabino is well-developed, well-nourished, appears stated age, in no acute distress, alert and oriented to time, place and person.     General    Vitals reviewed.  Constitutional: He is oriented to person, place, and time. He appears well-developed and well-nourished.   HENT:   Head: Atraumatic.   Nose: Nose normal.   Eyes: Conjunctivae are normal.   Cardiovascular: Normal rate.    Pulmonary/Chest: Effort normal.   Abdominal: He exhibits no distension.   Neurological: He is alert and oriented to person, place, and time.   Psychiatric: He has a normal mood and affect. His behavior is normal. Judgment and thought content normal.     General Musculoskeletal Exam   Gait: normal     Right Ankle/Foot Exam     Tests   Heel Walk: able to perform  Tiptoe Walk: able to perform    Left Ankle/Foot Exam     Tests   Heel Walk: able to perform  Tiptoe Walk: able to perform  Back (L-Spine & T-Spine) / Neck (C-Spine) Exam     Tenderness   The patient is experiencing no tenderness in the right right trapezial, left trapezial, right  scapular and left scapular.     Back (L-Spine & T-Spine) Range of Motion   Extension: 30   Flexion: 90 (with pain)   Lateral bend right: 30   Lateral bend left: 30     Neck (C-Spine) Range of Motion   Flexion:     Normal  Extension: Normal  Right Lateral Bend: normal  Left Lateral Bend: normal  Right Rotation: normal  Left Rotation: normal    Spinal Sensation   Right Side Sensation  C-Spine Level: normal   L-Spine Level: normal  S-Spine Level: normal  Left Side Sensation  C-Spine Level: normal  L-Spine Level: normal  S-Spine Level: normal    Other He has no scoliosis .  Spinal Kyphosis:  Absent    Comments:  (+) pain with cervical facet loading L>R  (+) pain with lumbar facet loading L>R      Muscle Strength   Right Upper Extremity   Biceps: 5/5   Deltoid:  5/5  Triceps:  5/5  Wrist flexion: 5/5   : 5/5   Finger Extensors:  5/5  Elbow Extension: 5/5  Left Upper Extremity  Biceps: 5/5   Deltoid:  5/5  Triceps:  5/5  Wrist flexion: 5/5   :  5/5   Finger Extensors:  5/5  Elbow Extension: 5/5  Right Lower Extremity   Hip Flexion: 5/5   Quadriceps:  5/5   Ankle Dorsiflexion:  5/5   Anterior tibial:  5/5   EHL:  5/5  Left Lower Extremity   Hip Flexion: 5/5   Quadriceps:  5/5   Ankle Dorsiflexion:  5/5   Anterior tibial:  5/5   EHL:  5/5    Reflexes     Left Side  Biceps:  2+  Brachioradialis:  2+  Achilles:  2+  Quadriceps:  2+    Right Side   Biceps:  2+  Brachioradialis:  2+  Achilles:  2+  Quadriceps:  2+    Vascular Exam     Right Pulses        Carotid:                  2+    Left Pulses        Carotid:                  2+              Assessment:       1. DDD (degenerative disc disease), cervical    2. Spondylosis of lumbar region without myelopathy or radiculopathy    3. DDD (degenerative disc disease), lumbar    4. Spondylosis of cervical region without myelopathy or radiculopathy           Plan:          1. Prior imaging and records were reviewed today.   2. We discussed neck and back pain and the nature  of neck back pain.  We discussed that it is not one thing that causes the pain but an accumulation of multiple things that we do.    3. He has constant pain in the back with no leg pain. No numbness, tingling, or weakness in his legs. Known multi level degenerative disc disease and facet arthropathy contributing to central and neuroforaminal stenosis, most severe at L4/5 and L5/S1. LBP likely due to DDD/facets with myofascial component. Also know cervical disc disease with facet arthropathy, likely causing neck pain.   4. Had bilateral L4-5 and L5-S1 facet joint injection in 2019 with Dr. Braxton with great relief, but temporary.   5. Schedule bilateral L3,4,5 MBB x 2 and evaluate response. If he has excellent relief with the MBB, we will plan to proceed with right then left L3,4,5 RFA for more prolonged relief.   6. Continue on mobic, neurontin from PCP.   7. We discussed posture sitting and the importance of trying to sit better.    8. We discussed the benefits of therapy and exercise and continuing to move.  9. Referral for physical therapy for evaluation and treatment of neck and back pain.   10. Consider cervical facet injection once his low back pain is addressed if no significant improvement with PT.   11. May followup with Dr. Braxton in PM following injections for further management and intervention.       Follow-up: Follow up May followup with PM after injections. If there are any questions prior to this, the patient was instructed to contact the office.

## 2022-01-13 NOTE — PROGRESS NOTES
Subjective:      Patient ID: Sabino Rubio is a 66 y.o. male.    Chief Complaint: Neck Pain and Low-back Pain    HPI Mr. Rubio is a 66 year old male here for evaluation of neck and low back pain. He was referred by his PCP, Dr. Baum, for consultation.     History of chronic back/neck pain.      Known multi level degenerative disc disease and facet arthropathy contributing to central and neuroforaminal stenosis, most severe at L4/5 and L5/S1.      He reports bilateral neck pain which started about one month ago with ZULY. He denies arm pain or numbness and tingling. The neck feels stiff.   He reports low back pain, R>L. He denies leg pain or numbness and tingling in the LEs.      He has constant pain in the back with no leg pain. No numbness, tingling, or weakness in his legs. Pain is a 2 now, and its 10++++ when he has a flare up. Pain is sharp, shooting. Pain is better with rest. 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     He is on mobic, neurontin. He did PT in the past with some benefit. Did have bilateral L4-5 and L5-S1 facet joint injection with Dr. Braxton in 2019 with good, but temporary relief.     Lumbar MRI 2018    FINDINGS:  Alignment: Mild straightening of normal lumbar lordosis.     Vertebrae: Mild vertebral height loss and degenerative signal change.  No evidence of acute fracture or infiltrative marrow process.     Discs: Intervertebral disc height loss and degenerative signal change, most prominent within the lower lumbar spine.     Cord: Normal.  Conus terminates at L1-L2.     Degenerative findings:     T12-L1: Mild broad-based disc bulge and facet arthropathy without significant spinal canal stenosis or neural foraminal narrowing.     L1-L2: Mild broad-based disc bulge and facet arthropathy without significant spinal canal stenosis or neural foraminal narrowing.     L2-L3: Broad-based disc bulge, ligamentum flavum thickening, and facet arthropathy  without significant spinal canal stenosis or neural foraminal narrowing.     L3-L4: Broad-based disc bulge, ligamentum flavum thickening, and facet arthropathy resulting in moderate spinal canal stenosis and mild bilateral neural foraminal narrowing.     L4-L5: Broad-based disc bulge, ligamentum flavum thickening, and facet arthropathy resulting in moderate spinal canal stenosis, moderate left neural foraminal narrowing, and mild right neural foraminal narrowing.     L5-S1: Broad-based disc bulge, ligamentum flavum thickening, and facet arthropathy resulting in moderate left and mild right neural foraminal narrowing.     Paraspinal muscles & soft tissues: Unremarkable.     IMPRESSION:      Multilevel degenerative change of the lumbar spine most significant at L3-L4 which demonstrates moderate spinal canal stenosis and mild bilateral neural foraminal narrowing.  Moderate left and mild right neural foraminal narrowing seen at L4-L5 and L5-S1.  Additional details above.    Cervical MRI 2018    FINDINGS:  Alignment: Grade 1 retrolisthesis of C3 on C4/anterolisthesis of C4 on C5 as well as grade 1 retrolisthesis of C6 on C7.     Vertebrae: Relative preservation of vertebral body heights without acute fracture or infiltrative marrow process.  Mild scattered areas of degenerative signal change.     Discs: Intervertebral disc height loss and degenerative signal changes, most prominent within the mid cervical spine.     Cord: Within normal limits without abnormal cord signal.     Skull base and craniocervical junction: Normal.     Degenerative findings:     C2-C3: No significant spinal canal stenosis or neural foraminal narrowing.     C3-C4: Mild retrolisthesis of C3 on C4, posterior disc osteophyte complex, and facet arthropathy resulting in near complete effacement of the anterior and posterior thecal sac with mild contour changes of the spinal cord and without internal cord signal change suggesting moderate spinal canal  stenosis.  Severe right and moderate left neural foraminal narrowing.     C4-C5: Mild anterolisthesis of C4 on C5, posterior disc osteophyte complex, and facet arthropathy resulting in near complete effacement of the anterior and posterior thecal sac with mild contour changes of the spinal cord and without internal cord signal change suggesting moderate spinal canal stenosis.  Severe bilateral neural foraminal narrowing.     C5-C6: Minimal posterior disc osteophyte complex and facet arthropathy resulting in mild spinal canal stenosis.     C6-C7: Mild retrolisthesis of C6 on C7, posterior disc osteophyte complex, and facet arthropathy resulting in mild spinal canal stenosis and mild bilateral neural foraminal narrowing.     C7-T1: Minimal posterior disc osteophyte complex and facet arthropathy resulting in mild left neural foraminal narrowing.     Paraspinal muscles & soft tissues: Unremarkable.     IMPRESSION:      Mild progression of cervical lordosis compared to MRI cervical spine 02/24/2014.  Most significant level of degenerative change at C4-C5 which demonstrates moderate spinal canal stenosis and severe bilateral neural foraminal narrowing.  Additional details above.    Lumbar xray 2019    FINDINGS:  There is mild curvature of the lumbar spine.  The vertebral bodies are normally aligned.  There is mild concavity along the superior endplate of L3 similar to prior study.  There are facet degenerative changes..  There is multilevel disc space narrowing and overall mild to moderate osteophytic spurring.  No evidence of instability with flexion or extension.    Cervical xray 2018    4 views cervical spine, comparison 2014.  C1-C2 normal.  Airway soft tissues normal.  Mild levoscoliosis cervicothoracic junction.  Limited range of motion on flexion and extension.  Progressive degenerative disc spondylosis C3-C6 levels.  Progression of facet joint arthropathy particularly C3 through C5, C6-C7.  Primary anterolisthesis  C4 on C3.  IMPRESSION:    Progression of degenerative disc spondylosis facet arthropathy cervical spine.  No fracture.     Past Medical History:   Diagnosis Date    Allergy     Arthritis     Family history of malignant neoplasm of gastrointestinal tract mat.gf    Family history of prostate cancer: 1/2 brother 9/25/2017    GERD (gastroesophageal reflux disease)     Kidney cyst, acquired: R 1.2 cm 2015 9/25/2017    Restless leg syndrome     Tubulovillous adenoma 2013 due 2016 9/14/2015       Past Surgical History:   Procedure Laterality Date    APPLICATION OF BONE GRAFT TO FINGER Left 11/8/2021    Procedure: APPLICATION INTEGRA GRAFT, TO FINGER;  Surgeon: Alba Penn MD;  Location: [facility] OR;  Service: Orthopedics;  Laterality: Left;    CARPAL TUNNEL RELEASE      COLONOSCOPY N/A 5/22/2019    Procedure: COLONOSCOPY;  Surgeon: Juancarlos Foley MD;  Location: [facility] (4TH FLR);  Service: Endoscopy;  Laterality: N/A;    EXCISION OF GANGLION OF WRIST Right 6/28/2019    Procedure: EXCISION, GANGLION CYST, WRIST right;  Surgeon: Alba Penn MD;  [address];  Service: Orthopedics;  Laterality: Right;  regional MAC, stretcher, supine, hand pan 1 and 2,MINI C-arm, call Arthrex    INJECTION OF FACET JOINT Bilateral 6/6/2019    Procedure: INJECTION, FACET JOINT INJECTION (LUMBAR BLOCK) BILATERAL L4-L5 AND L5-S1 FACET INJECTIONS;  Surgeon: Kaiser Braxton MD;  Location: [facility];  Service: Pain Management;  Laterality: Bilateral;  NEEDS CONSENT    INTERPOSITION ARTHROPLASTY OF CARPOMETACARPAL JOINTS Right 6/28/2019    Procedure: INTERPOSITION ARTHROPLASTY, CMC JOINT right;  Surgeon: Alba Penn MD;  [address];  Service: Orthopedics;  Laterality: Right;  regional MAC, stretcher, supine, hand pan 1 and 2,MINI C-arm, call Arthrex    IRRIGATION AND DEBRIDEMENT OF UPPER EXTREMITY Left 11/8/2021    Procedure: IRRIGATION AND DEBRIDEMENT, UPPER EXTREMITY, left index  finger;  Surgeon: Alba Penn MD;  Location: [facility] OR;  Service: Orthopedics;  Laterality: Left;    REPAIR OF NAIL BED Left 11/8/2021    Procedure: REPAIR, NAIL BED, left index;  Surgeon: Alba Penn MD;  Location: [facility] OR;  Service: Orthopedics;  Laterality: Left;    SPERMATOCELECTOMY Right 11/8/2019    Procedure: EXCISION, SPERMATOCELE;  Surgeon: Sheldon Araya Jr., MD;  [address];  Service: Urology;  Laterality: Right;  1hr       Family History   Problem Relation Age of Onset    Arthritis Mother         probable R.A.    Cancer Father         esophageal ca and brain tumor    Esophageal cancer Father     Melanoma Father     Cancer Brother         pancreatic cancer    Cancer Maternal Grandfather         colon    Colon cancer Maternal Grandfather     Irritable bowel syndrome Sister     Arthritis Sister     No Known Problems Son     No Known Problems Brother     No Known Problems Son     Cancer Sister     No Known Problems Brother     Diabetes Neg Hx     Heart disease Neg Hx     Cirrhosis Neg Hx     Celiac disease Neg Hx     Crohn's disease Neg Hx     Ulcerative colitis Neg Hx     Stomach cancer Neg Hx     Rectal cancer Neg Hx     Liver cancer Neg Hx     Psoriasis Neg Hx     Lupus Neg Hx        Social History     Socioeconomic History    Marital status: Single   Tobacco Use    Smoking status: Never Smoker    Smokeless tobacco: Never Used    Tobacco comment: 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   Substance and Sexual Activity    Alcohol use: Yes     Alcohol/week: 0.0 standard drinks     Comment: 2-3 days weekly, up to 2 beers    Drug use: No    Sexual activity: Yes     Partners: Female       Current Outpatient Medications   Medication Sig Dispense Refill    fluticasone propionate (FLONASE) 50 mcg/actuation nasal spray 1 spray by Each Nostril route 2 (two) times a day.       gabapentin (NEURONTIN) 300 MG capsule Take 1 capsule (300 mg total) by mouth 3 (three) times daily. 90 capsule 2    meloxicam (MOBIC) 15 MG tablet TAKE 1 TABLET (15 MG TOTAL) BY MOUTH DAILY AS NEEDED FOR PAIN (TAKE WITH FOOD OR A MEAL). 90 tablet 3     No current facility-administered medications for this visit.       Review of patient's allergies indicates:  No Known Allergies      Review of Systems   Constitutional: Negative for fever, weight gain and weight loss.   Cardiovascular: Negative for chest pain.   Respiratory: Negative for shortness of breath.    Musculoskeletal: Positive for back pain and neck pain. Negative for falls.   Gastrointestinal: Negative for abdominal pain, bowel incontinence, nausea and vomiting.   Genitourinary: Negative for bladder incontinence.   Neurological: Negative for focal weakness, numbness, paresthesias, sensory change and weakness.         Objective:        General: Sabino is well-developed, well-nourished, appears stated age, in no acute distress, alert and oriented to time, place and person.     General    Vitals reviewed.  Constitutional: He is oriented to person, place, and time. He appears well-developed and well-nourished.   HENT:   Head: Atraumatic.   Nose: Nose normal.   Eyes: Conjunctivae are normal.   Cardiovascular: Normal rate.    Pulmonary/Chest: Effort normal.   Abdominal: He exhibits no distension.   Neurological: He is alert and oriented to person, place, and time.   Psychiatric: He has a normal mood and affect. His behavior is normal. Judgment and thought content normal.     General Musculoskeletal Exam   Gait: normal     Right Ankle/Foot Exam     Tests   Heel Walk: able to perform  Tiptoe Walk: able to perform    Left Ankle/Foot Exam     Tests   Heel Walk: able to perform  Tiptoe Walk: able to perform  Back (L-Spine & T-Spine) / Neck (C-Spine) Exam     Tenderness   The patient is experiencing no tenderness in the right right trapezial, left trapezial, right  scapular and left scapular.     Back (L-Spine & T-Spine) Range of Motion   Extension: 30   Flexion: 90 (with pain)   Lateral bend right: 30   Lateral bend left: 30     Neck (C-Spine) Range of Motion   Flexion:     Normal  Extension: Normal  Right Lateral Bend: normal  Left Lateral Bend: normal  Right Rotation: normal  Left Rotation: normal    Spinal Sensation   Right Side Sensation  C-Spine Level: normal   L-Spine Level: normal  S-Spine Level: normal  Left Side Sensation  C-Spine Level: normal  L-Spine Level: normal  S-Spine Level: normal    Other He has no scoliosis .  Spinal Kyphosis:  Absent    Comments:  (+) pain with cervical facet loading L>R  (+) pain with lumbar facet loading L>R      Muscle Strength   Right Upper Extremity   Biceps: 5/5   Deltoid:  5/5  Triceps:  5/5  Wrist flexion: 5/5   : 5/5   Finger Extensors:  5/5  Elbow Extension: 5/5  Left Upper Extremity  Biceps: 5/5   Deltoid:  5/5  Triceps:  5/5  Wrist flexion: 5/5   :  5/5   Finger Extensors:  5/5  Elbow Extension: 5/5  Right Lower Extremity   Hip Flexion: 5/5   Quadriceps:  5/5   Ankle Dorsiflexion:  5/5   Anterior tibial:  5/5   EHL:  5/5  Left Lower Extremity   Hip Flexion: 5/5   Quadriceps:  5/5   Ankle Dorsiflexion:  5/5   Anterior tibial:  5/5   EHL:  5/5    Reflexes     Left Side  Biceps:  2+  Brachioradialis:  2+  Achilles:  2+  Quadriceps:  2+    Right Side   Biceps:  2+  Brachioradialis:  2+  Achilles:  2+  Quadriceps:  2+    Vascular Exam     Right Pulses        Carotid:                  2+    Left Pulses        Carotid:                  2+              Assessment:       1. DDD (degenerative disc disease), cervical    2. Spondylosis of lumbar region without myelopathy or radiculopathy    3. DDD (degenerative disc disease), lumbar    4. Spondylosis of cervical region without myelopathy or radiculopathy           Plan:          1. Prior imaging and records were reviewed today.   2. We discussed neck and back pain and the nature  of neck back pain.  We discussed that it is not one thing that causes the pain but an accumulation of multiple things that we do.    3. He has constant pain in the back with no leg pain. No numbness, tingling, or weakness in his legs. Known multi level degenerative disc disease and facet arthropathy contributing to central and neuroforaminal stenosis, most severe at L4/5 and L5/S1. LBP likely due to DDD/facets with myofascial component. Also know cervical disc disease with facet arthropathy, likely causing neck pain.   4. Had bilateral L4-5 and L5-S1 facet joint injection in 2019 with Dr. Braxton with great relief, but temporary.   5. Schedule bilateral L3,4,5 MBB x 2 and evaluate response. If he has excellent relief with the MBB, we will plan to proceed with right then left L3,4,5 RFA for more prolonged relief.   6. Continue on mobic, neurontin from PCP.   7. We discussed posture sitting and the importance of trying to sit better.    8. We discussed the benefits of therapy and exercise and continuing to move.  9. Referral for physical therapy for evaluation and treatment of neck and back pain.   10. Consider cervical facet injection once his low back pain is addressed if no significant improvement with PT.   11. May followup with Dr. Braxton in PM following injections for further management and intervention.       Follow-up: Follow up May followup with PM after injections. If there are any questions prior to this, the patient was instructed to contact the office.

## 2022-01-14 ENCOUNTER — TELEPHONE (OUTPATIENT)
Dept: ADMINISTRATIVE | Facility: OTHER | Age: 67
End: 2022-01-14
Payer: MEDICARE

## 2022-01-18 ENCOUNTER — TELEPHONE (OUTPATIENT)
Dept: PAIN MEDICINE | Facility: OTHER | Age: 67
End: 2022-01-18
Payer: MEDICARE

## 2022-01-18 ENCOUNTER — TELEPHONE (OUTPATIENT)
Dept: ADMINISTRATIVE | Facility: OTHER | Age: 67
End: 2022-01-18
Payer: MEDICARE

## 2022-01-18 ENCOUNTER — PATIENT MESSAGE (OUTPATIENT)
Dept: PAIN MEDICINE | Facility: OTHER | Age: 67
End: 2022-01-18
Payer: MEDICARE

## 2022-01-18 DIAGNOSIS — M47.816 SPONDYLOSIS OF LUMBAR REGION WITHOUT MYELOPATHY OR RADICULOPATHY: Primary | ICD-10-CM

## 2022-01-19 ENCOUNTER — HOSPITAL ENCOUNTER (OUTPATIENT)
Dept: RADIOLOGY | Facility: HOSPITAL | Age: 67
Discharge: HOME | End: 2022-01-19
Attending: INTERNAL MEDICINE
Payer: MEDICARE

## 2022-01-19 ENCOUNTER — OFFICE VISIT (OUTPATIENT)
Dept: ORTHOPEDICS | Facility: CLINIC | Age: 67
End: 2022-01-19
Payer: MEDICARE

## 2022-01-19 VITALS
HEIGHT: 70 IN | BODY MASS INDEX: 21.9 KG/M2 | WEIGHT: 153 LBS | DIASTOLIC BLOOD PRESSURE: 89 MMHG | HEART RATE: 76 BPM | SYSTOLIC BLOOD PRESSURE: 128 MMHG

## 2022-01-19 DIAGNOSIS — E78.2 MIXED HYPERLIPIDEMIA: ICD-10-CM

## 2022-01-19 DIAGNOSIS — Z47.89 ORTHOPEDIC AFTERCARE: ICD-10-CM

## 2022-01-19 DIAGNOSIS — S61.311A LACERATION OF LEFT INDEX FINGER WITH DAMAGE TO NAIL, FOREIGN BODY PRESENCE UNSPECIFIED, INITIAL ENCOUNTER: Primary | ICD-10-CM

## 2022-01-19 PROCEDURE — 75571 CT HRT W/O DYE W/CA TEST: CPT | Mod: TC

## 2022-01-19 PROCEDURE — 75571 CT HRT W/O DYE W/CA TEST: CPT | Mod: 26,,, | Performed by: RADIOLOGY

## 2022-01-19 PROCEDURE — 99999 PR PBB SHADOW E&M-EST. PATIENT-LVL III: CPT | Mod: PBBFAC,,, | Performed by: PHYSICIAN ASSISTANT

## 2022-01-19 PROCEDURE — 99024 PR POST-OP FOLLOW-UP VISIT: ICD-10-PCS | Mod: S$GLB,,, | Performed by: PHYSICIAN ASSISTANT

## 2022-01-19 PROCEDURE — 75571 CT CALCIUM SCORING CARDIAC: ICD-10-PCS | Mod: 26,,, | Performed by: RADIOLOGY

## 2022-01-19 PROCEDURE — 99024 POSTOP FOLLOW-UP VISIT: CPT | Mod: S$GLB,,, | Performed by: PHYSICIAN ASSISTANT

## 2022-01-19 PROCEDURE — 99213 OFFICE O/P EST LOW 20 MIN: CPT | Mod: PBBFAC | Performed by: PHYSICIAN ASSISTANT

## 2022-01-19 PROCEDURE — 99999 PR PBB SHADOW E&M-EST. PATIENT-LVL III: ICD-10-PCS | Mod: PBBFAC,,, | Performed by: PHYSICIAN ASSISTANT

## 2022-01-19 NOTE — PROGRESS NOTES
"Mr. Rubio is here today for a post-operative visit.  He is 72 days status post left index finger nail plate removal with nail bed repair and integra graft to the nail bed by Dr. Penn on 11/8/2021. He reports that he is doing well. He is attending OT and performing HEP.  He is back to work.  No current pain.  He is not taking pain medication. He completed Bactrim DS as prescribed. He denies fever, chills, and sweats since the time of the surgery.     Physical exam:    Vitals:    01/19/22 0856   BP: 128/89   Pulse: 76   Weight: 69.4 kg (153 lb)   Height: 5' 10" (1.778 m)   PainSc: 0-No pain     Vital signs are stable, patient is afebrile.  Patient is well dressed and well groomed, no acute distress.  Alert and oriented to person, place, and time.  Graft and laceration are healed, dry skin noted. There is no erythema or exudate.  There is no sign of any infection. He is NVI. Fair-good finger ROM     Assessment: 72 days status post left index finger nail plate removal with nail bed repair and integra graft to the nail bed    Plan:  Sabino was seen today for post-op evaluation.    Diagnoses and all orders for this visit:    Laceration of left index finger with damage to nail, foreign body presence unspecified, initial encounter    Orthopedic aftercare          - Regular OT and HEP  - follow up as needed  - Call with questions or concerns            "

## 2022-01-25 ENCOUNTER — TELEPHONE (OUTPATIENT)
Dept: INTERNAL MEDICINE | Facility: CLINIC | Age: 67
End: 2022-01-25
Payer: MEDICARE

## 2022-01-26 ENCOUNTER — PATIENT MESSAGE (OUTPATIENT)
Dept: PAIN MEDICINE | Facility: OTHER | Age: 67
End: 2022-01-26
Payer: MEDICARE

## 2022-01-27 ENCOUNTER — HOSPITAL ENCOUNTER (OUTPATIENT)
Facility: OTHER | Age: 67
Discharge: HOME | End: 2022-01-27
Attending: ANESTHESIOLOGY | Admitting: ANESTHESIOLOGY
Payer: MEDICARE

## 2022-01-27 VITALS
BODY MASS INDEX: 21.47 KG/M2 | RESPIRATION RATE: 14 BRPM | OXYGEN SATURATION: 98 % | DIASTOLIC BLOOD PRESSURE: 86 MMHG | SYSTOLIC BLOOD PRESSURE: 139 MMHG | HEART RATE: 75 BPM | TEMPERATURE: 98 F | HEIGHT: 70 IN | WEIGHT: 150 LBS

## 2022-01-27 DIAGNOSIS — M47.816 SPONDYLOSIS OF LUMBAR REGION WITHOUT MYELOPATHY OR RADICULOPATHY: Primary | ICD-10-CM

## 2022-01-27 DIAGNOSIS — G89.29 CHRONIC PAIN: ICD-10-CM

## 2022-01-27 PROCEDURE — 64494 PR INJ DX/THER AGNT PARAVERT FACET JOINT,IMG GUIDE,LUMBAR/SAC, 2ND LEVEL: ICD-10-PCS | Mod: 50,KX,, | Performed by: ANESTHESIOLOGY

## 2022-01-27 PROCEDURE — 64494 INJ PARAVERT F JNT L/S 2 LEV: CPT | Mod: 50,KX,, | Performed by: ANESTHESIOLOGY

## 2022-01-27 PROCEDURE — 25000003 PHARM REV CODE 250: Performed by: ANESTHESIOLOGY

## 2022-01-27 PROCEDURE — 64493 INJ PARAVERT F JNT L/S 1 LEV: CPT | Mod: 50,KX,, | Performed by: ANESTHESIOLOGY

## 2022-01-27 PROCEDURE — 64494 INJ PARAVERT F JNT L/S 2 LEV: CPT | Mod: 50,KX | Performed by: ANESTHESIOLOGY

## 2022-01-27 PROCEDURE — 64493 INJ PARAVERT F JNT L/S 1 LEV: CPT | Mod: 50,KX | Performed by: ANESTHESIOLOGY

## 2022-01-27 PROCEDURE — 64493 PR INJ DX/THER AGNT PARAVERT FACET JOINT,IMG GUIDE,LUMBAR/SAC,1ST LVL: ICD-10-PCS | Mod: 50,KX,, | Performed by: ANESTHESIOLOGY

## 2022-01-27 RX ORDER — SODIUM CHLORIDE 9 MG/ML
INJECTION, SOLUTION INTRAVENOUS CONTINUOUS
Status: DISCONTINUED | OUTPATIENT
Start: 2022-01-27 | End: 2022-01-27 | Stop reason: HOSPADM

## 2022-01-27 RX ORDER — BUPIVACAINE HYDROCHLORIDE 2.5 MG/ML
INJECTION, SOLUTION EPIDURAL; INFILTRATION; INTRACAUDAL
Status: DISCONTINUED | OUTPATIENT
Start: 2022-01-27 | End: 2022-01-27 | Stop reason: HOSPADM

## 2022-01-27 RX ORDER — LIDOCAINE HYDROCHLORIDE 20 MG/ML
INJECTION, SOLUTION INFILTRATION; PERINEURAL
Status: DISCONTINUED | OUTPATIENT
Start: 2022-01-27 | End: 2022-01-27 | Stop reason: HOSPADM

## 2022-01-27 NOTE — BRIEF OP NOTE
Discharge Note  Short Stay      SUMMARY     Admit Date: 1/27/2022    Attending Physician: Kaiser Braxton      Discharge Physician: Kaiser Braxton      Discharge Date: 1/27/2022 9:06 AM    Procedure(s) (LRB):  Block, Nerve MEDIAL BRANCH BLOCK BILATERAL L3,4,5  DIRECT REFERRAL 1 OF 2 (Bilateral)    Final Diagnosis: Spondylosis of lumbar region without myelopathy or radiculopathy [M47.816]    Disposition: Home or self care    Patient Instructions:   Current Discharge Medication List      CONTINUE these medications which have NOT CHANGED    Details   fluticasone propionate (FLONASE) 50 mcg/actuation nasal spray 1 spray by Each Nostril route 2 (two) times a day.      gabapentin (NEURONTIN) 300 MG capsule Take 1 capsule (300 mg total) by mouth 3 (three) times daily.  Qty: 90 capsule, Refills: 2    Associated Diagnoses: Degeneration of cervical intervertebral disc      meloxicam (MOBIC) 15 MG tablet TAKE 1 TABLET (15 MG TOTAL) BY MOUTH DAILY AS NEEDED FOR PAIN (TAKE WITH FOOD OR A MEAL).  Qty: 90 tablet, Refills: 3    Associated Diagnoses: Degeneration of cervical intervertebral disc                 Discharge Diagnosis: Spondylosis of lumbar region without myelopathy or radiculopathy [M47.816]  Condition on Discharge: Stable with no complications to procedure   Diet on Discharge: Same as before.  Activity: as per instruction sheet.  Discharge to: Home with a responsible adult.  Follow up: 2-4 weeks       Please call my office or pager at [phone] if experienced any weakness or loss of sensation, fever > 101.5, pain uncontrolled with oral medications, persistent nausea/vomiting/or diarrhea, redness or drainage from the incisions, or any other worrisome concerns. If physician on call was not reached or could not communicate with our office for any reason please go to the nearest emergency department

## 2022-01-27 NOTE — DISCHARGE INSTRUCTIONS

## 2022-01-27 NOTE — OP NOTE
Diagnostic Lumbar Medial Branch Block Under Fluoroscopy    The procedure, risks, benefits, and options were discussed with the patient. There are no contraindications to the procedure. The patent expressed understanding and agreed to the procedure. Informed written consent was obtained prior to the start of the procedure and can be found in the patient's chart.    PATIENT NAME: Sabino Rubio   MRN: [id number]     DATE OF PROCEDURE: 01/27/2022                                           PROCEDURE:  Diagnostic Bilateral L3, L4 and L5 Lumbar Medial Branch Block under Fluoroscopy    PRE-OP DIAGNOSIS: Spondylosis of lumbar region without myelopathy or radiculopathy [M47.816]    POST-OP DIAGNOSIS: Same    PHYSICIAN: Kaiser Braxton MD    ASSISTANTS: Lennox Oliver MD  [employer] PM&R Resident       MEDICATIONS INJECTED:  Bupivicaine 0.25%    LOCAL ANESTHETIC INJECTED:   Xylocaine 2%    SEDATION: None    ESTIMATED BLOOD LOSS:  None    COMPLICATIONS:  None.    INTERVAL HISTORY: Patient has clinical and imaging findings suggestive of facet mediated pain.    TECHNIQUE: Time-out was performed to identify the patient and procedure to be performed. With the patient laying in a prone position, the surgical area was prepped and draped in the usual sterile fashion using ChloraPrep and fenestrated drape. The levels were determined under fluoroscopic guidance. Skin anesthesia was achieved by injecting Lidocaine 2% over the injection sites. A 22 gauge, 3.5 inch needle was introduced into the medial branch nerves at the junctions of the superior articular process and the transverse processes of the targeted sites using AP, lateral and/or contralateral oblique fluoroscopic imaging. After negative aspiration for blood or CSF was confirmed, 1 mL of the anesthetic listed above was then slowly injected at each site. The needles were removed and bleeding was nil. A sterile dressing was applied. No specimens collected. The patient tolerated the procedure  well.     PAIN BEFORE THE PROCEDURE: 4-10/10    PAIN AFTER THE PROCEDURE: 0/10    The patient was monitored after the procedure in the recovery area. They were given post-procedure and discharge instructions to follow at home. The patient was discharged in a stable condition.    Kaiser Braxton MD

## 2022-02-01 ENCOUNTER — OFFICE VISIT (OUTPATIENT)
Dept: INTERNAL MEDICINE | Facility: CLINIC | Age: 67
End: 2022-02-01
Payer: MEDICARE

## 2022-02-01 ENCOUNTER — TELEPHONE (OUTPATIENT)
Dept: INTERNAL MEDICINE | Facility: CLINIC | Age: 67
End: 2022-02-01
Payer: MEDICARE

## 2022-02-01 DIAGNOSIS — Z11.4 SCREENING FOR HIV (HUMAN IMMUNODEFICIENCY VIRUS): Primary | ICD-10-CM

## 2022-02-01 DIAGNOSIS — E78.2 MIXED HYPERLIPIDEMIA: Primary | ICD-10-CM

## 2022-02-01 DIAGNOSIS — I25.10 CORONARY ARTERY DISEASE INVOLVING NATIVE CORONARY ARTERY OF NATIVE HEART WITHOUT ANGINA PECTORIS: ICD-10-CM

## 2022-02-01 PROBLEM — R52 PAIN: Status: RESOLVED | Noted: 2022-01-11 | Resolved: 2022-02-01

## 2022-02-01 PROCEDURE — 99442 PR PHYSICIAN TELEPHONE EVALUATION 11-20 MIN: ICD-10-PCS | Mod: 95,,, | Performed by: INTERNAL MEDICINE

## 2022-02-01 PROCEDURE — 99442 PR PHYSICIAN TELEPHONE EVALUATION 11-20 MIN: CPT | Mod: 95,,, | Performed by: INTERNAL MEDICINE

## 2022-02-01 RX ORDER — ROSUVASTATIN CALCIUM 10 MG/1
10 TABLET, COATED ORAL DAILY
Qty: 90 TABLET | Refills: 3 | Status: SHIPPED | OUTPATIENT
Start: 2022-02-01 | End: 2022-11-30

## 2022-02-01 NOTE — PROGRESS NOTES
Established Patient - Audio Only Telehealth Visit     The patient location is: [place], home  The chief complaint leading to consultation is: Lipids, coronary CT review  Visit type: Virtual visit with audio only (telephone)  Total time spent with patient: 15 minutes       The reason for the audio only service rather than synchronous audio and video virtual visit was related to technical difficulties or patient preference/necessity.     Each patient to whom I provide medical services by telemedicine is:  (1) informed of the relationship between the physician and patient and the respective role of any other health care provider with respect to management of the patient; and (2) notified that they may decline to receive medical services by telemedicine and may withdraw from such care at any time. Patient verbally consented to receive this service via voice-only telephone call.    The 10-year ASCVD risk score (Nayeligianni MOTTA Jr., et al., 2013) is: 11.2%    Values used to calculate the score:      Age: 66 years      Sex: Male      Is Non- : No      Diabetic: No      Tobacco smoker: No      Systolic Blood Pressure: 139 mmHg      Is BP treated: No      HDL Cholesterol: 87 mg/dL      Total Cholesterol: 215 mg/dL     Reviewed his coronary CT which had a score of 8.  Although this is not elevated, future cardiac risk is 11%.  Lifestyle is excellent; he has never smoked, he is slim and he exercises.  He is not having symptoms.  For preventive purposes, I am recommending lipid treatment and he is in agreement.  Cautions and side effects were reviewed.    Review of Systems   Respiratory: Negative for shortness of breath.    Cardiovascular: Negative for chest pain.     Physical Exam  Pulmonary:      Effort: No respiratory distress.      Comments: Speaking in complete sentences no distress  Neurological:      General: No focal deficit present.      Mental Status: He is oriented to person, place, and time.    Psychiatric:         Mood and Affect: Mood normal.         Behavior: Behavior normal.         Thought Content: Thought content normal.         Judgment: Judgment normal.     Diagnoses and all orders for this visit:    Mixed hyperlipidemia  -     AST (SGOT); Future  -     Lipid Panel; Future    Coronary artery disease: CT score 8 2022    Other orders  -     rosuvastatin (CRESTOR) 10 MG tablet; Take 1 tablet (10 mg total) by mouth once daily.    Cautions and side effects reviewed  Labs in 4 months  Continue with exercise and healthy habits  Schedule colonoscopy later this year                 This service was not originating from a related E/M service provided within the previous 7 days nor will  to an E/M service or procedure within the next 24 hours or my soonest available appointment.  Prevailing standard of care was able to be met in this audio-only visit.

## 2022-02-02 ENCOUNTER — OFFICE VISIT (OUTPATIENT)
Dept: UROLOGY | Facility: CLINIC | Age: 67
End: 2022-02-02
Payer: MEDICARE

## 2022-02-02 VITALS
BODY MASS INDEX: 21.78 KG/M2 | HEIGHT: 70 IN | HEART RATE: 79 BPM | SYSTOLIC BLOOD PRESSURE: 128 MMHG | DIASTOLIC BLOOD PRESSURE: 71 MMHG | WEIGHT: 152.13 LBS

## 2022-02-02 DIAGNOSIS — N13.8 BPH WITH URINARY OBSTRUCTION: Primary | ICD-10-CM

## 2022-02-02 DIAGNOSIS — N28.1 KIDNEY CYST, ACQUIRED: ICD-10-CM

## 2022-02-02 DIAGNOSIS — N40.1 BPH WITH URINARY OBSTRUCTION: Primary | ICD-10-CM

## 2022-02-02 PROCEDURE — 99214 PR OFFICE/OUTPT VISIT, EST, LEVL IV, 30-39 MIN: ICD-10-PCS | Mod: S$GLB,,, | Performed by: UROLOGY

## 2022-02-02 PROCEDURE — 99214 OFFICE O/P EST MOD 30 MIN: CPT | Mod: S$GLB,,, | Performed by: UROLOGY

## 2022-02-02 PROCEDURE — 99999 PR PBB SHADOW E&M-EST. PATIENT-LVL III: CPT | Mod: PBBFAC,,, | Performed by: UROLOGY

## 2022-02-02 PROCEDURE — 99999 PR PBB SHADOW E&M-EST. PATIENT-LVL III: ICD-10-PCS | Mod: PBBFAC,,, | Performed by: UROLOGY

## 2022-02-02 PROCEDURE — 99213 OFFICE O/P EST LOW 20 MIN: CPT | Mod: PBBFAC | Performed by: UROLOGY

## 2022-02-02 RX ORDER — TAMSULOSIN HYDROCHLORIDE 0.4 MG/1
0.4 CAPSULE ORAL DAILY
Qty: 30 CAPSULE | Refills: 12 | Status: SHIPPED | OUTPATIENT
Start: 2022-02-02 | End: 2024-03-25

## 2022-02-02 NOTE — PROGRESS NOTES
Subjective:       Patient ID: Sabino Rubio is a 66 y.o. male.    Chief Complaint: No chief complaint on file.    HPI patient is here with lower urinary tract symptoms and a renal ultrasound demonstrating a Bosniak 2 simple cyst.  He is ready to try some types of medications.  His PSA is normal he has decreased force and caliber stream and hesitancy    Past Medical History:   Diagnosis Date    Allergy     Arthritis     Family history of malignant neoplasm of gastrointestinal tract mat.gf    Family history of prostate cancer: 1/2 brother 9/25/2017    GERD (gastroesophageal reflux disease)     Kidney cyst, acquired: R 1.2 cm 2015 9/25/2017    Restless leg syndrome     Tubulovillous adenoma 2013 due 2016 9/14/2015       Past Surgical History:   Procedure Laterality Date    APPLICATION OF BONE GRAFT TO FINGER Left 11/8/2021    Procedure: APPLICATION INTEGRA GRAFT, TO FINGER;  Surgeon: Alba Penn MD;  Location: [facility] OR;  Service: Orthopedics;  Laterality: Left;    CARPAL TUNNEL RELEASE      COLONOSCOPY N/A 5/22/2019    Procedure: COLONOSCOPY;  Surgeon: Juancarlos Foley MD;  Location: [facility] ENDO (4TH FLR);  Service: Endoscopy;  Laterality: N/A;    EXCISION OF GANGLION OF WRIST Right 6/28/2019    Procedure: EXCISION, GANGLION CYST, WRIST right;  Surgeon: Alba Penn MD;  Location: [facility] 1ST FLR;  Service: Orthopedics;  Laterality: Right;  regional MAC, stretcher, supine, hand pan 1 and 2,MINI C-arm, call Arthrex    INJECTION OF ANESTHETIC AGENT AROUND NERVE Bilateral 1/27/2022    Procedure: Block, Nerve MEDIAL BRANCH BLOCK BILATERAL L3,4,5  DIRECT REFERRAL 1 OF 2;  Surgeon: Kaiser Braxton MD;  Location: [facility];  Service: Pain Management;  Laterality: Bilateral;    INJECTION OF FACET JOINT Bilateral 6/6/2019    Procedure: INJECTION, FACET JOINT INJECTION (LUMBAR BLOCK) BILATERAL L4-L5 AND L5-S1 FACET INJECTIONS;  Surgeon: Kaiser Braxton MD;  Location: [facility];  Service: Pain  Management;  Laterality: Bilateral;  NEEDS CONSENT    INTERPOSITION ARTHROPLASTY OF CARPOMETACARPAL JOINTS Right 6/28/2019    Procedure: INTERPOSITION ARTHROPLASTY, CMC JOINT right;  Surgeon: Alba Penn MD;  [facility];  Service: Orthopedics;  Laterality: Right;  regional MAC, stretcher, supine, hand pan 1 and 2,MINI C-arm, call Arthrex    IRRIGATION AND DEBRIDEMENT OF UPPER EXTREMITY Left 11/8/2021    Procedure: IRRIGATION AND DEBRIDEMENT, UPPER EXTREMITY, left index finger;  Surgeon: Alba Penn MD;  Location: [facility] OR;  Service: Orthopedics;  Laterality: Left;    REPAIR OF NAIL BED Left 11/8/2021    Procedure: REPAIR, NAIL BED, left index;  Surgeon: Alba Penn MD;  Location: [facility] OR;  Service: Orthopedics;  Laterality: Left;    SPERMATOCELECTOMY Right 11/8/2019    Procedure: EXCISION, SPERMATOCELE;  Surgeon: Sheldon Araya Jr., MD;  [facility];  Service: Urology;  Laterality: Right;  1hr       Family History   Problem Relation Age of Onset    Arthritis Mother         probable R.A.    Cancer Father         esophageal ca and brain tumor    Esophageal cancer Father     Melanoma Father     Cancer Brother         pancreatic cancer    Cancer Maternal Grandfather         colon    Colon cancer Maternal Grandfather     Irritable bowel syndrome Sister     Arthritis Sister     No Known Problems Son     No Known Problems Brother     No Known Problems Son     Cancer Sister     No Known Problems Brother     Diabetes Neg Hx     Heart disease Neg Hx     Cirrhosis Neg Hx     Celiac disease Neg Hx     Crohn's disease Neg Hx     Ulcerative colitis Neg Hx     Stomach cancer Neg Hx     Rectal cancer Neg Hx     Liver cancer Neg Hx     Psoriasis Neg Hx     Lupus Neg Hx        Social History     Socioeconomic History    Marital status: Single   Tobacco Use    Smoking status: Never Smoker    Smokeless tobacco: Never Used    Tobacco comment: 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   Substance and Sexual Activity    Alcohol use: Yes     Alcohol/week: 0.0 standard drinks     Comment: 2-3 days weekly, up to 2 beers    Drug use: No    Sexual activity: Yes     Partners: Female       Allergies:  Patient has no known allergies.    Medications:    Current Outpatient Medications:     fluticasone propionate (FLONASE) 50 mcg/actuation nasal spray, 1 spray by Each Nostril route 2 (two) times a day., Disp: , Rfl:     gabapentin (NEURONTIN) 300 MG capsule, Take 1 capsule (300 mg total) by mouth 3 (three) times daily., Disp: 90 capsule, Rfl: 2    meloxicam (MOBIC) 15 MG tablet, TAKE 1 TABLET (15 MG TOTAL) BY MOUTH DAILY AS NEEDED FOR PAIN (TAKE WITH FOOD OR A MEAL)., Disp: 90 tablet, Rfl: 3    rosuvastatin (CRESTOR) 10 MG tablet, Take 1 tablet (10 mg total) by mouth once daily., Disp: 90 tablet, Rfl: 3    tamsulosin (FLOMAX) 0.4 mg Cap, Take 1 capsule (0.4 mg total) by mouth once daily., Disp: 30 capsule, Rfl: 12    Review of Systems   Constitutional: Negative for activity change, appetite change, chills, diaphoresis, fatigue, fever and unexpected weight change.   HENT: Negative for congestion, dental problem, hearing loss, mouth sores, postnasal drip, rhinorrhea, sinus pressure and trouble swallowing.    Eyes: Negative for pain, discharge and itching.   Respiratory: Negative for apnea, cough, choking, chest tightness, shortness of breath and wheezing.    Cardiovascular: Negative for chest pain, palpitations and leg swelling.   Gastrointestinal: Negative for abdominal distention, abdominal pain, anal bleeding, blood in stool, constipation, diarrhea, nausea, rectal pain and vomiting.   Endocrine: Negative for polydipsia and polyuria.   Genitourinary: Positive for decreased urine volume and difficulty urinating. Negative for dysuria, enuresis, flank pain, frequency, genital sores,  hematuria, penile discharge, penile pain, penile swelling, scrotal swelling, testicular pain and urgency.   Musculoskeletal: Negative for arthralgias, back pain and myalgias.   Skin: Negative for color change, rash and wound.   Neurological: Negative for dizziness, syncope, speech difficulty, light-headedness and headaches.   Hematological: Negative for adenopathy. Does not bruise/bleed easily.   Psychiatric/Behavioral: Negative for behavioral problems, confusion, hallucinations and sleep disturbance.       Objective:      Physical Exam  Constitutional:       Appearance: He is well-developed.   HENT:      Head: Normocephalic.   Cardiovascular:      Rate and Rhythm: Normal rate.   Pulmonary:      Effort: Pulmonary effort is normal.   Abdominal:      Palpations: Abdomen is soft.   Genitourinary:     Prostate: Normal.   Skin:     General: Skin is warm.   Neurological:      Mental Status: He is alert.   Psychiatric:         Mood and Affect: Mood and affect normal.         Assessment:       1. BPH with urinary obstruction        Plan:       Diagnoses and all orders for this visit:    BPH with urinary obstruction    Other orders  -     tamsulosin (FLOMAX) 0.4 mg Cap; Take 1 capsule (0.4 mg total) by mouth once daily.        PSA is normal will give him a trial of Flomax and return to clinic in 6 weeks to check symptoms

## 2022-02-10 ENCOUNTER — HOSPITAL ENCOUNTER (OUTPATIENT)
Facility: OTHER | Age: 67
Discharge: HOME | End: 2022-02-10
Attending: ANESTHESIOLOGY | Admitting: ANESTHESIOLOGY
Payer: MEDICARE

## 2022-02-10 VITALS
OXYGEN SATURATION: 100 % | HEIGHT: 70 IN | DIASTOLIC BLOOD PRESSURE: 85 MMHG | TEMPERATURE: 98 F | HEART RATE: 62 BPM | BODY MASS INDEX: 21.47 KG/M2 | WEIGHT: 150 LBS | RESPIRATION RATE: 14 BRPM | SYSTOLIC BLOOD PRESSURE: 158 MMHG

## 2022-02-10 DIAGNOSIS — G89.29 CHRONIC PAIN: ICD-10-CM

## 2022-02-10 DIAGNOSIS — M47.816 SPONDYLOSIS OF LUMBAR REGION WITHOUT MYELOPATHY OR RADICULOPATHY: Primary | ICD-10-CM

## 2022-02-10 DIAGNOSIS — M47.816 OSTEOARTHRITIS OF LUMBAR SPINE, UNSPECIFIED SPINAL OSTEOARTHRITIS COMPLICATION STATUS: ICD-10-CM

## 2022-02-10 PROCEDURE — 64493 INJ PARAVERT F JNT L/S 1 LEV: CPT | Mod: 50,KX | Performed by: ANESTHESIOLOGY

## 2022-02-10 PROCEDURE — 25000003 PHARM REV CODE 250: Performed by: ANESTHESIOLOGY

## 2022-02-10 PROCEDURE — 64493 PR INJ DX/THER AGNT PARAVERT FACET JOINT,IMG GUIDE,LUMBAR/SAC,1ST LVL: ICD-10-PCS | Mod: 50,KX,, | Performed by: ANESTHESIOLOGY

## 2022-02-10 PROCEDURE — 64493 INJ PARAVERT F JNT L/S 1 LEV: CPT | Mod: 50,KX,, | Performed by: ANESTHESIOLOGY

## 2022-02-10 PROCEDURE — 64494 INJ PARAVERT F JNT L/S 2 LEV: CPT | Mod: 50,KX | Performed by: ANESTHESIOLOGY

## 2022-02-10 PROCEDURE — 64494 INJ PARAVERT F JNT L/S 2 LEV: CPT | Mod: 50,KX,, | Performed by: ANESTHESIOLOGY

## 2022-02-10 PROCEDURE — 64494 PR INJ DX/THER AGNT PARAVERT FACET JOINT,IMG GUIDE,LUMBAR/SAC, 2ND LEVEL: ICD-10-PCS | Mod: 50,KX,, | Performed by: ANESTHESIOLOGY

## 2022-02-10 RX ORDER — SODIUM CHLORIDE 9 MG/ML
INJECTION, SOLUTION INTRAVENOUS CONTINUOUS
Status: ACTIVE | OUTPATIENT
Start: 2022-02-10

## 2022-02-10 RX ORDER — LIDOCAINE HYDROCHLORIDE 20 MG/ML
INJECTION, SOLUTION INFILTRATION; PERINEURAL
Status: DISCONTINUED | OUTPATIENT
Start: 2022-02-10 | End: 2022-02-10 | Stop reason: HOSPADM

## 2022-02-10 RX ORDER — BUPIVACAINE HYDROCHLORIDE 2.5 MG/ML
INJECTION, SOLUTION EPIDURAL; INFILTRATION; INTRACAUDAL
Status: DISCONTINUED | OUTPATIENT
Start: 2022-02-10 | End: 2022-02-10 | Stop reason: HOSPADM

## 2022-02-10 NOTE — BRIEF OP NOTE
Discharge Note  Short Stay      SUMMARY     Admit Date: 2/10/2022    Attending Physician: Kaiser Braxton      Discharge Physician: Kaiser Braxton      Discharge Date: 2/10/2022 8:55 AM    Procedure(s) (LRB):  Block, Nerve MEDIAL BRANCH BLOCK BILATERAL L3.4.5  DIRECT REFERRAL 2 OF 2 (Bilateral)    Final Diagnosis: Spondylosis of lumbar region without myelopathy or radiculopathy [M47.816]    Disposition: Home or self care    Patient Instructions:   Current Discharge Medication List      CONTINUE these medications which have NOT CHANGED    Details   fluticasone propionate (FLONASE) 50 mcg/actuation nasal spray 1 spray by Each Nostril route 2 (two) times a day.      gabapentin (NEURONTIN) 300 MG capsule Take 1 capsule (300 mg total) by mouth 3 (three) times daily.  Qty: 90 capsule, Refills: 2    Associated Diagnoses: Degeneration of cervical intervertebral disc      meloxicam (MOBIC) 15 MG tablet TAKE 1 TABLET (15 MG TOTAL) BY MOUTH DAILY AS NEEDED FOR PAIN (TAKE WITH FOOD OR A MEAL).  Qty: 90 tablet, Refills: 3    Associated Diagnoses: Degeneration of cervical intervertebral disc      rosuvastatin (CRESTOR) 10 MG tablet Take 1 tablet (10 mg total) by mouth once daily.  Qty: 90 tablet, Refills: 3      tamsulosin (FLOMAX) 0.4 mg Cap Take 1 capsule (0.4 mg total) by mouth once daily.  Qty: 30 capsule, Refills: 12                 Discharge Diagnosis: Spondylosis of lumbar region without myelopathy or radiculopathy [M47.816]  Condition on Discharge: Stable with no complications to procedure   Diet on Discharge: Same as before.  Activity: as per instruction sheet.  Discharge to: Home with a responsible adult.  Follow up: 2-4 weeks       Please call my office or pager at [phone] if experienced any weakness or loss of sensation, fever > 101.5, pain uncontrolled with oral medications, persistent nausea/vomiting/or diarrhea, redness or drainage from the incisions, or any other worrisome concerns. If physician on call was not  reached or could not communicate with our office for any reason please go to the nearest emergency department

## 2022-02-10 NOTE — OP NOTE
Diagnostic Lumbar Medial Branch Block Under Fluoroscopy    The procedure, risks, benefits, and options were discussed with the patient. There are no contraindications to the procedure. The patent expressed understanding and agreed to the procedure. Informed written consent was obtained prior to the start of the procedure and can be found in the patient's chart.    PATIENT NAME: Sabino Rubio   MRN: [id number]     DATE OF PROCEDURE: 02/10/2022                                           PROCEDURE:  Diagnostic Bilateral L3, L4 and L5 Lumbar Medial Branch Block under Fluoroscopy    PRE-OP DIAGNOSIS: Spondylosis of lumbar region without myelopathy or radiculopathy [M47.816]    POST-OP DIAGNOSIS: Same    PHYSICIAN: Kaiser Braxton MD    ASSISTANTS: Cole Fitzpatrick DO [facility] PM&R Arnie Houston MD  Neurology resident, PGY-1     MEDICATIONS INJECTED:  Bupivicaine 0.25%    LOCAL ANESTHETIC INJECTED:   Xylocaine 2%    SEDATION: None    ESTIMATED BLOOD LOSS:  None    COMPLICATIONS:  None.    INTERVAL HISTORY: Patient has clinical and imaging findings suggestive of facet mediated pain.    TECHNIQUE: Time-out was performed to identify the patient and procedure to be performed. With the patient laying in a prone position, the surgical area was prepped and draped in the usual sterile fashion using ChloraPrep and fenestrated drape. The levels were determined under fluoroscopic guidance. Skin anesthesia was achieved by injecting Lidocaine 2% over the injection sites. A 22 gauge, 3.5 inch needle was introduced into the medial branch nerves at the junctions of the superior articular process and the transverse processes of the targeted sites using AP, lateral and/or contralateral oblique fluoroscopic imaging. After negative aspiration for blood or CSF was confirmed, 1 mL of the anesthetic listed above was then slowly injected at each site. The needles were removed and bleeding was nil. A sterile dressing was applied. No specimens collected. The  patient tolerated the procedure well.   PAIN BEFORE THE PROCEDURE: 3-10/10    PAIN AFTER THE PROCEDURE: 0/10   The patient was monitored after the procedure in the recovery area. They were given post-procedure and discharge instructions to follow at home. The patient was discharged in a stable condition.        Kaiser Braxton MD

## 2022-02-10 NOTE — DISCHARGE INSTRUCTIONS

## 2022-02-10 NOTE — H&P
HPI  Patient presenting for Procedure(s) (LRB):  Block, Nerve MEDIAL BRANCH BLOCK BILATERAL L3.4.5  DIRECT REFERRAL 2 OF 2 (Bilateral)     Patient on Anti-coagulation No    No health changes since previous encounter    Past Medical History:   Diagnosis Date    Allergy     Arthritis     Family history of malignant neoplasm of gastrointestinal tract mat.gf    Family history of prostate cancer: 1/2 brother 9/25/2017    GERD (gastroesophageal reflux disease)     Kidney cyst, acquired: R 1.2 cm 2015 9/25/2017    Restless leg syndrome     Tubulovillous adenoma 2013 due 2016 9/14/2015     Past Surgical History:   Procedure Laterality Date    APPLICATION OF BONE GRAFT TO FINGER Left 11/8/2021    Procedure: APPLICATION INTEGRA GRAFT, TO FINGER;  Surgeon: Alba Penn MD;  Location: [facility] OR;  Service: Orthopedics;  Laterality: Left;    CARPAL TUNNEL RELEASE      COLONOSCOPY N/A 5/22/2019    Procedure: COLONOSCOPY;  Surgeon: Juancarlos Foley MD;  Location: [facility] (4TH FLR);  Service: Endoscopy;  Laterality: N/A;    EXCISION OF GANGLION OF WRIST Right 6/28/2019    Procedure: EXCISION, GANGLION CYST, WRIST right;  Surgeon: Alba Penn MD;  Location: [facility] 1ST FLR;  Service: Orthopedics;  Laterality: Right;  regional MAC, stretcher, supine, hand pan 1 and 2,MINI C-arm, call Arthrex    INJECTION OF ANESTHETIC AGENT AROUND NERVE Bilateral 1/27/2022    Procedure: Block, Nerve MEDIAL BRANCH BLOCK BILATERAL L3,4,5  DIRECT REFERRAL 1 OF 2;  Surgeon: Kaiser Braxton MD;  Location: [facility] PAIN [facility]GT;  Service: Pain Management;  Laterality: Bilateral;    INJECTION OF FACET JOINT Bilateral 6/6/2019    Procedure: INJECTION, FACET JOINT INJECTION (LUMBAR BLOCK) BILATERAL L4-L5 AND L5-S1 FACET INJECTIONS;  Surgeon: Kaiser Braxton MD;  Location: [facility] PAIN [facility]GT;  Service: Pain Management;  Laterality: Bilateral;  NEEDS CONSENT    INTERPOSITION ARTHROPLASTY OF CARPOMETACARPAL JOINTS Right 6/28/2019    Procedure:  "INTERPOSITION ARTHROPLASTY, CMC JOINT right;  Surgeon: Alba Penn MD;  Location: [facility] OR 1ST FLR;  Service: Orthopedics;  Laterality: Right;  regional MAC, stretcher, supine, hand pan 1 and 2,MINI C-arm, call Arthrex    IRRIGATION AND DEBRIDEMENT OF UPPER EXTREMITY Left 11/8/2021    Procedure: IRRIGATION AND DEBRIDEMENT, UPPER EXTREMITY, left index finger;  Surgeon: Alba Penn MD;  Location: [facility] OR;  Service: Orthopedics;  Laterality: Left;    REPAIR OF NAIL BED Left 11/8/2021    Procedure: REPAIR, NAIL BED, left index;  Surgeon: Alba Penn MD;  Location: [facility] OR;  Service: Orthopedics;  Laterality: Left;    SPERMATOCELECTOMY Right 11/8/2019    Procedure: EXCISION, SPERMATOCELE;  Surgeon: Sheldon Araya Jr., MD;  Location: [facility] OR 1ST FLR;  Service: Urology;  Laterality: Right;  1hr     Review of patient's allergies indicates:  No Known Allergies   Current Facility-Administered Medications   Medication    0.9%  NaCl infusion       PMHx, PSHx, Allergies, Medications reviewed in epic    ROS negative except pain complaints in HPI    OBJECTIVE:    BP (!) 145/80 (BP Location: Right arm, Patient Position: Lying)   Pulse 73   Temp 98 °F (36.7 °C) (Oral)   Resp 16   Ht 5' 10" (1.778 m)   Wt 68 kg (150 lb)   SpO2 99%   BMI 21.52 kg/m²     PHYSICAL EXAMINATION:    GENERAL: Well appearing, in no acute distress, alert and oriented x3.  PSYCH:  Mood and affect appropriate.  SKIN: Skin color, texture, turgor normal, no rashes or lesions which will impact the procedure.  CV: RRR with palpation of the radial artery.  PULM: No evidence of respiratory difficulty, symmetric chest rise. Clear to auscultation.  NEURO: Cranial nerves grossly intact.    Plan:    Proceed with procedure as planned Procedure(s) (LRB):  Block, Nerve MEDIAL BRANCH BLOCK BILATERAL L3.4.5  DIRECT REFERRAL 2 OF 2 (Bilateral)    Cole Fitzpatrick  02/10/2022            "

## 2022-02-11 ENCOUNTER — PATIENT MESSAGE (OUTPATIENT)
Dept: UROLOGY | Facility: CLINIC | Age: 67
End: 2022-02-11
Payer: MEDICARE

## 2022-03-04 ENCOUNTER — PATIENT MESSAGE (OUTPATIENT)
Dept: UROLOGY | Facility: CLINIC | Age: 67
End: 2022-03-04
Payer: MEDICARE

## 2022-03-09 ENCOUNTER — PATIENT MESSAGE (OUTPATIENT)
Dept: PAIN MEDICINE | Facility: CLINIC | Age: 67
End: 2022-03-09
Payer: MEDICARE

## 2022-03-23 ENCOUNTER — OFFICE VISIT (OUTPATIENT)
Dept: UROLOGY | Facility: CLINIC | Age: 67
End: 2022-03-23
Payer: MEDICARE

## 2022-03-23 VITALS
WEIGHT: 159.19 LBS | SYSTOLIC BLOOD PRESSURE: 139 MMHG | DIASTOLIC BLOOD PRESSURE: 85 MMHG | HEIGHT: 70 IN | HEART RATE: 67 BPM | BODY MASS INDEX: 22.79 KG/M2

## 2022-03-23 DIAGNOSIS — N28.1 RENAL CYST: ICD-10-CM

## 2022-03-23 DIAGNOSIS — N13.8 BPH WITH URINARY OBSTRUCTION: ICD-10-CM

## 2022-03-23 DIAGNOSIS — N40.1 BPH WITH URINARY OBSTRUCTION: ICD-10-CM

## 2022-03-23 DIAGNOSIS — N52.9 ERECTILE DYSFUNCTION, UNSPECIFIED ERECTILE DYSFUNCTION TYPE: Primary | ICD-10-CM

## 2022-03-23 PROCEDURE — 99999 PR PBB SHADOW E&M-EST. PATIENT-LVL III: CPT | Mod: PBBFAC,,, | Performed by: UROLOGY

## 2022-03-23 PROCEDURE — 99213 PR OFFICE/OUTPT VISIT, EST, LEVL III, 20-29 MIN: ICD-10-PCS | Mod: S$GLB,,, | Performed by: UROLOGY

## 2022-03-23 PROCEDURE — 99999 PR PBB SHADOW E&M-EST. PATIENT-LVL III: ICD-10-PCS | Mod: PBBFAC,,, | Performed by: UROLOGY

## 2022-03-23 PROCEDURE — 99213 OFFICE O/P EST LOW 20 MIN: CPT | Mod: S$GLB,,, | Performed by: UROLOGY

## 2022-03-23 RX ORDER — SILDENAFIL 100 MG/1
100 TABLET, FILM COATED ORAL DAILY PRN
Qty: 15 TABLET | Refills: 11 | Status: SHIPPED | OUTPATIENT
Start: 2022-03-23 | End: 2023-03-23

## 2022-03-23 NOTE — PROGRESS NOTES
Subjective:       Patient ID: Sabino Rubio is a 66 y.o. male.    Chief Complaint: bph with urinary obstruction (Rtc 6weeks follow on flomax for 6 weeks pt state it have help nocturia is once he still have the urgency sometime intermitted.)    [facility] for patient is here to follow-up on his BPH.  Flomax is helping him.  He is having some occasional urgency but is not that bad.  He also has erectile dysfunction I would like to try Viagra.  He does not take nitroglycerin    Past Medical History:   Diagnosis Date    Allergy     Arthritis     Family history of malignant neoplasm of gastrointestinal tract mat.gf    Family history of prostate cancer: 1/2 brother 9/25/2017    GERD (gastroesophageal reflux disease)     Kidney cyst, acquired: R 1.2 cm 2015 9/25/2017    Restless leg syndrome     Tubulovillous adenoma 2013 due 2016 9/14/2015       Past Surgical History:   Procedure Laterality Date    APPLICATION OF BONE GRAFT TO FINGER Left 11/8/2021    Procedure: APPLICATION INTEGRA GRAFT, TO FINGER;  Surgeon: Alba Penn MD;  Location: [facility] OR;  Service: Orthopedics;  Laterality: Left;    CARPAL TUNNEL RELEASE      COLONOSCOPY N/A 5/22/2019    Procedure: COLONOSCOPY;  Surgeon: Juancarlos Foley MD;  Location: [facility] (4TH FLR);  Service: Endoscopy;  Laterality: N/A;    EXCISION OF GANGLION OF WRIST Right 6/28/2019    Procedure: EXCISION, GANGLION CYST, WRIST right;  Surgeon: Alba Penn MD;  Location: [facility] OR 1ST FLR;  Service: Orthopedics;  Laterality: Right;  regional MAC, stretcher, supine, hand pan 1 and 2,MINI C-arm, call Arthrex    INJECTION OF ANESTHETIC AGENT AROUND NERVE Bilateral 1/27/2022    Procedure: Block, Nerve MEDIAL BRANCH BLOCK BILATERAL L3,4,5  DIRECT REFERRAL 1 OF 2;  Surgeon: Kaiser Braxton MD;  Location: [facility] PAIN MGT;  Service: Pain Management;  Laterality: Bilateral;    INJECTION OF ANESTHETIC AGENT AROUND NERVE Bilateral 2/10/2022    Procedure: Block, Nerve MEDIAL BRANCH  BLOCK BILATERAL L3.4.5  DIRECT REFERRAL 2 OF 2;  Surgeon: Kaiser Braxton MD;  Location: [facility] PAIN MGT;  Service: Pain Management;  Laterality: Bilateral;    INJECTION OF FACET JOINT Bilateral 6/6/2019    Procedure: INJECTION, FACET JOINT INJECTION (LUMBAR BLOCK) BILATERAL L4-L5 AND L5-S1 FACET INJECTIONS;  Surgeon: Kaiser Braxton MD;  Location: [facility] PAIN MGT;  Service: Pain Management;  Laterality: Bilateral;  NEEDS CONSENT    INTERPOSITION ARTHROPLASTY OF CARPOMETACARPAL JOINTS Right 6/28/2019    Procedure: INTERPOSITION ARTHROPLASTY, CMC JOINT right;  Surgeon: Alba Penn MD;  Location: [facility] OR [address] [place] [address];  Service: Orthopedics;  Laterality: Right;  regional MAC, stretcher, supine, hand pan 1 and 2,MINI C-arm, call Arthrex    IRRIGATION AND DEBRIDEMENT OF UPPER EXTREMITY Left 11/8/2021    Procedure: IRRIGATION AND DEBRIDEMENT, UPPER EXTREMITY, left index finger;  Surgeon: Alba Penn MD;  Location: [facility] OR;  Service: Orthopedics;  Laterality: Left;    REPAIR OF NAIL BED Left 11/8/2021    Procedure: REPAIR, NAIL BED, left index;  Surgeon: Alba Penn MD;  Location: [facility] OR;  Service: Orthopedics;  Laterality: Left;    SPERMATOCELECTOMY Right 11/8/2019    Procedure: EXCISION, SPERMATOCELE;  Surgeon: Sheldon Araya Jr., MD;  [facility];  Service: Urology;  Laterality: Right;  1hr       Family History   Problem Relation Age of Onset    Arthritis Mother         probable R.A.    Cancer Father         esophageal ca and brain tumor    Esophageal cancer Father     Melanoma Father     Cancer Brother         pancreatic cancer    Cancer Maternal Grandfather         colon    Colon cancer Maternal Grandfather     Irritable bowel syndrome Sister     Arthritis Sister     No Known Problems Son     No Known Problems Brother     No Known Problems Son     Cancer Sister     No Known Problems Brother     Diabetes Neg Hx     Heart disease Neg Hx     Cirrhosis Neg Hx      Celiac disease Neg Hx     Crohn's disease Neg Hx     Ulcerative colitis Neg Hx     Stomach cancer Neg Hx     Rectal cancer Neg Hx     Liver cancer Neg Hx     Psoriasis Neg Hx     Lupus Neg Hx        Social History     Socioeconomic History    Marital status: Single   Tobacco Use    Smoking status: Never Smoker    Smokeless tobacco: Never Used    Tobacco comment: 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   Substance and Sexual Activity    Alcohol use: Yes     Alcohol/week: 0.0 standard drinks     Comment: 2-3 days weekly, up to 2 beers    Drug use: No    Sexual activity: Yes     Partners: Female       Allergies:  Patient has no known allergies.    Medications:    Current Outpatient Medications:     fluticasone propionate (FLONASE) 50 mcg/actuation nasal spray, 1 spray by Each Nostril route 2 (two) times a day., Disp: , Rfl:     gabapentin (NEURONTIN) 300 MG capsule, Take 1 capsule (300 mg total) by mouth 3 (three) times daily., Disp: 90 capsule, Rfl: 2    meloxicam (MOBIC) 15 MG tablet, TAKE 1 TABLET (15 MG TOTAL) BY MOUTH DAILY AS NEEDED FOR PAIN (TAKE WITH FOOD OR A MEAL)., Disp: 90 tablet, Rfl: 3    rosuvastatin (CRESTOR) 10 MG tablet, Take 1 tablet (10 mg total) by mouth once daily., Disp: 90 tablet, Rfl: 3    tamsulosin (FLOMAX) 0.4 mg Cap, Take 1 capsule (0.4 mg total) by mouth once daily., Disp: 30 capsule, Rfl: 12    sildenafiL (VIAGRA) 100 MG tablet, Take 1 tablet (100 mg total) by mouth daily as needed., Disp: 15 tablet, Rfl: 11  No current facility-administered medications for this visit.    Facility-Administered Medications Ordered in Other Visits:     0.9%  NaCl infusion, , Intravenous, Continuous, Cole Fitzpatrick DO    Review of Systems   Constitutional: Negative for activity change, appetite change, chills, diaphoresis, fatigue, fever and unexpected weight change.   HENT: Negative for congestion, dental  problem, hearing loss, mouth sores, postnasal drip, rhinorrhea, sinus pressure and trouble swallowing.    Eyes: Negative for pain, discharge and itching.   Respiratory: Negative for apnea, cough, choking, chest tightness, shortness of breath and wheezing.    Cardiovascular: Negative for chest pain, palpitations and leg swelling.   Gastrointestinal: Negative for abdominal distention, abdominal pain, anal bleeding, blood in stool, constipation, diarrhea, nausea, rectal pain and vomiting.   Endocrine: Negative for polydipsia and polyuria.   Genitourinary: Positive for urgency. Negative for decreased urine volume, difficulty urinating, dysuria, enuresis, flank pain, frequency, genital sores, hematuria, penile discharge, penile pain, penile swelling, scrotal swelling and testicular pain.   Musculoskeletal: Negative for arthralgias, back pain and myalgias.   Skin: Negative for color change, rash and wound.   Neurological: Negative for dizziness, syncope, speech difficulty, light-headedness and headaches.   Hematological: Negative for adenopathy. Does not bruise/bleed easily.   Psychiatric/Behavioral: Negative for behavioral problems, confusion, hallucinations and sleep disturbance.       Objective:      Physical Exam  Constitutional:       Appearance: He is well-developed.   HENT:      Head: Normocephalic.   Cardiovascular:      Rate and Rhythm: Normal rate.   Pulmonary:      Effort: Pulmonary effort is normal.   Abdominal:      Palpations: Abdomen is soft.   Skin:     General: Skin is warm.   Neurological:      Mental Status: He is alert.         Assessment:       1. Erectile dysfunction, unspecified erectile dysfunction type    2. BPH with urinary obstruction    3. Renal cyst        Plan:       Sabino was seen today for bph with urinary obstruction.    Diagnoses and all orders for this visit:    Erectile dysfunction, unspecified erectile dysfunction type    BPH with urinary obstruction    Renal cyst  -     US  Retroperitoneal Complete; Future    Other orders  -     sildenafiL (VIAGRA) 100 MG tablet; Take 1 tablet (100 mg total) by mouth daily as needed.        return to clinic 1 year with renal ultrasound due to Bosniak 2 renal cyst.  If urgency per he taking Flomax will continue sists or worsens then we will do cystoscopy but the patient seems happy with his voiding pattern

## 2022-04-25 ENCOUNTER — PATIENT MESSAGE (OUTPATIENT)
Dept: SPINE | Facility: CLINIC | Age: 67
End: 2022-04-25
Payer: MEDICARE

## 2022-04-25 DIAGNOSIS — M47.816 SPONDYLOSIS OF LUMBAR REGION WITHOUT MYELOPATHY OR RADICULOPATHY: Primary | ICD-10-CM

## 2022-04-28 ENCOUNTER — TELEPHONE (OUTPATIENT)
Dept: PAIN MEDICINE | Facility: OTHER | Age: 67
End: 2022-04-28
Payer: MEDICARE

## 2022-04-28 ENCOUNTER — PATIENT MESSAGE (OUTPATIENT)
Dept: PAIN MEDICINE | Facility: OTHER | Age: 67
End: 2022-04-28
Payer: MEDICARE

## 2022-04-28 DIAGNOSIS — M47.816 SPONDYLOSIS OF LUMBAR REGION WITHOUT MYELOPATHY OR RADICULOPATHY: Primary | ICD-10-CM

## 2022-05-10 ENCOUNTER — PES CALL (OUTPATIENT)
Dept: ADMINISTRATIVE | Facility: CLINIC | Age: 67
End: 2022-05-10
Payer: MEDICARE

## 2022-05-12 ENCOUNTER — OFFICE VISIT (OUTPATIENT)
Dept: INTERNAL MEDICINE | Facility: CLINIC | Age: 67
End: 2022-05-12
Payer: MEDICARE

## 2022-05-12 VITALS
BODY MASS INDEX: 22.46 KG/M2 | DIASTOLIC BLOOD PRESSURE: 80 MMHG | SYSTOLIC BLOOD PRESSURE: 126 MMHG | HEART RATE: 65 BPM | WEIGHT: 156.5 LBS | OXYGEN SATURATION: 99 %

## 2022-05-12 DIAGNOSIS — M47.816 SPONDYLOSIS OF LUMBAR REGION WITHOUT MYELOPATHY OR RADICULOPATHY: ICD-10-CM

## 2022-05-12 DIAGNOSIS — N28.1 KIDNEY CYST, ACQUIRED: ICD-10-CM

## 2022-05-12 DIAGNOSIS — Z00.00 ENCOUNTER FOR PREVENTIVE HEALTH EXAMINATION: Primary | ICD-10-CM

## 2022-05-12 DIAGNOSIS — Z12.11 COLON CANCER SCREENING: ICD-10-CM

## 2022-05-12 DIAGNOSIS — J84.10 CALCIFIED GRANULOMA OF LUNG: ICD-10-CM

## 2022-05-12 DIAGNOSIS — H53.8 BLURRED VISION: ICD-10-CM

## 2022-05-12 PROCEDURE — G0402 INITIAL PREVENTIVE EXAM: HCPCS | Mod: S$GLB,,, | Performed by: NURSE PRACTITIONER

## 2022-05-12 PROCEDURE — 99999 PR PBB SHADOW E&M-EST. PATIENT-LVL III: ICD-10-PCS | Mod: PBBFAC,,, | Performed by: NURSE PRACTITIONER

## 2022-05-12 PROCEDURE — G0402 PR WELCOME MEDICARE PREVENTIVE VISIT NEW ENROLLEE: ICD-10-PCS | Mod: S$GLB,,, | Performed by: NURSE PRACTITIONER

## 2022-05-12 PROCEDURE — 99999 PR PBB SHADOW E&M-EST. PATIENT-LVL III: CPT | Mod: PBBFAC,,, | Performed by: NURSE PRACTITIONER

## 2022-05-12 NOTE — PATIENT INSTRUCTIONS
Counseling and Referral of Other Preventative  (Italic type indicates deductible and co-insurance are waived)    Patient Name: Sabino Rubio  Today's Date: 5/12/2022    Health Maintenance       Date Due Completion Date    HIV Screening Never done ---    COVID-19 Vaccine (4 - Booster for Pfizer series) 02/19/2022 11/19/2021    Shingles Vaccine (3 of 3) 03/02/2022 1/5/2022    Colorectal Cancer Screening 05/22/2022 5/22/2019    PROSTATE-SPECIFIC ANTIGEN 01/04/2023 1/4/2022    Pneumococcal Vaccines (Age 65+) (2 - PPSV23 or PCV20) 01/05/2023 1/5/2022    High Dose Statin 05/12/2023 5/12/2022    Lipid Panel 01/04/2027 1/4/2022    TETANUS VACCINE 02/09/2030 2/9/2020        Orders Placed This Encounter   Procedures    Ambulatory referral/consult to Optometry     The following information is provided to all patients.  This information is to help you find resources for any of the problems found today that may be affecting your health:                Living healthy guide: www.[facility].[place].gov      Understanding Diabetes: www.diabetes.org      Eating healthy: www.cdc.gov/healthyweight      CDC home safety checklist: www.cdc.gov/steadi/patient.html      Agency on Aging: www.goea.[place].gov      Alcoholics anonymous (AA): www.aa.org      Physical Activity: www.cheikh.nih.gov/wr5vkhm      Tobacco use: www.quitwithusla.org

## 2022-05-12 NOTE — Clinical Note
Alba Baum MD,   Your patient was seen today for a HRA visit. Abnormalities have been identified during this visit that require additional testing and possible follow up.  Orders Placed This Encounter     Ambulatory referral/consult to Optometry         Standing Status: Future         Standing Expiration Date: 6/12/2023         Referral Priority:Routine         Referral Type:Vision (Optometry)         Referral Reason:Specialty Services Required         Requested Specialty:Optometry         Number of Visits Requested:1     Case Request Endoscopy: COLONOSCOPY         Order Specific Question: Pre-op Diagnosis         Answer: Screening [[id number]]         These orders were placed using [facility] approved protocol and any results will be forwarded to your office for appropriate follow up.  I have included a copy of my visit note, please review the note and feel free to contact me with any questions.   Thank you for allowing me to participate in the care of your patients.  Mechelle Brand NP

## 2022-05-12 NOTE — PROGRESS NOTES
Sabino Rubio presented for a  Medicare AWV and comprehensive Health Risk Assessment today. The following components were reviewed and updated:    · Medical history  · Family History  · Social history  · Allergies and Current Medications  · Health Risk Assessment  · Health Maintenance  · Care Team         ** See Completed Assessments for Annual Wellness Visit within the encounter summary.**         The following assessments were completed:  · Living Situation  · CAGE  · Depression Screening  · Timed Get Up and Go  · Whisper Test  · Cognitive Function Screening  · Nutrition Screening  · ADL Screening  · PAQ Screening            Vitals:    05/12/22 0719   BP: 126/80   Pulse: 65   SpO2: 99%   Weight: 71 kg (156 lb 8.4 oz)     Body mass index is 22.46 kg/m².  Physical Exam  Vitals and nursing note reviewed.   Constitutional:       Appearance: He is well-developed.   HENT:      Head: Normocephalic and atraumatic.      Right Ear: External ear normal.      Left Ear: External ear normal.   Eyes:      Conjunctiva/sclera: Conjunctivae normal.      Pupils: Pupils are equal, round, and reactive to light.   Cardiovascular:      Rate and Rhythm: Normal rate and regular rhythm.   Pulmonary:      Effort: Pulmonary effort is normal.      Breath sounds: Normal breath sounds.   Abdominal:      General: Bowel sounds are normal.      Palpations: Abdomen is soft.   Musculoskeletal:         General: Normal range of motion.      Cervical back: Normal range of motion and neck supple.   Skin:     General: Skin is warm and dry.   Neurological:      Mental Status: He is alert and oriented to person, place, and time.               Diagnoses and health risks identified today and associated recommendations/orders:    1. Encounter for preventive health examination  Health Maintenance updated   Records reviewed   Exam done     2. Blurred vision  - Ambulatory referral/consult to Optometry; Future    3. Kidney cyst, acquired: R 1.2 cm 2015; stable  1/19 no follow up recommended  Stable and chronic. Followed by PCP.     4. Spondylosis of lumbar region without myelopathy or radiculopathy  Stable and chronic. Followed by PCP and pain medicine.     5. Colon cancer screening  - Case Request Endoscopy: COLONOSCOPY    6. Calcified granuloma of lung  Noted on CT calcium scoring 1/2022. Stable and chronic. Followed by PCP.     Counseling and Referral of Other Preventative  (Italic type indicates deductible and co-insurance are waived)    Patient Name: Sabino Rubio  Today's Date: 5/16/2022    Health Maintenance       Date Due Completion Date    HIV Screening Never done ---    COVID-19 Vaccine (4 - Booster for Pfizer series) 02/19/2022 11/19/2021    Shingles Vaccine (3 of 3) 03/02/2022 1/5/2022    Colorectal Cancer Screening 05/22/2022 5/22/2019    PROSTATE-SPECIFIC ANTIGEN 01/04/2023 1/4/2022    Pneumococcal Vaccines (Age 65+) (2 - PPSV23 or PCV20) 01/05/2023 1/5/2022    High Dose Statin 05/12/2023 5/12/2022    Lipid Panel 01/04/2027 1/4/2022    TETANUS VACCINE 02/09/2030 2/9/2020        Orders Placed This Encounter   Procedures    Ambulatory referral/consult to Optometry       Provided Sabino with a 5-10 year written screening schedule and personal prevention plan. Recommendations were developed using the USPSTF age appropriate recommendations. Education, counseling, and referrals were provided as needed. After Visit Summary printed and given to patient which includes a list of additional screenings\tests needed.    Follow up in about 13 weeks (around 8/11/2022) for follow up with optometry.    Mechelle Brand, NP  I offered to discuss advanced care planning, including how to pick a person who would make decisions for you if you were unable to make them for yourself, called a health care power of , and what kind of decisions you might make such as use of life sustaining treatments such as ventilators and tube feeding when faced with a life limiting illness  recorded on a living will that they will need to know. (How you want to be cared for as you near the end of your natural life)     X  Patient is unwilling to engage in a discussion regarding advance directives at this time.

## 2022-05-13 ENCOUNTER — PATIENT MESSAGE (OUTPATIENT)
Dept: PAIN MEDICINE | Facility: OTHER | Age: 67
End: 2022-05-13
Payer: MEDICARE

## 2022-05-16 ENCOUNTER — HOSPITAL ENCOUNTER (OUTPATIENT)
Facility: OTHER | Age: 67
Discharge: HOME | End: 2022-05-16
Attending: ANESTHESIOLOGY | Admitting: ANESTHESIOLOGY
Payer: MEDICARE

## 2022-05-16 VITALS
TEMPERATURE: 98 F | BODY MASS INDEX: 21.9 KG/M2 | HEIGHT: 70 IN | RESPIRATION RATE: 16 BRPM | HEART RATE: 70 BPM | DIASTOLIC BLOOD PRESSURE: 87 MMHG | SYSTOLIC BLOOD PRESSURE: 150 MMHG | OXYGEN SATURATION: 96 % | WEIGHT: 153 LBS

## 2022-05-16 DIAGNOSIS — M47.816 SPONDYLOSIS OF LUMBAR REGION WITHOUT MYELOPATHY OR RADICULOPATHY: Primary | ICD-10-CM

## 2022-05-16 DIAGNOSIS — G89.29 CHRONIC PAIN: ICD-10-CM

## 2022-05-16 PROCEDURE — 64636 PR DESTROY L/S FACET JNT ADDL: ICD-10-PCS | Mod: RT,,, | Performed by: ANESTHESIOLOGY

## 2022-05-16 PROCEDURE — 64636 DESTROY L/S FACET JNT ADDL: CPT | Mod: RT,,, | Performed by: ANESTHESIOLOGY

## 2022-05-16 PROCEDURE — 63600175 PHARM REV CODE 636 W HCPCS: Performed by: ANESTHESIOLOGY

## 2022-05-16 PROCEDURE — 64636 DESTROY L/S FACET JNT ADDL: CPT | Mod: RT | Performed by: ANESTHESIOLOGY

## 2022-05-16 PROCEDURE — 25000003 PHARM REV CODE 250: Performed by: STUDENT IN AN ORGANIZED HEALTH CARE EDUCATION/TRAINING PROGRAM

## 2022-05-16 PROCEDURE — 99152 MOD SED SAME PHYS/QHP 5/>YRS: CPT | Performed by: ANESTHESIOLOGY

## 2022-05-16 PROCEDURE — 25000003 PHARM REV CODE 250: Performed by: ANESTHESIOLOGY

## 2022-05-16 PROCEDURE — 99152 PR MOD CONSCIOUS SEDATION, SAME PHYS, 5+ YRS, FIRST 15 MIN: ICD-10-PCS | Mod: ,,, | Performed by: ANESTHESIOLOGY

## 2022-05-16 PROCEDURE — 99152 MOD SED SAME PHYS/QHP 5/>YRS: CPT | Mod: ,,, | Performed by: ANESTHESIOLOGY

## 2022-05-16 PROCEDURE — 64635 DESTROY LUMB/SAC FACET JNT: CPT | Mod: RT | Performed by: ANESTHESIOLOGY

## 2022-05-16 PROCEDURE — 64635 PR DESTROY LUMB/SAC FACET JNT: ICD-10-PCS | Mod: RT,,, | Performed by: ANESTHESIOLOGY

## 2022-05-16 PROCEDURE — 64635 DESTROY LUMB/SAC FACET JNT: CPT | Mod: RT,,, | Performed by: ANESTHESIOLOGY

## 2022-05-16 RX ORDER — FENTANYL CITRATE 50 UG/ML
INJECTION, SOLUTION INTRAMUSCULAR; INTRAVENOUS
Status: DISCONTINUED | OUTPATIENT
Start: 2022-05-16 | End: 2022-05-16 | Stop reason: HOSPADM

## 2022-05-16 RX ORDER — SODIUM CHLORIDE 9 MG/ML
500 INJECTION, SOLUTION INTRAVENOUS CONTINUOUS
Status: DISCONTINUED | OUTPATIENT
Start: 2022-05-16 | End: 2022-05-16 | Stop reason: HOSPADM

## 2022-05-16 RX ORDER — DEXAMETHASONE SODIUM PHOSPHATE 10 MG/ML
INJECTION INTRAMUSCULAR; INTRAVENOUS
Status: DISCONTINUED | OUTPATIENT
Start: 2022-05-16 | End: 2022-05-16 | Stop reason: HOSPADM

## 2022-05-16 RX ORDER — MIDAZOLAM HYDROCHLORIDE 1 MG/ML
INJECTION INTRAMUSCULAR; INTRAVENOUS
Status: DISCONTINUED | OUTPATIENT
Start: 2022-05-16 | End: 2022-05-16 | Stop reason: HOSPADM

## 2022-05-16 RX ORDER — LIDOCAINE HYDROCHLORIDE 20 MG/ML
INJECTION, SOLUTION INFILTRATION; PERINEURAL
Status: DISCONTINUED | OUTPATIENT
Start: 2022-05-16 | End: 2022-05-16 | Stop reason: HOSPADM

## 2022-05-16 RX ORDER — BUPIVACAINE HYDROCHLORIDE 2.5 MG/ML
INJECTION, SOLUTION EPIDURAL; INFILTRATION; INTRACAUDAL
Status: DISCONTINUED | OUTPATIENT
Start: 2022-05-16 | End: 2022-05-16 | Stop reason: HOSPADM

## 2022-05-16 NOTE — BRIEF OP NOTE
Discharge Note  Short Stay      SUMMARY     Admit Date: 5/16/2022    Attending Physician: Kaiser Braxton      Discharge Physician: Kaiser Braxton      Discharge Date: 5/16/2022 10:11 AM    Procedure(s) (LRB):  Radiofrequency Ablation RIGHT L3,4,5 (Right)    Final Diagnosis: Spondylosis of lumbar region without myelopathy or radiculopathy [M47.816]    Disposition: Home or self care    Patient Instructions:   Current Discharge Medication List      CONTINUE these medications which have NOT CHANGED    Details   fluticasone propionate (FLONASE) 50 mcg/actuation nasal spray 1 spray by Each Nostril route 2 (two) times a day.      gabapentin (NEURONTIN) 300 MG capsule Take 1 capsule (300 mg total) by mouth 3 (three) times daily.  Qty: 90 capsule, Refills: 2    Associated Diagnoses: Degeneration of cervical intervertebral disc      meloxicam (MOBIC) 15 MG tablet TAKE 1 TABLET (15 MG TOTAL) BY MOUTH DAILY AS NEEDED FOR PAIN (TAKE WITH FOOD OR A MEAL).  Qty: 90 tablet, Refills: 3    Associated Diagnoses: Degeneration of cervical intervertebral disc      rosuvastatin (CRESTOR) 10 MG tablet Take 1 tablet (10 mg total) by mouth once daily.  Qty: 90 tablet, Refills: 3      sildenafiL (VIAGRA) 100 MG tablet Take 1 tablet (100 mg total) by mouth daily as needed.  Qty: 15 tablet, Refills: 11      tamsulosin (FLOMAX) 0.4 mg Cap Take 1 capsule (0.4 mg total) by mouth once daily.  Qty: 30 capsule, Refills: 12                 Discharge Diagnosis: Spondylosis of lumbar region without myelopathy or radiculopathy [M47.816]  Condition on Discharge: Stable with no complications to procedure   Diet on Discharge: Same as before.  Activity: as per instruction sheet.  Discharge to: Home with a responsible adult.  Follow up: 2-4 weeks       Please call my office or pager at [phone] if experienced any weakness or loss of sensation, fever > 101.5, pain uncontrolled with oral medications, persistent nausea/vomiting/or diarrhea, redness or drainage  from the incisions, or any other worrisome concerns. If physician on call was not reached or could not communicate with our office for any reason please go to the nearest emergency department

## 2022-05-16 NOTE — OP NOTE
Therapeutic Lumbar Medial Branch Radiofrequency Ablation under Fluoroscopy     The procedure, risks, benefits, and options were discussed with the patient. There are no contraindications to the procedure. The patent expressed understanding and agreed to the procedure. Informed written consent was obtained prior to the start of the procedure and can be found in the patient's chart.        PATIENT NAME: Sabino Rubio   MRN: [id number]     DATE OF PROCEDURE: 05/16/2022     PROCEDURE:  Right L3, L4 and L5 Lumbar Radiofrequency Ablation under Fluoroscopy    PRE-OP DIAGNOSIS: Spondylosis of lumbar region without myelopathy or radiculopathy [M47.816] Lumbar spondylosis [M47.816]    POST-OP DIAGNOSIS: Same    PHYSICIAN: Kaiser Braxton MD    ASSISTANTS: goyo Manuel MD     MEDICATIONS INJECTED:  Preservative-free Decadron 10mg with 9cc of Bupivicaine 0.25%    LOCAL ANESTHETIC INJECTED:   Xylocaine 2%    SEDATION:   Versed 2mg and Fentanyl 25mcg                                                                                                                                                                                     Conscious sedation ordered by M.D. Patient re-evaluation prior to administration of conscious sedation. No changes noted in patient's status from initial evaluation. The patient's vital signs were monitored by RN and patient remained hemodynamically stable throughout the procedure.    Event Time In   Sedation Start 1001   Sedation End 1011       ESTIMATED BLOOD LOSS:  None    COMPLICATIONS:  None     INTERVAL HISTORY: Patient has clinical and imaging findings suggestive of facet mediated pain. Patients has completed 2 previous diagnostic medial branch blocks at specified levels with at least 80% relief for the expected duration of the local anesthetic utilized.    TECHNIQUE: Time-out was performed to identify the patient and procedure to be performed. With the patient laying in a prone position, the surgical area  was prepped and draped in the usual sterile fashion using ChloraPrep and fenestrated drape. The levels were determined under fluoroscopic guidance. Skin anesthesia was achieved by injecting Lidocaine 2% over the injection sites. A 20 gauge 10mm curved active tip needle was introduced to the anatomic local of the medial branch at each of the above levels using AP, lateral and/or contralateral oblique fluoroscopic imaging. Then sensory and motor testing was performed to confirm that the needle tips were in the correct location. After negative aspiration for blood or CSF was confirmed, 1 mL of the lidocaine 2% listed above was injected slowly at each site. This was followed by thermal lesioning at 80 degrees celsius for 90 seconds. That was followed by slowly injecting 2 mL of the medication mixture listed above at each site. The needles were removed and bleeding was nil. A sterile dressing was applied. No specimens collected. The patient tolerated the procedure well and did not have any procedure related motor deficit at the conclusion of the procedure.  PAIN BEFORE THE PROCEDURE: 3-10/10    PAIN AFTER THE PROCEDURE: 0/10   The patient was monitored after the procedure in the recovery area. They were given post-procedure and discharge instructions to follow at home. The patient was discharged in a stable condition.        Kaiser Braxton MD

## 2022-05-16 NOTE — H&P (VIEW-ONLY)
HPI  Patient presenting for Procedure(s) (LRB):  Radiofrequency Ablation RIGHT L3,4,5 (Right)     Patient on Anti-coagulation No    No health changes since previous encounter    Past Medical History:   Diagnosis Date    Allergy     Arthritis     Family history of malignant neoplasm of gastrointestinal tract mat.gf    Family history of prostate cancer: 1/2 brother 9/25/2017    GERD (gastroesophageal reflux disease)     Kidney cyst, acquired: R 1.2 cm 2015 9/25/2017    Restless leg syndrome     Tubulovillous adenoma 2013 due 2016 9/14/2015     Past Surgical History:   Procedure Laterality Date    APPLICATION OF BONE GRAFT TO FINGER Left 11/8/2021    Procedure: APPLICATION INTEGRA GRAFT, TO FINGER;  Surgeon: Alba Penn MD;  Location: [facility] OR;  Service: Orthopedics;  Laterality: Left;    CARPAL TUNNEL RELEASE      COLONOSCOPY N/A 5/22/2019    Procedure: COLONOSCOPY;  Surgeon: Juancarlos Foley MD;  Location: [facility] ENDO (4TH FLR);  Service: Endoscopy;  Laterality: N/A;    EXCISION OF GANGLION OF WRIST Right 6/28/2019    Procedure: EXCISION, GANGLION CYST, WRIST right;  Surgeon: Alba Penn MD;  Location: [facility] OR 1ST FLR;  Service: Orthopedics;  Laterality: Right;  regional MAC, stretcher, supine, hand pan 1 and 2,MINI C-arm, call Arthrex    INJECTION OF ANESTHETIC AGENT AROUND NERVE Bilateral 1/27/2022    Procedure: Block, Nerve MEDIAL BRANCH BLOCK BILATERAL L3,4,5  DIRECT REFERRAL 1 OF 2;  Surgeon: Kaiser Braxton MD;  Location: [facility] PAIN MGT;  Service: Pain Management;  Laterality: Bilateral;    INJECTION OF ANESTHETIC AGENT AROUND NERVE Bilateral 2/10/2022    Procedure: Block, Nerve MEDIAL BRANCH BLOCK BILATERAL L3.4.5  DIRECT REFERRAL 2 OF 2;  Surgeon: Kaiser Braxton MD;  Location: [facility] PAIN MGT;  Service: Pain Management;  Laterality: Bilateral;    INJECTION OF FACET JOINT Bilateral 6/6/2019    Procedure: INJECTION, FACET JOINT INJECTION (LUMBAR BLOCK) BILATERAL L4-L5 AND L5-S1 FACET  "INJECTIONS;  Surgeon: Kaiser Braxton MD;  Location: [facility] PAIN MGT;  Service: Pain Management;  Laterality: Bilateral;  NEEDS CONSENT    INTERPOSITION ARTHROPLASTY OF CARPOMETACARPAL JOINTS Right 6/28/2019    Procedure: INTERPOSITION ARTHROPLASTY, CMC JOINT right;  Surgeon: Alba Penn MD;  [facility];  Service: Orthopedics;  Laterality: Right;  regional MAC, stretcher, supine, hand pan 1 and 2,MINI C-arm, call Arthrex    IRRIGATION AND DEBRIDEMENT OF UPPER EXTREMITY Left 11/8/2021    Procedure: IRRIGATION AND DEBRIDEMENT, UPPER EXTREMITY, left index finger;  Surgeon: Alba Penn MD;  Location: [facility] OR;  Service: Orthopedics;  Laterality: Left;    REPAIR OF NAIL BED Left 11/8/2021    Procedure: REPAIR, NAIL BED, left index;  Surgeon: Alba Penn MD;  Location: [facility] OR;  Service: Orthopedics;  Laterality: Left;    SPERMATOCELECTOMY Right 11/8/2019    Procedure: EXCISION, SPERMATOCELE;  Surgeon: Sheldon Araya Jr., MD;  [facility];  Service: Urology;  Laterality: Right;  1hr     Review of patient's allergies indicates:  No Known Allergies   Current Facility-Administered Medications   Medication    0.9%  NaCl infusion     Facility-Administered Medications Ordered in Other Encounters   Medication    0.9%  NaCl infusion       PMHx, PSHx, Allergies, Medications reviewed in epic    ROS negative except pain complaints in HPI    OBJECTIVE:    /79 (BP Location: Right arm, Patient Position: Lying)   Pulse 70   Temp 97.7 °F (36.5 °C) (Oral)   Resp 16   Ht 5' 10" (1.778 m)   Wt 69.4 kg (153 lb)   SpO2 96%   BMI 21.95 kg/m²     PHYSICAL EXAMINATION:    GENERAL: Well appearing, in no acute distress, alert and oriented x3.  PSYCH:  Mood and affect appropriate.  SKIN: Skin color, texture, turgor normal, no rashes or lesions which will impact the procedure.  CV: RRR with palpation of the radial artery.  PULM: No evidence of respiratory difficulty, symmetric " chest rise. Clear to auscultation.  NEURO: Cranial nerves grossly intact.    Plan:    Proceed with procedure as planned Procedure(s) (LRB):  Radiofrequency Ablation RIGHT L3,4,5 (Right)    Jaret Aldana  05/16/2022

## 2022-05-16 NOTE — H&P
HPI  Patient presenting for Procedure(s) (LRB):  Radiofrequency Ablation RIGHT L3,4,5 (Right)     Patient on Anti-coagulation No    No health changes since previous encounter    Past Medical History:   Diagnosis Date    Allergy     Arthritis     Family history of malignant neoplasm of gastrointestinal tract mat.gf    Family history of prostate cancer: 1/2 brother 9/25/2017    GERD (gastroesophageal reflux disease)     Kidney cyst, acquired: R 1.2 cm 2015 9/25/2017    Restless leg syndrome     Tubulovillous adenoma 2013 due 2016 9/14/2015     Past Surgical History:   Procedure Laterality Date    APPLICATION OF BONE GRAFT TO FINGER Left 11/8/2021    Procedure: APPLICATION INTEGRA GRAFT, TO FINGER;  Surgeon: Alba Penn MD;  Location: [facility] OR;  Service: Orthopedics;  Laterality: Left;    CARPAL TUNNEL RELEASE      COLONOSCOPY N/A 5/22/2019    Procedure: COLONOSCOPY;  Surgeon: Juancarlos Foley MD;  Location: [facility] ENDO (4TH FLR);  Service: Endoscopy;  Laterality: N/A;    EXCISION OF GANGLION OF WRIST Right 6/28/2019    Procedure: EXCISION, GANGLION CYST, WRIST right;  Surgeon: Alba Penn MD;  Location: [facility] OR 1ST FLR;  Service: Orthopedics;  Laterality: Right;  regional MAC, stretcher, supine, hand pan 1 and 2,MINI C-arm, call Arthrex    INJECTION OF ANESTHETIC AGENT AROUND NERVE Bilateral 1/27/2022    Procedure: Block, Nerve MEDIAL BRANCH BLOCK BILATERAL L3,4,5  DIRECT REFERRAL 1 OF 2;  Surgeon: Kaiser Braxton MD;  Location: [facility] PAIN MGT;  Service: Pain Management;  Laterality: Bilateral;    INJECTION OF ANESTHETIC AGENT AROUND NERVE Bilateral 2/10/2022    Procedure: Block, Nerve MEDIAL BRANCH BLOCK BILATERAL L3.4.5  DIRECT REFERRAL 2 OF 2;  Surgeon: Kaiser Braxton MD;  Location: [facility] PAIN MGT;  Service: Pain Management;  Laterality: Bilateral;    INJECTION OF FACET JOINT Bilateral 6/6/2019    Procedure: INJECTION, FACET JOINT INJECTION (LUMBAR BLOCK) BILATERAL L4-L5 AND L5-S1 FACET  "INJECTIONS;  Surgeon: Kaiser Braxton MD;  Location: [facility] PAIN MGT;  Service: Pain Management;  Laterality: Bilateral;  NEEDS CONSENT    INTERPOSITION ARTHROPLASTY OF CARPOMETACARPAL JOINTS Right 6/28/2019    Procedure: INTERPOSITION ARTHROPLASTY, CMC JOINT right;  Surgeon: Alba Penn MD;  [facility];  Service: Orthopedics;  Laterality: Right;  regional MAC, stretcher, supine, hand pan 1 and 2,MINI C-arm, call Arthrex    IRRIGATION AND DEBRIDEMENT OF UPPER EXTREMITY Left 11/8/2021    Procedure: IRRIGATION AND DEBRIDEMENT, UPPER EXTREMITY, left index finger;  Surgeon: Alba Penn MD;  Location[facility];  Service: Orthopedics;  Laterality: Left;    REPAIR OF NAIL BED Left 11/8/2021    Procedure: REPAIR, NAIL BED, left index;  Surgeon: Alba Penn MD;  Location: [facility] OR;  Service: Orthopedics;  Laterality: Left;    SPERMATOCELECTOMY Right 11/8/2019    Procedure: EXCISION, SPERMATOCELE;  Surgeon: Sheldon Araya Jr., MD;  [facility];  Service: Urology;  Laterality: Right;  1hr     Review of patient's allergies indicates:  No Known Allergies   Current Facility-Administered Medications   Medication    0.9%  NaCl infusion     Facility-Administered Medications Ordered in Other Encounters   Medication    0.9%  NaCl infusion       PMHx, PSHx, Allergies, Medications reviewed in epic    ROS negative except pain complaints in HPI    OBJECTIVE:    /79 (BP Location: Right arm, Patient Position: Lying)   Pulse 70   Temp 97.7 °F (36.5 °C) (Oral)   Resp 16   Ht 5' 10" (1.778 m)   Wt 69.4 kg (153 lb)   SpO2 96%   BMI 21.95 kg/m²     PHYSICAL EXAMINATION:    GENERAL: Well appearing, in no acute distress, alert and oriented x3.  PSYCH:  Mood and affect appropriate.  SKIN: Skin color, texture, turgor normal, no rashes or lesions which will impact the procedure.  CV: RRR with palpation of the radial artery.  PULM: No evidence of respiratory difficulty, symmetric " chest rise. Clear to auscultation.  NEURO: Cranial nerves grossly intact.    Plan:    Proceed with procedure as planned Procedure(s) (LRB):  Radiofrequency Ablation RIGHT L3,4,5 (Right)    Jaret Aldana  05/16/2022

## 2022-05-16 NOTE — DISCHARGE INSTRUCTIONS
Thank you for allowing us to care for you today. You may receive a survey about the care we provided. Your feedback is valuable and helps us provide excellent care throughout the community.     Home Care Instructions for Pain Management:    1. DIET:   You may resume your normal diet today.   2. BATHING:   You may shower with luke warm water. No tub baths or anything that will soak injection sites under water for the next 24 hours.  3. DRESSING:   You may remove your bandage today.   4. ACTIVITY LEVEL:   You may resume your normal activities 24 hrs after your procedure. Nothing strenuous today.  5. MEDICATIONS:   You may resume your normal medications today. To restart blood thinners, ask your doctor.  6. DRIVING    If you have received any sedatives by mouth today, you may not drive for 12 hours.    If you have received any sedation through your IV, you may not drive for 24 hrs.   7. SPECIAL INSTRUCTIONS:   No heat to the injection site for 24 hrs including, hot bath or shower, heating pad, moist heat, or hot tubs.    Use ice pack to injection site for any pain or discomfort.  Apply ice packs for 20 minute intervals as needed.    IF you have diabetes, be sure to monitor your blood sugar more closely. IF your injection contained steroids your blood sugar levels may become higher than normal.    If you are still having pain upon discharge:  Your pain may improve over the next 48 hours. The anesthetic (numbing medication) works immediately to 48 hours. IF your injection contained a steroid (anti-inflammatory medication), it takes approximately 3 days to start feeling relief and 7-10 days to see your greatest results from the medication. It is possible you may need subsequent injections. This would be discussed at your follow up appointment with pain management or your referring doctor.    Please call the PAIN MANAGEMENT office at [phone] or ON CALL pager at [phone] if you experienced any:   -Weakness or  loss of sensation  -Fever > 101.5  -Pain uncontrolled with oral medications   -Persistent nausea, vomiting, or diarrhea  -Redness or drainage from the injection sites, or any other worrisome concerns.   If physician on call was not reached or could not communicate with our office for any reason please go to the nearest emergency department. Adult Procedural Sedation Instructions    Recovery After Procedural Sedation (Adult)  You have been given medicine by vein to make you sleep during your surgery. This may have included both a pain medicine and sleeping medicine. Most of the effects have worn off. But you may still have some drowsiness for the next 6 to 8 hours.  Home care  Follow these guidelines when you get home:  For the next 8 hours, you should be watched by a responsible adult. This person should make sure your condition is not getting worse.  Don't drink any alcohol for the next 24 hours.  Don't drive, operate dangerous machinery, or make important business or personal decisions during the next 24 hours.  Note: Your healthcare provider may tell you not to take any medicine by mouth for pain or sleep in the next 4 hours. These medicines may react with the medicines you were given in the hospital. This could cause a much stronger response than usual.  Follow-up care  Follow up with your healthcare provider if you are not alert and back to your usual level of activity within 12 hours.  When to seek medical advice  Call your healthcare provider right away if any of these occur:  Drowsiness gets worse  Weakness or dizziness gets worse  Repeated vomiting  You can't be awakened   Date Last Reviewed: 10/18/2016  © 3024-2500 [employer]. [address] [place] [address], [place] [address]. All rights reserved. This information is not intended as a substitute for professional medical care. Always follow your healthcare professional's instructions.

## 2022-06-01 ENCOUNTER — LAB VISIT (OUTPATIENT)
Dept: LAB | Facility: HOSPITAL | Age: 67
End: 2022-06-01
Attending: INTERNAL MEDICINE
Payer: MEDICARE

## 2022-06-01 DIAGNOSIS — Z11.4 SCREENING FOR HIV (HUMAN IMMUNODEFICIENCY VIRUS): ICD-10-CM

## 2022-06-01 DIAGNOSIS — E78.2 MIXED HYPERLIPIDEMIA: ICD-10-CM

## 2022-06-01 LAB
AST SERPL-CCNC: 25 U/L (ref 10–40)
CHOLEST SERPL-MCNC: 168 MG/DL (ref 120–199)
CHOLEST/HDLC SERPL: 2.1 {RATIO} (ref 2–5)
HDLC SERPL-MCNC: 81 MG/DL (ref 40–75)
HDLC SERPL: 48.2 % (ref 20–50)
LDLC SERPL CALC-MCNC: 72 MG/DL (ref 63–159)
NONHDLC SERPL-MCNC: 87 MG/DL
TRIGL SERPL-MCNC: 75 MG/DL (ref 30–150)

## 2022-06-01 PROCEDURE — 80061 LIPID PANEL: CPT | Performed by: INTERNAL MEDICINE

## 2022-06-01 PROCEDURE — 36415 COLL VENOUS BLD VENIPUNCTURE: CPT | Performed by: INTERNAL MEDICINE

## 2022-06-01 PROCEDURE — 84450 TRANSFERASE (AST) (SGOT): CPT | Performed by: INTERNAL MEDICINE

## 2022-06-01 PROCEDURE — 87389 HIV-1 AG W/HIV-1&-2 AB AG IA: CPT | Performed by: INTERNAL MEDICINE

## 2022-06-02 ENCOUNTER — HOSPITAL ENCOUNTER (OUTPATIENT)
Facility: OTHER | Age: 67
Discharge: HOME | End: 2022-06-02
Attending: ANESTHESIOLOGY | Admitting: ANESTHESIOLOGY
Payer: MEDICARE

## 2022-06-02 VITALS
DIASTOLIC BLOOD PRESSURE: 87 MMHG | RESPIRATION RATE: 16 BRPM | WEIGHT: 150 LBS | HEART RATE: 68 BPM | OXYGEN SATURATION: 98 % | TEMPERATURE: 98 F | SYSTOLIC BLOOD PRESSURE: 138 MMHG | BODY MASS INDEX: 21.47 KG/M2 | HEIGHT: 70 IN

## 2022-06-02 DIAGNOSIS — M47.816 SPONDYLOSIS WITHOUT MYELOPATHY OR RADICULOPATHY, LUMBAR REGION: Primary | ICD-10-CM

## 2022-06-02 DIAGNOSIS — G89.29 CHRONIC PAIN: ICD-10-CM

## 2022-06-02 LAB — HIV 1+2 AB+HIV1 P24 AG SERPL QL IA: NEGATIVE

## 2022-06-02 PROCEDURE — 25000003 PHARM REV CODE 250: Performed by: STUDENT IN AN ORGANIZED HEALTH CARE EDUCATION/TRAINING PROGRAM

## 2022-06-02 PROCEDURE — 64635 DESTROY LUMB/SAC FACET JNT: CPT | Mod: LT,,, | Performed by: ANESTHESIOLOGY

## 2022-06-02 PROCEDURE — 25000003 PHARM REV CODE 250: Performed by: ANESTHESIOLOGY

## 2022-06-02 PROCEDURE — 64635 DESTROY LUMB/SAC FACET JNT: CPT | Mod: LT | Performed by: ANESTHESIOLOGY

## 2022-06-02 PROCEDURE — 64635 PR DESTROY LUMB/SAC FACET JNT: ICD-10-PCS | Mod: LT,,, | Performed by: ANESTHESIOLOGY

## 2022-06-02 PROCEDURE — 64636 PR DESTROY L/S FACET JNT ADDL: ICD-10-PCS | Mod: LT,,, | Performed by: ANESTHESIOLOGY

## 2022-06-02 PROCEDURE — 64636 DESTROY L/S FACET JNT ADDL: CPT | Mod: LT,,, | Performed by: ANESTHESIOLOGY

## 2022-06-02 PROCEDURE — 64636 DESTROY L/S FACET JNT ADDL: CPT | Mod: LT | Performed by: ANESTHESIOLOGY

## 2022-06-02 PROCEDURE — 63600175 PHARM REV CODE 636 W HCPCS: Performed by: ANESTHESIOLOGY

## 2022-06-02 RX ORDER — DEXAMETHASONE SODIUM PHOSPHATE 10 MG/ML
INJECTION INTRAMUSCULAR; INTRAVENOUS
Status: DISCONTINUED | OUTPATIENT
Start: 2022-06-02 | End: 2022-06-02 | Stop reason: HOSPADM

## 2022-06-02 RX ORDER — FENTANYL CITRATE 50 UG/ML
INJECTION, SOLUTION INTRAMUSCULAR; INTRAVENOUS
Status: DISCONTINUED | OUTPATIENT
Start: 2022-06-02 | End: 2022-06-02 | Stop reason: HOSPADM

## 2022-06-02 RX ORDER — MIDAZOLAM HYDROCHLORIDE 1 MG/ML
INJECTION INTRAMUSCULAR; INTRAVENOUS
Status: DISCONTINUED | OUTPATIENT
Start: 2022-06-02 | End: 2022-06-02 | Stop reason: HOSPADM

## 2022-06-02 RX ORDER — SODIUM CHLORIDE 9 MG/ML
500 INJECTION, SOLUTION INTRAVENOUS CONTINUOUS
Status: DISCONTINUED | OUTPATIENT
Start: 2022-06-02 | End: 2022-06-02 | Stop reason: HOSPADM

## 2022-06-02 RX ORDER — LIDOCAINE HYDROCHLORIDE 20 MG/ML
INJECTION, SOLUTION INFILTRATION; PERINEURAL
Status: DISCONTINUED | OUTPATIENT
Start: 2022-06-02 | End: 2022-06-02 | Stop reason: HOSPADM

## 2022-06-02 RX ORDER — BUPIVACAINE HYDROCHLORIDE 2.5 MG/ML
INJECTION, SOLUTION EPIDURAL; INFILTRATION; INTRACAUDAL
Status: DISCONTINUED | OUTPATIENT
Start: 2022-06-02 | End: 2022-06-02 | Stop reason: HOSPADM

## 2022-06-02 NOTE — OP NOTE
Therapeutic Lumbar Medial Branch Radiofrequency Ablation under Fluoroscopy     The procedure, risks, benefits, and options were discussed with the patient. There are no contraindications to the procedure. The patent expressed understanding and agreed to the procedure. Informed written consent was obtained prior to the start of the procedure and can be found in the patient's chart.        PATIENT NAME: Sabino Rubio   MRN: [id number]     DATE OF PROCEDURE: 06/02/2022     PROCEDURE:  Left L3, L4 and L5 Lumbar Radiofrequency Ablation under Fluoroscopy    PRE-OP DIAGNOSIS: Spondylosis of lumbar region without myelopathy or radiculopathy [M47.816] Lumbar spondylosis [M47.816]    POST-OP DIAGNOSIS: Same    PHYSICIAN: Kaiser Braxton MD    ASSISTANTS: Wendie Vazquez DO [employer] Pain Fellow     MEDICATIONS INJECTED:  Preservative-free Decadron 10mg with 9cc of Bupivicaine 0.25%    LOCAL ANESTHETIC INJECTED:   Xylocaine 2%    SEDATION:   Versed 2mg and Fentanyl 25mcg                                                                                                                                                                                     Conscious sedation ordered by M.REBECCA. Patient re-evaluation prior to administration of conscious sedation. No changes noted in patient's status from initial evaluation. The patient's vital signs were monitored by RN and patient remained hemodynamically stable throughout the procedure.    Event Time In   Sedation Start 0853   Sedation End 0906       ESTIMATED BLOOD LOSS:  None    COMPLICATIONS:  None     INTERVAL HISTORY: Patient has clinical and imaging findings suggestive of facet mediated pain. Patients has completed 2 previous diagnostic medial branch blocks at specified levels with at least 80% relief for the expected duration of the local anesthetic utilized.    TECHNIQUE: Time-out was performed to identify the patient and procedure to be performed. With the patient laying in a prone  position, the surgical area was prepped and draped in the usual sterile fashion using ChloraPrep and fenestrated drape. The levels were determined under fluoroscopic guidance. Skin anesthesia was achieved by injecting Lidocaine 2% over the injection sites. A 20 gauge 10mm curved active tip needle was introduced to the anatomic local of the medial branch at each of the above levels using AP, lateral and/or contralateral oblique fluoroscopic imaging. Then sensory and motor testing was performed to confirm that the needle tips were in the correct location. After negative aspiration for blood or CSF was confirmed, 2 mL of medication mixture listed above was injected at each level prior to lesion. This was followed by thermal lesioning at 80 degrees celsius for 90 seconds. That was followed by slowly injecting 1 mL of the medication mixture listed above at each site. The needles were removed and bleeding was nil. A sterile dressing was applied. No specimens collected. The patient tolerated the procedure well and did not have any procedure related motor deficit at the conclusion of the procedure.    The patient was monitored after the procedure in the recovery area. They were given post-procedure and discharge instructions to follow at home. The patient was discharged in a stable condition.    PAIN BEFORE THE PROCEDURE: 3-8/10    PAIN AFTER THE PROCEDURE: 1/10      I reviewed and edited the fellow's note. I conducted my own interview and physical examination. I agree with the findings. I was present and supervising all critical portions of the procedure.    Kaiser Braxton MD

## 2022-06-02 NOTE — DISCHARGE INSTRUCTIONS
Thank you for allowing us to care for you today. You may receive a survey about the care we provided. Your feedback is valuable and helps us provide excellent care throughout the community.     Home Care Instructions for Pain Management:    1. DIET:   You may resume your normal diet today.   2. BATHING:   You may shower with luke warm water. No tub baths or anything that will soak injection sites under water for the next 24 hours.  3. DRESSING:   You may remove your bandage today.   4. ACTIVITY LEVEL:   You may resume your normal activities 24 hrs after your procedure. Nothing strenuous today.  5. MEDICATIONS:   You may resume your normal medications today. To restart blood thinners, ask your doctor.  6. DRIVING    If you have received any sedatives by mouth today, you may not drive for 12 hours.    If you have received any sedation through your IV, you may not drive for 24 hrs.   7. SPECIAL INSTRUCTIONS:   No heat to the injection site for 24 hrs including, hot bath or shower, heating pad, moist heat, or hot tubs.    Use ice pack to injection site for any pain or discomfort.  Apply ice packs for 20 minute intervals as needed.    IF you have diabetes, be sure to monitor your blood sugar more closely. IF your injection contained steroids your blood sugar levels may become higher than normal.    If you are still having pain upon discharge:  Your pain may improve over the next 48 hours. The anesthetic (numbing medication) works immediately to 48 hours. IF your injection contained a steroid (anti-inflammatory medication), it takes approximately 3 days to start feeling relief and 7-10 days to see your greatest results from the medication. It is possible you may need subsequent injections. This would be discussed at your follow up appointment with pain management or your referring doctor.    Please call the PAIN MANAGEMENT office at [phone] or ON CALL pager at [phone] if you experienced any:   -Weakness or  loss of sensation  -Fever > 101.5  -Pain uncontrolled with oral medications   -Persistent nausea, vomiting, or diarrhea  -Redness or drainage from the injection sites, or any other worrisome concerns.   If physician on call was not reached or could not communicate with our office for any reason please go to the nearest emergency department. Adult Procedural Sedation Instructions    Recovery After Procedural Sedation (Adult)  You have been given medicine by vein to make you sleep during your surgery. This may have included both a pain medicine and sleeping medicine. Most of the effects have worn off. But you may still have some drowsiness for the next 6 to 8 hours.  Home care  Follow these guidelines when you get home:  For the next 8 hours, you should be watched by a responsible adult. This person should make sure your condition is not getting worse.  Don't drink any alcohol for the next 24 hours.  Don't drive, operate dangerous machinery, or make important business or personal decisions during the next 24 hours.  Note: Your healthcare provider may tell you not to take any medicine by mouth for pain or sleep in the next 4 hours. These medicines may react with the medicines you were given in the hospital. This could cause a much stronger response than usual.  Follow-up care  Follow up with your healthcare provider if you are not alert and back to your usual level of activity within 12 hours.  When to seek medical advice  Call your healthcare provider right away if any of these occur:  Drowsiness gets worse  Weakness or dizziness gets worse  Repeated vomiting  You can't be awakened   Date Last Reviewed: 10/18/2016  © 9568-5274 [employer]. [address] [place] [address], [place] [address]. All rights reserved. This information is not intended as a substitute for professional medical care. Always follow your healthcare professional's instructions.

## 2022-06-02 NOTE — INTERVAL H&P NOTE
The patient has been examined and the H&P has been reviewed:    I concur with the findings and no changes have occurred since H&P was written.    Procedure risks, benefits and alternative options discussed and understood by patient/family.    Denies use of blood thinners. NPO at 8 PM.       There are no hospital problems to display for this patient.

## 2022-06-02 NOTE — DISCHARGE SUMMARY
Discharge Diagnosis:Spondylosis of lumbar region without myelopathy or radiculopathy [M47.816]  Condition on Discharge: Stable.  Diet on Discharge: Same as before.  Activity: as per instruction sheet.  Discharge to: Home with a responsible adult.  Follow up: 2-4 weeks &/or as per Discharge instructions

## 2022-06-15 ENCOUNTER — PATIENT MESSAGE (OUTPATIENT)
Dept: PAIN MEDICINE | Facility: CLINIC | Age: 67
End: 2022-06-15
Payer: MEDICARE

## 2022-06-16 ENCOUNTER — HOSPITAL ENCOUNTER (OUTPATIENT)
Dept: RADIOLOGY | Facility: OTHER | Age: 67
Discharge: HOME | End: 2022-06-16
Attending: ANESTHESIOLOGY
Payer: MEDICARE

## 2022-06-16 ENCOUNTER — OFFICE VISIT (OUTPATIENT)
Dept: PAIN MEDICINE | Facility: CLINIC | Age: 67
End: 2022-06-16
Attending: ANESTHESIOLOGY
Payer: MEDICARE

## 2022-06-16 VITALS
SYSTOLIC BLOOD PRESSURE: 114 MMHG | DIASTOLIC BLOOD PRESSURE: 75 MMHG | BODY MASS INDEX: 22.09 KG/M2 | WEIGHT: 154.31 LBS | HEART RATE: 79 BPM | TEMPERATURE: 98 F | OXYGEN SATURATION: 99 % | HEIGHT: 70 IN | RESPIRATION RATE: 18 BRPM

## 2022-06-16 DIAGNOSIS — M79.18 MYOFASCIAL PAIN: ICD-10-CM

## 2022-06-16 DIAGNOSIS — M47.816 SPONDYLOSIS WITHOUT MYELOPATHY OR RADICULOPATHY, LUMBAR REGION: Primary | ICD-10-CM

## 2022-06-16 DIAGNOSIS — M51.36 DDD (DEGENERATIVE DISC DISEASE), LUMBAR: ICD-10-CM

## 2022-06-16 DIAGNOSIS — M47.816 SPONDYLOSIS WITHOUT MYELOPATHY OR RADICULOPATHY, LUMBAR REGION: ICD-10-CM

## 2022-06-16 PROCEDURE — 99999 PR PBB SHADOW E&M-EST. PATIENT-LVL IV: ICD-10-PCS | Mod: PBBFAC,,, | Performed by: ANESTHESIOLOGY

## 2022-06-16 PROCEDURE — 72114 X-RAY EXAM L-S SPINE BENDING: CPT | Mod: 26,,, | Performed by: STUDENT IN AN ORGANIZED HEALTH CARE EDUCATION/TRAINING PROGRAM

## 2022-06-16 PROCEDURE — 72114 X-RAY EXAM L-S SPINE BENDING: CPT | Mod: TC,FY

## 2022-06-16 PROCEDURE — 99999 PR PBB SHADOW E&M-EST. PATIENT-LVL IV: CPT | Mod: PBBFAC,,, | Performed by: ANESTHESIOLOGY

## 2022-06-16 PROCEDURE — 72114 XR LUMBAR SPINE 5 VIEW WITH FLEX AND EXT: ICD-10-PCS | Mod: 26,,, | Performed by: STUDENT IN AN ORGANIZED HEALTH CARE EDUCATION/TRAINING PROGRAM

## 2022-06-16 PROCEDURE — 99214 PR OFFICE/OUTPT VISIT, EST, LEVL IV, 30-39 MIN: ICD-10-PCS | Mod: GC,S$GLB,, | Performed by: ANESTHESIOLOGY

## 2022-06-16 PROCEDURE — 99214 OFFICE O/P EST MOD 30 MIN: CPT | Mod: GC,S$GLB,, | Performed by: ANESTHESIOLOGY

## 2022-06-16 RX ORDER — CYCLOBENZAPRINE HCL 10 MG
10 TABLET ORAL NIGHTLY
Qty: 30 TABLET | Refills: 3 | Status: SHIPPED | OUTPATIENT
Start: 2022-06-16 | End: 2022-06-26

## 2022-06-16 NOTE — PROGRESS NOTES
Subjective:      Patient ID: Sabino Rubio is a 66 y.o. male.    Chief Complaint: Low-back Pain (Right side severe pain)    Referred by: Kaiser Braxton MD     Patient presents today for evaluationfollow-up of their low back pain s/p Bilateral  Lumbar RFA . Per pateint his axial LBP is almost 50-60% better after the RFA but he is still suffering from some pain in Left lower back ,mostly stiffness and spasm .no radicular component  Is associated with LBP. No B/B incontinence/no numbness /motor or sensory deficit.           Interventional Pain History  L3/4/5 RFA in April/June 2022    Review of Systems   Constitutional: Negative for weight loss.   Cardiovascular: Negative for chest pain.   Respiratory: Negative for shortness of breath.    Musculoskeletal: Positive for arthritis, back pain, muscle cramps and stiffness. Negative for falls and muscle weakness.     Imaging;     Narrative & Impression  EXAMINATION:  MRI LUMBAR SPINE WITHOUT CONTRAST     CLINICAL HISTORY:  Low back pain, >6wks conservative tx, persistent-progressive sx, surgical candidate; Spondylolisthesis, site unspecified     TECHNIQUE:  Multiplanar, multisequence MR images were acquired from the thoracolumbar junction to the sacrum without the administration of contrast.     COMPARISON:  Lumbar spine radiograph 10/03/2017     FINDINGS:  Alignment: Mild straightening of normal lumbar lordosis.     Vertebrae: Mild vertebral height loss and degenerative signal change.  No evidence of acute fracture or infiltrative marrow process.     Discs: Intervertebral disc height loss and degenerative signal change, most prominent within the lower lumbar spine.     Cord: Normal.  Conus terminates at L1-L2.     Degenerative findings:     T12-L1: Mild broad-based disc bulge and facet arthropathy without significant spinal canal stenosis or neural foraminal narrowing.     L1-L2: Mild broad-based disc bulge and facet arthropathy without significant spinal canal stenosis  or neural foraminal narrowing.     L2-L3: Broad-based disc bulge, ligamentum flavum thickening, and facet arthropathy without significant spinal canal stenosis or neural foraminal narrowing.     L3-L4: Broad-based disc bulge, ligamentum flavum thickening, and facet arthropathy resulting in moderate spinal canal stenosis and mild bilateral neural foraminal narrowing.     L4-L5: Broad-based disc bulge, ligamentum flavum thickening, and facet arthropathy resulting in moderate spinal canal stenosis, moderate left neural foraminal narrowing, and mild right neural foraminal narrowing.     L5-S1: Broad-based disc bulge, ligamentum flavum thickening, and facet arthropathy resulting in moderate left and mild right neural foraminal narrowing.     Paraspinal muscles & soft tissues: Unremarkable.     IMPRESSION:      Multilevel degenerative change of the lumbar spine most significant at L3-L4 which demonstrates moderate spinal canal stenosis and mild bilateral neural foraminal narrowing.  Moderate left and mild right neural foraminal narrowing seen at L4-L5 and L5-S1.  Additional details above.     Electronically signed by resident: William Arita  Date:                                            01/09/2019  Time:                                           09:20     Electronically signed by: Dragan Grijalva MD  Date:                                            01/09/2019  Time:                                           10:23  Objective:         LUMBAR SPINE EXAM:  GAIT: within functional limits  LUMBAR FLEXION:  Reduced   LUMBAR HYPEREXTENSION AND FACET LOADING: within functional limits  PALPATION OVER THE FACET JOINTS:no tenderness, mild deep tenderness in Right lower Lumbar area   MUSCLE STRENGTH: 5/5 bilaterally and symmetricalt  STRAIGHT LEG RAISE: negative  GUERREROS TEST: unremarkable bilaterally  DEEP PALPATION OF THE SACROILIAC JOINT: unremarkable bilaterally  HIP EXAM: unremarkable with internal and external rotation of the  hip joint  SENSORY: intact to touch bilaterally        Assessment:       Encounter Diagnoses   Name Primary?    Spondylosis without myelopathy or radiculopathy, lumbar region Yes    Myofascial pain     DDD (degenerative disc disease), lumbar          Plan:       Sabino was seen today for low-back pain.    Diagnoses and all orders for this visit:    Spondylosis without myelopathy or radiculopathy, lumbar region  -     X-Ray Lumbar Complete Including Flex And Ext; Future  -     Ambulatory referral/consult to Physical/Occupational Therapy; Future    Myofascial pain  -     X-Ray Lumbar Complete Including Flex And Ext; Future  -     Ambulatory referral/consult to Physical/Occupational Therapy; Future    DDD (degenerative disc disease), lumbar    Other orders  -     cyclobenzaprine (FLEXERIL) 10 MG tablet; Take 1 tablet (10 mg total) by mouth every evening. for 10 days      - Will refer him for Physical therapy for core stabilization/strengthening.  - Start Flexiril 10mg PO QHS for muscle spasm/stiffnes  -Lumbar spine Flexion/extension xray  -F/u in 6 weeks     Amie Reich M.D. [facility]    I have personally taken the history and examined this patient and agree with the fellow's note as stated above.

## 2022-06-28 ENCOUNTER — CLINICAL SUPPORT (OUTPATIENT)
Dept: REHABILITATION | Facility: HOSPITAL | Age: 67
End: 2022-06-28
Payer: MEDICARE

## 2022-06-28 DIAGNOSIS — M79.18 MYOFASCIAL PAIN: ICD-10-CM

## 2022-06-28 DIAGNOSIS — M47.816 SPONDYLOSIS WITHOUT MYELOPATHY OR RADICULOPATHY, LUMBAR REGION: ICD-10-CM

## 2022-06-28 PROCEDURE — 97161 PT EVAL LOW COMPLEX 20 MIN: CPT

## 2022-06-30 NOTE — PLAN OF CARE
[facility]  Physical Therapy Initial Evaluation    Name: Sabino Rubio  Clinic Number: [id number]    Therapy Diagnosis:   Encounter Diagnoses   Name Primary?    Spondylosis without myelopathy or radiculopathy, lumbar region     Myofascial pain      Physician: Amie Reich MD    Physician Orders: PT Eval and Treat  Medical Diagnosis from Referral: Spondylosis without myelopathy or radiculopathy, lumbar region [M47.816], Myofascial pain [M79.18]  Evaluation Date: 6/28/2022  Authorization Period Expiration: 12/31/2022  Plan of Care Expiration: 8/28/2022  Visit # / Visits authorized: 1/ 1  FOTO: 1/10      Time In: 7:45  Time Out: 8:30  Total Billable Time: 45 minutes     Precautions: Standard, Standard    Subjective   Date of onset: Chronic  History of current condition - Sabino reports: Patient presents today for evaluationfollow-up of their low back pain s/p Bilateral  Lumbar RFA . Per pateint his axial LBP is almost 50-60% better after the RFA but he is still suffering from some pain in Left lower back ,mostly stiffness and spasm .no radicular component  Is associated with LBP. No B/B incontinence/no numbness /motor or sensory deficit.        Medical History:   Past Medical History:   Diagnosis Date    Allergy     Arthritis     Family history of malignant neoplasm of gastrointestinal tract mat.gf    Family history of prostate cancer: 1/2 brother 9/25/2017    GERD (gastroesophageal reflux disease)     Kidney cyst, acquired: R 1.2 cm 2015 9/25/2017    Restless leg syndrome     Tubulovillous adenoma 2013 due 2016 9/14/2015       Surgical History:   Sabino Rubio  has a past surgical history that includes Carpal tunnel release; Colonoscopy (N/A, 5/22/2019); Injection of facet joint (Bilateral, 6/6/2019); Interposition arthroplasty of carpometacarpal joints (Right, 6/28/2019); Excision of ganglion of wrist (Right, 6/28/2019); Spermatocelectomy (Right, 11/8/2019); Repair of  nail bed (Left, 11/8/2021); Irrigation and debridement of upper extremity (Left, 11/8/2021); Application of bone graft to finger (Left, 11/8/2021); Injection of anesthetic agent around nerve (Bilateral, 1/27/2022); Injection of anesthetic agent around nerve (Bilateral, 2/10/2022); Radiofrequency ablation (Right, 5/16/2022); and Radiofrequency ablation (Left, 6/2/2022).    Medications:   Sabino has a current medication list which includes the following prescription(s): fluticasone propionate, gabapentin, meloxicam, rosuvastatin, sildenafil, and tamsulosin, and the following Facility-Administered Medications: sodium chloride 0.9%.    Allergies:   Review of patient's allergies indicates:  No Known Allergies     Imaging, MRI studies    Prior Therapy: Yes  Social History:  lives with their spouse  Occupation: Construction  Prior Level of Function: INDP  Current Level of Function: INDP    Pain:   Current 3/10, worst 5/10, best 3/10   Location: bilateral back   Description: Aching, Dull and Throbbing  Aggravating Factors: Standing  Easing Factors: relaxation    Pts goals: To relieve my back pain    Objective       Range of Motion/Strength:     Thoracolumbar AROM Pain/Dysfunction with Movement   Flexion WNL    Extension WNL    Right side bending WNL    Left side bending WNL    Right rotation 50%    Left rotation 50%        L/E MMT Right Left Pain/Dysfunction with Movement   Hip Flexion 4+/5 4+/5    Hip Extension 5/5 5/5    Hip Abduction 4+/5 4+/5    Hip Adduction 4+/5 4+/5    Hip IR 4+/5 4+/5    Hip ER 4/5 4/5    Knee Flexion 4/5 4/5    Knee Extension 4+/5 4+/5    Ankle DF 4+/5 4+/5    Ankle PF 4+/5 4+/5    Ankle Inversion 4+/5 4+/5    Ankle Eversion 4/5 4/5    Big Toe Extension 5/5 5/5        Flexibility: Decreased Lumbar Flexibiltiy    Special Tests: Normal    Gait Analysis:Without AD     CMS Impairment/Limitation/Restriction for FOTO  Survey    Therapist reviewed FOTO scores for Sabino Rubio on 6/28/2022.   FOTO  documents entered into EPIC - see Media section.    Limitation Score: **%  Category: Mobility         TREATMENT     Home Exercises Provided and Patient Education Provided     Education provided:   - HEP    Written Home Exercises Provided: Patient instructed to cont prior HEP.  Exercises were reviewed and Sabino was able to demonstrate them prior to the end of the session.  Sabino demonstrated good  understanding of the education provided.     See EMR under Patient Instructions for exercises provided 6/28/2022.      Assessment   Sabino is a 66 y.o. male referred to outpatient Physical Therapy with a medical diagnosis of Spondylosis without myelopathy or radiculopathy, lumbar region [M47.816], Myofascial pain [M79.18]   . Pt presents with decreased lumbar ROM, decreased LE strength, decreased functional mobility, and increased pain. Pt was educated on compliance with HEP and role of PT    Pt prognosis is Excellent.   Pt will benefit from skilled outpatient Physical Therapy to address the deficits stated above and in the chart below, provide pt/family education, and to maximize pt's level of independence.     Plan of care discussed with patient: Yes  Pt's spiritual, cultural and educational needs considered and patient is agreeable to the plan of care and goals as stated below:     Anticipated Barriers for therapy: None    Medical Necessity is demonstrated by the following  History  Co-morbidities and personal factors that may impact the plan of care Co-morbidities:   See Medical Hx    Personal Factors:   no deficits     low   Examination  Body Structures and Functions, activity limitations and participation restrictions that may impact the plan of care Body Regions:   back  lower extremities    Body Systems:    gross symmetry  ROM  strength  balance  gait  transfers    Participation Restrictions:   None    Activity limitations:   Learning and applying knowledge  no deficits    General Tasks and Commands  no  deficits    Communication  no deficits    Mobility  walking    Self care  no deficits    Domestic Life  no deficits    Interactions/Relationships  no deficits    Life Areas  no deficits    Community and Social Life  community life  recreation and leisure         moderate   Clinical Presentation stable and uncomplicated low   Decision Making/ Complexity Score: low       Goals:  Short Term Goals (4 Weeks):  1. Pt will be compliant with HEP to supplement PT in decreasing pain with functional mobility.  2. Pt will improve impaired LE MMTs by 1/2 score to improve strength for functional tasks.  Long Term Goals (8 Weeks):   1. Pt will improve FOTO score to </= 45 limited to decrease perceived limitation with maintaining/changing body position.   2. Pt will perform pallof press with good control to demonstrate improved core strength.  3. Pt will improve impaired LE MMTs by 1 score to improve strength for functional tasks.  4. Pt will report no pain during lumbar ROM to promote functional mobility.   Plan   Plan of care Certification: 6/28/2022 to 8/28/2022.    Outpatient Physical Therapy 2 times weekly for 8 weeks to include the following interventions: Aquatic Therapy, Cervical/Lumbar Traction, Gait Training, Manual Therapy, Moist Heat/ Ice, Neuromuscular Re-ed, Patient Education, Self Care, Therapeutic Activities, Therapeutic Exercise and Ultrasound, ASTYM, Kinesiotaping PRN, Functional Dry Needling    Iván Hernandez, PT, DPT

## 2022-07-04 ENCOUNTER — PATIENT MESSAGE (OUTPATIENT)
Dept: INTERNAL MEDICINE | Facility: CLINIC | Age: 67
End: 2022-07-04
Payer: MEDICARE

## 2022-07-05 RX ORDER — FLUTICASONE PROPIONATE 50 MCG
1 SPRAY, SUSPENSION (ML) NASAL 2 TIMES DAILY
Qty: 16 G | Refills: 4 | Status: SHIPPED | OUTPATIENT
Start: 2022-07-05 | End: 2022-09-30

## 2022-07-05 NOTE — TELEPHONE ENCOUNTER
No new care gaps identified.  [facility] Embedded Care Gaps. Reference number: [id number]. 7/05/2022   9:07:22 AM CDT

## 2022-07-06 ENCOUNTER — CLINICAL SUPPORT (OUTPATIENT)
Dept: REHABILITATION | Facility: HOSPITAL | Age: 67
End: 2022-07-06
Payer: MEDICARE

## 2022-07-06 DIAGNOSIS — M47.816 SPONDYLOSIS WITHOUT MYELOPATHY OR RADICULOPATHY, LUMBAR REGION: Primary | ICD-10-CM

## 2022-07-06 PROCEDURE — 97110 THERAPEUTIC EXERCISES: CPT | Mod: CQ

## 2022-07-06 NOTE — PROGRESS NOTES
"[facility]   Physical Therapy Treatment Note     Name: Sabino Rubio  Clinic Number: [id number]    Therapy Diagnosis:   Encounter Diagnosis   Name Primary?    Spondylosis without myelopathy or radiculopathy, lumbar region Yes     Physician: Amie Reich MD    Visit Date: 7/6/2022    Physician Orders: PT Eval and Treat  Medical Diagnosis from Referral: Spondylosis without myelopathy or radiculopathy, lumbar region [M47.816], Myofascial pain [M79.18]  Evaluation Date: 6/28/2022  Authorization Period Expiration: 12/31/2022  Plan of Care Expiration: 8/28/2022  Visit # / Visits authorized: 1/ 1, 1/12  FOTO: 2/10  PTA Visit #: 1/5     Precautions: Standard    Time In: 4:00 pm   Time Out: 4:50 pm   Total Billable Time: 50  minutes    Subjective     Pt reports: that he has been having some pain in his back that has increased with some activities of his HEP. Pt states that he has also had some discomfort with sleeping and getting up from a seated position.   He was compliant with home exercise program.  Response to previous treatment: last session was initial eval  Functional change: none at this time     Pain: 3/10  Location: low back     Objective     Sabino received therapeutic exercises to develop strength, endurance, ROM, flexibility, posture and core stabilization for 42 minutes including:  Seated ball rolls flex/lat 10x 5"   Supine 90/90 HSS 3x30"   LTR 10x 5"   DKTC c/ SB 2x10   Bridge c/ GTB 2x10   Sidelying clams GTB 2x10 BLE           Sabino received the following manual therapy techniques:  were applied to the: thoracolumbar paraspinals  for 8 minutes, including:  STM lumbosacral paraspinals         Home Exercises Provided and Patient Education Provided     Education provided:   - HEP  - Discontinuing exercises that increase pain.     Written Home Exercises Provided: yes.  Exercises were reviewed and Sabino was able to demonstrate them prior to the end of the session.  Sabino " demonstrated good  understanding of the education provided.     See EMR under Patient Instructions for exercises provided 7/6/2022.    Assessment     Initiated therex program following pt's initial evaluation. Pt exhibited no increased pain during or following therapy session, however pt noted discomfort in lowback during bridges and DKTC. Performed soft tissue mobilization to right sided thoracolumbar paraspinals, however minimal TTP noted by patient, despite hypertonicity noted. Will continue to monitor pt's response to therapy and progress per his tolerance.     Sabino Is progressing well towards his goals.   Pt prognosis is Excellent.     Pt will continue to benefit from skilled outpatient physical therapy to address the deficits listed in the problem list box on initial evaluation, provide pt/family education and to maximize pt's level of independence in the home and community environment.     Pt's spiritual, cultural and educational needs considered and pt agreeable to plan of care and goals.     Anticipated barriers to physical therapy: none    Goals:   Short Term Goals (4 Weeks):  1. Pt will be compliant with HEP to supplement PT in decreasing pain with functional mobility.  2. Pt will improve impaired LE MMTs by 1/2 score to improve strength for functional tasks.  Long Term Goals (8 Weeks):   1. Pt will improve FOTO score to </= 45 limited to decrease perceived limitation with maintaining/changing body position.   2. Pt will perform pallof press with good control to demonstrate improved core strength.  3. Pt will improve impaired LE MMTs by 1 score to improve strength for functional tasks.  4. Pt will report no pain during lumbar ROM to promote functional mobility.       Plan   Plan of care Certification: 6/28/2022 to 8/28/2022.       Continue with current POC.     Kellie Alex PTA

## 2022-07-08 ENCOUNTER — DOCUMENTATION ONLY (OUTPATIENT)
Dept: REHABILITATION | Facility: HOSPITAL | Age: 67
End: 2022-07-08
Payer: MEDICARE

## 2022-07-08 ENCOUNTER — CLINICAL SUPPORT (OUTPATIENT)
Dept: REHABILITATION | Facility: HOSPITAL | Age: 67
End: 2022-07-08
Payer: MEDICARE

## 2022-07-08 DIAGNOSIS — M47.816 SPONDYLOSIS WITHOUT MYELOPATHY OR RADICULOPATHY, LUMBAR REGION: Primary | ICD-10-CM

## 2022-07-08 PROCEDURE — 97140 MANUAL THERAPY 1/> REGIONS: CPT

## 2022-07-08 NOTE — PROGRESS NOTES
PT/PTA met face to face to discuss pt's treatment plan and progress towards established goals. Pt will be seen by a physical therapist minimally every 6th visit or every 30 days.    Kellie Alex PTA

## 2022-07-12 ENCOUNTER — CLINICAL SUPPORT (OUTPATIENT)
Dept: REHABILITATION | Facility: HOSPITAL | Age: 67
End: 2022-07-12
Payer: MEDICARE

## 2022-07-12 DIAGNOSIS — M47.816 SPONDYLOSIS WITHOUT MYELOPATHY OR RADICULOPATHY, LUMBAR REGION: Primary | ICD-10-CM

## 2022-07-12 PROCEDURE — 97110 THERAPEUTIC EXERCISES: CPT | Mod: CQ

## 2022-07-12 NOTE — PROGRESS NOTES
"[facility]   Physical Therapy Treatment Note     Name: Sabino Rubio  Clinic Number: [id number]    Therapy Diagnosis:   Encounter Diagnosis   Name Primary?    Spondylosis without myelopathy or radiculopathy, lumbar region Yes     Physician: Amie Reich MD    Visit Date: 7/8/2022    Physician Orders: PT Eval and Treat  Medical Diagnosis from Referral: Spondylosis without myelopathy or radiculopathy, lumbar region [M47.816], Myofascial pain [M79.18]  Evaluation Date: 6/28/2022  Authorization Period Expiration: 12/31/2022  Plan of Care Expiration: 8/28/2022  Visit # / Visits authorized: 1/ 1, 2/12  FOTO: 2/10  PTA Visit #: 1/5     Precautions: Standard    Time In: 8:45 pm   Time Out: 9:30pm   Total Billable Time: 45 minutes    Subjective     Pt reports: that he has been having some pain in his back that has increased with some activities of his HEP. Pt states that he has also had some discomfort with sleeping and getting up from a seated position.   He was compliant with home exercise program.  Response to previous treatment: last session was initial eval  Functional change: none at this time     Pain: 3/10  Location: low back     Objective     Sabino received therapeutic exercises to develop strength, endurance, ROM, flexibility, posture and core stabilization for 0 minutes including:  Seated ball rolls flex/lat 10x 5"   Supine 90/90 HSS 3x30"   LTR 10x 5"   DKTC c/ SB 2x10   Bridge c/ GTB 2x10   Sidelying clams GTB 2x10 BLE           Sabino received the following manual therapy techniques:  were applied to the: thoracolumbar paraspinals  for 45 minutes, including:  FDN and STM  lumbosacral paraspinals         Home Exercises Provided and Patient Education Provided     Education provided:   - HEP  - Discontinuing exercises that increase pain.     Written Home Exercises Provided: yes.  Exercises were reviewed and Sabino was able to demonstrate them prior to the end of the session.  Sabino " demonstrated good  understanding of the education provided.     See EMR under Patient Instructions for exercises provided 7/6/2022.    Assessment     Pt exhibited no increased pain during or following therapy session.  Performed dry needling soft tissue mobilization to right sided thoracolumbar paraspinals, however minimal TTP noted by patient, despite hypertonicity noted. Will continue to monitor pt's response to therapy and progress per his tolerance.     Sabino Is progressing well towards his goals.   Pt prognosis is Excellent.     Pt will continue to benefit from skilled outpatient physical therapy to address the deficits listed in the problem list box on initial evaluation, provide pt/family education and to maximize pt's level of independence in the home and community environment.     Pt's spiritual, cultural and educational needs considered and pt agreeable to plan of care and goals.     Anticipated barriers to physical therapy: none    Goals:   Short Term Goals (4 Weeks):  1. Pt will be compliant with HEP to supplement PT in decreasing pain with functional mobility.  2. Pt will improve impaired LE MMTs by 1/2 score to improve strength for functional tasks.  Long Term Goals (8 Weeks):   1. Pt will improve FOTO score to </= 45 limited to decrease perceived limitation with maintaining/changing body position.   2. Pt will perform pallof press with good control to demonstrate improved core strength.  3. Pt will improve impaired LE MMTs by 1 score to improve strength for functional tasks.  4. Pt will report no pain during lumbar ROM to promote functional mobility.       Plan   Plan of care Certification: 6/28/2022 to 8/28/2022.       Continue with current POC.     Iván Hernandez, PT

## 2022-07-12 NOTE — PROGRESS NOTES
"[facility]   Physical Therapy Treatment Note     Name: Sabino Rubio  Clinic Number: [id number]    Therapy Diagnosis:   Encounter Diagnosis   Name Primary?    Spondylosis without myelopathy or radiculopathy, lumbar region Yes     Physician: Amie Reich MD    Visit Date: 7/12/2022    Physician Orders: PT Eval and Treat  Medical Diagnosis from Referral: Spondylosis without myelopathy or radiculopathy, lumbar region [M47.816], Myofascial pain [M79.18]  Evaluation Date: 6/28/2022  Authorization Period Expiration: 12/31/2022  Plan of Care Expiration: 8/28/2022  Visit # / Visits authorized: 1/ 1, 4/12  FOTO:4/10  PTA Visit #: 1/5     Precautions: Standard    Time In: 8: 35 am   Time Out: 9:15 am   Total Billable Time: 40 minutes    Subjective     Pt reports: that he felt good after the dry needling last session, however woke up the next day with the same morning stiffness that he usually has.   He was compliant with home exercise program.  Response to previous treatment: no adverse effects   Functional change: none at this time     Pain: 3/10  Location: low back     Objective     Sabino received therapeutic exercises to develop strength, endurance, ROM, flexibility, posture and core stabilization for 40 minutes including:  Seated ball rolls flex/lat 10x 5"   Supine 90/90 HSS 3x30"   LTR 10x 5"   DKTC c/ SB 2x10   Bridge c/ GTB 2x10   Sidelying clams GTB 2x10 BLE   TrA brace 3" 2x10   TrA brace c/ SB isometric 15x   TrA brace c/ pallof press on CC 10lb 15x ea           Sabino received the following manual therapy techniques:  were applied to the: thoracolumbar paraspinals  for 0 minutes, including:          Home Exercises Provided and Patient Education Provided     Education provided:   - HEP  - Discontinuing exercises that increase pain.     Written Home Exercises Provided: yes.  Exercises were reviewed and Sabino was able to demonstrate them prior to the end of the session.  Sabino " demonstrated good  understanding of the education provided.     See EMR under Patient Instructions for exercises provided 7/6/2022.    Assessment     Incorporated additional core strengthening this session. Pt given minimal verbal cues for correct form and to maintain breathing with core activation activities. Pt with no adverse effects noted with new or established therex. Plan to incorporate dry needling and manual therapy techniques as needed during upcoming sessions.      Sabino Is progressing well towards his goals.   Pt prognosis is Excellent.     Pt will continue to benefit from skilled outpatient physical therapy to address the deficits listed in the problem list box on initial evaluation, provide pt/family education and to maximize pt's level of independence in the home and community environment.     Pt's spiritual, cultural and educational needs considered and pt agreeable to plan of care and goals.     Anticipated barriers to physical therapy: none    Goals:   Short Term Goals (4 Weeks):  1. Pt will be compliant with HEP to supplement PT in decreasing pain with functional mobility.  2. Pt will improve impaired LE MMTs by 1/2 score to improve strength for functional tasks.  Long Term Goals (8 Weeks):   1. Pt will improve FOTO score to </= 45 limited to decrease perceived limitation with maintaining/changing body position.   2. Pt will perform pallof press with good control to demonstrate improved core strength.  3. Pt will improve impaired LE MMTs by 1 score to improve strength for functional tasks.  4. Pt will report no pain during lumbar ROM to promote functional mobility.       Plan   Plan of care Certification: 6/28/2022 to 8/28/2022.       Continue with current POC.     Kellie Alex PTA

## 2022-07-14 ENCOUNTER — CLINICAL SUPPORT (OUTPATIENT)
Dept: REHABILITATION | Facility: HOSPITAL | Age: 67
End: 2022-07-14
Payer: MEDICARE

## 2022-07-14 DIAGNOSIS — M47.816 SPONDYLOSIS WITHOUT MYELOPATHY OR RADICULOPATHY, LUMBAR REGION: Primary | ICD-10-CM

## 2022-07-14 PROCEDURE — 97140 MANUAL THERAPY 1/> REGIONS: CPT

## 2022-07-14 NOTE — PROGRESS NOTES
"[facility]   Physical Therapy Treatment Note     Name: Sabino Rubio  Clinic Number: [id number]    Therapy Diagnosis:   Encounter Diagnosis   Name Primary?    Spondylosis without myelopathy or radiculopathy, lumbar region Yes     Physician: Amie Reich MD    Visit Date: 7/14/2022    Physician Orders: PT Eval and Treat  Medical Diagnosis from Referral: Spondylosis without myelopathy or radiculopathy, lumbar region [M47.816], Myofascial pain [M79.18]  Evaluation Date: 6/28/2022  Authorization Period Expiration: 12/31/2022  Plan of Care Expiration: 8/28/2022  Visit # / Visits authorized: 1/ 1, 4/12  FOTO:4/10  PTA Visit #: 1/5     Precautions: Standard    Time In: 7:45 am   Time Out: 8:30 am   Total Billable Time: 45 minutes    Subjective     Pt reports: that he felt good after the dry needling last session, however woke up the next day with the same morning stiffness that he usually has.   He was compliant with home exercise program.  Response to previous treatment: no adverse effects   Functional change: none at this time     Pain: 3/10  Location: low back     Objective     Sabino received therapeutic exercises to develop strength, endurance, ROM, flexibility, posture and core stabilization for 0 minutes including:  Seated ball rolls flex/lat 10x 5"   Supine 90/90 HSS 3x30"   LTR 10x 5"   DKTC c/ SB 2x10   Bridge c/ GTB 2x10   Sidelying clams GTB 2x10 BLE   TrA brace 3" 2x10   TrA brace c/ SB isometric 15x   TrA brace c/ pallof press on CC 10lb 15x ea           Sabino received the following manual therapy techniques:  were applied to the: thoracolumbar paraspinals  for 45 minutes, including:  FDN and STM to Lumbar Paraspinal muscles of 45 mins          Home Exercises Provided and Patient Education Provided     Education provided:   - HEP  - Discontinuing exercises that increase pain.     Written Home Exercises Provided: yes.  Exercises were reviewed and Sabino was able to demonstrate " them prior to the end of the session.  Sabino demonstrated good  understanding of the education provided.     See EMR under Patient Instructions for exercises provided 7/6/2022.    Assessment     Incorporated dry needling and manual therapy techniques this session. Pt tolerated manual therapy techniques well and had no complaints of pain. Pt reported decreased pain after dry needling. Cont to progress POC as tolerated.      Sabino Is progressing well towards his goals.   Pt prognosis is Excellent.     Pt will continue to benefit from skilled outpatient physical therapy to address the deficits listed in the problem list box on initial evaluation, provide pt/family education and to maximize pt's level of independence in the home and community environment.     Pt's spiritual, cultural and educational needs considered and pt agreeable to plan of care and goals.     Anticipated barriers to physical therapy: none    Goals:   Short Term Goals (4 Weeks):  1. Pt will be compliant with HEP to supplement PT in decreasing pain with functional mobility.  2. Pt will improve impaired LE MMTs by 1/2 score to improve strength for functional tasks.  Long Term Goals (8 Weeks):   1. Pt will improve FOTO score to </= 45 limited to decrease perceived limitation with maintaining/changing body position.   2. Pt will perform pallof press with good control to demonstrate improved core strength.  3. Pt will improve impaired LE MMTs by 1 score to improve strength for functional tasks.  4. Pt will report no pain during lumbar ROM to promote functional mobility.       Plan   Plan of care Certification: 6/28/2022 to 8/28/2022.       Continue with current POC.     Iván Hernandez, PT

## 2022-07-15 NOTE — PROGRESS NOTES
"[facility]   Physical Therapy Treatment Note     Name: Sabino Rubio  Clinic Number: [id number]    Therapy Diagnosis:   Encounter Diagnosis   Name Primary?    Spondylosis without myelopathy or radiculopathy, lumbar region Yes     Physician: Amie Reich MD    Visit Date: 7/18/2022    Physician Orders: PT Eval and Treat  Medical Diagnosis from Referral: Spondylosis without myelopathy or radiculopathy, lumbar region [M47.816], Myofascial pain [M79.18]  Evaluation Date: 6/28/2022  Authorization Period Expiration: 12/31/2022  Plan of Care Expiration: 8/28/2022  Visit # / Visits authorized: 1/ 1, 5/12  FOTO:5/10  PTA Visit #: 1/5     Precautions: Standard    Time In/out: 9:15 am - 9:30 am (15 min)   Time in/Out: 9:41- 10:08 am (27 min)   Total Billable Time: 42 minutes *10 minutes not billed due DN performed by Mohamud Cintron DPT*     Subjective     Pt reports: that he was feeling pretty good following his last session and was able to do some work around the house on Saturday, however felt sore yesterday. Pt states that his wife used her massage gun on his back and he felt better after that.   He was compliant with home exercise program.  Response to previous treatment: no adverse effects   Functional change: none at this time     Pain: 3/10  Location: low back     Objective     Sabino received therapeutic exercises to develop strength, endurance, ROM, flexibility, posture and core stabilization for 33 minutes including:  Seated ball rolls flex/lat 10x 5"  LTR 10x 5"   DKTC c/ SB 2x10   Bridge c/ GTB 2x10   TrA brace c/ pallof press on CC 10lb 15x ea   SAPD c/ TrA brace 10lb ea 2x10   Shuttle squats 4 cord 3x10       Not performed:   Supine 90/90 HSS 3x30"   Sidelying clams GTB 2x10 BLE   TrA brace 3" 2x10   TrA brace c/ SB isometric 15x             Sabino received the following manual therapy techniques:  were applied to the: thoracolumbar paraspinals  for 9 minutes, including:  STM with " theragun           Home Exercises Provided and Patient Education Provided     Education provided:   - HEP  - Discontinuing exercises that increase pain.     Written Home Exercises Provided: yes.  Exercises were reviewed and Sabino was able to demonstrate them prior to the end of the session.  Sabino demonstrated good  understanding of the education provided.     See EMR under Patient Instructions for exercises provided 7/6/2022.    Assessment     Pt able to receive dry needling from Mohamud Cintron this session due to him only attending PT once this week. Remainder of session observed and performed with PTA, during which therex and manual therapy techniques. Included additional LE and core strengthening to patient's program this session with no adverse effects. Pt given minimal verbal cues for correct form and stance during pallof press, SAPD and squats.  Will continue to progress pt when he returns to therapy after his vacation.     Sabino Is progressing well towards his goals.   Pt prognosis is Excellent.     Pt will continue to benefit from skilled outpatient physical therapy to address the deficits listed in the problem list box on initial evaluation, provide pt/family education and to maximize pt's level of independence in the home and community environment.     Pt's spiritual, cultural and educational needs considered and pt agreeable to plan of care and goals.     Anticipated barriers to physical therapy: none    Goals:   Short Term Goals (4 Weeks):  1. Pt will be compliant with HEP to supplement PT in decreasing pain with functional mobility.  2. Pt will improve impaired LE MMTs by 1/2 score to improve strength for functional tasks.  Long Term Goals (8 Weeks):   1. Pt will improve FOTO score to </= 45 limited to decrease perceived limitation with maintaining/changing body position.   2. Pt will perform pallof press with good control to demonstrate improved core strength.  3. Pt will improve impaired LE MMTs by 1  score to improve strength for functional tasks.  4. Pt will report no pain during lumbar ROM to promote functional mobility.       Plan   Plan of care Certification: 6/28/2022 to 8/28/2022.       Continue with current POC.     eKllie Alex PTA

## 2022-07-18 ENCOUNTER — CLINICAL SUPPORT (OUTPATIENT)
Dept: REHABILITATION | Facility: HOSPITAL | Age: 67
End: 2022-07-18
Payer: MEDICARE

## 2022-07-18 DIAGNOSIS — M47.816 SPONDYLOSIS WITHOUT MYELOPATHY OR RADICULOPATHY, LUMBAR REGION: Primary | ICD-10-CM

## 2022-07-18 PROCEDURE — 97110 THERAPEUTIC EXERCISES: CPT | Mod: CQ

## 2022-07-18 PROCEDURE — 97140 MANUAL THERAPY 1/> REGIONS: CPT | Mod: CQ

## 2022-07-18 NOTE — PROGRESS NOTES
Physical Therapy Dry Needling Note       Date:  7/18/2022    Name: Sabino Rubio  Clinic Number: [id number]    Therapy Diagnosis:   Encounter Diagnosis   Name Primary?    Spondylosis without myelopathy or radiculopathy, lumbar region Yes     Physician: Amie Reich MD    Start Time:  930  Stop Time:  940  Total Billable Time: 10 minutes    Subjective       See note by Kellie Alex         Patient verbalized consent to FDN: yes    Objective   Palpation Assessment to determine the necessity for Functional Dry Needling .   Patient provided written and verbal consent to Functional Dry Needling   Sabino received the following manual therapy techniques:     Functional Dry Needling to R lumbar paraspinals at L2-L4 with mechanical winding. Patient also tolerated estim to needles with good muscular contraction. Patient was on estim for 10 minutes     Home Exercises Provided and Patient Education Provided     Education provided:   - Purpose of Needling   - Stay Hydrated  Written Handout on response to FDN provided: yes    Sabino demonstrated good  understanding of the education provided.     Assessment     Patient demonstrated appropriate response to Functional Dry Needling.  Good rhythmical contractions observed with estim to treated muscle groups    Plan     Monitor response to Functional Dry Needling. Continue with Functional Dry Needling in POC as appropriate

## 2022-07-26 ENCOUNTER — CLINICAL SUPPORT (OUTPATIENT)
Dept: REHABILITATION | Facility: HOSPITAL | Age: 67
End: 2022-07-26
Payer: MEDICARE

## 2022-07-26 DIAGNOSIS — M47.816 SPONDYLOSIS WITHOUT MYELOPATHY OR RADICULOPATHY, LUMBAR REGION: Primary | ICD-10-CM

## 2022-07-26 PROCEDURE — 97110 THERAPEUTIC EXERCISES: CPT

## 2022-07-26 NOTE — PROGRESS NOTES
"[facility]   Physical Therapy Treatment Note     Name: Sabino Rubio  Clinic Number: [id number]    Therapy Diagnosis:   Encounter Diagnosis   Name Primary?    Spondylosis without myelopathy or radiculopathy, lumbar region Yes     Physician: Amie Reich MD    Visit Date: 7/26/2022    Physician Orders: PT Eval and Treat  Medical Diagnosis from Referral: Spondylosis without myelopathy or radiculopathy, lumbar region [M47.816], Myofascial pain [M79.18]  Evaluation Date: 6/28/2022  Authorization Period Expiration: 12/31/2022  Plan of Care Expiration: 8/28/2022  Visit # / Visits authorized: 1/ 1, 7/12  FOTO:5/10  PTA Visit #: 1/5     Precautions: Standard    Time In 7:45  Time Out 8:30  Total Billable Time: 45 minutes    Subjective     Pt reports: that he was feeling pretty good following his last session and was able to do some work around the house on Saturday, however felt sore yesterday. Pt states that his wife used her massage gun on his back and he felt better after that.   He was compliant with home exercise program.  Response to previous treatment: no adverse effects   Functional change: none at this time     Pain: 3/10  Location: low back     Objective     Sabino received therapeutic exercises to develop strength, endurance, ROM, flexibility, posture and core stabilization for 0 minutes including:  Seated ball rolls flex/lat 10x 5"  LTR 10x 5"   DKTC c/ SB 2x10   Bridge c/ GTB 2x10   TrA brace c/ pallof press on CC 10lb 15x ea   SAPD c/ TrA brace 10lb ea 2x10   Shuttle squats 4 cord 3x10       Not performed:   Supine 90/90 HSS 3x30"   Sidelying clams GTB 2x10 BLE   TrA brace 3" 2x10   TrA brace c/ SB isometric 15x             Sabino received the following manual therapy techniques:  were applied to the: thoracolumbar paraspinals  for 45 minutes, including:  FDN and STM to Lumbar Paraspinals           Home Exercises Provided and Patient Education Provided     Education provided:   - " HEP  - Discontinuing exercises that increase pain.     Written Home Exercises Provided: yes.  Exercises were reviewed and Sabino was able to demonstrate them prior to the end of the session.  Sabino demonstrated good  understanding of the education provided.     See EMR under Patient Instructions for exercises provided 7/6/2022.    Assessment     Pt tolerated treatment session well and had no complaints of pain  Will continue to progress pt as he tolerates.     Sabino Is progressing well towards his goals.   Pt prognosis is Excellent.     Pt will continue to benefit from skilled outpatient physical therapy to address the deficits listed in the problem list box on initial evaluation, provide pt/family education and to maximize pt's level of independence in the home and community environment.     Pt's spiritual, cultural and educational needs considered and pt agreeable to plan of care and goals.     Anticipated barriers to physical therapy: none    Goals:   Short Term Goals (4 Weeks):  1. Pt will be compliant with HEP to supplement PT in decreasing pain with functional mobility.  2. Pt will improve impaired LE MMTs by 1/2 score to improve strength for functional tasks.  Long Term Goals (8 Weeks):   1. Pt will improve FOTO score to </= 45 limited to decrease perceived limitation with maintaining/changing body position.   2. Pt will perform pallof press with good control to demonstrate improved core strength.  3. Pt will improve impaired LE MMTs by 1 score to improve strength for functional tasks.  4. Pt will report no pain during lumbar ROM to promote functional mobility.       Plan   Plan of care Certification: 6/28/2022 to 8/28/2022.       Continue with current POC.     Iván Hernandez, PT

## 2022-07-28 ENCOUNTER — CLINICAL SUPPORT (OUTPATIENT)
Dept: REHABILITATION | Facility: HOSPITAL | Age: 67
End: 2022-07-28
Payer: MEDICARE

## 2022-07-28 DIAGNOSIS — M47.816 SPONDYLOSIS WITHOUT MYELOPATHY OR RADICULOPATHY, LUMBAR REGION: Primary | ICD-10-CM

## 2022-07-28 PROCEDURE — 97110 THERAPEUTIC EXERCISES: CPT

## 2022-07-29 NOTE — PROGRESS NOTES
"[facility]   Physical Therapy Treatment Note     Name: Sabino Rubio  Clinic Number: [id number]    Therapy Diagnosis:   Encounter Diagnosis   Name Primary?    Spondylosis without myelopathy or radiculopathy, lumbar region Yes     Physician: Amie Reich MD    Visit Date: 7/28/2022    Physician Orders: PT Eval and Treat  Medical Diagnosis from Referral: Spondylosis without myelopathy or radiculopathy, lumbar region [M47.816], Myofascial pain [M79.18]  Evaluation Date: 6/28/2022  Authorization Period Expiration: 12/31/2022  Plan of Care Expiration: 8/28/2022  Visit # / Visits authorized: 1/ 1, 7/12  FOTO:5/10  PTA Visit #: 1/5     Precautions: Standard    Time In 7:45  Time Out 8:30  Total Billable Time: 45 minutes    Subjective     Pt reports: that he was feeling pretty good following his last session and was able to do some work around the house on Saturday, however felt sore yesterday. Pt states that his wife used her massage gun on his back and he felt better after that.   He was compliant with home exercise program.  Response to previous treatment: no adverse effects   Functional change: none at this time     Pain: 3/10  Location: low back     Objective     Sabino received therapeutic exercises to develop strength, endurance, ROM, flexibility, posture and core stabilization for 45 minutes including:  Seated ball rolls flex/lat 10x 5"  LTR 10x 5"   DKTC c/ SB 2x10   Bridge c/ GTB 2x10   TrA brace c/ pallof press on CC 10lb 15x ea   SAPD c/ TrA brace 10lb ea 2x10   Shuttle squats 4 cord 3x10       Not performed:   Supine 90/90 HSS 3x30"   Sidelying clams GTB 2x10 BLE   TrA brace 3" 2x10   TrA brace c/ SB isometric 15x             Sabino received the following manual therapy techniques:  were applied to the: thoracolumbar paraspinals  for 0 minutes, including:  FDN and STM to Lumbar Paraspinals           Home Exercises Provided and Patient Education Provided     Education provided:   - " HEP  - Discontinuing exercises that increase pain.     Written Home Exercises Provided: yes.  Exercises were reviewed and Sabino was able to demonstrate them prior to the end of the session.  Sabino demonstrated good  understanding of the education provided.     See EMR under Patient Instructions for exercises provided 7/6/2022.    Assessment     Pt tolerated treatment session well and had no complaints of pain  Will continue to progress pt as he tolerates.     Sabino Is progressing well towards his goals.   Pt prognosis is Excellent.     Pt will continue to benefit from skilled outpatient physical therapy to address the deficits listed in the problem list box on initial evaluation, provide pt/family education and to maximize pt's level of independence in the home and community environment.     Pt's spiritual, cultural and educational needs considered and pt agreeable to plan of care and goals.     Anticipated barriers to physical therapy: none    Goals:   Short Term Goals (4 Weeks):  1. Pt will be compliant with HEP to supplement PT in decreasing pain with functional mobility.  2. Pt will improve impaired LE MMTs by 1/2 score to improve strength for functional tasks.  Long Term Goals (8 Weeks):   1. Pt will improve FOTO score to </= 45 limited to decrease perceived limitation with maintaining/changing body position.   2. Pt will perform pallof press with good control to demonstrate improved core strength.  3. Pt will improve impaired LE MMTs by 1 score to improve strength for functional tasks.  4. Pt will report no pain during lumbar ROM to promote functional mobility.       Plan   Plan of care Certification: 6/28/2022 to 8/28/2022.       Continue with current POC.     Iván Hernandez, PT

## 2022-08-01 ENCOUNTER — PATIENT MESSAGE (OUTPATIENT)
Dept: PAIN MEDICINE | Facility: CLINIC | Age: 67
End: 2022-08-01
Payer: MEDICARE

## 2022-08-02 ENCOUNTER — OFFICE VISIT (OUTPATIENT)
Dept: PAIN MEDICINE | Facility: CLINIC | Age: 67
End: 2022-08-02
Attending: ANESTHESIOLOGY
Payer: MEDICARE

## 2022-08-02 VITALS
HEART RATE: 81 BPM | RESPIRATION RATE: 18 BRPM | HEIGHT: 70 IN | WEIGHT: 154 LBS | TEMPERATURE: 98 F | SYSTOLIC BLOOD PRESSURE: 123 MMHG | BODY MASS INDEX: 22.05 KG/M2 | DIASTOLIC BLOOD PRESSURE: 81 MMHG

## 2022-08-02 DIAGNOSIS — M79.18 MYOFASCIAL PAIN: ICD-10-CM

## 2022-08-02 DIAGNOSIS — M51.36 DDD (DEGENERATIVE DISC DISEASE), LUMBAR: ICD-10-CM

## 2022-08-02 DIAGNOSIS — M43.06 SPONDYLOLYSIS OF LUMBAR REGION: ICD-10-CM

## 2022-08-02 DIAGNOSIS — D36.10 NEUROMA: Primary | ICD-10-CM

## 2022-08-02 PROCEDURE — 3079F DIAST BP 80-89 MM HG: CPT | Mod: CPTII,S$GLB,, | Performed by: ANESTHESIOLOGY

## 2022-08-02 PROCEDURE — 99999 PR PBB SHADOW E&M-EST. PATIENT-LVL III: ICD-10-PCS | Mod: PBBFAC,,, | Performed by: ANESTHESIOLOGY

## 2022-08-02 PROCEDURE — 1160F PR REVIEW ALL MEDS BY PRESCRIBER/CLIN PHARMACIST DOCUMENTED: ICD-10-PCS | Mod: CPTII,S$GLB,, | Performed by: ANESTHESIOLOGY

## 2022-08-02 PROCEDURE — 64450 NJX AA&/STRD OTHER PN/BRANCH: CPT | Mod: RT,S$GLB,, | Performed by: ANESTHESIOLOGY

## 2022-08-02 PROCEDURE — 3288F FALL RISK ASSESSMENT DOCD: CPT | Mod: CPTII,S$GLB,, | Performed by: ANESTHESIOLOGY

## 2022-08-02 PROCEDURE — 3008F BODY MASS INDEX DOCD: CPT | Mod: CPTII,S$GLB,, | Performed by: ANESTHESIOLOGY

## 2022-08-02 PROCEDURE — 1101F PT FALLS ASSESS-DOCD LE1/YR: CPT | Mod: CPTII,S$GLB,, | Performed by: ANESTHESIOLOGY

## 2022-08-02 PROCEDURE — 3074F SYST BP LT 130 MM HG: CPT | Mod: CPTII,S$GLB,, | Performed by: ANESTHESIOLOGY

## 2022-08-02 PROCEDURE — 64450 PR NERVE BLOCK INJ, ANES/STEROID, OTHER PERIPHERAL: ICD-10-PCS | Mod: RT,S$GLB,, | Performed by: ANESTHESIOLOGY

## 2022-08-02 PROCEDURE — 3008F PR BODY MASS INDEX (BMI) DOCUMENTED: ICD-10-PCS | Mod: CPTII,S$GLB,, | Performed by: ANESTHESIOLOGY

## 2022-08-02 PROCEDURE — 99999 PR PBB SHADOW E&M-EST. PATIENT-LVL III: CPT | Mod: PBBFAC,,, | Performed by: ANESTHESIOLOGY

## 2022-08-02 PROCEDURE — 1125F AMNT PAIN NOTED PAIN PRSNT: CPT | Mod: CPTII,S$GLB,, | Performed by: ANESTHESIOLOGY

## 2022-08-02 PROCEDURE — 99213 PR OFFICE/OUTPT VISIT, EST, LEVL III, 20-29 MIN: ICD-10-PCS | Mod: 25,GC,S$GLB, | Performed by: ANESTHESIOLOGY

## 2022-08-02 PROCEDURE — 3074F PR MOST RECENT SYSTOLIC BLOOD PRESSURE < 130 MM HG: ICD-10-PCS | Mod: CPTII,S$GLB,, | Performed by: ANESTHESIOLOGY

## 2022-08-02 PROCEDURE — 1101F PR PT FALLS ASSESS DOC 0-1 FALLS W/OUT INJ PAST YR: ICD-10-PCS | Mod: CPTII,S$GLB,, | Performed by: ANESTHESIOLOGY

## 2022-08-02 PROCEDURE — 1159F PR MEDICATION LIST DOCUMENTED IN MEDICAL RECORD: ICD-10-PCS | Mod: CPTII,S$GLB,, | Performed by: ANESTHESIOLOGY

## 2022-08-02 PROCEDURE — 3079F PR MOST RECENT DIASTOLIC BLOOD PRESSURE 80-89 MM HG: ICD-10-PCS | Mod: CPTII,S$GLB,, | Performed by: ANESTHESIOLOGY

## 2022-08-02 PROCEDURE — 3044F PR MOST RECENT HEMOGLOBIN A1C LEVEL <7.0%: ICD-10-PCS | Mod: CPTII,S$GLB,, | Performed by: ANESTHESIOLOGY

## 2022-08-02 PROCEDURE — 1160F RVW MEDS BY RX/DR IN RCRD: CPT | Mod: CPTII,S$GLB,, | Performed by: ANESTHESIOLOGY

## 2022-08-02 PROCEDURE — 3288F PR FALLS RISK ASSESSMENT DOCUMENTED: ICD-10-PCS | Mod: CPTII,S$GLB,, | Performed by: ANESTHESIOLOGY

## 2022-08-02 PROCEDURE — 99213 OFFICE O/P EST LOW 20 MIN: CPT | Mod: 25,GC,S$GLB, | Performed by: ANESTHESIOLOGY

## 2022-08-02 PROCEDURE — 1159F MED LIST DOCD IN RCRD: CPT | Mod: CPTII,S$GLB,, | Performed by: ANESTHESIOLOGY

## 2022-08-02 PROCEDURE — 1125F PR PAIN SEVERITY QUANTIFIED, PAIN PRESENT: ICD-10-PCS | Mod: CPTII,S$GLB,, | Performed by: ANESTHESIOLOGY

## 2022-08-02 PROCEDURE — 3044F HG A1C LEVEL LT 7.0%: CPT | Mod: CPTII,S$GLB,, | Performed by: ANESTHESIOLOGY

## 2022-08-02 RX ORDER — TRIAMCINOLONE ACETONIDE 40 MG/ML
40 INJECTION, SUSPENSION INTRA-ARTICULAR; INTRAMUSCULAR
Status: COMPLETED | OUTPATIENT
Start: 2022-08-02 | End: 2022-08-02

## 2022-08-02 RX ADMIN — TRIAMCINOLONE ACETONIDE 40 MG: 40 INJECTION, SUSPENSION INTRA-ARTICULAR; INTRAMUSCULAR at 09:08

## 2022-08-02 NOTE — PROGRESS NOTES
Subjective:      Patient ID: Sabino Rubio is a 66 y.o. male.    Chief Complaint: Low-back Pain    Referred by: No ref. provider found     Patient presents today for evaluationfollow-up of their low back pain s/p Bilateral  Lumbar RFA . Per pateint his axial LBP is almost 50-60% better after the RFA but he is still suffering from some pain in Left lower back ,mostly stiffness and spasm .no radicular component  Is associated with LBP. No B/B incontinence/no numbness /motor or sensory deficit.       Low-back Pain  Pertinent negatives include no chest pain or weight loss.     Interval History:  8/2/2022: Patient returns to clinic today reporting 2/10 pain at rest which increases to 4-6/10 when standing. Pain is primarily low back in nature with radiation to the right buttock. He reports that pain is exacerbated by prolonged immobility specifically when awakening from sleep or sitting for long periods of time. At the last visit, he was prescribed Flexeril, but has noted only minimal benefit from it. He has been working with PT and plans on starting to go to the gym. Prior RFA's have provided some relief, he reports his first left-sided procedure produced significant relief whereas the second right-sided procedure only produced moderate relief.    Interventional Pain History  L3/4/5 RFA in April/June 2022    Review of Systems   Constitutional: Negative for weight loss.   Cardiovascular: Negative for chest pain.   Respiratory: Negative for shortness of breath.    Musculoskeletal: Positive for arthritis, back pain, muscle cramps and stiffness. Negative for falls and muscle weakness.     Imaging;     Narrative & Impression  EXAMINATION:  MRI LUMBAR SPINE WITHOUT CONTRAST     CLINICAL HISTORY:  Low back pain, >6wks conservative tx, persistent-progressive sx, surgical candidate; Spondylolisthesis, site unspecified     TECHNIQUE:  Multiplanar, multisequence MR images were acquired from the thoracolumbar junction to the  sacrum without the administration of contrast.     COMPARISON:  Lumbar spine radiograph 10/03/2017     FINDINGS:  Alignment: Mild straightening of normal lumbar lordosis.     Vertebrae: Mild vertebral height loss and degenerative signal change.  No evidence of acute fracture or infiltrative marrow process.     Discs: Intervertebral disc height loss and degenerative signal change, most prominent within the lower lumbar spine.     Cord: Normal.  Conus terminates at L1-L2.     Degenerative findings:     T12-L1: Mild broad-based disc bulge and facet arthropathy without significant spinal canal stenosis or neural foraminal narrowing.     L1-L2: Mild broad-based disc bulge and facet arthropathy without significant spinal canal stenosis or neural foraminal narrowing.     L2-L3: Broad-based disc bulge, ligamentum flavum thickening, and facet arthropathy without significant spinal canal stenosis or neural foraminal narrowing.     L3-L4: Broad-based disc bulge, ligamentum flavum thickening, and facet arthropathy resulting in moderate spinal canal stenosis and mild bilateral neural foraminal narrowing.     L4-L5: Broad-based disc bulge, ligamentum flavum thickening, and facet arthropathy resulting in moderate spinal canal stenosis, moderate left neural foraminal narrowing, and mild right neural foraminal narrowing.     L5-S1: Broad-based disc bulge, ligamentum flavum thickening, and facet arthropathy resulting in moderate left and mild right neural foraminal narrowing.     Paraspinal muscles & soft tissues: Unremarkable.     IMPRESSION:      Multilevel degenerative change of the lumbar spine most significant at L3-L4 which demonstrates moderate spinal canal stenosis and mild bilateral neural foraminal narrowing.  Moderate left and mild right neural foraminal narrowing seen at L4-L5 and L5-S1.  Additional details above.     Electronically signed by resident: William Arita  Date:                                             01/09/2019  Time:                                           09:20     Electronically signed by: Dragan Grijalva MD  Date:                                            01/09/2019  Time:                                           10:23  Objective:         LUMBAR SPINE EXAM:  GAIT: within functional limits  LUMBAR FLEXION:  Reduced   LUMBAR HYPEREXTENSION AND FACET LOADING: within functional limits  PALPATION OVER THE FACET JOINTS:no tenderness, mild deep tenderness in Right lower Lumbar area   MUSCLE STRENGTH: 5/5 bilaterally and symmetricalt  STRAIGHT LEG RAISE: negative  GUERREROS TEST: unremarkable bilaterally  DEEP PALPATION OF THE SACROILIAC JOINT: unremarkable bilaterally  HIP EXAM: unremarkable with internal and external rotation of the hip joint  SENSORY: intact to touch bilaterally        Assessment:       Encounter Diagnoses   Name Primary?    Spondylolysis of lumbar region     DDD (degenerative disc disease), lumbar     Myofascial pain     Neuroma Yes         Plan:       Sabino was seen today for low-back pain.    Diagnoses and all orders for this visit:    Neuroma  -     triamcinolone acetonide injection 40 mg    Spondylolysis of lumbar region    DDD (degenerative disc disease), lumbar    Myofascial pain      Procedure:  Under clean technique & after discussing with patient the Right cluneal neuroma was injected with 4 mL  of medications, per side, from a mixture of 10 mL of bupivacaine 0.25% and 0.5 mL of Triamcinolone 40mg/ml .  Patient tolerated procedure well.    1. Continue with medical therapy with Flexeril and Gabapentin  2. Continue with PT  3. Injection done in clinic into identified neuroma  4. F/u in 6 weeks w/ MITRA    Teodoro Jack MD  [facility], [facility]      I have personally taken the history and examined this patient and agree with the resident's note as stated above.

## 2022-08-05 ENCOUNTER — DOCUMENTATION ONLY (OUTPATIENT)
Dept: REHABILITATION | Facility: HOSPITAL | Age: 67
End: 2022-08-05
Payer: MEDICARE

## 2022-08-05 NOTE — PROGRESS NOTES
Face to Face PTA Conference performed with Kellie Alex PTA and Iván Tam PTA regarding patient's current status, overall progress, and plan of care.    Iván Hernandez, PT    PT/PTA met face to face to discuss pt's treatment plan and progress towards established goals. Pt will be seen by a physical therapist minimally every 6th visit or every 30 days.    Kellie Alex PTA

## 2022-08-08 NOTE — PROGRESS NOTES
"[facility]   Physical Therapy Treatment Note     Name: Sabino Rubio  Clinic Number: [id number]    Therapy Diagnosis:   Encounter Diagnosis   Name Primary?    Spondylosis without myelopathy or radiculopathy, lumbar region Yes     Physician: Amie Reich MD    Visit Date: 8/9/2022    Physician Orders: PT Eval and Treat  Medical Diagnosis from Referral: Spondylosis without myelopathy or radiculopathy, lumbar region [M47.816], Myofascial pain [M79.18]  Evaluation Date: 6/28/2022  Authorization Period Expiration: 12/31/2022  Plan of Care Expiration: 8/28/2022  Visit # / Visits authorized: 1/ 1, 8/12  FOTO:6/10  PTA Visit #: 1/5     Precautions: Standard    Time In 7:45  Time Out 8:33  Total Billable Time: 48 minutes    Subjective     Pt reports: that he had a slight flare up in pain over the weekend along the right side of his mid to lower back.   He was compliant with home exercise program.  Response to previous treatment: no adverse effects   Functional change: none at this time     Pain: 3/10  Location: low back     Objective     Sabino received therapeutic exercises to develop strength, endurance, ROM, flexibility, posture and core stabilization for 36 minutes including:  Seated ball rolls flex/lat 10x 5"  LTR 10x 5"   DKTC c/ SB 2x10   Bridge c/ GTB 2x10   Prone press up 5" 10x   TrA brace c/ pallof press on CC 10lb 15x ea   SAPD c/ TrA brace 10lb ea 2x10   Shuttle squats 4 cord 3x10   Sidelying clams GTB 2x10 BLE       Not performed:   Supine 90/90 HSS 3x30"   TrA brace 3" 2x10   TrA brace c/ SB isometric 15x             Sabino received the following manual therapy techniques:  were applied to the: thoracolumbar paraspinals  for 12 minutes, including:  STM to Lumbar Paraspinals           Home Exercises Provided and Patient Education Provided     Education provided:   - HEP  - Discontinuing exercises that increase pain.     Written Home Exercises Provided: yes.  Exercises were " reviewed and Sabino was able to demonstrate them prior to the end of the session.  Sabino demonstrated good  understanding of the education provided.     See EMR under Patient Instructions for exercises provided 7/6/2022.    Assessment     Pt exhibited tolerance to all therex, including the addition of prone press ups. Pt with no increase in pain during session, and noted decrease TTP after manual therapy techniques. Will re-visit need for dry needling during upcoming sessions.     Sabino Is progressing well towards his goals.   Pt prognosis is Excellent.     Pt will continue to benefit from skilled outpatient physical therapy to address the deficits listed in the problem list box on initial evaluation, provide pt/family education and to maximize pt's level of independence in the home and community environment.     Pt's spiritual, cultural and educational needs considered and pt agreeable to plan of care and goals.     Anticipated barriers to physical therapy: none    Goals:   Short Term Goals (4 Weeks):  1. Pt will be compliant with HEP to supplement PT in decreasing pain with functional mobility.  2. Pt will improve impaired LE MMTs by 1/2 score to improve strength for functional tasks.  Long Term Goals (8 Weeks):   1. Pt will improve FOTO score to </= 45 limited to decrease perceived limitation with maintaining/changing body position.   2. Pt will perform pallof press with good control to demonstrate improved core strength.  3. Pt will improve impaired LE MMTs by 1 score to improve strength for functional tasks.  4. Pt will report no pain during lumbar ROM to promote functional mobility.       Plan   Plan of care Certification: 6/28/2022 to 8/28/2022.       Continue with current POC.     Kellie Alex PTA

## 2022-08-09 ENCOUNTER — CLINICAL SUPPORT (OUTPATIENT)
Dept: REHABILITATION | Facility: HOSPITAL | Age: 67
End: 2022-08-09
Payer: MEDICARE

## 2022-08-09 DIAGNOSIS — M47.816 SPONDYLOSIS WITHOUT MYELOPATHY OR RADICULOPATHY, LUMBAR REGION: Primary | ICD-10-CM

## 2022-08-09 PROCEDURE — 97140 MANUAL THERAPY 1/> REGIONS: CPT | Mod: CQ

## 2022-08-09 PROCEDURE — 97110 THERAPEUTIC EXERCISES: CPT | Mod: CQ

## 2022-08-11 ENCOUNTER — OFFICE VISIT (OUTPATIENT)
Dept: OPTOMETRY | Facility: CLINIC | Age: 67
End: 2022-08-11
Payer: MEDICARE

## 2022-08-11 ENCOUNTER — CLINICAL SUPPORT (OUTPATIENT)
Dept: REHABILITATION | Facility: HOSPITAL | Age: 67
End: 2022-08-11
Payer: MEDICARE

## 2022-08-11 DIAGNOSIS — H52.4 ASTIGMATISM OF BOTH EYES WITH PRESBYOPIA: ICD-10-CM

## 2022-08-11 DIAGNOSIS — Z13.5 GLAUCOMA SCREENING: ICD-10-CM

## 2022-08-11 DIAGNOSIS — H53.8 BLURRED VISION: ICD-10-CM

## 2022-08-11 DIAGNOSIS — H52.203 ASTIGMATISM OF BOTH EYES WITH PRESBYOPIA: ICD-10-CM

## 2022-08-11 DIAGNOSIS — M47.816 SPONDYLOSIS WITHOUT MYELOPATHY OR RADICULOPATHY, LUMBAR REGION: Primary | ICD-10-CM

## 2022-08-11 DIAGNOSIS — H25.13 NUCLEAR SCLEROSIS, BILATERAL: Primary | ICD-10-CM

## 2022-08-11 PROCEDURE — 1160F RVW MEDS BY RX/DR IN RCRD: CPT | Mod: CPTII,S$GLB,, | Performed by: OPTOMETRIST

## 2022-08-11 PROCEDURE — 3288F PR FALLS RISK ASSESSMENT DOCUMENTED: ICD-10-PCS | Mod: CPTII,S$GLB,, | Performed by: OPTOMETRIST

## 2022-08-11 PROCEDURE — 97140 MANUAL THERAPY 1/> REGIONS: CPT

## 2022-08-11 PROCEDURE — 92004 PR EYE EXAM, NEW PATIENT,COMPREHESV: ICD-10-PCS | Mod: S$GLB,,, | Performed by: OPTOMETRIST

## 2022-08-11 PROCEDURE — 1101F PT FALLS ASSESS-DOCD LE1/YR: CPT | Mod: CPTII,S$GLB,, | Performed by: OPTOMETRIST

## 2022-08-11 PROCEDURE — 1126F AMNT PAIN NOTED NONE PRSNT: CPT | Mod: CPTII,S$GLB,, | Performed by: OPTOMETRIST

## 2022-08-11 PROCEDURE — 3288F FALL RISK ASSESSMENT DOCD: CPT | Mod: CPTII,S$GLB,, | Performed by: OPTOMETRIST

## 2022-08-11 PROCEDURE — 1159F MED LIST DOCD IN RCRD: CPT | Mod: CPTII,S$GLB,, | Performed by: OPTOMETRIST

## 2022-08-11 PROCEDURE — 1159F PR MEDICATION LIST DOCUMENTED IN MEDICAL RECORD: ICD-10-PCS | Mod: CPTII,S$GLB,, | Performed by: OPTOMETRIST

## 2022-08-11 PROCEDURE — 1126F PR PAIN SEVERITY QUANTIFIED, NO PAIN PRESENT: ICD-10-PCS | Mod: CPTII,S$GLB,, | Performed by: OPTOMETRIST

## 2022-08-11 PROCEDURE — 1101F PR PT FALLS ASSESS DOC 0-1 FALLS W/OUT INJ PAST YR: ICD-10-PCS | Mod: CPTII,S$GLB,, | Performed by: OPTOMETRIST

## 2022-08-11 PROCEDURE — 1160F PR REVIEW ALL MEDS BY PRESCRIBER/CLIN PHARMACIST DOCUMENTED: ICD-10-PCS | Mod: CPTII,S$GLB,, | Performed by: OPTOMETRIST

## 2022-08-11 PROCEDURE — 3044F PR MOST RECENT HEMOGLOBIN A1C LEVEL <7.0%: ICD-10-PCS | Mod: CPTII,S$GLB,, | Performed by: OPTOMETRIST

## 2022-08-11 PROCEDURE — 99999 PR PBB SHADOW E&M-EST. PATIENT-LVL III: CPT | Mod: PBBFAC,,, | Performed by: OPTOMETRIST

## 2022-08-11 PROCEDURE — 92004 COMPRE OPH EXAM NEW PT 1/>: CPT | Mod: S$GLB,,, | Performed by: OPTOMETRIST

## 2022-08-11 PROCEDURE — 92015 PR REFRACTION: ICD-10-PCS | Mod: S$GLB,,, | Performed by: OPTOMETRIST

## 2022-08-11 PROCEDURE — 99999 PR PBB SHADOW E&M-EST. PATIENT-LVL III: ICD-10-PCS | Mod: PBBFAC,,, | Performed by: OPTOMETRIST

## 2022-08-11 PROCEDURE — 3044F HG A1C LEVEL LT 7.0%: CPT | Mod: CPTII,S$GLB,, | Performed by: OPTOMETRIST

## 2022-08-11 PROCEDURE — 92015 DETERMINE REFRACTIVE STATE: CPT | Mod: S$GLB,,, | Performed by: OPTOMETRIST

## 2022-08-11 NOTE — PROGRESS NOTES
HPI     67 Y/o male is here for routine eye exam with C/o Blurry vision pt states   he needs and updates glasses Rx   Pt denies pain and discomfort   No f/f    Eye med: no gtt       Last edited by Nick Flynn, OD on 8/11/2022 10:32 AM. (History)        ROS     Negative for: Constitutional, Gastrointestinal, Neurological, Skin,   Genitourinary, Musculoskeletal, HENT, Endocrine, Cardiovascular, Eyes,   Respiratory, Psychiatric, Allergic/Imm, Heme/Lymph    Last edited by Nick Flynn, OD on 8/11/2022 10:32 AM. (History)        Assessment /Plan     For exam results, see Encounter Report.    Nuclear sclerosis, bilateral    Blurred vision  -     Ambulatory referral/consult to Optometry    Glaucoma screening    Astigmatism of both eyes with presbyopia      Small cats OU--pt wishes new Rx    PLAN:    rtc 1 yr

## 2022-08-12 NOTE — PROGRESS NOTES
"[facility]   Physical Therapy Treatment Note     Name: Sabino Rubio  Clinic Number: [id number]    Therapy Diagnosis:   Encounter Diagnosis   Name Primary?    Spondylosis without myelopathy or radiculopathy, lumbar region Yes     Physician: Amie Reich MD    Visit Date: 8/11/2022    Physician Orders: PT Eval and Treat  Medical Diagnosis from Referral: Spondylosis without myelopathy or radiculopathy, lumbar region [M47.816], Myofascial pain [M79.18]  Evaluation Date: 6/28/2022  Authorization Period Expiration: 12/31/2022  Plan of Care Expiration: 8/28/2022  Visit # / Visits authorized: 1/ 1, 9/12  FOTO:6/10  PTA Visit #: 1/5     Precautions: Standard    Time In 8:30  Time Out 9:15  Total Billable Time: 45 minutes    Subjective     Pt reports: that he had a slight flare up in pain over the weekend along the right side of his mid to lower back.   He was compliant with home exercise program.  Response to previous treatment: no adverse effects   Functional change: none at this time     Pain: 3/10  Location: low back     Objective     Sabino received therapeutic exercises to develop strength, endurance, ROM, flexibility, posture and core stabilization for 0 minutes including:  Seated ball rolls flex/lat 10x 5"  LTR 10x 5"   DKTC c/ SB 2x10   Bridge c/ GTB 2x10   Prone press up 5" 10x   TrA brace c/ pallof press on CC 10lb 15x ea   SAPD c/ TrA brace 10lb ea 2x10   Shuttle squats 4 cord 3x10   Sidelying clams GTB 2x10 BLE       Not performed:   Supine 90/90 HSS 3x30"   TrA brace 3" 2x10   TrA brace c/ SB isometric 15x             Sabino received the following manual therapy techniques:  were applied to the: thoracolumbar paraspinals  for 45 minutes, including:  STM to Lumbar Paraspinals           Home Exercises Provided and Patient Education Provided     Education provided:   - HEP  - Discontinuing exercises that increase pain.     Written Home Exercises Provided: yes.  Exercises were " reviewed and Sabino was able to demonstrate them prior to the end of the session.  Sabino demonstrated good  understanding of the education provided.     See EMR under Patient Instructions for exercises provided 7/6/2022.    Assessment     Pt exhibited tolerance to all therex, including the addition of prone press ups. Pt with no increase in pain during session, and noted decrease TTP after manual therapy techniques. Will re-visit need for dry needling during upcoming sessions.     Sabino Is progressing well towards his goals.   Pt prognosis is Excellent.     Pt will continue to benefit from skilled outpatient physical therapy to address the deficits listed in the problem list box on initial evaluation, provide pt/family education and to maximize pt's level of independence in the home and community environment.     Pt's spiritual, cultural and educational needs considered and pt agreeable to plan of care and goals.     Anticipated barriers to physical therapy: none    Goals:   Short Term Goals (4 Weeks):  1. Pt will be compliant with HEP to supplement PT in decreasing pain with functional mobility.  2. Pt will improve impaired LE MMTs by 1/2 score to improve strength for functional tasks.  Long Term Goals (8 Weeks):   1. Pt will improve FOTO score to </= 45 limited to decrease perceived limitation with maintaining/changing body position.   2. Pt will perform pallof press with good control to demonstrate improved core strength.  3. Pt will improve impaired LE MMTs by 1 score to improve strength for functional tasks.  4. Pt will report no pain during lumbar ROM to promote functional mobility.       Plan   Plan of care Certification: 6/28/2022 to 8/28/2022.       Continue with current POC.     Iván Hernandez, PT

## 2022-08-15 NOTE — PROGRESS NOTES
"[facility]   Physical Therapy Treatment Note     Name: Sabino Rubio  Clinic Number: [id number]    Therapy Diagnosis:   Encounter Diagnosis   Name Primary?    Spondylosis without myelopathy or radiculopathy, lumbar region Yes     Physician: Amie Reich MD    Visit Date: 8/16/2022    Physician Orders: PT Eval and Treat  Medical Diagnosis from Referral: Spondylosis without myelopathy or radiculopathy, lumbar region [M47.816], Myofascial pain [M79.18]  Evaluation Date: 6/28/2022  Authorization Period Expiration: 12/31/2022  Plan of Care Expiration: 8/28/2022  Visit # / Visits authorized: 1/ 1, 10/12  FOTO: 7/10  PTA Visit #: 1/5     Precautions: Standard    Time In: 7:49 am   Time Out  8:30 am   Total Billable Time: 41 minutes    Subjective     Pt reports: that he is doing better however still has some pain in the mornings, such as when he was getting out of the chair to come into the clinic.   He was compliant with home exercise program.  Response to previous treatment: no adverse effects   Functional change: none at this time     Pain: 1/10  Location: low back     Objective     Sabino received therapeutic exercises to develop strength, endurance, ROM, flexibility, posture and core stabilization for 26 minutes including:  Seated ball rolls flex/lat 10x 5"  TrA brace c/ pallof press on CC 10lb 15x ea   SAPD c/ TrA brace 10lb ea 2x10   Shuttle squats 4 cord 3x10   Seated on SB trunk rotation 10lb 15x ea   Bridge c/ GTB 3x10   Sidelying clams GTB 2x10 BLE           Not performed:   LTR 10x 5"   DKTC c/ SB 2x10   Prone press up 5" 10x   Supine 90/90 HSS 3x30"   TrA brace 3" 2x10   TrA brace c/ SB isometric 15x         Sabino received the following manual therapy techniques:  were applied to the: thoracolumbar paraspinals  for 15 minutes, including:  STM to Lumbar Paraspinals   Static and dynamic cupping           Home Exercises Provided and Patient Education Provided     Education provided: "   - HEP  - Discontinuing exercises that increase pain.     Written Home Exercises Provided: yes.  Exercises were reviewed and Sabino was able to demonstrate them prior to the end of the session.  Sabino demonstrated good  understanding of the education provided.     See EMR under Patient Instructions for exercises provided 7/6/2022.    Assessment     Pt exhibited tolerance to all therex performed this session, however also incorporated trunk rotations with cable cross. Had patient sit on swiss ball to challenge care stability. Pt given minimal verbal cues for correct technique. Incorporated static and dynamic cupping to improve lumbar muscular mobility. Will continue to progress pt per his tolerance and POC during upcoming sessions.     Sabino Is progressing well towards his goals.   Pt prognosis is Excellent.     Pt will continue to benefit from skilled outpatient physical therapy to address the deficits listed in the problem list box on initial evaluation, provide pt/family education and to maximize pt's level of independence in the home and community environment.     Pt's spiritual, cultural and educational needs considered and pt agreeable to plan of care and goals.     Anticipated barriers to physical therapy: none    Goals:   Short Term Goals (4 Weeks):  1. Pt will be compliant with HEP to supplement PT in decreasing pain with functional mobility.  2. Pt will improve impaired LE MMTs by 1/2 score to improve strength for functional tasks.  Long Term Goals (8 Weeks):   1. Pt will improve FOTO score to </= 45 limited to decrease perceived limitation with maintaining/changing body position.   2. Pt will perform pallof press with good control to demonstrate improved core strength.  3. Pt will improve impaired LE MMTs by 1 score to improve strength for functional tasks.  4. Pt will report no pain during lumbar ROM to promote functional mobility.       Plan   Plan of care Certification: 6/28/2022 to 8/28/2022.        Continue with current POC.     Kellie Alex PTA

## 2022-08-16 ENCOUNTER — CLINICAL SUPPORT (OUTPATIENT)
Dept: REHABILITATION | Facility: HOSPITAL | Age: 67
End: 2022-08-16
Payer: MEDICARE

## 2022-08-16 ENCOUNTER — TELEPHONE (OUTPATIENT)
Dept: INTERNAL MEDICINE | Facility: CLINIC | Age: 67
End: 2022-08-16
Payer: MEDICARE

## 2022-08-16 DIAGNOSIS — M47.816 SPONDYLOSIS WITHOUT MYELOPATHY OR RADICULOPATHY, LUMBAR REGION: Primary | ICD-10-CM

## 2022-08-16 DIAGNOSIS — Z12.5 ENCOUNTER FOR SCREENING FOR MALIGNANT NEOPLASM OF PROSTATE: ICD-10-CM

## 2022-08-16 DIAGNOSIS — D36.9 TUBULOVILLOUS ADENOMA: Primary | ICD-10-CM

## 2022-08-16 DIAGNOSIS — R73.01 IFG (IMPAIRED FASTING GLUCOSE): ICD-10-CM

## 2022-08-16 DIAGNOSIS — M50.30 DEGENERATION OF CERVICAL INTERVERTEBRAL DISC: ICD-10-CM

## 2022-08-16 DIAGNOSIS — E78.2 MIXED HYPERLIPIDEMIA: ICD-10-CM

## 2022-08-16 PROCEDURE — 97140 MANUAL THERAPY 1/> REGIONS: CPT | Mod: CQ

## 2022-08-16 PROCEDURE — 97110 THERAPEUTIC EXERCISES: CPT | Mod: CQ

## 2022-08-16 NOTE — TELEPHONE ENCOUNTER
No new care gaps identified.  [facility] Embedded Care Gaps. Reference number: [id number]. 8/16/2022   5:54:39 PM CDT

## 2022-08-17 RX ORDER — GABAPENTIN 300 MG/1
CAPSULE ORAL
Qty: 90 CAPSULE | Refills: 0 | Status: SHIPPED | OUTPATIENT
Start: 2022-08-17 | End: 2022-09-14

## 2022-08-17 NOTE — TELEPHONE ENCOUNTER
He needs a physical exam, first available is fine    Labs prior, orders in, thank you    Colonoscopy also ordered

## 2022-08-18 ENCOUNTER — CLINICAL SUPPORT (OUTPATIENT)
Dept: REHABILITATION | Facility: HOSPITAL | Age: 67
End: 2022-08-18
Payer: MEDICARE

## 2022-08-18 DIAGNOSIS — M47.816 SPONDYLOSIS WITHOUT MYELOPATHY OR RADICULOPATHY, LUMBAR REGION: Primary | ICD-10-CM

## 2022-08-18 PROCEDURE — 97140 MANUAL THERAPY 1/> REGIONS: CPT

## 2022-08-18 PROCEDURE — 97110 THERAPEUTIC EXERCISES: CPT

## 2022-08-18 NOTE — PROGRESS NOTES
"[facility]   Physical Therapy Treatment Note     Name: Sabino Rubio  Clinic Number: [id number]    Therapy Diagnosis:   Encounter Diagnosis   Name Primary?    Spondylosis without myelopathy or radiculopathy, lumbar region Yes     Physician: Amie Reich MD    Visit Date: 8/18/2022    Physician Orders: PT Eval and Treat  Medical Diagnosis from Referral: Spondylosis without myelopathy or radiculopathy, lumbar region [M47.816], Myofascial pain [M79.18]  Evaluation Date: 6/28/2022  Authorization Period Expiration: 12/31/2022  Plan of Care Expiration: 8/28/2022  Visit # / Visits authorized: 1/ 1, 10/12  FOTO: 7/10  PTA Visit #: 1/5     Precautions: Standard    Time In: 8:30 am   Time Out  9:15 am   Total Billable Time: 45 minutes    Subjective     Pt reports: that he is doing better however still has some pain in the mornings, such as when he was getting out of the chair to come into the clinic. Pt worked a lot yesterday and had noticeable stiffness in his low back   He was compliant with home exercise program.  Response to previous treatment: no adverse effects   Functional change: none at this time     Pain: 1/10  Location: low back     Objective     Sabino received therapeutic exercises to develop strength, endurance, ROM, flexibility, posture and core stabilization for 10 minutes including:  Seated ball rolls flex/lat 10x 5"  TrA brace c/ pallof press on CC 10lb 15x ea   SAPD c/ TrA brace 10lb ea 2x10   Shuttle squats 4 cord 3x10   Seated on SB trunk rotation 10lb 15x ea   Bridge c/ GTB 3x10   Sidelying clams GTB 2x10 BLE           Not performed:   LTR 10x 5"   DKTC c/ SB 2x10   Prone press up 5" 10x   Supine 90/90 HSS 3x30"   TrA brace 3" 2x10   TrA brace c/ SB isometric 15x         Sabino received the following manual therapy techniques:  were applied to the: thoracolumbar paraspinals  for 30 minutes, including:  STM to Lumbar Paraspinals   Static and dynamic cupping NP          Home " Exercises Provided and Patient Education Provided     Education provided:   - HEP  - Discontinuing exercises that increase pain.     Written Home Exercises Provided: yes.  Exercises were reviewed and Sabino was able to demonstrate them prior to the end of the session.  Sabino demonstrated good  understanding of the education provided.     See EMR under Patient Instructions for exercises provided 7/6/2022.    Assessment     Pt exhibited tolerance to all therex performed this session. Incorporated static and dynamic cupping to improve lumbar muscular mobility. Will continue to progress pt per his tolerance and POC during upcoming sessions.     Sabino Is progressing well towards his goals.   Pt prognosis is Excellent.     Pt will continue to benefit from skilled outpatient physical therapy to address the deficits listed in the problem list box on initial evaluation, provide pt/family education and to maximize pt's level of independence in the home and community environment.     Pt's spiritual, cultural and educational needs considered and pt agreeable to plan of care and goals.     Anticipated barriers to physical therapy: none    Goals:   Short Term Goals (4 Weeks):  1. Pt will be compliant with HEP to supplement PT in decreasing pain with functional mobility.  2. Pt will improve impaired LE MMTs by 1/2 score to improve strength for functional tasks.  Long Term Goals (8 Weeks):   1. Pt will improve FOTO score to </= 45 limited to decrease perceived limitation with maintaining/changing body position.   2. Pt will perform pallof press with good control to demonstrate improved core strength.  3. Pt will improve impaired LE MMTs by 1 score to improve strength for functional tasks.  4. Pt will report no pain during lumbar ROM to promote functional mobility.       Plan   Plan of care Certification: 6/28/2022 to 8/28/2022.       Continue with current POC.     Iván Hernandez, PT

## 2022-08-22 NOTE — PROGRESS NOTES
"[facility]   Physical Therapy Treatment Note     Name: Sabino Rubio  Clinic Number: [id number]    Therapy Diagnosis:   Encounter Diagnosis   Name Primary?    Spondylosis without myelopathy or radiculopathy, lumbar region Yes     Physician: Amie Reich MD    Visit Date: 8/23/2022    Physician Orders: PT Eval and Treat  Medical Diagnosis from Referral: Spondylosis without myelopathy or radiculopathy, lumbar region [M47.816], Myofascial pain [M79.18]  Evaluation Date: 6/28/2022  Authorization Period Expiration: 12/31/2022  Plan of Care Expiration: 8/28/2022  Visit # / Visits authorized: 1/ 1, 12/12  FOTO: 8/10  PTA Visit #: 1/5     Precautions: Standard    Time In: 7:45 am   Time Out  8:30 am   Total Billable Time: 45 minutes    Subjective     Pt reports: that he woke up with less pain than he has been and feels that he is doing better. Pt reports minimal pain in his back today.   He was compliant with home exercise program.  Response to previous treatment: no adverse effects   Functional change: none at this time     Pain: 2/10  Location: low back     Objective     Sabino received therapeutic exercises to develop strength, endurance, ROM, flexibility, posture and core stabilization for 33 minutes including:  Seated ball rolls flex/lat 10x 5"  TrA brace c/ pallof press on CC 10lb 15x ea   SAPD c/ TrA brace 10lb ea 2x10   Shuttle squats 4 cord 3x10   Seated on SB trunk rotation 10lb 15x ea   1/2 kneel chops 10lb 15x ea   Bridge c/ GTB 3x10   Sidelying clams GTB 2x10 BLE   TrA brace c/ SB isometric 2x10        Not performed:   LTR 10x 5"   DKTC c/ SB 2x10   Prone press up 5" 10x   Supine 90/90 HSS 3x30"   TrA brace 3" 2x10           Sabino received the following manual therapy techniques:  were applied to the: thoracolumbar paraspinals  for 12 minutes, including:  STM to Lumbar Paraspinals   Static and dynamic cupping           Home Exercises Provided and Patient Education Provided "     Education provided:   - HEP  - Discontinuing exercises that increase pain.     Written Home Exercises Provided: yes.  Exercises were reviewed and Sabino was able to demonstrate them prior to the end of the session.  Sabino demonstrated good  understanding of the education provided.     See EMR under Patient Instructions for exercises provided 7/6/2022.    Assessment     Included half kneeling chops to patient's program in order to continue to work towards dynamic core strength. Pt with no adverse effects during session, however patient given verbal cues for maintaining breathing and form without compensation from lower trunk/hips. Continued to perform manual therapy techniques to reduce pain and hypertonicity of thoracolumbar paraspinals.     Sabino Is progressing well towards his goals.   Pt prognosis is Excellent.     Pt will continue to benefit from skilled outpatient physical therapy to address the deficits listed in the problem list box on initial evaluation, provide pt/family education and to maximize pt's level of independence in the home and community environment.     Pt's spiritual, cultural and educational needs considered and pt agreeable to plan of care and goals.     Anticipated barriers to physical therapy: none    Goals:   Short Term Goals (4 Weeks):  1. Pt will be compliant with HEP to supplement PT in decreasing pain with functional mobility.  2. Pt will improve impaired LE MMTs by 1/2 score to improve strength for functional tasks.  Long Term Goals (8 Weeks):   1. Pt will improve FOTO score to </= 45 limited to decrease perceived limitation with maintaining/changing body position.   2. Pt will perform pallof press with good control to demonstrate improved core strength.  3. Pt will improve impaired LE MMTs by 1 score to improve strength for functional tasks.  4. Pt will report no pain during lumbar ROM to promote functional mobility.       Plan   Plan of care Certification: 6/28/2022 to 8/28/2022.        Continue with current POC.     Kellie Alex PTA

## 2022-08-23 ENCOUNTER — CLINICAL SUPPORT (OUTPATIENT)
Dept: REHABILITATION | Facility: HOSPITAL | Age: 67
End: 2022-08-23
Payer: MEDICARE

## 2022-08-23 DIAGNOSIS — M47.816 SPONDYLOSIS WITHOUT MYELOPATHY OR RADICULOPATHY, LUMBAR REGION: Primary | ICD-10-CM

## 2022-08-23 PROCEDURE — 97110 THERAPEUTIC EXERCISES: CPT | Mod: CQ

## 2022-08-23 PROCEDURE — 97140 MANUAL THERAPY 1/> REGIONS: CPT | Mod: CQ

## 2022-08-25 ENCOUNTER — CLINICAL SUPPORT (OUTPATIENT)
Dept: REHABILITATION | Facility: HOSPITAL | Age: 67
End: 2022-08-25
Payer: MEDICARE

## 2022-08-25 DIAGNOSIS — M47.816 SPONDYLOSIS WITHOUT MYELOPATHY OR RADICULOPATHY, LUMBAR REGION: Primary | ICD-10-CM

## 2022-08-25 PROCEDURE — 97140 MANUAL THERAPY 1/> REGIONS: CPT

## 2022-08-25 PROCEDURE — 97110 THERAPEUTIC EXERCISES: CPT

## 2022-08-25 NOTE — PROGRESS NOTES
"[facility]   Physical Therapy Treatment Note     Name: Sabino Rubio  Clinic Number: [id number]    Therapy Diagnosis:   Encounter Diagnosis   Name Primary?    Spondylosis without myelopathy or radiculopathy, lumbar region Yes     Physician: Amie Reich MD    Visit Date: 8/25/2022    Physician Orders: PT Eval and Treat  Medical Diagnosis from Referral: Spondylosis without myelopathy or radiculopathy, lumbar region [M47.816], Myofascial pain [M79.18]  Evaluation Date: 6/28/2022  Authorization Period Expiration: 12/31/2022  Plan of Care Expiration: 8/28/2022  Visit # / Visits authorized: 1/ 1, 12/12  FOTO: 8/10  PTA Visit #: 1/5     Precautions: Standard    Time In: 7:45 am   Time Out  8:30 am   Total Billable Time: 45 minutes    Subjective     Pt reports: that he woke up with less pain than he has been and feels that he is doing better. Pt reports minimal pain in his back today.   He was compliant with home exercise program.  Response to previous treatment: no adverse effects   Functional change: none at this time     Pain: 2/10  Location: low back     Objective     Sabino received therapeutic exercises to develop strength, endurance, ROM, flexibility, posture and core stabilization for 8 minutes including:  Seated ball rolls flex/lat 10x 5"  TrA brace c/ pallof press on CC 10lb 15x ea   SAPD c/ TrA brace 10lb ea 2x10   Shuttle squats 4 cord 3x10   Seated on SB trunk rotation 10lb 15x ea   1/2 kneel chops 10lb 15x ea   Bridge c/ GTB 3x10   Sidelying clams GTB 2x10 BLE   TrA brace c/ SB isometric 2x10        Not performed:   LTR 10x 5"   DKTC c/ SB 2x10   Prone press up 5" 10x   Supine 90/90 HSS 3x30"   TrA brace 3" 2x10           Sabino received the following manual therapy techniques:  were applied to the: thoracolumbar paraspinals  for 37 minutes, including:  STM to Lumbar Paraspinals   Static and dynamic cupping           Home Exercises Provided and Patient Education Provided "     Education provided:   - HEP  - Discontinuing exercises that increase pain.     Written Home Exercises Provided: yes.  Exercises were reviewed and Sabino was able to demonstrate them prior to the end of the session.  Sabino demonstrated good  understanding of the education provided.     See EMR under Patient Instructions for exercises provided 7/6/2022.    Assessment     Included half kneeling chops to patient's program in order to continue to work towards dynamic core strength. Pt with no adverse effects during session, however patient given verbal cues for maintaining breathing and form without compensation from lower trunk/hips. Continued to perform manual therapy techniques to reduce pain and hypertonicity of thoracolumbar paraspinals.     Sabino Is progressing well towards his goals.   Pt prognosis is Excellent.     Pt will continue to benefit from skilled outpatient physical therapy to address the deficits listed in the problem list box on initial evaluation, provide pt/family education and to maximize pt's level of independence in the home and community environment.     Pt's spiritual, cultural and educational needs considered and pt agreeable to plan of care and goals.     Anticipated barriers to physical therapy: none    Goals:   Short Term Goals (4 Weeks):  1. Pt will be compliant with HEP to supplement PT in decreasing pain with functional mobility.  2. Pt will improve impaired LE MMTs by 1/2 score to improve strength for functional tasks.  Long Term Goals (8 Weeks):   1. Pt will improve FOTO score to </= 45 limited to decrease perceived limitation with maintaining/changing body position.   2. Pt will perform pallof press with good control to demonstrate improved core strength.  3. Pt will improve impaired LE MMTs by 1 score to improve strength for functional tasks.  4. Pt will report no pain during lumbar ROM to promote functional mobility.       Plan   Plan of care Certification: 6/28/2022 to 8/28/2022.        Continue with current POC.     Iván Hernandez, PT

## 2022-08-30 ENCOUNTER — CLINICAL SUPPORT (OUTPATIENT)
Dept: REHABILITATION | Facility: HOSPITAL | Age: 67
End: 2022-08-30
Payer: MEDICARE

## 2022-08-30 DIAGNOSIS — M47.816 SPONDYLOSIS WITHOUT MYELOPATHY OR RADICULOPATHY, LUMBAR REGION: Primary | ICD-10-CM

## 2022-08-30 PROCEDURE — 97110 THERAPEUTIC EXERCISES: CPT

## 2022-08-30 NOTE — PROGRESS NOTES
"[facility]   Physical Therapy Treatment Note/Updated POC    Name: Sabino Rubio  Clinic Number: [id number]    Therapy Diagnosis:   Encounter Diagnosis   Name Primary?    Spondylosis without myelopathy or radiculopathy, lumbar region Yes     Physician: Amie Reich MD    Visit Date: 8/30/2022    Physician Orders: PT Eval and Treat  Medical Diagnosis from Referral: Spondylosis without myelopathy or radiculopathy, lumbar region [M47.816], Myofascial pain [M79.18]  Evaluation Date: 6/28/2022  Authorization Period Expiration: 12/31/2022  Plan of Care Expiration: 8/28/2022  Visit # / Visits authorized: 1/ 1, 12/12, 14/12  FOTO: 8/10  PTA Visit #: 1/5     Precautions: Standard    Time In: 7:45 am   Time Out  8:30 am   Total Billable Time: 45 minutes    Subjective     Pt reports: that he woke up with less pain than he has been and feels that he is doing better. Pt reports minimal pain in his back today.   He was compliant with home exercise program.  Response to previous treatment: no adverse effects   Functional change: none at this time     Pain: 2/10  Location: low back     Objective     Sabino received therapeutic exercises to develop strength, endurance, ROM, flexibility, posture and core stabilization for 45 minutes including:  Seated ball rolls flex/lat 10x 5"  TrA brace c/ pallof press on CC 10lb 15x ea   SAPD c/ TrA brace 10lb ea 2x10   Shuttle squats 4 cord 3x10   Seated on SB trunk rotation 10lb 15x ea   1/2 kneel chops 10lb 15x ea   Bridge c/ GTB 3x10   Sidelying clams GTB 2x10 BLE   TrA brace c/ SB isometric 2x10        Not performed:   LTR 10x 5"   DKTC c/ SB 2x10   Prone press up 5" 10x   Supine 90/90 HSS 3x30"   TrA brace 3" 2x10           Sabino received the following manual therapy techniques:  were applied to the: thoracolumbar paraspinals  for 0 minutes, including:  STM to Lumbar Paraspinals   Static and dynamic cupping           Home Exercises Provided and Patient Education " Provided     Education provided:   - HEP  - Discontinuing exercises that increase pain.     Written Home Exercises Provided: yes.  Exercises were reviewed and Sabino was able to demonstrate them prior to the end of the session.  Sabino demonstrated good  understanding of the education provided.     See EMR under Patient Instructions for exercises provided 7/6/2022.    Assessment     Pt with no adverse effects during session, however patient given verbal cues for maintaining breathing and form without compensation from lower trunk/hips. Continued to progress POC as tolerated.    Sabino Is progressing well towards his goals.   Pt prognosis is Excellent.     Pt will continue to benefit from skilled outpatient physical therapy to address the deficits listed in the problem list box on initial evaluation, provide pt/family education and to maximize pt's level of independence in the home and community environment.     Pt's spiritual, cultural and educational needs considered and pt agreeable to plan of care and goals.     Anticipated barriers to physical therapy: none    Goals:   Short Term Goals (4 Weeks):  1. Pt will be compliant with HEP to supplement PT in decreasing pain with functional mobility.  2. Pt will improve impaired LE MMTs by 1/2 score to improve strength for functional tasks.  Long Term Goals (8 Weeks):   1. Pt will improve FOTO score to </= 45 limited to decrease perceived limitation with maintaining/changing body position.   2. Pt will perform pallof press with good control to demonstrate improved core strength.  3. Pt will improve impaired LE MMTs by 1 score to improve strength for functional tasks.  4. Pt will report no pain during lumbar ROM to promote functional mobility.       Plan   Updated Plan of care Certification: 8/30/2022 to 10/01/2022.       Continue with current POC.     Iván Hernandez, PT

## 2022-09-01 ENCOUNTER — CLINICAL SUPPORT (OUTPATIENT)
Dept: REHABILITATION | Facility: HOSPITAL | Age: 67
End: 2022-09-01
Payer: MEDICARE

## 2022-09-01 DIAGNOSIS — M47.816 SPONDYLOSIS WITHOUT MYELOPATHY OR RADICULOPATHY, LUMBAR REGION: Primary | ICD-10-CM

## 2022-09-01 PROCEDURE — 97140 MANUAL THERAPY 1/> REGIONS: CPT

## 2022-09-01 PROCEDURE — 97110 THERAPEUTIC EXERCISES: CPT

## 2022-09-01 NOTE — PROGRESS NOTES
"[facility]   Physical Therapy Treatment Note/Updated POC    Name: Sabino Rubio  Clinic Number: [id number]    Therapy Diagnosis:   Encounter Diagnosis   Name Primary?    Spondylosis without myelopathy or radiculopathy, lumbar region Yes     Physician: Amie Reich MD    Visit Date: 9/1/2022    Physician Orders: PT Eval and Treat  Medical Diagnosis from Referral: Spondylosis without myelopathy or radiculopathy, lumbar region [M47.816], Myofascial pain [M79.18]  Evaluation Date: 6/28/2022  Authorization Period Expiration: 12/31/2022  Plan of Care Expiration: 8/28/2022  Visit # / Visits authorized: 1/ 1, 12/12, 14/12  FOTO: 8/10  PTA Visit #: 1/5     Precautions: Standard    Time In: 7:45 am   Time Out  8:30 am   Total Billable Time: 45 minutes    Subjective     Pt reports: that he woke up with less pain than he has been and feels that he is doing better. Pt reports minimal pain in his back today.   He was compliant with home exercise program.  Response to previous treatment: no adverse effects   Functional change: none at this time     Pain: 2/10  Location: low back     Objective     Sabino received therapeutic exercises to develop strength, endurance, ROM, flexibility, posture and core stabilization for 15 minutes including:  Seated ball rolls flex/lat 10x 5"  TrA brace c/ pallof press on CC 10lb 15x ea   SAPD c/ TrA brace 10lb ea 2x10   Shuttle squats 4 cord 3x10   Seated on SB trunk rotation 10lb 15x ea   1/2 kneel chops 10lb 15x ea   Bridge c/ GTB 3x10   Sidelying clams GTB 2x10 BLE   TrA brace c/ SB isometric 2x10        Not performed:   LTR 10x 5"   DKTC c/ SB 2x10   Prone press up 5" 10x   Supine 90/90 HSS 3x30"   TrA brace 3" 2x10           Sabino received the following manual therapy techniques:  were applied to the: thoracolumbar paraspinals  for 30 minutes, including:  STM to Lumbar Paraspinals   Static and dynamic cupping           Home Exercises Provided and Patient Education " Provided     Education provided:   - HEP  - Discontinuing exercises that increase pain.     Written Home Exercises Provided: yes.  Exercises were reviewed and Sabino was able to demonstrate them prior to the end of the session.  Sabino demonstrated good  understanding of the education provided.     See EMR under Patient Instructions for exercises provided 7/6/2022.    Assessment     Pt with no adverse effects during session, however patient given verbal cues for maintaining breathing and form without compensation from lower trunk/hips. Continued to progress POC as tolerated.    Sabino Is progressing well towards his goals.   Pt prognosis is Excellent.     Pt will continue to benefit from skilled outpatient physical therapy to address the deficits listed in the problem list box on initial evaluation, provide pt/family education and to maximize pt's level of independence in the home and community environment.     Pt's spiritual, cultural and educational needs considered and pt agreeable to plan of care and goals.     Anticipated barriers to physical therapy: none    Goals:   Short Term Goals (4 Weeks):  1. Pt will be compliant with HEP to supplement PT in decreasing pain with functional mobility.  2. Pt will improve impaired LE MMTs by 1/2 score to improve strength for functional tasks.  Long Term Goals (8 Weeks):   1. Pt will improve FOTO score to </= 45 limited to decrease perceived limitation with maintaining/changing body position.   2. Pt will perform pallof press with good control to demonstrate improved core strength.  3. Pt will improve impaired LE MMTs by 1 score to improve strength for functional tasks.  4. Pt will report no pain during lumbar ROM to promote functional mobility.       Plan   Updated Plan of care Certification: 8/30/2022 to 10/01/2022.       Continue with current POC.     Iván Hernandez, PT

## 2022-09-12 ENCOUNTER — TELEPHONE (OUTPATIENT)
Dept: PAIN MEDICINE | Facility: CLINIC | Age: 67
End: 2022-09-12
Payer: MEDICARE

## 2022-09-12 NOTE — TELEPHONE ENCOUNTER
This message is for patient in regards to his/her appointment 09/13/22 at 8:00 a.m.   With Yumi Naik NP.      [facility] Policy: For the safety of all patients and staff members.   During this visit we're informing all patients and visitors to wear face mask at all time here at [facility].  If you have any questions or concerns please contact [phone]

## 2022-09-13 ENCOUNTER — DOCUMENTATION ONLY (OUTPATIENT)
Dept: REHABILITATION | Facility: HOSPITAL | Age: 67
End: 2022-09-13
Payer: MEDICARE

## 2022-09-13 ENCOUNTER — OFFICE VISIT (OUTPATIENT)
Dept: PAIN MEDICINE | Facility: CLINIC | Age: 67
End: 2022-09-13
Payer: MEDICARE

## 2022-09-13 VITALS
WEIGHT: 154 LBS | BODY MASS INDEX: 22.05 KG/M2 | SYSTOLIC BLOOD PRESSURE: 134 MMHG | TEMPERATURE: 98 F | OXYGEN SATURATION: 100 % | DIASTOLIC BLOOD PRESSURE: 82 MMHG | RESPIRATION RATE: 18 BRPM | HEIGHT: 70 IN | HEART RATE: 81 BPM

## 2022-09-13 DIAGNOSIS — M48.061 SPINAL STENOSIS OF LUMBAR REGION WITHOUT NEUROGENIC CLAUDICATION: ICD-10-CM

## 2022-09-13 DIAGNOSIS — M51.36 DISCOGENIC LOW BACK PAIN: ICD-10-CM

## 2022-09-13 DIAGNOSIS — M47.816 SPONDYLOSIS OF LUMBAR REGION WITHOUT MYELOPATHY OR RADICULOPATHY: Primary | ICD-10-CM

## 2022-09-13 DIAGNOSIS — M51.36 DDD (DEGENERATIVE DISC DISEASE), LUMBAR: ICD-10-CM

## 2022-09-13 PROCEDURE — 3044F HG A1C LEVEL LT 7.0%: CPT | Mod: CPTII,S$GLB,, | Performed by: NURSE PRACTITIONER

## 2022-09-13 PROCEDURE — 1125F AMNT PAIN NOTED PAIN PRSNT: CPT | Mod: CPTII,S$GLB,, | Performed by: NURSE PRACTITIONER

## 2022-09-13 PROCEDURE — 3079F DIAST BP 80-89 MM HG: CPT | Mod: CPTII,S$GLB,, | Performed by: NURSE PRACTITIONER

## 2022-09-13 PROCEDURE — 1159F MED LIST DOCD IN RCRD: CPT | Mod: CPTII,S$GLB,, | Performed by: NURSE PRACTITIONER

## 2022-09-13 PROCEDURE — 99213 OFFICE O/P EST LOW 20 MIN: CPT | Mod: S$GLB,,, | Performed by: NURSE PRACTITIONER

## 2022-09-13 PROCEDURE — 1125F PR PAIN SEVERITY QUANTIFIED, PAIN PRESENT: ICD-10-PCS | Mod: CPTII,S$GLB,, | Performed by: NURSE PRACTITIONER

## 2022-09-13 PROCEDURE — 1101F PT FALLS ASSESS-DOCD LE1/YR: CPT | Mod: CPTII,S$GLB,, | Performed by: NURSE PRACTITIONER

## 2022-09-13 PROCEDURE — 3288F PR FALLS RISK ASSESSMENT DOCUMENTED: ICD-10-PCS | Mod: CPTII,S$GLB,, | Performed by: NURSE PRACTITIONER

## 2022-09-13 PROCEDURE — 3008F PR BODY MASS INDEX (BMI) DOCUMENTED: ICD-10-PCS | Mod: CPTII,S$GLB,, | Performed by: NURSE PRACTITIONER

## 2022-09-13 PROCEDURE — 3079F PR MOST RECENT DIASTOLIC BLOOD PRESSURE 80-89 MM HG: ICD-10-PCS | Mod: CPTII,S$GLB,, | Performed by: NURSE PRACTITIONER

## 2022-09-13 PROCEDURE — 1160F PR REVIEW ALL MEDS BY PRESCRIBER/CLIN PHARMACIST DOCUMENTED: ICD-10-PCS | Mod: CPTII,S$GLB,, | Performed by: NURSE PRACTITIONER

## 2022-09-13 PROCEDURE — 99999 PR PBB SHADOW E&M-EST. PATIENT-LVL IV: ICD-10-PCS | Mod: PBBFAC,,, | Performed by: NURSE PRACTITIONER

## 2022-09-13 PROCEDURE — 3044F PR MOST RECENT HEMOGLOBIN A1C LEVEL <7.0%: ICD-10-PCS | Mod: CPTII,S$GLB,, | Performed by: NURSE PRACTITIONER

## 2022-09-13 PROCEDURE — 3075F PR MOST RECENT SYSTOLIC BLOOD PRESS GE 130-139MM HG: ICD-10-PCS | Mod: CPTII,S$GLB,, | Performed by: NURSE PRACTITIONER

## 2022-09-13 PROCEDURE — 99213 PR OFFICE/OUTPT VISIT, EST, LEVL III, 20-29 MIN: ICD-10-PCS | Mod: S$GLB,,, | Performed by: NURSE PRACTITIONER

## 2022-09-13 PROCEDURE — 3008F BODY MASS INDEX DOCD: CPT | Mod: CPTII,S$GLB,, | Performed by: NURSE PRACTITIONER

## 2022-09-13 PROCEDURE — 99999 PR PBB SHADOW E&M-EST. PATIENT-LVL IV: CPT | Mod: PBBFAC,,, | Performed by: NURSE PRACTITIONER

## 2022-09-13 PROCEDURE — 3288F FALL RISK ASSESSMENT DOCD: CPT | Mod: CPTII,S$GLB,, | Performed by: NURSE PRACTITIONER

## 2022-09-13 PROCEDURE — 1160F RVW MEDS BY RX/DR IN RCRD: CPT | Mod: CPTII,S$GLB,, | Performed by: NURSE PRACTITIONER

## 2022-09-13 PROCEDURE — 1101F PR PT FALLS ASSESS DOC 0-1 FALLS W/OUT INJ PAST YR: ICD-10-PCS | Mod: CPTII,S$GLB,, | Performed by: NURSE PRACTITIONER

## 2022-09-13 PROCEDURE — 3075F SYST BP GE 130 - 139MM HG: CPT | Mod: CPTII,S$GLB,, | Performed by: NURSE PRACTITIONER

## 2022-09-13 PROCEDURE — 1159F PR MEDICATION LIST DOCUMENTED IN MEDICAL RECORD: ICD-10-PCS | Mod: CPTII,S$GLB,, | Performed by: NURSE PRACTITIONER

## 2022-09-13 NOTE — PROGRESS NOTES
Chronic patient Established Note (Follow up visit)      SUBJECTIVE:    Interval History 9/13/2022:  Sabino Rubio presents to the clinic for a follow-up appointment for low back pain. He reports neuroma injection at last office visit provided short term relief. He continues reports low back pain that radiates into his right buttock. He denies any radicular leg pain. His pain is worse with moving from sitting to standing, prolonged sitting, and activity. He does endorse morning stiffness. He is currently participating in physical therapy with some benefit. He is taking Gabapentin, Flexeril, and Mobic. He denies any other health changes. His pain today is 5/10.    Interval History 8/2/2022:   Patient returns to clinic today reporting 2/10 pain at rest which increases to 4-6/10 when standing. Pain is primarily low back in nature with radiation to the right buttock. He reports that pain is exacerbated by prolonged immobility specifically when awakening from sleep or sitting for long periods of time. At the last visit, he was prescribed Flexeril, but has noted only minimal benefit from it. He has been working with PT and plans on starting to go to the gym. Prior RFA's have provided some relief, he reports his first left-sided procedure produced significant relief whereas the second right-sided procedure only produced moderate relief.    Interval History 6/16/2022:  Patient presents today for evaluationfollow-up of their low back pain s/p Bilateral  Lumbar RFA . Per pateint his axial LBP is almost 50-60% better after the RFA but he is still suffering from some pain in Left lower back ,mostly stiffness and spasm .no radicular component  Is associated with LBP. No B/B incontinence/no numbness /motor or sensory deficit.     Pain Disability Index Review:  Last 3 PDI Scores 8/2/2022 6/16/2022 1/29/2019   Pain Disability Index (PDI) 27 33 13       Pain Medications:  Gabapentin  Mobic  Flexeril    Opioid Contract: no      report:  Not applicable    Pain Procedures:   6/6/2019- Bilateral L4/5 and L5/S1 facet joint injection  1/27/2022- Bilateral L3,4,5 MBB  2/10/2022- Bilateral L3,4,5 MBB  5/16/2022- Right L3,4,5 RFA  6/2/2022- Left L3,4,5 RFA    Physical Therapy/Home Exercise: yes    Imaging:   Xray Lumbar Spine 6/16/2022:  COMPARISON:  Lumbar spine radiograph from 05/24/2019.     FINDINGS:  Alignment: Mild levocurvature of lumbar spine.  Vertebral bodies adequately aligned on sagittal views.  No dynamic instability.     Vertebrae: Persistent concavity along superior endplate of L3, similar to prior exam.  Remaining vertebral body heights are adequately maintained.  No definite spondylolysis on oblique views.  No suspicious appearing lytic or blastic lesions.     Discs and facets: Multilevel moderate to severe disc space narrowing most prominent at L1-L2, L4-L5, and L5-S1 with sclerotic endplate changes.  Vacuum disc phenomenon present at L1-L2 and L5-S1.  Multilevel anterior osteophyte formation.  Advanced facet arthropathy most prominent in the lower lumbar spine.     Miscellaneous: No additional findings.     Impression:     As above.    MRI Lumbar Spine 1/9/2019:  COMPARISON:  Lumbar spine radiograph 10/03/2017     FINDINGS:  Alignment: Mild straightening of normal lumbar lordosis.     Vertebrae: Mild vertebral height loss and degenerative signal change.  No evidence of acute fracture or infiltrative marrow process.     Discs: Intervertebral disc height loss and degenerative signal change, most prominent within the lower lumbar spine.     Cord: Normal.  Conus terminates at L1-L2.     Degenerative findings:     T12-L1: Mild broad-based disc bulge and facet arthropathy without significant spinal canal stenosis or neural foraminal narrowing.     L1-L2: Mild broad-based disc bulge and facet arthropathy without significant spinal canal stenosis or neural foraminal narrowing.     L2-L3: Broad-based disc bulge, ligamentum flavum  thickening, and facet arthropathy without significant spinal canal stenosis or neural foraminal narrowing.     L3-L4: Broad-based disc bulge, ligamentum flavum thickening, and facet arthropathy resulting in moderate spinal canal stenosis and mild bilateral neural foraminal narrowing.     L4-L5: Broad-based disc bulge, ligamentum flavum thickening, and facet arthropathy resulting in moderate spinal canal stenosis, moderate left neural foraminal narrowing, and mild right neural foraminal narrowing.     L5-S1: Broad-based disc bulge, ligamentum flavum thickening, and facet arthropathy resulting in moderate left and mild right neural foraminal narrowing.     Paraspinal muscles & soft tissues: Unremarkable.     IMPRESSION:      Multilevel degenerative change of the lumbar spine most significant at L3-L4 which demonstrates moderate spinal canal stenosis and mild bilateral neural foraminal narrowing.  Moderate left and mild right neural foraminal narrowing seen at L4-L5 and L5-S1.  Additional details above.    Allergies: Review of patient's allergies indicates:  No Known Allergies    Current Medications:   Current Outpatient Medications   Medication Sig Dispense Refill    gabapentin (NEURONTIN) 300 MG capsule TAKE 1 CAPSULE BY MOUTH THREE TIMES A DAY 90 capsule 0    fluticasone propionate (FLONASE) 50 mcg/actuation nasal spray 1 spray (50 mcg total) by Each Nostril route 2 (two) times a day. 16 g 4    meloxicam (MOBIC) 15 MG tablet TAKE 1 TABLET (15 MG TOTAL) BY MOUTH DAILY AS NEEDED FOR PAIN (TAKE WITH FOOD OR A MEAL). 90 tablet 3    rosuvastatin (CRESTOR) 10 MG tablet Take 1 tablet (10 mg total) by mouth once daily. 90 tablet 3    sildenafiL (VIAGRA) 100 MG tablet Take 1 tablet (100 mg total) by mouth daily as needed. 15 tablet 11    tamsulosin (FLOMAX) 0.4 mg Cap Take 1 capsule (0.4 mg total) by mouth once daily. 30 capsule 12     No current facility-administered medications for this visit.     Facility-Administered  Medications Ordered in Other Visits   Medication Dose Route Frequency Provider Last Rate Last Admin    0.9%  NaCl infusion   Intravenous Continuous Cole Fitzaptrick DO           REVIEW OF SYSTEMS:    GENERAL:  No weight loss, malaise or fevers.  HEENT:  Negative for frequent or significant headaches.  NECK:  Negative for lumps, goiter, pain and significant neck swelling.  RESPIRATORY:  Negative for cough, wheezing or shortness of breath.  CARDIOVASCULAR:  Negative for chest pain, leg swelling or palpitations.  GI:  Negative for abdominal discomfort, blood in stools or black stools or change in bowel habits. GERD  MUSCULOSKELETAL:  See HPI.  SKIN:  Negative for lesions, rash, and itching.  PSYCH:  Negative for sleep disturbance, mood disorder and recent psychosocial stressors.  HEMATOLOGY/LYMPHOLOGY:  Negative for prolonged bleeding, bruising easily or swollen nodes.  NEURO:   No history of headaches, syncope, paralysis, seizures or tremors.  All other reviewed and negative other than HPI.    Past Medical History:  Past Medical History:   Diagnosis Date    Allergy     Arthritis     Family history of malignant neoplasm of gastrointestinal tract mat.gf    Family history of prostate cancer: 1/2 brother 9/25/2017    GERD (gastroesophageal reflux disease)     Kidney cyst, acquired: R 1.2 cm 2015 9/25/2017    Restless leg syndrome     Tubulovillous adenoma 2013 due 2016 9/14/2015       Past Surgical History:  Past Surgical History:   Procedure Laterality Date    APPLICATION OF BONE GRAFT TO FINGER Left 11/8/2021    Procedure: APPLICATION INTEGRA GRAFT, TO FINGER;  Surgeon: Alba Penn MD;  Location: [facility];  Service: Orthopedics;  Laterality: Left;    CARPAL TUNNEL RELEASE      COLONOSCOPY N/A 5/22/2019    Procedure: COLONOSCOPY;  Surgeon: Juancarlos Foley MD;  Location: [address]);  Service: Endoscopy;  Laterality: N/A;    EXCISION OF GANGLION OF WRIST Right 6/28/2019    Procedure: EXCISION, GANGLION CYST,  WRIST right;  Surgeon: Alba Penn MD;  Location: [facility] OR 1ST FLR;  Service: Orthopedics;  Laterality: Right;  regional MAC, stretcher, supine, hand pan 1 and 2,MINI C-arm, call Arthrex    INJECTION OF ANESTHETIC AGENT AROUND NERVE Bilateral 1/27/2022    Procedure: Block, Nerve MEDIAL BRANCH BLOCK BILATERAL L3,4,5  DIRECT REFERRAL 1 OF 2;  Surgeon: Kaiser Braxton MD;  Location: [facility];  Service: Pain Management;  Laterality: Bilateral;    INJECTION OF ANESTHETIC AGENT AROUND NERVE Bilateral 2/10/2022    Procedure: Block, Nerve MEDIAL BRANCH BLOCK BILATERAL L3.4.5  DIRECT REFERRAL 2 OF 2;  Surgeon: Kaiser Braxton MD;  Location: [facility];  Service: Pain Management;  Laterality: Bilateral;    INJECTION OF FACET JOINT Bilateral 6/6/2019    Procedure: INJECTION, FACET JOINT INJECTION (LUMBAR BLOCK) BILATERAL L4-L5 AND L5-S1 FACET INJECTIONS;  Surgeon: Kaiser Braxton MD;  Location: [facility];  Service: Pain Management;  Laterality: Bilateral;  NEEDS CONSENT    INTERPOSITION ARTHROPLASTY OF CARPOMETACARPAL JOINTS Right 6/28/2019    Procedure: INTERPOSITION ARTHROPLASTY, CMC JOINT right;  Surgeon: Alba Penn MD;  Location: [facility] OR 1ST FLR;  Service: Orthopedics;  Laterality: Right;  regional MAC, stretcher, supine, hand pan 1 and 2,MINI C-arm, call Arthrex    IRRIGATION AND DEBRIDEMENT OF UPPER EXTREMITY Left 11/8/2021    Procedure: IRRIGATION AND DEBRIDEMENT, UPPER EXTREMITY, left index finger;  Surgeon: Alba Penn MD;  Location: [facility] OR;  Service: Orthopedics;  Laterality: Left;    RADIOFREQUENCY ABLATION Right 5/16/2022    Procedure: Radiofrequency Ablation RIGHT L3,4,5;  Surgeon: Kaiser Braxton MD;  Location: [facility];  Service: Pain Management;  Laterality: Right;    RADIOFREQUENCY ABLATION Left 6/2/2022    Procedure: Radiofrequency Ablation LEFT L3,4,5;  Surgeon: Kaiser Braxton MD;  Location: [facility];  Service: Pain Management;  Laterality: Left;    REPAIR OF NAIL BED  Left 11/8/2021    Procedure: REPAIR, NAIL BED, left index;  Surgeon: Alba Penn MD;  Location: [facility] OR;  Service: Orthopedics;  Laterality: Left;    SPERMATOCELECTOMY Right 11/8/2019    Procedure: EXCISION, SPERMATOCELE;  Surgeon: Sheldon Araya Jr., MD;  Location: [facility] OR 1ST FLR;  Service: Urology;  Laterality: Right;  1hr       Family History:  Family History   Problem Relation Age of Onset    Arthritis Mother         probable R.A.    Cancer Father         esophageal ca and brain tumor    Esophageal cancer Father     Melanoma Father     Cancer Brother         pancreatic cancer    Cancer Maternal Grandfather         colon    Colon cancer Maternal Grandfather     Irritable bowel syndrome Sister     Arthritis Sister     No Known Problems Son     No Known Problems Brother     No Known Problems Son     Cancer Sister     No Known Problems Brother     Diabetes Neg Hx     Heart disease Neg Hx     Cirrhosis Neg Hx     Celiac disease Neg Hx     Crohn's disease Neg Hx     Ulcerative colitis Neg Hx     Stomach cancer Neg Hx     Rectal cancer Neg Hx     Liver cancer Neg Hx     Psoriasis Neg Hx     Lupus Neg Hx        Social History:  Social History     Socioeconomic History    Marital status: Single   Tobacco Use    Smoking status: Never    Smokeless tobacco: Never    Tobacco comments:     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   Substance and Sexual Activity    Alcohol use: Yes     Alcohol/week: 0.0 standard drinks     Comment: 2-3 days weekly, up to 2 beers    Drug use: No    Sexual activity: Yes     Partners: Female     Social Determinants of Health     Transportation Needs: No Transportation Needs    Lack of Transportation (Medical): No    Lack of Transportation (Non-Medical): No   Physical Activity: Sufficiently Active    Days of Exercise per Week: 5 days    Minutes of Exercise per Session: 30 min   Housing Stability: Low Risk      "Unable to Pay for Housing in the Last Year: No    Number of Places Lived in the Last Year: 1    Unstable Housing in the Last Year: No       OBJECTIVE:    /82 (BP Location: Right arm, Patient Position: Sitting, BP Method: Medium (Automatic))   Pulse 81   Temp 98 °F (36.7 °C) (Temporal)   Resp 18   Ht 5' 10" (1.778 m)   Wt 69.9 kg (154 lb)   SpO2 100%   BMI 22.10 kg/m²     PHYSICAL EXAMINATION:    General appearance: Well appearing, in no acute distress, alert and oriented x3.  Psych:  Mood and affect appropriate.  Skin: Skin color, texture, turgor normal, no rashes or lesions, in both upper and lower body.  Head/face:  Atraumatic, normocephalic.   Cor: RRR  Pulm: Symmetric chest rise, no respiratory distress noted.   Back: Straight leg raising in the sitting position is negative to radicular pain bilaterally. There is mild pain to palpation over the lumbar facet joints bilaterally. Limited ROM with pain on flexion and extension. Positive facet loading bilaterally.   Extremities: No deformities, edema, or skin discoloration. Good capillary refill.  Musculoskeletal: There is no pain with palpation over bilateral SI joints. FABERs is negative bilaterally. Bilateral lower extremity strength is normal and symmetric.  No atrophy or tone abnormalities are noted.  Neuro: Bilateral lower extremity coordination and muscle stretch reflexes are physiologic and symmetric.  Plantar response are downgoing. No loss of sensation is noted.  Gait: Antalgic- ambulates without assistance.     ASSESSMENT: 66 y.o. year old male with back pain, consistent with the followin. Spondylosis of lumbar region without myelopathy or radiculopathy  MRI Lumbar Spine Without Contrast      2. Spinal stenosis of lumbar region without neurogenic claudication  MRI Lumbar Spine Without Contrast      3. DDD (degenerative disc disease), lumbar        4. Discogenic low back pain              PLAN:     - Previous imaging was reviewed and " discussed with the patient today.    - Obtain updated lumbar MRI, last MRI is from 2019.    - Consider [facility]CY in the future.     - Continue physical therapy.     - I have stressed the importance of physical activity and a home exercise plan to help with pain and improve health.    - Patient can continue with medications for now since they are providing benefits, using them appropriately, and without side effects.    - RTC after imaging.     - Counseled patient regarding the importance of activity modification and physical therapy.    - Dr. Braxton was consulted on the patient and agrees with this plan.    The above plan and management options were discussed at length with patient. Patient is in agreement with the above and verbalized understanding.    Yumi Naik NP  09/13/2022

## 2022-09-14 ENCOUNTER — PATIENT MESSAGE (OUTPATIENT)
Dept: ENDOSCOPY | Facility: HOSPITAL | Age: 67
End: 2022-09-14
Payer: MEDICARE

## 2022-09-14 DIAGNOSIS — Z12.11 SPECIAL SCREENING FOR MALIGNANT NEOPLASMS, COLON: Primary | ICD-10-CM

## 2022-09-14 RX ORDER — POLYETHYLENE GLYCOL 3350, SODIUM SULFATE ANHYDROUS, SODIUM BICARBONATE, SODIUM CHLORIDE, POTASSIUM CHLORIDE 236; 22.74; 6.74; 5.86; 2.97 G/4L; G/4L; G/4L; G/4L; G/4L
4 POWDER, FOR SOLUTION ORAL ONCE
Qty: 4000 ML | Refills: 0 | Status: SHIPPED | OUTPATIENT
Start: 2022-09-14 | End: 2022-09-14

## 2022-09-15 ENCOUNTER — CLINICAL SUPPORT (OUTPATIENT)
Dept: REHABILITATION | Facility: HOSPITAL | Age: 67
End: 2022-09-15
Payer: MEDICARE

## 2022-09-15 DIAGNOSIS — M47.816 SPONDYLOSIS WITHOUT MYELOPATHY OR RADICULOPATHY, LUMBAR REGION: Primary | ICD-10-CM

## 2022-09-15 PROCEDURE — 97140 MANUAL THERAPY 1/> REGIONS: CPT

## 2022-09-19 NOTE — PROGRESS NOTES
"[facility]   Physical Therapy Treatment Note/Updated POC    Name: Sabino Rubio  Clinic Number: [id number]    Therapy Diagnosis:   Encounter Diagnosis   Name Primary?    Spondylosis without myelopathy or radiculopathy, lumbar region Yes     Physician: Amie Reich MD    Visit Date: 9/15/2022    Physician Orders: PT Eval and Treat  Medical Diagnosis from Referral: Spondylosis without myelopathy or radiculopathy, lumbar region [M47.816], Myofascial pain [M79.18]  Evaluation Date: 6/28/2022  Authorization Period Expiration: 12/31/2022  Plan of Care Expiration: 8/28/2022  Visit # / Visits authorized: 1/ 1, 12/12, 14/12  FOTO: 8/10  PTA Visit #: 1/5     Precautions: Standard    Time In: 8:30 am   Time Out  9:15 am   Total Billable Time: 45 minutes    Subjective     Pt reports: that he woke up with more pain than he has been.   He was compliant with home exercise program.  Response to previous treatment: no adverse effects   Functional change: none at this time     Pain: 4/10  Location: low back     Objective     Sabino received therapeutic exercises to develop strength, endurance, ROM, flexibility, posture and core stabilization for 0 minutes including:  Seated ball rolls flex/lat 10x 5"  TrA brace c/ pallof press on CC 10lb 15x ea   SAPD c/ TrA brace 10lb ea 2x10   Shuttle squats 4 cord 3x10   Seated on SB trunk rotation 10lb 15x ea   1/2 kneel chops 10lb 15x ea   Bridge c/ GTB 3x10   Sidelying clams GTB 2x10 BLE   TrA brace c/ SB isometric 2x10        Not performed:   LTR 10x 5"   DKTC c/ SB 2x10   Prone press up 5" 10x   Supine 90/90 HSS 3x30"   TrA brace 3" 2x10           Sabino received the following manual therapy techniques:  were applied to the: thoracolumbar paraspinals  for 45 minutes, including:  FDN and STM to Lumbar Paraspinals   Static and dynamic cupping           Home Exercises Provided and Patient Education Provided     Education provided:   - HEP  - Discontinuing exercises " that increase pain.     Written Home Exercises Provided: yes.  Exercises were reviewed and Sabino was able to demonstrate them prior to the end of the session.  Sabino demonstrated good  understanding of the education provided.     See EMR under Patient Instructions for exercises provided 7/6/2022.    Assessment     Pt with no adverse effects during session. Continued to progress POC as tolerated.    Sabino Is progressing well towards his goals.   Pt prognosis is Excellent.     Pt will continue to benefit from skilled outpatient physical therapy to address the deficits listed in the problem list box on initial evaluation, provide pt/family education and to maximize pt's level of independence in the home and community environment.     Pt's spiritual, cultural and educational needs considered and pt agreeable to plan of care and goals.     Anticipated barriers to physical therapy: none    Goals:   Short Term Goals (4 Weeks):  1. Pt will be compliant with HEP to supplement PT in decreasing pain with functional mobility.  2. Pt will improve impaired LE MMTs by 1/2 score to improve strength for functional tasks.  Long Term Goals (8 Weeks):   1. Pt will improve FOTO score to </= 45 limited to decrease perceived limitation with maintaining/changing body position.   2. Pt will perform pallof press with good control to demonstrate improved core strength.  3. Pt will improve impaired LE MMTs by 1 score to improve strength for functional tasks.  4. Pt will report no pain during lumbar ROM to promote functional mobility.       Plan   Updated Plan of care Certification: 8/30/2022 to 10/01/2022.       Continue with current POC.     Iván Hernandez, PT

## 2022-09-20 ENCOUNTER — CLINICAL SUPPORT (OUTPATIENT)
Dept: REHABILITATION | Facility: HOSPITAL | Age: 67
End: 2022-09-20
Payer: MEDICARE

## 2022-09-20 DIAGNOSIS — M47.816 SPONDYLOSIS WITHOUT MYELOPATHY OR RADICULOPATHY, LUMBAR REGION: Primary | ICD-10-CM

## 2022-09-20 PROCEDURE — 97110 THERAPEUTIC EXERCISES: CPT

## 2022-09-22 NOTE — PROGRESS NOTES
"[facility]   Physical Therapy Treatment Note/Updated POC    Name: Sabino Rubio  Clinic Number: [id number]    Therapy Diagnosis:   Encounter Diagnosis   Name Primary?    Spondylosis without myelopathy or radiculopathy, lumbar region Yes     Physician: Amie Reich MD    Visit Date: 9/20/2022    Physician Orders: PT Eval and Treat  Medical Diagnosis from Referral: Spondylosis without myelopathy or radiculopathy, lumbar region [M47.816], Myofascial pain [M79.18]  Evaluation Date: 6/28/2022  Authorization Period Expiration: 12/31/2022  Plan of Care Expiration: 8/28/2022  Visit # / Visits authorized: 1/ 1, 12/12, 17/12  FOTO: 8/10  PTA Visit #: 1/5     Precautions: Standard    Time In: 8:30 am   Time Out  9:15 am   Total Billable Time: 45 minutes    Subjective     Pt reports: that he woke up with less pain than he has been.   He was compliant with home exercise program.  Response to previous treatment: no adverse effects   Functional change: none at this time     Pain: 2/10  Location: low back     Objective     Range of Motion/Strength:      Thoracolumbar AROM Pain/Dysfunction with Movement   Flexion WNL     Extension WNL     Right side bending WNL     Left side bending WNL     Right rotation WNL     Left rotation WNL           L/E MMT Right Left Pain/Dysfunction with Movement   Hip Flexion 4+/5 4+/5     Hip Extension 5/5 5/5     Hip Abduction 4+/5 4+/5     Hip Adduction 4+/5 4+/5     Hip IR 4+/5 4+/5     Hip ER 4/5 4/5     Knee Flexion 4/5 4/5     Knee Extension 4+/5 4+/5     Ankle DF 4+/5 4+/5     Ankle PF 4+/5 4+/5     Ankle Inversion 4+/5 4+/5     Ankle Eversion 4/5 4/5     Big Toe Extension 5/5 5/5           Sabino received therapeutic exercises to develop strength, endurance, ROM, flexibility, posture and core stabilization for 0 minutes including:  Seated ball rolls flex/lat 10x 5"  TrA brace c/ pallof press on CC 10lb 15x ea   SAPD c/ TrA brace 10lb ea 2x10   Shuttle squats 4 cord " "3x10   Seated on SB trunk rotation 10lb 15x ea   1/2 kneel chops 10lb 15x ea   Bridge c/ GTB 3x10   Sidelying clams GTB 2x10 BLE   TrA brace c/ SB isometric 2x10        Not performed:   LTR 10x 5"   DKTC c/ SB 2x10   Prone press up 5" 10x   Supine 90/90 HSS 3x30"   TrA brace 3" 2x10           Sabino received the following manual therapy techniques:  were applied to the: thoracolumbar paraspinals  for 45 minutes, including:  FDN and STM to Lumbar Paraspinals   Static and dynamic cupping           Home Exercises Provided and Patient Education Provided     Education provided:   - HEP  - Discontinuing exercises that increase pain.     Written Home Exercises Provided: yes.  Exercises were reviewed and Sabino was able to demonstrate them prior to the end of the session.  Sabino demonstrated good  understanding of the education provided.     See EMR under Patient Instructions for exercises provided 7/6/2022.    Assessment     Pt with no adverse effects during session. Continued to progress POC as tolerated.    Sabino Is progressing well towards his goals.   Pt prognosis is Excellent.     Pt will continue to benefit from skilled outpatient physical therapy to address the deficits listed in the problem list box on initial evaluation, provide pt/family education and to maximize pt's level of independence in the home and community environment.     Pt's spiritual, cultural and educational needs considered and pt agreeable to plan of care and goals.     Anticipated barriers to physical therapy: none    Goals:   Short Term Goals (4 Weeks):  1. Pt will be compliant with HEP to supplement PT in decreasing pain with functional mobility.  2. Pt will improve impaired LE MMTs by 1/2 score to improve strength for functional tasks.  Long Term Goals (8 Weeks):   1. Pt will improve FOTO score to </= 45 limited to decrease perceived limitation with maintaining/changing body position.   2. Pt will perform pallof press with good control to " demonstrate improved core strength.  3. Pt will improve impaired LE MMTs by 1 score to improve strength for functional tasks.  4. Pt will report no pain during lumbar ROM to promote functional mobility.       Plan   Updated Plan of care Certification: 8/30/2022 to 10/01/2022.       Continue with current POC.     Iván Hernandez, PT

## 2022-10-03 ENCOUNTER — CLINICAL SUPPORT (OUTPATIENT)
Dept: ENDOSCOPY | Facility: HOSPITAL | Age: 67
End: 2022-10-03
Attending: INTERNAL MEDICINE
Payer: MEDICARE

## 2022-10-03 DIAGNOSIS — D36.9 TUBULOVILLOUS ADENOMA: ICD-10-CM

## 2022-10-04 ENCOUNTER — TELEPHONE (OUTPATIENT)
Dept: ENDOSCOPY | Facility: HOSPITAL | Age: 67
End: 2022-10-04
Payer: MEDICARE

## 2022-10-04 NOTE — TELEPHONE ENCOUNTER
----- Message from Samina Blake sent at 10/4/2022  3:52 PM CDT -----  Contact: Patient  Type:  Patient Returning Call    Who Called:Patient  Does the patient know what this is regarding?:colonoscopy   Would the patient rather a call back or a response via [facility]? Call back  Best Call Back Number:[phone]  Additional Information: Please assist

## 2022-10-05 ENCOUNTER — HOSPITAL ENCOUNTER (OUTPATIENT)
Dept: RADIOLOGY | Facility: HOSPITAL | Age: 67
Discharge: HOME | End: 2022-10-05
Attending: NURSE PRACTITIONER
Payer: MEDICARE

## 2022-10-05 DIAGNOSIS — M47.816 SPONDYLOSIS OF LUMBAR REGION WITHOUT MYELOPATHY OR RADICULOPATHY: ICD-10-CM

## 2022-10-05 DIAGNOSIS — M48.061 SPINAL STENOSIS OF LUMBAR REGION WITHOUT NEUROGENIC CLAUDICATION: ICD-10-CM

## 2022-10-05 PROCEDURE — 72148 MRI LUMBAR SPINE W/O DYE: CPT | Mod: 26,,, | Performed by: RADIOLOGY

## 2022-10-05 PROCEDURE — 72148 MRI LUMBAR SPINE W/O DYE: CPT | Mod: TC

## 2022-10-05 PROCEDURE — 72148 MRI LUMBAR SPINE WITHOUT CONTRAST: ICD-10-PCS | Mod: 26,,, | Performed by: RADIOLOGY

## 2022-10-10 ENCOUNTER — PATIENT MESSAGE (OUTPATIENT)
Dept: PAIN MEDICINE | Facility: OTHER | Age: 67
End: 2022-10-10
Payer: MEDICARE

## 2022-10-10 ENCOUNTER — OFFICE VISIT (OUTPATIENT)
Dept: PAIN MEDICINE | Facility: CLINIC | Age: 67
End: 2022-10-10
Payer: MEDICARE

## 2022-10-10 VITALS
RESPIRATION RATE: 18 BRPM | BODY MASS INDEX: 22.05 KG/M2 | SYSTOLIC BLOOD PRESSURE: 118 MMHG | HEIGHT: 70 IN | DIASTOLIC BLOOD PRESSURE: 72 MMHG | WEIGHT: 154 LBS | HEART RATE: 83 BPM | TEMPERATURE: 98 F

## 2022-10-10 DIAGNOSIS — M48.061 SPINAL STENOSIS OF LUMBAR REGION WITHOUT NEUROGENIC CLAUDICATION: Primary | ICD-10-CM

## 2022-10-10 DIAGNOSIS — M51.36 DDD (DEGENERATIVE DISC DISEASE), LUMBAR: ICD-10-CM

## 2022-10-10 DIAGNOSIS — M47.816 LUMBAR SPONDYLOSIS: ICD-10-CM

## 2022-10-10 DIAGNOSIS — M54.16 LUMBAR RADICULOPATHY: ICD-10-CM

## 2022-10-10 PROCEDURE — 3078F DIAST BP <80 MM HG: CPT | Mod: CPTII,S$GLB,, | Performed by: NURSE PRACTITIONER

## 2022-10-10 PROCEDURE — 3044F HG A1C LEVEL LT 7.0%: CPT | Mod: CPTII,S$GLB,, | Performed by: NURSE PRACTITIONER

## 2022-10-10 PROCEDURE — 3044F PR MOST RECENT HEMOGLOBIN A1C LEVEL <7.0%: ICD-10-PCS | Mod: CPTII,S$GLB,, | Performed by: NURSE PRACTITIONER

## 2022-10-10 PROCEDURE — 3008F PR BODY MASS INDEX (BMI) DOCUMENTED: ICD-10-PCS | Mod: CPTII,S$GLB,, | Performed by: NURSE PRACTITIONER

## 2022-10-10 PROCEDURE — 3008F BODY MASS INDEX DOCD: CPT | Mod: CPTII,S$GLB,, | Performed by: NURSE PRACTITIONER

## 2022-10-10 PROCEDURE — 1125F PR PAIN SEVERITY QUANTIFIED, PAIN PRESENT: ICD-10-PCS | Mod: CPTII,S$GLB,, | Performed by: NURSE PRACTITIONER

## 2022-10-10 PROCEDURE — 1159F MED LIST DOCD IN RCRD: CPT | Mod: CPTII,S$GLB,, | Performed by: NURSE PRACTITIONER

## 2022-10-10 PROCEDURE — 1101F PT FALLS ASSESS-DOCD LE1/YR: CPT | Mod: CPTII,S$GLB,, | Performed by: NURSE PRACTITIONER

## 2022-10-10 PROCEDURE — 1101F PR PT FALLS ASSESS DOC 0-1 FALLS W/OUT INJ PAST YR: ICD-10-PCS | Mod: CPTII,S$GLB,, | Performed by: NURSE PRACTITIONER

## 2022-10-10 PROCEDURE — 1160F PR REVIEW ALL MEDS BY PRESCRIBER/CLIN PHARMACIST DOCUMENTED: ICD-10-PCS | Mod: CPTII,S$GLB,, | Performed by: NURSE PRACTITIONER

## 2022-10-10 PROCEDURE — 3078F PR MOST RECENT DIASTOLIC BLOOD PRESSURE < 80 MM HG: ICD-10-PCS | Mod: CPTII,S$GLB,, | Performed by: NURSE PRACTITIONER

## 2022-10-10 PROCEDURE — 99999 PR PBB SHADOW E&M-EST. PATIENT-LVL III: CPT | Mod: PBBFAC,,, | Performed by: NURSE PRACTITIONER

## 2022-10-10 PROCEDURE — 1159F PR MEDICATION LIST DOCUMENTED IN MEDICAL RECORD: ICD-10-PCS | Mod: CPTII,S$GLB,, | Performed by: NURSE PRACTITIONER

## 2022-10-10 PROCEDURE — 1160F RVW MEDS BY RX/DR IN RCRD: CPT | Mod: CPTII,S$GLB,, | Performed by: NURSE PRACTITIONER

## 2022-10-10 PROCEDURE — 3288F PR FALLS RISK ASSESSMENT DOCUMENTED: ICD-10-PCS | Mod: CPTII,S$GLB,, | Performed by: NURSE PRACTITIONER

## 2022-10-10 PROCEDURE — 99213 PR OFFICE/OUTPT VISIT, EST, LEVL III, 20-29 MIN: ICD-10-PCS | Mod: S$GLB,,, | Performed by: NURSE PRACTITIONER

## 2022-10-10 PROCEDURE — 99999 PR PBB SHADOW E&M-EST. PATIENT-LVL III: ICD-10-PCS | Mod: PBBFAC,,, | Performed by: NURSE PRACTITIONER

## 2022-10-10 PROCEDURE — 3074F SYST BP LT 130 MM HG: CPT | Mod: CPTII,S$GLB,, | Performed by: NURSE PRACTITIONER

## 2022-10-10 PROCEDURE — 3288F FALL RISK ASSESSMENT DOCD: CPT | Mod: CPTII,S$GLB,, | Performed by: NURSE PRACTITIONER

## 2022-10-10 PROCEDURE — 99213 OFFICE O/P EST LOW 20 MIN: CPT | Mod: S$GLB,,, | Performed by: NURSE PRACTITIONER

## 2022-10-10 PROCEDURE — 3074F PR MOST RECENT SYSTOLIC BLOOD PRESSURE < 130 MM HG: ICD-10-PCS | Mod: CPTII,S$GLB,, | Performed by: NURSE PRACTITIONER

## 2022-10-10 PROCEDURE — 1125F AMNT PAIN NOTED PAIN PRSNT: CPT | Mod: CPTII,S$GLB,, | Performed by: NURSE PRACTITIONER

## 2022-10-10 NOTE — H&P (VIEW-ONLY)
Chronic patient Established Note (Follow up visit)      SUBJECTIVE:    Interval History 10/10/2022:  The patient returns to clinic today for follow up of low back pain. He is here today for imaging review. He continues to report low back pain that radiates into his right buttock and posterior thigh. His pain is worse with moving from sitting to standing, prolonged standing, and activity. He reports intermittent weakness into his legs with moving from sitting to standing. He has completed physical therapy. He continues to perform a home exercise routine. He continues to take Gabapentin, Flexeril, and Mobic. He denies any bowel or bladder incontinence. His pain today is 2/10.      Interval History 9/13/2022:  Sabino Rubio presents to the clinic for a follow-up appointment for low back pain. He reports neuroma injection at last office visit provided short term relief. He continues reports low back pain that radiates into his right buttock. He denies any radicular leg pain. His pain is worse with moving from sitting to standing, prolonged sitting, and activity. He does endorse morning stiffness. He is currently participating in physical therapy with some benefit. He is taking Gabapentin, Flexeril, and Mobic. He denies any other health changes. His pain today is 5/10.    Interval History 8/2/2022:   Patient returns to clinic today reporting 2/10 pain at rest which increases to 4-6/10 when standing. Pain is primarily low back in nature with radiation to the right buttock. He reports that pain is exacerbated by prolonged immobility specifically when awakening from sleep or sitting for long periods of time. At the last visit, he was prescribed Flexeril, but has noted only minimal benefit from it. He has been working with PT and plans on starting to go to the gym. Prior RFA's have provided some relief, he reports his first left-sided procedure produced significant relief whereas the second right-sided procedure only  produced moderate relief.    Interval History 6/16/2022:  Patient presents today for evaluationfollow-up of their low back pain s/p Bilateral  Lumbar RFA . Per pateint his axial LBP is almost 50-60% better after the RFA but he is still suffering from some pain in Left lower back ,mostly stiffness and spasm .no radicular component  Is associated with LBP. No B/B incontinence/no numbness /motor or sensory deficit.     Pain Disability Index Review:  Last 3 PDI Scores 10/10/2022 9/13/2022 8/2/2022   Pain Disability Index (PDI) 24 20 27       Pain Medications:  Gabapentin  Mobic  Flexeril    Opioid Contract: no     report:  Not applicable    Pain Procedures:   6/6/2019- Bilateral L4/5 and L5/S1 facet joint injection  1/27/2022- Bilateral L3,4,5 MBB  2/10/2022- Bilateral L3,4,5 MBB  5/16/2022- Right L3,4,5 RFA  6/2/2022- Left L3,4,5 RFA    Physical Therapy/Home Exercise: yes    Imaging:   MRI Lumbar Spine 10/5/2022:  COMPARISON:  Radiograph 06/16/2022, MRI 01/09/2019.     FINDINGS:  Lumbar spine alignment demonstrates levoscoliosis with straightening of the normal lordosis.  Vertebral body heights are well maintained without evidence for acute fracture.  Schmorl's node extends into the superior endplate of the L3 vertebral body, unchanged when compared to the previous MRI.  Benign osseous hemangioma at the L4 vertebral body.  No marrow signal abnormality to suggest an infiltrative process.     There is advanced degenerative disc space narrowing and desiccation throughout the visualized thoracolumbar spine, progressed when compared to the previous MRI most notably at the L1-L2 level noting severe associated endplate edema.     Distal spinal cord demonstrates normal contour and signal intensity.  Cauda equina appears normal without findings to suggest arachnoiditis.  Conus medullaris terminates at L1.     Right renal cortical cyst.  SI joints are symmetric.  Paraspinal musculature demonstrates normal bulk and signal  intensity.     T12-L1: Circumferential disc bulge encroaches into the bilateral foraminal zones.  Bilateral facet arthropathy and bilateral ligamentum flavum buckling.  Findings contribute to mild spinal canal stenosis and mild right neural foraminal narrowing.     L1-L2: Circumferential disc bulge encroaches into the right neural foramen and likely abuts the right exiting L1 nerve root.  Bilateral facet arthropathy and bilateral ligamentum flavum buckling.  Findings contribute to moderate spinal canal stenosis and severe right and mild left neural foraminal narrowing.     L2-L3: Circumferential disc bulge, bilateral facet arthropathy and bilateral ligamentum flavum buckling.  Findings contribute to mild spinal canal stenosis and mild left neural foraminal narrowing.     L3-L4: Circumferential disc bulge encroaches into the bilateral foraminal zones.  Bilateral facet arthropathy and bilateral ligamentum flavum buckling.  Findings contribute to moderate to severe spinal canal and lateral recess stenosis and mild bilateral neural foraminal narrowing.     L4-L5: Circumferential disc bulge encroaches into the bilateral foraminal zones.  Bilateral facet arthropathy and bilateral ligamentum flavum buckling.  Findings contribute to moderate spinal canal and lateral recess stenosis and moderate to severe left and moderate right neural foraminal narrowing.     L5-S1: Circumferential disc bulge encroaches into the bilateral foraminal zones.  Bilateral facet arthropathy and bilateral ligamentum flavum buckling.  Findings contribute to mild lateral recess stenosis and moderate to severe bilateral neural foraminal narrowing with suspected impingement of the left exiting L5 nerve root.     Impression:     1. Lumbar levoscoliosis with advanced superimposed degenerative changes most pronounced at L1-L2 and from L3-L4 through L5-S1, progressed when compared to MRI dated 01/09/2019.    Xray Lumbar Spine  6/16/2022:  COMPARISON:  Lumbar spine radiograph from 05/24/2019.     FINDINGS:  Alignment: Mild levocurvature of lumbar spine.  Vertebral bodies adequately aligned on sagittal views.  No dynamic instability.     Vertebrae: Persistent concavity along superior endplate of L3, similar to prior exam.  Remaining vertebral body heights are adequately maintained.  No definite spondylolysis on oblique views.  No suspicious appearing lytic or blastic lesions.     Discs and facets: Multilevel moderate to severe disc space narrowing most prominent at L1-L2, L4-L5, and L5-S1 with sclerotic endplate changes.  Vacuum disc phenomenon present at L1-L2 and L5-S1.  Multilevel anterior osteophyte formation.  Advanced facet arthropathy most prominent in the lower lumbar spine.     Miscellaneous: No additional findings.     Impression:     As above.    MRI Lumbar Spine 1/9/2019:  COMPARISON:  Lumbar spine radiograph 10/03/2017     FINDINGS:  Alignment: Mild straightening of normal lumbar lordosis.     Vertebrae: Mild vertebral height loss and degenerative signal change.  No evidence of acute fracture or infiltrative marrow process.     Discs: Intervertebral disc height loss and degenerative signal change, most prominent within the lower lumbar spine.     Cord: Normal.  Conus terminates at L1-L2.     Degenerative findings:     T12-L1: Mild broad-based disc bulge and facet arthropathy without significant spinal canal stenosis or neural foraminal narrowing.     L1-L2: Mild broad-based disc bulge and facet arthropathy without significant spinal canal stenosis or neural foraminal narrowing.     L2-L3: Broad-based disc bulge, ligamentum flavum thickening, and facet arthropathy without significant spinal canal stenosis or neural foraminal narrowing.     L3-L4: Broad-based disc bulge, ligamentum flavum thickening, and facet arthropathy resulting in moderate spinal canal stenosis and mild bilateral neural foraminal narrowing.     L4-L5:  Broad-based disc bulge, ligamentum flavum thickening, and facet arthropathy resulting in moderate spinal canal stenosis, moderate left neural foraminal narrowing, and mild right neural foraminal narrowing.     L5-S1: Broad-based disc bulge, ligamentum flavum thickening, and facet arthropathy resulting in moderate left and mild right neural foraminal narrowing.     Paraspinal muscles & soft tissues: Unremarkable.     IMPRESSION:      Multilevel degenerative change of the lumbar spine most significant at L3-L4 which demonstrates moderate spinal canal stenosis and mild bilateral neural foraminal narrowing.  Moderate left and mild right neural foraminal narrowing seen at L4-L5 and L5-S1.  Additional details above.    Allergies: Review of patient's allergies indicates:  No Known Allergies    Current Medications:   Current Outpatient Medications   Medication Sig Dispense Refill    fluticasone propionate (FLONASE) 50 mcg/actuation nasal spray 1 SPRAY (50 MCG TOTAL) BY EACH NOSTRIL ROUTE 2 (TWO) TIMES A DAY. 48 mL 1    gabapentin (NEURONTIN) 300 MG capsule TAKE 1 CAPSULE BY MOUTH THREE TIMES A DAY 90 capsule 0    meloxicam (MOBIC) 15 MG tablet TAKE 1 TABLET (15 MG TOTAL) BY MOUTH DAILY AS NEEDED FOR PAIN (TAKE WITH FOOD OR A MEAL). 90 tablet 3    rosuvastatin (CRESTOR) 10 MG tablet Take 1 tablet (10 mg total) by mouth once daily. 90 tablet 3    sildenafiL (VIAGRA) 100 MG tablet Take 1 tablet (100 mg total) by mouth daily as needed. 15 tablet 11    tamsulosin (FLOMAX) 0.4 mg Cap Take 1 capsule (0.4 mg total) by mouth once daily. 30 capsule 12     No current facility-administered medications for this visit.     Facility-Administered Medications Ordered in Other Visits   Medication Dose Route Frequency Provider Last Rate Last Admin    0.9%  NaCl infusion   Intravenous Continuous Cole Fitzpatrick DO           REVIEW OF SYSTEMS:    GENERAL:  No weight loss, malaise or fevers.  HEENT:  Negative for frequent or significant  headaches.  NECK:  Negative for lumps, goiter, pain and significant neck swelling.  RESPIRATORY:  Negative for cough, wheezing or shortness of breath.  CARDIOVASCULAR:  Negative for chest pain, leg swelling or palpitations.  GI:  Negative for abdominal discomfort, blood in stools or black stools or change in bowel habits. GERD  MUSCULOSKELETAL:  See HPI.  SKIN:  Negative for lesions, rash, and itching.  PSYCH:  Negative for sleep disturbance, mood disorder and recent psychosocial stressors.  HEMATOLOGY/LYMPHOLOGY:  Negative for prolonged bleeding, bruising easily or swollen nodes.  NEURO:   No history of headaches, syncope, paralysis, seizures or tremors.  All other reviewed and negative other than HPI.    Past Medical History:  Past Medical History:   Diagnosis Date    Allergy     Arthritis     Family history of malignant neoplasm of gastrointestinal tract mat.gf    Family history of prostate cancer: 1/2 brother 9/25/2017    GERD (gastroesophageal reflux disease)     Kidney cyst, acquired: R 1.2 cm 2015 9/25/2017    Restless leg syndrome     Tubulovillous adenoma 2013 due 2016 9/14/2015       Past Surgical History:  Past Surgical History:   Procedure Laterality Date    APPLICATION OF BONE GRAFT TO FINGER Left 11/8/2021    Procedure: APPLICATION INTEGRA GRAFT, TO FINGER;  Surgeon: Alba Penn MD;  Location: [facility];  Service: Orthopedics;  Laterality: Left;    CARPAL TUNNEL RELEASE      COLONOSCOPY N/A 5/22/2019    Procedure: COLONOSCOPY;  Surgeon: Juancarlos Foley MD;  Location: [facility] (4TH FLR);  Service: Endoscopy;  Laterality: N/A;    EXCISION OF GANGLION OF WRIST Right 6/28/2019    Procedure: EXCISION, GANGLION CYST, WRIST right;  Surgeon: Alba Penn MD;  Location: [facility] 1ST FLR;  Service: Orthopedics;  Laterality: Right;  regional MAC, stretcher, supine, hand pan 1 and 2,MINI C-arm, call Arthrex    INJECTION OF ANESTHETIC AGENT AROUND NERVE Bilateral 1/27/2022    Procedure: Block, Nerve  MEDIAL BRANCH BLOCK BILATERAL L3,4,5  DIRECT REFERRAL 1 OF 2;  Surgeon: Kaiser Braxton MD;  Location: [facility] PAIN MGT;  Service: Pain Management;  Laterality: Bilateral;    INJECTION OF ANESTHETIC AGENT AROUND NERVE Bilateral 2/10/2022    Procedure: Block, Nerve MEDIAL BRANCH BLOCK BILATERAL L3.4.5  DIRECT REFERRAL 2 OF 2;  Surgeon: Kaiser Braxton MD;  Location: [facility]IN [facility]GT;  Service: Pain Management;  Laterality: Bilateral;    INJECTION OF FACET JOINT Bilateral 6/6/2019    Procedure: INJECTION, FACET JOINT INJECTION (LUMBAR BLOCK) BILATERAL L4-L5 AND L5-S1 FACET INJECTIONS;  Surgeon: Kaiser Braxton MD;  Location: [facility] PAIN [facility]GT;  Service: Pain Management;  Laterality: Bilateral;  NEEDS CONSENT    INTERPOSITION ARTHROPLASTY OF CARPOMETACARPAL JOINTS Right 6/28/2019    Procedure: INTERPOSITION ARTHROPLASTY, CMC JOINT right;  Surgeon: Alba Penn MD;  Location: [address];  Service: Orthopedics;  Laterality: Right;  regional MAC, stretcher, supine, hand pan 1 and 2,MINI C-arm, call Arthrex    IRRIGATION AND DEBRIDEMENT OF UPPER EXTREMITY Left 11/8/2021    Procedure: IRRIGATION AND DEBRIDEMENT, UPPER EXTREMITY, left index finger;  Surgeon: Alba Penn MD;  Location: [facility];  Service: Orthopedics;  Laterality: Left;    RADIOFREQUENCY ABLATION Right 5/16/2022    Procedure: Radiofrequency Ablation RIGHT L3,4,5;  Surgeon: Kaiser Braxton MD;  Location: [facility] PAIN [facility]GT;  Service: Pain Management;  Laterality: Right;    RADIOFREQUENCY ABLATION Left 6/2/2022    Procedure: Radiofrequency Ablation LEFT L3,4,5;  Surgeon: Kaiser Braxton MD;  Location: [facility] PAIN [facility]GT;  Service: Pain Management;  Laterality: Left;    REPAIR OF NAIL BED Left 11/8/2021    Procedure: REPAIR, NAIL BED, left index;  Surgeon: Alba Penn MD;  Location: [facility] OR;  Service: Orthopedics;  Laterality: Left;    SPERMATOCELECTOMY Right 11/8/2019    Procedure: EXCISION, SPERMATOCELE;  Surgeon: Sheldon Araya Jr., MD;  Location: [address]  "FLR;  Service: Urology;  Laterality: Right;  1hr       Family History:  Family History   Problem Relation Age of Onset    Arthritis Mother         probable R.A.    Cancer Father         esophageal ca and brain tumor    Esophageal cancer Father     Melanoma Father     Cancer Brother         pancreatic cancer    Cancer Maternal Grandfather         colon    Colon cancer Maternal Grandfather     Irritable bowel syndrome Sister     Arthritis Sister     No Known Problems Son     No Known Problems Brother     No Known Problems Son     Cancer Sister     No Known Problems Brother     Diabetes Neg Hx     Heart disease Neg Hx     Cirrhosis Neg Hx     Celiac disease Neg Hx     Crohn's disease Neg Hx     Ulcerative colitis Neg Hx     Stomach cancer Neg Hx     Rectal cancer Neg Hx     Liver cancer Neg Hx     Psoriasis Neg Hx     Lupus Neg Hx        Social History:  Social History     Socioeconomic History    Marital status: Single   Tobacco Use    Smoking status: Never    Smokeless tobacco: Never    Tobacco comments:     The patient works in the [id number] industry.  He is very active at work but does not engage in outside activities.   Substance and Sexual Activity    Alcohol use: Yes     Alcohol/week: 0.0 standard drinks     Comment: 2-3 days weekly, up to 2 beers    Drug use: No    Sexual activity: Yes     Partners: Female     Social Determinants of Health     Transportation Needs: No Transportation Needs    Lack of Transportation (Medical): No    Lack of Transportation (Non-Medical): No   Physical Activity: Sufficiently Active    Days of Exercise per Week: 5 days    Minutes of Exercise per Session: 30 min   Housing Stability: Low Risk     Unable to Pay for Housing in the Last Year: No    Number of Places Lived in the Last Year: 1    Unstable Housing in the Last Year: No       OBJECTIVE:    /72   Pulse 83   Temp 97.8 °F (36.6 °C) (Oral)   Resp 18   Ht 5' 10" (1.778 m)   Wt 69.9 kg (154 lb)   BMI 22.10 kg/m² "     PHYSICAL EXAMINATION:    General appearance: Well appearing, in no acute distress, alert and oriented x3.  Psych:  Mood and affect appropriate.  Skin: Skin color, texture, turgor normal, no rashes or lesions, in both upper and lower body.  Head/face:  Atraumatic, normocephalic.   Cor: RRR  Pulm: Symmetric chest rise, no respiratory distress noted.   Back: Straight leg raising in the sitting position is negative to radicular pain bilaterally. There is pain to palpation over the lumbar facet joints bilaterally. Limited ROM with pain on flexion and extension. Positive facet loading bilaterally.   Extremities: No deformities, edema, or skin discoloration. Good capillary refill.  Musculoskeletal: There is no pain with palpation over bilateral SI joints. FABERs is negative bilaterally. Bilateral lower extremity strength is normal and symmetric.  No atrophy or tone abnormalities are noted.  Neuro: Bilateral lower extremity coordination and muscle stretch reflexes are physiologic and symmetric.  Plantar response are downgoing. No loss of sensation is noted.  Gait: Antalgic- ambulates without assistance.     ASSESSMENT: 66 y.o. year old male with back pain, consistent with the followin. Spinal stenosis of lumbar region without neurogenic claudication  Procedure Order to Pain Management      2. Lumbar radiculopathy  Procedure Order to Pain Management      3. Lumbar spondylosis        4. DDD (degenerative disc disease), lumbar                PLAN:     - Previous imaging was reviewed and discussed with the patient today.    - Schedule for bilateral L5/S1 TF PERCY.     - If limited relief, consider L3/4 versus caudal.     - I have stressed the importance of physical activity and a home exercise plan to help with pain and improve health.    - Patient can continue with medications for now since they are providing benefits, using them appropriately, and without side effects.    - RTC 2 weeks after above procedure.      -  Counseled patient regarding the importance of activity modification and physical therapy.    The above plan and management options were discussed at length with patient. Patient is in agreement with the above and verbalized understanding.    Yumi Naik NP  10/10/2022

## 2022-10-10 NOTE — PROGRESS NOTES
Chronic patient Established Note (Follow up visit)      SUBJECTIVE:    Interval History 10/10/2022:  The patient returns to clinic today for follow up of low back pain. He is here today for imaging review. He continues to report low back pain that radiates into his right buttock and posterior thigh. His pain is worse with moving from sitting to standing, prolonged standing, and activity. He reports intermittent weakness into his legs with moving from sitting to standing. He has completed physical therapy. He continues to perform a home exercise routine. He continues to take Gabapentin, Flexeril, and Mobic. He denies any bowel or bladder incontinence. His pain today is 2/10.      Interval History 9/13/2022:  Sabino Rubio presents to the clinic for a follow-up appointment for low back pain. He reports neuroma injection at last office visit provided short term relief. He continues reports low back pain that radiates into his right buttock. He denies any radicular leg pain. His pain is worse with moving from sitting to standing, prolonged sitting, and activity. He does endorse morning stiffness. He is currently participating in physical therapy with some benefit. He is taking Gabapentin, Flexeril, and Mobic. He denies any other health changes. His pain today is 5/10.    Interval History 8/2/2022:   Patient returns to clinic today reporting 2/10 pain at rest which increases to 4-6/10 when standing. Pain is primarily low back in nature with radiation to the right buttock. He reports that pain is exacerbated by prolonged immobility specifically when awakening from sleep or sitting for long periods of time. At the last visit, he was prescribed Flexeril, but has noted only minimal benefit from it. He has been working with PT and plans on starting to go to the gym. Prior RFA's have provided some relief, he reports his first left-sided procedure produced significant relief whereas the second right-sided procedure only  produced moderate relief.    Interval History 6/16/2022:  Patient presents today for evaluationfollow-up of their low back pain s/p Bilateral  Lumbar RFA . Per pateint his axial LBP is almost 50-60% better after the RFA but he is still suffering from some pain in Left lower back ,mostly stiffness and spasm .no radicular component  Is associated with LBP. No B/B incontinence/no numbness /motor or sensory deficit.     Pain Disability Index Review:  Last 3 PDI Scores 10/10/2022 9/13/2022 8/2/2022   Pain Disability Index (PDI) 24 20 27       Pain Medications:  Gabapentin  Mobic  Flexeril    Opioid Contract: no     report:  Not applicable    Pain Procedures:   6/6/2019- Bilateral L4/5 and L5/S1 facet joint injection  1/27/2022- Bilateral L3,4,5 MBB  2/10/2022- Bilateral L3,4,5 MBB  5/16/2022- Right L3,4,5 RFA  6/2/2022- Left L3,4,5 RFA    Physical Therapy/Home Exercise: yes    Imaging:   MRI Lumbar Spine 10/5/2022:  COMPARISON:  Radiograph 06/16/2022, MRI 01/09/2019.     FINDINGS:  Lumbar spine alignment demonstrates levoscoliosis with straightening of the normal lordosis.  Vertebral body heights are well maintained without evidence for acute fracture.  Schmorl's node extends into the superior endplate of the L3 vertebral body, unchanged when compared to the previous MRI.  Benign osseous hemangioma at the L4 vertebral body.  No marrow signal abnormality to suggest an infiltrative process.     There is advanced degenerative disc space narrowing and desiccation throughout the visualized thoracolumbar spine, progressed when compared to the previous MRI most notably at the L1-L2 level noting severe associated endplate edema.     Distal spinal cord demonstrates normal contour and signal intensity.  Cauda equina appears normal without findings to suggest arachnoiditis.  Conus medullaris terminates at L1.     Right renal cortical cyst.  SI joints are symmetric.  Paraspinal musculature demonstrates normal bulk and signal  intensity.     T12-L1: Circumferential disc bulge encroaches into the bilateral foraminal zones.  Bilateral facet arthropathy and bilateral ligamentum flavum buckling.  Findings contribute to mild spinal canal stenosis and mild right neural foraminal narrowing.     L1-L2: Circumferential disc bulge encroaches into the right neural foramen and likely abuts the right exiting L1 nerve root.  Bilateral facet arthropathy and bilateral ligamentum flavum buckling.  Findings contribute to moderate spinal canal stenosis and severe right and mild left neural foraminal narrowing.     L2-L3: Circumferential disc bulge, bilateral facet arthropathy and bilateral ligamentum flavum buckling.  Findings contribute to mild spinal canal stenosis and mild left neural foraminal narrowing.     L3-L4: Circumferential disc bulge encroaches into the bilateral foraminal zones.  Bilateral facet arthropathy and bilateral ligamentum flavum buckling.  Findings contribute to moderate to severe spinal canal and lateral recess stenosis and mild bilateral neural foraminal narrowing.     L4-L5: Circumferential disc bulge encroaches into the bilateral foraminal zones.  Bilateral facet arthropathy and bilateral ligamentum flavum buckling.  Findings contribute to moderate spinal canal and lateral recess stenosis and moderate to severe left and moderate right neural foraminal narrowing.     L5-S1: Circumferential disc bulge encroaches into the bilateral foraminal zones.  Bilateral facet arthropathy and bilateral ligamentum flavum buckling.  Findings contribute to mild lateral recess stenosis and moderate to severe bilateral neural foraminal narrowing with suspected impingement of the left exiting L5 nerve root.     Impression:     1. Lumbar levoscoliosis with advanced superimposed degenerative changes most pronounced at L1-L2 and from L3-L4 through L5-S1, progressed when compared to MRI dated 01/09/2019.    Xray Lumbar Spine  6/16/2022:  COMPARISON:  Lumbar spine radiograph from 05/24/2019.     FINDINGS:  Alignment: Mild levocurvature of lumbar spine.  Vertebral bodies adequately aligned on sagittal views.  No dynamic instability.     Vertebrae: Persistent concavity along superior endplate of L3, similar to prior exam.  Remaining vertebral body heights are adequately maintained.  No definite spondylolysis on oblique views.  No suspicious appearing lytic or blastic lesions.     Discs and facets: Multilevel moderate to severe disc space narrowing most prominent at L1-L2, L4-L5, and L5-S1 with sclerotic endplate changes.  Vacuum disc phenomenon present at L1-L2 and L5-S1.  Multilevel anterior osteophyte formation.  Advanced facet arthropathy most prominent in the lower lumbar spine.     Miscellaneous: No additional findings.     Impression:     As above.    MRI Lumbar Spine 1/9/2019:  COMPARISON:  Lumbar spine radiograph 10/03/2017     FINDINGS:  Alignment: Mild straightening of normal lumbar lordosis.     Vertebrae: Mild vertebral height loss and degenerative signal change.  No evidence of acute fracture or infiltrative marrow process.     Discs: Intervertebral disc height loss and degenerative signal change, most prominent within the lower lumbar spine.     Cord: Normal.  Conus terminates at L1-L2.     Degenerative findings:     T12-L1: Mild broad-based disc bulge and facet arthropathy without significant spinal canal stenosis or neural foraminal narrowing.     L1-L2: Mild broad-based disc bulge and facet arthropathy without significant spinal canal stenosis or neural foraminal narrowing.     L2-L3: Broad-based disc bulge, ligamentum flavum thickening, and facet arthropathy without significant spinal canal stenosis or neural foraminal narrowing.     L3-L4: Broad-based disc bulge, ligamentum flavum thickening, and facet arthropathy resulting in moderate spinal canal stenosis and mild bilateral neural foraminal narrowing.     L4-L5:  Broad-based disc bulge, ligamentum flavum thickening, and facet arthropathy resulting in moderate spinal canal stenosis, moderate left neural foraminal narrowing, and mild right neural foraminal narrowing.     L5-S1: Broad-based disc bulge, ligamentum flavum thickening, and facet arthropathy resulting in moderate left and mild right neural foraminal narrowing.     Paraspinal muscles & soft tissues: Unremarkable.     IMPRESSION:      Multilevel degenerative change of the lumbar spine most significant at L3-L4 which demonstrates moderate spinal canal stenosis and mild bilateral neural foraminal narrowing.  Moderate left and mild right neural foraminal narrowing seen at L4-L5 and L5-S1.  Additional details above.    Allergies: Review of patient's allergies indicates:  No Known Allergies    Current Medications:   Current Outpatient Medications   Medication Sig Dispense Refill    fluticasone propionate (FLONASE) 50 mcg/actuation nasal spray 1 SPRAY (50 MCG TOTAL) BY EACH NOSTRIL ROUTE 2 (TWO) TIMES A DAY. 48 mL 1    gabapentin (NEURONTIN) 300 MG capsule TAKE 1 CAPSULE BY MOUTH THREE TIMES A DAY 90 capsule 0    meloxicam (MOBIC) 15 MG tablet TAKE 1 TABLET (15 MG TOTAL) BY MOUTH DAILY AS NEEDED FOR PAIN (TAKE WITH FOOD OR A MEAL). 90 tablet 3    rosuvastatin (CRESTOR) 10 MG tablet Take 1 tablet (10 mg total) by mouth once daily. 90 tablet 3    sildenafiL (VIAGRA) 100 MG tablet Take 1 tablet (100 mg total) by mouth daily as needed. 15 tablet 11    tamsulosin (FLOMAX) 0.4 mg Cap Take 1 capsule (0.4 mg total) by mouth once daily. 30 capsule 12     No current facility-administered medications for this visit.     Facility-Administered Medications Ordered in Other Visits   Medication Dose Route Frequency Provider Last Rate Last Admin    0.9%  NaCl infusion   Intravenous Continuous Cole Fitzpatrick DO           REVIEW OF SYSTEMS:    GENERAL:  No weight loss, malaise or fevers.  HEENT:  Negative for frequent or significant  headaches.  NECK:  Negative for lumps, goiter, pain and significant neck swelling.  RESPIRATORY:  Negative for cough, wheezing or shortness of breath.  CARDIOVASCULAR:  Negative for chest pain, leg swelling or palpitations.  GI:  Negative for abdominal discomfort, blood in stools or black stools or change in bowel habits. GERD  MUSCULOSKELETAL:  See HPI.  SKIN:  Negative for lesions, rash, and itching.  PSYCH:  Negative for sleep disturbance, mood disorder and recent psychosocial stressors.  HEMATOLOGY/LYMPHOLOGY:  Negative for prolonged bleeding, bruising easily or swollen nodes.  NEURO:   No history of headaches, syncope, paralysis, seizures or tremors.  All other reviewed and negative other than HPI.    Past Medical History:  Past Medical History:   Diagnosis Date    Allergy     Arthritis     Family history of malignant neoplasm of gastrointestinal tract mat.gf    Family history of prostate cancer: 1/2 brother 9/25/2017    GERD (gastroesophageal reflux disease)     Kidney cyst, acquired: R 1.2 cm 2015 9/25/2017    Restless leg syndrome     Tubulovillous adenoma 2013 due 2016 9/14/2015       Past Surgical History:  Past Surgical History:   Procedure Laterality Date    APPLICATION OF BONE GRAFT TO FINGER Left 11/8/2021    Procedure: APPLICATION INTEGRA GRAFT, TO FINGER;  Surgeon: Alba Penn MD;  Location: [facility];  Service: Orthopedics;  Laterality: Left;    CARPAL TUNNEL RELEASE      COLONOSCOPY N/A 5/22/2019    Procedure: COLONOSCOPY;  Surgeon: Juancarlos Foley MD;  Location: [facility] (4TH FLR);  Service: Endoscopy;  Laterality: N/A;    EXCISION OF GANGLION OF WRIST Right 6/28/2019    Procedure: EXCISION, GANGLION CYST, WRIST right;  Surgeon: Alba Penn MD;  Location: [facility] 1ST FLR;  Service: Orthopedics;  Laterality: Right;  regional MAC, stretcher, supine, hand pan 1 and 2,MINI C-arm, call Arthrex    INJECTION OF ANESTHETIC AGENT AROUND NERVE Bilateral 1/27/2022    Procedure: Block, Nerve  MEDIAL BRANCH BLOCK BILATERAL L3,4,5  DIRECT REFERRAL 1 OF 2;  Surgeon: Kaiser Braxton MD;  Location: [facility];  Service: Pain Management;  Laterality: Bilateral;    INJECTION OF ANESTHETIC AGENT AROUND NERVE Bilateral 2/10/2022    Procedure: Block, Nerve MEDIAL BRANCH BLOCK BILATERAL L3.4.5  DIRECT REFERRAL 2 OF 2;  Surgeon: Kaiser Braxton MD;  Location: [facility];  Service: Pain Management;  Laterality: Bilateral;    INJECTION OF FACET JOINT Bilateral 6/6/2019    Procedure: INJECTION, FACET JOINT INJECTION (LUMBAR BLOCK) BILATERAL L4-L5 AND L5-S1 FACET INJECTIONS;  Surgeon: Kaiser Braxton MD;  Location: [facility];  Service: Pain Management;  Laterality: Bilateral;  NEEDS CONSENT    INTERPOSITION ARTHROPLASTY OF CARPOMETACARPAL JOINTS Right 6/28/2019    Procedure: INTERPOSITION ARTHROPLASTY, CMC JOINT right;  Surgeon: Alba Penn MD;  Location: [address];  Service: Orthopedics;  Laterality: Right;  regional MAC, stretcher, supine, hand pan 1 and 2,MINI C-arm, call Arthrex    IRRIGATION AND DEBRIDEMENT OF UPPER EXTREMITY Left 11/8/2021    Procedure: IRRIGATION AND DEBRIDEMENT, UPPER EXTREMITY, left index finger;  Surgeon: Alba Penn MD;  Location: [facility] OR;  Service: Orthopedics;  Laterality: Left;    RADIOFREQUENCY ABLATION Right 5/16/2022    Procedure: Radiofrequency Ablation RIGHT L3,4,5;  Surgeon: Kaiser Braxton MD;  Location: [facility];  Service: Pain Management;  Laterality: Right;    RADIOFREQUENCY ABLATION Left 6/2/2022    Procedure: Radiofrequency Ablation LEFT L3,4,5;  Surgeon: Kaiser Braxton MD;  Location: [facility];  Service: Pain Management;  Laterality: Left;    REPAIR OF NAIL BED Left 11/8/2021    Procedure: REPAIR, NAIL BED, left index;  Surgeon: Alba Penn MD;  Location: [facility] OR;  Service: Orthopedics;  Laterality: Left;    SPERMATOCELECTOMY Right 11/8/2019    Procedure: EXCISION, SPERMATOCELE;  Surgeon: Sheldon Araya Jr., MD;  Location: [address]  "FLR;  Service: Urology;  Laterality: Right;  1hr       Family History:  Family History   Problem Relation Age of Onset    Arthritis Mother         probable R.A.    Cancer Father         esophageal ca and brain tumor    Esophageal cancer Father     Melanoma Father     Cancer Brother         pancreatic cancer    Cancer Maternal Grandfather         colon    Colon cancer Maternal Grandfather     Irritable bowel syndrome Sister     Arthritis Sister     No Known Problems Son     No Known Problems Brother     No Known Problems Son     Cancer Sister     No Known Problems Brother     Diabetes Neg Hx     Heart disease Neg Hx     Cirrhosis Neg Hx     Celiac disease Neg Hx     Crohn's disease Neg Hx     Ulcerative colitis Neg Hx     Stomach cancer Neg Hx     Rectal cancer Neg Hx     Liver cancer Neg Hx     Psoriasis Neg Hx     Lupus Neg Hx        Social History:  Social History     Socioeconomic History    Marital status: Single   Tobacco Use    Smoking status: Never    Smokeless tobacco: Never    Tobacco comments:     The patient works in the "[employer] industry.  He is very active at work but does not engage in outside activities.   Substance and Sexual Activity    Alcohol use: Yes     Alcohol/week: 0.0 standard drinks     Comment: 2-3 days weekly, up to 2 beers    Drug use: No    Sexual activity: Yes     Partners: Female     Social Determinants of Health     Transportation Needs: No Transportation Needs    Lack of Transportation (Medical): No    Lack of Transportation (Non-Medical): No   Physical Activity: Sufficiently Active    Days of Exercise per Week: 5 days    Minutes of Exercise per Session: 30 min   Housing Stability: Low Risk     Unable to Pay for Housing in the Last Year: No    Number of Places Lived in the Last Year: 1    Unstable Housing in the Last Year: No       OBJECTIVE:    /72   Pulse 83   Temp 97.8 °F (36.6 °C) (Oral)   Resp 18   Ht 5' 10" (1.778 m)   Wt 69.9 kg (154 lb)   BMI 22.10 kg/m² "     PHYSICAL EXAMINATION:    General appearance: Well appearing, in no acute distress, alert and oriented x3.  Psych:  Mood and affect appropriate.  Skin: Skin color, texture, turgor normal, no rashes or lesions, in both upper and lower body.  Head/face:  Atraumatic, normocephalic.   Cor: RRR  Pulm: Symmetric chest rise, no respiratory distress noted.   Back: Straight leg raising in the sitting position is negative to radicular pain bilaterally. There is pain to palpation over the lumbar facet joints bilaterally. Limited ROM with pain on flexion and extension. Positive facet loading bilaterally.   Extremities: No deformities, edema, or skin discoloration. Good capillary refill.  Musculoskeletal: There is no pain with palpation over bilateral SI joints. FABERs is negative bilaterally. Bilateral lower extremity strength is normal and symmetric.  No atrophy or tone abnormalities are noted.  Neuro: Bilateral lower extremity coordination and muscle stretch reflexes are physiologic and symmetric.  Plantar response are downgoing. No loss of sensation is noted.  Gait: Antalgic- ambulates without assistance.     ASSESSMENT: 66 y.o. year old male with back pain, consistent with the followin. Spinal stenosis of lumbar region without neurogenic claudication  Procedure Order to Pain Management      2. Lumbar radiculopathy  Procedure Order to Pain Management      3. Lumbar spondylosis        4. DDD (degenerative disc disease), lumbar                PLAN:     - Previous imaging was reviewed and discussed with the patient today.    - Schedule for bilateral L5/S1 TF PERCY.     - If limited relief, consider L3/4 versus caudal.     - I have stressed the importance of physical activity and a home exercise plan to help with pain and improve health.    - Patient can continue with medications for now since they are providing benefits, using them appropriately, and without side effects.    - RTC 2 weeks after above procedure.      -  Counseled patient regarding the importance of activity modification and physical therapy.    The above plan and management options were discussed at length with patient. Patient is in agreement with the above and verbalized understanding.    Yumi Naik NP  10/10/2022

## 2022-10-11 RX ORDER — SODIUM CHLORIDE 0.9 % (FLUSH) 0.9 %
10 SYRINGE (ML) INJECTION
Status: CANCELLED | OUTPATIENT
Start: 2022-10-11

## 2022-10-12 ENCOUNTER — ANESTHESIA EVENT (OUTPATIENT)
Dept: ENDOSCOPY | Facility: HOSPITAL | Age: 67
End: 2022-10-12
Payer: MEDICARE

## 2022-10-12 ENCOUNTER — HOSPITAL ENCOUNTER (OUTPATIENT)
Facility: HOSPITAL | Age: 67
Discharge: HOME | End: 2022-10-12
Attending: INTERNAL MEDICINE | Admitting: INTERNAL MEDICINE
Payer: MEDICARE

## 2022-10-12 ENCOUNTER — ANESTHESIA (OUTPATIENT)
Dept: ENDOSCOPY | Facility: HOSPITAL | Age: 67
End: 2022-10-12
Payer: MEDICARE

## 2022-10-12 VITALS
HEART RATE: 81 BPM | SYSTOLIC BLOOD PRESSURE: 128 MMHG | TEMPERATURE: 98 F | DIASTOLIC BLOOD PRESSURE: 97 MMHG | OXYGEN SATURATION: 95 % | HEIGHT: 70 IN | BODY MASS INDEX: 21.47 KG/M2 | RESPIRATION RATE: 16 BRPM | WEIGHT: 150 LBS

## 2022-10-12 DIAGNOSIS — Z12.11 ENCOUNTER FOR COLONOSCOPY DUE TO HISTORY OF ADENOMATOUS COLONIC POLYPS: ICD-10-CM

## 2022-10-12 DIAGNOSIS — Z86.010 ENCOUNTER FOR COLONOSCOPY DUE TO HISTORY OF ADENOMATOUS COLONIC POLYPS: ICD-10-CM

## 2022-10-12 PROCEDURE — 45385 COLONOSCOPY W/LESION REMOVAL: CPT | Mod: PT | Performed by: INTERNAL MEDICINE

## 2022-10-12 PROCEDURE — 25000003 PHARM REV CODE 250: Performed by: INTERNAL MEDICINE

## 2022-10-12 PROCEDURE — 45385 COLONOSCOPY W/LESION REMOVAL: CPT | Mod: PT,,, | Performed by: INTERNAL MEDICINE

## 2022-10-12 PROCEDURE — 27201089 HC SNARE, DISP (ANY): Performed by: INTERNAL MEDICINE

## 2022-10-12 PROCEDURE — 37000009 HC ANESTHESIA EA ADD 15 MINS: Performed by: INTERNAL MEDICINE

## 2022-10-12 PROCEDURE — E9220 PRA ENDO ANESTHESIA: ICD-10-PCS | Mod: PT,,, | Performed by: NURSE ANESTHETIST, CERTIFIED REGISTERED

## 2022-10-12 PROCEDURE — 37000008 HC ANESTHESIA 1ST 15 MINUTES: Performed by: INTERNAL MEDICINE

## 2022-10-12 PROCEDURE — E9220 PRA ENDO ANESTHESIA: HCPCS | Mod: PT,,, | Performed by: NURSE ANESTHETIST, CERTIFIED REGISTERED

## 2022-10-12 PROCEDURE — 25000003 PHARM REV CODE 250: Performed by: NURSE ANESTHETIST, CERTIFIED REGISTERED

## 2022-10-12 PROCEDURE — 45385 PR COLONOSCOPY,REMV LESN,SNARE: ICD-10-PCS | Mod: PT,,, | Performed by: INTERNAL MEDICINE

## 2022-10-12 PROCEDURE — 88305 TISSUE EXAM BY PATHOLOGIST: CPT | Performed by: PATHOLOGY

## 2022-10-12 PROCEDURE — 88305 TISSUE EXAM BY PATHOLOGIST: ICD-10-PCS | Mod: 26,,, | Performed by: PATHOLOGY

## 2022-10-12 PROCEDURE — 63600175 PHARM REV CODE 636 W HCPCS: Performed by: NURSE ANESTHETIST, CERTIFIED REGISTERED

## 2022-10-12 PROCEDURE — 88305 TISSUE EXAM BY PATHOLOGIST: CPT | Mod: 26,,, | Performed by: PATHOLOGY

## 2022-10-12 RX ORDER — PROPOFOL 10 MG/ML
VIAL (ML) INTRAVENOUS CONTINUOUS PRN
Status: DISCONTINUED | OUTPATIENT
Start: 2022-10-12 | End: 2022-10-12

## 2022-10-12 RX ORDER — PROPOFOL 10 MG/ML
VIAL (ML) INTRAVENOUS
Status: DISCONTINUED | OUTPATIENT
Start: 2022-10-12 | End: 2022-10-12

## 2022-10-12 RX ORDER — SODIUM CHLORIDE 9 MG/ML
INJECTION, SOLUTION INTRAVENOUS CONTINUOUS
Status: DISCONTINUED | OUTPATIENT
Start: 2022-10-12 | End: 2022-10-12 | Stop reason: HOSPADM

## 2022-10-12 RX ORDER — LIDOCAINE HYDROCHLORIDE 20 MG/ML
INJECTION INTRAVENOUS
Status: DISCONTINUED | OUTPATIENT
Start: 2022-10-12 | End: 2022-10-12

## 2022-10-12 RX ADMIN — PROPOFOL 25 MG: 10 INJECTION, EMULSION INTRAVENOUS at 08:10

## 2022-10-12 RX ADMIN — PROPOFOL 100 MG: 10 INJECTION, EMULSION INTRAVENOUS at 08:10

## 2022-10-12 RX ADMIN — SODIUM CHLORIDE: 9 INJECTION, SOLUTION INTRAVENOUS at 08:10

## 2022-10-12 RX ADMIN — Medication 150 MCG/KG/MIN: at 08:10

## 2022-10-12 RX ADMIN — SODIUM CHLORIDE: 0.9 INJECTION, SOLUTION INTRAVENOUS at 08:10

## 2022-10-12 RX ADMIN — LIDOCAINE HYDROCHLORIDE 50 MG: 20 INJECTION INTRAVENOUS at 08:10

## 2022-10-12 NOTE — TRANSFER OF CARE
"Anesthesia Transfer of Care Note    Patient: Sabino Rubio    Procedure(s) Performed: Procedure(s) (LRB):  COLONOSCOPY (N/A)    Patient location: GI    Anesthesia Type: general    Transport from OR: Transported from OR on room air with adequate spontaneous ventilation    Post pain: adequate analgesia    Post assessment: no apparent anesthetic complications and tolerated procedure well    Post vital signs: stable    Level of consciousness: responds to stimulation    Nausea/Vomiting: no vomiting    Complications: none    Transfer of care protocol was followed      Last vitals:   Visit Vitals  BP (!) 128/97   Pulse 81   Temp 36.5 °C (97.7 °F) (Oral)   Resp 16   Ht 5' 10" (1.778 m)   Wt 68 kg (150 lb)   SpO2 95%   BMI 21.52 kg/m²     "

## 2022-10-12 NOTE — ANESTHESIA PREPROCEDURE EVALUATION
10/12/2022  Sabino Rubio is a 66 y.o., male.    Past Medical History:   Diagnosis Date    Allergy     Arthritis     Family history of malignant neoplasm of gastrointestinal tract mat.gf    Family history of prostate cancer: 1/2 brother 9/25/2017    GERD (gastroesophageal reflux disease)     Kidney cyst, acquired: R 1.2 cm 2015 9/25/2017    Restless leg syndrome     Tubulovillous adenoma 2013 due 2016 9/14/2015     Past Surgical History:   Procedure Laterality Date    APPLICATION OF BONE GRAFT TO FINGER Left 11/8/2021    Procedure: APPLICATION INTEGRA GRAFT, TO FINGER;  Surgeon: Alba Penn MD;  Location: [facility] OR;  Service: Orthopedics;  Laterality: Left;    CARPAL TUNNEL RELEASE      COLONOSCOPY N/A 5/22/2019    Procedure: COLONOSCOPY;  Surgeon: Juancarlos Foley MD;  Location: [facility] (4TH FLR);  Service: Endoscopy;  Laterality: N/A;    EXCISION OF GANGLION OF WRIST Right 6/28/2019    Procedure: EXCISION, GANGLION CYST, WRIST right;  Surgeon: Alba Penn MD;  Location: [facility] 1ST FLR;  Service: Orthopedics;  Laterality: Right;  regional MAC, stretcher, supine, hand pan 1 and 2,MINI C-arm, call Arthrex    INJECTION OF ANESTHETIC AGENT AROUND NERVE Bilateral 1/27/2022    Procedure: Block, Nerve MEDIAL BRANCH BLOCK BILATERAL L3,4,5  DIRECT REFERRAL 1 OF 2;  Surgeon: Kaiser Braxton MD;  Location: [facility];  Service: Pain Management;  Laterality: Bilateral;    INJECTION OF ANESTHETIC AGENT AROUND NERVE Bilateral 2/10/2022    Procedure: Block, Nerve MEDIAL BRANCH BLOCK BILATERAL L3.4.5  DIRECT REFERRAL 2 OF 2;  Surgeon: Kaiser Braxton MD;  Location: [facility];  Service: Pain Management;  Laterality: Bilateral;    INJECTION OF FACET JOINT Bilateral 6/6/2019    Procedure: INJECTION, FACET JOINT INJECTION (LUMBAR BLOCK) BILATERAL L4-L5 AND L5-S1 FACET INJECTIONS;  Surgeon:  Kaiser Braxton MD;  Location: [facility];  Service: Pain Management;  Laterality: Bilateral;  NEEDS CONSENT    INTERPOSITION ARTHROPLASTY OF CARPOMETACARPAL JOINTS Right 6/28/2019    Procedure: INTERPOSITION ARTHROPLASTY, CMC JOINT right;  Surgeon: Alba Penn MD;  Location: [facility] OR 1ST FLR;  Service: Orthopedics;  Laterality: Right;  regional MAC, stretcher, supine, hand pan 1 and 2,MINI C-arm, call Arthrex    IRRIGATION AND DEBRIDEMENT OF UPPER EXTREMITY Left 11/8/2021    Procedure: IRRIGATION AND DEBRIDEMENT, UPPER EXTREMITY, left index finger;  Surgeon: Alba Penn MD;  Location: [facility] OR;  Service: Orthopedics;  Laterality: Left;    RADIOFREQUENCY ABLATION Right 5/16/2022    Procedure: Radiofrequency Ablation RIGHT L3,4,5;  Surgeon: Kaiser Braxton MD;  Location: [facility];  Service: Pain Management;  Laterality: Right;    RADIOFREQUENCY ABLATION Left 6/2/2022    Procedure: Radiofrequency Ablation LEFT L3,4,5;  Surgeon: Kaiser Braxton MD;  Location: [facility];  Service: Pain Management;  Laterality: Left;    REPAIR OF NAIL BED Left 11/8/2021    Procedure: REPAIR, NAIL BED, left index;  Surgeon: Alba Penn MD;  Location: [facility] OR;  Service: Orthopedics;  Laterality: Left;    SPERMATOCELECTOMY Right 11/8/2019    Procedure: EXCISION, SPERMATOCELE;  Surgeon: Sheldon Araya Jr., MD;  [facility];  Service: Urology;  Laterality: Right;  1hr       Pre-op Assessment    I have reviewed the Patient Summary Reports.     I have reviewed the Nursing Notes. I have reviewed the NPO Status.   I have reviewed the Medications.     Review of Systems  Anesthesia Hx:  No problems with previous Anesthesia  Denies Family Hx of Anesthesia complications.   Denies Personal Hx of Anesthesia complications.   Hematology/Oncology:  Hematology Normal        Cardiovascular:  Cardiovascular Normal CAD       Renal/:   Chronic Renal Disease    Hepatic/GI:   Bowel Prep. GERD     Musculoskeletal:   Arthritis     Neurological:   Neuromuscular Disease,    Dermatological:  Skin Normal        Physical Exam  General: Well nourished    Airway:  Mallampati: II   Mouth Opening: Normal  TM Distance: Normal  Tongue: Normal  Neck ROM: Normal ROM    Dental:  Intact        Anesthesia Plan  Type of Anesthesia, risks & benefits discussed:    Anesthesia Type: Gen Natural Airway  Intra-op Monitoring Plan: Standard ASA Monitors  Informed Consent: Informed consent signed with the Patient and all parties understand the risks and agree with anesthesia plan.  All questions answered.   ASA Score: 2  Day of Surgery Review of History & Physical: H&P Update referred to the surgeon/provider.I have interviewed and examined the patient. I have reviewed the patient's H&P dated: There are no significant changes.     Ready For Surgery From Anesthesia Perspective.     .

## 2022-10-12 NOTE — ANESTHESIA POSTPROCEDURE EVALUATION
Anesthesia Post Evaluation    Patient: Sabino Rubio    Procedure(s) Performed: Procedure(s) (LRB):  COLONOSCOPY (N/A)    Final Anesthesia Type: general      Patient location during evaluation: PACU  Patient participation: Yes- Able to Participate  Level of consciousness: awake  Post-procedure vital signs: reviewed and stable  Pain management: adequate  Airway patency: patent    PONV status at discharge: No PONV  Anesthetic complications: no      Cardiovascular status: blood pressure returned to baseline  Respiratory status: unassisted  Hydration status: euvolemic  Follow-up not needed.          Vitals Value Taken Time   /97 10/12/22 0921   Temp 36.5 °C (97.7 °F) 10/12/22 0900   Pulse 81 10/12/22 0921   Resp 16 10/12/22 0921   SpO2 95 % 10/12/22 0921         Event Time   Out of Recovery 09:30:00         Pain/Pete Score: Pete Score: 10 (10/12/2022  9:10 AM)

## 2022-10-12 NOTE — PROVATION PATIENT INSTRUCTIONS
Discharge Summary/Instructions after an Endoscopic Procedure  Patient Name: Sabino Rubio  Patient MRN: [id number]  Patient YOB: 1955 Wednesday, October 12, 2022  Cherelle Wang MD  Dear patient,  As a result of recent federal legislation (The Federal Cures Act), you may   receive lab or pathology results from your procedure in your [facility]   account before your physician is able to contact you. Your physician or   their representative will relay the results to you with their   recommendations at their soonest availability.  Thank you,  RESTRICTIONS:  During your procedure today, you received medications for sedation.  These   medications may affect your judgment, balance and coordination.  Therefore,   for 24 hours, you have the following restrictions:   - DO NOT drive a car, operate machinery, make legal/financial decisions,   sign important papers or drink alcohol.    ACTIVITY:  Today: no heavy lifting, straining or running due to procedural   sedation/anesthesia.  The following day: return to full activity including work.  DIET:  Eat and drink normally unless instructed otherwise.     TREATMENT FOR COMMON SIDE EFFECTS:  - Mild abdominal pain, nausea, belching, bloating or excessive gas:  rest,   eat lightly and use a heating pad.  - Sore Throat: treat with throat lozenges and/or gargle with warm salt   water.  - Because air was used during the procedure, expelling large amounts of air   from your rectum or belching is normal.  - If a bowel prep was taken, you may not have a bowel movement for 1-3 days.    This is normal.  SYMPTOMS TO WATCH FOR AND REPORT TO YOUR PHYSICIAN:  1. Abdominal pain or bloating, other than gas cramps.  2. Chest pain.  3. Back pain.  4. Signs of infection such as: chills or fever occurring within 24 hours   after the procedure.  5. Rectal bleeding, which would show as bright red, maroon, or black stools.   (A tablespoon of blood from the rectum is not serious, especially if    hemorrhoids are present.)  6. Vomiting.  7. Weakness or dizziness.  GO DIRECTLY TO THE NEAREST EMERGENCY ROOM IF YOU HAVE ANY OF THE FOLLOWING:      Difficulty breathing              Chills and/or fever over 101 F   Persistent vomiting and/or vomiting blood   Severe abdominal pain   Severe chest pain   Black, tarry stools   Bleeding- more than one tablespoon   Any other symptom or condition that you feel may need urgent attention  Your doctor recommends these additional instructions:  If any biopsies were taken, your doctors clinic will contact you in 1 to 2   weeks with any results.  - Patient has a contact number available for emergencies.  The signs and   symptoms of potential delayed complications were discussed with the   patient.  Return to normal activities tomorrow.  Written discharge   instructions were provided to the patient.   - Discharge patient to home (ambulatory).   - Resume previous diet.   - Continue present medications.   - Await pathology results.   - Repeat colonoscopy in 3 years for surveillance based on pathology results.     - Return to referring physician.  For questions, problems or results please call your physician - Cherelle Wang MD at Work:  [phone].  [facility], EMERGENCY ROOM PHONE NUMBER: [phone]  IF A COMPLICATION OR EMERGENCY SITUATION ARISES AND YOU ARE UNABLE TO REACH   YOUR PHYSICIAN - GO DIRECTLY TO THE EMERGENCY ROOM.  Cherelle Wang MD  10/12/2022 8:55:28 AM  This report has been verified and signed electronically.  Dear patient,  As a result of recent federal legislation (The Federal Cures Act), you may   receive lab or pathology results from your procedure in your [facility]   account before your physician is able to contact you. Your physician or   their representative will relay the results to you with their   recommendations at their soonest availability.  Thank you,  [employer]OVATION

## 2022-10-12 NOTE — H&P
Short Stay Endoscopy History and Physical    PCP - Alba Baum MD  Referring Physician - Mechelle Brand NP  [address] [place] [address]    Procedure - surveillance colonoscopy (prior polyps)  ASA - per anesthesia  Mallampati - per anesthesia  History of Anesthesia problems - per anesthesia  Family history Anesthesia problems -  per anesthesia   Plan of anesthesia - per anesthesia    HPI:  This is a 66 y.o. male here for evaluation of: surveillance colonoscopy with h/o colon polyps    Reflux - no  Dysphagia - no  Abdominal pain - no  Diarrhea - no    ROS:  Constitutional: No fevers, chills, No weight loss  CV: No chest pain  Pulm: No cough, No shortness of breath  Ophtho: No vision changes  GI: see HPI  Derm: No rash    Medical History:  has a past medical history of Allergy, Arthritis, Family history of malignant neoplasm of gastrointestinal tract (mat.gf), Family history of prostate cancer: 1/2 brother (9/25/2017), GERD (gastroesophageal reflux disease), Kidney cyst, acquired: R 1.2 cm 2015 (9/25/2017), Restless leg syndrome, and Tubulovillous adenoma 2013 due 2016 (9/14/2015).    Surgical History:  has a past surgical history that includes Carpal tunnel release; Colonoscopy (N/A, 5/22/2019); Injection of facet joint (Bilateral, 6/6/2019); Interposition arthroplasty of carpometacarpal joints (Right, 6/28/2019); Excision of ganglion of wrist (Right, 6/28/2019); Spermatocelectomy (Right, 11/8/2019); Repair of nail bed (Left, 11/8/2021); Irrigation and debridement of upper extremity (Left, 11/8/2021); Application of bone graft to finger (Left, 11/8/2021); Injection of anesthetic agent around nerve (Bilateral, 1/27/2022); Injection of anesthetic agent around nerve (Bilateral, 2/10/2022); Radiofrequency ablation (Right, 5/16/2022); and Radiofrequency ablation (Left, 6/2/2022).    Family History: family history includes Arthritis in his mother and sister; Cancer in his brother, father, maternal  grandfather, and sister; Colon cancer in his maternal grandfather; Esophageal cancer in his father; Irritable bowel syndrome in his sister; Melanoma in his father; No Known Problems in his brother, brother, son, and son..    Social History:  reports that he has never smoked. He has never used smokeless tobacco. He reports current alcohol use. He reports that he does not use drugs.    Review of patient's allergies indicates:  No Known Allergies    Medications:   Medications Prior to Admission   Medication Sig Dispense Refill Last Dose    fluticasone propionate (FLONASE) 50 mcg/actuation nasal spray 1 SPRAY (50 MCG TOTAL) BY EACH NOSTRIL ROUTE 2 (TWO) TIMES A DAY. 48 mL 1     gabapentin (NEURONTIN) 300 MG capsule TAKE 1 CAPSULE BY MOUTH THREE TIMES A DAY 90 capsule 0     meloxicam (MOBIC) 15 MG tablet TAKE 1 TABLET (15 MG TOTAL) BY MOUTH DAILY AS NEEDED FOR PAIN (TAKE WITH FOOD OR A MEAL). 90 tablet 3     rosuvastatin (CRESTOR) 10 MG tablet Take 1 tablet (10 mg total) by mouth once daily. 90 tablet 3     sildenafiL (VIAGRA) 100 MG tablet Take 1 tablet (100 mg total) by mouth daily as needed. 15 tablet 11     tamsulosin (FLOMAX) 0.4 mg Cap Take 1 capsule (0.4 mg total) by mouth once daily. 30 capsule 12        Physical Exam:    Vital Signs: There were no vitals filed for this visit.    General Appearance: Well appearing in no acute distress    Labs:  Lab Results   Component Value Date    WBC 6.31 01/04/2022    HGB 15.2 01/04/2022    HCT 47.2 01/04/2022     01/04/2022    CHOL 168 06/01/2022    TRIG 75 06/01/2022    HDL 81 (H) 06/01/2022    ALT 21 01/04/2022    AST 25 06/01/2022     (L) 01/04/2022    K 4.7 01/04/2022     01/04/2022    CREATININE 0.9 01/04/2022    BUN 16 01/04/2022    CO2 27 01/04/2022    TSH 1.603 01/04/2022    PSA 0.77 01/04/2022    HGBA1C 5.5 01/04/2022       I have explained the risks and benefits of this endoscopic procedure to the patient including but not limited to bleeding,  inflammation, infection, perforation, missing a lesion and death.      Cherelle Wang MD

## 2022-10-14 LAB
FINAL PATHOLOGIC DIAGNOSIS: NORMAL
GROSS: NORMAL
Lab: NORMAL

## 2022-10-26 ENCOUNTER — PATIENT MESSAGE (OUTPATIENT)
Dept: PAIN MEDICINE | Facility: OTHER | Age: 67
End: 2022-10-26
Payer: MEDICARE

## 2022-10-26 PROBLEM — M51.379 DDD (DEGENERATIVE DISC DISEASE), LUMBOSACRAL: Status: ACTIVE | Noted: 2022-10-26

## 2022-10-26 PROBLEM — M51.37 DDD (DEGENERATIVE DISC DISEASE), LUMBOSACRAL: Status: ACTIVE | Noted: 2022-10-26

## 2022-10-26 PROBLEM — M54.17 LUMBOSACRAL RADICULOPATHY: Status: ACTIVE | Noted: 2022-10-26

## 2022-10-27 ENCOUNTER — HOSPITAL ENCOUNTER (OUTPATIENT)
Facility: OTHER | Age: 67
Discharge: HOME | End: 2022-10-27
Attending: ANESTHESIOLOGY | Admitting: ANESTHESIOLOGY
Payer: MEDICARE

## 2022-10-27 VITALS
DIASTOLIC BLOOD PRESSURE: 89 MMHG | WEIGHT: 150 LBS | OXYGEN SATURATION: 99 % | TEMPERATURE: 98 F | RESPIRATION RATE: 16 BRPM | HEIGHT: 70 IN | SYSTOLIC BLOOD PRESSURE: 149 MMHG | BODY MASS INDEX: 21.47 KG/M2 | HEART RATE: 84 BPM

## 2022-10-27 DIAGNOSIS — M51.37 DDD (DEGENERATIVE DISC DISEASE), LUMBOSACRAL: Primary | ICD-10-CM

## 2022-10-27 DIAGNOSIS — M54.17 LUMBOSACRAL RADICULOPATHY: ICD-10-CM

## 2022-10-27 DIAGNOSIS — G89.29 CHRONIC PAIN: ICD-10-CM

## 2022-10-27 PROCEDURE — 25500020 PHARM REV CODE 255: Performed by: ANESTHESIOLOGY

## 2022-10-27 PROCEDURE — 25000003 PHARM REV CODE 250: Performed by: ANESTHESIOLOGY

## 2022-10-27 PROCEDURE — 64483 NJX AA&/STRD TFRM EPI L/S 1: CPT | Mod: 50,,, | Performed by: ANESTHESIOLOGY

## 2022-10-27 PROCEDURE — 63600175 PHARM REV CODE 636 W HCPCS: Performed by: ANESTHESIOLOGY

## 2022-10-27 PROCEDURE — 64483 NJX AA&/STRD TFRM EPI L/S 1: CPT | Mod: 50 | Performed by: ANESTHESIOLOGY

## 2022-10-27 PROCEDURE — 64483 PR EPIDURAL INJ, ANES/STEROID, TRANSFORAMINAL, LUMB/SACR, SNGL LEVL: ICD-10-PCS | Mod: 50,,, | Performed by: ANESTHESIOLOGY

## 2022-10-27 RX ORDER — ALPRAZOLAM 0.5 MG/1
1 TABLET ORAL ONCE
Status: DISCONTINUED | OUTPATIENT
Start: 2022-10-27 | End: 2022-10-27 | Stop reason: HOSPADM

## 2022-10-27 RX ORDER — LIDOCAINE HYDROCHLORIDE 20 MG/ML
INJECTION, SOLUTION INFILTRATION; PERINEURAL
Status: DISCONTINUED | OUTPATIENT
Start: 2022-10-27 | End: 2022-10-27 | Stop reason: HOSPADM

## 2022-10-27 RX ORDER — DEXAMETHASONE SODIUM PHOSPHATE 10 MG/ML
INJECTION INTRAMUSCULAR; INTRAVENOUS
Status: DISCONTINUED | OUTPATIENT
Start: 2022-10-27 | End: 2022-10-27 | Stop reason: HOSPADM

## 2022-10-27 RX ORDER — SODIUM CHLORIDE 9 MG/ML
500 INJECTION, SOLUTION INTRAVENOUS CONTINUOUS
Status: DISCONTINUED | OUTPATIENT
Start: 2022-10-27 | End: 2022-10-27 | Stop reason: HOSPADM

## 2022-10-27 RX ORDER — LIDOCAINE HYDROCHLORIDE 10 MG/ML
INJECTION, SOLUTION EPIDURAL; INFILTRATION; INTRACAUDAL; PERINEURAL
Status: DISCONTINUED | OUTPATIENT
Start: 2022-10-27 | End: 2022-10-27 | Stop reason: HOSPADM

## 2022-10-27 NOTE — DISCHARGE SUMMARY
Discharge Note  Short Stay      SUMMARY     Admit Date: 10/27/2022    Attending Physician: Kaiser Braxton      Discharge Physician: Kaiser Braxton      Discharge Date: 10/27/2022 9:10 AM    Procedure(s) (LRB):  INJECTION, STEROID, EPIDURAL, TRANSFORAMINAL APPROACH, BILATERAL L5-S1 CONTRAST (Bilateral)    Final Diagnosis: Spinal stenosis of lumbar region without neurogenic claudication [M48.061]    Disposition: Home or self care    Patient Instructions:   Current Discharge Medication List        CONTINUE these medications which have NOT CHANGED    Details   fluticasone propionate (FLONASE) 50 mcg/actuation nasal spray 1 SPRAY (50 MCG TOTAL) BY EACH NOSTRIL ROUTE 2 (TWO) TIMES A DAY.  Qty: 48 mL, Refills: 1      gabapentin (NEURONTIN) 300 MG capsule TAKE 1 CAPSULE BY MOUTH THREE TIMES A DAY  Qty: 90 capsule, Refills: 0    Associated Diagnoses: Degeneration of cervical intervertebral disc      meloxicam (MOBIC) 15 MG tablet TAKE 1 TABLET (15 MG TOTAL) BY MOUTH DAILY AS NEEDED FOR PAIN (TAKE WITH FOOD OR A MEAL).  Qty: 90 tablet, Refills: 3    Associated Diagnoses: Degeneration of cervical intervertebral disc      rosuvastatin (CRESTOR) 10 MG tablet Take 1 tablet (10 mg total) by mouth once daily.  Qty: 90 tablet, Refills: 3      sildenafiL (VIAGRA) 100 MG tablet Take 1 tablet (100 mg total) by mouth daily as needed.  Qty: 15 tablet, Refills: 11      tamsulosin (FLOMAX) 0.4 mg Cap Take 1 capsule (0.4 mg total) by mouth once daily.  Qty: 30 capsule, Refills: 12                 Discharge Diagnosis: Spinal stenosis of lumbar region without neurogenic claudication [M48.061]  Condition on Discharge: Stable with no complications to procedure   Diet on Discharge: Same as before.  Activity: as per instruction sheet.  Discharge to: Home with a responsible adult.  Follow up: 2-4 weeks       Please call my office or pager at [phone] if experienced any weakness or loss of sensation, fever > 101.5, pain uncontrolled with oral  medications, persistent nausea/vomiting/or diarrhea, redness or drainage from the incisions, or any other worrisome concerns. If physician on call was not reached or could not communicate with our office for any reason please go to the nearest emergency department

## 2022-10-27 NOTE — OP NOTE
Lumbar Transforaminal Epidural Steroid Injection under Fluoroscopic Guidance    The procedure, risks, benefits, and options were discussed with the patient. There are no contraindications to the procedure. The patent expressed understanding and agreed to the procedure. Informed written consent was obtained prior to the start of the procedure and can be found in the patient's chart.    PATIENT NAME: Sabino Rubio   MRN: [id number]     DATE OF PROCEDURE: 10/27/2022    PROCEDURE:  Bilateral  L5/S1 Lumbar Transforaminal Epidural Steroid Injection under Fluoroscopic Guidance    PRE-OP DIAGNOSIS: Spinal stenosis of lumbar region without neurogenic claudication [M48.061] Lumbar radiculopathy [M54.16]    POST-OP DIAGNOSIS: Same    PHYSICIAN: Kaiser Braxton MD    ASSISTANTS: [facility] Resident      MEDICATIONS INJECTED: Preservative-free Decadron 10mg with 5cc of Lidocaine 1% MPF     LOCAL ANESTHETIC INJECTED: Xylocaine 2%     SEDATION: None    ESTIMATED BLOOD LOSS: None    COMPLICATIONS: None    TECHNIQUE: Time-out was performed to identify the patient and procedure to be performed. With the patient laying in a prone position, the surgical area was prepped and draped in the usual sterile fashion using ChloraPrep and a fenestrated drape.The levels were determined under fluoroscopy guidance. Skin anesthesia was achieved by injecting Lidocaine 2% over the injection sites. The transforaminal spaces were then approached with a 25 gauge, 3.5 inch spinal quinke needle that was introduced under fluoroscopic guidance in the AP and Lateral views. Once the needle tip was in the area of the transforaminal space, and there was no blood, CSF or paraesthesias, contrast dye Omnipaque (240mg/mL) was injected to confirm placement and there was no vascular runoff. Fluoroscopic imaging in the AP and lateral views revealed a clear outline of the spinal nerve with proximal spread of agent through the neural  foramen into the epidural space. 3 mL of the medication mixture listed above was injected slowly at each site. Displacement of the radio opaque contrast after injection of the medication confirmed that the medication went into the area of the transforaminal spaces. The needles were removed and bleeding was nil. A sterile dressing was applied. No specimens collected. The patient tolerated the procedure well.   PAIN BEFORE THE PROCEDURE: 6-9/10    PAIN AFTER THE PROCEDURE: 0/10     The patient was monitored after the procedure in the recovery area. They were given post-procedure and discharge instructions to follow at home. The patient was discharged in a stable condition.        Kaiser Braxton MD

## 2022-10-27 NOTE — INTERVAL H&P NOTE
The patient has been examined and the H&P has been reviewed:    I concur with the findings and no changes have occurred since H&P was written.    Procedure risks, benefits and alternative options discussed and understood by patient/family.          Active Hospital Problems    Diagnosis  POA    DDD (degenerative disc disease), lumbosacral [M51.37]  Unknown    Lumbosacral radiculopathy [M54.17]  Unknown      Resolved Hospital Problems   No resolved problems to display.

## 2022-11-10 ENCOUNTER — TELEPHONE (OUTPATIENT)
Dept: PAIN MEDICINE | Facility: CLINIC | Age: 67
End: 2022-11-10
Payer: MEDICARE

## 2022-11-11 ENCOUNTER — HOSPITAL ENCOUNTER (OUTPATIENT)
Dept: RADIOLOGY | Facility: OTHER | Age: 67
Discharge: HOME | End: 2022-11-11
Attending: NURSE PRACTITIONER
Payer: MEDICARE

## 2022-11-11 ENCOUNTER — OFFICE VISIT (OUTPATIENT)
Dept: PAIN MEDICINE | Facility: CLINIC | Age: 67
End: 2022-11-11
Payer: MEDICARE

## 2022-11-11 VITALS
SYSTOLIC BLOOD PRESSURE: 120 MMHG | HEART RATE: 85 BPM | BODY MASS INDEX: 22.54 KG/M2 | WEIGHT: 157.44 LBS | DIASTOLIC BLOOD PRESSURE: 81 MMHG | HEIGHT: 70 IN

## 2022-11-11 DIAGNOSIS — M51.36 DISCOGENIC LOW BACK PAIN: ICD-10-CM

## 2022-11-11 DIAGNOSIS — M47.816 LUMBAR SPONDYLOSIS: ICD-10-CM

## 2022-11-11 DIAGNOSIS — M51.37 DDD (DEGENERATIVE DISC DISEASE), LUMBOSACRAL: ICD-10-CM

## 2022-11-11 DIAGNOSIS — M51.36 DDD (DEGENERATIVE DISC DISEASE), LUMBAR: ICD-10-CM

## 2022-11-11 DIAGNOSIS — G89.29 CHRONIC PAIN OF RIGHT HIP: ICD-10-CM

## 2022-11-11 DIAGNOSIS — M48.061 SPINAL STENOSIS OF LUMBAR REGION WITHOUT NEUROGENIC CLAUDICATION: ICD-10-CM

## 2022-11-11 DIAGNOSIS — M54.17 LUMBOSACRAL RADICULOPATHY: ICD-10-CM

## 2022-11-11 DIAGNOSIS — M25.551 CHRONIC PAIN OF RIGHT HIP: Primary | ICD-10-CM

## 2022-11-11 DIAGNOSIS — G89.29 CHRONIC PAIN OF RIGHT HIP: Primary | ICD-10-CM

## 2022-11-11 DIAGNOSIS — M54.16 LUMBAR RADICULOPATHY: ICD-10-CM

## 2022-11-11 DIAGNOSIS — M25.551 CHRONIC PAIN OF RIGHT HIP: ICD-10-CM

## 2022-11-11 PROCEDURE — 99213 PR OFFICE/OUTPT VISIT, EST, LEVL III, 20-29 MIN: ICD-10-PCS | Mod: S$GLB,,, | Performed by: NURSE PRACTITIONER

## 2022-11-11 PROCEDURE — 3074F SYST BP LT 130 MM HG: CPT | Mod: CPTII,S$GLB,, | Performed by: NURSE PRACTITIONER

## 2022-11-11 PROCEDURE — 1159F PR MEDICATION LIST DOCUMENTED IN MEDICAL RECORD: ICD-10-PCS | Mod: CPTII,S$GLB,, | Performed by: NURSE PRACTITIONER

## 2022-11-11 PROCEDURE — 3074F PR MOST RECENT SYSTOLIC BLOOD PRESSURE < 130 MM HG: ICD-10-PCS | Mod: CPTII,S$GLB,, | Performed by: NURSE PRACTITIONER

## 2022-11-11 PROCEDURE — 3008F BODY MASS INDEX DOCD: CPT | Mod: CPTII,S$GLB,, | Performed by: NURSE PRACTITIONER

## 2022-11-11 PROCEDURE — 1101F PT FALLS ASSESS-DOCD LE1/YR: CPT | Mod: CPTII,S$GLB,, | Performed by: NURSE PRACTITIONER

## 2022-11-11 PROCEDURE — 3079F PR MOST RECENT DIASTOLIC BLOOD PRESSURE 80-89 MM HG: ICD-10-PCS | Mod: CPTII,S$GLB,, | Performed by: NURSE PRACTITIONER

## 2022-11-11 PROCEDURE — 3044F PR MOST RECENT HEMOGLOBIN A1C LEVEL <7.0%: ICD-10-PCS | Mod: CPTII,S$GLB,, | Performed by: NURSE PRACTITIONER

## 2022-11-11 PROCEDURE — 1160F RVW MEDS BY RX/DR IN RCRD: CPT | Mod: CPTII,S$GLB,, | Performed by: NURSE PRACTITIONER

## 2022-11-11 PROCEDURE — 99999 PR PBB SHADOW E&M-EST. PATIENT-LVL III: ICD-10-PCS | Mod: PBBFAC,,, | Performed by: NURSE PRACTITIONER

## 2022-11-11 PROCEDURE — 99999 PR PBB SHADOW E&M-EST. PATIENT-LVL III: CPT | Mod: PBBFAC,,, | Performed by: NURSE PRACTITIONER

## 2022-11-11 PROCEDURE — 3008F PR BODY MASS INDEX (BMI) DOCUMENTED: ICD-10-PCS | Mod: CPTII,S$GLB,, | Performed by: NURSE PRACTITIONER

## 2022-11-11 PROCEDURE — 1101F PR PT FALLS ASSESS DOC 0-1 FALLS W/OUT INJ PAST YR: ICD-10-PCS | Mod: CPTII,S$GLB,, | Performed by: NURSE PRACTITIONER

## 2022-11-11 PROCEDURE — 73521 X-RAY EXAM HIPS BI 2 VIEWS: CPT | Mod: 26,,, | Performed by: RADIOLOGY

## 2022-11-11 PROCEDURE — 1159F MED LIST DOCD IN RCRD: CPT | Mod: CPTII,S$GLB,, | Performed by: NURSE PRACTITIONER

## 2022-11-11 PROCEDURE — 73521 X-RAY EXAM HIPS BI 2 VIEWS: CPT | Mod: TC,FY

## 2022-11-11 PROCEDURE — 1125F PR PAIN SEVERITY QUANTIFIED, PAIN PRESENT: ICD-10-PCS | Mod: CPTII,S$GLB,, | Performed by: NURSE PRACTITIONER

## 2022-11-11 PROCEDURE — 3288F PR FALLS RISK ASSESSMENT DOCUMENTED: ICD-10-PCS | Mod: CPTII,S$GLB,, | Performed by: NURSE PRACTITIONER

## 2022-11-11 PROCEDURE — 1160F PR REVIEW ALL MEDS BY PRESCRIBER/CLIN PHARMACIST DOCUMENTED: ICD-10-PCS | Mod: CPTII,S$GLB,, | Performed by: NURSE PRACTITIONER

## 2022-11-11 PROCEDURE — 3044F HG A1C LEVEL LT 7.0%: CPT | Mod: CPTII,S$GLB,, | Performed by: NURSE PRACTITIONER

## 2022-11-11 PROCEDURE — 99213 OFFICE O/P EST LOW 20 MIN: CPT | Mod: S$GLB,,, | Performed by: NURSE PRACTITIONER

## 2022-11-11 PROCEDURE — 1125F AMNT PAIN NOTED PAIN PRSNT: CPT | Mod: CPTII,S$GLB,, | Performed by: NURSE PRACTITIONER

## 2022-11-11 PROCEDURE — 73521 XR HIPS BILATERAL 2 VIEW INCL AP PELVIS: ICD-10-PCS | Mod: 26,,, | Performed by: RADIOLOGY

## 2022-11-11 PROCEDURE — 3079F DIAST BP 80-89 MM HG: CPT | Mod: CPTII,S$GLB,, | Performed by: NURSE PRACTITIONER

## 2022-11-11 PROCEDURE — 3288F FALL RISK ASSESSMENT DOCD: CPT | Mod: CPTII,S$GLB,, | Performed by: NURSE PRACTITIONER

## 2022-11-11 NOTE — H&P (VIEW-ONLY)
Chronic patient Established Note (Follow up visit)      SUBJECTIVE:    Interval History 11/11/2022:  The patient returns to clinic today for follow up of low back pain. He is s/p bilateral L5/S1 TF PERCY on 10/27/2022. He reports limited relief of his pain. He continues to report low back and right hip pain. He reports intermittent radiating pain into his right posterior leg to his knee. He reports intermittent numbness to his legs. His pain is worse with moving from sitting to standing. He also reports increased pain with prolonged standing. He continues to perform a home exercise routine. He continues to take Gabapentin, Flexeril, and Mobic. He denies any other health changes. His pain today is 2/10.    Interval History 10/10/2022:  The patient returns to clinic today for follow up of low back pain. He is here today for imaging review. He continues to report low back pain that radiates into his right buttock and posterior thigh. His pain is worse with moving from sitting to standing, prolonged standing, and activity. He reports intermittent weakness into his legs with moving from sitting to standing. He has completed physical therapy. He continues to perform a home exercise routine. He continues to take Gabapentin, Flexeril, and Mobic. He denies any bowel or bladder incontinence. His pain today is 2/10.      Interval History 9/13/2022:  Sabino Rubio presents to the clinic for a follow-up appointment for low back pain. He reports neuroma injection at last office visit provided short term relief. He continues reports low back pain that radiates into his right buttock. He denies any radicular leg pain. His pain is worse with moving from sitting to standing, prolonged sitting, and activity. He does endorse morning stiffness. He is currently participating in physical therapy with some benefit. He is taking Gabapentin, Flexeril, and Mobic. He denies any other health changes. His pain today is 5/10.    Interval  History 8/2/2022:   Patient returns to clinic today reporting 2/10 pain at rest which increases to 4-6/10 when standing. Pain is primarily low back in nature with radiation to the right buttock. He reports that pain is exacerbated by prolonged immobility specifically when awakening from sleep or sitting for long periods of time. At the last visit, he was prescribed Flexeril, but has noted only minimal benefit from it. He has been working with PT and plans on starting to go to the gym. Prior RFA's have provided some relief, he reports his first left-sided procedure produced significant relief whereas the second right-sided procedure only produced moderate relief.    Interval History 6/16/2022:  Patient presents today for evaluationfollow-up of their low back pain s/p Bilateral  Lumbar RFA . Per pateint his axial LBP is almost 50-60% better after the RFA but he is still suffering from some pain in Left lower back ,mostly stiffness and spasm .no radicular component  Is associated with LBP. No B/B incontinence/no numbness /motor or sensory deficit.     Pain Disability Index Review:  Last 3 PDI Scores 11/11/2022 10/10/2022 9/13/2022   Pain Disability Index (PDI) 21 24 20       Pain Medications:  Gabapentin  Mobic  Flexeril    Opioid Contract: no     report:  Not applicable    Pain Procedures:   6/6/2019- Bilateral L4/5 and L5/S1 facet joint injection  1/27/2022- Bilateral L3,4,5 MBB  2/10/2022- Bilateral L3,4,5 MBB  5/16/2022- Right L3,4,5 RFA  6/2/2022- Left L3,4,5 RFA  10/27/2022- Bilateral L5/S1 TF PERCY    Physical Therapy/Home Exercise: yes    Imaging:   MRI Lumbar Spine 10/5/2022:  COMPARISON:  Radiograph 06/16/2022, MRI 01/09/2019.     FINDINGS:  Lumbar spine alignment demonstrates levoscoliosis with straightening of the normal lordosis.  Vertebral body heights are well maintained without evidence for acute fracture.  Schmorl's node extends into the superior endplate of the L3 vertebral body, unchanged when  compared to the previous MRI.  Benign osseous hemangioma at the L4 vertebral body.  No marrow signal abnormality to suggest an infiltrative process.     There is advanced degenerative disc space narrowing and desiccation throughout the visualized thoracolumbar spine, progressed when compared to the previous MRI most notably at the L1-L2 level noting severe associated endplate edema.     Distal spinal cord demonstrates normal contour and signal intensity.  Cauda equina appears normal without findings to suggest arachnoiditis.  Conus medullaris terminates at L1.     Right renal cortical cyst.  SI joints are symmetric.  Paraspinal musculature demonstrates normal bulk and signal intensity.     T12-L1: Circumferential disc bulge encroaches into the bilateral foraminal zones.  Bilateral facet arthropathy and bilateral ligamentum flavum buckling.  Findings contribute to mild spinal canal stenosis and mild right neural foraminal narrowing.     L1-L2: Circumferential disc bulge encroaches into the right neural foramen and likely abuts the right exiting L1 nerve root.  Bilateral facet arthropathy and bilateral ligamentum flavum buckling.  Findings contribute to moderate spinal canal stenosis and severe right and mild left neural foraminal narrowing.     L2-L3: Circumferential disc bulge, bilateral facet arthropathy and bilateral ligamentum flavum buckling.  Findings contribute to mild spinal canal stenosis and mild left neural foraminal narrowing.     L3-L4: Circumferential disc bulge encroaches into the bilateral foraminal zones.  Bilateral facet arthropathy and bilateral ligamentum flavum buckling.  Findings contribute to moderate to severe spinal canal and lateral recess stenosis and mild bilateral neural foraminal narrowing.     L4-L5: Circumferential disc bulge encroaches into the bilateral foraminal zones.  Bilateral facet arthropathy and bilateral ligamentum flavum buckling.  Findings contribute to moderate spinal  canal and lateral recess stenosis and moderate to severe left and moderate right neural foraminal narrowing.     L5-S1: Circumferential disc bulge encroaches into the bilateral foraminal zones.  Bilateral facet arthropathy and bilateral ligamentum flavum buckling.  Findings contribute to mild lateral recess stenosis and moderate to severe bilateral neural foraminal narrowing with suspected impingement of the left exiting L5 nerve root.     Impression:     1. Lumbar levoscoliosis with advanced superimposed degenerative changes most pronounced at L1-L2 and from L3-L4 through L5-S1, progressed when compared to MRI dated 01/09/2019.    Xray Lumbar Spine 6/16/2022:  COMPARISON:  Lumbar spine radiograph from 05/24/2019.     FINDINGS:  Alignment: Mild levocurvature of lumbar spine.  Vertebral bodies adequately aligned on sagittal views.  No dynamic instability.     Vertebrae: Persistent concavity along superior endplate of L3, similar to prior exam.  Remaining vertebral body heights are adequately maintained.  No definite spondylolysis on oblique views.  No suspicious appearing lytic or blastic lesions.     Discs and facets: Multilevel moderate to severe disc space narrowing most prominent at L1-L2, L4-L5, and L5-S1 with sclerotic endplate changes.  Vacuum disc phenomenon present at L1-L2 and L5-S1.  Multilevel anterior osteophyte formation.  Advanced facet arthropathy most prominent in the lower lumbar spine.     Miscellaneous: No additional findings.     Impression:     As above.    MRI Lumbar Spine 1/9/2019:  COMPARISON:  Lumbar spine radiograph 10/03/2017     FINDINGS:  Alignment: Mild straightening of normal lumbar lordosis.     Vertebrae: Mild vertebral height loss and degenerative signal change.  No evidence of acute fracture or infiltrative marrow process.     Discs: Intervertebral disc height loss and degenerative signal change, most prominent within the lower lumbar spine.     Cord: Normal.  Conus terminates at  L1-L2.     Degenerative findings:     T12-L1: Mild broad-based disc bulge and facet arthropathy without significant spinal canal stenosis or neural foraminal narrowing.     L1-L2: Mild broad-based disc bulge and facet arthropathy without significant spinal canal stenosis or neural foraminal narrowing.     L2-L3: Broad-based disc bulge, ligamentum flavum thickening, and facet arthropathy without significant spinal canal stenosis or neural foraminal narrowing.     L3-L4: Broad-based disc bulge, ligamentum flavum thickening, and facet arthropathy resulting in moderate spinal canal stenosis and mild bilateral neural foraminal narrowing.     L4-L5: Broad-based disc bulge, ligamentum flavum thickening, and facet arthropathy resulting in moderate spinal canal stenosis, moderate left neural foraminal narrowing, and mild right neural foraminal narrowing.     L5-S1: Broad-based disc bulge, ligamentum flavum thickening, and facet arthropathy resulting in moderate left and mild right neural foraminal narrowing.     Paraspinal muscles & soft tissues: Unremarkable.     IMPRESSION:      Multilevel degenerative change of the lumbar spine most significant at L3-L4 which demonstrates moderate spinal canal stenosis and mild bilateral neural foraminal narrowing.  Moderate left and mild right neural foraminal narrowing seen at L4-L5 and L5-S1.  Additional details above.    Allergies: Review of patient's allergies indicates:  No Known Allergies    Current Medications:   Current Outpatient Medications   Medication Sig Dispense Refill    fluticasone propionate (FLONASE) 50 mcg/actuation nasal spray 1 SPRAY (50 MCG TOTAL) BY EACH NOSTRIL ROUTE 2 (TWO) TIMES A DAY. 48 mL 1    gabapentin (NEURONTIN) 300 MG capsule TAKE 1 CAPSULE BY MOUTH THREE TIMES A DAY 90 capsule 0    meloxicam (MOBIC) 15 MG tablet TAKE 1 TABLET (15 MG TOTAL) BY MOUTH DAILY AS NEEDED FOR PAIN (TAKE WITH FOOD OR A MEAL). 90 tablet 3    rosuvastatin (CRESTOR) 10 MG tablet  Take 1 tablet (10 mg total) by mouth once daily. 90 tablet 3    sildenafiL (VIAGRA) 100 MG tablet Take 1 tablet (100 mg total) by mouth daily as needed. 15 tablet 11    tamsulosin (FLOMAX) 0.4 mg Cap Take 1 capsule (0.4 mg total) by mouth once daily. 30 capsule 12     No current facility-administered medications for this visit.     Facility-Administered Medications Ordered in Other Visits   Medication Dose Route Frequency Provider Last Rate Last Admin    0.9%  NaCl infusion   Intravenous Continuous Cole Fitzpatrick DO           REVIEW OF SYSTEMS:    GENERAL:  No weight loss, malaise or fevers.  HEENT:  Negative for frequent or significant headaches.  NECK:  Negative for lumps, goiter, pain and significant neck swelling.  RESPIRATORY:  Negative for cough, wheezing or shortness of breath.  CARDIOVASCULAR:  Negative for chest pain, leg swelling or palpitations.  GI:  Negative for abdominal discomfort, blood in stools or black stools or change in bowel habits. GERD  MUSCULOSKELETAL:  See HPI.  SKIN:  Negative for lesions, rash, and itching.  PSYCH:  Negative for sleep disturbance, mood disorder and recent psychosocial stressors.  HEMATOLOGY/LYMPHOLOGY:  Negative for prolonged bleeding, bruising easily or swollen nodes.  NEURO:   No history of headaches, syncope, paralysis, seizures or tremors.  All other reviewed and negative other than HPI.    Past Medical History:  Past Medical History:   Diagnosis Date    Allergy     Arthritis     Family history of malignant neoplasm of gastrointestinal tract mat.gf    Family history of prostate cancer: 1/2 brother 9/25/2017    GERD (gastroesophageal reflux disease)     Kidney cyst, acquired: R 1.2 cm 2015 9/25/2017    Restless leg syndrome     Tubulovillous adenoma 2013 due 2016 9/14/2015       Past Surgical History:  Past Surgical History:   Procedure Laterality Date    APPLICATION OF BONE GRAFT TO FINGER Left 11/8/2021    Procedure: APPLICATION INTEGRA GRAFT, TO FINGER;  Surgeon:  Alba Penn MD;  Location: [facility];  Service: Orthopedics;  Laterality: Left;    CARPAL TUNNEL RELEASE      COLONOSCOPY N/A 5/22/2019    Procedure: COLONOSCOPY;  Surgeon: Juancarlos Foley MD;  Location: [facility] ENDO (4TH FLR);  Service: Endoscopy;  Laterality: N/A;    COLONOSCOPY N/A 10/12/2022    Procedure: COLONOSCOPY;  Surgeon: Cherelle Wang MD;  Location: [facility] ENDO (4TH FLR);  Service: Endoscopy;  Laterality: N/A;  vacc-inst portal-am prep-clears 4 hrs prior-tb    EXCISION OF GANGLION OF WRIST Right 6/28/2019    Procedure: EXCISION, GANGLION CYST, WRIST right;  Surgeon: Alba Penn MD;  [address]  Service: Orthopedics;  Laterality: Right;  regional MAC, stretcher, supine, hand pan 1 and 2,MINI C-arm, call Arthrex    INJECTION OF ANESTHETIC AGENT AROUND NERVE Bilateral 1/27/2022    Procedure: Block, Nerve MEDIAL BRANCH BLOCK BILATERAL L3,4,5  DIRECT REFERRAL 1 OF 2;  Surgeon: Kaiser Braxton MD;  Location: [facility] PAIN MGT;  Service: Pain Management;  Laterality: Bilateral;    INJECTION OF ANESTHETIC AGENT AROUND NERVE Bilateral 2/10/2022    Procedure: Block, Nerve MEDIAL BRANCH BLOCK BILATERAL L3.4.5  DIRECT REFERRAL 2 OF 2;  Surgeon: Kaiser Braxton MD;  Location: [facility] PAIN MGT;  Service: Pain Management;  Laterality: Bilateral;    INJECTION OF FACET JOINT Bilateral 6/6/2019    Procedure: INJECTION, FACET JOINT INJECTION (LUMBAR BLOCK) BILATERAL L4-L5 AND L5-S1 FACET INJECTIONS;  Surgeon: Kaiser Braxton MD;  Location: [facility] PAIN MGT;  Service: Pain Management;  Laterality: Bilateral;  NEEDS CONSENT    INTERPOSITION ARTHROPLASTY OF CARPOMETACARPAL JOINTS Right 6/28/2019    Procedure: INTERPOSITION ARTHROPLASTY, CMC JOINT right;  Surgeon: Alba Penn MD;  Location: [address];  Service: Orthopedics;  Laterality: Right;  regional MAC, stretcher, supine, hand pan 1 and 2,MINI C-arm, call Arthrex    IRRIGATION AND DEBRIDEMENT OF UPPER EXTREMITY Left 11/8/2021    Procedure: IRRIGATION  AND DEBRIDEMENT, UPPER EXTREMITY, left index finger;  Surgeon: Alba Penn MD;  Location: [facility] OR;  Service: Orthopedics;  Laterality: Left;    RADIOFREQUENCY ABLATION Right 5/16/2022    Procedure: Radiofrequency Ablation RIGHT L3,4,5;  Surgeon: Kaiser Braxton MD;  Location: [facility] [place] PAIN MGT;  Service: Pain Management;  Laterality: Right;    RADIOFREQUENCY ABLATION Left 6/2/2022    Procedure: Radiofrequency Ablation LEFT L3,4,5;  Surgeon: Kaiser Braxton MD;  Location: [facility] [place] PAIN MGT;  Service: Pain Management;  Laterality: Left;    REPAIR OF NAIL BED Left 11/8/2021    Procedure: REPAIR, NAIL BED, left index;  Surgeon: Alba Penn MD;  Location: [facility] OR;  Service: Orthopedics;  Laterality: Left;    SPERMATOCELECTOMY Right 11/8/2019    Procedure: EXCISION, SPERMATOCELE;  Surgeon: Sheldon Araya Jr., MD;  Location: [facility] OR 1ST FLR;  Service: Urology;  Laterality: Right;  1hr    TRANSFORAMINAL EPIDURAL INJECTION OF STEROID Bilateral 10/27/2022    Procedure: INJECTION, STEROID, EPIDURAL, TRANSFORAMINAL APPROACH, BILATERAL L5-S1 CONTRAST;  Surgeon: Kaiser Braxton MD;  Location: [facility] [place] PAIN MGT;  Service: Pain Management;  Laterality: Bilateral;       Family History:  Family History   Problem Relation Age of Onset    Arthritis Mother         probable R.A.    Cancer Father         esophageal ca and brain tumor    Esophageal cancer Father     Melanoma Father     Cancer Brother         pancreatic cancer    Cancer Maternal Grandfather         colon    Colon cancer Maternal Grandfather     Irritable bowel syndrome Sister     Arthritis Sister     No Known Problems Son     No Known Problems Brother     No Known Problems Son     Cancer Sister     No Known Problems Brother     Diabetes Neg Hx     Heart disease Neg Hx     Cirrhosis Neg Hx     Celiac disease Neg Hx     Crohn's disease Neg Hx     Ulcerative colitis Neg Hx     Stomach cancer Neg Hx     Rectal cancer Neg Hx     Liver cancer Neg Hx     Psoriasis Neg Hx     Lupus  "Neg Hx        Social History:  Social History     Socioeconomic History    Marital status: Single   Tobacco Use    Smoking status: Never    Smokeless tobacco: Never    Tobacco comments:     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   Substance and Sexual Activity    Alcohol use: Yes     Alcohol/week: 0.0 standard drinks     Comment: 2-3 days weekly, up to 2 beers    Drug use: No    Sexual activity: Yes     Partners: Female     Social Determinants of Health     Transportation Needs: No Transportation Needs    Lack of Transportation (Medical): No    Lack of Transportation (Non-Medical): No   Physical Activity: Sufficiently Active    Days of Exercise per Week: 5 days    Minutes of Exercise per Session: 30 min   Housing Stability: Low Risk     Unable to Pay for Housing in the Last Year: No    Number of Places Lived in the Last Year: 1    Unstable Housing in the Last Year: No       OBJECTIVE:    /81 (BP Location: Right arm, Patient Position: Sitting, BP Method: Medium (Automatic))   Pulse 85   Ht 5' 10" (1.778 m)   Wt 71.4 kg (157 lb 6.5 oz)   BMI 22.59 kg/m²     PHYSICAL EXAMINATION:    General appearance: Well appearing, in no acute distress, alert and oriented x3.  Psych:  Mood and affect appropriate.  Skin: Skin color, texture, turgor normal, no rashes or lesions, in both upper and lower body.  Head/face:  Atraumatic, normocephalic.   Cor: RRR  Pulm: Symmetric chest rise, no respiratory distress noted.   Back: Straight leg raising in the sitting position is negative to radicular pain bilaterally. There is pain to palpation over the lumbar facet joints bilaterally. Limited ROM with pain on flexion and extension. Positive facet loading bilaterally, R>L.   Extremities: No deformities, edema, or skin discoloration. Good capillary refill.  Musculoskeletal: There is no pain with palpation over bilateral SI joints. FABERs is positive on " the right. Bilateral lower extremity strength is normal and symmetric.  No atrophy or tone abnormalities are noted.  Neuro: Bilateral lower extremity coordination and muscle stretch reflexes are physiologic and symmetric.  Plantar response are downgoing. No loss of sensation is noted.  Gait: Antalgic- ambulates without assistance.     ASSESSMENT: 66 y.o. year old male with back pain, consistent with the followin. Chronic pain of right hip  X-Ray Hips Bilateral 2 View Incl AP Pelvis      2. Lumbosacral radiculopathy        3. Lumbar radiculopathy        4. Lumbar spondylosis        5. DDD (degenerative disc disease), lumbar        6. Spinal stenosis of lumbar region without neurogenic claudication        7. DDD (degenerative disc disease), lumbosacral        8. Discogenic low back pain                  PLAN:     - Previous imaging was reviewed and discussed with the patient today.    - He is s/p bilateral L5/S1 TF PERCY with limited relief.     - Obtain xray of hips.     - Consider hip joint injection versus L1/2 and L2/3 TF PERCY. Will discuss with Dr. Braxton    - I have stressed the importance of physical activity and a home exercise plan to help with pain and improve health.    - Patient can continue with medications for now since they are providing benefits, using them appropriately, and without side effects.    - RTC PRN. I will call call him with imaging results and plan.       - Counseled patient regarding the importance of activity modification and physical therapy.    The above plan and management options were discussed at length with patient. Patient is in agreement with the above and verbalized understanding.    Yumi Naik NP  2022

## 2022-11-11 NOTE — PROGRESS NOTES
Chronic patient Established Note (Follow up visit)      SUBJECTIVE:    Interval History 11/11/2022:  The patient returns to clinic today for follow up of low back pain. He is s/p bilateral L5/S1 TF PERCY on 10/27/2022. He reports limited relief of his pain. He continues to report low back and right hip pain. He reports intermittent radiating pain into his right posterior leg to his knee. He reports intermittent numbness to his legs. His pain is worse with moving from sitting to standing. He also reports increased pain with prolonged standing. He continues to perform a home exercise routine. He continues to take Gabapentin, Flexeril, and Mobic. He denies any other health changes. His pain today is 2/10.    Interval History 10/10/2022:  The patient returns to clinic today for follow up of low back pain. He is here today for imaging review. He continues to report low back pain that radiates into his right buttock and posterior thigh. His pain is worse with moving from sitting to standing, prolonged standing, and activity. He reports intermittent weakness into his legs with moving from sitting to standing. He has completed physical therapy. He continues to perform a home exercise routine. He continues to take Gabapentin, Flexeril, and Mobic. He denies any bowel or bladder incontinence. His pain today is 2/10.      Interval History 9/13/2022:  Sabino Rubio presents to the clinic for a follow-up appointment for low back pain. He reports neuroma injection at last office visit provided short term relief. He continues reports low back pain that radiates into his right buttock. He denies any radicular leg pain. His pain is worse with moving from sitting to standing, prolonged sitting, and activity. He does endorse morning stiffness. He is currently participating in physical therapy with some benefit. He is taking Gabapentin, Flexeril, and Mobic. He denies any other health changes. His pain today is 5/10.    Interval  History 8/2/2022:   Patient returns to clinic today reporting 2/10 pain at rest which increases to 4-6/10 when standing. Pain is primarily low back in nature with radiation to the right buttock. He reports that pain is exacerbated by prolonged immobility specifically when awakening from sleep or sitting for long periods of time. At the last visit, he was prescribed Flexeril, but has noted only minimal benefit from it. He has been working with PT and plans on starting to go to the gym. Prior RFA's have provided some relief, he reports his first left-sided procedure produced significant relief whereas the second right-sided procedure only produced moderate relief.    Interval History 6/16/2022:  Patient presents today for evaluationfollow-up of their low back pain s/p Bilateral  Lumbar RFA . Per pateint his axial LBP is almost 50-60% better after the RFA but he is still suffering from some pain in Left lower back ,mostly stiffness and spasm .no radicular component  Is associated with LBP. No B/B incontinence/no numbness /motor or sensory deficit.     Pain Disability Index Review:  Last 3 PDI Scores 11/11/2022 10/10/2022 9/13/2022   Pain Disability Index (PDI) 21 24 20       Pain Medications:  Gabapentin  Mobic  Flexeril    Opioid Contract: no     report:  Not applicable    Pain Procedures:   6/6/2019- Bilateral L4/5 and L5/S1 facet joint injection  1/27/2022- Bilateral L3,4,5 MBB  2/10/2022- Bilateral L3,4,5 MBB  5/16/2022- Right L3,4,5 RFA  6/2/2022- Left L3,4,5 RFA  10/27/2022- Bilateral L5/S1 TF PERCY    Physical Therapy/Home Exercise: yes    Imaging:   MRI Lumbar Spine 10/5/2022:  COMPARISON:  Radiograph 06/16/2022, MRI 01/09/2019.     FINDINGS:  Lumbar spine alignment demonstrates levoscoliosis with straightening of the normal lordosis.  Vertebral body heights are well maintained without evidence for acute fracture.  Schmorl's node extends into the superior endplate of the L3 vertebral body, unchanged when  compared to the previous MRI.  Benign osseous hemangioma at the L4 vertebral body.  No marrow signal abnormality to suggest an infiltrative process.     There is advanced degenerative disc space narrowing and desiccation throughout the visualized thoracolumbar spine, progressed when compared to the previous MRI most notably at the L1-L2 level noting severe associated endplate edema.     Distal spinal cord demonstrates normal contour and signal intensity.  Cauda equina appears normal without findings to suggest arachnoiditis.  Conus medullaris terminates at L1.     Right renal cortical cyst.  SI joints are symmetric.  Paraspinal musculature demonstrates normal bulk and signal intensity.     T12-L1: Circumferential disc bulge encroaches into the bilateral foraminal zones.  Bilateral facet arthropathy and bilateral ligamentum flavum buckling.  Findings contribute to mild spinal canal stenosis and mild right neural foraminal narrowing.     L1-L2: Circumferential disc bulge encroaches into the right neural foramen and likely abuts the right exiting L1 nerve root.  Bilateral facet arthropathy and bilateral ligamentum flavum buckling.  Findings contribute to moderate spinal canal stenosis and severe right and mild left neural foraminal narrowing.     L2-L3: Circumferential disc bulge, bilateral facet arthropathy and bilateral ligamentum flavum buckling.  Findings contribute to mild spinal canal stenosis and mild left neural foraminal narrowing.     L3-L4: Circumferential disc bulge encroaches into the bilateral foraminal zones.  Bilateral facet arthropathy and bilateral ligamentum flavum buckling.  Findings contribute to moderate to severe spinal canal and lateral recess stenosis and mild bilateral neural foraminal narrowing.     L4-L5: Circumferential disc bulge encroaches into the bilateral foraminal zones.  Bilateral facet arthropathy and bilateral ligamentum flavum buckling.  Findings contribute to moderate spinal  canal and lateral recess stenosis and moderate to severe left and moderate right neural foraminal narrowing.     L5-S1: Circumferential disc bulge encroaches into the bilateral foraminal zones.  Bilateral facet arthropathy and bilateral ligamentum flavum buckling.  Findings contribute to mild lateral recess stenosis and moderate to severe bilateral neural foraminal narrowing with suspected impingement of the left exiting L5 nerve root.     Impression:     1. Lumbar levoscoliosis with advanced superimposed degenerative changes most pronounced at L1-L2 and from L3-L4 through L5-S1, progressed when compared to MRI dated 01/09/2019.    Xray Lumbar Spine 6/16/2022:  COMPARISON:  Lumbar spine radiograph from 05/24/2019.     FINDINGS:  Alignment: Mild levocurvature of lumbar spine.  Vertebral bodies adequately aligned on sagittal views.  No dynamic instability.     Vertebrae: Persistent concavity along superior endplate of L3, similar to prior exam.  Remaining vertebral body heights are adequately maintained.  No definite spondylolysis on oblique views.  No suspicious appearing lytic or blastic lesions.     Discs and facets: Multilevel moderate to severe disc space narrowing most prominent at L1-L2, L4-L5, and L5-S1 with sclerotic endplate changes.  Vacuum disc phenomenon present at L1-L2 and L5-S1.  Multilevel anterior osteophyte formation.  Advanced facet arthropathy most prominent in the lower lumbar spine.     Miscellaneous: No additional findings.     Impression:     As above.    MRI Lumbar Spine 1/9/2019:  COMPARISON:  Lumbar spine radiograph 10/03/2017     FINDINGS:  Alignment: Mild straightening of normal lumbar lordosis.     Vertebrae: Mild vertebral height loss and degenerative signal change.  No evidence of acute fracture or infiltrative marrow process.     Discs: Intervertebral disc height loss and degenerative signal change, most prominent within the lower lumbar spine.     Cord: Normal.  Conus terminates at  L1-L2.     Degenerative findings:     T12-L1: Mild broad-based disc bulge and facet arthropathy without significant spinal canal stenosis or neural foraminal narrowing.     L1-L2: Mild broad-based disc bulge and facet arthropathy without significant spinal canal stenosis or neural foraminal narrowing.     L2-L3: Broad-based disc bulge, ligamentum flavum thickening, and facet arthropathy without significant spinal canal stenosis or neural foraminal narrowing.     L3-L4: Broad-based disc bulge, ligamentum flavum thickening, and facet arthropathy resulting in moderate spinal canal stenosis and mild bilateral neural foraminal narrowing.     L4-L5: Broad-based disc bulge, ligamentum flavum thickening, and facet arthropathy resulting in moderate spinal canal stenosis, moderate left neural foraminal narrowing, and mild right neural foraminal narrowing.     L5-S1: Broad-based disc bulge, ligamentum flavum thickening, and facet arthropathy resulting in moderate left and mild right neural foraminal narrowing.     Paraspinal muscles & soft tissues: Unremarkable.     IMPRESSION:      Multilevel degenerative change of the lumbar spine most significant at L3-L4 which demonstrates moderate spinal canal stenosis and mild bilateral neural foraminal narrowing.  Moderate left and mild right neural foraminal narrowing seen at L4-L5 and L5-S1.  Additional details above.    Allergies: Review of patient's allergies indicates:  No Known Allergies    Current Medications:   Current Outpatient Medications   Medication Sig Dispense Refill    fluticasone propionate (FLONASE) 50 mcg/actuation nasal spray 1 SPRAY (50 MCG TOTAL) BY EACH NOSTRIL ROUTE 2 (TWO) TIMES A DAY. 48 mL 1    gabapentin (NEURONTIN) 300 MG capsule TAKE 1 CAPSULE BY MOUTH THREE TIMES A DAY 90 capsule 0    meloxicam (MOBIC) 15 MG tablet TAKE 1 TABLET (15 MG TOTAL) BY MOUTH DAILY AS NEEDED FOR PAIN (TAKE WITH FOOD OR A MEAL). 90 tablet 3    rosuvastatin (CRESTOR) 10 MG tablet  Take 1 tablet (10 mg total) by mouth once daily. 90 tablet 3    sildenafiL (VIAGRA) 100 MG tablet Take 1 tablet (100 mg total) by mouth daily as needed. 15 tablet 11    tamsulosin (FLOMAX) 0.4 mg Cap Take 1 capsule (0.4 mg total) by mouth once daily. 30 capsule 12     No current facility-administered medications for this visit.     Facility-Administered Medications Ordered in Other Visits   Medication Dose Route Frequency Provider Last Rate Last Admin    0.9%  NaCl infusion   Intravenous Continuous Cole Fitzpatrick DO           REVIEW OF SYSTEMS:    GENERAL:  No weight loss, malaise or fevers.  HEENT:  Negative for frequent or significant headaches.  NECK:  Negative for lumps, goiter, pain and significant neck swelling.  RESPIRATORY:  Negative for cough, wheezing or shortness of breath.  CARDIOVASCULAR:  Negative for chest pain, leg swelling or palpitations.  GI:  Negative for abdominal discomfort, blood in stools or black stools or change in bowel habits. GERD  MUSCULOSKELETAL:  See HPI.  SKIN:  Negative for lesions, rash, and itching.  PSYCH:  Negative for sleep disturbance, mood disorder and recent psychosocial stressors.  HEMATOLOGY/LYMPHOLOGY:  Negative for prolonged bleeding, bruising easily or swollen nodes.  NEURO:   No history of headaches, syncope, paralysis, seizures or tremors.  All other reviewed and negative other than HPI.    Past Medical History:  Past Medical History:   Diagnosis Date    Allergy     Arthritis     Family history of malignant neoplasm of gastrointestinal tract mat.gf    Family history of prostate cancer: 1/2 brother 9/25/2017    GERD (gastroesophageal reflux disease)     Kidney cyst, acquired: R 1.2 cm 2015 9/25/2017    Restless leg syndrome     Tubulovillous adenoma 2013 due 2016 9/14/2015       Past Surgical History:  Past Surgical History:   Procedure Laterality Date    APPLICATION OF BONE GRAFT TO FINGER Left 11/8/2021    Procedure: APPLICATION INTEGRA GRAFT, TO FINGER;  Surgeon:  Alba Penn MD;  Location: [facility];  Service: Orthopedics;  Laterality: Left;    CARPAL TUNNEL RELEASE      COLONOSCOPY N/A 5/22/2019    Procedure: COLONOSCOPY;  Surgeon: Juancarlos Foley MD;  Location: [facility] ENDO (4TH FLR);  Service: Endoscopy;  Laterality: N/A;    COLONOSCOPY N/A 10/12/2022    Procedure: COLONOSCOPY;  Surgeon: Cherelle Wang MD;  Location: [facility] ENDO (4TH FLR);  Service: Endoscopy;  Laterality: N/A;  vacc-inst portal-am prep-clears 4 hrs prior-tb    EXCISION OF GANGLION OF WRIST Right 6/28/2019    Procedure: EXCISION, GANGLION CYST, WRIST right;  Surgeon: Alba Penn MD;  [address]  Service: [address]opedics;  Laterality: Right;  regional MAC, stretcher, supine, hand pan 1 and 2,MINI C-arm, call Arthrex    INJECTION OF ANESTHETIC AGENT AROUND NERVE Bilateral 1/27/2022    Procedure: Block, Nerve MEDIAL BRANCH BLOCK BILATERAL L3,4,5  DIRECT REFERRAL 1 OF 2;  Surgeon: Kaiser Braxton MD;  Location: [facility] PAIN MGT;  Service: Pain Management;  Laterality: Bilateral;    INJECTION OF ANESTHETIC AGENT AROUND NERVE Bilateral 2/10/2022    Procedure: Block, Nerve MEDIAL BRANCH BLOCK BILATERAL L3.4.5  DIRECT REFERRAL 2 OF 2;  Surgeon: Kaiser Braxton MD;  Location: [facility] PAIN MGT;  Service: Pain Management;  Laterality: Bilateral;    INJECTION OF FACET JOINT Bilateral 6/6/2019    Procedure: INJECTION, FACET JOINT INJECTION (LUMBAR BLOCK) BILATERAL L4-L5 AND L5-S1 FACET INJECTIONS;  Surgeon: Kaiser Braxton MD;  Location: [facility] PAIN MGT;  Service: Pain Management;  Laterality: Bilateral;  NEEDS CONSENT    INTERPOSITION ARTHROPLASTY OF CARPOMETACARPAL JOINTS Right 6/28/2019    Procedure: INTERPOSITION ARTHROPLASTY, CMC JOINT right;  Surgeon: Alba Penn MD;  Location: [address];  Service: Orthopedics;  Laterality: Right;  regional MAC, stretcher, supine, hand pan 1 and 2,MINI C-arm, call Arthrex    IRRIGATION AND DEBRIDEMENT OF UPPER EXTREMITY Left 11/8/2021    Procedure: IRRIGATION  AND DEBRIDEMENT, UPPER EXTREMITY, left index finger;  Surgeon: Alba Penn MD;  Location: [facility] OR;  Service: Orthopedics;  Laterality: Left;    RADIOFREQUENCY ABLATION Right 5/16/2022    Procedure: Radiofrequency Ablation RIGHT L3,4,5;  Surgeon: Kaiser Braxton MD;  Location: [facility] PAIN MGT;  Service: Pain Management;  Laterality: Right;    RADIOFREQUENCY ABLATION Left 6/2/2022    Procedure: Radiofrequency Ablation LEFT L3,4,5;  Surgeon: Kaiser Braxton MD;  Location: [facility] PAIN MGT;  Service: Pain Management;  Laterality: Left;    REPAIR OF NAIL BED Left 11/8/2021    Procedure: REPAIR, NAIL BED, left index;  Surgeon: Alba Penn MD;  Location: [facility] OR;  Service: Orthopedics;  Laterality: Left;    SPERMATOCELECTOMY Right 11/8/2019    Procedure: EXCISION, SPERMATOCELE;  Surgeon: Sheldon Araya Jr., MD;  Location: [facility] OR 1ST FLR;  Service: Urology;  Laterality: Right;  1hr    TRANSFORAMINAL EPIDURAL INJECTION OF STEROID Bilateral 10/27/2022    Procedure: INJECTION, STEROID, EPIDURAL, TRANSFORAMINAL APPROACH, BILATERAL L5-S1 CONTRAST;  Surgeon: Kaiser Braxton MD;  Location: [facility] PAIN MGT;  Service: Pain Management;  Laterality: Bilateral;       Family History:  Family History   Problem Relation Age of Onset    Arthritis Mother         probable R.A.    Cancer Father         esophageal ca and brain tumor    Esophageal cancer Father     Melanoma Father     Cancer Brother         pancreatic cancer    Cancer Maternal Grandfather         colon    Colon cancer Maternal Grandfather     Irritable bowel syndrome Sister     Arthritis Sister     No Known Problems Son     No Known Problems Brother     No Known Problems Son     Cancer Sister     No Known Problems Brother     Diabetes Neg Hx     Heart disease Neg Hx     Cirrhosis Neg Hx     Celiac disease Neg Hx     Crohn's disease Neg Hx     Ulcerative colitis Neg Hx     Stomach cancer Neg Hx     Rectal cancer Neg Hx     Liver cancer Neg Hx     Psoriasis Neg Hx     Lupus  "Neg Hx        Social History:  Social History     Socioeconomic History    Marital status: Single   Tobacco Use    Smoking status: Never    Smokeless tobacco: Never    Tobacco comments:     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   Substance and Sexual Activity    Alcohol use: Yes     Alcohol/week: 0.0 standard drinks     Comment: 2-3 days weekly, up to 2 beers    Drug use: No    Sexual activity: Yes     Partners: Female     Social Determinants of Health     Transportation Needs: No Transportation Needs    Lack of Transportation (Medical): No    Lack of Transportation (Non-Medical): No   Physical Activity: Sufficiently Active    Days of Exercise per Week: 5 days    Minutes of Exercise per Session: 30 min   Housing Stability: Low Risk     Unable to Pay for Housing in the Last Year: No    Number of Places Lived in the Last Year: 1    Unstable Housing in the Last Year: No       OBJECTIVE:    /81 (BP Location: Right arm, Patient Position: Sitting, BP Method: Medium (Automatic))   Pulse 85   Ht 5' 10" (1.778 m)   Wt 71.4 kg (157 lb 6.5 oz)   BMI 22.59 kg/m²     PHYSICAL EXAMINATION:    General appearance: Well appearing, in no acute distress, alert and oriented x3.  Psych:  Mood and affect appropriate.  Skin: Skin color, texture, turgor normal, no rashes or lesions, in both upper and lower body.  Head/face:  Atraumatic, normocephalic.   Cor: RRR  Pulm: Symmetric chest rise, no respiratory distress noted.   Back: Straight leg raising in the sitting position is negative to radicular pain bilaterally. There is pain to palpation over the lumbar facet joints bilaterally. Limited ROM with pain on flexion and extension. Positive facet loading bilaterally, R>L.   Extremities: No deformities, edema, or skin discoloration. Good capillary refill.  Musculoskeletal: There is no pain with palpation over bilateral SI joints. FABERs is positive on " the right. Bilateral lower extremity strength is normal and symmetric.  No atrophy or tone abnormalities are noted.  Neuro: Bilateral lower extremity coordination and muscle stretch reflexes are physiologic and symmetric.  Plantar response are downgoing. No loss of sensation is noted.  Gait: Antalgic- ambulates without assistance.     ASSESSMENT: 66 y.o. year old male with back pain, consistent with the followin. Chronic pain of right hip  X-Ray Hips Bilateral 2 View Incl AP Pelvis      2. Lumbosacral radiculopathy        3. Lumbar radiculopathy        4. Lumbar spondylosis        5. DDD (degenerative disc disease), lumbar        6. Spinal stenosis of lumbar region without neurogenic claudication        7. DDD (degenerative disc disease), lumbosacral        8. Discogenic low back pain                  PLAN:     - Previous imaging was reviewed and discussed with the patient today.    - He is s/p bilateral L5/S1 TF PERCY with limited relief.     - Obtain xray of hips.     - Consider hip joint injection versus L1/2 and L2/3 TF PERCY. Will discuss with Dr. Braxton    - I have stressed the importance of physical activity and a home exercise plan to help with pain and improve health.    - Patient can continue with medications for now since they are providing benefits, using them appropriately, and without side effects.    - RTC PRN. I will call call him with imaging results and plan.       - Counseled patient regarding the importance of activity modification and physical therapy.    The above plan and management options were discussed at length with patient. Patient is in agreement with the above and verbalized understanding.    Yumi Naik NP  2022

## 2022-11-17 DIAGNOSIS — Z12.11 COLON CANCER SCREENING: Primary | ICD-10-CM

## 2022-11-21 ENCOUNTER — TELEPHONE (OUTPATIENT)
Dept: PAIN MEDICINE | Facility: CLINIC | Age: 67
End: 2022-11-21
Payer: MEDICARE

## 2022-11-21 DIAGNOSIS — M25.551 CHRONIC PAIN OF RIGHT HIP: Primary | ICD-10-CM

## 2022-11-21 DIAGNOSIS — M53.3 SACROILIAC JOINT PAIN: ICD-10-CM

## 2022-11-21 DIAGNOSIS — G89.29 CHRONIC PAIN OF RIGHT HIP: Primary | ICD-10-CM

## 2022-11-21 NOTE — TELEPHONE ENCOUNTER
Phoned patient and discussed xray results. He continues to report right hip and buttock pain. His leg pain has improved. We discussed right SI joint injection, he agreed, orders placed.

## 2022-11-22 ENCOUNTER — PATIENT MESSAGE (OUTPATIENT)
Dept: PAIN MEDICINE | Facility: OTHER | Age: 67
End: 2022-11-22
Payer: MEDICARE

## 2022-11-22 ENCOUNTER — TELEPHONE (OUTPATIENT)
Dept: PAIN MEDICINE | Facility: OTHER | Age: 67
End: 2022-11-22
Payer: MEDICARE

## 2022-12-05 ENCOUNTER — IMMUNIZATION (OUTPATIENT)
Dept: INTERNAL MEDICINE | Facility: CLINIC | Age: 67
End: 2022-12-05
Payer: MEDICARE

## 2022-12-05 ENCOUNTER — OFFICE VISIT (OUTPATIENT)
Dept: INTERNAL MEDICINE | Facility: CLINIC | Age: 67
End: 2022-12-05
Payer: MEDICARE

## 2022-12-05 VITALS
SYSTOLIC BLOOD PRESSURE: 132 MMHG | WEIGHT: 154 LBS | DIASTOLIC BLOOD PRESSURE: 78 MMHG | HEART RATE: 76 BPM | BODY MASS INDEX: 22.05 KG/M2 | HEIGHT: 70 IN

## 2022-12-05 DIAGNOSIS — R73.01 IFG (IMPAIRED FASTING GLUCOSE): ICD-10-CM

## 2022-12-05 DIAGNOSIS — Z00.00 ANNUAL PHYSICAL EXAM: Primary | ICD-10-CM

## 2022-12-05 DIAGNOSIS — I25.10 CORONARY ARTERY DISEASE INVOLVING NATIVE CORONARY ARTERY OF NATIVE HEART WITHOUT ANGINA PECTORIS: ICD-10-CM

## 2022-12-05 DIAGNOSIS — D36.9 TUBULOVILLOUS ADENOMA: ICD-10-CM

## 2022-12-05 DIAGNOSIS — M54.17 LUMBOSACRAL RADICULOPATHY: ICD-10-CM

## 2022-12-05 PROBLEM — D12.6 TUBULAR ADENOMA OF COLON: Status: RESOLVED | Noted: 2020-01-29 | Resolved: 2022-12-05

## 2022-12-05 PROCEDURE — 3008F PR BODY MASS INDEX (BMI) DOCUMENTED: ICD-10-PCS | Mod: CPTII,S$GLB,, | Performed by: INTERNAL MEDICINE

## 2022-12-05 PROCEDURE — 3288F PR FALLS RISK ASSESSMENT DOCUMENTED: ICD-10-PCS | Mod: CPTII,S$GLB,, | Performed by: INTERNAL MEDICINE

## 2022-12-05 PROCEDURE — G0008 FLU VACCINE - QUADRIVALENT - ADJUVANTED: ICD-10-PCS | Mod: S$GLB,,, | Performed by: INTERNAL MEDICINE

## 2022-12-05 PROCEDURE — 99999 PR PBB SHADOW E&M-EST. PATIENT-LVL III: CPT | Mod: PBBFAC,,, | Performed by: INTERNAL MEDICINE

## 2022-12-05 PROCEDURE — 3288F FALL RISK ASSESSMENT DOCD: CPT | Mod: CPTII,S$GLB,, | Performed by: INTERNAL MEDICINE

## 2022-12-05 PROCEDURE — 90694 FLU VACCINE - QUADRIVALENT - ADJUVANTED: ICD-10-PCS | Mod: S$GLB,,, | Performed by: INTERNAL MEDICINE

## 2022-12-05 PROCEDURE — 1125F PR PAIN SEVERITY QUANTIFIED, PAIN PRESENT: ICD-10-PCS | Mod: CPTII,S$GLB,, | Performed by: INTERNAL MEDICINE

## 2022-12-05 PROCEDURE — 99397 PER PM REEVAL EST PAT 65+ YR: CPT | Mod: S$GLB,,, | Performed by: INTERNAL MEDICINE

## 2022-12-05 PROCEDURE — 99397 PR PREVENTIVE VISIT,EST,65 & OVER: ICD-10-PCS | Mod: S$GLB,,, | Performed by: INTERNAL MEDICINE

## 2022-12-05 PROCEDURE — 3075F PR MOST RECENT SYSTOLIC BLOOD PRESS GE 130-139MM HG: ICD-10-PCS | Mod: CPTII,S$GLB,, | Performed by: INTERNAL MEDICINE

## 2022-12-05 PROCEDURE — 1101F PT FALLS ASSESS-DOCD LE1/YR: CPT | Mod: CPTII,S$GLB,, | Performed by: INTERNAL MEDICINE

## 2022-12-05 PROCEDURE — 3008F BODY MASS INDEX DOCD: CPT | Mod: CPTII,S$GLB,, | Performed by: INTERNAL MEDICINE

## 2022-12-05 PROCEDURE — 1125F AMNT PAIN NOTED PAIN PRSNT: CPT | Mod: CPTII,S$GLB,, | Performed by: INTERNAL MEDICINE

## 2022-12-05 PROCEDURE — 1101F PR PT FALLS ASSESS DOC 0-1 FALLS W/OUT INJ PAST YR: ICD-10-PCS | Mod: CPTII,S$GLB,, | Performed by: INTERNAL MEDICINE

## 2022-12-05 PROCEDURE — 3044F PR MOST RECENT HEMOGLOBIN A1C LEVEL <7.0%: ICD-10-PCS | Mod: CPTII,S$GLB,, | Performed by: INTERNAL MEDICINE

## 2022-12-05 PROCEDURE — 3078F PR MOST RECENT DIASTOLIC BLOOD PRESSURE < 80 MM HG: ICD-10-PCS | Mod: CPTII,S$GLB,, | Performed by: INTERNAL MEDICINE

## 2022-12-05 PROCEDURE — 90694 VACC AIIV4 NO PRSRV 0.5ML IM: CPT | Mod: S$GLB,,, | Performed by: INTERNAL MEDICINE

## 2022-12-05 PROCEDURE — 3075F SYST BP GE 130 - 139MM HG: CPT | Mod: CPTII,S$GLB,, | Performed by: INTERNAL MEDICINE

## 2022-12-05 PROCEDURE — G0008 ADMIN INFLUENZA VIRUS VAC: HCPCS | Mod: S$GLB,,, | Performed by: INTERNAL MEDICINE

## 2022-12-05 PROCEDURE — 1159F PR MEDICATION LIST DOCUMENTED IN MEDICAL RECORD: ICD-10-PCS | Mod: CPTII,S$GLB,, | Performed by: INTERNAL MEDICINE

## 2022-12-05 PROCEDURE — 1159F MED LIST DOCD IN RCRD: CPT | Mod: CPTII,S$GLB,, | Performed by: INTERNAL MEDICINE

## 2022-12-05 PROCEDURE — 3044F HG A1C LEVEL LT 7.0%: CPT | Mod: CPTII,S$GLB,, | Performed by: INTERNAL MEDICINE

## 2022-12-05 PROCEDURE — 3078F DIAST BP <80 MM HG: CPT | Mod: CPTII,S$GLB,, | Performed by: INTERNAL MEDICINE

## 2022-12-05 PROCEDURE — 99999 PR PBB SHADOW E&M-EST. PATIENT-LVL III: ICD-10-PCS | Mod: PBBFAC,,, | Performed by: INTERNAL MEDICINE

## 2022-12-05 RX ORDER — ROSUVASTATIN CALCIUM 10 MG/1
10 TABLET, COATED ORAL DAILY
Qty: 90 TABLET | Refills: 3 | Status: SHIPPED | OUTPATIENT
Start: 2022-12-05 | End: 2024-03-29 | Stop reason: SDUPTHER

## 2022-12-05 RX ORDER — CYCLOBENZAPRINE HCL 10 MG
10 TABLET ORAL
COMMUNITY
Start: 2022-09-12 | End: 2022-12-22 | Stop reason: SDUPTHER

## 2022-12-06 RX ORDER — ASPIRIN 81 MG/1
81 TABLET ORAL DAILY
Qty: 30 TABLET | Refills: 12
Start: 2022-12-06 | End: 2023-12-06

## 2022-12-06 NOTE — PROGRESS NOTES
Patient ID: Sabino Rubio is a 67 y.o. male.    Chief Complaint: Annual Exam      Assessment:       1. Annual physical exam    2. Lumbosacral radiculopathy    3. Coronary artery disease: CT score 8 2022    4. Tubulovillous adenoma: 2013; adenomas 10/22 repeat colonoscopy 2025    5. IFG (impaired fasting glucose)            Plan:         1. Annual physical exam    2. Lumbosacral radiculopathy    3. Coronary artery disease: CT score 8 2022    4. Tubulovillous adenoma: 2013; adenomas 10/22 repeat colonoscopy 2025    5. IFG (impaired fasting glucose)    Other orders  -     rosuvastatin (CRESTOR) 10 MG tablet; Take 1 tablet (10 mg total) by mouth once daily.  Dispense: 90 tablet; Refill: 3     Aspirin issues reviewed  COVID booster  Shingrix # 2  Labs and review  Keep Ortho follow up  CAD issues discussed, no sx    Subjective:   Annual exam    Overall doing well other than orthopedic issues.    Patient Active Problem List:     Restless leg syndrome     Cervical spondylosis without myelopathy     Brachial neuritis or radiculitis NOS     Tubulovillous adenoma: 2013; adenomas 10/22 repeat colonoscopy 2025     Kidney cyst, acquired: R 1.2 cm 2015; stable 1/19 no follow up recommended     Spondylosis without myelopathy or radiculopathy, lumbar region     Arthritis of carpometacarpal (CMC) joint of right thumb     Allergic rhinitis     Spermatocele     Diverticulosis: see colonoscopy 2019     IFG (impaired fasting glucose)     Decreased range of motion     Coronary artery disease: CT score 8 2022     Myofascial pain     DDD (degenerative disc disease), lumbosacral     Lumbosacral radiculopathy         Review of Systems   Constitutional: Negative.    HENT:  Negative for sinus pressure.    Eyes:  Negative for visual disturbance.   Respiratory:  Negative for apnea, choking, shortness of breath and stridor.    Cardiovascular:  Negative for chest pain.   Gastrointestinal:  Negative for abdominal pain, constipation and  diarrhea.   Genitourinary:  Negative for difficulty urinating, flank pain, frequency, testicular pain and urgency.   Musculoskeletal:  Positive for arthralgias and back pain.        Following closely in Ortho, see above   Skin:  Negative for color change and rash.   Neurological: Negative.    Psychiatric/Behavioral: Negative.         Objective:      Physical Exam  Constitutional:       Appearance: He is well-developed.   HENT:      Head: Normocephalic and atraumatic.      Right Ear: External ear normal.      Left Ear: External ear normal.   Eyes:      Extraocular Movements: Extraocular movements intact.      Conjunctiva/sclera: Conjunctivae normal.   Neck:      Thyroid: No thyromegaly.   Cardiovascular:      Rate and Rhythm: Normal rate and regular rhythm.      Heart sounds: No murmur heard.  Pulmonary:      Effort: Pulmonary effort is normal. No respiratory distress.      Breath sounds: Normal breath sounds. No wheezing.   Abdominal:      General: There is no distension.      Palpations: Abdomen is soft.      Tenderness: There is no abdominal tenderness.   Musculoskeletal:         General: No tenderness.      Cervical back: Normal range of motion and neck supple.      Right lower leg: No edema.      Left lower leg: No edema.   Lymphadenopathy:      Cervical: No cervical adenopathy.   Skin:     General: Skin is warm and dry.   Neurological:      General: No focal deficit present.      Mental Status: He is alert and oriented to person, place, and time.      Cranial Nerves: No cranial nerve deficit.   Psychiatric:         Mood and Affect: Mood normal.         Behavior: Behavior normal.         Thought Content: Thought content normal.         Judgment: Judgment normal.           Health Maintenance Due   Topic Date Due    Aspirin/Antiplatelet Therapy  Never done    COVID-19 Vaccine (4 - Booster for Pfizer series) 01/14/2022    Shingles Vaccine (3 of 3) 03/02/2022    Pneumococcal Vaccines (Age 65+) (2 - PPSV23 if  available, else PCV20) 01/05/2023

## 2022-12-08 ENCOUNTER — HOSPITAL ENCOUNTER (OUTPATIENT)
Facility: OTHER | Age: 67
Discharge: HOME | End: 2022-12-08
Attending: ANESTHESIOLOGY | Admitting: ANESTHESIOLOGY
Payer: MEDICARE

## 2022-12-08 VITALS
BODY MASS INDEX: 22.48 KG/M2 | DIASTOLIC BLOOD PRESSURE: 81 MMHG | WEIGHT: 157 LBS | SYSTOLIC BLOOD PRESSURE: 148 MMHG | HEIGHT: 70 IN | OXYGEN SATURATION: 98 % | HEART RATE: 83 BPM | RESPIRATION RATE: 12 BRPM | TEMPERATURE: 98 F

## 2022-12-08 DIAGNOSIS — G89.29 CHRONIC PAIN: ICD-10-CM

## 2022-12-08 DIAGNOSIS — M46.1 SACROILIITIS: Primary | ICD-10-CM

## 2022-12-08 PROCEDURE — 27096 INJECT SACROILIAC JOINT: CPT | Mod: RT | Performed by: ANESTHESIOLOGY

## 2022-12-08 PROCEDURE — 27096 PR INJECTION,SACROILIAC JOINT: ICD-10-PCS | Mod: RT,,, | Performed by: ANESTHESIOLOGY

## 2022-12-08 PROCEDURE — 25500020 PHARM REV CODE 255: Performed by: ANESTHESIOLOGY

## 2022-12-08 PROCEDURE — 63600175 PHARM REV CODE 636 W HCPCS: Performed by: ANESTHESIOLOGY

## 2022-12-08 PROCEDURE — 27096 INJECT SACROILIAC JOINT: CPT | Mod: RT,,, | Performed by: ANESTHESIOLOGY

## 2022-12-08 PROCEDURE — 25000003 PHARM REV CODE 250: Performed by: ANESTHESIOLOGY

## 2022-12-08 RX ORDER — TRIAMCINOLONE ACETONIDE 40 MG/ML
INJECTION, SUSPENSION INTRA-ARTICULAR; INTRAMUSCULAR
Status: DISCONTINUED | OUTPATIENT
Start: 2022-12-08 | End: 2022-12-08 | Stop reason: HOSPADM

## 2022-12-08 RX ORDER — SODIUM CHLORIDE 9 MG/ML
500 INJECTION, SOLUTION INTRAVENOUS CONTINUOUS
Status: DISCONTINUED | OUTPATIENT
Start: 2022-12-08 | End: 2022-12-08 | Stop reason: HOSPADM

## 2022-12-08 RX ORDER — LIDOCAINE HYDROCHLORIDE 20 MG/ML
INJECTION, SOLUTION INFILTRATION; PERINEURAL
Status: DISCONTINUED | OUTPATIENT
Start: 2022-12-08 | End: 2022-12-08 | Stop reason: HOSPADM

## 2022-12-08 RX ORDER — BUPIVACAINE HYDROCHLORIDE 2.5 MG/ML
INJECTION, SOLUTION EPIDURAL; INFILTRATION; INTRACAUDAL
Status: DISCONTINUED | OUTPATIENT
Start: 2022-12-08 | End: 2022-12-08 | Stop reason: HOSPADM

## 2022-12-08 NOTE — OP NOTE
Sacroiliac Joint Injection under Fluoroscopic Guidance    The procedure, risks, benefits, and options were discussed with the patient. There are no contraindications to the procedure. The patent expressed understanding and agreed to the procedure. Informed written consent was obtained prior to the start of the procedure and can be found in the patient's chart.    PATIENT NAME: Sabino Rubio   MRN: [id number]     DATE OF PROCEDURE: 12/08/2022    PROCEDURE: Right Sacroiliac Joint Injection under Fluoroscopic Guidance    PRE-OP DIAGNOSIS: Chronic pain of right hip [M25.551, G89.29]  Sacroiliac joint pain [M53.3]    POST-OP DIAGNOSIS: Same    PHYSICIAN: Kaiser Braxton MD    ASSISTANTS: none     MEDICATIONS INJECTED: Preservative-free Kenalog 40mg with 3cc of Bupivacine 0.25%     LOCAL ANESTHETIC INJECTED: Xylocaine 2%     SEDATION: None    ESTIMATED BLOOD LOSS: None    COMPLICATIONS: None    TECHNIQUE: Time-out was performed to identify the patient and procedure to be performed. With the patient laying in a prone position, the surgical area was prepped and draped in the usual sterile fashion using ChloraPrep and a fenestrated drape. The sacroiliac joint was determined under fluoroscopy guidance. Skin anesthesia was achieved by injecting Lidocaine 2% over the injection site. The sacroiliac joint was  then approached with a 25 gauge, 3.5 inch spinal quinke needle that was introduced under fluoroscopic guidance in the AP and Lateral views. Once the needle tip was in the area of the joint, and there was no blood, contrast dye Omnipaque (240mg/mL) was injected to confirm placement and there was no vascular runoff. Fluoroscopic imaging in the AP and lateral views revealed a clear outline of the joint space. 4 mL of the medication mixture listed above was injected slowly intraarticular and lenny-articular. Displacement of the radio opaque contrast after injection of the medication confirmed that the medication went into the area of the  joint. The needles were removed and bleeding was nil.  A sterile dressing was applied. No specimens collected. The patient tolerated the procedure well.   PAIN BEFORE THE PROCEDURE: 3-8/10    PAIN AFTER THE PROCEDURE: 0/10     The patient was monitored after the procedure in the recovery area. They were given post-procedure and discharge instructions to follow at home. The patient was discharged in a stable condition.        Kaiser Braxton MD

## 2022-12-08 NOTE — DISCHARGE SUMMARY
Discharge Note  Short Stay      SUMMARY     Admit Date: 12/8/2022    Attending Physician: Kaiser Braxton      Discharge Physician: Kaiser Braxton      Discharge Date: 12/8/2022 9:43 AM    Procedure(s) (LRB):  INJECTION,SACROILIAC JOINT RIGHT  CONTRAST (Right)    Final Diagnosis: Chronic pain of right hip [M25.551, G89.29]  Sacroiliac joint pain [M53.3]    Disposition: Home or self care    Patient Instructions:   Current Discharge Medication List        CONTINUE these medications which have NOT CHANGED    Details   aspirin (ECOTRIN) 81 MG EC tablet Take 1 tablet (81 mg total) by mouth once daily.  Qty: 30 tablet, Refills: 12      cyclobenzaprine (FLEXERIL) 10 MG tablet Take 10 mg by mouth. for ten days      fluticasone propionate (FLONASE) 50 mcg/actuation nasal spray 1 SPRAY (50 MCG TOTAL) BY EACH NOSTRIL ROUTE 2 (TWO) TIMES A DAY.  Qty: 48 mL, Refills: 1      gabapentin (NEURONTIN) 300 MG capsule TAKE 1 CAPSULE BY MOUTH THREE TIMES A DAY  Qty: 90 capsule, Refills: 0    Associated Diagnoses: Degeneration of cervical intervertebral disc      meloxicam (MOBIC) 15 MG tablet TAKE 1 TABLET (15 MG TOTAL) BY MOUTH DAILY AS NEEDED FOR PAIN (TAKE WITH FOOD OR A MEAL).  Qty: 90 tablet, Refills: 3    Associated Diagnoses: Degeneration of cervical intervertebral disc      rosuvastatin (CRESTOR) 10 MG tablet Take 1 tablet (10 mg total) by mouth once daily.  Qty: 90 tablet, Refills: 3      sildenafiL (VIAGRA) 100 MG tablet Take 1 tablet (100 mg total) by mouth daily as needed.  Qty: 15 tablet, Refills: 11      tamsulosin (FLOMAX) 0.4 mg Cap Take 1 capsule (0.4 mg total) by mouth once daily.  Qty: 30 capsule, Refills: 12                 Discharge Diagnosis: Chronic pain of right hip [M25.551, G89.29]  Sacroiliac joint pain [M53.3]  Condition on Discharge: Stable with no complications to procedure   Diet on Discharge: Same as before.  Activity: as per instruction sheet.  Discharge to: Home with a responsible adult.  Follow up: 2-4  weeks       Please call my office or pager at [phone] if experienced any weakness or loss of sensation, fever > 101.5, pain uncontrolled with oral medications, persistent nausea/vomiting/or diarrhea, redness or drainage from the incisions, or any other worrisome concerns. If physician on call was not reached or could not communicate with our office for any reason please go to the nearest emergency department

## 2022-12-08 NOTE — DISCHARGE INSTRUCTIONS

## 2022-12-21 ENCOUNTER — TELEPHONE (OUTPATIENT)
Dept: PAIN MEDICINE | Facility: CLINIC | Age: 67
End: 2022-12-21
Payer: MEDICARE

## 2022-12-21 NOTE — TELEPHONE ENCOUNTER
"This message is for patient in regards to his/her appointment 12/22/22 at 8:00a       [facility] Policy: For the safety of all patients and staff members.     Patient Visitor policy: Due to social distancing and limited seating staff are requesting patient to arrive to their schedule appointments on time.     Upon arriving to facility; patients are required to wear a face mask, if patient do not have a face mask one will be provided. Upon arriving patient we ask patients to contact clinic at this number [phone] to notify staff that they have arrived or they may do so by utilizing the Mobile checked in [place](if patient have patient portal; clinical staff will send a message through there letting them know it's okay to proceed to their visit). Staff will give the patient the "okay" to come up or wait inside their vehicle until clinic is ready for patient to be seen by Yumi Naik NP. If you have any questions or concerns please contact [phone]     Patient verbalized and confirmed appt  "

## 2022-12-22 ENCOUNTER — OFFICE VISIT (OUTPATIENT)
Dept: PAIN MEDICINE | Facility: CLINIC | Age: 67
End: 2022-12-22
Payer: MEDICARE

## 2022-12-22 VITALS
BODY MASS INDEX: 22.19 KG/M2 | TEMPERATURE: 97 F | RESPIRATION RATE: 19 BRPM | DIASTOLIC BLOOD PRESSURE: 86 MMHG | SYSTOLIC BLOOD PRESSURE: 128 MMHG | HEIGHT: 70 IN | WEIGHT: 155 LBS | HEART RATE: 72 BPM

## 2022-12-22 DIAGNOSIS — M54.17 LUMBOSACRAL RADICULOPATHY: ICD-10-CM

## 2022-12-22 DIAGNOSIS — M47.816 LUMBAR SPONDYLOSIS: ICD-10-CM

## 2022-12-22 DIAGNOSIS — M51.36 DDD (DEGENERATIVE DISC DISEASE), LUMBAR: ICD-10-CM

## 2022-12-22 DIAGNOSIS — M79.18 MYOFASCIAL PAIN: ICD-10-CM

## 2022-12-22 DIAGNOSIS — M48.061 SPINAL STENOSIS OF LUMBAR REGION WITHOUT NEUROGENIC CLAUDICATION: ICD-10-CM

## 2022-12-22 DIAGNOSIS — M53.3 SACROILIAC JOINT PAIN: Primary | ICD-10-CM

## 2022-12-22 DIAGNOSIS — M54.16 LUMBAR RADICULOPATHY: ICD-10-CM

## 2022-12-22 PROCEDURE — 3074F PR MOST RECENT SYSTOLIC BLOOD PRESSURE < 130 MM HG: ICD-10-PCS | Mod: CPTII,S$GLB,, | Performed by: NURSE PRACTITIONER

## 2022-12-22 PROCEDURE — 1160F PR REVIEW ALL MEDS BY PRESCRIBER/CLIN PHARMACIST DOCUMENTED: ICD-10-PCS | Mod: CPTII,S$GLB,, | Performed by: NURSE PRACTITIONER

## 2022-12-22 PROCEDURE — 3074F SYST BP LT 130 MM HG: CPT | Mod: CPTII,S$GLB,, | Performed by: NURSE PRACTITIONER

## 2022-12-22 PROCEDURE — 3288F PR FALLS RISK ASSESSMENT DOCUMENTED: ICD-10-PCS | Mod: CPTII,S$GLB,, | Performed by: NURSE PRACTITIONER

## 2022-12-22 PROCEDURE — 1101F PR PT FALLS ASSESS DOC 0-1 FALLS W/OUT INJ PAST YR: ICD-10-PCS | Mod: CPTII,S$GLB,, | Performed by: NURSE PRACTITIONER

## 2022-12-22 PROCEDURE — 99213 OFFICE O/P EST LOW 20 MIN: CPT | Mod: S$GLB,,, | Performed by: NURSE PRACTITIONER

## 2022-12-22 PROCEDURE — 1125F AMNT PAIN NOTED PAIN PRSNT: CPT | Mod: CPTII,S$GLB,, | Performed by: NURSE PRACTITIONER

## 2022-12-22 PROCEDURE — 1159F PR MEDICATION LIST DOCUMENTED IN MEDICAL RECORD: ICD-10-PCS | Mod: CPTII,S$GLB,, | Performed by: NURSE PRACTITIONER

## 2022-12-22 PROCEDURE — 3288F FALL RISK ASSESSMENT DOCD: CPT | Mod: CPTII,S$GLB,, | Performed by: NURSE PRACTITIONER

## 2022-12-22 PROCEDURE — 3044F PR MOST RECENT HEMOGLOBIN A1C LEVEL <7.0%: ICD-10-PCS | Mod: CPTII,S$GLB,, | Performed by: NURSE PRACTITIONER

## 2022-12-22 PROCEDURE — 99999 PR PBB SHADOW E&M-EST. PATIENT-LVL III: CPT | Mod: PBBFAC,,, | Performed by: NURSE PRACTITIONER

## 2022-12-22 PROCEDURE — 99999 PR PBB SHADOW E&M-EST. PATIENT-LVL III: ICD-10-PCS | Mod: PBBFAC,,, | Performed by: NURSE PRACTITIONER

## 2022-12-22 PROCEDURE — 1101F PT FALLS ASSESS-DOCD LE1/YR: CPT | Mod: CPTII,S$GLB,, | Performed by: NURSE PRACTITIONER

## 2022-12-22 PROCEDURE — 1160F RVW MEDS BY RX/DR IN RCRD: CPT | Mod: CPTII,S$GLB,, | Performed by: NURSE PRACTITIONER

## 2022-12-22 PROCEDURE — 1125F PR PAIN SEVERITY QUANTIFIED, PAIN PRESENT: ICD-10-PCS | Mod: CPTII,S$GLB,, | Performed by: NURSE PRACTITIONER

## 2022-12-22 PROCEDURE — 3008F PR BODY MASS INDEX (BMI) DOCUMENTED: ICD-10-PCS | Mod: CPTII,S$GLB,, | Performed by: NURSE PRACTITIONER

## 2022-12-22 PROCEDURE — 3008F BODY MASS INDEX DOCD: CPT | Mod: CPTII,S$GLB,, | Performed by: NURSE PRACTITIONER

## 2022-12-22 PROCEDURE — 99213 PR OFFICE/OUTPT VISIT, EST, LEVL III, 20-29 MIN: ICD-10-PCS | Mod: S$GLB,,, | Performed by: NURSE PRACTITIONER

## 2022-12-22 PROCEDURE — 1159F MED LIST DOCD IN RCRD: CPT | Mod: CPTII,S$GLB,, | Performed by: NURSE PRACTITIONER

## 2022-12-22 PROCEDURE — 3079F PR MOST RECENT DIASTOLIC BLOOD PRESSURE 80-89 MM HG: ICD-10-PCS | Mod: CPTII,S$GLB,, | Performed by: NURSE PRACTITIONER

## 2022-12-22 PROCEDURE — 3079F DIAST BP 80-89 MM HG: CPT | Mod: CPTII,S$GLB,, | Performed by: NURSE PRACTITIONER

## 2022-12-22 PROCEDURE — 3044F HG A1C LEVEL LT 7.0%: CPT | Mod: CPTII,S$GLB,, | Performed by: NURSE PRACTITIONER

## 2022-12-22 RX ORDER — CYCLOBENZAPRINE HCL 10 MG
10 TABLET ORAL 3 TIMES DAILY PRN
Qty: 30 TABLET | Refills: 1 | Status: SHIPPED | OUTPATIENT
Start: 2022-12-22

## 2022-12-22 NOTE — PROGRESS NOTES
Chronic patient Established Note (Follow up visit)      SUBJECTIVE:    Interval History 12/22/2022:  The patient returns to clinic today for follow up of low back pain. He is s/p bilateral SI joint injections on 12/8/2022. He reports 50% relief of his pain. He continues to report right sided low back and buttock pain. He denies any radicular leg pain. His pain is worse with driving, prolonged sitting, and moving from sitting to standing. He continues to perform a home exercise routine. He is taking Gabapentin, Flexeril, and Mobic. He denies any other health changes. His pain today is 2/10.    Interval History 11/11/2022:  The patient returns to clinic today for follow up of low back pain. He is s/p bilateral L5/S1 TF PERCY on 10/27/2022. He reports limited relief of his pain. He continues to report low back and right hip pain. He reports intermittent radiating pain into his right posterior leg to his knee. He reports intermittent numbness to his legs. His pain is worse with moving from sitting to standing. He also reports increased pain with prolonged standing. He continues to perform a home exercise routine. He continues to take Gabapentin, Flexeril, and Mobic. He denies any other health changes. His pain today is 2/10.    Interval History 10/10/2022:  The patient returns to clinic today for follow up of low back pain. He is here today for imaging review. He continues to report low back pain that radiates into his right buttock and posterior thigh. His pain is worse with moving from sitting to standing, prolonged standing, and activity. He reports intermittent weakness into his legs with moving from sitting to standing. He has completed physical therapy. He continues to perform a home exercise routine. He continues to take Gabapentin, Flexeril, and Mobic. He denies any bowel or bladder incontinence. His pain today is 2/10.      Interval History 9/13/2022:  Sabino Rubio presents to the clinic for a follow-up  appointment for low back pain. He reports neuroma injection at last office visit provided short term relief. He continues reports low back pain that radiates into his right buttock. He denies any radicular leg pain. His pain is worse with moving from sitting to standing, prolonged sitting, and activity. He does endorse morning stiffness. He is currently participating in physical therapy with some benefit. He is taking Gabapentin, Flexeril, and Mobic. He denies any other health changes. His pain today is 5/10.    Interval History 8/2/2022:   Patient returns to clinic today reporting 2/10 pain at rest which increases to 4-6/10 when standing. Pain is primarily low back in nature with radiation to the right buttock. He reports that pain is exacerbated by prolonged immobility specifically when awakening from sleep or sitting for long periods of time. At the last visit, he was prescribed Flexeril, but has noted only minimal benefit from it. He has been working with PT and plans on starting to go to the gym. Prior RFA's have provided some relief, he reports his first left-sided procedure produced significant relief whereas the second right-sided procedure only produced moderate relief.    Interval History 6/16/2022:  Patient presents today for evaluationfollow-up of their low back pain s/p Bilateral  Lumbar RFA . Per pateint his axial LBP is almost 50-60% better after the RFA but he is still suffering from some pain in Left lower back ,mostly stiffness and spasm .no radicular component  Is associated with LBP. No B/B incontinence/no numbness /motor or sensory deficit.     Pain Disability Index Review:  Last 3 PDI Scores 11/11/2022 10/10/2022 9/13/2022   Pain Disability Index (PDI) 21 24 20       Pain Medications:  Gabapentin  Mobic  Flexeril    Opioid Contract: no     report:  Not applicable    Pain Procedures:   6/6/2019- Bilateral L4/5 and L5/S1 facet joint injection  1/27/2022- Bilateral L3,4,5 MBB  2/10/2022-  Bilateral L3,4,5 MBB  5/16/2022- Right L3,4,5 RFA  6/2/2022- Left L3,4,5 RFA  10/27/2022- Bilateral L5/S1 TF PERCY  12/8/2022- Bilateral SI joint injections    Physical Therapy/Home Exercise: yes    Imaging:   MRI Lumbar Spine 10/5/2022:  COMPARISON:  Radiograph 06/16/2022, MRI 01/09/2019.     FINDINGS:  Lumbar spine alignment demonstrates levoscoliosis with straightening of the normal lordosis.  Vertebral body heights are well maintained without evidence for acute fracture.  Schmorl's node extends into the superior endplate of the L3 vertebral body, unchanged when compared to the previous MRI.  Benign osseous hemangioma at the L4 vertebral body.  No marrow signal abnormality to suggest an infiltrative process.     There is advanced degenerative disc space narrowing and desiccation throughout the visualized thoracolumbar spine, progressed when compared to the previous MRI most notably at the L1-L2 level noting severe associated endplate edema.     Distal spinal cord demonstrates normal contour and signal intensity.  Cauda equina appears normal without findings to suggest arachnoiditis.  Conus medullaris terminates at L1.     Right renal cortical cyst.  SI joints are symmetric.  Paraspinal musculature demonstrates normal bulk and signal intensity.     T12-L1: Circumferential disc bulge encroaches into the bilateral foraminal zones.  Bilateral facet arthropathy and bilateral ligamentum flavum buckling.  Findings contribute to mild spinal canal stenosis and mild right neural foraminal narrowing.     L1-L2: Circumferential disc bulge encroaches into the right neural foramen and likely abuts the right exiting L1 nerve root.  Bilateral facet arthropathy and bilateral ligamentum flavum buckling.  Findings contribute to moderate spinal canal stenosis and severe right and mild left neural foraminal narrowing.     L2-L3: Circumferential disc bulge, bilateral facet arthropathy and bilateral ligamentum flavum buckling.  Findings  contribute to mild spinal canal stenosis and mild left neural foraminal narrowing.     L3-L4: Circumferential disc bulge encroaches into the bilateral foraminal zones.  Bilateral facet arthropathy and bilateral ligamentum flavum buckling.  Findings contribute to moderate to severe spinal canal and lateral recess stenosis and mild bilateral neural foraminal narrowing.     L4-L5: Circumferential disc bulge encroaches into the bilateral foraminal zones.  Bilateral facet arthropathy and bilateral ligamentum flavum buckling.  Findings contribute to moderate spinal canal and lateral recess stenosis and moderate to severe left and moderate right neural foraminal narrowing.     L5-S1: Circumferential disc bulge encroaches into the bilateral foraminal zones.  Bilateral facet arthropathy and bilateral ligamentum flavum buckling.  Findings contribute to mild lateral recess stenosis and moderate to severe bilateral neural foraminal narrowing with suspected impingement of the left exiting L5 nerve root.     Impression:     1. Lumbar levoscoliosis with advanced superimposed degenerative changes most pronounced at L1-L2 and from L3-L4 through L5-S1, progressed when compared to MRI dated 01/09/2019.    Xray Lumbar Spine 6/16/2022:  COMPARISON:  Lumbar spine radiograph from 05/24/2019.     FINDINGS:  Alignment: Mild levocurvature of lumbar spine.  Vertebral bodies adequately aligned on sagittal views.  No dynamic instability.     Vertebrae: Persistent concavity along superior endplate of L3, similar to prior exam.  Remaining vertebral body heights are adequately maintained.  No definite spondylolysis on oblique views.  No suspicious appearing lytic or blastic lesions.     Discs and facets: Multilevel moderate to severe disc space narrowing most prominent at L1-L2, L4-L5, and L5-S1 with sclerotic endplate changes.  Vacuum disc phenomenon present at L1-L2 and L5-S1.  Multilevel anterior osteophyte formation.  Advanced facet  arthropathy most prominent in the lower lumbar spine.     Miscellaneous: No additional findings.     Impression:     As above.    MRI Lumbar Spine 1/9/2019:  COMPARISON:  Lumbar spine radiograph 10/03/2017     FINDINGS:  Alignment: Mild straightening of normal lumbar lordosis.     Vertebrae: Mild vertebral height loss and degenerative signal change.  No evidence of acute fracture or infiltrative marrow process.     Discs: Intervertebral disc height loss and degenerative signal change, most prominent within the lower lumbar spine.     Cord: Normal.  Conus terminates at L1-L2.     Degenerative findings:     T12-L1: Mild broad-based disc bulge and facet arthropathy without significant spinal canal stenosis or neural foraminal narrowing.     L1-L2: Mild broad-based disc bulge and facet arthropathy without significant spinal canal stenosis or neural foraminal narrowing.     L2-L3: Broad-based disc bulge, ligamentum flavum thickening, and facet arthropathy without significant spinal canal stenosis or neural foraminal narrowing.     L3-L4: Broad-based disc bulge, ligamentum flavum thickening, and facet arthropathy resulting in moderate spinal canal stenosis and mild bilateral neural foraminal narrowing.     L4-L5: Broad-based disc bulge, ligamentum flavum thickening, and facet arthropathy resulting in moderate spinal canal stenosis, moderate left neural foraminal narrowing, and mild right neural foraminal narrowing.     L5-S1: Broad-based disc bulge, ligamentum flavum thickening, and facet arthropathy resulting in moderate left and mild right neural foraminal narrowing.     Paraspinal muscles & soft tissues: Unremarkable.     IMPRESSION:      Multilevel degenerative change of the lumbar spine most significant at L3-L4 which demonstrates moderate spinal canal stenosis and mild bilateral neural foraminal narrowing.  Moderate left and mild right neural foraminal narrowing seen at L4-L5 and L5-S1.  Additional details  above.    Allergies: Review of patient's allergies indicates:  No Known Allergies    Current Medications:   Current Outpatient Medications   Medication Sig Dispense Refill    aspirin (ECOTRIN) 81 MG EC tablet Take 1 tablet (81 mg total) by mouth once daily. 30 tablet 12    cyclobenzaprine (FLEXERIL) 10 MG tablet Take 10 mg by mouth. for ten days      fluticasone propionate (FLONASE) 50 mcg/actuation nasal spray 1 SPRAY (50 MCG TOTAL) BY EACH NOSTRIL ROUTE 2 (TWO) TIMES A DAY. 48 mL 1    gabapentin (NEURONTIN) 300 MG capsule TAKE 1 CAPSULE BY MOUTH THREE TIMES A DAY 90 capsule 0    meloxicam (MOBIC) 15 MG tablet TAKE 1 TABLET (15 MG TOTAL) BY MOUTH DAILY AS NEEDED FOR PAIN (TAKE WITH FOOD OR A MEAL). 90 tablet 3    rosuvastatin (CRESTOR) 10 MG tablet Take 1 tablet (10 mg total) by mouth once daily. 90 tablet 3    sildenafiL (VIAGRA) 100 MG tablet Take 1 tablet (100 mg total) by mouth daily as needed. 15 tablet 11    tamsulosin (FLOMAX) 0.4 mg Cap Take 1 capsule (0.4 mg total) by mouth once daily. 30 capsule 12     No current facility-administered medications for this visit.     Facility-Administered Medications Ordered in Other Visits   Medication Dose Route Frequency Provider Last Rate Last Admin    0.9%  NaCl infusion   Intravenous Continuous Cole Fitzpatrick DO           REVIEW OF SYSTEMS:    GENERAL:  No weight loss, malaise or fevers.  HEENT:  Negative for frequent or significant headaches.  NECK:  Negative for lumps, goiter, pain and significant neck swelling.  RESPIRATORY:  Negative for cough, wheezing or shortness of breath.  CARDIOVASCULAR:  Negative for chest pain, leg swelling or palpitations.  GI:  Negative for abdominal discomfort, blood in stools or black stools or change in bowel habits. GERD  MUSCULOSKELETAL:  See HPI.  SKIN:  Negative for lesions, rash, and itching.  PSYCH:  Negative for sleep disturbance, mood disorder and recent psychosocial stressors.  HEMATOLOGY/LYMPHOLOGY:  Negative for prolonged  bleeding, bruising easily or swollen nodes.  NEURO:   No history of headaches, syncope, paralysis, seizures or tremors.  All other reviewed and negative other than HPI.    Past Medical History:  Past Medical History:   Diagnosis Date    Allergy     Arthritis     Family history of malignant neoplasm of gastrointestinal tract mat.gf    Family history of prostate cancer: 1/2 brother 9/25/2017    GERD (gastroesophageal reflux disease)     Kidney cyst, acquired: R 1.2 cm 2015 9/25/2017    Restless leg syndrome     Tubulovillous adenoma 2013 due 2016 9/14/2015       Past Surgical History:  Past Surgical History:   Procedure Laterality Date    APPLICATION OF BONE GRAFT TO FINGER Left 11/8/2021    Procedure: APPLICATION INTEGRA GRAFT, TO FINGER;  Surgeon: Alba Penn MD;  Location: [facility];  Service: Orthopedics;  Laterality: Left;    CARPAL TUNNEL RELEASE      COLONOSCOPY N/A 5/22/2019    Procedure: COLONOSCOPY;  Surgeon: Juancarlos Foley MD;  [facility]  Service: Endoscopy;  Laterality: N/A;    COLONOSCOPY N/A 10/12/2022    Procedure: COLONOSCOPY;  Surgeon: Cherelle Wang MD;  [facility]  Service: Endoscopy;  Laterality: N/A;  vacc-inst portal-am prep-clears 4 hrs prior-tb    EXCISION OF GANGLION OF WRIST Right 6/28/2019    Procedure: EXCISION, GANGLION CYST, WRIST right;  Surgeon: Alba Penn MD;  Location: [address] FLR;  Service: Orthopedics;  Laterality: Right;  regional MAC, stretcher, supine, hand pan 1 and 2,MINI C-arm, call Arthrex    INJECTION OF ANESTHETIC AGENT AROUND NERVE Bilateral 1/27/2022    Procedure: Block, Nerve MEDIAL BRANCH BLOCK BILATERAL L3,4,5  DIRECT REFERRAL 1 OF 2;  Surgeon: Kaiser Braxton MD;  Location: [facility] PAIN MGT;  Service: Pain Management;  Laterality: Bilateral;    INJECTION OF ANESTHETIC AGENT AROUND NERVE Bilateral 2/10/2022    Procedure: Block, Nerve MEDIAL BRANCH BLOCK BILATERAL L3.4.5  DIRECT REFERRAL 2 OF 2;  Surgeon: Kaiser  MD Fox;  Location: [facility] PAIN MGT;  Service: Pain Management;  Laterality: Bilateral;    INJECTION OF FACET JOINT Bilateral 6/6/2019    Procedure: INJECTION, FACET JOINT INJECTION (LUMBAR BLOCK) BILATERAL L4-L5 AND L5-S1 FACET INJECTIONS;  Surgeon: Kaiser Braxton MD;  Location: [facility] PAIN MGT;  Service: Pain Management;  Laterality: Bilateral;  NEEDS CONSENT    INJECTION, SACROILIAC JOINT Right 12/8/2022    Procedure: INJECTION,SACROILIAC JOINT RIGHT  CONTRAST;  Surgeon: Kaiser Braxton MD;  Location: [facility] PAIN MGT;  Service: Pain Management;  Laterality: Right;    INTERPOSITION ARTHROPLASTY OF CARPOMETACARPAL JOINTS Right 6/28/2019    Procedure: INTERPOSITION ARTHROPLASTY, CMC JOINT right;  Surgeon: Alba Penn MD;  [facility];  Service: Orthopedics;  Laterality: Right;  regional MAC, stretcher, supine, hand pan 1 and 2,MINI C-arm, call Arthrex    IRRIGATION AND DEBRIDEMENT OF UPPER EXTREMITY Left 11/8/2021    Procedure: IRRIGATION AND DEBRIDEMENT, UPPER EXTREMITY, left index finger;  Surgeon: Alba Penn MD;  Location: [facility] OR;  Service: Orthopedics;  Laterality: Left;    RADIOFREQUENCY ABLATION Right 5/16/2022    Procedure: Radiofrequency Ablation RIGHT L3,4,5;  Surgeon: Kaiser Braxton MD;  Location: [facility] PAIN MGT;  Service: Pain Management;  Laterality: Right;    RADIOFREQUENCY ABLATION Left 6/2/2022    Procedure: Radiofrequency Ablation LEFT L3,4,5;  Surgeon: Kaiser Braxton MD;  Location: [facility] PAIN MGT;  Service: Pain Management;  Laterality: Left;    REPAIR OF NAIL BED Left 11/8/2021    Procedure: REPAIR, NAIL BED, left index;  Surgeon: Alba Penn MD;  Location: [facility] OR;  Service: Orthopedics;  Laterality: Left;    SPERMATOCELECTOMY Right 11/8/2019    Procedure: EXCISION, SPERMATOCELE;  Surgeon: Sheldon Araya Jr., MD;  Location: [facility] OR 1ST FLR;  Service: Urology;  Laterality: Right;  1hr    TRANSFORAMINAL EPIDURAL INJECTION OF STEROID Bilateral 10/27/2022    Procedure:  INJECTION, STEROID, EPIDURAL, TRANSFORAMINAL APPROACH, BILATERAL L5-S1 CONTRAST;  Surgeon: Kaiser Braxton MD;  Location: [facility] PAIN MGT;  Service: Pain Management;  Laterality: Bilateral;       Family History:  Family History   Problem Relation Age of Onset    Arthritis Mother         probable R.A.    Cancer Father         esophageal ca and brain tumor    Esophageal cancer Father     Melanoma Father     Cancer Brother         pancreatic cancer    Cancer Maternal Grandfather         colon    Colon cancer Maternal Grandfather     Irritable bowel syndrome Sister     Arthritis Sister     No Known Problems Son     No Known Problems Brother     No Known Problems Son     Cancer Sister     No Known Problems Brother     Diabetes Neg Hx     Heart disease Neg Hx     Cirrhosis Neg Hx     Celiac disease Neg Hx     Crohn's disease Neg Hx     Ulcerative colitis Neg Hx     Stomach cancer Neg Hx     Rectal cancer Neg Hx     Liver cancer Neg Hx     Psoriasis Neg Hx     Lupus Neg Hx        Social History:  Social History     Socioeconomic History    Marital status: Single    Number of children: 2   Tobacco Use    Smoking status: Never    Smokeless tobacco: Never    Tobacco comments:     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   Substance and Sexual Activity    Alcohol use: Yes     Alcohol/week: 0.0 standard drinks     Comment: 2-3 days weekly, up to 2 beers    Drug use: No    Sexual activity: Yes     Partners: Female     Social Determinants of Health     Transportation Needs: No Transportation Needs    Lack of Transportation (Medical): No    Lack of Transportation (Non-Medical): No   Physical Activity: Sufficiently Active    Days of Exercise per Week: 5 days    Minutes of Exercise per Session: 30 min   Housing Stability: Low Risk     Unable to Pay for Housing in the Last Year: No    Number of Places Lived in the Last Year: 1    Unstable Housing in the Last  "Year: No       OBJECTIVE:    /86 (BP Location: Right arm, Patient Position: Sitting)   Pulse 72   Temp 97.4 °F (36.3 °C) (Oral)   Resp 19   Ht 5' 10" (1.778 m)   Wt 70.3 kg (154 lb 15.7 oz)   BMI 22.24 kg/m²     PHYSICAL EXAMINATION:    General appearance: Well appearing, in no acute distress, alert and oriented x3.  Psych:  Mood and affect appropriate.  Skin: Skin color, texture, turgor normal, no rashes or lesions, in both upper and lower body.  Head/face:  Atraumatic, normocephalic.   Cor: RRR  Pulm: Symmetric chest rise, no respiratory distress noted.   Back: Straight leg raising in the sitting position is negative to radicular pain bilaterally. There is mild pain to palpation over the lumbar facet joints bilaterally. Limited ROM with pain on flexion and extension. Positive facet loading bilaterally, R>L.   Extremities: No deformities, edema, or skin discoloration. Good capillary refill.  Musculoskeletal: There is no pain with palpation over bilateral SI joints. FABERs is positive on the right. Bilateral lower extremity strength is normal and symmetric.  No atrophy or tone abnormalities are noted.  Neuro: Bilateral lower extremity coordination and muscle stretch reflexes are physiologic and symmetric.  Plantar response are downgoing. No loss of sensation is noted.  Gait: Antalgic- ambulates without assistance.     ASSESSMENT: 67 y.o. year old male with back pain, consistent with the followin. Sacroiliac joint pain        2. Lumbosacral radiculopathy        3. Lumbar radiculopathy        4. Lumbar spondylosis        5. DDD (degenerative disc disease), lumbar        6. Spinal stenosis of lumbar region without neurogenic claudication        7. Myofascial pain  cyclobenzaprine (FLEXERIL) 10 MG tablet                  PLAN:     - Previous imaging was reviewed and discussed with the patient today.    - He is s/p bilateral SI joint injections with some benefit.     - I have stressed the importance " of physical activity and a home exercise plan to help with pain and improve health.    - Patient can continue with medications for now since they are providing benefits, using them appropriately, and without side effects.    - RTC in 3 months or sooner if needed.     - Counseled patient regarding the importance of activity modification and physical therapy.    The above plan and management options were discussed at length with patient. Patient is in agreement with the above and verbalized understanding.    Yumi Naik NP  12/22/2022

## 2023-01-13 ENCOUNTER — PES CALL (OUTPATIENT)
Dept: ADMINISTRATIVE | Facility: CLINIC | Age: 68
End: 2023-01-13
Payer: MEDICARE

## 2023-02-02 ENCOUNTER — PES CALL (OUTPATIENT)
Dept: ADMINISTRATIVE | Facility: CLINIC | Age: 68
End: 2023-02-02
Payer: MEDICARE

## 2023-02-06 ENCOUNTER — TELEPHONE (OUTPATIENT)
Dept: PRIMARY CARE CLINIC | Facility: CLINIC | Age: 68
End: 2023-02-06
Payer: MEDICARE

## 2023-02-06 NOTE — TELEPHONE ENCOUNTER
----- Message from Hue Rich sent at 2/4/2023  8:34 AM CST -----  Who Called: ENDO SCHEDULING    What is the request in detail: Pre-Admission Testing referral expires prior to the first available appointment. Please extend expiration to allow for scheduling.    Can the clinic reply by [facility]? NO    Best Call Back Number: [phone]    Additional Information:        Initial (On Arrival)

## 2023-03-01 ENCOUNTER — PES CALL (OUTPATIENT)
Dept: ADMINISTRATIVE | Facility: CLINIC | Age: 68
End: 2023-03-01
Payer: MEDICARE

## 2023-03-13 ENCOUNTER — PES CALL (OUTPATIENT)
Dept: ADMINISTRATIVE | Facility: CLINIC | Age: 68
End: 2023-03-13
Payer: MEDICARE

## 2023-04-19 ENCOUNTER — PATIENT MESSAGE (OUTPATIENT)
Dept: UROLOGY | Facility: CLINIC | Age: 68
End: 2023-04-19
Payer: MEDICARE

## 2023-05-04 ENCOUNTER — OFFICE VISIT (OUTPATIENT)
Dept: UROLOGY | Facility: CLINIC | Age: 68
End: 2023-05-04
Payer: MEDICARE

## 2023-05-04 VITALS
WEIGHT: 154 LBS | BODY MASS INDEX: 22.05 KG/M2 | SYSTOLIC BLOOD PRESSURE: 127 MMHG | DIASTOLIC BLOOD PRESSURE: 79 MMHG | HEART RATE: 85 BPM | HEIGHT: 70 IN

## 2023-05-04 DIAGNOSIS — N40.0 BENIGN NON-NODULAR PROSTATIC HYPERPLASIA WITHOUT LOWER URINARY TRACT SYMPTOMS: Primary | ICD-10-CM

## 2023-05-04 PROCEDURE — 1126F AMNT PAIN NOTED NONE PRSNT: CPT | Mod: CPTII,S$GLB,, | Performed by: UROLOGY

## 2023-05-04 PROCEDURE — 3074F SYST BP LT 130 MM HG: CPT | Mod: CPTII,S$GLB,, | Performed by: UROLOGY

## 2023-05-04 PROCEDURE — 99999 PR PBB SHADOW E&M-EST. PATIENT-LVL III: ICD-10-PCS | Mod: PBBFAC,,, | Performed by: UROLOGY

## 2023-05-04 PROCEDURE — 3008F BODY MASS INDEX DOCD: CPT | Mod: CPTII,S$GLB,, | Performed by: UROLOGY

## 2023-05-04 PROCEDURE — 1160F PR REVIEW ALL MEDS BY PRESCRIBER/CLIN PHARMACIST DOCUMENTED: ICD-10-PCS | Mod: CPTII,S$GLB,, | Performed by: UROLOGY

## 2023-05-04 PROCEDURE — 1160F RVW MEDS BY RX/DR IN RCRD: CPT | Mod: CPTII,S$GLB,, | Performed by: UROLOGY

## 2023-05-04 PROCEDURE — 1101F PR PT FALLS ASSESS DOC 0-1 FALLS W/OUT INJ PAST YR: ICD-10-PCS | Mod: CPTII,S$GLB,, | Performed by: UROLOGY

## 2023-05-04 PROCEDURE — 1159F PR MEDICATION LIST DOCUMENTED IN MEDICAL RECORD: ICD-10-PCS | Mod: CPTII,S$GLB,, | Performed by: UROLOGY

## 2023-05-04 PROCEDURE — 3288F FALL RISK ASSESSMENT DOCD: CPT | Mod: CPTII,S$GLB,, | Performed by: UROLOGY

## 2023-05-04 PROCEDURE — 99999 PR PBB SHADOW E&M-EST. PATIENT-LVL III: CPT | Mod: PBBFAC,,, | Performed by: UROLOGY

## 2023-05-04 PROCEDURE — 99213 PR OFFICE/OUTPT VISIT, EST, LEVL III, 20-29 MIN: ICD-10-PCS | Mod: S$GLB,,, | Performed by: UROLOGY

## 2023-05-04 PROCEDURE — 99213 OFFICE O/P EST LOW 20 MIN: CPT | Mod: S$GLB,,, | Performed by: UROLOGY

## 2023-05-04 PROCEDURE — 3008F PR BODY MASS INDEX (BMI) DOCUMENTED: ICD-10-PCS | Mod: CPTII,S$GLB,, | Performed by: UROLOGY

## 2023-05-04 PROCEDURE — 3288F PR FALLS RISK ASSESSMENT DOCUMENTED: ICD-10-PCS | Mod: CPTII,S$GLB,, | Performed by: UROLOGY

## 2023-05-04 PROCEDURE — 3074F PR MOST RECENT SYSTOLIC BLOOD PRESSURE < 130 MM HG: ICD-10-PCS | Mod: CPTII,S$GLB,, | Performed by: UROLOGY

## 2023-05-04 PROCEDURE — 3078F DIAST BP <80 MM HG: CPT | Mod: CPTII,S$GLB,, | Performed by: UROLOGY

## 2023-05-04 PROCEDURE — 1101F PT FALLS ASSESS-DOCD LE1/YR: CPT | Mod: CPTII,S$GLB,, | Performed by: UROLOGY

## 2023-05-04 PROCEDURE — 3078F PR MOST RECENT DIASTOLIC BLOOD PRESSURE < 80 MM HG: ICD-10-PCS | Mod: CPTII,S$GLB,, | Performed by: UROLOGY

## 2023-05-04 PROCEDURE — 1126F PR PAIN SEVERITY QUANTIFIED, NO PAIN PRESENT: ICD-10-PCS | Mod: CPTII,S$GLB,, | Performed by: UROLOGY

## 2023-05-04 PROCEDURE — 1159F MED LIST DOCD IN RCRD: CPT | Mod: CPTII,S$GLB,, | Performed by: UROLOGY

## 2023-05-04 RX ORDER — TAMSULOSIN HYDROCHLORIDE 0.4 MG/1
0.4 CAPSULE ORAL DAILY
Qty: 90 CAPSULE | Refills: 3 | Status: SHIPPED | OUTPATIENT
Start: 2023-05-04 | End: 2023-10-04 | Stop reason: SDUPTHER

## 2023-05-04 RX ORDER — FLUTICASONE PROPIONATE 50 MCG
1 SPRAY, SUSPENSION (ML) NASAL 2 TIMES DAILY
Qty: 48 ML | Refills: 2 | Status: SHIPPED | OUTPATIENT
Start: 2023-05-04

## 2023-05-04 NOTE — PROGRESS NOTES
Subjective:       Patient ID: Sabino Rubio is a 67 y.o. male.    Chief Complaint: Medication Refill (flomax)    HPI patient is here for prostate check.  His symptoms are well controlled with Flomax 0.4 mg p.o. q.day.  He is emptying his bladder.  He occasionally gets up at night.  He has no complaints    Past Medical History:   Diagnosis Date    Allergy     Arthritis     Family history of malignant neoplasm of gastrointestinal tract mat.gf    Family history of prostate cancer: 1/2 brother 9/25/2017    GERD (gastroesophageal reflux disease)     Kidney cyst, acquired: R 1.2 cm 2015 9/25/2017    Restless leg syndrome     Tubulovillous adenoma 2013 due 2016 9/14/2015       Past Surgical History:   Procedure Laterality Date    APPLICATION OF BONE GRAFT TO FINGER Left 11/8/2021    Procedure: APPLICATION INTEGRA GRAFT, TO FINGER;  Surgeon: Alba Penn MD;  Location: [facility];  Service: Orthopedics;  Laterality: Left;    CARPAL TUNNEL RELEASE      COLONOSCOPY N/A 5/22/2019    Procedure: COLONOSCOPY;  Surgeon: Juancarlos Foley MD;  Location: [facility] (4TH FLR);  Service: Endoscopy;  Laterality: N/A;    COLONOSCOPY N/A 10/12/2022    Procedure: COLONOSCOPY;  Surgeon: Cherelle Wang MD;  Location: [facility] ENDO (4TH FLR);  Service: Endoscopy;  Laterality: N/A;  vacc-inst portal-am prep-clears 4 hrs prior-tb    EXCISION OF GANGLION OF WRIST Right 6/28/2019    Procedure: EXCISION, GANGLION CYST, WRIST right;  Surgeon: Alba Penn MD;  Location: [address] FLR;  Service: Orthopedics;  Laterality: Right;  regional MAC, stretcher, supine, hand pan 1 and 2,MINI C-arm, call Arthrex    INJECTION OF ANESTHETIC AGENT AROUND NERVE Bilateral 1/27/2022    Procedure: Block, Nerve MEDIAL BRANCH BLOCK BILATERAL L3,4,5  DIRECT REFERRAL 1 OF 2;  Surgeon: Kaiser Braxton MD;  Location: [facility] PAIN MGT;  Service: Pain Management;  Laterality: Bilateral;    INJECTION OF ANESTHETIC AGENT AROUND NERVE Bilateral 2/10/2022    Procedure:  Block, Nerve MEDIAL BRANCH BLOCK BILATERAL L3.4.5  DIRECT REFERRAL 2 OF 2;  Surgeon: Kaiser Braxton MD;  Location: [facility] [place] PAIN MGT;  Service: Pain Management;  Laterality: Bilateral;    INJECTION OF FACET JOINT Bilateral 6/6/2019    Procedure: INJECTION, FACET JOINT INJECTION (LUMBAR BLOCK) BILATERAL L4-L5 AND L5-S1 FACET INJECTIONS;  Surgeon: Kaiser Braxton MD;  Location: [facility] PAIN MGT;  Service: Pain Management;  Laterality: Bilateral;  NEEDS CONSENT    INJECTION, SACROILIAC JOINT Right 12/8/2022    Procedure: INJECTION,SACROILIAC JOINT RIGHT  CONTRAST;  Surgeon: Kaiser Braxton MD;  Location: [facility] [place] PAIN MGT;  Service: Pain Management;  Laterality: Right;    INTERPOSITION ARTHROPLASTY OF CARPOMETACARPAL JOINTS Right 6/28/2019    Procedure: INTERPOSITION ARTHROPLASTY, CMC JOINT right;  Surgeon: Alba Penn MD;  Location: [facility] OR 1ST FLR;  Service: Orthopedics;  Laterality: Right;  regional MAC, stretcher, supine, hand pan 1 and 2,MINI C-arm, call Arthrex    IRRIGATION AND DEBRIDEMENT OF UPPER EXTREMITY Left 11/8/2021    Procedure: IRRIGATION AND DEBRIDEMENT, UPPER EXTREMITY, left index finger;  Surgeon: Alba Penn MD;  Location: [facility] OR;  Service: Orthopedics;  Laterality: Left;    RADIOFREQUENCY ABLATION Right 5/16/2022    Procedure: Radiofrequency Ablation RIGHT L3,4,5;  Surgeon: Kaiser Braxton MD;  Location: [facility] [place] PAIN MGT;  Service: Pain Management;  Laterality: Right;    RADIOFREQUENCY ABLATION Left 6/2/2022    Procedure: Radiofrequency Ablation LEFT L3,4,5;  Surgeon: Kaiser Braxton MD;  Location: [facility] [place] PAIN MGT;  Service: Pain Management;  Laterality: Left;    REPAIR OF NAIL BED Left 11/8/2021    Procedure: REPAIR, NAIL BED, left index;  Surgeon: Alba Penn MD;  Location: [facility] OR;  Service: Orthopedics;  Laterality: Left;    SPERMATOCELECTOMY Right 11/8/2019    Procedure: EXCISION, SPERMATOCELE;  Surgeon: Sheldon Araya Jr., MD;  [facility];  Service: Urology;  Laterality:  Right;  1hr    TRANSFORAMINAL EPIDURAL INJECTION OF STEROID Bilateral 10/27/2022    Procedure: INJECTION, STEROID, EPIDURAL, TRANSFORAMINAL APPROACH, BILATERAL L5-S1 CONTRAST;  Surgeon: Kaiser Braxton MD;  Location: [facility] PAIN T;  Service: Pain Management;  Laterality: Bilateral;       Family History   Problem Relation Age of Onset    Arthritis Mother         probable R.A.    Cancer Father         esophageal ca and brain tumor    Esophageal cancer Father     Melanoma Father     Cancer Brother         pancreatic cancer    Cancer Maternal Grandfather         colon    Colon cancer Maternal Grandfather     Irritable bowel syndrome Sister     Arthritis Sister     No Known Problems Son     No Known Problems Brother     No Known Problems Son     Cancer Sister     No Known Problems Brother     Diabetes Neg Hx     Heart disease Neg Hx     Cirrhosis Neg Hx     Celiac disease Neg Hx     Crohn's disease Neg Hx     Ulcerative colitis Neg Hx     Stomach cancer Neg Hx     Rectal cancer Neg Hx     Liver cancer Neg Hx     Psoriasis Neg Hx     Lupus Neg Hx        Social History     Socioeconomic History    Marital status: Single    Number of children: 2   Tobacco Use    Smoking status: Never    Smokeless tobacco: Never    Tobacco comments:     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   Substance and Sexual Activity    Alcohol use: Yes     Alcohol/week: 0.0 standard drinks     Comment: 2-3 days weekly, up to 2 beers    Drug use: No    Sexual activity: Yes     Partners: Female     Social Determinants of Health     Transportation Needs: No Transportation Needs    Lack of Transportation (Medical): No    Lack of Transportation (Non-Medical): No   Physical Activity: Sufficiently Active    Days of Exercise per Week: 5 days    Minutes of Exercise per Session: 30 min   Housing Stability: Low Risk     Unable to Pay for Housing in the Last Year: No    Number of Places  Lived in the Last Year: 1    Unstable Housing in the Last Year: No       Allergies:  Patient has no known allergies.    Medications:    Current Outpatient Medications:     aspirin (ECOTRIN) 81 MG EC tablet, Take 1 tablet (81 mg total) by mouth once daily., Disp: 30 tablet, Rfl: 12    cyclobenzaprine (FLEXERIL) 10 MG tablet, Take 1 tablet (10 mg total) by mouth 3 (three) times daily as needed for Muscle spasms. for ten days, Disp: 30 tablet, Rfl: 1    fluticasone propionate (FLONASE) 50 mcg/actuation nasal spray, 1 SPRAY (50 MCG TOTAL) BY EACH NOSTRIL ROUTE 2 (TWO) TIMES A DAY., Disp: 48 mL, Rfl: 1    gabapentin (NEURONTIN) 300 MG capsule, TAKE 1 CAPSULE BY MOUTH THREE TIMES A DAY, Disp: 90 capsule, Rfl: 0    meloxicam (MOBIC) 15 MG tablet, TAKE 1 TABLET (15 MG TOTAL) BY MOUTH DAILY AS NEEDED FOR PAIN (TAKE WITH FOOD OR A MEAL)., Disp: 90 tablet, Rfl: 3    rosuvastatin (CRESTOR) 10 MG tablet, Take 1 tablet (10 mg total) by mouth once daily., Disp: 90 tablet, Rfl: 3    tamsulosin (FLOMAX) 0.4 mg Cap, Take 1 capsule (0.4 mg total) by mouth once daily., Disp: 30 capsule, Rfl: 12    sildenafiL (VIAGRA) 100 MG tablet, Take 1 tablet (100 mg total) by mouth daily as needed., Disp: 15 tablet, Rfl: 11    tamsulosin (FLOMAX) 0.4 mg Cap, Take 1 capsule (0.4 mg total) by mouth once daily., Disp: 90 capsule, Rfl: 3  No current facility-administered medications for this visit.    Facility-Administered Medications Ordered in Other Visits:     0.9%  NaCl infusion, , Intravenous, Continuous, Cole Fitzpatrick DO    Review of Systems   Constitutional:  Negative for activity change, appetite change, chills, diaphoresis, fatigue, fever and unexpected weight change.   HENT:  Negative for congestion, dental problem, hearing loss, mouth sores, postnasal drip, rhinorrhea, sinus pressure and trouble swallowing.    Eyes:  Negative for pain, discharge and itching.   Respiratory:  Negative for apnea, cough, choking, chest tightness, shortness of  breath and wheezing.    Cardiovascular:  Negative for chest pain, palpitations and leg swelling.   Gastrointestinal:  Negative for abdominal distention, abdominal pain, anal bleeding, blood in stool, constipation, diarrhea, nausea, rectal pain and vomiting.   Endocrine: Negative for polydipsia and polyuria.   Genitourinary:  Negative for decreased urine volume, difficulty urinating, dysuria, enuresis, flank pain, frequency, genital sores, hematuria, penile discharge, penile pain, penile swelling, scrotal swelling, testicular pain and urgency.   Musculoskeletal:  Negative for arthralgias, back pain and myalgias.   Skin:  Negative for color change, rash and wound.   Neurological:  Negative for dizziness, syncope, speech difficulty, light-headedness and headaches.   Hematological:  Negative for adenopathy. Does not bruise/bleed easily.   Psychiatric/Behavioral:  Negative for behavioral problems, confusion, hallucinations and sleep disturbance.      Objective:      Physical Exam  Constitutional:       Appearance: He is well-developed.   HENT:      Head: Normocephalic.   Cardiovascular:      Rate and Rhythm: Normal rate.   Pulmonary:      Effort: Pulmonary effort is normal.   Abdominal:      Palpations: Abdomen is soft.   Genitourinary:     Prostate: Normal.      Comments: 30 g benign  Skin:     General: Skin is warm.   Neurological:      Mental Status: He is alert.       Assessment:       1. Benign non-nodular prostatic hyperplasia without lower urinary tract symptoms        Plan:       Sabino was seen today for medication refill.    Diagnoses and all orders for this visit:    Benign non-nodular prostatic hyperplasia without lower urinary tract symptoms    Other orders  -     tamsulosin (FLOMAX) 0.4 mg Cap; Take 1 capsule (0.4 mg total) by mouth once daily.

## 2023-05-04 NOTE — TELEPHONE ENCOUNTER
Care Due:                  Date            Visit Type   Department     Provider  --------------------------------------------------------------------------------                                EP -                              PRIMARY      [facility] INTER[facility]  Last Visit: 12-      CARE ([facility])   MEDICINE       Alba Baum  Next Visit: None Scheduled  None         None Found                                                            Last  Test          Frequency    Reason                     Performed    Due Date  --------------------------------------------------------------------------------    CMP.........  12 months..  rosuvastatin.............  01- 12-    Lipid Panel.  12 months..  rosuvastatin.............  06- 05-    [facility] Embedded Care Due Messages. Reference number: [id number].   5/04/2023 11:09:53 AM CDT

## 2023-05-05 NOTE — TELEPHONE ENCOUNTER
Provider Staff:  Action required for this patient     CMP and Lipid panel due    Please see care gap opportunities below in Care Due Message.    Thanks!  [facility]     Appointments      Date Provider   Last Visit   12/5/2022 Alba Baum MD   Next Visit   Visit date not found Alba Baum MD     Refill Decision Note   Sabino Rubio  is requesting a refill authorization.  Brief Assessment and Rationale for Refill:  Approve     Medication Therapy Plan:         Comments:     Note composed:8:36 PM 05/04/2023

## 2023-05-09 ENCOUNTER — OFFICE VISIT (OUTPATIENT)
Dept: OPTOMETRY | Facility: CLINIC | Age: 68
End: 2023-05-09
Payer: MEDICARE

## 2023-05-09 DIAGNOSIS — H16.9 KERATITIS: Primary | ICD-10-CM

## 2023-05-09 PROCEDURE — 1125F PR PAIN SEVERITY QUANTIFIED, PAIN PRESENT: ICD-10-PCS | Mod: CPTII,S$GLB,, | Performed by: OPTOMETRIST

## 2023-05-09 PROCEDURE — 3288F FALL RISK ASSESSMENT DOCD: CPT | Mod: CPTII,S$GLB,, | Performed by: OPTOMETRIST

## 2023-05-09 PROCEDURE — 99213 OFFICE O/P EST LOW 20 MIN: CPT | Mod: S$GLB,,, | Performed by: OPTOMETRIST

## 2023-05-09 PROCEDURE — 99999 PR PBB SHADOW E&M-EST. PATIENT-LVL II: ICD-10-PCS | Mod: PBBFAC,,, | Performed by: OPTOMETRIST

## 2023-05-09 PROCEDURE — 1125F AMNT PAIN NOTED PAIN PRSNT: CPT | Mod: CPTII,S$GLB,, | Performed by: OPTOMETRIST

## 2023-05-09 PROCEDURE — 1160F RVW MEDS BY RX/DR IN RCRD: CPT | Mod: CPTII,S$GLB,, | Performed by: OPTOMETRIST

## 2023-05-09 PROCEDURE — 1159F PR MEDICATION LIST DOCUMENTED IN MEDICAL RECORD: ICD-10-PCS | Mod: CPTII,S$GLB,, | Performed by: OPTOMETRIST

## 2023-05-09 PROCEDURE — 1159F MED LIST DOCD IN RCRD: CPT | Mod: CPTII,S$GLB,, | Performed by: OPTOMETRIST

## 2023-05-09 PROCEDURE — 3288F PR FALLS RISK ASSESSMENT DOCUMENTED: ICD-10-PCS | Mod: CPTII,S$GLB,, | Performed by: OPTOMETRIST

## 2023-05-09 PROCEDURE — 1101F PT FALLS ASSESS-DOCD LE1/YR: CPT | Mod: CPTII,S$GLB,, | Performed by: OPTOMETRIST

## 2023-05-09 PROCEDURE — 1101F PR PT FALLS ASSESS DOC 0-1 FALLS W/OUT INJ PAST YR: ICD-10-PCS | Mod: CPTII,S$GLB,, | Performed by: OPTOMETRIST

## 2023-05-09 PROCEDURE — 99213 PR OFFICE/OUTPT VISIT, EST, LEVL III, 20-29 MIN: ICD-10-PCS | Mod: S$GLB,,, | Performed by: OPTOMETRIST

## 2023-05-09 PROCEDURE — 1160F PR REVIEW ALL MEDS BY PRESCRIBER/CLIN PHARMACIST DOCUMENTED: ICD-10-PCS | Mod: CPTII,S$GLB,, | Performed by: OPTOMETRIST

## 2023-05-09 PROCEDURE — 99999 PR PBB SHADOW E&M-EST. PATIENT-LVL II: CPT | Mod: PBBFAC,,, | Performed by: OPTOMETRIST

## 2023-05-09 RX ORDER — TOBRAMYCIN 3 MG/ML
SOLUTION/ DROPS OPHTHALMIC
Qty: 5 ML | Refills: 0 | Status: SHIPPED | OUTPATIENT
Start: 2023-05-09 | End: 2023-05-16

## 2023-05-09 RX ORDER — ERYTHROMYCIN 5 MG/G
OINTMENT OPHTHALMIC
Qty: 3.5 G | Refills: 0 | Status: SHIPPED | OUTPATIENT
Start: 2023-05-09 | End: 2023-07-26

## 2023-05-23 ENCOUNTER — OFFICE VISIT (OUTPATIENT)
Dept: PAIN MEDICINE | Facility: CLINIC | Age: 68
End: 2023-05-23
Payer: MEDICARE

## 2023-05-23 VITALS
BODY MASS INDEX: 22 KG/M2 | SYSTOLIC BLOOD PRESSURE: 125 MMHG | WEIGHT: 153.69 LBS | HEART RATE: 80 BPM | DIASTOLIC BLOOD PRESSURE: 78 MMHG | HEIGHT: 70 IN

## 2023-05-23 DIAGNOSIS — M47.816 LUMBAR SPONDYLOSIS: Primary | ICD-10-CM

## 2023-05-23 DIAGNOSIS — M51.36 DDD (DEGENERATIVE DISC DISEASE), LUMBAR: ICD-10-CM

## 2023-05-23 DIAGNOSIS — M48.061 SPINAL STENOSIS OF LUMBAR REGION WITHOUT NEUROGENIC CLAUDICATION: ICD-10-CM

## 2023-05-23 DIAGNOSIS — M53.3 SACROILIAC JOINT PAIN: ICD-10-CM

## 2023-05-23 PROCEDURE — 99999 PR PBB SHADOW E&M-EST. PATIENT-LVL III: ICD-10-PCS | Mod: PBBFAC,,, | Performed by: NURSE PRACTITIONER

## 2023-05-23 PROCEDURE — 3288F PR FALLS RISK ASSESSMENT DOCUMENTED: ICD-10-PCS | Mod: CPTII,S$GLB,, | Performed by: NURSE PRACTITIONER

## 2023-05-23 PROCEDURE — 3288F FALL RISK ASSESSMENT DOCD: CPT | Mod: CPTII,S$GLB,, | Performed by: NURSE PRACTITIONER

## 2023-05-23 PROCEDURE — 99213 OFFICE O/P EST LOW 20 MIN: CPT | Mod: S$GLB,,, | Performed by: NURSE PRACTITIONER

## 2023-05-23 PROCEDURE — 3078F PR MOST RECENT DIASTOLIC BLOOD PRESSURE < 80 MM HG: ICD-10-PCS | Mod: CPTII,S$GLB,, | Performed by: NURSE PRACTITIONER

## 2023-05-23 PROCEDURE — 3008F BODY MASS INDEX DOCD: CPT | Mod: CPTII,S$GLB,, | Performed by: NURSE PRACTITIONER

## 2023-05-23 PROCEDURE — 1160F PR REVIEW ALL MEDS BY PRESCRIBER/CLIN PHARMACIST DOCUMENTED: ICD-10-PCS | Mod: CPTII,S$GLB,, | Performed by: NURSE PRACTITIONER

## 2023-05-23 PROCEDURE — 1125F AMNT PAIN NOTED PAIN PRSNT: CPT | Mod: CPTII,S$GLB,, | Performed by: NURSE PRACTITIONER

## 2023-05-23 PROCEDURE — 99213 PR OFFICE/OUTPT VISIT, EST, LEVL III, 20-29 MIN: ICD-10-PCS | Mod: S$GLB,,, | Performed by: NURSE PRACTITIONER

## 2023-05-23 PROCEDURE — 1159F MED LIST DOCD IN RCRD: CPT | Mod: CPTII,S$GLB,, | Performed by: NURSE PRACTITIONER

## 2023-05-23 PROCEDURE — 99999 PR PBB SHADOW E&M-EST. PATIENT-LVL III: CPT | Mod: PBBFAC,,, | Performed by: NURSE PRACTITIONER

## 2023-05-23 PROCEDURE — 3074F SYST BP LT 130 MM HG: CPT | Mod: CPTII,S$GLB,, | Performed by: NURSE PRACTITIONER

## 2023-05-23 PROCEDURE — 1159F PR MEDICATION LIST DOCUMENTED IN MEDICAL RECORD: ICD-10-PCS | Mod: CPTII,S$GLB,, | Performed by: NURSE PRACTITIONER

## 2023-05-23 PROCEDURE — 3078F DIAST BP <80 MM HG: CPT | Mod: CPTII,S$GLB,, | Performed by: NURSE PRACTITIONER

## 2023-05-23 PROCEDURE — 1160F RVW MEDS BY RX/DR IN RCRD: CPT | Mod: CPTII,S$GLB,, | Performed by: NURSE PRACTITIONER

## 2023-05-23 PROCEDURE — 1101F PR PT FALLS ASSESS DOC 0-1 FALLS W/OUT INJ PAST YR: ICD-10-PCS | Mod: CPTII,S$GLB,, | Performed by: NURSE PRACTITIONER

## 2023-05-23 PROCEDURE — 3074F PR MOST RECENT SYSTOLIC BLOOD PRESSURE < 130 MM HG: ICD-10-PCS | Mod: CPTII,S$GLB,, | Performed by: NURSE PRACTITIONER

## 2023-05-23 PROCEDURE — 1101F PT FALLS ASSESS-DOCD LE1/YR: CPT | Mod: CPTII,S$GLB,, | Performed by: NURSE PRACTITIONER

## 2023-05-23 PROCEDURE — 99213 OFFICE O/P EST LOW 20 MIN: CPT | Performed by: NURSE PRACTITIONER

## 2023-05-23 PROCEDURE — 1125F PR PAIN SEVERITY QUANTIFIED, PAIN PRESENT: ICD-10-PCS | Mod: CPTII,S$GLB,, | Performed by: NURSE PRACTITIONER

## 2023-05-23 PROCEDURE — 3008F PR BODY MASS INDEX (BMI) DOCUMENTED: ICD-10-PCS | Mod: CPTII,S$GLB,, | Performed by: NURSE PRACTITIONER

## 2023-05-23 NOTE — PROGRESS NOTES
Chronic patient Established Note (Follow up visit)      SUBJECTIVE:    Interval History 5/23/2023:  The patient returns to clinic today for follow up of low back pain. He reports worsened low back pain. He reports low back and buttock pain. He denies any radicular leg pain. His pain is worse with moving from sitting to standing. He also endorses morning stiffness. He is taking Gabapentin, Flexeril, and Mobic. He does perform a home exercise routine. He denies any other health changes. His pain today is 7/10.    Interval History 12/22/2022:  The patient returns to clinic today for follow up of low back pain. He is s/p bilateral SI joint injections on 12/8/2022. He reports 50% relief of his pain. He continues to report right sided low back and buttock pain. He denies any radicular leg pain. His pain is worse with driving, prolonged sitting, and moving from sitting to standing. He continues to perform a home exercise routine. He is taking Gabapentin, Flexeril, and Mobic. He denies any other health changes. His pain today is 2/10.    Interval History 11/11/2022:  The patient returns to clinic today for follow up of low back pain. He is s/p bilateral L5/S1 TF PERCY on 10/27/2022. He reports limited relief of his pain. He continues to report low back and right hip pain. He reports intermittent radiating pain into his right posterior leg to his knee. He reports intermittent numbness to his legs. His pain is worse with moving from sitting to standing. He also reports increased pain with prolonged standing. He continues to perform a home exercise routine. He continues to take Gabapentin, Flexeril, and Mobic. He denies any other health changes. His pain today is 2/10.    Interval History 10/10/2022:  The patient returns to clinic today for follow up of low back pain. He is here today for imaging review. He continues to report low back pain that radiates into his right buttock and posterior thigh. His pain is worse with moving  from sitting to standing, prolonged standing, and activity. He reports intermittent weakness into his legs with moving from sitting to standing. He has completed physical therapy. He continues to perform a home exercise routine. He continues to take Gabapentin, Flexeril, and Mobic. He denies any bowel or bladder incontinence. His pain today is 2/10.      Interval History 9/13/2022:  Sabino Rubio presents to the clinic for a follow-up appointment for low back pain. He reports neuroma injection at last office visit provided short term relief. He continues reports low back pain that radiates into his right buttock. He denies any radicular leg pain. His pain is worse with moving from sitting to standing, prolonged sitting, and activity. He does endorse morning stiffness. He is currently participating in physical therapy with some benefit. He is taking Gabapentin, Flexeril, and Mobic. He denies any other health changes. His pain today is 5/10.    Interval History 8/2/2022:   Patient returns to clinic today reporting 2/10 pain at rest which increases to 4-6/10 when standing. Pain is primarily low back in nature with radiation to the right buttock. He reports that pain is exacerbated by prolonged immobility specifically when awakening from sleep or sitting for long periods of time. At the last visit, he was prescribed Flexeril, but has noted only minimal benefit from it. He has been working with PT and plans on starting to go to the gym. Prior RFA's have provided some relief, he reports his first left-sided procedure produced significant relief whereas the second right-sided procedure only produced moderate relief.    Interval History 6/16/2022:  Patient presents today for evaluationfollow-up of their low back pain s/p Bilateral  Lumbar RFA . Per pateint his axial LBP is almost 50-60% better after the RFA but he is still suffering from some pain in Left lower back ,mostly stiffness and spasm .no radicular component   Is associated with LBP. No B/B incontinence/no numbness /motor or sensory deficit.     Pain Disability Index Review:  Last 3 PDI Scores 12/22/2022 11/11/2022 10/10/2022   Pain Disability Index (PDI) 32 21 24       Pain Medications:  Gabapentin  Mobic  Flexeril    Opioid Contract: no     report:  Not applicable    Pain Procedures:   6/6/2019- Bilateral L4/5 and L5/S1 facet joint injection  1/27/2022- Bilateral L3,4,5 MBB  2/10/2022- Bilateral L3,4,5 MBB  5/16/2022- Right L3,4,5 RFA- 60% relief  6/2/2022- Left L3,4,5 RFA- 60% relief  10/27/2022- Bilateral L5/S1 TF PERCY  12/8/2022- Bilateral SI joint injections    Physical Therapy/Home Exercise: yes    Imaging:   MRI Lumbar Spine 10/5/2022:  COMPARISON:  Radiograph 06/16/2022, MRI 01/09/2019.     FINDINGS:  Lumbar spine alignment demonstrates levoscoliosis with straightening of the normal lordosis.  Vertebral body heights are well maintained without evidence for acute fracture.  Schmorl's node extends into the superior endplate of the L3 vertebral body, unchanged when compared to the previous MRI.  Benign osseous hemangioma at the L4 vertebral body.  No marrow signal abnormality to suggest an infiltrative process.     There is advanced degenerative disc space narrowing and desiccation throughout the visualized thoracolumbar spine, progressed when compared to the previous MRI most notably at the L1-L2 level noting severe associated endplate edema.     Distal spinal cord demonstrates normal contour and signal intensity.  Cauda equina appears normal without findings to suggest arachnoiditis.  Conus medullaris terminates at L1.     Right renal cortical cyst.  SI joints are symmetric.  Paraspinal musculature demonstrates normal bulk and signal intensity.     T12-L1: Circumferential disc bulge encroaches into the bilateral foraminal zones.  Bilateral facet arthropathy and bilateral ligamentum flavum buckling.  Findings contribute to mild spinal canal stenosis and mild  right neural foraminal narrowing.     L1-L2: Circumferential disc bulge encroaches into the right neural foramen and likely abuts the right exiting L1 nerve root.  Bilateral facet arthropathy and bilateral ligamentum flavum buckling.  Findings contribute to moderate spinal canal stenosis and severe right and mild left neural foraminal narrowing.     L2-L3: Circumferential disc bulge, bilateral facet arthropathy and bilateral ligamentum flavum buckling.  Findings contribute to mild spinal canal stenosis and mild left neural foraminal narrowing.     L3-L4: Circumferential disc bulge encroaches into the bilateral foraminal zones.  Bilateral facet arthropathy and bilateral ligamentum flavum buckling.  Findings contribute to moderate to severe spinal canal and lateral recess stenosis and mild bilateral neural foraminal narrowing.     L4-L5: Circumferential disc bulge encroaches into the bilateral foraminal zones.  Bilateral facet arthropathy and bilateral ligamentum flavum buckling.  Findings contribute to moderate spinal canal and lateral recess stenosis and moderate to severe left and moderate right neural foraminal narrowing.     L5-S1: Circumferential disc bulge encroaches into the bilateral foraminal zones.  Bilateral facet arthropathy and bilateral ligamentum flavum buckling.  Findings contribute to mild lateral recess stenosis and moderate to severe bilateral neural foraminal narrowing with suspected impingement of the left exiting L5 nerve root.     Impression:     1. Lumbar levoscoliosis with advanced superimposed degenerative changes most pronounced at L1-L2 and from L3-L4 through L5-S1, progressed when compared to MRI dated 01/09/2019.    Xray Lumbar Spine 6/16/2022:  COMPARISON:  Lumbar spine radiograph from 05/24/2019.     FINDINGS:  Alignment: Mild levocurvature of lumbar spine.  Vertebral bodies adequately aligned on sagittal views.  No dynamic instability.     Vertebrae: Persistent concavity along  superior endplate of L3, similar to prior exam.  Remaining vertebral body heights are adequately maintained.  No definite spondylolysis on oblique views.  No suspicious appearing lytic or blastic lesions.     Discs and facets: Multilevel moderate to severe disc space narrowing most prominent at L1-L2, L4-L5, and L5-S1 with sclerotic endplate changes.  Vacuum disc phenomenon present at L1-L2 and L5-S1.  Multilevel anterior osteophyte formation.  Advanced facet arthropathy most prominent in the lower lumbar spine.     Miscellaneous: No additional findings.     Impression:     As above.    MRI Lumbar Spine 1/9/2019:  COMPARISON:  Lumbar spine radiograph 10/03/2017     FINDINGS:  Alignment: Mild straightening of normal lumbar lordosis.     Vertebrae: Mild vertebral height loss and degenerative signal change.  No evidence of acute fracture or infiltrative marrow process.     Discs: Intervertebral disc height loss and degenerative signal change, most prominent within the lower lumbar spine.     Cord: Normal.  Conus terminates at L1-L2.     Degenerative findings:     T12-L1: Mild broad-based disc bulge and facet arthropathy without significant spinal canal stenosis or neural foraminal narrowing.     L1-L2: Mild broad-based disc bulge and facet arthropathy without significant spinal canal stenosis or neural foraminal narrowing.     L2-L3: Broad-based disc bulge, ligamentum flavum thickening, and facet arthropathy without significant spinal canal stenosis or neural foraminal narrowing.     L3-L4: Broad-based disc bulge, ligamentum flavum thickening, and facet arthropathy resulting in moderate spinal canal stenosis and mild bilateral neural foraminal narrowing.     L4-L5: Broad-based disc bulge, ligamentum flavum thickening, and facet arthropathy resulting in moderate spinal canal stenosis, moderate left neural foraminal narrowing, and mild right neural foraminal narrowing.     L5-S1: Broad-based disc bulge, ligamentum flavum  thickening, and facet arthropathy resulting in moderate left and mild right neural foraminal narrowing.     Paraspinal muscles & soft tissues: Unremarkable.     IMPRESSION:      Multilevel degenerative change of the lumbar spine most significant at L3-L4 which demonstrates moderate spinal canal stenosis and mild bilateral neural foraminal narrowing.  Moderate left and mild right neural foraminal narrowing seen at L4-L5 and L5-S1.  Additional details above.    Allergies: Review of patient's allergies indicates:  No Known Allergies    Current Medications:   Current Outpatient Medications   Medication Sig Dispense Refill    aspirin (ECOTRIN) 81 MG EC tablet Take 1 tablet (81 mg total) by mouth once daily. 30 tablet 12    cyclobenzaprine (FLEXERIL) 10 MG tablet Take 1 tablet (10 mg total) by mouth 3 (three) times daily as needed for Muscle spasms. for ten days 30 tablet 1    erythromycin (ROMYCIN) ophthalmic ointment Apply 1/2 inch strip to lower cul de sac of right eye nightly for 10 nights. 3.5 g 0    fluticasone propionate (FLONASE) 50 mcg/actuation nasal spray 1 SPRAY (50 MCG TOTAL) BY EACH NOSTRIL ROUTE 2 (TWO) TIMES A DAY. 48 mL 2    gabapentin (NEURONTIN) 300 MG capsule TAKE 1 CAPSULE BY MOUTH THREE TIMES A DAY 90 capsule 0    meloxicam (MOBIC) 15 MG tablet TAKE 1 TABLET (15 MG TOTAL) BY MOUTH DAILY AS NEEDED FOR PAIN (TAKE WITH FOOD OR A MEAL). 90 tablet 3    rosuvastatin (CRESTOR) 10 MG tablet Take 1 tablet (10 mg total) by mouth once daily. 90 tablet 3    tamsulosin (FLOMAX) 0.4 mg Cap Take 1 capsule (0.4 mg total) by mouth once daily. 30 capsule 12    tamsulosin (FLOMAX) 0.4 mg Cap Take 1 capsule (0.4 mg total) by mouth once daily. 90 capsule 3    sildenafiL (VIAGRA) 100 MG tablet Take 1 tablet (100 mg total) by mouth daily as needed. 15 tablet 11     No current facility-administered medications for this visit.     Facility-Administered Medications Ordered in Other Visits   Medication Dose Route Frequency  Provider Last Rate Last Admin    0.9%  NaCl infusion   Intravenous Continuous Cole Fitzpatrick DO           REVIEW OF SYSTEMS:    GENERAL:  No weight loss, malaise or fevers.  HEENT:  Negative for frequent or significant headaches.  NECK:  Negative for lumps, goiter, pain and significant neck swelling.  RESPIRATORY:  Negative for cough, wheezing or shortness of breath.  CARDIOVASCULAR:  Negative for chest pain, leg swelling or palpitations.  GI:  Negative for abdominal discomfort, blood in stools or black stools or change in bowel habits. GERD  MUSCULOSKELETAL:  See HPI.  SKIN:  Negative for lesions, rash, and itching.  PSYCH:  Negative for sleep disturbance, mood disorder and recent psychosocial stressors.  HEMATOLOGY/LYMPHOLOGY:  Negative for prolonged bleeding, bruising easily or swollen nodes.  NEURO:   No history of headaches, syncope, paralysis, seizures or tremors.  All other reviewed and negative other than HPI.    Past Medical History:  Past Medical History:   Diagnosis Date    Allergy     Arthritis     Family history of malignant neoplasm of gastrointestinal tract mat.gf    Family history of prostate cancer: 1/2 brother 9/25/2017    GERD (gastroesophageal reflux disease)     Kidney cyst, acquired: R 1.2 cm 2015 9/25/2017    Restless leg syndrome     Tubulovillous adenoma 2013 due 2016 9/14/2015       Past Surgical History:  Past Surgical History:   Procedure Laterality Date    APPLICATION OF BONE GRAFT TO FINGER Left 11/8/2021    Procedure: APPLICATION INTEGRA GRAFT, TO FINGER;  Surgeon: Alba Penn MD;  Location: [facility] OR;  Service: Orthopedics;  Laterality: Left;    CARPAL TUNNEL RELEASE      COLONOSCOPY N/A 5/22/2019    Procedure: COLONOSCOPY;  Surgeon: Juancarlos Foley MD;  Location: [address]);  Service: Endoscopy;  Laterality: N/A;    COLONOSCOPY N/A 10/12/2022    Procedure: COLONOSCOPY;  Surgeon: Cherelle Wang MD;  Location: [facility] ENDO ([address] [place] [address]);  Service: Endoscopy;  Laterality: N/A;   vacc-inst portal-am prep-clears 4 hrs prior-tb    EXCISION OF GANGLION OF WRIST Right 6/28/2019    Procedure: EXCISION, GANGLION CYST, WRIST right;  Surgeon: Alba Penn MD;  [facility];  Service: Orthopedics;  Laterality: Right;  regional MAC, stretcher, supine, hand pan 1 and 2,MINI C-arm, call Arthrex    INJECTION OF ANESTHETIC AGENT AROUND NERVE Bilateral 1/27/2022    Procedure: Block, Nerve MEDIAL BRANCH BLOCK BILATERAL L3,4,5  DIRECT REFERRAL 1 OF 2;  Surgeon: Kaiser Braxton MD;  Location: [facility] PAIN MGT;  Service: Pain Management;  Laterality: Bilateral;    INJECTION OF ANESTHETIC AGENT AROUND NERVE Bilateral 2/10/2022    Procedure: Block, Nerve MEDIAL BRANCH BLOCK BILATERAL L3.4.5  DIRECT REFERRAL 2 OF 2;  Surgeon: Kaiser Braxton MD;  Location: [facility] PAIN MGT;  Service: Pain Management;  Laterality: Bilateral;    INJECTION OF FACET JOINT Bilateral 6/6/2019    Procedure: INJECTION, FACET JOINT INJECTION (LUMBAR BLOCK) BILATERAL L4-L5 AND L5-S1 FACET INJECTIONS;  Surgeon: Kaiser Braxton MD;  Location: [facility] PAIN MGT;  Service: Pain Management;  Laterality: Bilateral;  NEEDS CONSENT    INJECTION, SACROILIAC JOINT Right 12/8/2022    Procedure: INJECTION,SACROILIAC JOINT RIGHT  CONTRAST;  Surgeon: Kaiser Braxton MD;  Location: [facility] PAIN MGT;  Service: Pain Management;  Laterality: Right;    INTERPOSITION ARTHROPLASTY OF CARPOMETACARPAL JOINTS Right 6/28/2019    Procedure: INTERPOSITION ARTHROPLASTY, CMC JOINT right;  Surgeon: Alba Penn MD;  [facility];  Service: Orthopedics;  Laterality: Right;  regional MAC, stretcher, supine, hand pan 1 and 2,MINI C-arm, call Arthrex    IRRIGATION AND DEBRIDEMENT OF UPPER EXTREMITY Left 11/8/2021    Procedure: IRRIGATION AND DEBRIDEMENT, UPPER EXTREMITY, left index finger;  Surgeon: Alba Penn MD;  Location: [facility];  Service: Orthopedics;  Laterality: Left;    RADIOFREQUENCY ABLATION Right 5/16/2022    Procedure:  Radiofrequency Ablation RIGHT L3,4,5;  Surgeon: Kaiser Braxton MD;  Location: [facility] PAIN MGT;  Service: Pain Management;  Laterality: Right;    RADIOFREQUENCY ABLATION Left 6/2/2022    Procedure: Radiofrequency Ablation LEFT L3,4,5;  Surgeon: Kaiser Braxton MD;  Location: [facility] PAIN MGT;  Service: Pain Management;  Laterality: Left;    REPAIR OF NAIL BED Left 11/8/2021    Procedure: REPAIR, NAIL BED, left index;  Surgeon: Alba Penn MD;  Location: [facility] OR;  Service: Orthopedics;  Laterality: Left;    SPERMATOCELECTOMY Right 11/8/2019    Procedure: EXCISION, SPERMATOCELE;  Surgeon: Sheldon Araya Jr., MD;  Location: [facility] OR 1ST FLR;  Service: Urology;  Laterality: Right;  1hr    TRANSFORAMINAL EPIDURAL INJECTION OF STEROID Bilateral 10/27/2022    Procedure: INJECTION, STEROID, EPIDURAL, TRANSFORAMINAL APPROACH, BILATERAL L5-S1 CONTRAST;  Surgeon: Kaiser Braxton MD;  Location: [facility] PAIN MGT;  Service: Pain Management;  Laterality: Bilateral;       Family History:  Family History   Problem Relation Age of Onset    Arthritis Mother         probable R.A.    Cancer Father         esophageal ca and brain tumor    Esophageal cancer Father     Melanoma Father     Cancer Brother         pancreatic cancer    Cancer Maternal Grandfather         colon    Colon cancer Maternal Grandfather     Irritable bowel syndrome Sister     Arthritis Sister     No Known Problems Son     No Known Problems Brother     No Known Problems Son     Cancer Sister     No Known Problems Brother     Diabetes Neg Hx     Heart disease Neg Hx     Cirrhosis Neg Hx     Celiac disease Neg Hx     Crohn's disease Neg Hx     Ulcerative colitis Neg Hx     Stomach cancer Neg Hx     Rectal cancer Neg Hx     Liver cancer Neg Hx     Psoriasis Neg Hx     Lupus Neg Hx        Social History:  Social History     Socioeconomic History    Marital status: Single    Number of children: 2   Tobacco Use    Smoking status: Never    Smokeless tobacco: Never    Tobacco  "comments:     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   Substance and Sexual Activity    Alcohol use: Yes     Alcohol/week: 0.0 standard drinks     Comment: 2-3 days weekly, up to 2 beers    Drug use: No    Sexual activity: Yes     Partners: Female       OBJECTIVE:    /78 (BP Location: Right arm, Patient Position: Sitting, BP Method: Medium (Automatic))   Pulse 80   Ht 5' 10" (1.778 m)   Wt 69.7 kg (153 lb 10.6 oz)   BMI 22.05 kg/m²     PHYSICAL EXAMINATION:    General appearance: Well appearing, in no acute distress, alert and oriented x3.  Psych:  Mood and affect appropriate.  Skin: Skin color, texture, turgor normal, no rashes or lesions, in both upper and lower body.  Head/face:  Atraumatic, normocephalic.   Cor: RRR  Pulm: Symmetric chest rise, no respiratory distress noted.   Back: Straight leg raising in the sitting position is negative to radicular pain bilaterally. There is pain to palpation over the lumbar facet joints bilaterally. Limited ROM with pain on flexion and extension. Positive facet loading bilaterally.   Extremities: No deformities, edema, or skin discoloration. Good capillary refill.  Musculoskeletal: There is pain with palpation over bilateral SI joints. FABERs is negative bilaterally. Bilateral lower extremity strength is normal and symmetric.  No atrophy or tone abnormalities are noted.  Neuro: No loss of sensation is noted.  Gait: Antalgic- ambulates without assistance.     ASSESSMENT: 67 y.o. year old male with back pain, consistent with the followin. Lumbar spondylosis  Procedure Order to Pain Management      2. DDD (degenerative disc disease), lumbar        3. Spinal stenosis of lumbar region without neurogenic claudication        4. Sacroiliac joint pain            PLAN:     - Previous imaging reviewed today.    - Schedule for right then left L3,4,5 RFA, one side at a time, two weeks apart. "     - Consider repeat SI joint injections in the future.     - I have stressed the importance of physical activity and a home exercise plan to help with pain and improve health.    - Patient can continue with medications for now since they are providing benefits, using them appropriately, and without side effects.    - RTC 3 weeks after above procedure.     - Counseled patient regarding the importance of activity modification and physical therapy.    The above plan and management options were discussed at length with patient. Patient is in agreement with the above and verbalized understanding.    Yumi Naik NP  05/23/2023

## 2023-05-25 ENCOUNTER — PATIENT MESSAGE (OUTPATIENT)
Dept: PAIN MEDICINE | Facility: OTHER | Age: 68
End: 2023-05-25
Payer: MEDICARE

## 2023-05-26 ENCOUNTER — TELEPHONE (OUTPATIENT)
Dept: OPHTHALMOLOGY | Facility: CLINIC | Age: 68
End: 2023-05-26
Payer: MEDICARE

## 2023-05-26 NOTE — TELEPHONE ENCOUNTER
----- Message from Rufina Rubio OD sent at 5/9/2023 11:16 AM CDT -----  Regarding: keratitis OD

## 2023-07-05 NOTE — PT/OT/SLP DISCHARGE
Physician:Justa Moses, *  Diagnosis: LBP  Orders:  Physical Therapy evaluate and treat  Date of eval: 10/30/2017   Treatment included pt ed, hep, nmr, ex,    11/28 Functional Limitations Reports - G Codes  Category: mobility  Tool: FOTO   Score: 72  Current/ 32%  Goal/ : 28%  Discharge/   32           GOALS:    12_   weeks. Pt  met goals.  1. Independent with HEP.  2. Report decreased  LBP    pain  <   / =  5  /10 with adls such as sit to stand, climbing ladders (MET)  3. Increased MMT  for  B HE   To 5/5 (MET)  4. Increased arom  for  LB ext to 75% (MET)            5.   Improved FOTO score mobility  to 20-40% (MET)     
show

## 2023-07-11 ENCOUNTER — PATIENT MESSAGE (OUTPATIENT)
Dept: ADMINISTRATIVE | Facility: OTHER | Age: 68
End: 2023-07-11
Payer: MEDICARE

## 2023-07-12 ENCOUNTER — PATIENT MESSAGE (OUTPATIENT)
Dept: ADMINISTRATIVE | Facility: OTHER | Age: 68
End: 2023-07-12
Payer: MEDICARE

## 2023-07-14 ENCOUNTER — PATIENT MESSAGE (OUTPATIENT)
Dept: PAIN MEDICINE | Facility: OTHER | Age: 68
End: 2023-07-14
Payer: MEDICARE

## 2023-07-17 ENCOUNTER — HOSPITAL ENCOUNTER (OUTPATIENT)
Facility: OTHER | Age: 68
Discharge: HOME | End: 2023-07-17
Attending: ANESTHESIOLOGY | Admitting: ANESTHESIOLOGY
Payer: MEDICARE

## 2023-07-17 VITALS
SYSTOLIC BLOOD PRESSURE: 150 MMHG | TEMPERATURE: 98 F | WEIGHT: 150 LBS | RESPIRATION RATE: 16 BRPM | BODY MASS INDEX: 21.47 KG/M2 | DIASTOLIC BLOOD PRESSURE: 72 MMHG | OXYGEN SATURATION: 99 % | HEIGHT: 70 IN | HEART RATE: 77 BPM

## 2023-07-17 DIAGNOSIS — G89.29 CHRONIC PAIN: ICD-10-CM

## 2023-07-17 DIAGNOSIS — M47.819 SPONDYLOSIS WITHOUT MYELOPATHY: ICD-10-CM

## 2023-07-17 DIAGNOSIS — M47.816 OSTEOARTHRITIS OF LUMBAR SPINE, UNSPECIFIED SPINAL OSTEOARTHRITIS COMPLICATION STATUS: Primary | ICD-10-CM

## 2023-07-17 PROCEDURE — 64635 PR DESTROY LUMB/SAC FACET JNT: ICD-10-PCS | Mod: RT,,, | Performed by: ANESTHESIOLOGY

## 2023-07-17 PROCEDURE — 63600175 PHARM REV CODE 636 W HCPCS: Performed by: ANESTHESIOLOGY

## 2023-07-17 PROCEDURE — 64635 DESTROY LUMB/SAC FACET JNT: CPT | Mod: RT | Performed by: ANESTHESIOLOGY

## 2023-07-17 PROCEDURE — 64635 DESTROY LUMB/SAC FACET JNT: CPT | Mod: RT,,, | Performed by: ANESTHESIOLOGY

## 2023-07-17 PROCEDURE — 25000003 PHARM REV CODE 250: Performed by: ANESTHESIOLOGY

## 2023-07-17 PROCEDURE — 64636 DESTROY L/S FACET JNT ADDL: CPT | Mod: RT | Performed by: ANESTHESIOLOGY

## 2023-07-17 PROCEDURE — 64636 DESTROY L/S FACET JNT ADDL: CPT | Mod: RT,,, | Performed by: ANESTHESIOLOGY

## 2023-07-17 PROCEDURE — 64636 PR DESTROY L/S FACET JNT ADDL: ICD-10-PCS | Mod: RT,,, | Performed by: ANESTHESIOLOGY

## 2023-07-17 RX ORDER — LIDOCAINE HYDROCHLORIDE 20 MG/ML
INJECTION, SOLUTION INFILTRATION; PERINEURAL
Status: DISCONTINUED | OUTPATIENT
Start: 2023-07-17 | End: 2023-07-17 | Stop reason: HOSPADM

## 2023-07-17 RX ORDER — MIDAZOLAM HYDROCHLORIDE 1 MG/ML
INJECTION INTRAMUSCULAR; INTRAVENOUS
Status: DISCONTINUED | OUTPATIENT
Start: 2023-07-17 | End: 2023-07-17 | Stop reason: HOSPADM

## 2023-07-17 RX ORDER — SODIUM CHLORIDE 9 MG/ML
INJECTION, SOLUTION INTRAVENOUS CONTINUOUS
Status: DISCONTINUED | OUTPATIENT
Start: 2023-07-17 | End: 2023-07-17 | Stop reason: HOSPADM

## 2023-07-17 RX ORDER — DEXAMETHASONE SODIUM PHOSPHATE 10 MG/ML
INJECTION INTRAMUSCULAR; INTRAVENOUS
Status: DISCONTINUED | OUTPATIENT
Start: 2023-07-17 | End: 2023-07-17 | Stop reason: HOSPADM

## 2023-07-17 RX ORDER — BUPIVACAINE HYDROCHLORIDE 2.5 MG/ML
INJECTION, SOLUTION EPIDURAL; INFILTRATION; INTRACAUDAL
Status: DISCONTINUED | OUTPATIENT
Start: 2023-07-17 | End: 2023-07-17 | Stop reason: HOSPADM

## 2023-07-17 RX ORDER — FENTANYL CITRATE 50 UG/ML
INJECTION, SOLUTION INTRAMUSCULAR; INTRAVENOUS
Status: DISCONTINUED | OUTPATIENT
Start: 2023-07-17 | End: 2023-07-17 | Stop reason: HOSPADM

## 2023-07-17 NOTE — H&P
HPI  Patient presenting for Procedure(s) (LRB):  RADIOFREQUENCY ABLATION, RIGHT L3,L4,L5 MEDIAL BRANCH ONE OF TWO (Right)     Patient on Anti-coagulation No    No health changes since previous encounter    Past Medical History:   Diagnosis Date    Allergy     Arthritis     Family history of malignant neoplasm of gastrointestinal tract mat.gf    Family history of prostate cancer: 1/2 brother 9/25/2017    GERD (gastroesophageal reflux disease)     Kidney cyst, acquired: R 1.2 cm 2015 9/25/2017    Restless leg syndrome     Tubulovillous adenoma 2013 due 2016 9/14/2015     Past Surgical History:   Procedure Laterality Date    APPLICATION OF BONE GRAFT TO FINGER Left 11/8/2021    Procedure: APPLICATION INTEGRA GRAFT, TO FINGER;  Surgeon: Alba Penn MD;  Location: [facility];  Service: Orthopedics;  Laterality: Left;    CARPAL TUNNEL RELEASE      COLONOSCOPY N/A 5/22/2019    Procedure: COLONOSCOPY;  Surgeon: Juancarlos Foley MD;  [facility]  Service: Endoscopy;  Laterality: N/A;    COLONOSCOPY N/A 10/12/2022    Procedure: COLONOSCOPY;  Surgeon: Cherelle Wang MD;  [facility]  Service: Endoscopy;  Laterality: N/A;  vacc-inst portal-am prep-clears 4 hrs prior-tb    EXCISION OF GANGLION OF WRIST Right 6/28/2019    Procedure: EXCISION, GANGLION CYST, WRIST right;  Surgeon: Alba Penn MD;  Location: [address] FLR;  Service: Orthopedics;  Laterality: Right;  regional MAC, stretcher, supine, hand pan 1 and 2,MINI C-arm, call Arthrex    INJECTION OF ANESTHETIC AGENT AROUND NERVE Bilateral 1/27/2022    Procedure: Block, Nerve MEDIAL BRANCH BLOCK BILATERAL L3,4,5  DIRECT REFERRAL 1 OF 2;  Surgeon: Kaiser Braxton MD;  Location: [facility] PAIN MGT;  Service: Pain Management;  Laterality: Bilateral;    INJECTION OF ANESTHETIC AGENT AROUND NERVE Bilateral 2/10/2022    Procedure: Block, Nerve MEDIAL BRANCH BLOCK BILATERAL L3.4.5  DIRECT REFERRAL 2 OF 2;  Surgeon: Kaiser Braxton MD;   [facility]: [facility];  Service: Pain Management;  Laterality: Bilateral;    INJECTION OF FACET JOINT Bilateral 6/6/2019    Procedure: INJECTION, FACET JOINT INJECTION (LUMBAR BLOCK) BILATERAL L4-L5 AND L5-S1 FACET INJECTIONS;  Surgeon: Kaiser Braxton MD;  Location: [facility];  Service: Pain Management;  Laterality: Bilateral;  NEEDS CONSENT    INJECTION, SACROILIAC JOINT Right 12/8/2022    Procedure: INJECTION,SACROILIAC JOINT RIGHT  CONTRAST;  Surgeon: Kaiser Braxton MD;  Location: [facility];  Service: Pain Management;  Laterality: Right;    INTERPOSITION ARTHROPLASTY OF CARPOMETACARPAL JOINTS Right 6/28/2019    Procedure: INTERPOSITION ARTHROPLASTY, CMC JOINT right;  Surgeon: Alba Penn MD;  [facility];  Service: Orthopedics;  Laterality: Right;  regional MAC, stretcher, supine, hand pan 1 and 2,MINI C-arm, call Arthrex    IRRIGATION AND DEBRIDEMENT OF UPPER EXTREMITY Left 11/8/2021    Procedure: IRRIGATION AND DEBRIDEMENT, UPPER EXTREMITY, left index finger;  Surgeon: Alba Penn MD;  Location: [facility] OR;  Service: Orthopedics;  Laterality: Left;    RADIOFREQUENCY ABLATION Right 5/16/2022    Procedure: Radiofrequency Ablation RIGHT L3,4,5;  Surgeon: Kaiser Braxton MD;  Location: [facility];  Service: Pain Management;  Laterality: Right;    RADIOFREQUENCY ABLATION Left 6/2/2022    Procedure: Radiofrequency Ablation LEFT L3,4,5;  Surgeon: Kaiser Braxton MD;  Location: [facility];  Service: Pain Management;  Laterality: Left;    REPAIR OF NAIL BED Left 11/8/2021    Procedure: REPAIR, NAIL BED, left index;  Surgeon: Alba Penn MD;  Location: [facility] OR;  Service: Orthopedics;  Laterality: Left;    SPERMATOCELECTOMY Right 11/8/2019    Procedure: EXCISION, SPERMATOCELE;  Surgeon: Sheldon Araya Jr., MD;  [facility];  Service: Urology;  Laterality: Right;  1hr    TRANSFORAMINAL EPIDURAL INJECTION OF STEROID Bilateral 10/27/2022    Procedure: INJECTION,  "STEROID, EPIDURAL, TRANSFORAMINAL APPROACH, BILATERAL L5-S1 CONTRAST;  Surgeon: Kaiser Braxton MD;  Location: [facility]GT;  Service: Pain Management;  Laterality: Bilateral;     Review of patient's allergies indicates:  No Known Allergies   Current Facility-Administered Medications   Medication    0.9%  NaCl infusion    0.9%  NaCl infusion     Facility-Administered Medications Ordered in Other Encounters   Medication    0.9%  NaCl infusion       PMHx, PSHx, Allergies, Medications reviewed in epic    ROS negative except pain complaints in HPI    OBJECTIVE:    /79 (BP Location: Right arm, Patient Position: Lying)   Pulse 76   Temp 97.6 °F (36.4 °C) (Oral)   Resp 16   Ht 5' 10" (1.778 m)   Wt 68 kg (150 lb)   SpO2 97%   BMI 21.52 kg/m²     PHYSICAL EXAMINATION:    GENERAL: Well appearing, in no acute distress, alert and oriented x3.  PSYCH:  Mood and affect appropriate.  SKIN: Skin color, texture, turgor normal, no rashes or lesions which will impact the procedure.  CV: RRR with palpation of the radial artery.  PULM: No evidence of respiratory difficulty, symmetric chest rise. Clear to auscultation.  NEURO: Cranial nerves grossly intact.    Plan:    Proceed with procedure as planned Procedure(s) (LRB):  RADIOFREQUENCY ABLATION, RIGHT L3,L4,L5 MEDIAL BRANCH ONE OF TWO (Right)    Eber Lacy  07/17/2023  [employer] pain fellow         "

## 2023-07-17 NOTE — DISCHARGE INSTRUCTIONS

## 2023-07-17 NOTE — OP NOTE
Therapeutic Lumbar Medial Branch Radiofrequency Ablation under Fluoroscopy     The procedure, risks, benefits, and options were discussed with the patient. There are no contraindications to the procedure. The patent expressed understanding and agreed to the procedure. Informed written consent was obtained prior to the start of the procedure and can be found in the patient's chart.        PATIENT NAME: Sabino Rubio   MRN: [id number]     DATE OF PROCEDURE: 07/17/2023     PROCEDURE:  Right L3, L4, and L5 Lumbar Radiofrequency Ablation under Fluoroscopy    PRE-OP DIAGNOSIS: Lumbar spondylosis [M47.816] Lumbar spondylosis [M47.816]    POST-OP DIAGNOSIS: Same    PHYSICIAN: Kaiser Braxton MD    ASSISTANTS: Eber Lacy MD     MEDICATIONS INJECTED:  Preservative-free Decadron 10mg with 9cc of Bupivicaine 0.25%    LOCAL ANESTHETIC INJECTED:   Xylocaine 2%    SEDATION: Versed 2mg and Fentanyl 25mcg                                                                                                                                                                                     Conscious sedation ordered by M.D. Patient re-evaluation prior to administration of conscious sedation. No changes noted in patient's status from initial evaluation. The patient's vital signs were monitored by RN and patient remained hemodynamically stable throughout the procedure.    Event Time In   Sedation Start 0832   Sedation End 0848       ESTIMATED BLOOD LOSS:  None    COMPLICATIONS:  None     INTERVAL HISTORY: Patient has clinical and imaging findings suggestive of facet mediated pain. Patients has completed 2 previous diagnostic medial branch blocks at specified levels with at least 80% relief for the expected duration of the local anesthetic utilized.    TECHNIQUE: Time-out was performed to identify the patient and procedure to be performed. With the patient laying in a prone position, the surgical area was prepped and draped in the usual sterile  fashion using ChloraPrep and fenestrated drape. The levels were determined under fluoroscopic guidance. Skin anesthesia was achieved by injecting Lidocaine 2% over the injection sites. A 20 gauge 10mm curved active tip needle was introduced to the anatomic local of the medial branch at each of the above levels using AP, lateral and/or contralateral oblique fluoroscopic imaging. Then sensory and motor testing was performed to confirm that the needle tips were in the correct location. After negative aspiration for blood or CSF was confirmed, 1 mL of the lidocaine 2% listed above was injected slowly at each site. This was followed by thermal lesioning at 80 degrees celsius for 90 seconds. That was followed by slowly injecting 1.5 mL of the medication mixture listed above at each site. The needles were removed and bleeding was nil. A sterile dressing was applied. No specimens collected. The patient tolerated the procedure well and did not have any procedure related motor deficit at the conclusion of the procedure.    PAIN BEFORE THE PROCEDURE: 4-10/10    PAIN AFTER THE PROCEDURE: 0/10     The patient was monitored after the procedure in the recovery area. They were given post-procedure and discharge instructions to follow at home. The patient was discharged in a stable condition.    Iván Rodriguez M.D.  [facility] Physical Medicine and Rehabilitation PGY-2      I reviewed and edited the fellow's note. I conducted my own interview and physical examination. I agree with the findings. I was present and supervising all critical portions of the procedure.

## 2023-07-17 NOTE — DISCHARGE SUMMARY
Discharge Note  Short Stay      SUMMARY     Admit Date: 7/17/2023    Attending Physician: Kaiser Braxton MD      Discharge Physician: Kaiser Braxton MD      Discharge Date: 7/17/2023 8:52 AM    Procedure(s) (LRB):  RADIOFREQUENCY ABLATION, RIGHT L3,L4,L5 MEDIAL BRANCH ONE OF TWO (Right)    Final Diagnosis: Lumbar spondylosis [M47.816]    Disposition: Home or self care    Patient Instructions:   Current Discharge Medication List        CONTINUE these medications which have NOT CHANGED    Details   aspirin (ECOTRIN) 81 MG EC tablet Take 1 tablet (81 mg total) by mouth once daily.  Qty: 30 tablet, Refills: 12      cyclobenzaprine (FLEXERIL) 10 MG tablet Take 1 tablet (10 mg total) by mouth 3 (three) times daily as needed for Muscle spasms. for ten days  Qty: 30 tablet, Refills: 1    Associated Diagnoses: Myofascial pain      erythromycin (ROMYCIN) ophthalmic ointment Apply 1/2 inch strip to lower cul de sac of right eye nightly for 10 nights.  Qty: 3.5 g, Refills: 0    Associated Diagnoses: Keratitis      fluticasone propionate (FLONASE) 50 mcg/actuation nasal spray 1 SPRAY (50 MCG TOTAL) BY EACH NOSTRIL ROUTE 2 (TWO) TIMES A DAY.  Qty: 48 mL, Refills: 2      gabapentin (NEURONTIN) 300 MG capsule TAKE 1 CAPSULE BY MOUTH THREE TIMES A DAY  Qty: 90 capsule, Refills: 0    Associated Diagnoses: Degeneration of cervical intervertebral disc      meloxicam (MOBIC) 15 MG tablet TAKE 1 TABLET (15 MG TOTAL) BY MOUTH DAILY AS NEEDED FOR PAIN (TAKE WITH FOOD OR A MEAL).  Qty: 90 tablet, Refills: 3    Associated Diagnoses: Degeneration of cervical intervertebral disc      rosuvastatin (CRESTOR) 10 MG tablet Take 1 tablet (10 mg total) by mouth once daily.  Qty: 90 tablet, Refills: 3      sildenafiL (VIAGRA) 100 MG tablet Take 1 tablet (100 mg total) by mouth daily as needed.  Qty: 15 tablet, Refills: 11      !! tamsulosin (FLOMAX) 0.4 mg Cap Take 1 capsule (0.4 mg total) by mouth once daily.  Qty: 30 capsule, Refills: 12      !!  tamsulosin (FLOMAX) 0.4 mg Cap Take 1 capsule (0.4 mg total) by mouth once daily.  Qty: 90 capsule, Refills: 3       !! - Potential duplicate medications found. Please discuss with provider.              Discharge Diagnosis: Lumbar spondylosis [M47.816]  Condition on Discharge: Stable with no complications to procedure   Diet on Discharge: Same as before.  Activity: as per instruction sheet.  Discharge to: Home with a responsible adult.  Follow up: 2-4 weeks       Please call my office or pager at [phone] if experienced any weakness or loss of sensation, fever > 101.5, pain uncontrolled with oral medications, persistent nausea/vomiting/or diarrhea, redness or drainage from the incisions, or any other worrisome concerns. If physician on call was not reached or could not communicate with our office for any reason please go to the nearest emergency department

## 2023-07-18 NOTE — OP NOTE
Therapeutic Lumbar Medial Branch Radiofrequency Ablation under Fluoroscopy      The procedure, risks, benefits, and options were discussed with the patient. There are no contraindications to the procedure. The patent expressed understanding and agreed to the procedure. Informed written consent was obtained prior to the start of the procedure and can be found in the patient's chart.         PATIENT NAME: Sabino Rubio   MRN: [id number]      DATE OF PROCEDURE: 07/17/2023      PROCEDURE:  Right L3, L4, and L5 Lumbar Radiofrequency Ablation under Fluoroscopy     PRE-OP DIAGNOSIS: Lumbar spondylosis [M47.816] Lumbar spondylosis [M47.816]     POST-OP DIAGNOSIS: Same     PHYSICIAN: Kaiser Braxton MD     ASSISTANTS: Eber Lacy MD      MEDICATIONS INJECTED:  Preservative-free Decadron 10mg with 9cc of Bupivicaine 0.25%     LOCAL ANESTHETIC INJECTED:   Xylocaine 2%     SEDATION: Versed 2mg and Fentanyl 25mcg                                                                                                                                                                                     Conscious sedation ordered by M.D. Patient re-evaluation prior to administration of conscious sedation. No changes noted in patient's status from initial evaluation. The patient's vital signs were monitored by RN and patient remained hemodynamically stable throughout the procedure.    Event Time In   Sedation Start 0832   Sedation End 0848         ESTIMATED BLOOD LOSS:  None     COMPLICATIONS:  None      INTERVAL HISTORY: Patient has clinical and imaging findings suggestive of facet mediated pain. Patients has completed 2 previous diagnostic medial branch blocks at specified levels with at least 80% relief for the expected duration of the local anesthetic utilized.     TECHNIQUE: Time-out was performed to identify the patient and procedure to be performed. With the patient laying in a prone position, the surgical area was prepped and draped in the  usual sterile fashion using ChloraPrep and fenestrated drape. The levels were determined under fluoroscopic guidance. Skin anesthesia was achieved by injecting Lidocaine 2% over the injection sites. A 20 gauge 10mm curved active tip needle was introduced to the anatomic local of the medial branch at each of the above levels using AP, lateral and/or contralateral oblique fluoroscopic imaging. Then sensory and motor testing was performed to confirm that the needle tips were in the correct location. After negative aspiration for blood or CSF was confirmed, 1 mL of the lidocaine 2% listed above was injected slowly at each site. This was followed by thermal lesioning at 80 degrees celsius for 90 seconds. That was followed by slowly injecting 1.5 mL of the medication mixture listed above at each site. The needles were removed and bleeding was nil. A sterile dressing was applied. No specimens collected. The patient tolerated the procedure well and did not have any procedure related motor deficit at the conclusion of the procedure.     PAIN BEFORE THE PROCEDURE: 4-10/10     PAIN AFTER THE PROCEDURE: 0/10      The patient was monitored after the procedure in the recovery area. They were given post-procedure and discharge instructions to follow at home. The patient was discharged in a stable condition.     Iván Rodriguez M.D.  [facility] Physical Medicine and Rehabilitation PGY-2        I reviewed and edited the fellow's note. I conducted my own interview and physical examination. I agree with the findings. I was present and supervising all critical portions of the procedure.

## 2023-07-21 ENCOUNTER — TELEPHONE (OUTPATIENT)
Dept: INTERNAL MEDICINE | Facility: CLINIC | Age: 68
End: 2023-07-21
Payer: MEDICARE

## 2023-07-21 DIAGNOSIS — M50.30 DEGENERATION OF CERVICAL INTERVERTEBRAL DISC: ICD-10-CM

## 2023-07-21 RX ORDER — MELOXICAM 15 MG/1
15 TABLET ORAL DAILY PRN
Qty: 90 TABLET | Refills: 0 | Status: SHIPPED | OUTPATIENT
Start: 2023-07-21 | End: 2023-10-16

## 2023-07-21 NOTE — TELEPHONE ENCOUNTER
Please contact him to schedule his annual exam with Shaunna or me before the end of the year, thank you    He also needs labs, all of which were ordered both in August of last year as well as in May of this year    Needs to do labs right now

## 2023-07-25 ENCOUNTER — TELEPHONE (OUTPATIENT)
Dept: PAIN MEDICINE | Facility: CLINIC | Age: 68
End: 2023-07-25
Payer: MEDICARE

## 2023-07-25 NOTE — TELEPHONE ENCOUNTER
----- Message from Estrella Pérez sent at 7/25/2023  9:51 AM CDT -----  Regarding: r/s procedure  Name of Who is Calling:MARYLOU GERARD [[id number]]          What is the request in detail:pt has a procedure 7/31. He needs to cancel for the moment bc he has an infection and is taking antibiotics. He would like to go ahead and r/s now. Please c/b pt to reschedule           Can the clinic reply by [facility]:          What Number to Call Back if not in [facility]:[phone]

## 2023-07-26 ENCOUNTER — PATIENT MESSAGE (OUTPATIENT)
Dept: PAIN MEDICINE | Facility: OTHER | Age: 68
End: 2023-07-26
Payer: MEDICARE

## 2023-07-26 ENCOUNTER — OFFICE VISIT (OUTPATIENT)
Dept: INTERNAL MEDICINE | Facility: CLINIC | Age: 68
End: 2023-07-26
Payer: MEDICARE

## 2023-07-26 ENCOUNTER — TELEPHONE (OUTPATIENT)
Dept: ADMINISTRATIVE | Facility: OTHER | Age: 68
End: 2023-07-26
Payer: MEDICARE

## 2023-07-26 VITALS
TEMPERATURE: 98 F | WEIGHT: 153.75 LBS | DIASTOLIC BLOOD PRESSURE: 70 MMHG | SYSTOLIC BLOOD PRESSURE: 132 MMHG | OXYGEN SATURATION: 95 % | BODY MASS INDEX: 22.01 KG/M2 | HEIGHT: 70 IN | HEART RATE: 88 BPM

## 2023-07-26 DIAGNOSIS — L02.416 ABSCESS OF LEFT KNEE: Primary | ICD-10-CM

## 2023-07-26 DIAGNOSIS — D36.9 TUBULOVILLOUS ADENOMA: ICD-10-CM

## 2023-07-26 DIAGNOSIS — I25.10 CORONARY ARTERY DISEASE INVOLVING NATIVE CORONARY ARTERY OF NATIVE HEART WITHOUT ANGINA PECTORIS: ICD-10-CM

## 2023-07-26 DIAGNOSIS — M47.816 SPONDYLOSIS WITHOUT MYELOPATHY OR RADICULOPATHY, LUMBAR REGION: ICD-10-CM

## 2023-07-26 DIAGNOSIS — J30.89 ALLERGIC RHINITIS DUE TO OTHER ALLERGIC TRIGGER, UNSPECIFIED SEASONALITY: ICD-10-CM

## 2023-07-26 DIAGNOSIS — R73.01 IFG (IMPAIRED FASTING GLUCOSE): ICD-10-CM

## 2023-07-26 PROCEDURE — 99999 PR PBB SHADOW E&M-EST. PATIENT-LVL V: CPT | Mod: PBBFAC,,, | Performed by: PHYSICIAN ASSISTANT

## 2023-07-26 PROCEDURE — 1159F MED LIST DOCD IN RCRD: CPT | Mod: CPTII,S$GLB,, | Performed by: PHYSICIAN ASSISTANT

## 2023-07-26 PROCEDURE — 3288F PR FALLS RISK ASSESSMENT DOCUMENTED: ICD-10-PCS | Mod: CPTII,S$GLB,, | Performed by: PHYSICIAN ASSISTANT

## 2023-07-26 PROCEDURE — 3078F PR MOST RECENT DIASTOLIC BLOOD PRESSURE < 80 MM HG: ICD-10-PCS | Mod: CPTII,S$GLB,, | Performed by: PHYSICIAN ASSISTANT

## 2023-07-26 PROCEDURE — 1101F PT FALLS ASSESS-DOCD LE1/YR: CPT | Mod: CPTII,S$GLB,, | Performed by: PHYSICIAN ASSISTANT

## 2023-07-26 PROCEDURE — 3078F DIAST BP <80 MM HG: CPT | Mod: CPTII,S$GLB,, | Performed by: PHYSICIAN ASSISTANT

## 2023-07-26 PROCEDURE — 1160F PR REVIEW ALL MEDS BY PRESCRIBER/CLIN PHARMACIST DOCUMENTED: ICD-10-PCS | Mod: CPTII,S$GLB,, | Performed by: PHYSICIAN ASSISTANT

## 2023-07-26 PROCEDURE — 99999 PR PBB SHADOW E&M-EST. PATIENT-LVL V: ICD-10-PCS | Mod: PBBFAC,,, | Performed by: PHYSICIAN ASSISTANT

## 2023-07-26 PROCEDURE — 3008F PR BODY MASS INDEX (BMI) DOCUMENTED: ICD-10-PCS | Mod: CPTII,S$GLB,, | Performed by: PHYSICIAN ASSISTANT

## 2023-07-26 PROCEDURE — 3288F FALL RISK ASSESSMENT DOCD: CPT | Mod: CPTII,S$GLB,, | Performed by: PHYSICIAN ASSISTANT

## 2023-07-26 PROCEDURE — 99214 OFFICE O/P EST MOD 30 MIN: CPT | Mod: S$GLB,,, | Performed by: PHYSICIAN ASSISTANT

## 2023-07-26 PROCEDURE — 1125F AMNT PAIN NOTED PAIN PRSNT: CPT | Mod: CPTII,S$GLB,, | Performed by: PHYSICIAN ASSISTANT

## 2023-07-26 PROCEDURE — 3008F BODY MASS INDEX DOCD: CPT | Mod: CPTII,S$GLB,, | Performed by: PHYSICIAN ASSISTANT

## 2023-07-26 PROCEDURE — 3075F PR MOST RECENT SYSTOLIC BLOOD PRESS GE 130-139MM HG: ICD-10-PCS | Mod: CPTII,S$GLB,, | Performed by: PHYSICIAN ASSISTANT

## 2023-07-26 PROCEDURE — 99214 PR OFFICE/OUTPT VISIT, EST, LEVL IV, 30-39 MIN: ICD-10-PCS | Mod: S$GLB,,, | Performed by: PHYSICIAN ASSISTANT

## 2023-07-26 PROCEDURE — 3075F SYST BP GE 130 - 139MM HG: CPT | Mod: CPTII,S$GLB,, | Performed by: PHYSICIAN ASSISTANT

## 2023-07-26 PROCEDURE — 1125F PR PAIN SEVERITY QUANTIFIED, PAIN PRESENT: ICD-10-PCS | Mod: CPTII,S$GLB,, | Performed by: PHYSICIAN ASSISTANT

## 2023-07-26 PROCEDURE — 1101F PR PT FALLS ASSESS DOC 0-1 FALLS W/OUT INJ PAST YR: ICD-10-PCS | Mod: CPTII,S$GLB,, | Performed by: PHYSICIAN ASSISTANT

## 2023-07-26 PROCEDURE — 1160F RVW MEDS BY RX/DR IN RCRD: CPT | Mod: CPTII,S$GLB,, | Performed by: PHYSICIAN ASSISTANT

## 2023-07-26 PROCEDURE — 1159F PR MEDICATION LIST DOCUMENTED IN MEDICAL RECORD: ICD-10-PCS | Mod: CPTII,S$GLB,, | Performed by: PHYSICIAN ASSISTANT

## 2023-07-26 RX ORDER — SULFAMETHOXAZOLE AND TRIMETHOPRIM 800; 160 MG/1; MG/1
1 TABLET ORAL 2 TIMES DAILY
Qty: 14 TABLET | Refills: 0 | Status: SHIPPED | OUTPATIENT
Start: 2023-07-26 | End: 2023-10-04

## 2023-07-26 RX ORDER — MUPIROCIN 20 MG/G
OINTMENT TOPICAL 2 TIMES DAILY
Qty: 30 G | Refills: 0 | Status: SHIPPED | OUTPATIENT
Start: 2023-07-26

## 2023-07-26 RX ORDER — DOXYCYCLINE 100 MG/1
100 CAPSULE ORAL EVERY 12 HOURS
COMMUNITY
Start: 2023-07-21 | End: 2023-07-26

## 2023-07-26 NOTE — PROGRESS NOTES
Subjective:       Patient ID: Sabino Rubio is a 67 y.o. male.        Chief Complaint: Recurrent Skin Infections    Sabino Rubio is an established patient of Alba Baum MD here today for urgent care visit.    1 week ago he developed redness of left knee.  By the next day, he had a head on the area of redness.  His partner, Ana Laura, is a nurse and used a sterile needle to puncture the area and a lot of pus drained out.  He felt a lot of relief after this.  He has been on doxycycline for 6 days without much change.  No worse but also not better since partner tried to drain it.  No fever.           Review of Systems   Constitutional:  Negative for appetite change, chills, fatigue and fever.   HENT:  Negative for congestion and sore throat.    Eyes:  Negative for visual disturbance.   Respiratory:  Negative for cough, chest tightness and shortness of breath.    Cardiovascular:  Negative for chest pain, palpitations and leg swelling.   Gastrointestinal:  Negative for abdominal pain, blood in stool, constipation, diarrhea, nausea and vomiting.   Genitourinary:  Negative for dysuria, frequency, hematuria and urgency.   Musculoskeletal:  Negative for arthralgias and back pain.   Skin:  Positive for color change and wound. Negative for rash.   Neurological:  Negative for dizziness, syncope, weakness and headaches.   Psychiatric/Behavioral:  Negative for dysphoric mood and sleep disturbance. The patient is not nervous/anxious.      Objective:      Physical Exam  Vitals and nursing note reviewed.   Constitutional:       Appearance: He is well-developed.   HENT:      Head: Normocephalic.      Right Ear: External ear normal.      Left Ear: External ear normal.   Eyes:      Pupils: Pupils are equal, round, and reactive to light.   Cardiovascular:      Rate and Rhythm: Normal rate and regular rhythm.      Heart sounds: Normal heart sounds. No murmur heard.    No friction rub. No gallop.   Pulmonary:      Effort:  Pulmonary effort is normal. No respiratory distress.      Breath sounds: Normal breath sounds.   Abdominal:      Palpations: Abdomen is soft.      Tenderness: There is no abdominal tenderness.   Musculoskeletal:         General: No swelling.   Skin:     General: Skin is warm and dry.   Neurological:      General: No focal deficit present.      Mental Status: He is alert.   Psychiatric:         Mood and Affect: Mood normal.         Left knee with cellulitis and abscess, some pus expressed  Assessment:       1. Abscess of left knee    2. Spondylosis without myelopathy or radiculopathy, lumbar region    3. Allergic rhinitis due to other allergic trigger, unspecified seasonality    4. Coronary artery disease: CT score 8 2022    5. Tubulovillous adenoma: 2013; adenomas 10/22 repeat colonoscopy 2025    6. IFG (impaired fasting glucose)        Plan:       Sabino was seen today for recurrent skin infections.    Diagnoses and all orders for this visit:    Abscess and cellulitis of left knee - to see general surgery tomorrow to evaluate for I&D, start bactrim and mupirocin, ED prompts reviewed at length with patient  -     Ambulatory referral/consult to General Surgery; Future  -     sulfamethoxazole-trimethoprim 800-160mg (BACTRIM DS) 800-160 mg Tab; Take 1 tablet by mouth 2 (two) times daily.  -     mupirocin (BACTROBAN) 2 % ointment; Apply topically 2 (two) times daily.    Spondylosis without myelopathy or radiculopathy, lumbar region - he postponed pain clinic procedure scheduled for next week    Allergic rhinitis due to other allergic trigger, unspecified seasonality - stable and controlled, continue current regimen    Coronary artery disease: CT score 8 2022 - modifying risk factors with statin and aspirin    Tubulovillous adenoma: 2013; adenomas 10/22 repeat colonoscopy 2025    IFG (impaired fasting glucose) - lab work reviewed, monitor A1C  Lab Results   Component Value Date    HGBA1C 5.5 01/04/2022    HGBA1C 5.3  "01/29/2020    HGBA1C 5.2 12/12/2018     Pt has been given instructions populated from patient instructions database and has verbalized understanding of the after visit summary and information contained wherein.    Follow up with a primary care provider. May go to ER for acute shortness of breath, lightheadedness, fever, or any other emergent complaints or changes in condition.    "This note will be shared with the patient"    Future Appointments   Date Time Provider Department Center   7/27/2023  9:15 AM Kike Land MD [facility] Cli   8/8/2023  3:00 PM Corby Chowdary MD [facility] DERM [facility]   8/22/2023 10:00 AM Yumi Naik NP [facility]   8/31/2023  7:00 AM LAB, APPOINTMENT [facility] IM [facility]   10/4/2023  2:00 PM Alba Baum MD [facility] IM [facility]                 "

## 2023-07-27 ENCOUNTER — OFFICE VISIT (OUTPATIENT)
Dept: SURGERY | Facility: CLINIC | Age: 68
End: 2023-07-27
Payer: MEDICARE

## 2023-07-27 VITALS
BODY MASS INDEX: 22.21 KG/M2 | SYSTOLIC BLOOD PRESSURE: 107 MMHG | HEIGHT: 70 IN | OXYGEN SATURATION: 97 % | DIASTOLIC BLOOD PRESSURE: 71 MMHG | HEART RATE: 88 BPM | WEIGHT: 155.13 LBS

## 2023-07-27 DIAGNOSIS — L02.416 ABSCESS OF LEFT KNEE: Primary | ICD-10-CM

## 2023-07-27 PROCEDURE — 10060 I&D ABSCESS SIMPLE/SINGLE: CPT | Mod: S$GLB,,, | Performed by: SURGERY

## 2023-07-27 PROCEDURE — 99999 PR PBB SHADOW E&M-EST. PATIENT-LVL III: ICD-10-PCS | Mod: PBBFAC,,, | Performed by: SURGERY

## 2023-07-27 PROCEDURE — 10060 INCISION & DRAINAGE: ICD-10-PCS | Mod: S$GLB,,, | Performed by: SURGERY

## 2023-07-27 PROCEDURE — 99499 UNLISTED E&M SERVICE: CPT | Mod: S$GLB,,, | Performed by: SURGERY

## 2023-07-27 PROCEDURE — 99499 NO LOS: ICD-10-PCS | Mod: S$GLB,,, | Performed by: SURGERY

## 2023-07-27 PROCEDURE — 99999 PR PBB SHADOW E&M-EST. PATIENT-LVL III: CPT | Mod: PBBFAC,,, | Performed by: SURGERY

## 2023-07-27 NOTE — PROCEDURES
"Incision & Drainage    Date/Time: 7/27/2023 9:15 AM  Performed by: Kike Land MD  Authorized by: Kike Land MD     Time out: Immediately prior to procedure a "time out" was called to verify the correct patient, procedure, equipment, support staff and site/side marked as required.    Consent Done?:  Yes (Verbal)    Type:  Abscess  Body area:  Lower extremity  Location details:  Left leg  Anesthesia:  Local infiltration  Local anesthetic: Lidocaine 1% with epinephrine  Scalpel size:  11  Incision type:  Single straight  Incision depth: dermal    Complexity:  Simple  Drainage:  Pus  Drainage amount:  Scant  Wound treatment:  Incision and wound left open  Patient tolerance:  Patient tolerated the procedure well with no immediate complications  "

## 2023-08-07 DIAGNOSIS — M50.30 DEGENERATION OF CERVICAL INTERVERTEBRAL DISC: ICD-10-CM

## 2023-08-07 RX ORDER — GABAPENTIN 300 MG/1
CAPSULE ORAL
Qty: 90 CAPSULE | Refills: 0 | Status: SHIPPED | OUTPATIENT
Start: 2023-08-07 | End: 2023-10-23

## 2023-08-08 ENCOUNTER — OFFICE VISIT (OUTPATIENT)
Dept: DERMATOLOGY | Facility: CLINIC | Age: 68
End: 2023-08-08
Payer: MEDICARE

## 2023-08-08 DIAGNOSIS — D22.9 MULTIPLE BENIGN NEVI: ICD-10-CM

## 2023-08-08 DIAGNOSIS — L73.8 SEBACEOUS HYPERPLASIA: ICD-10-CM

## 2023-08-08 DIAGNOSIS — Z12.83 SCREENING EXAM FOR SKIN CANCER: ICD-10-CM

## 2023-08-08 DIAGNOSIS — D36.9 ANGIOFIBROMA: ICD-10-CM

## 2023-08-08 DIAGNOSIS — L81.4 LENTIGO: ICD-10-CM

## 2023-08-08 DIAGNOSIS — L82.1 SEBORRHEIC KERATOSES: Primary | ICD-10-CM

## 2023-08-08 PROCEDURE — 99213 OFFICE O/P EST LOW 20 MIN: CPT | Mod: S$GLB,,, | Performed by: STUDENT IN AN ORGANIZED HEALTH CARE EDUCATION/TRAINING PROGRAM

## 2023-08-08 PROCEDURE — 1101F PT FALLS ASSESS-DOCD LE1/YR: CPT | Mod: CPTII,S$GLB,, | Performed by: STUDENT IN AN ORGANIZED HEALTH CARE EDUCATION/TRAINING PROGRAM

## 2023-08-08 PROCEDURE — 99999 PR PBB SHADOW E&M-EST. PATIENT-LVL III: ICD-10-PCS | Mod: PBBFAC,,, | Performed by: STUDENT IN AN ORGANIZED HEALTH CARE EDUCATION/TRAINING PROGRAM

## 2023-08-08 PROCEDURE — 3288F PR FALLS RISK ASSESSMENT DOCUMENTED: ICD-10-PCS | Mod: CPTII,S$GLB,, | Performed by: STUDENT IN AN ORGANIZED HEALTH CARE EDUCATION/TRAINING PROGRAM

## 2023-08-08 PROCEDURE — 3288F FALL RISK ASSESSMENT DOCD: CPT | Mod: CPTII,S$GLB,, | Performed by: STUDENT IN AN ORGANIZED HEALTH CARE EDUCATION/TRAINING PROGRAM

## 2023-08-08 PROCEDURE — 1160F RVW MEDS BY RX/DR IN RCRD: CPT | Mod: CPTII,S$GLB,, | Performed by: STUDENT IN AN ORGANIZED HEALTH CARE EDUCATION/TRAINING PROGRAM

## 2023-08-08 PROCEDURE — 1126F PR PAIN SEVERITY QUANTIFIED, NO PAIN PRESENT: ICD-10-PCS | Mod: CPTII,S$GLB,, | Performed by: STUDENT IN AN ORGANIZED HEALTH CARE EDUCATION/TRAINING PROGRAM

## 2023-08-08 PROCEDURE — 1159F PR MEDICATION LIST DOCUMENTED IN MEDICAL RECORD: ICD-10-PCS | Mod: CPTII,S$GLB,, | Performed by: STUDENT IN AN ORGANIZED HEALTH CARE EDUCATION/TRAINING PROGRAM

## 2023-08-08 PROCEDURE — 1126F AMNT PAIN NOTED NONE PRSNT: CPT | Mod: CPTII,S$GLB,, | Performed by: STUDENT IN AN ORGANIZED HEALTH CARE EDUCATION/TRAINING PROGRAM

## 2023-08-08 PROCEDURE — 99213 PR OFFICE/OUTPT VISIT, EST, LEVL III, 20-29 MIN: ICD-10-PCS | Mod: S$GLB,,, | Performed by: STUDENT IN AN ORGANIZED HEALTH CARE EDUCATION/TRAINING PROGRAM

## 2023-08-08 PROCEDURE — 1159F MED LIST DOCD IN RCRD: CPT | Mod: CPTII,S$GLB,, | Performed by: STUDENT IN AN ORGANIZED HEALTH CARE EDUCATION/TRAINING PROGRAM

## 2023-08-08 PROCEDURE — 99999 PR PBB SHADOW E&M-EST. PATIENT-LVL III: CPT | Mod: PBBFAC,,, | Performed by: STUDENT IN AN ORGANIZED HEALTH CARE EDUCATION/TRAINING PROGRAM

## 2023-08-08 PROCEDURE — 1101F PR PT FALLS ASSESS DOC 0-1 FALLS W/OUT INJ PAST YR: ICD-10-PCS | Mod: CPTII,S$GLB,, | Performed by: STUDENT IN AN ORGANIZED HEALTH CARE EDUCATION/TRAINING PROGRAM

## 2023-08-08 PROCEDURE — 1160F PR REVIEW ALL MEDS BY PRESCRIBER/CLIN PHARMACIST DOCUMENTED: ICD-10-PCS | Mod: CPTII,S$GLB,, | Performed by: STUDENT IN AN ORGANIZED HEALTH CARE EDUCATION/TRAINING PROGRAM

## 2023-08-08 NOTE — PROGRESS NOTES
Subjective:      Patient ID:  Sabino uRbio is a 67 y.o. male who presents for   Chief Complaint   Patient presents with    Skin Check     TBSE     History of Present Illness: The patient presents for follow up of skin check.    The patient was last seen on: 1/5/2022 for an UBSE.    Other skin complaints: Patient with new complaint of lesion(s)  Location: spot on head  Duration: a few months  Symptoms: none  Relieving factors/Previous treatments: none             Review of Systems   Constitutional:  Negative for fever, chills and fatigue.   Skin:  Positive for activity-related sunscreen use and wears hat (90%). Negative for daily sunscreen use and recent sunburn.   Hematologic/Lymphatic: Bruises/bleeds easily.       Objective:   Physical Exam   Constitutional: He appears well-developed and well-nourished. No distress.   Neurological: He is alert and oriented to person, place, and time. He is not disoriented.   Psychiatric: He has a normal mood and affect.   Skin:   Areas Examined (abnormalities noted in diagram):   Scalp / Hair Palpated and Inspected  Head / Face Inspection Performed  Neck Inspection Performed  Chest / Axilla Inspection Performed  Abdomen Inspection Performed  Back Inspection Performed  RUE Inspected  LUE Inspection Performed  Nails and Digits Inspection Performed                 Diagram Legend     Erythematous scaling macule/papule c/w actinic keratosis       Vascular papule c/w angioma      Pigmented verrucoid papule/plaque c/w seborrheic keratosis      Yellow umbilicated papule c/w sebaceous hyperplasia      Irregularly shaped tan macule c/w lentigo     1-2 mm smooth white papules consistent with Milia      Movable subcutaneous cyst with punctum c/w epidermal inclusion cyst      Subcutaneous movable cyst c/w pilar cyst      Firm pink to brown papule c/w dermatofibroma      Pedunculated fleshy papule(s) c/w skin tag(s)      Evenly pigmented macule c/w junctional nevus     Mildly variegated  pigmented, slightly irregular-bordered macule c/w mildly atypical nevus      Flesh colored to evenly pigmented papule c/w intradermal nevus       Pink pearly papule/plaque c/w basal cell carcinoma      Erythematous hyperkeratotic cursted plaque c/w SCC      Surgical scar with no sign of skin cancer recurrence      Open and closed comedones      Inflammatory papules and pustules      Verrucoid papule consistent consistent with wart     Erythematous eczematous patches and plaques     Dystrophic onycholytic nail with subungual debris c/w onychomycosis     Umbilicated papule    Erythematous-base heme-crusted tan verrucoid plaque consistent with inflamed seborrheic keratosis     Erythematous Silvery Scaling Plaque c/w Psoriasis     See annotation      Assessment / Plan:        Seborrheic keratoses  Multiple benign nevi  Lentigo  Angiofibroma  Sebaceous hyperplasia  Reassurance given to patient. No treatment is necessary.   Treatment of benign, asymptomatic lesions may be considered cosmetic.    Screening exam for skin cancer  Total body skin examination performed today including at least 12 points as noted in physical examination. No lesions suspicious for malignancy noted.    Recommend daily sun protection/avoidance, use of at least SPF 30, broad spectrum sunscreen (OTC drug), skin self examinations, and routine physician surveillance to optimize early detection             Follow up in about 1 year (around 8/8/2024) for TBSE.

## 2023-08-21 ENCOUNTER — HOSPITAL ENCOUNTER (OUTPATIENT)
Dept: RADIOLOGY | Facility: HOSPITAL | Age: 68
Discharge: HOME | End: 2023-08-21
Attending: ORTHOPAEDIC SURGERY
Payer: MEDICARE

## 2023-08-21 ENCOUNTER — OFFICE VISIT (OUTPATIENT)
Dept: SPORTS MEDICINE | Facility: CLINIC | Age: 68
End: 2023-08-21
Payer: MEDICARE

## 2023-08-21 VITALS
BODY MASS INDEX: 22.36 KG/M2 | HEART RATE: 93 BPM | DIASTOLIC BLOOD PRESSURE: 78 MMHG | SYSTOLIC BLOOD PRESSURE: 127 MMHG | WEIGHT: 155.88 LBS

## 2023-08-21 DIAGNOSIS — M25.562 LEFT KNEE PAIN, UNSPECIFIED CHRONICITY: ICD-10-CM

## 2023-08-21 DIAGNOSIS — M25.562 CHRONIC PAIN OF LEFT KNEE: Primary | ICD-10-CM

## 2023-08-21 DIAGNOSIS — G89.29 CHRONIC PAIN OF LEFT KNEE: Primary | ICD-10-CM

## 2023-08-21 PROCEDURE — 1125F AMNT PAIN NOTED PAIN PRSNT: CPT | Mod: CPTII,S$GLB,, | Performed by: ORTHOPAEDIC SURGERY

## 2023-08-21 PROCEDURE — 99214 OFFICE O/P EST MOD 30 MIN: CPT | Mod: S$GLB,,, | Performed by: ORTHOPAEDIC SURGERY

## 2023-08-21 PROCEDURE — 73562 X-RAY EXAM OF KNEE 3: CPT | Mod: 26,RT,, | Performed by: RADIOLOGY

## 2023-08-21 PROCEDURE — 3074F SYST BP LT 130 MM HG: CPT | Mod: CPTII,S$GLB,, | Performed by: ORTHOPAEDIC SURGERY

## 2023-08-21 PROCEDURE — 73564 X-RAY EXAM KNEE 4 OR MORE: CPT | Mod: TC,LT

## 2023-08-21 PROCEDURE — 73564 XR KNEE ORTHO LEFT WITH FLEXION: ICD-10-PCS | Mod: 26,LT,, | Performed by: RADIOLOGY

## 2023-08-21 PROCEDURE — 99214 PR OFFICE/OUTPT VISIT, EST, LEVL IV, 30-39 MIN: ICD-10-PCS | Mod: S$GLB,,, | Performed by: ORTHOPAEDIC SURGERY

## 2023-08-21 PROCEDURE — 3044F PR MOST RECENT HEMOGLOBIN A1C LEVEL <7.0%: ICD-10-PCS | Mod: CPTII,S$GLB,, | Performed by: ORTHOPAEDIC SURGERY

## 2023-08-21 PROCEDURE — 3008F BODY MASS INDEX DOCD: CPT | Mod: CPTII,S$GLB,, | Performed by: ORTHOPAEDIC SURGERY

## 2023-08-21 PROCEDURE — 3008F PR BODY MASS INDEX (BMI) DOCUMENTED: ICD-10-PCS | Mod: CPTII,S$GLB,, | Performed by: ORTHOPAEDIC SURGERY

## 2023-08-21 PROCEDURE — 99999 PR PBB SHADOW E&M-EST. PATIENT-LVL II: CPT | Mod: PBBFAC,,, | Performed by: ORTHOPAEDIC SURGERY

## 2023-08-21 PROCEDURE — 73564 X-RAY EXAM KNEE 4 OR MORE: CPT | Mod: 26,LT,, | Performed by: RADIOLOGY

## 2023-08-21 PROCEDURE — 73562 XR KNEE ORTHO LEFT WITH FLEXION: ICD-10-PCS | Mod: 26,RT,, | Performed by: RADIOLOGY

## 2023-08-21 PROCEDURE — 3078F PR MOST RECENT DIASTOLIC BLOOD PRESSURE < 80 MM HG: ICD-10-PCS | Mod: CPTII,S$GLB,, | Performed by: ORTHOPAEDIC SURGERY

## 2023-08-21 PROCEDURE — 1125F PR PAIN SEVERITY QUANTIFIED, PAIN PRESENT: ICD-10-PCS | Mod: CPTII,S$GLB,, | Performed by: ORTHOPAEDIC SURGERY

## 2023-08-21 PROCEDURE — 3074F PR MOST RECENT SYSTOLIC BLOOD PRESSURE < 130 MM HG: ICD-10-PCS | Mod: CPTII,S$GLB,, | Performed by: ORTHOPAEDIC SURGERY

## 2023-08-21 PROCEDURE — 3078F DIAST BP <80 MM HG: CPT | Mod: CPTII,S$GLB,, | Performed by: ORTHOPAEDIC SURGERY

## 2023-08-21 PROCEDURE — 99999 PR PBB SHADOW E&M-EST. PATIENT-LVL II: ICD-10-PCS | Mod: PBBFAC,,, | Performed by: ORTHOPAEDIC SURGERY

## 2023-08-21 PROCEDURE — 3044F HG A1C LEVEL LT 7.0%: CPT | Mod: CPTII,S$GLB,, | Performed by: ORTHOPAEDIC SURGERY

## 2023-08-21 RX ORDER — SILDENAFIL 100 MG/1
100 TABLET, FILM COATED ORAL DAILY PRN
COMMUNITY

## 2023-08-21 NOTE — PROGRESS NOTES
CC: Left knee pain    67 y.o. Who returns with a new complaint. I have previously seen him for his left shoulder. He works as an ***. Complaint is left knee pain x *** with an atraumatic onset. Pain localizes ***.  Denies swelling or effusions. No prominent mechanical symptoms. Denies instability. Denies groin pain. Worse with ***. Better with rest.Treatment thus far has included rest, activity modifications, oral medications and ***.  Here today to discuss diagnosis and treatment options.      PMHx notable for ***.   {POSITIVE/NEGATIVE:22741} for tobacco.   {POSITIVE/NEGATIVE:96429} for diabetes. Last A1C:         REVIEW OF SYSTEMS:   Constitution: Negative. Negative for chills, fever and night sweats.    Hematologic/Lymphatic: Negative for bleeding problem. Does not bruise/bleed easily.   Skin: Negative for dry skin, itching and rash.   Musculoskeletal: Negative for falls. Positive for left knee pain and muscle weakness.     All other review of symptoms were reviewed and found to be noncontributory.     PAST MEDICAL HISTORY:   Past Medical History:   Diagnosis Date    Allergy     Arthritis     Family history of malignant neoplasm of gastrointestinal tract mat.gf    Family history of prostate cancer: 1/2 brother 9/25/2017    GERD (gastroesophageal reflux disease)     Kidney cyst, acquired: R 1.2 cm 2015 9/25/2017    Restless leg syndrome     Tubulovillous adenoma 2013 due 2016 9/14/2015     PAST SURGICAL HISTORY:   Past Surgical History:   Procedure Laterality Date    APPLICATION OF BONE GRAFT TO FINGER Left 11/8/2021    Procedure: APPLICATION INTEGRA GRAFT, TO FINGER;  Surgeon: Alba Penn MD;  Location: [facility] OR;  Service: Orthopedics;  Laterality: Left;    CARPAL TUNNEL RELEASE      COLONOSCOPY N/A 5/22/2019    Procedure: COLONOSCOPY;  Surgeon: Juancarlos Foley MD;  Location: [address]);  Service: Endoscopy;  Laterality: N/A;    COLONOSCOPY N/A 10/12/2022    Procedure: COLONOSCOPY;  Surgeon: Cherelle REYES  MD Felipe;  Location: [facility] ENDO (4TH FLR);  Service: Endoscopy;  Laterality: N/A;  vacc-inst portal-am prep-clears 4 hrs prior-tb    EXCISION OF GANGLION OF WRIST Right 6/28/2019    Procedure: EXCISION, GANGLION CYST, WRIST right;  Surgeon: Alba Penn MD;  Location: [facility] OR 1ST FLR;  Service: Orthopedics;  Laterality: Right;  regional MAC, stretcher, supine, hand pan 1 and 2,MINI C-arm, call Arthrex    INJECTION OF ANESTHETIC AGENT AROUND NERVE Bilateral 1/27/2022    Procedure: Block, Nerve MEDIAL BRANCH BLOCK BILATERAL L3,4,5  DIRECT REFERRAL 1 OF 2;  Surgeon: Kaiser Braxton MD;  Location: [facility] PAIN MGT;  Service: Pain Management;  Laterality: Bilateral;    INJECTION OF ANESTHETIC AGENT AROUND NERVE Bilateral 2/10/2022    Procedure: Block, Nerve MEDIAL BRANCH BLOCK BILATERAL L3.4.5  DIRECT REFERRAL 2 OF 2;  Surgeon: Kaiser Braxton MD;  Location: [facility] PAIN MGT;  Service: Pain Management;  Laterality: Bilateral;    INJECTION OF FACET JOINT Bilateral 6/6/2019    Procedure: INJECTION, FACET JOINT INJECTION (LUMBAR BLOCK) BILATERAL L4-L5 AND L5-S1 FACET INJECTIONS;  Surgeon: Kaiser Braxton MD;  Location: [facility] PAIN MGT;  Service: Pain Management;  Laterality: Bilateral;  NEEDS CONSENT    INJECTION, SACROILIAC JOINT Right 12/8/2022    Procedure: INJECTION,SACROILIAC JOINT RIGHT  CONTRAST;  Surgeon: Kaiser Braxton MD;  Location: [facility] PAIN MGT;  Service: Pain Management;  Laterality: Right;    INTERPOSITION ARTHROPLASTY OF CARPOMETACARPAL JOINTS Right 6/28/2019    Procedure: INTERPOSITION ARTHROPLASTY, CMC JOINT right;  Surgeon: Alba Penn MD;  Location: [facility] OR 1ST FLR;  Service: Orthopedics;  Laterality: Right;  regional MAC, stretcher, supine, hand pan 1 and 2,MINI C-arm, call Arthrex    IRRIGATION AND DEBRIDEMENT OF UPPER EXTREMITY Left 11/8/2021    Procedure: IRRIGATION AND DEBRIDEMENT, UPPER EXTREMITY, left index finger;  Surgeon: Alba Penn MD;  Location: [facility];  Service: Orthopedics;   Laterality: Left;    RADIOFREQUENCY ABLATION Right 5/16/2022    Procedure: Radiofrequency Ablation RIGHT L3,4,5;  Surgeon: Kaiser Braxton MD;  Location: [facility] PAIN MGT;  Service: Pain Management;  Laterality: Right;    RADIOFREQUENCY ABLATION Left 6/2/2022    Procedure: Radiofrequency Ablation LEFT L3,4,5;  Surgeon: Kaiser Braxton MD;  Location: [facility] PAIN MGT;  Service: Pain Management;  Laterality: Left;    RADIOFREQUENCY ABLATION Right 7/17/2023    Procedure: RADIOFREQUENCY ABLATION, RIGHT L3,L4,L5 MEDIAL BRANCH ONE OF TWO;  Surgeon: Kaiser Braxton MD;  Location: [facility] PAIN MGT;  Service: Pain Management;  Laterality: Right;    REPAIR OF NAIL BED Left 11/8/2021    Procedure: REPAIR, NAIL BED, left index;  Surgeon: Alba Penn MD;  Location: [facility] OR;  Service: Orthopedics;  Laterality: Left;    SPERMATOCELECTOMY Right 11/8/2019    Procedure: EXCISION, SPERMATOCELE;  Surgeon: Sheldon Araya Jr., MD;  [facility];  Service: Urology;  Laterality: Right;  1hr    TRANSFORAMINAL EPIDURAL INJECTION OF STEROID Bilateral 10/27/2022    Procedure: INJECTION, STEROID, EPIDURAL, TRANSFORAMINAL APPROACH, BILATERAL L5-S1 CONTRAST;  Surgeon: Kaiser Braxton MD;  Location: [facility] PAIN MGT;  Service: Pain Management;  Laterality: Bilateral;     FAMILY HISTORY:   Family History   Problem Relation Age of Onset    Arthritis Mother         probable R.A.    Cancer Father         esophageal ca and brain tumor    Esophageal cancer Father     Melanoma Father     Cancer Brother         pancreatic cancer    Cancer Maternal Grandfather         colon    Colon cancer Maternal Grandfather     Irritable bowel syndrome Sister     Arthritis Sister     No Known Problems Son     No Known Problems Brother     No Known Problems Son     Cancer Sister     No Known Problems Brother     Diabetes Neg Hx     Heart disease Neg Hx     Cirrhosis Neg Hx     Celiac disease Neg Hx     Crohn's disease Neg Hx     Ulcerative colitis Neg Hx     Stomach  cancer Neg Hx     Rectal cancer Neg Hx     Liver cancer Neg Hx     Psoriasis Neg Hx     Lupus Neg Hx      SOCIAL HISTORY:   Social History     Socioeconomic History    Marital status: Single    Number of children: 2   Tobacco Use    Smoking status: Never    Smokeless tobacco: Never    Tobacco comments:     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   Substance and Sexual Activity    Alcohol use: Yes     Alcohol/week: 0.0 standard drinks of alcohol     Comment: 2-3 days weekly, up to 2 beers    Drug use: No    Sexual activity: Yes     Partners: Female     Social Determinants of Health     Transportation Needs: No Transportation Needs (5/12/2022)    PRAPARE - Transportation     Lack of Transportation (Medical): No     Lack of Transportation (Non-Medical): No   Physical Activity: Sufficiently Active (5/12/2022)    Exercise Vital Sign     Days of Exercise per Week: 5 days     Minutes of Exercise per Session: 30 min   Stress: No Stress Concern Present (1/29/2020)    [employer] - Occupational Stress Questionnaire     Feeling of Stress : Only a little   Housing Stability: Low Risk  (5/12/2022)    Housing Stability Vital Sign     Unable to Pay for Housing in the Last Year: No     Number of Places Lived in the Last Year: 1     Unstable Housing in the Last Year: No     MEDICATIONS:     Current Outpatient Medications:     aspirin (ECOTRIN) 81 MG EC tablet, Take 1 tablet (81 mg total) by mouth once daily., Disp: 30 tablet, Rfl: 12    cyclobenzaprine (FLEXERIL) 10 MG tablet, Take 1 tablet (10 mg total) by mouth 3 (three) times daily as needed for Muscle spasms. for ten days, Disp: 30 tablet, Rfl: 1    fluticasone propionate (FLONASE) 50 mcg/actuation nasal spray, 1 SPRAY (50 MCG TOTAL) BY EACH NOSTRIL ROUTE 2 (TWO) TIMES A DAY., Disp: 48 mL, Rfl: 2    gabapentin (NEURONTIN) 300 MG capsule, TAKE 1 CAPSULE BY MOUTH THREE TIMES  A DAY, Disp: 90 capsule, Rfl: 0    meloxicam (MOBIC) 15 MG tablet, TAKE 1 TABLET (15 MG TOTAL) BY MOUTH DAILY AS NEEDED FOR PAIN (TAKE WITH FOOD OR A MEAL)., Disp: 90 tablet, Rfl: 0    mupirocin (BACTROBAN) 2 % ointment, Apply topically 2 (two) times daily., Disp: 30 g, Rfl: 0    rosuvastatin (CRESTOR) 10 MG tablet, Take 1 tablet (10 mg total) by mouth once daily., Disp: 90 tablet, Rfl: 3    sildenafiL (VIAGRA) 100 MG tablet, Take 1 tablet (100 mg total) by mouth daily as needed., Disp: 15 tablet, Rfl: 11    sulfamethoxazole-trimethoprim 800-160mg (BACTRIM DS) 800-160 mg Tab, Take 1 tablet by mouth 2 (two) times daily., Disp: 14 tablet, Rfl: 0    tamsulosin (FLOMAX) 0.4 mg Cap, Take 1 capsule (0.4 mg total) by mouth once daily., Disp: 30 capsule, Rfl: 12    tamsulosin (FLOMAX) 0.4 mg Cap, Take 1 capsule (0.4 mg total) by mouth once daily., Disp: 90 capsule, Rfl: 3  No current facility-administered medications for this visit.    Facility-Administered Medications Ordered in Other Visits:     0.9%  NaCl infusion, , Intravenous, Continuous, Cole Fitzpatrick DO    ALLERGIES:   Review of patient's allergies indicates:  No Known Allergies     PHYSICAL EXAMINATION:  There were no vitals taken for this visit.  General: Well-developed well-nourished 67 y.o. malein no acute distress   Cardiovascular: Regular rhythm by palpation of distal pulse, normal color and temperature, no concerning varicosities on symptomatic side   Lungs: No labored breathing or wheezing appreciated   Neuro: Alert and oriented ×3   Psychiatric: well oriented to person, place and time, demonstrates normal mood and affect   Skin: No rashes, lesions or ulcers, normal temperature, turgor, and texture on involved extremity    Ortho/SPM Exam  Examination of the left knee demonstrates intact extensor mechanism. No effusion or prepatellar swelling. Central patellar tracking. No patellar apprehension. Normal patellar mobility. *** extension. Flexion to ***. ***  pain with forced flexion or extension. *** Prominent tenderness along the medial and lateral joint line. Negative Winston's. Negative Lachman. Stable to varus/valgus stress testing at 0 and 30 deg. Negative posterior drawer. Ligamentously stable.    IMAGING:  X-rays including standing, weight bearing AP and flexion bilateral knees, LEFT knee lateral and sunrise views ordered and images reviewed by me show:    ***    ASSESSMENT:    No diagnosis found.    PLAN:     -Findings and treatment options were discussed with the patient  -  -RTC ***  -All questions answered      Procedures

## 2023-08-21 NOTE — PROGRESS NOTES
CC: Left Knee pain    Sabino return to clinic with new complaint of left knee pain. He is a . Had a previous infection in that knee. Prior to pre-patellar bursitis, he reports kneeling down and feeling tender. On 7/19/23 patient started Doxycline. He was then seen in internal medicine on 7/26/23, given Bactrium and Bactroban and referred to Dr. Kike Land for I&D. I&D was performed on 7/27/23, reports immediate relief from pain and swelling. He has completed antibiotics. Today he reports one week of lateral knee pain. Denies trauma.      Prior Hx 8/3/21   67 y.o. Male who presents for MRI review of left shoulder. Complaint is longstanding history of left shoulder pain, more than 6 months.  Localizes deep in the shoulder.  Patient does not report any new incidents or injuries since their last appointment. Pain and symptoms remain unchanged since his last appointment. Here today to discuss treatment options.  He has not done any PT or had any injections.  He takes meloxicam for his back which takes some of the edge off his shoulder pain.    PAST MEDICAL HISTORY:   Past Medical History:   Diagnosis Date    Allergy     Arthritis     Family history of malignant neoplasm of gastrointestinal tract mat.gf    Family history of prostate cancer: 1/2 brother 9/25/2017    GERD (gastroesophageal reflux disease)     Kidney cyst, acquired: R 1.2 cm 2015 9/25/2017    Restless leg syndrome     Tubulovillous adenoma 2013 due 2016 9/14/2015     PAST SURGICAL HISTORY:  Past Surgical History:   Procedure Laterality Date    APPLICATION OF BONE GRAFT TO FINGER Left 11/8/2021    Procedure: APPLICATION INTEGRA GRAFT, TO FINGER;  Surgeon: Alba Penn MD;  Location: [facility] OR;  Service: Orthopedics;  Laterality: Left;    CARPAL TUNNEL RELEASE      COLONOSCOPY N/A 5/22/2019    Procedure: COLONOSCOPY;  Surgeon: Juancarlos Foley MD;  Location: [facility] ([address] [place] [address]);  Service: Endoscopy;  Laterality: N/A;    COLONOSCOPY N/A  10/12/2022    Procedure: COLONOSCOPY;  Surgeon: Cherelle Wang MD;  Location: [facility] ENDO (4TH FLR);  Service: Endoscopy;  Laterality: N/A;  vacc-inst portal-am prep-clears 4 hrs prior-tb    EXCISION OF GANGLION OF WRIST Right 6/28/2019    Procedure: EXCISION, GANGLION CYST, WRIST right;  Surgeon: Alba Penn MD;  Location: [facility] OR 1ST FLR;  Service: Orthopedics;  Laterality: Right;  regional MAC, stretcher, supine, hand pan 1 and 2,MINI C-arm, call Arthrex    INJECTION OF ANESTHETIC AGENT AROUND NERVE Bilateral 1/27/2022    Procedure: Block, Nerve MEDIAL BRANCH BLOCK BILATERAL L3,4,5  DIRECT REFERRAL 1 OF 2;  Surgeon: Kaiser Braxton MD;  Location: [facility] PAIN MGT;  Service: Pain Management;  Laterality: Bilateral;    INJECTION OF ANESTHETIC AGENT AROUND NERVE Bilateral 2/10/2022    Procedure: Block, Nerve MEDIAL BRANCH BLOCK BILATERAL L3.4.5  DIRECT REFERRAL 2 OF 2;  Surgeon: Kaiser Braxton MD;  Location: [facility] PAIN MGT;  Service: Pain Management;  Laterality: Bilateral;    INJECTION OF FACET JOINT Bilateral 6/6/2019    Procedure: INJECTION, FACET JOINT INJECTION (LUMBAR BLOCK) BILATERAL L4-L5 AND L5-S1 FACET INJECTIONS;  Surgeon: Kaiser Braxton MD;  Location: [facility] PAIN MGT;  Service: Pain Management;  Laterality: Bilateral;  NEEDS CONSENT    INJECTION, SACROILIAC JOINT Right 12/8/2022    Procedure: INJECTION,SACROILIAC JOINT RIGHT  CONTRAST;  Surgeon: Kaiser Braxton MD;  Location: [facility] PAIN MGT;  Service: Pain Management;  Laterality: Right;    INTERPOSITION ARTHROPLASTY OF CARPOMETACARPAL JOINTS Right 6/28/2019    Procedure: INTERPOSITION ARTHROPLASTY, CMC JOINT right;  Surgeon: Alba Penn MD;  Location: [facility] OR 1ST FLR;  Service: Orthopedics;  Laterality: Right;  regional MAC, stretcher, supine, hand pan 1 and 2,MINI C-arm, call Arthrex    IRRIGATION AND DEBRIDEMENT OF UPPER EXTREMITY Left 11/8/2021    Procedure: IRRIGATION AND DEBRIDEMENT, UPPER EXTREMITY, left index finger;  Surgeon: Alba JENNINGS  MD Isamar;  Location: [facility] OR;  Service: Orthopedics;  Laterality: Left;    RADIOFREQUENCY ABLATION Right 5/16/2022    Procedure: Radiofrequency Ablation RIGHT L3,4,5;  Surgeon: Kaiser Braxton MD;  Location: [facility] PAIN MGT;  Service: Pain Management;  Laterality: Right;    RADIOFREQUENCY ABLATION Left 6/2/2022    Procedure: Radiofrequency Ablation LEFT L3,4,5;  Surgeon: Kaiser Braxton MD;  Location: [facility] PAIN MGT;  Service: Pain Management;  Laterality: Left;    RADIOFREQUENCY ABLATION Right 7/17/2023    Procedure: RADIOFREQUENCY ABLATION, RIGHT L3,L4,L5 MEDIAL BRANCH ONE OF TWO;  Surgeon: Kaiser Braxton MD;  Location: [facility] PAIN MGT;  Service: Pain Management;  Laterality: Right;    REPAIR OF NAIL BED Left 11/8/2021    Procedure: REPAIR, NAIL BED, left index;  Surgeon: Alba Penn MD;  Location: [facility] OR;  Service: Orthopedics;  Laterality: Left;    SPERMATOCELECTOMY Right 11/8/2019    Procedure: EXCISION, SPERMATOCELE;  Surgeon: Sheldon Araya Jr., MD;  Location: [facility] OR 1ST FLR;  Service: Urology;  Laterality: Right;  1hr    TRANSFORAMINAL EPIDURAL INJECTION OF STEROID Bilateral 10/27/2022    Procedure: INJECTION, STEROID, EPIDURAL, TRANSFORAMINAL APPROACH, BILATERAL L5-S1 CONTRAST;  Surgeon: Kaiser Braxton MD;  Location: [facility] PAIN MGT;  Service: Pain Management;  Laterality: Bilateral;     FAMILY HISTORY:  Family History   Problem Relation Age of Onset    Arthritis Mother         probable R.A.    Cancer Father         esophageal ca and brain tumor    Esophageal cancer Father     Melanoma Father     Cancer Brother         pancreatic cancer    Cancer Maternal Grandfather         colon    Colon cancer Maternal Grandfather     Irritable bowel syndrome Sister     Arthritis Sister     No Known Problems Son     No Known Problems Brother     No Known Problems Son     Cancer Sister     No Known Problems Brother     Diabetes Neg Hx     Heart disease Neg Hx     Cirrhosis Neg Hx     Celiac disease Neg Hx     Crohn's  disease Neg Hx     Ulcerative colitis Neg Hx     Stomach cancer Neg Hx     Rectal cancer Neg Hx     Liver cancer Neg Hx     Psoriasis Neg Hx     Lupus Neg Hx      MEDICATIONS:    Current Outpatient Medications:     aspirin (ECOTRIN) 81 MG EC tablet, Take 1 tablet (81 mg total) by mouth once daily., Disp: 30 tablet, Rfl: 12    cyclobenzaprine (FLEXERIL) 10 MG tablet, Take 1 tablet (10 mg total) by mouth 3 (three) times daily as needed for Muscle spasms. for ten days, Disp: 30 tablet, Rfl: 1    fluticasone propionate (FLONASE) 50 mcg/actuation nasal spray, 1 SPRAY (50 MCG TOTAL) BY EACH NOSTRIL ROUTE 2 (TWO) TIMES A DAY., Disp: 48 mL, Rfl: 2    gabapentin (NEURONTIN) 300 MG capsule, TAKE 1 CAPSULE BY MOUTH THREE TIMES A DAY, Disp: 90 capsule, Rfl: 0    meloxicam (MOBIC) 15 MG tablet, TAKE 1 TABLET (15 MG TOTAL) BY MOUTH DAILY AS NEEDED FOR PAIN (TAKE WITH FOOD OR A MEAL)., Disp: 90 tablet, Rfl: 0    mupirocin (BACTROBAN) 2 % ointment, Apply topically 2 (two) times daily., Disp: 30 g, Rfl: 0    rosuvastatin (CRESTOR) 10 MG tablet, Take 1 tablet (10 mg total) by mouth once daily., Disp: 90 tablet, Rfl: 3    sildenafiL (VIAGRA) 100 MG tablet, Take 100 mg by mouth daily as needed for Erectile Dysfunction., Disp: , Rfl:     sulfamethoxazole-trimethoprim 800-160mg (BACTRIM DS) 800-160 mg Tab, Take 1 tablet by mouth 2 (two) times daily., Disp: 14 tablet, Rfl: 0    tamsulosin (FLOMAX) 0.4 mg Cap, Take 1 capsule (0.4 mg total) by mouth once daily., Disp: 30 capsule, Rfl: 12    tamsulosin (FLOMAX) 0.4 mg Cap, Take 1 capsule (0.4 mg total) by mouth once daily., Disp: 90 capsule, Rfl: 3  No current facility-administered medications for this visit.    Facility-Administered Medications Ordered in Other Visits:     0.9%  NaCl infusion, , Intravenous, Continuous, Cole Fitzpatrick, DO    ALLERGIES:  Review of patient's allergies indicates:  No Known Allergies     REVIEW OF SYSTEMS:  Constitution: Negative. Negative for chills, fever  and night sweats.    Hematologic/Lymphatic: Negative for bleeding problem. Does not bruise/bleed easily.   Skin: Negative for dry skin, itching and rash.   Musculoskeletal: Negative for falls. Positive for left knee pain and muscle weakness.     All other review of symptoms were reviewed and found to be noncontributory.    PHYSICAL EXAMINATION:  Vitals:  /78   Pulse 93   Wt 70.7 kg (155 lb 13.8 oz)   BMI 22.36 kg/m²    General: Well-developed well-nourished 67 y.o. malein no acute distress   Cardiovascular: Regular rhythm by palpation of distal pulse, normal color and temperature, no concerning varicosities on symptomatic side   Lungs: No labored breathing or wheezing appreciated   Neuro: Alert and oriented ×3   Psychiatric: well oriented to person, place and time, demonstrates normal mood and affect   Skin: No rashes, lesions or ulcers, normal temperature, turgor, and texture on uninvolved extremity    Ortho/SPM Exam    Examination of the left knee demonstrates non-chronic eurhythmia. Intact extensor mechanism. Mild quadriceps weakness. No effusion. Central patellar tracking. No patellar apprehension. Normal patellar mobility. Full extension. Flexion to 130. No pain with forced flexion or extension. Tenderness over patellofemoral articulation. No prominent tenderness along the medial and lateral joint line. Negative Winston's. Negative Lachman. Stable to varus/valgus stress testing at 0 and 30 deg. Negative posterior drawer. Ligamentously stable.    Examination of the left shoulder demonstrates active forward elevation in the scapular plane to 140 with pain, passive to 160 with pain.  Active external rotation with arm at side to 60°.  Positive Neer and William maneuvers for pain.  Minimal tenderness over the proximal biceps groove, negative speeds test.  Pain and weakness on resisted cuff testing.  4/5 scaption.  5-out of 5 resisted external rotation.  Negative belly press test, positive lift off test.   Positive compression rotation.    IMAGING:  Xrays including AP, Outlet and Axillary Lateral of Left shoulder are ordered / images reviewed by me:   Mild degenerative changes    MRI left shoulder:  1. Severe tendinosis of supraspinatus, infraspinatus, and subscapularis tendons.  Partial-thickness undersurface tear subscapularis tendon with retraction and medial dislocation of the biceps tendon.  Low-grade undersurface fraying of infraspinatus tendon.  Rotator cuff muscle bulk is maintained.  2. Circumferential fraying of labrum.  3. Moderate acromioclavicular arthrosis.  4. Subacromial subdeltoid bursitis.      5. Glenohumeral cartilage loss.    ASSESSMENT:      ICD-10-CM ICD-9-CM   1. Chronic pain of left knee  M25.562 719.46    G89.29 338.29       PLAN:     Discussed NSAIDS and ice for inflammation  Possible future physical therapy for him strengthening  Follow-up as needed.    Procedures

## 2023-08-30 DIAGNOSIS — Z00.00 ROUTINE GENERAL MEDICAL EXAMINATION AT A HEALTH CARE FACILITY: Primary | ICD-10-CM

## 2023-08-30 DIAGNOSIS — R79.9 ABNORMAL FINDING OF BLOOD CHEMISTRY, UNSPECIFIED: ICD-10-CM

## 2023-08-31 ENCOUNTER — LAB VISIT (OUTPATIENT)
Dept: LAB | Facility: HOSPITAL | Age: 68
End: 2023-08-31
Attending: INTERNAL MEDICINE
Payer: MEDICARE

## 2023-08-31 DIAGNOSIS — R73.01 IFG (IMPAIRED FASTING GLUCOSE): ICD-10-CM

## 2023-08-31 DIAGNOSIS — Z12.5 ENCOUNTER FOR SCREENING FOR MALIGNANT NEOPLASM OF PROSTATE: ICD-10-CM

## 2023-08-31 DIAGNOSIS — E78.2 MIXED HYPERLIPIDEMIA: ICD-10-CM

## 2023-08-31 LAB
ALBUMIN SERPL BCP-MCNC: 4 G/DL (ref 3.5–5.2)
ALP SERPL-CCNC: 64 U/L (ref 55–135)
ALT SERPL W/O P-5'-P-CCNC: 24 U/L (ref 10–44)
ANION GAP SERPL CALC-SCNC: 9 MMOL/L (ref 8–16)
AST SERPL-CCNC: 37 U/L (ref 10–40)
BASOPHILS # BLD AUTO: 0.07 K/UL (ref 0–0.2)
BASOPHILS NFR BLD: 0.6 % (ref 0–1.9)
BILIRUB SERPL-MCNC: 0.4 MG/DL (ref 0.1–1)
BUN SERPL-MCNC: 16 MG/DL (ref 8–23)
CALCIUM SERPL-MCNC: 9.1 MG/DL (ref 8.7–10.5)
CHLORIDE SERPL-SCNC: 101 MMOL/L (ref 95–110)
CHOLEST SERPL-MCNC: 167 MG/DL (ref 120–199)
CHOLEST/HDLC SERPL: 2.1 {RATIO} (ref 2–5)
CO2 SERPL-SCNC: 27 MMOL/L (ref 23–29)
COMPLEXED PSA SERPL-MCNC: 0.92 NG/ML (ref 0–4)
CREAT SERPL-MCNC: 0.9 MG/DL (ref 0.5–1.4)
DIFFERENTIAL METHOD: ABNORMAL
EOSINOPHIL # BLD AUTO: 0.7 K/UL (ref 0–0.5)
EOSINOPHIL NFR BLD: 5.9 % (ref 0–8)
ERYTHROCYTE [DISTWIDTH] IN BLOOD BY AUTOMATED COUNT: 13.4 % (ref 11.5–14.5)
EST. GFR  (NO RACE VARIABLE): >60 ML/MIN/1.73 M^2
ESTIMATED AVG GLUCOSE: 105 MG/DL (ref 68–131)
GLUCOSE SERPL-MCNC: 84 MG/DL (ref 70–110)
HBA1C MFR BLD: 5.3 % (ref 4–5.6)
HCT VFR BLD AUTO: 45 % (ref 40–54)
HDLC SERPL-MCNC: 79 MG/DL (ref 40–75)
HDLC SERPL: 47.3 % (ref 20–50)
HGB BLD-MCNC: 14.6 G/DL (ref 14–18)
IMM GRANULOCYTES # BLD AUTO: 0.03 K/UL (ref 0–0.04)
IMM GRANULOCYTES NFR BLD AUTO: 0.3 % (ref 0–0.5)
LDLC SERPL CALC-MCNC: 81.8 MG/DL (ref 63–159)
LYMPHOCYTES # BLD AUTO: 1.7 K/UL (ref 1–4.8)
LYMPHOCYTES NFR BLD: 14.8 % (ref 18–48)
MCH RBC QN AUTO: 30.2 PG (ref 27–31)
MCHC RBC AUTO-ENTMCNC: 32.4 G/DL (ref 32–36)
MCV RBC AUTO: 93 FL (ref 82–98)
MONOCYTES # BLD AUTO: 0.8 K/UL (ref 0.3–1)
MONOCYTES NFR BLD: 6.5 % (ref 4–15)
NEUTROPHILS # BLD AUTO: 8.5 K/UL (ref 1.8–7.7)
NEUTROPHILS NFR BLD: 71.9 % (ref 38–73)
NONHDLC SERPL-MCNC: 88 MG/DL
NRBC BLD-RTO: 0 /100 WBC
PLATELET # BLD AUTO: 230 K/UL (ref 150–450)
PMV BLD AUTO: 10.2 FL (ref 9.2–12.9)
POTASSIUM SERPL-SCNC: 4.2 MMOL/L (ref 3.5–5.1)
PROT SERPL-MCNC: 7.2 G/DL (ref 6–8.4)
RBC # BLD AUTO: 4.84 M/UL (ref 4.6–6.2)
SODIUM SERPL-SCNC: 137 MMOL/L (ref 136–145)
TRIGL SERPL-MCNC: 31 MG/DL (ref 30–150)
WBC # BLD AUTO: 11.78 K/UL (ref 3.9–12.7)

## 2023-08-31 PROCEDURE — 83036 HEMOGLOBIN GLYCOSYLATED A1C: CPT | Performed by: INTERNAL MEDICINE

## 2023-08-31 PROCEDURE — 80053 COMPREHEN METABOLIC PANEL: CPT | Performed by: INTERNAL MEDICINE

## 2023-08-31 PROCEDURE — 80061 LIPID PANEL: CPT | Performed by: INTERNAL MEDICINE

## 2023-08-31 PROCEDURE — 84153 ASSAY OF PSA TOTAL: CPT | Performed by: INTERNAL MEDICINE

## 2023-08-31 PROCEDURE — 36415 COLL VENOUS BLD VENIPUNCTURE: CPT | Performed by: INTERNAL MEDICINE

## 2023-08-31 PROCEDURE — 85025 COMPLETE CBC W/AUTO DIFF WBC: CPT | Performed by: INTERNAL MEDICINE

## 2023-09-08 ENCOUNTER — PATIENT MESSAGE (OUTPATIENT)
Dept: PAIN MEDICINE | Facility: OTHER | Age: 68
End: 2023-09-08
Payer: MEDICARE

## 2023-09-11 ENCOUNTER — HOSPITAL ENCOUNTER (OUTPATIENT)
Facility: OTHER | Age: 68
Discharge: HOME | End: 2023-09-11
Attending: ANESTHESIOLOGY | Admitting: ANESTHESIOLOGY
Payer: MEDICARE

## 2023-09-11 ENCOUNTER — TELEPHONE (OUTPATIENT)
Dept: SPORTS MEDICINE | Facility: CLINIC | Age: 68
End: 2023-09-11
Payer: MEDICARE

## 2023-09-11 VITALS
HEART RATE: 76 BPM | DIASTOLIC BLOOD PRESSURE: 77 MMHG | SYSTOLIC BLOOD PRESSURE: 134 MMHG | RESPIRATION RATE: 16 BRPM | TEMPERATURE: 97 F | WEIGHT: 150 LBS | HEIGHT: 69 IN | BODY MASS INDEX: 22.22 KG/M2 | OXYGEN SATURATION: 98 %

## 2023-09-11 DIAGNOSIS — M47.899 OTHER OSTEOARTHRITIS OF SPINE, UNSPECIFIED SPINAL REGION: Primary | ICD-10-CM

## 2023-09-11 DIAGNOSIS — G89.29 CHRONIC PAIN: ICD-10-CM

## 2023-09-11 DIAGNOSIS — M47.819 SPONDYLOSIS WITHOUT MYELOPATHY: ICD-10-CM

## 2023-09-11 PROCEDURE — 64635 DESTROY LUMB/SAC FACET JNT: CPT | Mod: LT | Performed by: ANESTHESIOLOGY

## 2023-09-11 PROCEDURE — 99152 MOD SED SAME PHYS/QHP 5/>YRS: CPT | Performed by: ANESTHESIOLOGY

## 2023-09-11 PROCEDURE — 64635 DESTROY LUMB/SAC FACET JNT: CPT | Mod: LT,,, | Performed by: ANESTHESIOLOGY

## 2023-09-11 PROCEDURE — 64636 PR DESTROY L/S FACET JNT ADDL: ICD-10-PCS | Mod: LT,,, | Performed by: ANESTHESIOLOGY

## 2023-09-11 PROCEDURE — 64636 DESTROY L/S FACET JNT ADDL: CPT | Mod: LT | Performed by: ANESTHESIOLOGY

## 2023-09-11 PROCEDURE — 25000003 PHARM REV CODE 250: Performed by: STUDENT IN AN ORGANIZED HEALTH CARE EDUCATION/TRAINING PROGRAM

## 2023-09-11 PROCEDURE — 64635 PR DESTROY LUMB/SAC FACET JNT: ICD-10-PCS | Mod: LT,,, | Performed by: ANESTHESIOLOGY

## 2023-09-11 PROCEDURE — 25000003 PHARM REV CODE 250: Performed by: ANESTHESIOLOGY

## 2023-09-11 PROCEDURE — 64636 DESTROY L/S FACET JNT ADDL: CPT | Mod: LT,,, | Performed by: ANESTHESIOLOGY

## 2023-09-11 PROCEDURE — 63600175 PHARM REV CODE 636 W HCPCS: Performed by: ANESTHESIOLOGY

## 2023-09-11 RX ORDER — DEXAMETHASONE SODIUM PHOSPHATE 10 MG/ML
INJECTION INTRAMUSCULAR; INTRAVENOUS
Status: DISCONTINUED | OUTPATIENT
Start: 2023-09-11 | End: 2023-09-11 | Stop reason: HOSPADM

## 2023-09-11 RX ORDER — BUPIVACAINE HYDROCHLORIDE 2.5 MG/ML
INJECTION, SOLUTION EPIDURAL; INFILTRATION; INTRACAUDAL
Status: DISCONTINUED | OUTPATIENT
Start: 2023-09-11 | End: 2023-09-11 | Stop reason: HOSPADM

## 2023-09-11 RX ORDER — FENTANYL CITRATE 50 UG/ML
INJECTION, SOLUTION INTRAMUSCULAR; INTRAVENOUS
Status: DISCONTINUED | OUTPATIENT
Start: 2023-09-11 | End: 2023-09-11 | Stop reason: HOSPADM

## 2023-09-11 RX ORDER — SODIUM CHLORIDE 9 MG/ML
INJECTION, SOLUTION INTRAVENOUS CONTINUOUS
Status: DISCONTINUED | OUTPATIENT
Start: 2023-09-11 | End: 2023-09-11 | Stop reason: HOSPADM

## 2023-09-11 RX ORDER — MIDAZOLAM HYDROCHLORIDE 1 MG/ML
INJECTION INTRAMUSCULAR; INTRAVENOUS
Status: DISCONTINUED | OUTPATIENT
Start: 2023-09-11 | End: 2023-09-11 | Stop reason: HOSPADM

## 2023-09-11 RX ORDER — LIDOCAINE HYDROCHLORIDE 20 MG/ML
INJECTION, SOLUTION INFILTRATION; PERINEURAL
Status: DISCONTINUED | OUTPATIENT
Start: 2023-09-11 | End: 2023-09-11 | Stop reason: HOSPADM

## 2023-09-11 NOTE — DISCHARGE INSTRUCTIONS

## 2023-09-11 NOTE — TELEPHONE ENCOUNTER
Spoke with patient about appointment with Dr. Coronel for knee pain.   Patient previously treated by Dr. Patel for this. Rescheduled to Dr. Patel.     Patient agreed and understood.

## 2023-09-11 NOTE — OP NOTE
Therapeutic Lumbar Medial Branch Radiofrequency Ablation under Fluoroscopy     The procedure, risks, benefits, and options were discussed with the patient. There are no contraindications to the procedure. The patent expressed understanding and agreed to the procedure. Informed written consent was obtained prior to the start of the procedure and can be found in the patient's chart.        PATIENT NAME: Sabino Rubio   MRN: [id number]     DATE OF PROCEDURE: 09/11/2023     PROCEDURE:  Left L3, L4, and L5 Lumbar Radiofrequency Ablation under Fluoroscopy    PRE-OP DIAGNOSIS: Lumbar spondylosis [M47.816] Lumbar spondylosis [M47.816]    POST-OP DIAGNOSIS: Same    PHYSICIAN: Kaiser Braxton MD    ASSISTANTS: Flash Julien MD Fellow      MEDICATIONS INJECTED:  Preservative-free Decadron 10mg with 9cc of Bupivicaine 0.25%    LOCAL ANESTHETIC INJECTED:   Xylocaine 2%    SEDATION: Versed 2mg and Fentanyl 25mcg                                                                                                                                                                                     Conscious sedation ordered by M.D. Patient re-evaluation prior to administration of conscious sedation. No changes noted in patient's status from initial evaluation. The patient's vital signs were monitored by RN and patient remained hemodynamically stable throughout the procedure.    Event Time In   Sedation Start 0852   Sedation End 0905       ESTIMATED BLOOD LOSS:  None    COMPLICATIONS:  None     INTERVAL HISTORY: Patient has clinical and imaging findings suggestive of facet mediated pain. Patients has completed 2 previous diagnostic medial branch blocks at specified levels with at least 80% relief for the expected duration of the local anesthetic utilized.    TECHNIQUE: Time-out was performed to identify the patient and procedure to be performed. With the patient laying in a prone position, the surgical area was prepped and draped in the usual  sterile fashion using ChloraPrep and fenestrated drape. The levels were determined under fluoroscopic guidance. Skin anesthesia was achieved by injecting Lidocaine 2% over the injection sites. A 20 gauge 10mm curved active tip needle was introduced to the anatomic local of the medial branch at each of the above levels using AP, lateral and/or contralateral oblique fluoroscopic imaging. Then sensory and motor testing was performed to confirm that the needle tips were in the correct location. After negative aspiration for blood or CSF was confirmed, 1 mL of the lidocaine 2% listed above was injected slowly at each site. This was followed by thermal lesioning at 80 degrees celsius for 90 seconds. That was followed by slowly injecting 2 mL of the medication mixture listed above at each site. The needles were removed and bleeding was nil. A sterile dressing was applied. No specimens collected. The patient tolerated the procedure well and did not have any procedure related motor deficit at the conclusion of the procedure.    PRE-PROCEDURE PAIN SCORE: 10/10    POST-PROCEDURE PAIN SCORE: 0/10    The patient was monitored after the procedure in the recovery area. They were given post-procedure and discharge instructions to follow at home. The patient was discharged in a stable condition.        Kaiser Braxton MD

## 2023-09-11 NOTE — H&P
HPI  Patient presenting for Procedure(s) (LRB):  RADIOFREQUENCY ABLATION, LEFT L3,L4,L5 MEDIAL BRANCH TWO OF TWO (Left)     Patient on Anti-coagulation No    No health changes since previous encounter    Past Medical History:   Diagnosis Date    Allergy     Arthritis     Family history of malignant neoplasm of gastrointestinal tract mat.gf    Family history of prostate cancer: 1/2 brother 9/25/2017    GERD (gastroesophageal reflux disease)     Kidney cyst, acquired: R 1.2 cm 2015 9/25/2017    Restless leg syndrome     Tubulovillous adenoma 2013 due 2016 9/14/2015     Past Surgical History:   Procedure Laterality Date    APPLICATION OF BONE GRAFT TO FINGER Left 11/8/2021    Procedure: APPLICATION INTEGRA GRAFT, TO FINGER;  Surgeon: Alba Penn MD;  Location: [facility];  Service: Orthopedics;  Laterality: Left;    CARPAL TUNNEL RELEASE      COLONOSCOPY N/A 5/22/2019    Procedure: COLONOSCOPY;  Surgeon: Juancarlos Foley MD;  [facility]  Service: Endoscopy;  Laterality: N/A;    COLONOSCOPY N/A 10/12/2022    Procedure: COLONOSCOPY;  Surgeon: Cherelle Wang MD;  [facility]  Service: Endoscopy;  Laterality: N/A;  vacc-inst portal-am prep-clears 4 hrs prior-tb    EXCISION OF GANGLION OF WRIST Right 6/28/2019    Procedure: EXCISION, GANGLION CYST, WRIST right;  Surgeon: Alba Penn MD;  Location: [address] FLR;  Service: Orthopedics;  Laterality: Right;  regional MAC, stretcher, supine, hand pan 1 and 2,MINI C-arm, call Arthrex    INJECTION OF ANESTHETIC AGENT AROUND NERVE Bilateral 1/27/2022    Procedure: Block, Nerve MEDIAL BRANCH BLOCK BILATERAL L3,4,5  DIRECT REFERRAL 1 OF 2;  Surgeon: Kaiser Braxton MD;  Location: [facility] PAIN MGT;  Service: Pain Management;  Laterality: Bilateral;    INJECTION OF ANESTHETIC AGENT AROUND NERVE Bilateral 2/10/2022    Procedure: Block, Nerve MEDIAL BRANCH BLOCK BILATERAL L3.4.5  DIRECT REFERRAL 2 OF 2;  Surgeon: Kaiser Braxton MD;  [facility];  Service: Pain Management;  Laterality: Bilateral;    INJECTION OF FACET JOINT Bilateral 6/6/2019    Procedure: INJECTION, FACET JOINT INJECTION (LUMBAR BLOCK) BILATERAL L4-L5 AND L5-S1 FACET INJECTIONS;  Surgeon: Kaiser Braxton MD;  Location: [facility] PAIN MGT;  Service: Pain Management;  Laterality: Bilateral;  NEEDS CONSENT    INJECTION, SACROILIAC JOINT Right 12/8/2022    Procedure: INJECTION,SACROILIAC JOINT RIGHT  CONTRAST;  Surgeon: Kaiser Braxton MD;  Location: [facility] PAIN MGT;  Service: Pain Management;  Laterality: Right;    INTERPOSITION ARTHROPLASTY OF CARPOMETACARPAL JOINTS Right 6/28/2019    Procedure: INTERPOSITION ARTHROPLASTY, CMC JOINT right;  Surgeon: Alba Penn MD;  Location: [address];  Service: Orthopedics;  Laterality: Right;  regional MAC, stretcher, supine, hand pan 1 and 2,MINI C-arm, call Arthrex    IRRIGATION AND DEBRIDEMENT OF UPPER EXTREMITY Left 11/8/2021    Procedure: IRRIGATION AND DEBRIDEMENT, UPPER EXTREMITY, left index finger;  Surgeon: Alba Penn MD;  Location: [facility] OR;  Service: Orthopedics;  Laterality: Left;    RADIOFREQUENCY ABLATION Right 5/16/2022    Procedure: Radiofrequency Ablation RIGHT L3,4,5;  Surgeon: Kaiser Braxton MD;  Location: [facility] PAIN MGT;  Service: Pain Management;  Laterality: Right;    RADIOFREQUENCY ABLATION Left 6/2/2022    Procedure: Radiofrequency Ablation LEFT L3,4,5;  Surgeon: Kaiser Braxton MD;  Location: [facility] PAIN MGT;  Service: Pain Management;  Laterality: Left;    RADIOFREQUENCY ABLATION Right 7/17/2023    Procedure: RADIOFREQUENCY ABLATION, RIGHT L3,L4,L5 MEDIAL BRANCH ONE OF TWO;  Surgeon: Kaiser Braxton MD;  Location: [facility] PAIN MGT;  Service: Pain Management;  Laterality: Right;    REPAIR OF NAIL BED Left 11/8/2021    Procedure: REPAIR, NAIL BED, left index;  Surgeon: Alba Penn MD;  Location: [facility] OR;  Service: Orthopedics;  Laterality: Left;    SPERMATOCELECTOMY Right 11/8/2019    Procedure: EXCISION,  SPERMATOCELE;  Surgeon: Sheldon Araya Jr., MD;  Location: [facility] OR [address] [place] [address];  Service: Urology;  Laterality: Right;  1hr    TRANSFORAMINAL EPIDURAL INJECTION OF STEROID Bilateral 10/27/2022    Procedure: INJECTION, STEROID, EPIDURAL, TRANSFORAMINAL APPROACH, BILATERAL L5-S1 CONTRAST;  Surgeon: Kaiser Braxton MD;  [facility];  Service: Pain Management;  Laterality: Bilateral;     Review of patient's allergies indicates:  No Known Allergies   No current facility-administered medications for this encounter.     Facility-Administered Medications Ordered in Other Encounters   Medication    0.9%  NaCl infusion       PMHx, PSHx, Allergies, Medications reviewed in epic    ROS negative except pain complaints in HPI    OBJECTIVE:    There were no vitals taken for this visit.    PHYSICAL EXAMINATION:    GENERAL: Well appearing, in no acute distress, alert and oriented x3.  PSYCH:  Mood and affect appropriate.  SKIN: Skin color, texture, turgor normal, no rashes or lesions which will impact the procedure.  CV: RRR with palpation of the radial artery.  PULM: No evidence of respiratory difficulty, symmetric chest rise. Clear to auscultation.  NEURO: Cranial nerves grossly intact.    Plan:    Proceed with procedure as planned Procedure(s) (LRB):  RADIOFREQUENCY ABLATION, LEFT L3,L4,L5 MEDIAL BRANCH TWO OF TWO (Left)    Flash Julien MD  09/11/2023

## 2023-09-11 NOTE — DISCHARGE SUMMARY
Discharge Note  Short Stay      SUMMARY     Admit Date: 9/11/2023    Attending Physician: Kaiser Braxton      Discharge Physician: Kaiser Braxton      Discharge Date: 9/11/2023 8:53 AM    Procedure(s) (LRB):  RADIOFREQUENCY ABLATION, LEFT L3,L4,L5 MEDIAL BRANCH TWO OF TWO (Left)    Final Diagnosis: Lumbar spondylosis [M47.816]    Disposition: Home or self care    Patient Instructions:   Current Discharge Medication List        CONTINUE these medications which have NOT CHANGED    Details   aspirin (ECOTRIN) 81 MG EC tablet Take 1 tablet (81 mg total) by mouth once daily.  Qty: 30 tablet, Refills: 12      cyclobenzaprine (FLEXERIL) 10 MG tablet Take 1 tablet (10 mg total) by mouth 3 (three) times daily as needed for Muscle spasms. for ten days  Qty: 30 tablet, Refills: 1    Associated Diagnoses: Myofascial pain      fluticasone propionate (FLONASE) 50 mcg/actuation nasal spray 1 SPRAY (50 MCG TOTAL) BY EACH NOSTRIL ROUTE 2 (TWO) TIMES A DAY.  Qty: 48 mL, Refills: 2      gabapentin (NEURONTIN) 300 MG capsule TAKE 1 CAPSULE BY MOUTH THREE TIMES A DAY  Qty: 90 capsule, Refills: 0    Comments: DX Code Needed  .  Associated Diagnoses: Degeneration of cervical intervertebral disc      meloxicam (MOBIC) 15 MG tablet TAKE 1 TABLET (15 MG TOTAL) BY MOUTH DAILY AS NEEDED FOR PAIN (TAKE WITH FOOD OR A MEAL).  Qty: 90 tablet, Refills: 0    Associated Diagnoses: Degeneration of cervical intervertebral disc      mupirocin (BACTROBAN) 2 % ointment Apply topically 2 (two) times daily.  Qty: 30 g, Refills: 0    Associated Diagnoses: Abscess of left knee      rosuvastatin (CRESTOR) 10 MG tablet Take 1 tablet (10 mg total) by mouth once daily.  Qty: 90 tablet, Refills: 3      sildenafiL (VIAGRA) 100 MG tablet Take 100 mg by mouth daily as needed for Erectile Dysfunction.      sulfamethoxazole-trimethoprim 800-160mg (BACTRIM DS) 800-160 mg Tab Take 1 tablet by mouth 2 (two) times daily.  Qty: 14 tablet, Refills: 0    Associated Diagnoses:  Abscess of left knee      !! tamsulosin (FLOMAX) 0.4 mg Cap Take 1 capsule (0.4 mg total) by mouth once daily.  Qty: 30 capsule, Refills: 12      !! tamsulosin (FLOMAX) 0.4 mg Cap Take 1 capsule (0.4 mg total) by mouth once daily.  Qty: 90 capsule, Refills: 3       !! - Potential duplicate medications found. Please discuss with provider.              Discharge Diagnosis: Lumbar spondylosis [M47.816]  Condition on Discharge: Stable with no complications to procedure   Diet on Discharge: Same as before.  Activity: as per instruction sheet.  Discharge to: Home with a responsible adult.  Follow up: 2-4 weeks       Please call my office or pager at [phone] if experienced any weakness or loss of sensation, fever > 101.5, pain uncontrolled with oral medications, persistent nausea/vomiting/or diarrhea, redness or drainage from the incisions, or any other worrisome concerns. If physician on call was not reached or could not communicate with our office for any reason please go to the nearest emergency department

## 2023-09-11 NOTE — OR NURSING
Patient remains in unit related to Left leg feeling numb. Dr. Braxton notified and will observe patient until feeling is obtained.

## 2023-09-25 ENCOUNTER — OFFICE VISIT (OUTPATIENT)
Dept: SPORTS MEDICINE | Facility: CLINIC | Age: 68
End: 2023-09-25
Payer: MEDICARE

## 2023-09-25 VITALS
DIASTOLIC BLOOD PRESSURE: 77 MMHG | BODY MASS INDEX: 22.22 KG/M2 | HEART RATE: 84 BPM | HEIGHT: 69 IN | SYSTOLIC BLOOD PRESSURE: 129 MMHG | WEIGHT: 150 LBS

## 2023-09-25 DIAGNOSIS — G89.29 CHRONIC PAIN OF LEFT KNEE: ICD-10-CM

## 2023-09-25 DIAGNOSIS — M25.562 CHRONIC PAIN OF LEFT KNEE: ICD-10-CM

## 2023-09-25 DIAGNOSIS — M17.12 PRIMARY OSTEOARTHRITIS OF LEFT KNEE: Primary | ICD-10-CM

## 2023-09-25 PROCEDURE — 20610 DRAIN/INJ JOINT/BURSA W/O US: CPT | Mod: LT,S$GLB,, | Performed by: ORTHOPAEDIC SURGERY

## 2023-09-25 PROCEDURE — 3074F SYST BP LT 130 MM HG: CPT | Mod: CPTII,S$GLB,, | Performed by: ORTHOPAEDIC SURGERY

## 2023-09-25 PROCEDURE — 99999 PR PBB SHADOW E&M-EST. PATIENT-LVL III: CPT | Mod: PBBFAC,,, | Performed by: ORTHOPAEDIC SURGERY

## 2023-09-25 PROCEDURE — 3008F BODY MASS INDEX DOCD: CPT | Mod: CPTII,S$GLB,, | Performed by: ORTHOPAEDIC SURGERY

## 2023-09-25 PROCEDURE — 3288F PR FALLS RISK ASSESSMENT DOCUMENTED: ICD-10-PCS | Mod: CPTII,S$GLB,, | Performed by: ORTHOPAEDIC SURGERY

## 2023-09-25 PROCEDURE — 20610 LARGE JOINT ASPIRATION/INJECTION: L KNEE: ICD-10-PCS | Mod: LT,S$GLB,, | Performed by: ORTHOPAEDIC SURGERY

## 2023-09-25 PROCEDURE — 1101F PR PT FALLS ASSESS DOC 0-1 FALLS W/OUT INJ PAST YR: ICD-10-PCS | Mod: CPTII,S$GLB,, | Performed by: ORTHOPAEDIC SURGERY

## 2023-09-25 PROCEDURE — 3044F PR MOST RECENT HEMOGLOBIN A1C LEVEL <7.0%: ICD-10-PCS | Mod: CPTII,S$GLB,, | Performed by: ORTHOPAEDIC SURGERY

## 2023-09-25 PROCEDURE — 3288F FALL RISK ASSESSMENT DOCD: CPT | Mod: CPTII,S$GLB,, | Performed by: ORTHOPAEDIC SURGERY

## 2023-09-25 PROCEDURE — 1126F PR PAIN SEVERITY QUANTIFIED, NO PAIN PRESENT: ICD-10-PCS | Mod: CPTII,S$GLB,, | Performed by: ORTHOPAEDIC SURGERY

## 2023-09-25 PROCEDURE — 3044F HG A1C LEVEL LT 7.0%: CPT | Mod: CPTII,S$GLB,, | Performed by: ORTHOPAEDIC SURGERY

## 2023-09-25 PROCEDURE — 1159F PR MEDICATION LIST DOCUMENTED IN MEDICAL RECORD: ICD-10-PCS | Mod: CPTII,S$GLB,, | Performed by: ORTHOPAEDIC SURGERY

## 2023-09-25 PROCEDURE — 1126F AMNT PAIN NOTED NONE PRSNT: CPT | Mod: CPTII,S$GLB,, | Performed by: ORTHOPAEDIC SURGERY

## 2023-09-25 PROCEDURE — 3074F PR MOST RECENT SYSTOLIC BLOOD PRESSURE < 130 MM HG: ICD-10-PCS | Mod: CPTII,S$GLB,, | Performed by: ORTHOPAEDIC SURGERY

## 2023-09-25 PROCEDURE — 1159F MED LIST DOCD IN RCRD: CPT | Mod: CPTII,S$GLB,, | Performed by: ORTHOPAEDIC SURGERY

## 2023-09-25 PROCEDURE — 99999 PR PBB SHADOW E&M-EST. PATIENT-LVL III: ICD-10-PCS | Mod: PBBFAC,,, | Performed by: ORTHOPAEDIC SURGERY

## 2023-09-25 PROCEDURE — 3078F PR MOST RECENT DIASTOLIC BLOOD PRESSURE < 80 MM HG: ICD-10-PCS | Mod: CPTII,S$GLB,, | Performed by: ORTHOPAEDIC SURGERY

## 2023-09-25 PROCEDURE — 1101F PT FALLS ASSESS-DOCD LE1/YR: CPT | Mod: CPTII,S$GLB,, | Performed by: ORTHOPAEDIC SURGERY

## 2023-09-25 PROCEDURE — 3078F DIAST BP <80 MM HG: CPT | Mod: CPTII,S$GLB,, | Performed by: ORTHOPAEDIC SURGERY

## 2023-09-25 PROCEDURE — 3008F PR BODY MASS INDEX (BMI) DOCUMENTED: ICD-10-PCS | Mod: CPTII,S$GLB,, | Performed by: ORTHOPAEDIC SURGERY

## 2023-09-25 PROCEDURE — 99214 PR OFFICE/OUTPT VISIT, EST, LEVL IV, 30-39 MIN: ICD-10-PCS | Mod: 25,S$GLB,, | Performed by: ORTHOPAEDIC SURGERY

## 2023-09-25 PROCEDURE — 99214 OFFICE O/P EST MOD 30 MIN: CPT | Mod: 25,S$GLB,, | Performed by: ORTHOPAEDIC SURGERY

## 2023-09-25 RX ADMIN — TRIAMCINOLONE ACETONIDE 40 MG: 40 INJECTION, SUSPENSION INTRA-ARTICULAR; INTRAMUSCULAR at 03:09

## 2023-09-25 NOTE — PROGRESS NOTES
CC: Left Knee pain    Sabino returns to clinic for follow up of left knee. Would like to discuss CSI.    8/21/23  Sabino return to clinic with new complaint of left knee pain. He is a . Had a previous infection in that knee. Prior to pre-patellar bursitis, he reports kneeling down and feeling tender. On 7/19/23 patient started Doxycline. He was then seen in internal medicine on 7/26/23, given Bactrium and Bactroban and referred to Dr. Kike Land for I&D. I&D was performed on 7/27/23, reports immediate relief from pain and swelling. He has completed antibiotics. Today he reports one week of lateral knee pain. Denies trauma.      Prior Hx 8/3/21   67 y.o. Male who presents for MRI review of left shoulder. Complaint is longstanding history of left shoulder pain, more than 6 months.  Localizes deep in the shoulder.  Patient does not report any new incidents or injuries since their last appointment. Pain and symptoms remain unchanged since his last appointment. Here today to discuss treatment options.  He has not done any PT or had any injections.  He takes meloxicam for his back which takes some of the edge off his shoulder pain.    PAST MEDICAL HISTORY:   Past Medical History:   Diagnosis Date    Allergy     Arthritis     Family history of malignant neoplasm of gastrointestinal tract mat.gf    Family history of prostate cancer: 1/2 brother 9/25/2017    GERD (gastroesophageal reflux disease)     Kidney cyst, acquired: R 1.2 cm 2015 9/25/2017    Restless leg syndrome     Tubulovillous adenoma 2013 due 2016 9/14/2015     PAST SURGICAL HISTORY:  Past Surgical History:   Procedure Laterality Date    APPLICATION OF BONE GRAFT TO FINGER Left 11/8/2021    Procedure: APPLICATION INTEGRA GRAFT, TO FINGER;  Surgeon: Alba Penn MD;  Location: [facility];  Service: Orthopedics;  Laterality: Left;    CARPAL TUNNEL RELEASE      COLONOSCOPY N/A 5/22/2019    Procedure: COLONOSCOPY;  Surgeon: Juancarlos  MD Og;  Location: [facility] ENDO (4TH FLR);  Service: Endoscopy;  Laterality: N/A;    COLONOSCOPY N/A 10/12/2022    Procedure: COLONOSCOPY;  Surgeon: Cherelle Wang MD;  Location: [facility] ENDO (4TH FLR);  Service: Endoscopy;  Laterality: N/A;  vacc-inst portal-am prep-clears 4 hrs prior-tb    EXCISION OF GANGLION OF WRIST Right 6/28/2019    Procedure: EXCISION, GANGLION CYST, WRIST right;  Surgeon: Alba Penn MD;  Location: [facility] OR 1ST FLR;  Service: Orthopedics;  Laterality: Right;  regional MAC, stretcher, supine, hand pan 1 and 2,MINI C-arm, call Arthrex    INJECTION OF ANESTHETIC AGENT AROUND NERVE Bilateral 1/27/2022    Procedure: Block, Nerve MEDIAL BRANCH BLOCK BILATERAL L3,4,5  DIRECT REFERRAL 1 OF 2;  Surgeon: Kaiser Braxton MD;  Location: [facility] PAIN MGT;  Service: Pain Management;  Laterality: Bilateral;    INJECTION OF ANESTHETIC AGENT AROUND NERVE Bilateral 2/10/2022    Procedure: Block, Nerve MEDIAL BRANCH BLOCK BILATERAL L3.4.5  DIRECT REFERRAL 2 OF 2;  Surgeon: Kaiser Braxton MD;  Location: [facility] PAIN MGT;  Service: Pain Management;  Laterality: Bilateral;    INJECTION OF FACET JOINT Bilateral 6/6/2019    Procedure: INJECTION, FACET JOINT INJECTION (LUMBAR BLOCK) BILATERAL L4-L5 AND L5-S1 FACET INJECTIONS;  Surgeon: Kaiser Braxton MD;  Location: [facility] PAIN MGT;  Service: Pain Management;  Laterality: Bilateral;  NEEDS CONSENT    INJECTION, SACROILIAC JOINT Right 12/8/2022    Procedure: INJECTION,SACROILIAC JOINT RIGHT  CONTRAST;  Surgeon: Kaiser Braxton MD;  Location: [facility] PAIN MGT;  Service: Pain Management;  Laterality: Right;    INTERPOSITION ARTHROPLASTY OF CARPOMETACARPAL JOINTS Right 6/28/2019    Procedure: INTERPOSITION ARTHROPLASTY, CMC JOINT right;  Surgeon: Alba Penn MD;  [facility];  Service: Orthopedics;  Laterality: Right;  regional MAC, stretcher, supine, hand pan 1 and 2,MINI C-arm, call Arthrex    IRRIGATION AND DEBRIDEMENT OF UPPER EXTREMITY Left 11/8/2021     Procedure: IRRIGATION AND DEBRIDEMENT, UPPER EXTREMITY, left index finger;  Surgeon: Alba Penn MD;  Location: [facility] OR;  Service: Orthopedics;  Laterality: Left;    RADIOFREQUENCY ABLATION Right 5/16/2022    Procedure: Radiofrequency Ablation RIGHT L3,4,5;  Surgeon: Kaiser Braxton MD;  Location: [facility] PAIN MGT;  Service: Pain Management;  Laterality: Right;    RADIOFREQUENCY ABLATION Left 6/2/2022    Procedure: Radiofrequency Ablation LEFT L3,4,5;  Surgeon: Kaiser Braxton MD;  Location: [facility] PAIN MGT;  Service: Pain Management;  Laterality: Left;    RADIOFREQUENCY ABLATION Right 7/17/2023    Procedure: RADIOFREQUENCY ABLATION, RIGHT L3,L4,L5 MEDIAL BRANCH ONE OF TWO;  Surgeon: Kaiser Braxton MD;  Location: [facility] PAIN MGT;  Service: Pain Management;  Laterality: Right;    RADIOFREQUENCY ABLATION Left 9/11/2023    Procedure: RADIOFREQUENCY ABLATION, LEFT L3,L4,L5 MEDIAL BRANCH TWO OF TWO;  Surgeon: Kaiser Braxton MD;  Location: [facility] PAIN MGT;  Service: Pain Management;  Laterality: Left;    REPAIR OF NAIL BED Left 11/8/2021    Procedure: REPAIR, NAIL BED, left index;  Surgeon: Alba Penn MD;  Location: [facility] OR;  Service: Orthopedics;  Laterality: Left;    SPERMATOCELECTOMY Right 11/8/2019    Procedure: EXCISION, SPERMATOCELE;  Surgeon: Sheldon Araya Jr., MD;  Location: [facility] OR 1ST FLR;  Service: Urology;  Laterality: Right;  1hr    TRANSFORAMINAL EPIDURAL INJECTION OF STEROID Bilateral 10/27/2022    Procedure: INJECTION, STEROID, EPIDURAL, TRANSFORAMINAL APPROACH, BILATERAL L5-S1 CONTRAST;  Surgeon: Kaiser Braxton MD;  Location: [facility] PAIN MGT;  Service: Pain Management;  Laterality: Bilateral;     FAMILY HISTORY:  Family History   Problem Relation Age of Onset    Arthritis Mother         probable R.A.    Cancer Father         esophageal ca and brain tumor    Esophageal cancer Father     Melanoma Father     Cancer Brother         pancreatic cancer    Cancer Maternal Grandfather          colon    Colon cancer Maternal Grandfather     Irritable bowel syndrome Sister     Arthritis Sister     No Known Problems Son     No Known Problems Brother     No Known Problems Son     Cancer Sister     No Known Problems Brother     Diabetes Neg Hx     Heart disease Neg Hx     Cirrhosis Neg Hx     Celiac disease Neg Hx     Crohn's disease Neg Hx     Ulcerative colitis Neg Hx     Stomach cancer Neg Hx     Rectal cancer Neg Hx     Liver cancer Neg Hx     Psoriasis Neg Hx     Lupus Neg Hx      MEDICATIONS:    Current Outpatient Medications:     aspirin (ECOTRIN) 81 MG EC tablet, Take 1 tablet (81 mg total) by mouth once daily., Disp: 30 tablet, Rfl: 12    cyclobenzaprine (FLEXERIL) 10 MG tablet, Take 1 tablet (10 mg total) by mouth 3 (three) times daily as needed for Muscle spasms. for ten days, Disp: 30 tablet, Rfl: 1    fluticasone propionate (FLONASE) 50 mcg/actuation nasal spray, 1 SPRAY (50 MCG TOTAL) BY EACH NOSTRIL ROUTE 2 (TWO) TIMES A DAY., Disp: 48 mL, Rfl: 2    gabapentin (NEURONTIN) 300 MG capsule, TAKE 1 CAPSULE BY MOUTH THREE TIMES A DAY, Disp: 90 capsule, Rfl: 0    meloxicam (MOBIC) 15 MG tablet, TAKE 1 TABLET (15 MG TOTAL) BY MOUTH DAILY AS NEEDED FOR PAIN (TAKE WITH FOOD OR A MEAL)., Disp: 90 tablet, Rfl: 0    mupirocin (BACTROBAN) 2 % ointment, Apply topically 2 (two) times daily., Disp: 30 g, Rfl: 0    rosuvastatin (CRESTOR) 10 MG tablet, Take 1 tablet (10 mg total) by mouth once daily., Disp: 90 tablet, Rfl: 3    sildenafiL (VIAGRA) 100 MG tablet, Take 100 mg by mouth daily as needed for Erectile Dysfunction., Disp: , Rfl:     sulfamethoxazole-trimethoprim 800-160mg (BACTRIM DS) 800-160 mg Tab, Take 1 tablet by mouth 2 (two) times daily., Disp: 14 tablet, Rfl: 0    tamsulosin (FLOMAX) 0.4 mg Cap, Take 1 capsule (0.4 mg total) by mouth once daily., Disp: 30 capsule, Rfl: 12    tamsulosin (FLOMAX) 0.4 mg Cap, Take 1 capsule (0.4 mg total) by mouth once daily., Disp: 90  "capsule, Rfl: 3  No current facility-administered medications for this visit.    Facility-Administered Medications Ordered in Other Visits:     0.9%  NaCl infusion, , Intravenous, Continuous, Cole Fitzpatrick,     ALLERGIES:  Review of patient's allergies indicates:  No Known Allergies     REVIEW OF SYSTEMS:  Constitution: Negative. Negative for chills, fever and night sweats.    Hematologic/Lymphatic: Negative for bleeding problem. Does not bruise/bleed easily.   Skin: Negative for dry skin, itching and rash.   Musculoskeletal: Negative for falls. Positive for left knee pain and muscle weakness.     All other review of symptoms were reviewed and found to be noncontributory.    PHYSICAL EXAMINATION:  Vitals:  /77   Pulse 84   Ht 5' 9" (1.753 m)   Wt 68 kg (150 lb)   BMI 22.15 kg/m²    General: Well-developed well-nourished 67 y.o. malein no acute distress   Cardiovascular: Regular rhythm by palpation of distal pulse, normal color and temperature, no concerning varicosities on symptomatic side   Lungs: No labored breathing or wheezing appreciated   Neuro: Alert and oriented ×3   Psychiatric: well oriented to person, place and time, demonstrates normal mood and affect   Skin: No rashes, lesions or ulcers, normal temperature, turgor, and texture on uninvolved extremity    Ortho/SPM Exam    Examination of the left knee demonstrates improved skin over the prepatellar region.  Continued chronic bogginess but no significant associated tenderness.  No signs of infection.  Pain over the patellofemoral articulation.  Positive patellar crepitus and grind.  Minimal tenderness over the medial and lateral facets.  Negative Winston's maneuver.  Anterior/retropatellar knee pain with forced flexion.  Ligamentously stable.    IMAGING:  Xrays including AP, Moya lateral and axial views of the left knee were ordered and reviewed by me showing:   Mild DJD with some early medial joint space narrowing    MRI left " shoulder:  1. Severe tendinosis of supraspinatus, infraspinatus, and subscapularis tendons.  Partial-thickness undersurface tear subscapularis tendon with retraction and medial dislocation of the biceps tendon.  Low-grade undersurface fraying of infraspinatus tendon.  Rotator cuff muscle bulk is maintained.  2. Circumferential fraying of labrum.  3. Moderate acromioclavicular arthrosis.  4. Subacromial subdeltoid bursitis.      5. Glenohumeral cartilage loss.    ASSESSMENT:      ICD-10-CM ICD-9-CM   1. Primary osteoarthritis of left knee  M17.12 715.16   2. Chronic pain of left knee  M25.562 719.46    G89.29 338.29     PLAN:     Previous prepatellar bursitis issue has gotten better.  The patient now has continued pain deep in the joint related to some suspect underlying degenerative joint disease and chondromalacia.  Discussed treatment options.  Elected for corticosteroid injection and continued conservative care.  -CSI - knee joint  -Physical therapy referral   -F/u prn    Large Joint Aspiration/Injection: L knee    Date/Time: 9/25/2023 3:00 PM    Performed by: GRETA Patel MD  Authorized by: GRETA Patel MD    Consent Done?:  Yes (Verbal)  Indications:  Pain  Site marked: the procedure site was marked    Timeout: prior to procedure the correct patient, procedure, and site was verified    Prep: patient was prepped and draped in usual sterile fashion      Local anesthesia used?: Yes    Local anesthetic:  Co-phenylcaine spray (0.2% Naropin)  Anesthetic total (ml):  4      Details:  Needle Size:  22 G  Ultrasonic Guidance for needle placement?: No    Approach:  Lateral  Location:  Knee  Site:  L knee  Medications:  40 mg triamcinolone acetonide 40 mg/mL  Patient tolerance:  Patient tolerated the procedure well with no immediate complications

## 2023-10-03 RX ORDER — TRIAMCINOLONE ACETONIDE 40 MG/ML
40 INJECTION, SUSPENSION INTRA-ARTICULAR; INTRAMUSCULAR
Status: DISCONTINUED | OUTPATIENT
Start: 2023-09-25 | End: 2023-10-03 | Stop reason: HOSPADM

## 2023-10-04 ENCOUNTER — HOSPITAL ENCOUNTER (OUTPATIENT)
Dept: RADIOLOGY | Facility: HOSPITAL | Age: 68
Discharge: HOME | End: 2023-10-04
Attending: INTERNAL MEDICINE
Payer: MEDICARE

## 2023-10-04 ENCOUNTER — OFFICE VISIT (OUTPATIENT)
Dept: INTERNAL MEDICINE | Facility: CLINIC | Age: 68
End: 2023-10-04
Payer: MEDICARE

## 2023-10-04 ENCOUNTER — IMMUNIZATION (OUTPATIENT)
Dept: INTERNAL MEDICINE | Facility: CLINIC | Age: 68
End: 2023-10-04
Payer: MEDICARE

## 2023-10-04 VITALS
DIASTOLIC BLOOD PRESSURE: 80 MMHG | BODY MASS INDEX: 22.51 KG/M2 | SYSTOLIC BLOOD PRESSURE: 120 MMHG | WEIGHT: 152 LBS | HEIGHT: 69 IN

## 2023-10-04 DIAGNOSIS — I25.10 CORONARY ARTERY DISEASE INVOLVING NATIVE CORONARY ARTERY OF NATIVE HEART WITHOUT ANGINA PECTORIS: ICD-10-CM

## 2023-10-04 DIAGNOSIS — J84.10 CALCIFIED GRANULOMA OF LUNG: ICD-10-CM

## 2023-10-04 DIAGNOSIS — R25.2 LEG CRAMPS: ICD-10-CM

## 2023-10-04 DIAGNOSIS — N28.1 KIDNEY CYST, ACQUIRED: ICD-10-CM

## 2023-10-04 DIAGNOSIS — D36.9 TUBULOVILLOUS ADENOMA: ICD-10-CM

## 2023-10-04 DIAGNOSIS — R73.01 IFG (IMPAIRED FASTING GLUCOSE): ICD-10-CM

## 2023-10-04 DIAGNOSIS — R35.1 BENIGN PROSTATIC HYPERPLASIA WITH NOCTURIA: ICD-10-CM

## 2023-10-04 DIAGNOSIS — R07.89 ATYPICAL CHEST PAIN: ICD-10-CM

## 2023-10-04 DIAGNOSIS — Z00.00 ANNUAL PHYSICAL EXAM: Primary | ICD-10-CM

## 2023-10-04 DIAGNOSIS — N40.1 BENIGN PROSTATIC HYPERPLASIA WITH NOCTURIA: ICD-10-CM

## 2023-10-04 PROBLEM — M79.18 MYOFASCIAL PAIN: Status: RESOLVED | Noted: 2022-06-28 | Resolved: 2023-10-04

## 2023-10-04 PROBLEM — M25.60 DECREASED RANGE OF MOTION: Status: RESOLVED | Noted: 2022-01-11 | Resolved: 2023-10-04

## 2023-10-04 PROBLEM — J98.4 CALCIFIED GRANULOMA OF LUNG: Status: ACTIVE | Noted: 2023-10-04

## 2023-10-04 PROCEDURE — 99214 PR OFFICE/OUTPT VISIT, EST, LEVL IV, 30-39 MIN: ICD-10-PCS | Mod: S$GLB,,, | Performed by: INTERNAL MEDICINE

## 2023-10-04 PROCEDURE — 99999 PR PBB SHADOW E&M-EST. PATIENT-LVL IV: ICD-10-PCS | Mod: PBBFAC,,, | Performed by: INTERNAL MEDICINE

## 2023-10-04 PROCEDURE — 3008F BODY MASS INDEX DOCD: CPT | Mod: CPTII,S$GLB,, | Performed by: INTERNAL MEDICINE

## 2023-10-04 PROCEDURE — 3008F PR BODY MASS INDEX (BMI) DOCUMENTED: ICD-10-PCS | Mod: CPTII,S$GLB,, | Performed by: INTERNAL MEDICINE

## 2023-10-04 PROCEDURE — G0008 FLU VACCINE - QUADRIVALENT - ADJUVANTED: ICD-10-PCS | Mod: S$GLB,,, | Performed by: INTERNAL MEDICINE

## 2023-10-04 PROCEDURE — 1101F PR PT FALLS ASSESS DOC 0-1 FALLS W/OUT INJ PAST YR: ICD-10-PCS | Mod: CPTII,S$GLB,, | Performed by: INTERNAL MEDICINE

## 2023-10-04 PROCEDURE — 1159F PR MEDICATION LIST DOCUMENTED IN MEDICAL RECORD: ICD-10-PCS | Mod: CPTII,S$GLB,, | Performed by: INTERNAL MEDICINE

## 2023-10-04 PROCEDURE — 3074F SYST BP LT 130 MM HG: CPT | Mod: CPTII,S$GLB,, | Performed by: INTERNAL MEDICINE

## 2023-10-04 PROCEDURE — 1126F AMNT PAIN NOTED NONE PRSNT: CPT | Mod: CPTII,S$GLB,, | Performed by: INTERNAL MEDICINE

## 2023-10-04 PROCEDURE — G0008 ADMIN INFLUENZA VIRUS VAC: HCPCS | Mod: S$GLB,,, | Performed by: INTERNAL MEDICINE

## 2023-10-04 PROCEDURE — 1159F MED LIST DOCD IN RCRD: CPT | Mod: CPTII,S$GLB,, | Performed by: INTERNAL MEDICINE

## 2023-10-04 PROCEDURE — 99999 PR PBB SHADOW E&M-EST. PATIENT-LVL IV: CPT | Mod: PBBFAC,,, | Performed by: INTERNAL MEDICINE

## 2023-10-04 PROCEDURE — 1101F PT FALLS ASSESS-DOCD LE1/YR: CPT | Mod: CPTII,S$GLB,, | Performed by: INTERNAL MEDICINE

## 2023-10-04 PROCEDURE — 1126F PR PAIN SEVERITY QUANTIFIED, NO PAIN PRESENT: ICD-10-PCS | Mod: CPTII,S$GLB,, | Performed by: INTERNAL MEDICINE

## 2023-10-04 PROCEDURE — 76770 US RETROPERITONEAL COMPLETE: ICD-10-PCS | Mod: 26,,, | Performed by: RADIOLOGY

## 2023-10-04 PROCEDURE — 3288F FALL RISK ASSESSMENT DOCD: CPT | Mod: CPTII,S$GLB,, | Performed by: INTERNAL MEDICINE

## 2023-10-04 PROCEDURE — 90694 VACC AIIV4 NO PRSRV 0.5ML IM: CPT | Mod: S$GLB,,, | Performed by: INTERNAL MEDICINE

## 2023-10-04 PROCEDURE — 76770 US EXAM ABDO BACK WALL COMP: CPT | Mod: TC

## 2023-10-04 PROCEDURE — 99214 OFFICE O/P EST MOD 30 MIN: CPT | Mod: S$GLB,,, | Performed by: INTERNAL MEDICINE

## 2023-10-04 PROCEDURE — 76770 US EXAM ABDO BACK WALL COMP: CPT | Mod: 26,,, | Performed by: RADIOLOGY

## 2023-10-04 PROCEDURE — 90694 FLU VACCINE - QUADRIVALENT - ADJUVANTED: ICD-10-PCS | Mod: S$GLB,,, | Performed by: INTERNAL MEDICINE

## 2023-10-04 PROCEDURE — 3044F HG A1C LEVEL LT 7.0%: CPT | Mod: CPTII,S$GLB,, | Performed by: INTERNAL MEDICINE

## 2023-10-04 PROCEDURE — 3044F PR MOST RECENT HEMOGLOBIN A1C LEVEL <7.0%: ICD-10-PCS | Mod: CPTII,S$GLB,, | Performed by: INTERNAL MEDICINE

## 2023-10-04 PROCEDURE — 3288F PR FALLS RISK ASSESSMENT DOCUMENTED: ICD-10-PCS | Mod: CPTII,S$GLB,, | Performed by: INTERNAL MEDICINE

## 2023-10-04 PROCEDURE — 3074F PR MOST RECENT SYSTOLIC BLOOD PRESSURE < 130 MM HG: ICD-10-PCS | Mod: CPTII,S$GLB,, | Performed by: INTERNAL MEDICINE

## 2023-10-04 PROCEDURE — 3079F PR MOST RECENT DIASTOLIC BLOOD PRESSURE 80-89 MM HG: ICD-10-PCS | Mod: CPTII,S$GLB,, | Performed by: INTERNAL MEDICINE

## 2023-10-04 PROCEDURE — 3079F DIAST BP 80-89 MM HG: CPT | Mod: CPTII,S$GLB,, | Performed by: INTERNAL MEDICINE

## 2023-10-04 NOTE — PROGRESS NOTES
Patient ID: Sabino Rubio is a 67 y.o. male.    Chief Complaint: Annual Exam      Assessment:       1. Annual physical exam    2. Coronary artery disease: CT score 8 2022    3. Kidney cyst, acquired: R 1.2 cm 2015; stable 1/19 no follow up recommended    4. Tubulovillous adenoma: 2013; adenomas 10/22 repeat colonoscopy 2025    5. IFG (impaired fasting glucose)    6. Benign prostatic hyperplasia with nocturia    7. Calcified granuloma of lung: see CT 2022    8. Atypical chest pain    9. Leg cramps          Plan:         1. Annual physical exam    2. Coronary artery disease: CT score 8 2022    3. Kidney cyst, acquired: R 1.2 cm 2015; stable 1/19 no follow up recommended  -     Ambulatory referral/consult to Urology; Future; Expected date: 10/11/2023  -     US Retroperitoneal Complete; Future; Expected date: 10/04/2023    4. Tubulovillous adenoma: 2013; adenomas 10/22 repeat colonoscopy 2025    5. IFG (impaired fasting glucose)    6. Benign prostatic hyperplasia with nocturia    7. Calcified granuloma of lung: see CT 2022    8. Atypical chest pain  -     EKG 12-LEAD - Muse; Future  -     Stress Echo Which stress agent will be used? Treadmill Exercise; Color Flow Doppler? No; Future    9. Leg cramps  -     Magnesium; Future; Expected date: 10/04/2023  -     CK; Future; Expected date: 10/04/2023       Hydration  Labs and review  Immunizations discussed, he got flu shot today, will catch up others at a later point  Stress echo, consider Cardiology follow-up  Kidney ultrasound  Urology follow-up  Continue current regimen to further notice    Subjective:   Annual exam    Recurrent staph infections- had to have antibiotics and drainage and ultimately I & D of the left knee.    Also some lumbar DJD, sp ablations.    Has had some cramping, in the legs and feet.  Also has had some discomfort left side of the chest.  This is not exertional but is uncomfortable.  Discussed ETT.    Due for urology follow-up with regard to  cyst; will also obtain ultrasound.    He follows closely in Dermatology, seen recently for routine skin exam.    Got flu shot today.  Needs to get second shingles vaccine and Prevnar 20.  COVID booster also discussed.      Patient Active Problem List:     Restless leg syndrome     Cervical spondylosis without myelopathy     Brachial neuritis or radiculitis NOS     Tubulovillous adenoma: 2013; adenomas 10/22 repeat colonoscopy 2025     Kidney cyst, acquired: R 1.2 cm 2015; stable 1/19 no follow up recommended     Other osteoarthritis of spine     Arthritis of carpometacarpal (CMC) joint of right thumb     Allergic rhinitis     Spermatocele     Diverticulosis: see colonoscopy 2019     IFG (impaired fasting glucose)     Coronary artery disease: CT score 8 2022     DDD (degenerative disc disease), lumbosacral     Lumbosacral radiculopathy     Spondylosis without myelopathy     Benign prostatic hyperplasia with nocturia     Calcified granuloma of lung         Review of Systems   Constitutional: Negative.    HENT:  Negative for sinus pressure.    Eyes:  Negative for visual disturbance.   Respiratory:  Negative for choking, shortness of breath and stridor.    Cardiovascular:  Positive for chest pain.        See HPI.  Not exertional.  No jaw pain or left arm pain   Gastrointestinal:  Negative for abdominal pain, constipation and diarrhea.   Genitourinary:  Negative for difficulty urinating, flank pain, frequency, testicular pain and urgency.   Musculoskeletal:  Positive for arthralgias and back pain.        Cramping, see HPI   Skin:  Negative for color change and rash.   Neurological: Negative.    Psychiatric/Behavioral: Negative.           Objective:      Physical Exam  Constitutional:       Appearance: He is well-developed.   HENT:      Head: Normocephalic and atraumatic.      Right Ear: External ear normal.      Left Ear: External ear normal.   Eyes:      Extraocular Movements: Extraocular movements intact.       Conjunctiva/sclera: Conjunctivae normal.   Neck:      Thyroid: No thyromegaly.   Cardiovascular:      Rate and Rhythm: Normal rate and regular rhythm.      Heart sounds: No murmur heard.  Pulmonary:      Effort: Pulmonary effort is normal. No respiratory distress.      Breath sounds: Normal breath sounds. No wheezing.   Abdominal:      General: There is no distension.      Palpations: Abdomen is soft.      Tenderness: There is no abdominal tenderness.   Musculoskeletal:         General: No tenderness.      Cervical back: Normal range of motion and neck supple.      Right lower leg: No edema.      Left lower leg: No edema.      Comments: Slight swelling of the left knee, no effusion   Lymphadenopathy:      Cervical: No cervical adenopathy.   Skin:     General: Skin is warm and dry.   Neurological:      General: No focal deficit present.      Mental Status: He is alert and oriented to person, place, and time.      Cranial Nerves: No cranial nerve deficit.   Psychiatric:         Mood and Affect: Mood normal.         Behavior: Behavior normal.         Thought Content: Thought content normal.         Judgment: Judgment normal.             Health Maintenance Due   Topic Date Due    COVID-19 Vaccine (4 - Pfizer risk series) 01/14/2022    Shingles Vaccine (3 of 3) 03/02/2022    Pneumococcal Vaccines (Age 65+) (2 - PPSV23 or PCV20) 03/02/2022

## 2023-10-05 DIAGNOSIS — N28.1 KIDNEY CYST, ACQUIRED: Primary | ICD-10-CM

## 2023-10-11 ENCOUNTER — TELEPHONE (OUTPATIENT)
Dept: INTERNAL MEDICINE | Facility: CLINIC | Age: 68
End: 2023-10-11
Payer: MEDICARE

## 2023-10-11 ENCOUNTER — OFFICE VISIT (OUTPATIENT)
Dept: UROLOGY | Facility: CLINIC | Age: 68
End: 2023-10-11
Payer: MEDICARE

## 2023-10-11 VITALS
HEIGHT: 69 IN | DIASTOLIC BLOOD PRESSURE: 76 MMHG | SYSTOLIC BLOOD PRESSURE: 123 MMHG | WEIGHT: 154.31 LBS | BODY MASS INDEX: 22.85 KG/M2 | HEART RATE: 70 BPM

## 2023-10-11 DIAGNOSIS — N28.1 RENAL CYST: Primary | ICD-10-CM

## 2023-10-11 DIAGNOSIS — N28.1 KIDNEY CYST, ACQUIRED: ICD-10-CM

## 2023-10-11 DIAGNOSIS — N13.8 BPH WITH OBSTRUCTION/LOWER URINARY TRACT SYMPTOMS: ICD-10-CM

## 2023-10-11 DIAGNOSIS — N40.1 BPH WITH OBSTRUCTION/LOWER URINARY TRACT SYMPTOMS: ICD-10-CM

## 2023-10-11 PROCEDURE — 1160F PR REVIEW ALL MEDS BY PRESCRIBER/CLIN PHARMACIST DOCUMENTED: ICD-10-PCS | Mod: CPTII,S$GLB,, | Performed by: UROLOGY

## 2023-10-11 PROCEDURE — 99999 PR PBB SHADOW E&M-EST. PATIENT-LVL III: ICD-10-PCS | Mod: PBBFAC,,, | Performed by: UROLOGY

## 2023-10-11 PROCEDURE — 3044F HG A1C LEVEL LT 7.0%: CPT | Mod: CPTII,S$GLB,, | Performed by: UROLOGY

## 2023-10-11 PROCEDURE — 3288F PR FALLS RISK ASSESSMENT DOCUMENTED: ICD-10-PCS | Mod: CPTII,S$GLB,, | Performed by: UROLOGY

## 2023-10-11 PROCEDURE — 99999 PR PBB SHADOW E&M-EST. PATIENT-LVL III: CPT | Mod: PBBFAC,,, | Performed by: UROLOGY

## 2023-10-11 PROCEDURE — 1160F RVW MEDS BY RX/DR IN RCRD: CPT | Mod: CPTII,S$GLB,, | Performed by: UROLOGY

## 2023-10-11 PROCEDURE — 3078F PR MOST RECENT DIASTOLIC BLOOD PRESSURE < 80 MM HG: ICD-10-PCS | Mod: CPTII,S$GLB,, | Performed by: UROLOGY

## 2023-10-11 PROCEDURE — 1101F PR PT FALLS ASSESS DOC 0-1 FALLS W/OUT INJ PAST YR: ICD-10-PCS | Mod: CPTII,S$GLB,, | Performed by: UROLOGY

## 2023-10-11 PROCEDURE — 3074F PR MOST RECENT SYSTOLIC BLOOD PRESSURE < 130 MM HG: ICD-10-PCS | Mod: CPTII,S$GLB,, | Performed by: UROLOGY

## 2023-10-11 PROCEDURE — 1159F MED LIST DOCD IN RCRD: CPT | Mod: CPTII,S$GLB,, | Performed by: UROLOGY

## 2023-10-11 PROCEDURE — 3008F BODY MASS INDEX DOCD: CPT | Mod: CPTII,S$GLB,, | Performed by: UROLOGY

## 2023-10-11 PROCEDURE — 3078F DIAST BP <80 MM HG: CPT | Mod: CPTII,S$GLB,, | Performed by: UROLOGY

## 2023-10-11 PROCEDURE — 3044F PR MOST RECENT HEMOGLOBIN A1C LEVEL <7.0%: ICD-10-PCS | Mod: CPTII,S$GLB,, | Performed by: UROLOGY

## 2023-10-11 PROCEDURE — 3288F FALL RISK ASSESSMENT DOCD: CPT | Mod: CPTII,S$GLB,, | Performed by: UROLOGY

## 2023-10-11 PROCEDURE — 99213 PR OFFICE/OUTPT VISIT, EST, LEVL III, 20-29 MIN: ICD-10-PCS | Mod: S$GLB,,, | Performed by: UROLOGY

## 2023-10-11 PROCEDURE — 1159F PR MEDICATION LIST DOCUMENTED IN MEDICAL RECORD: ICD-10-PCS | Mod: CPTII,S$GLB,, | Performed by: UROLOGY

## 2023-10-11 PROCEDURE — 3008F PR BODY MASS INDEX (BMI) DOCUMENTED: ICD-10-PCS | Mod: CPTII,S$GLB,, | Performed by: UROLOGY

## 2023-10-11 PROCEDURE — 99213 OFFICE O/P EST LOW 20 MIN: CPT | Mod: S$GLB,,, | Performed by: UROLOGY

## 2023-10-11 PROCEDURE — 3074F SYST BP LT 130 MM HG: CPT | Mod: CPTII,S$GLB,, | Performed by: UROLOGY

## 2023-10-11 PROCEDURE — 1101F PT FALLS ASSESS-DOCD LE1/YR: CPT | Mod: CPTII,S$GLB,, | Performed by: UROLOGY

## 2023-10-11 NOTE — PROGRESS NOTES
Subjective:       Patient ID: Sabino Rubio is a 67 y.o. male.    Chief Complaint: Follow-up (Pt here for f/u on kidney u/s. Pt has kidney cyst on right side. Pt reports no lower back pain like when he first got the cysts. U/S 10/4)    HPI patient is here with a Bosniak 2 right lower pole renal cyst.  No evidence of malignancy or infection.  Patient is feeling well without nausea vomiting etcetera    Past Medical History:   Diagnosis Date    Allergy     Arthritis     Family history of malignant neoplasm of gastrointestinal tract mat.gf    Family history of prostate cancer: 1/2 brother 09/25/2017    GERD (gastroesophageal reflux disease)     Hyperlipidemia     Kidney cyst, acquired: R 1.2 cm 2015 09/25/2017    Restless leg syndrome     Tubulovillous adenoma 2013 due 2016 09/14/2015       Past Surgical History:   Procedure Laterality Date    APPLICATION OF BONE GRAFT TO FINGER Left 11/8/2021    Procedure: APPLICATION INTEGRA GRAFT, TO FINGER;  Surgeon: Alba Penn MD;  Location: [facility];  Service: Orthopedics;  Laterality: Left;    CARPAL TUNNEL RELEASE      COLONOSCOPY N/A 5/22/2019    Procedure: COLONOSCOPY;  Surgeon: Juancarlos Foley MD;  [facility]  Service: Endoscopy;  Laterality: N/A;    COLONOSCOPY N/A 10/12/2022    Procedure: COLONOSCOPY;  Surgeon: Cherelle Wang MD;  Location: [facility] (4TH FLR);  Service: Endoscopy;  Laterality: N/A;  vacc-inst portal-am prep-clears 4 hrs prior-tb    EXCISION OF GANGLION OF WRIST Right 6/28/2019    Procedure: EXCISION, GANGLION CYST, WRIST right;  Surgeon: Alba Penn MD;  Location: [facility] 1ST FLR;  Service: Orthopedics;  Laterality: Right;  regional MAC, stretcher, supine, hand pan 1 and 2,MINI C-arm, call Arthrex    INJECTION OF ANESTHETIC AGENT AROUND NERVE Bilateral 1/27/2022    Procedure: Block, Nerve MEDIAL BRANCH BLOCK BILATERAL L3,4,5  DIRECT REFERRAL 1 OF 2;  Surgeon: Kaiser Braxton MD;  Location: [facility] PAIN MGT;  Service: Pain  Management;  Laterality: Bilateral;    INJECTION OF ANESTHETIC AGENT AROUND NERVE Bilateral 2/10/2022    Procedure: Block, Nerve MEDIAL BRANCH BLOCK BILATERAL L3.4.5  DIRECT REFERRAL 2 OF 2;  Surgeon: Kaiser Braxton MD;  Location: [facility] PAIN MGT;  Service: Pain Management;  Laterality: Bilateral;    INJECTION OF FACET JOINT Bilateral 6/6/2019    Procedure: INJECTION, FACET JOINT INJECTION (LUMBAR BLOCK) BILATERAL L4-L5 AND L5-S1 FACET INJECTIONS;  Surgeon: Kaiser Braxton MD;  Location: [facility] PAIN MGT;  Service: Pain Management;  Laterality: Bilateral;  NEEDS CONSENT    INJECTION, SACROILIAC JOINT Right 12/8/2022    Procedure: INJECTION,SACROILIAC JOINT RIGHT  CONTRAST;  Surgeon: Kaiser Braxton MD;  Location: [facility] PAIN MGT;  Service: Pain Management;  Laterality: Right;    INTERPOSITION ARTHROPLASTY OF CARPOMETACARPAL JOINTS Right 6/28/2019    Procedure: INTERPOSITION ARTHROPLASTY, CMC JOINT right;  Surgeon: Alba Penn MD;  [address];  Service: Orthopedics;  Laterality: Right;  regional MAC, stretcher, supine, hand pan 1 and 2,MINI C-arm, call Arthrex    IRRIGATION AND DEBRIDEMENT OF UPPER EXTREMITY Left 11/8/2021    Procedure: IRRIGATION AND DEBRIDEMENT, UPPER EXTREMITY, left index finger;  Surgeon: Alba Penn MD;  Location: [facility] OR;  Service: Orthopedics;  Laterality: Left;    RADIOFREQUENCY ABLATION Right 5/16/2022    Procedure: Radiofrequency Ablation RIGHT L3,4,5;  Surgeon: Kaiser Braxton MD;  Location: [facility] PAIN MGT;  Service: Pain Management;  Laterality: Right;    RADIOFREQUENCY ABLATION Left 6/2/2022    Procedure: Radiofrequency Ablation LEFT L3,4,5;  Surgeon: Kaiser Braxton MD;  Location: [facility] PAIN MGT;  Service: Pain Management;  Laterality: Left;    RADIOFREQUENCY ABLATION Right 7/17/2023    Procedure: RADIOFREQUENCY ABLATION, RIGHT L3,L4,L5 MEDIAL BRANCH ONE OF TWO;  Surgeon: Kaiser Braxton MD;  Location: [facility] PAIN MGT;  Service: Pain Management;  Laterality: Right;     RADIOFREQUENCY ABLATION Left 9/11/2023    Procedure: RADIOFREQUENCY ABLATION, LEFT L3,L4,L5 MEDIAL BRANCH TWO OF TWO;  Surgeon: Kaiser Braxton MD;  Location: [facility] PAIN MGT;  Service: Pain Management;  Laterality: Left;    REPAIR OF NAIL BED Left 11/8/2021    Procedure: REPAIR, NAIL BED, left index;  Surgeon: Alba Penn MD;  Location: [facility] OR;  Service: Orthopedics;  Laterality: Left;    SPERMATOCELECTOMY Right 11/8/2019    Procedure: EXCISION, SPERMATOCELE;  Surgeon: Sheldon Araya Jr., MD;  Location: [facility] OR 1ST FLR;  Service: Urology;  Laterality: Right;  1hr    TRANSFORAMINAL EPIDURAL INJECTION OF STEROID Bilateral 10/27/2022    Procedure: INJECTION, STEROID, EPIDURAL, TRANSFORAMINAL APPROACH, BILATERAL L5-S1 CONTRAST;  Surgeon: Kaiser Braxton MD;  Location: [facility] PAIN MGT;  Service: Pain Management;  Laterality: Bilateral;       Family History   Problem Relation Age of Onset    Arthritis Mother         probable R.A.    Cancer Father         esophageal ca and brain tumor    Esophageal cancer Father     Melanoma Father     Irritable bowel syndrome Sister     Arthritis Sister     Arthritis Sister         rheumatoid    Cancer Sister         skin    Cancer Brother         pancreatic cancer    No Known Problems Brother     No Known Problems Brother     No Known Problems Son     No Known Problems Son     Cancer Maternal Grandfather         colon    Colon cancer Maternal Grandfather     Diabetes Neg Hx     Heart disease Neg Hx     Cirrhosis Neg Hx     Celiac disease Neg Hx     Crohn's disease Neg Hx     Ulcerative colitis Neg Hx     Stomach cancer Neg Hx     Rectal cancer Neg Hx     Liver cancer Neg Hx     Psoriasis Neg Hx     Lupus Neg Hx        Social History     Socioeconomic History    Marital status: Single    Number of children: 2   Tobacco Use    Smoking status: Never    Smokeless tobacco: Never    Tobacco comments:     The patient works in the [employer] industry.  He is very active at work but does  not engage in outside activities.   Substance and Sexual Activity    Alcohol use: Yes     Alcohol/week: 0.0 standard drinks of alcohol     Comment: 2-3 days weekly, up to 2 beers    Drug use: No    Sexual activity: Yes     Partners: Female     Social Determinants of Health     Transportation Needs: No Transportation Needs (5/12/2022)    PRAPARE - Transportation     Lack of Transportation (Medical): No     Lack of Transportation (Non-Medical): No   Physical Activity: Sufficiently Active (5/12/2022)    Exercise Vital Sign     Days of Exercise per Week: 5 days     Minutes of Exercise per Session: 30 min   Stress: No Stress Concern Present (1/29/2020)    [employer] - Occupational Stress Questionnaire     Feeling of Stress : Only a little   Housing Stability: Low Risk  (5/12/2022)    Housing Stability Vital Sign     Unable to Pay for Housing in the Last Year: No     Number of Places Lived in the Last Year: 1     Unstable Housing in the Last Year: No       Allergies:  Patient has no known allergies.    Medications:    Current Outpatient Medications:     aspirin (ECOTRIN) 81 MG EC tablet, Take 1 tablet (81 mg total) by mouth once daily., Disp: 30 tablet, Rfl: 12    cyclobenzaprine (FLEXERIL) 10 MG tablet, Take 1 tablet (10 mg total) by mouth 3 (three) times daily as needed for Muscle spasms. for ten days, Disp: 30 tablet, Rfl: 1    fluticasone propionate (FLONASE) 50 mcg/actuation nasal spray, 1 SPRAY (50 MCG TOTAL) BY EACH NOSTRIL ROUTE 2 (TWO) TIMES A DAY., Disp: 48 mL, Rfl: 2    gabapentin (NEURONTIN) 300 MG capsule, TAKE 1 CAPSULE BY MOUTH THREE TIMES A DAY, Disp: 90 capsule, Rfl: 0    meloxicam (MOBIC) 15 MG tablet, TAKE 1 TABLET (15 MG TOTAL) BY MOUTH DAILY AS NEEDED FOR PAIN (TAKE WITH FOOD OR A MEAL)., Disp: 90 tablet, Rfl: 0    mupirocin (BACTROBAN) 2 % ointment, Apply topically 2 (two) times daily., Disp: 30 g, Rfl: 0    rosuvastatin (CRESTOR) 10 MG tablet, Take 1 tablet (10 mg  total) by mouth once daily., Disp: 90 tablet, Rfl: 3    sildenafiL (VIAGRA) 100 MG tablet, Take 100 mg by mouth daily as needed for Erectile Dysfunction., Disp: , Rfl:     tamsulosin (FLOMAX) 0.4 mg Cap, Take 1 capsule (0.4 mg total) by mouth once daily., Disp: 30 capsule, Rfl: 12  No current facility-administered medications for this visit.    Facility-Administered Medications Ordered in Other Visits:     0.9%  NaCl infusion, , Intravenous, Continuous, Cole Fitzpatrick,     Review of Systems   Constitutional:  Negative for activity change, appetite change, chills, diaphoresis, fatigue, fever and unexpected weight change.   HENT:  Negative for congestion, dental problem, hearing loss, mouth sores, postnasal drip, rhinorrhea, sinus pressure and trouble swallowing.    Eyes:  Negative for pain, discharge and itching.   Respiratory:  Negative for apnea, cough, choking, chest tightness, shortness of breath and wheezing.    Cardiovascular:  Negative for chest pain, palpitations and leg swelling.   Gastrointestinal:  Negative for abdominal distention, abdominal pain, anal bleeding, blood in stool, constipation, diarrhea, nausea, rectal pain and vomiting.   Endocrine: Negative for polydipsia and polyuria.   Genitourinary:  Negative for decreased urine volume, difficulty urinating, dysuria, enuresis, flank pain, frequency, genital sores, hematuria, penile discharge, penile pain, penile swelling, scrotal swelling, testicular pain and urgency.   Musculoskeletal:  Negative for arthralgias, back pain and myalgias.   Skin:  Negative for color change, rash and wound.   Neurological:  Negative for dizziness, syncope, speech difficulty, light-headedness and headaches.   Hematological:  Negative for adenopathy. Does not bruise/bleed easily.   Psychiatric/Behavioral:  Negative for behavioral problems, confusion, hallucinations and sleep disturbance.        Objective:      Physical Exam    Assessment:       1. Renal cyst    2. Kidney  cyst, acquired: R 1.2 cm 2015; stable 1/19 no follow up recommended        Plan:       Sabino was seen today for follow-up.    Diagnoses and all orders for this visit:    Renal cyst    Kidney cyst, acquired: R 1.2 cm 2015; stable 1/19 no follow up recommended  -     Ambulatory referral/consult to Urology  -     US Retroperitoneal Complete; Future        Return to clinic 6 months with a renal ultrasound and PSA

## 2023-10-11 NOTE — TELEPHONE ENCOUNTER
Please call he has not read Anh message    Lab work is acceptable including magnesium and CPK.  I would recommend staying hydrated and trying coenzyme Q10 for the muscle cramps.     Kidney cyst has enlarged a little bit.  I would like to repeat the ultrasound in 6 months.  This is likely no cause for concern but we can keep an eye on the size.  My staff will be in touch.  Please let me know if you have any questions.

## 2023-10-13 NOTE — [employer] PATIENT INSTRUCTIONS
Discharge Summary/Instructions after an Endoscopic Procedure  Patient Name: Sabino Rubio  Patient MRN: [id number]  Patient YOB: 1955  Wednesday, May 22, 2019  Juancarlos Foley MD  RESTRICTIONS:  During your procedure today, you received medications for sedation.  These   medications may affect your judgment, balance and coordination.  Therefore,   for 24 hours, you have the following restrictions:   - DO NOT drive a car, operate machinery, make legal/financial decisions,   sign important papers or drink alcohol.    ACTIVITY:  Today: no heavy lifting, straining or running due to procedural   sedation/anesthesia.  The following day: return to full activity including work.  DIET:  Eat and drink normally unless instructed otherwise.     TREATMENT FOR COMMON SIDE EFFECTS:  - Mild abdominal pain, nausea, belching, bloating or excessive gas:  rest,   eat lightly and use a heating pad.  - Sore Throat: treat with throat lozenges and/or gargle with warm salt   water.  - Because air was used during the procedure, expelling large amounts of air   from your rectum or belching is normal.  - If a bowel prep was taken, you may not have a bowel movement for 1-3 days.    This is normal.  SYMPTOMS TO WATCH FOR AND REPORT TO YOUR PHYSICIAN:  1. Abdominal pain or bloating, other than gas cramps.  2. Chest pain.  3. Back pain.  4. Signs of infection such as: chills or fever occurring within 24 hours   after the procedure.  5. Rectal bleeding, which would show as bright red, maroon, or black stools.   (A tablespoon of blood from the rectum is not serious, especially if   hemorrhoids are present.)  6. Vomiting.  7. Weakness or dizziness.  GO DIRECTLY TO THE NEAREST EMERGENCY ROOM IF YOU HAVE ANY OF THE FOLLOWING:      Difficulty breathing              Chills and/or fever over 101 F   Persistent vomiting and/or vomiting blood   Severe abdominal pain   Severe chest pain   Black, tarry stools   Bleeding- more than one tablespoon   Any other  History and Physical    Subjective:     Nina Schrader is a 65 y.o. male with a past medical history significant for obesity, gout, and DM-II(controlled) intellectual disability, who presents to the ED today with complaints of shortness of breath in the last 1 week. History is obtained by the patient in the ED and supplemented by his mother who is present at bedside. Patient complains of increasing shortness of breath on exertion, in the last approximately 1 week. He complains of back pain, and LLE pain and swelling. States he saw his family doctor Daniel Gallegos a week ago primarily for the LLE pain and swelling, who did labs, and started a low dose lasix. Mother stated they were supposed to see him again next Monday for a referral plan. ED work-up shows a low Hg level of 6.4, for why hospitalist was requested to admit for blood transfusion. Patient denies any use of aspirin, ibuprofen or NSAIDs. He denies any hematemesis, hematochezia or melena. No chest pains. He complains of generalized weakness. No other complaints voiced. Per chart records, it appears he had an elevated PSA level of 439 on 10/2/2023, and his Hg was 6.4 at the time too. CTA of his chest, abdomen and pelvis today shows extensive lymphadenopathy with extensive osseous metastatic disease, and enlarged left axillary node and moderate bilateral pleural effusions. We agreed to inpatient admission criteria for symptomatic anemia.     Past Medical History:   Diagnosis Date    Diabetes ([address])     Elevated PSA     Gout     Gout     High cholesterol     Hyperlipemia     Hypertension     Pulmonary disease       Past Surgical History:   Procedure Laterality Date    COLONOSCOPY N/A 5/28/2021    COLONOSCOPY (TIVA) performed by Jair John MD at [facility] ENDOSCOPY    OTHER SURGICAL HISTORY      cyst removed-foot     Family History   Problem Relation Age of Onset    Cancer Maternal Grandmother         colon    Cancer Maternal Grandfather symptom or condition that you feel may need urgent attention  Your doctor recommends these additional instructions:  If any biopsies were taken, your doctors clinic will contact you in 1 to 2   weeks with any results.  - Patient has a contact number available for emergencies.  The signs and   symptoms of potential delayed complications were discussed with the   patient.  Return to normal activities tomorrow.  Written discharge   instructions were provided to the patient.   - Discharge patient to home.   - Await pathology results.   - Repeat colonoscopy in 3 years for surveillance.   - The findings and recommendations were discussed with the designated   responsible adult.  For questions, problems or results please call your physician - Juancarlos Foley MD at Work:  [phone].  [facility], EMERGENCY ROOM PHONE NUMBER: [phone]  IF A COMPLICATION OR EMERGENCY SITUATION ARISES AND YOU ARE UNABLE TO REACH   YOUR PHYSICIAN - GO DIRECTLY TO THE EMERGENCY ROOM.  Juancarlos Foley MD  5/22/2019 9:46:04 AM  This report has been verified and signed electronically.  [employer]   but appropriately follows commands.       Labs:  Recent Results (from the past 24 hour(s))   EKG 12 Lead    Collection Time: 10/13/23 11:07 AM   Result Value Ref Range    Ventricular Rate 107 BPM    Atrial Rate 107 BPM    P-R Interval 134 ms    QRS Duration 96 ms    Q-T Interval 350 ms    QTc Calculation (Bazett) 467 ms    P Axis 56 degrees    R Axis -16 degrees    T Axis 82 degrees    Diagnosis       Sinus tachycardia  Septal infarct , age undetermined  Abnormal ECG  No previous ECGs available     CBC with Auto Differential    Collection Time: 10/13/23 11:13 AM   Result Value Ref Range    WBC 4.9 4.6 - 13.2 K/uL    RBC 2.27 (L) 4.35 - 5.65 M/uL    Hemoglobin 6.4 (L) 13.0 - 16.0 g/dL    Hematocrit 21.1 (L) 36.0 - 48.0 %    MCV 93.0 78.0 - 100.0 FL    MCH 28.2 24.0 - 34.0 PG    MCHC 30.3 (L) 31.0 - 37.0 g/dL    RDW 18.1 (H) 11.6 - 14.5 %    Platelets 895 702 - 760 K/uL    MPV 10.0 9.2 - 11.8 FL    Nucleated RBCs 2.6 (H) 0.0  WBC    nRBC 0.13 (H) 0.00 - 0.01 K/uL    Neutrophils % 51 40 - 73 %    Lymphocytes % 32 21 - 52 %    Monocytes % 17 (H) 3 - 10 %    Eosinophils % 0 0 - 5 %    Basophils % 0 0 - 2 %    Immature Granulocytes 0 %    Neutrophils Absolute 2.5 1.8 - 8.0 K/UL    Lymphocytes Absolute 1.6 0.9 - 3.6 K/UL    Monocytes Absolute 0.8 0.05 - 1.2 K/UL    Eosinophils Absolute 0.0 0.0 - 0.4 K/UL    Basophils Absolute 0.0 0.0 - 0.1 K/UL    Absolute Immature Granulocyte 0.0 K/UL    Differential Type AUTOMATED      RBC Comment Anisocytosis  2+        RBC Comment Hypochromia  2+       CMP    Collection Time: 10/13/23 11:13 AM   Result Value Ref Range    Sodium 134 (L) 136 - 145 mmol/L    Potassium 3.5 3.5 - 5.5 mmol/L    Chloride 97 (L) 100 - 111 mmol/L    CO2 28 21 - 32 mmol/L    Anion Gap 9 3.0 - 18.0 mmol/L    Glucose 139 (H) 74 - 99 mg/dL    BUN 12 7 - 18 mg/dL    Creatinine 0.73 0.60 - 1.30 mg/dL    Bun/Cre Ratio 16 12 - 20      Est, Glom Filt Rate >60 >60 ml/min/1.73m2    Calcium 8.4 (L) 8.5 - 10.1 mg/dL

## 2023-10-15 DIAGNOSIS — M50.30 DEGENERATION OF CERVICAL INTERVERTEBRAL DISC: ICD-10-CM

## 2023-10-15 NOTE — TELEPHONE ENCOUNTER
No care due was identified.  [facility] Embedded Care Due Messages. Reference number: [id number].   10/15/2023 8:05:15 AM CDT

## 2023-10-16 RX ORDER — MELOXICAM 15 MG/1
15 TABLET ORAL DAILY PRN
Qty: 90 TABLET | Refills: 0 | Status: SHIPPED | OUTPATIENT
Start: 2023-10-16 | End: 2024-01-16

## 2023-10-22 DIAGNOSIS — M50.30 DEGENERATION OF CERVICAL INTERVERTEBRAL DISC: ICD-10-CM

## 2023-10-22 NOTE — TELEPHONE ENCOUNTER
No care due was identified.  [facility] Embedded Care Due Messages. Reference number: [id number].   10/22/2023 1:25:45 PM CDT

## 2023-10-23 RX ORDER — GABAPENTIN 300 MG/1
CAPSULE ORAL
Qty: 90 CAPSULE | Refills: 0 | Status: SHIPPED | OUTPATIENT
Start: 2023-10-23 | End: 2024-01-09

## 2023-11-15 ENCOUNTER — HOSPITAL ENCOUNTER (OUTPATIENT)
Dept: CARDIOLOGY | Facility: HOSPITAL | Age: 68
Discharge: HOME | End: 2023-11-15
Attending: INTERNAL MEDICINE
Payer: MEDICARE

## 2023-11-15 ENCOUNTER — HOSPITAL ENCOUNTER (OUTPATIENT)
Dept: CARDIOLOGY | Facility: CLINIC | Age: 68
Discharge: HOME | End: 2023-11-15
Payer: MEDICARE

## 2023-11-15 VITALS — BODY MASS INDEX: 22.81 KG/M2 | WEIGHT: 154 LBS | HEIGHT: 69 IN

## 2023-11-15 DIAGNOSIS — R07.89 ATYPICAL CHEST PAIN: ICD-10-CM

## 2023-11-15 LAB
ASCENDING AORTA: 2.98 CM
BSA FOR ECHO PROCEDURE: 1.84 M2
CV ECHO LV RWT: 0.32 CM
CV STRESS BASE HR: 73 BPM
DIASTOLIC BLOOD PRESSURE: 73 MMHG
DOP CALC LVOT AREA: 4.9 CM2
DOP CALC LVOT DIAMETER: 2.49 CM
DOP CALC LVOT PEAK VEL: 0.79 M/S
DOP CALC LVOT STROKE VOLUME: 78.12 CM3
DOP CALCLVOT PEAK VEL VTI: 16.05 CM
E WAVE DECELERATION TIME: 213.79 MSEC
E/A RATIO: 1.22
E/E' RATIO: 7.05 M/S
ECHO LV POSTERIOR WALL: 0.81 CM (ref 0.6–1.1)
EJECTION FRACTION: 60 %
FRACTIONAL SHORTENING: 29 % (ref 28–44)
INTERVENTRICULAR SEPTUM: 0.83 CM (ref 0.6–1.1)
IVRT: 97.05 MSEC
LA MAJOR: 5.27 CM
LA MINOR: 5.25 CM
LA WIDTH: 3.67 CM
LEFT ATRIUM SIZE: 3.37 CM
LEFT ATRIUM VOLUME INDEX: 29.9 ML/M2
LEFT ATRIUM VOLUME: 55.3 CM3
LEFT INTERNAL DIMENSION IN SYSTOLE: 3.66 CM (ref 2.1–4)
LEFT VENTRICLE DIASTOLIC VOLUME INDEX: 68.18 ML/M2
LEFT VENTRICLE DIASTOLIC VOLUME: 126.14 ML
LEFT VENTRICLE MASS INDEX: 79 G/M2
LEFT VENTRICLE SYSTOLIC VOLUME INDEX: 30.7 ML/M2
LEFT VENTRICLE SYSTOLIC VOLUME: 56.75 ML
LEFT VENTRICULAR INTERNAL DIMENSION IN DIASTOLE: 5.14 CM (ref 3.5–6)
LEFT VENTRICULAR MASS: 146.92 G
LV LATERAL E/E' RATIO: 6.7 M/S
LV SEPTAL E/E' RATIO: 7.44 M/S
MV A" WAVE DURATION": 8.28 MSEC
MV PEAK A VEL: 0.55 M/S
MV PEAK E VEL: 0.67 M/S
MV STENOSIS PRESSURE HALF TIME: 62 MS
MV VALVE AREA P 1/2 METHOD: 3.55 CM2
OHS CV CPX 1 MINUTE RECOVERY HEART RATE: 139 BPM
OHS CV CPX 85 PERCENT MAX PREDICTED HEART RATE MALE: 131
OHS CV CPX ESTIMATED METS: 16
OHS CV CPX MAX PREDICTED HEART RATE: 154
OHS CV CPX PATIENT IS FEMALE: 0
OHS CV CPX PATIENT IS MALE: 1
OHS CV CPX PEAK DIASTOLIC BLOOD PRESSURE: 56 MMHG
OHS CV CPX PEAK HEAR RATE: 181 BPM
OHS CV CPX PEAK RATE PRESSURE PRODUCT: NORMAL
OHS CV CPX PEAK SYSTOLIC BLOOD PRESSURE: 177 MMHG
OHS CV CPX PERCENT MAX PREDICTED HEART RATE ACHIEVED: 118
OHS CV CPX RATE PRESSURE PRODUCT PRESENTING: 9125
PISA TR MAX VEL: 2.44 M/S
PULM VEIN S/D RATIO: 1.76
PV PEAK D VEL: 0.33 M/S
PV PEAK S VEL: 0.58 M/S
RA MAJOR: 4.44 CM
RA PRESSURE ESTIMATED: 3 MMHG
RA WIDTH: 4.33 CM
RIGHT VENTRICULAR END-DIASTOLIC DIMENSION: 3.98 CM
RV TB RVSP: 5 MMHG
SINUS: 3.89 CM
STJ: 2.88 CM
STRESS ECHO POST EXERCISE DUR MIN: 9 MINUTES
STRESS ECHO POST EXERCISE DUR SEC: 31 SECONDS
SYSTOLIC BLOOD PRESSURE: 125 MMHG
TDI LATERAL: 0.1 M/S
TDI SEPTAL: 0.09 M/S
TDI: 0.1 M/S
TR MAX PG: 24 MMHG
TRICUSPID ANNULAR PLANE SYSTOLIC EXCURSION: 2.6 CM
TV REST PULMONARY ARTERY PRESSURE: 27 MMHG
Z-SCORE OF LEFT VENTRICULAR DIMENSION IN END DIASTOLE: 0
Z-SCORE OF LEFT VENTRICULAR DIMENSION IN END SYSTOLE: 1.13

## 2023-11-15 PROCEDURE — 93005 ELECTROCARDIOGRAM TRACING: CPT | Mod: S$GLB,,, | Performed by: INTERNAL MEDICINE

## 2023-11-15 PROCEDURE — 93351 STRESS TTE COMPLETE: CPT | Mod: 26,,, | Performed by: INTERNAL MEDICINE

## 2023-11-15 PROCEDURE — 93351 STRESS TTE COMPLETE: CPT

## 2023-11-15 PROCEDURE — 93010 EKG 12-LEAD: ICD-10-PCS | Mod: S$GLB,,, | Performed by: INTERNAL MEDICINE

## 2023-11-15 PROCEDURE — 93005 EKG 12-LEAD: ICD-10-PCS | Mod: S$GLB,,, | Performed by: INTERNAL MEDICINE

## 2023-11-15 PROCEDURE — 93010 ELECTROCARDIOGRAM REPORT: CPT | Mod: S$GLB,,, | Performed by: INTERNAL MEDICINE

## 2023-11-15 PROCEDURE — 93351 STRESS ECHO (CUPID ONLY): ICD-10-PCS | Mod: 26,,, | Performed by: INTERNAL MEDICINE

## 2024-01-09 DIAGNOSIS — M50.30 DEGENERATION OF CERVICAL INTERVERTEBRAL DISC: ICD-10-CM

## 2024-01-09 RX ORDER — GABAPENTIN 300 MG/1
CAPSULE ORAL
Qty: 90 CAPSULE | Refills: 0 | Status: SHIPPED | OUTPATIENT
Start: 2024-01-09

## 2024-01-09 NOTE — TELEPHONE ENCOUNTER
No care due was identified.  [facility] Embedded Care Due Messages. Reference number: [id number].   1/09/2024 2:26:11 PM CST

## 2024-01-16 DIAGNOSIS — M50.30 DEGENERATION OF CERVICAL INTERVERTEBRAL DISC: ICD-10-CM

## 2024-01-16 RX ORDER — MELOXICAM 15 MG/1
15 TABLET ORAL DAILY PRN
Qty: 90 TABLET | Refills: 0 | Status: SHIPPED | OUTPATIENT
Start: 2024-01-16 | End: 2024-04-12 | Stop reason: SDUPTHER

## 2024-01-16 NOTE — TELEPHONE ENCOUNTER
No care due was identified.  [facility] Embedded Care Due Messages. Reference number: [id number].   1/16/2024 12:02:22 AM CST

## 2024-02-15 ENCOUNTER — LAB VISIT (OUTPATIENT)
Dept: LAB | Facility: HOSPITAL | Age: 69
End: 2024-02-15
Payer: MEDICARE

## 2024-02-15 ENCOUNTER — OFFICE VISIT (OUTPATIENT)
Dept: INTERNAL MEDICINE | Facility: CLINIC | Age: 69
End: 2024-02-15
Payer: MEDICARE

## 2024-02-15 VITALS
WEIGHT: 153.44 LBS | DIASTOLIC BLOOD PRESSURE: 72 MMHG | SYSTOLIC BLOOD PRESSURE: 120 MMHG | HEIGHT: 69 IN | HEART RATE: 70 BPM | BODY MASS INDEX: 22.73 KG/M2 | OXYGEN SATURATION: 100 %

## 2024-02-15 DIAGNOSIS — R10.31 ACUTE RIGHT LOWER QUADRANT PAIN: Primary | ICD-10-CM

## 2024-02-15 DIAGNOSIS — R10.31 ACUTE RIGHT LOWER QUADRANT PAIN: ICD-10-CM

## 2024-02-15 LAB
BASOPHILS # BLD AUTO: 0.07 K/UL (ref 0–0.2)
BASOPHILS NFR BLD: 0.4 % (ref 0–1.9)
CREAT SERPL-MCNC: 0.9 MG/DL (ref 0.5–1.4)
CRP SERPL-MCNC: 94.1 MG/L (ref 0–8.2)
DIFFERENTIAL METHOD BLD: ABNORMAL
EOSINOPHIL # BLD AUTO: 0.3 K/UL (ref 0–0.5)
EOSINOPHIL NFR BLD: 1.9 % (ref 0–8)
ERYTHROCYTE [DISTWIDTH] IN BLOOD BY AUTOMATED COUNT: 13.2 % (ref 11.5–14.5)
EST. GFR  (NO RACE VARIABLE): >60 ML/MIN/1.73 M^2
HCT VFR BLD AUTO: 44.7 % (ref 40–54)
HGB BLD-MCNC: 14.5 G/DL (ref 14–18)
IMM GRANULOCYTES # BLD AUTO: 0.07 K/UL (ref 0–0.04)
IMM GRANULOCYTES NFR BLD AUTO: 0.4 % (ref 0–0.5)
LYMPHOCYTES # BLD AUTO: 1.9 K/UL (ref 1–4.8)
LYMPHOCYTES NFR BLD: 12.4 % (ref 18–48)
MCH RBC QN AUTO: 30 PG (ref 27–31)
MCHC RBC AUTO-ENTMCNC: 32.4 G/DL (ref 32–36)
MCV RBC AUTO: 92 FL (ref 82–98)
MONOCYTES # BLD AUTO: 0.9 K/UL (ref 0.3–1)
MONOCYTES NFR BLD: 5.7 % (ref 4–15)
NEUTROPHILS # BLD AUTO: 12.3 K/UL (ref 1.8–7.7)
NEUTROPHILS NFR BLD: 79.2 % (ref 38–73)
NRBC BLD-RTO: 0 /100 WBC
PLATELET # BLD AUTO: 273 K/UL (ref 150–450)
PMV BLD AUTO: 10.2 FL (ref 9.2–12.9)
RBC # BLD AUTO: 4.84 M/UL (ref 4.6–6.2)
WBC # BLD AUTO: 15.56 K/UL (ref 3.9–12.7)

## 2024-02-15 PROCEDURE — 85025 COMPLETE CBC W/AUTO DIFF WBC: CPT | Performed by: STUDENT IN AN ORGANIZED HEALTH CARE EDUCATION/TRAINING PROGRAM

## 2024-02-15 PROCEDURE — 82565 ASSAY OF CREATININE: CPT | Performed by: STUDENT IN AN ORGANIZED HEALTH CARE EDUCATION/TRAINING PROGRAM

## 2024-02-15 PROCEDURE — 1101F PT FALLS ASSESS-DOCD LE1/YR: CPT | Mod: CPTII,S$GLB,, | Performed by: STUDENT IN AN ORGANIZED HEALTH CARE EDUCATION/TRAINING PROGRAM

## 2024-02-15 PROCEDURE — 3008F BODY MASS INDEX DOCD: CPT | Mod: CPTII,S$GLB,, | Performed by: STUDENT IN AN ORGANIZED HEALTH CARE EDUCATION/TRAINING PROGRAM

## 2024-02-15 PROCEDURE — 86140 C-REACTIVE PROTEIN: CPT | Performed by: STUDENT IN AN ORGANIZED HEALTH CARE EDUCATION/TRAINING PROGRAM

## 2024-02-15 PROCEDURE — 99999 PR PBB SHADOW E&M-EST. PATIENT-LVL IV: CPT | Mod: PBBFAC,,, | Performed by: STUDENT IN AN ORGANIZED HEALTH CARE EDUCATION/TRAINING PROGRAM

## 2024-02-15 PROCEDURE — 1159F MED LIST DOCD IN RCRD: CPT | Mod: CPTII,S$GLB,, | Performed by: STUDENT IN AN ORGANIZED HEALTH CARE EDUCATION/TRAINING PROGRAM

## 2024-02-15 PROCEDURE — 3074F SYST BP LT 130 MM HG: CPT | Mod: CPTII,S$GLB,, | Performed by: STUDENT IN AN ORGANIZED HEALTH CARE EDUCATION/TRAINING PROGRAM

## 2024-02-15 PROCEDURE — 1160F RVW MEDS BY RX/DR IN RCRD: CPT | Mod: CPTII,S$GLB,, | Performed by: STUDENT IN AN ORGANIZED HEALTH CARE EDUCATION/TRAINING PROGRAM

## 2024-02-15 PROCEDURE — 3288F FALL RISK ASSESSMENT DOCD: CPT | Mod: CPTII,S$GLB,, | Performed by: STUDENT IN AN ORGANIZED HEALTH CARE EDUCATION/TRAINING PROGRAM

## 2024-02-15 PROCEDURE — 3078F DIAST BP <80 MM HG: CPT | Mod: CPTII,S$GLB,, | Performed by: STUDENT IN AN ORGANIZED HEALTH CARE EDUCATION/TRAINING PROGRAM

## 2024-02-15 PROCEDURE — 99213 OFFICE O/P EST LOW 20 MIN: CPT | Mod: S$GLB,,, | Performed by: STUDENT IN AN ORGANIZED HEALTH CARE EDUCATION/TRAINING PROGRAM

## 2024-02-15 PROCEDURE — 36415 COLL VENOUS BLD VENIPUNCTURE: CPT | Performed by: STUDENT IN AN ORGANIZED HEALTH CARE EDUCATION/TRAINING PROGRAM

## 2024-02-15 PROCEDURE — 1125F AMNT PAIN NOTED PAIN PRSNT: CPT | Mod: CPTII,S$GLB,, | Performed by: STUDENT IN AN ORGANIZED HEALTH CARE EDUCATION/TRAINING PROGRAM

## 2024-02-15 RX ORDER — FAMOTIDINE 20 MG/1
20 TABLET, FILM COATED ORAL 2 TIMES DAILY
Qty: 60 TABLET | Refills: 1 | Status: SHIPPED | OUTPATIENT
Start: 2024-02-15 | End: 2024-04-15

## 2024-02-15 RX ORDER — ONDANSETRON 4 MG/1
4 TABLET, FILM COATED ORAL EVERY 6 HOURS PRN
Qty: 20 TABLET | Refills: 0 | Status: SHIPPED | OUTPATIENT
Start: 2024-02-15 | End: 2024-02-25

## 2024-02-15 NOTE — PATIENT INSTRUCTIONS
ABDOMINAL PAIN  Labs and imaging as detailed below have been ordered - please complete as scheduled.  Famotidine (Pepcid) has been prescribed - take twice daily as needed.  Eat a bland diet and drink plenty of liquids.  Please go to the ER if you experience any worsening or severe symptoms.

## 2024-02-15 NOTE — PROGRESS NOTES
[facility]    CHIEF COMPLAINT:   Chief Complaint   Patient presents with    GI Problem     Pt states Tuesday evening his stomach started bothering him . He's having stomach cramps, gas and he stated he haven't really ate since Tuesday evening.       HISTORY OF PRESENT ILLNESS: Sabino Rubio is a 68 y.o. male who presents here today for evaluation of Abdominal Pain. Pain is located lower abdomen and has been present for 2 days. Initially pain bilateral/middle lower abdomen but now more in right lower quadrant. Pain is described as crampy with intermittent sharp pain. Associated symptoms include reduced appetite, nausea, anorexia, gas, bloating, diarrhea/soft stool, chills. Denies vomiting, constipation, heartburn, acid reflux, fever, unintentional weight loss, blood in stool. Patient cannot identify any apparent triggers. Denies sick contacts. Denies recent travel. Denies new medications including antibiotics. Denies prior abdominal surgeries. Patient takes aspirin and Mobic daily; otherwise denies NSAID use. Patient has a history of GERD and diverticulitis. He does not take any medication for his GERD or for heartburn.      REVIEW OF SYSTEMS:    ROS as in HPI.      MEDICAL HISTORY:    Past Medical History:   Diagnosis Date    Allergy     Arthritis     Family history of malignant neoplasm of gastrointestinal tract mat.gf    Family history of prostate cancer: 1/2 brother 09/25/2017    GERD (gastroesophageal reflux disease)     Hyperlipidemia     Kidney cyst, acquired: R 1.2 cm 2015 09/25/2017    Restless leg syndrome     Tubulovillous adenoma 2013 due 2016 09/14/2015       MEDICATIONS:    Current Outpatient Medications on File Prior to Visit   Medication Sig Dispense Refill    aspirin (ECOTRIN) 81 MG EC tablet Take 1 tablet (81 mg total) by mouth once daily. 30 tablet 12    cyclobenzaprine (FLEXERIL) 10 MG tablet Take 1 tablet (10 mg total) by mouth 3 (three) times daily as needed for Muscle  "spasms. for ten days 30 tablet 1    fluticasone propionate (FLONASE) 50 mcg/actuation nasal spray 1 SPRAY (50 MCG TOTAL) BY EACH NOSTRIL ROUTE 2 (TWO) TIMES A DAY. 48 mL 2    gabapentin (NEURONTIN) 300 MG capsule TAKE 1 CAPSULE BY MOUTH THREE TIMES A DAY 90 capsule 0    meloxicam (MOBIC) 15 MG tablet TAKE 1 TABLET (15 MG TOTAL) BY MOUTH DAILY AS NEEDED FOR PAIN (TAKE WITH FOOD OR A MEAL). 90 tablet 0    mupirocin (BACTROBAN) 2 % ointment Apply topically 2 (two) times daily. 30 g 0    rosuvastatin (CRESTOR) 10 MG tablet Take 1 tablet (10 mg total) by mouth once daily. 90 tablet 3    sildenafiL (VIAGRA) 100 MG tablet Take 100 mg by mouth daily as needed for Erectile Dysfunction.      tamsulosin (FLOMAX) 0.4 mg Cap Take 1 capsule (0.4 mg total) by mouth once daily. 30 capsule 12     Current Facility-Administered Medications on File Prior to Visit   Medication Dose Route Frequency Provider Last Rate Last Admin    0.9%  NaCl infusion   Intravenous Continuous Cole Fitzpatrick DO           PHYSICAL EXAM:    /72 (BP Location: Left arm, Patient Position: Sitting)   Pulse 70   Ht 5' 9" (1.753 m)   Wt 69.6 kg (153 lb 7 oz)   SpO2 100%   BMI 22.66 kg/m²     Physical Exam  Vitals and nursing note reviewed.   Constitutional:       General: He is not in acute distress.     Appearance: He is not ill-appearing, toxic-appearing or diaphoretic.   HENT:      Head: Normocephalic and atraumatic.      Nose: Nose normal.   Eyes:      Extraocular Movements: Extraocular movements intact.      Conjunctiva/sclera: Conjunctivae normal.      Pupils: Pupils are equal, round, and reactive to light.   Cardiovascular:      Rate and Rhythm: Normal rate and regular rhythm.      Heart sounds: Normal heart sounds. No murmur heard.  Pulmonary:      Effort: Pulmonary effort is normal. No respiratory distress.      Breath sounds: Normal breath sounds.   Abdominal:      General: Bowel sounds are increased. There is no distension.      Palpations: " Abdomen is soft. There is no mass.      Tenderness: There is abdominal tenderness in the right lower quadrant. There is guarding (minimal). There is no rebound. Negative signs include Rovsing's sign.   Musculoskeletal:         General: Normal range of motion.   Skin:     Findings: No lesion or rash.   Neurological:      General: No focal deficit present.      Mental Status: He is alert.      Motor: No weakness.      Gait: Gait normal.   Psychiatric:         Mood and Affect: Mood normal.         Behavior: Behavior normal.         Thought Content: Thought content normal.         Judgment: Judgment normal.             ASSESSMENT & PLAN:    Sabino was seen today for gi problem.    Diagnoses and all orders for this visit:    Acute right lower quadrant pain  Patient with history of GERD and diverticulosis presenting with pain in RLQ for 2 days. Initially pain in middle/bilateral lower abdomen now more localized to RLQ. Associated symptoms include anorexia, loss of appetite, nausea, chills, diarrhea.  On exam, abdomen is soft. Increased bowel sounds. Tenderness to palpation RLQ with mild guarding. No rebound. Negative Rovsing's sign.   Ddx: diverticulitis, appendicitis, gastroenteritis. Bowel perforation or obstruction less likely.  Labs and imaging as below to evaluate further.  Famotidine & Zofran for symptom relief.  ER/return precautions discussed.  -     Creatinine, serum; Future; Expected date: 02/15/2024  -     CT Abdomen Pelvis With IV Contrast Routine Oral Contrast; Future; Expected date: 02/15/2024  -     CBC W/ AUTO DIFFERENTIAL; Future; Expected date: 02/15/2024  -     C-REACTIVE PROTEIN; Future; Expected date: 02/15/2024  -     famotidine (PEPCID) 20 MG tablet; Take 1 tablet (20 mg total) by mouth 2 (two) times daily.  -     ondansetron (ZOFRAN) 4 MG tablet; Take 1 tablet (4 mg total) by mouth every 6 (six) hours as needed for Nausea.            Meli Rodas MD  [facility]

## 2024-02-16 ENCOUNTER — HOSPITAL ENCOUNTER (OUTPATIENT)
Dept: RADIOLOGY | Facility: HOSPITAL | Age: 69
Discharge: HOME | End: 2024-02-16
Attending: STUDENT IN AN ORGANIZED HEALTH CARE EDUCATION/TRAINING PROGRAM
Payer: MEDICARE

## 2024-02-16 DIAGNOSIS — R10.31 ACUTE RIGHT LOWER QUADRANT PAIN: ICD-10-CM

## 2024-02-16 PROCEDURE — A9698 NON-RAD CONTRAST MATERIALNOC: HCPCS | Performed by: STUDENT IN AN ORGANIZED HEALTH CARE EDUCATION/TRAINING PROGRAM

## 2024-02-16 PROCEDURE — 74177 CT ABD & PELVIS W/CONTRAST: CPT | Mod: TC

## 2024-02-16 PROCEDURE — 74177 CT ABD & PELVIS W/CONTRAST: CPT | Mod: 26,,, | Performed by: INTERNAL MEDICINE

## 2024-02-16 PROCEDURE — 25500020 PHARM REV CODE 255: Performed by: STUDENT IN AN ORGANIZED HEALTH CARE EDUCATION/TRAINING PROGRAM

## 2024-02-16 RX ADMIN — IOHEXOL 100 ML: 350 INJECTION, SOLUTION INTRAVENOUS at 08:02

## 2024-02-16 RX ADMIN — BARIUM SULFATE 450 ML: 20 SUSPENSION ORAL at 08:02

## 2024-02-19 ENCOUNTER — TELEPHONE (OUTPATIENT)
Dept: INTERNAL MEDICINE | Facility: CLINIC | Age: 69
End: 2024-02-19
Payer: MEDICARE

## 2024-02-19 DIAGNOSIS — K57.92 DIVERTICULITIS: Primary | ICD-10-CM

## 2024-02-19 RX ORDER — CIPROFLOXACIN 500 MG/1
500 TABLET ORAL 2 TIMES DAILY
Qty: 20 TABLET | Refills: 0 | Status: SHIPPED | OUTPATIENT
Start: 2024-02-19

## 2024-02-19 RX ORDER — METRONIDAZOLE 500 MG/1
500 TABLET ORAL EVERY 8 HOURS
Qty: 30 TABLET | Refills: 0 | Status: SHIPPED | OUTPATIENT
Start: 2024-02-19

## 2024-02-19 NOTE — TELEPHONE ENCOUNTER
Message from his significant other.  He was seen last week by another physician, appears to have diverticulitis.  [facility] in [facility]    I spoke to him, he is feeling all right.  No fever, no current nausea.  Slight pain.  Alarm sx and ED cautions and med side effects reviewed. Avoid ETOH    He will need follow-up in Colorectal surgery within the next couple of weeks, likely will need a colonoscopy at a later point.  Please assist with appointment in Colorectal surgery

## 2024-02-21 ENCOUNTER — PATIENT MESSAGE (OUTPATIENT)
Dept: SURGERY | Facility: CLINIC | Age: 69
End: 2024-02-21

## 2024-02-21 ENCOUNTER — OFFICE VISIT (OUTPATIENT)
Dept: SURGERY | Facility: CLINIC | Age: 69
End: 2024-02-21
Payer: MEDICARE

## 2024-02-21 VITALS
SYSTOLIC BLOOD PRESSURE: 105 MMHG | WEIGHT: 156.75 LBS | DIASTOLIC BLOOD PRESSURE: 66 MMHG | HEIGHT: 69 IN | HEART RATE: 71 BPM | BODY MASS INDEX: 23.22 KG/M2

## 2024-02-21 DIAGNOSIS — K57.92 DIVERTICULITIS: ICD-10-CM

## 2024-02-21 DIAGNOSIS — R10.9 ABDOMINAL CRAMPING: Primary | ICD-10-CM

## 2024-02-21 PROCEDURE — 1101F PT FALLS ASSESS-DOCD LE1/YR: CPT | Mod: CPTII,S$GLB,, | Performed by: NURSE PRACTITIONER

## 2024-02-21 PROCEDURE — 99999 PR PBB SHADOW E&M-EST. PATIENT-LVL IV: CPT | Mod: PBBFAC,,, | Performed by: NURSE PRACTITIONER

## 2024-02-21 PROCEDURE — 3074F SYST BP LT 130 MM HG: CPT | Mod: CPTII,S$GLB,, | Performed by: NURSE PRACTITIONER

## 2024-02-21 PROCEDURE — 1160F RVW MEDS BY RX/DR IN RCRD: CPT | Mod: CPTII,S$GLB,, | Performed by: NURSE PRACTITIONER

## 2024-02-21 PROCEDURE — 3008F BODY MASS INDEX DOCD: CPT | Mod: CPTII,S$GLB,, | Performed by: NURSE PRACTITIONER

## 2024-02-21 PROCEDURE — 3288F FALL RISK ASSESSMENT DOCD: CPT | Mod: CPTII,S$GLB,, | Performed by: NURSE PRACTITIONER

## 2024-02-21 PROCEDURE — 1125F AMNT PAIN NOTED PAIN PRSNT: CPT | Mod: CPTII,S$GLB,, | Performed by: NURSE PRACTITIONER

## 2024-02-21 PROCEDURE — 1159F MED LIST DOCD IN RCRD: CPT | Mod: CPTII,S$GLB,, | Performed by: NURSE PRACTITIONER

## 2024-02-21 PROCEDURE — 99204 OFFICE O/P NEW MOD 45 MIN: CPT | Mod: S$GLB,,, | Performed by: NURSE PRACTITIONER

## 2024-02-21 PROCEDURE — 3078F DIAST BP <80 MM HG: CPT | Mod: CPTII,S$GLB,, | Performed by: NURSE PRACTITIONER

## 2024-02-21 RX ORDER — DICYCLOMINE HYDROCHLORIDE 10 MG/1
10 CAPSULE ORAL
Qty: 60 CAPSULE | Refills: 0 | Status: SHIPPED | OUTPATIENT
Start: 2024-02-21 | End: 2024-03-07

## 2024-02-21 NOTE — PATIENT INSTRUCTIONS
Complete all antibiotics.  Cipro 2x/day and Flagyl 3x/day.  Just fyi- flagyl can give a metallic taste in mouth and cause decreased appetite and overall poor feeling.  Bentyl as needed for abdominal cramping/pain. Please take sparingly due to hx of BPH.  If symptoms are not continually improving, or start to worsen, please let me know and I will change the antibiotics.   Maintain a soft or liquid diet at this time until pain is fully resolved.  Once pain is fully resolved:  High fiber diet  Daily fiber supplement like citrucel, fibercon, or metamucil.  Water intake > 64 oz/day.  If pain returns:  Stop fiber supplement  Start a clear liquid diet  Message/call me asap  Will order labs &/or CT scan  If abnormal, will rx antibiotics.  Up to date on colonoscopy. Due for repeat in 10/2025.   If repeated occurrences prior to 10/2025, will repeat sooner.

## 2024-02-21 NOTE — PROGRESS NOTES
CRS Office Visit History and Physical    Referring Md:   Alba Baum Md  [address] [place] [address]    SUBJECTIVE:     Chief Complaint: diverticulitis    History of Present Illness:  The patient is new patient to this practice.   Course is as follows:  Patient is a 68 y.o. male presents with 1st dx episode of diverticulitis.  Reports hx of abdominal pain/diarrhea in past that felt similar but never with dx/ct scan.  Symptoms have been present for 1 week. RLQ abdominal pain.   Started with constipation, took a laxative then pain progressed.  Sigmoid diverticulitis on CT scan. On cipro/flagyl day 3 today.  Reports pain is 25% improved. Still with some abdominal cramping pain.  Did have some slight dizziness/nausea/chills, since resolved.  Tolerating regular diet.  Having formed bowel movements.    Last Colonoscopy completed on 10/12/2022  - Two 4 to 8 mm polyps in the descending colon, removed with a cold snare. Resected and retrieved.   - Three 1 to 3 mm polyps in the sigmoid colon, removed with a cold snare. Resected and retrieved.   - Diverticulosis in the sigmoid colon and in the descending colon.   - The examined portion of the ileum was normal.   - Non-bleeding external and internal hemorrhoids.   - repeat in 3 years      Review of patient's allergies indicates:  No Known Allergies    Past Medical History:   Diagnosis Date    Allergy     Arthritis     Family history of malignant neoplasm of gastrointestinal tract mat.gf    Family history of prostate cancer: 1/2 brother 09/25/2017    GERD (gastroesophageal reflux disease)     Hyperlipidemia     Kidney cyst, acquired: R 1.2 cm 2015 09/25/2017    Restless leg syndrome     Tubulovillous adenoma 2013 due 2016 09/14/2015     Past Surgical History:   Procedure Laterality Date    APPLICATION OF BONE GRAFT TO FINGER Left 11/8/2021    Procedure: APPLICATION INTEGRA GRAFT, TO FINGER;  Surgeon: Alba Penn MD;  Location: [facility] OR;  Service:  Orthopedics;  Laterality: Left;    CARPAL TUNNEL RELEASE      COLONOSCOPY N/A 5/22/2019    Procedure: COLONOSCOPY;  Surgeon: Juancarlos Foley MD;  Location: [facility] ENDO (4TH FLR);  Service: Endoscopy;  Laterality: N/A;    COLONOSCOPY N/A 10/12/2022    Procedure: COLONOSCOPY;  Surgeon: Cherelle Wang MD;  Location: [facility] ENDO (4TH FLR);  Service: Endoscopy;  Laterality: N/A;  vacc-inst portal-am prep-clears 4 hrs prior-tb    EXCISION OF GANGLION OF WRIST Right 6/28/2019    Procedure: EXCISION, GANGLION CYST, WRIST right;  Surgeon: Alba Penn MD;  Location: [facility] OR 1ST FLR;  Service: Orthopedics;  Laterality: Right;  regional MAC, stretcher, supine, hand pan 1 and 2,MINI C-arm, call Arthrex    INJECTION OF ANESTHETIC AGENT AROUND NERVE Bilateral 1/27/2022    Procedure: Block, Nerve MEDIAL BRANCH BLOCK BILATERAL L3,4,5  DIRECT REFERRAL 1 OF 2;  Surgeon: Kaiser Braxton MD;  Location: [facility] PAIN MGT;  Service: Pain Management;  Laterality: Bilateral;    INJECTION OF ANESTHETIC AGENT AROUND NERVE Bilateral 2/10/2022    Procedure: Block, Nerve MEDIAL BRANCH BLOCK BILATERAL L3.4.5  DIRECT REFERRAL 2 OF 2;  Surgeon: Kaiser Braxton MD;  Location: [facility] PAIN MGT;  Service: Pain Management;  Laterality: Bilateral;    INJECTION OF FACET JOINT Bilateral 6/6/2019    Procedure: INJECTION, FACET JOINT INJECTION (LUMBAR BLOCK) BILATERAL L4-L5 AND L5-S1 FACET INJECTIONS;  Surgeon: Kaiser Braxton MD;  Location: [facility] PAIN MGT;  Service: Pain Management;  Laterality: Bilateral;  NEEDS CONSENT    INJECTION, SACROILIAC JOINT Right 12/8/2022    Procedure: INJECTION,SACROILIAC JOINT RIGHT  CONTRAST;  Surgeon: Kaiser Braxton MD;  Location: [facility] PAIN MGT;  Service: Pain Management;  Laterality: Right;    INTERPOSITION ARTHROPLASTY OF CARPOMETACARPAL JOINTS Right 6/28/2019    Procedure: INTERPOSITION ARTHROPLASTY, CMC JOINT right;  Surgeon: Alba Penn MD;  Location: [facility] OR 1ST FLR;  Service: Orthopedics;  Laterality: Right;  regional MAC,  stretcher, supine, hand pan 1 and 2,MINI C-arm, call Arthrex    IRRIGATION AND DEBRIDEMENT OF UPPER EXTREMITY Left 11/8/2021    Procedure: IRRIGATION AND DEBRIDEMENT, UPPER EXTREMITY, left index finger;  Surgeon: Alba Penn MD;  Location: [facility] OR;  Service: Orthopedics;  Laterality: Left;    RADIOFREQUENCY ABLATION Right 5/16/2022    Procedure: Radiofrequency Ablation RIGHT L3,4,5;  Surgeon: Kaiser Braxton MD;  Location: [facility] PAIN MGT;  Service: Pain Management;  Laterality: Right;    RADIOFREQUENCY ABLATION Left 6/2/2022    Procedure: Radiofrequency Ablation LEFT L3,4,5;  Surgeon: Kaiser Braxton MD;  Location: [facility] PAIN MGT;  Service: Pain Management;  Laterality: Left;    RADIOFREQUENCY ABLATION Right 7/17/2023    Procedure: RADIOFREQUENCY ABLATION, RIGHT L3,L4,L5 MEDIAL BRANCH ONE OF TWO;  Surgeon: Kaiser Braxton MD;  Location: [facility] PAIN MGT;  Service: Pain Management;  Laterality: Right;    RADIOFREQUENCY ABLATION Left 9/11/2023    Procedure: RADIOFREQUENCY ABLATION, LEFT L3,L4,L5 MEDIAL BRANCH TWO OF TWO;  Surgeon: Kaiser Braxton MD;  Location: [facility] PAIN MGT;  Service: Pain Management;  Laterality: Left;    REPAIR OF NAIL BED Left 11/8/2021    Procedure: REPAIR, NAIL BED, left index;  Surgeon: Alba Penn MD;  Location: [facility] OR;  Service: Orthopedics;  Laterality: Left;    SPERMATOCELECTOMY Right 11/8/2019    Procedure: EXCISION, SPERMATOCELE;  Surgeon: Sheldon Araya Jr., MD;  [facility];  Service: Urology;  Laterality: Right;  1hr    TRANSFORAMINAL EPIDURAL INJECTION OF STEROID Bilateral 10/27/2022    Procedure: INJECTION, STEROID, EPIDURAL, TRANSFORAMINAL APPROACH, BILATERAL L5-S1 CONTRAST;  Surgeon: Kaiser Braxton MD;  Location: [facility] PAIN MGT;  Service: Pain Management;  Laterality: Bilateral;     Family History   Problem Relation Age of Onset    Arthritis Mother         probable R.A.    Cancer Father         esophageal ca and brain tumor    Esophageal cancer Father     Melanoma  "Father     Irritable bowel syndrome Sister     Arthritis Sister     Arthritis Sister         rheumatoid    Cancer Sister         skin    Cancer Brother         pancreatic cancer    No Known Problems Brother     No Known Problems Brother     No Known Problems Son     No Known Problems Son     Cancer Maternal Grandfather         colon    Colon cancer Maternal Grandfather     Diabetes Neg Hx     Heart disease Neg Hx     Cirrhosis Neg Hx     Celiac disease Neg Hx     Crohn's disease Neg Hx     Ulcerative colitis Neg Hx     Stomach cancer Neg Hx     Rectal cancer Neg Hx     Liver cancer Neg Hx     Psoriasis Neg Hx     Lupus Neg Hx      Social History     Tobacco Use    Smoking status: Never    Smokeless tobacco: Never    Tobacco comments:     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   Substance Use Topics    Alcohol use: Yes     Alcohol/week: 0.0 standard drinks of alcohol     Comment: 2-3 days weekly, up to 2 beers    Drug use: No        Review of Systems:  Review of Systems   Constitutional:  Positive for chills.   Gastrointestinal:  Positive for abdominal pain, constipation and nausea.       OBJECTIVE:     Vital Signs (Most Recent)  Blood Pressure 105/66 (BP Location: Left arm, Patient Position: Sitting, BP Method: Medium (Manual))   Pulse 71   Height 5' 9" (1.753 m)   Weight 71.1 kg (156 lb 12 oz)   Body Mass Index 23.15 kg/m²     Physical Exam:  General: White male in no distress   Neuro: Alert and oriented to person, place, and time.  Moves all extremities.     HEENT: No icterus.  Trachea midline  Respiratory: Respirations are even and unlabored, no cough or audible wheezing  Skin: Warm dry and intact, No visible rashes, no jaundice    Labs reviewed today:  Lab Results   Component Value Date    WBC 15.56 (H) 02/15/2024    HGB 14.5 02/15/2024    HCT 44.7 02/15/2024     02/15/2024    CHOL 167 08/31/2023    TRIG 31 08/31/2023    " HDL 79 (H) 08/31/2023    ALT 24 08/31/2023    AST 37 08/31/2023     08/31/2023    K 4.2 08/31/2023     08/31/2023    CREATININE 0.9 02/15/2024    BUN 16 08/31/2023    CO2 27 08/31/2023    TSH 1.603 01/04/2022    PSA 0.92 08/31/2023    HGBA1C 5.3 08/31/2023       Imaging reviewed today:  2/16/24 CT abdomen pelvis  - Acute uncomplicated sigmoid diverticulitis.  No abscess formation or kristine perforation.  - 6 cm long segment of cecal wall thickening which could be inflammatory, infectious, or neoplastic.  Colonoscopy is recommended.    Endoscopy reviewed today:  Last Colonoscopy completed on 10/12/2022  - Two 4 to 8 mm polyps in the descending colon, removed with a cold snare. Resected and retrieved.   - Three 1 to 3 mm polyps in the sigmoid colon, removed with a cold snare. Resected and retrieved.   - Diverticulosis in the sigmoid colon and in the descending colon.   - The examined portion of the ileum was normal.   - Non-bleeding external and internal hemorrhoids.   - repeat in 3 years      ASSESSMENT/PLAN:     Diagnoses and all orders for this visit:    Abdominal cramping  -     dicyclomine (BENTYL) 10 MG capsule; Take 1 capsule (10 mg total) by mouth 4 (four) times daily before meals and nightly. for 15 days    Diverticulitis  -     Ambulatory referral/consult to Colorectal Surgery  -     dicyclomine (BENTYL) 10 MG capsule; Take 1 capsule (10 mg total) by mouth 4 (four) times daily before meals and nightly. for 15 days        The patient was instructed to:  Complete all abx.  Will message if no significant improvement/worsening s/s by Friday, will change abx to avelox if so.  Schedule colonoscopy in 6-8 weeks after completion of abx.  Bentyl sparingly for abdominal pain  Fiber supplement once pain fully resolved.  If pain returns, stop fiber, clear liquid diet, message/call asap for labs if within 1 yr; or labs and CT if > 1 yr.          Johanna Green, RADHA-C  Colon and Rectal Surgery

## 2024-02-23 ENCOUNTER — TELEPHONE (OUTPATIENT)
Dept: ENDOSCOPY | Facility: HOSPITAL | Age: 69
End: 2024-02-23
Payer: MEDICARE

## 2024-02-23 DIAGNOSIS — Z12.11 SCREENING FOR COLON CANCER: Primary | ICD-10-CM

## 2024-02-23 DIAGNOSIS — R93.3 ABNORMAL FINDING ON GI TRACT IMAGING: Primary | ICD-10-CM

## 2024-02-23 RX ORDER — POLYETHYLENE GLYCOL 3350, SODIUM SULFATE ANHYDROUS, SODIUM BICARBONATE, SODIUM CHLORIDE, POTASSIUM CHLORIDE 236; 22.74; 6.74; 5.86; 2.97 G/4L; G/4L; G/4L; G/4L; G/4L
4 POWDER, FOR SOLUTION ORAL ONCE
Qty: 4000 ML | Refills: 0 | Status: SHIPPED | OUTPATIENT
Start: 2024-02-23 | End: 2024-02-23

## 2024-02-23 NOTE — TELEPHONE ENCOUNTER
Spoke to pt to schedule procedure(s) Colonoscopy       Physician to perform procedure(s) Dr. ELI Bhatti  Date of Procedure (s) 05/30/24  Arrival Time 6:40 AM  Time of Procedure(s) 7:40 AM   Location of Procedure(s) [place] 4th Floor  Type of Rx Prep sent to patient: PEG  Instructions provided to patient via [facility]    Patient was informed on the following information and verbalized understanding. Screening questionnaire reviewed with patient and complete. If procedure requires anesthesia, a responsible adult needs to be present to accompany the patient home, patient cannot drive after receiving anesthesia. Appointment details are tentative, especially check-in time. Patient will receive a prep-op call 7 days prior to confirm check-in time for procedure. If applicable the patient should contact their pharmacy to verify Rx for procedure prep is ready for pick-up. Patient was advised to call the scheduling department at [phone] if pharmacy states no Rx is available. Patient was advised to call the endoscopy scheduling department if any questions or concerns arise.      [facility] Scheduling Department

## 2024-02-23 NOTE — TELEPHONE ENCOUNTER
"----- Message from Kellen Elizabeth sent at 2024 11:52 AM CST -----    ----- Message -----  From: Johanna Green NP  Sent: 2024  10:35 AM CST  To: [facility]    Procedure: Colonoscopy    Diagnosis: Abnormal finding on GI tract imaging    Procedure Timin24 or later    #If within 4 weeks selected, please adeline as high priority#    #If greater than 12 weeks, please select "5-12 weeks" and delay sending until 3 months prior to requested date#     Provider: Any CRS provider    Location: No Preference    Additional Scheduling Information: No scheduling concerns    Prep Specifications:Standard prep    Is the patient taking a GLP-1 Agonist:no    Have you attached a patient to this message: yes        "

## 2024-03-04 DIAGNOSIS — R10.9 ABDOMINAL CRAMPING: ICD-10-CM

## 2024-03-04 DIAGNOSIS — K57.92 DIVERTICULITIS: ICD-10-CM

## 2024-03-04 RX ORDER — DICYCLOMINE HYDROCHLORIDE 10 MG/1
10 CAPSULE ORAL
Qty: 360 CAPSULE | Refills: 1 | OUTPATIENT
Start: 2024-03-04 | End: 2024-03-19

## 2024-03-08 DIAGNOSIS — R10.31 ACUTE RIGHT LOWER QUADRANT PAIN: ICD-10-CM

## 2024-03-08 RX ORDER — FAMOTIDINE 20 MG/1
20 TABLET, FILM COATED ORAL 2 TIMES DAILY
Qty: 180 TABLET | Refills: 1 | OUTPATIENT
Start: 2024-03-08

## 2024-03-08 NOTE — TELEPHONE ENCOUNTER
Refill Decision Note   Sabino Rubio  is requesting a refill authorization.  Brief Assessment and Rationale for Refill:  Quick Discontinue     Medication Therapy Plan:       Medication Reconciliation Completed: No   Comments:     No Care Gaps recommended.     Note composed:2:13 PM 03/08/2024

## 2024-03-08 NOTE — TELEPHONE ENCOUNTER
No care due was identified.  [facility] Embedded Care Due Messages. Reference number: [id number].   3/08/2024 1:32:35 PM CST

## 2024-03-20 DIAGNOSIS — M50.30 DEGENERATION OF CERVICAL INTERVERTEBRAL DISC: ICD-10-CM

## 2024-03-20 NOTE — TELEPHONE ENCOUNTER
No care due was identified.  [facility] Embedded Care Due Messages. Reference number: [id number].   3/20/2024 4:55:10 PM CDT

## 2024-03-21 RX ORDER — GABAPENTIN 300 MG/1
CAPSULE ORAL
Qty: 90 CAPSULE | Refills: 0 | Status: SHIPPED | OUTPATIENT
Start: 2024-03-21 | End: 2024-06-03

## 2024-03-21 NOTE — TELEPHONE ENCOUNTER
Refill Routing Note   Medication(s) are not appropriate for processing by [facility] for the following reason(s):        Outside of protocol    [facility] action(s):  Route               Appointments  past 12m or future 3m with PCP    Date Provider   Last Visit   Visit date not found Sharon Carrillo MD   Next Visit   Visit date not found Sharon Carrillo MD   ED visits in past 90 days: 0        Note composed:8:10 AM 03/21/2024

## 2024-03-25 RX ORDER — TAMSULOSIN HYDROCHLORIDE 0.4 MG/1
1 CAPSULE ORAL
Qty: 90 CAPSULE | Refills: 3 | Status: SHIPPED | OUTPATIENT
Start: 2024-03-25

## 2024-03-29 NOTE — TELEPHONE ENCOUNTER
No care due was identified.  [facility] Embedded Care Due Messages. Reference number: [id number].   3/29/2024 12:36:25 PM CDT

## 2024-04-01 RX ORDER — ROSUVASTATIN CALCIUM 10 MG/1
10 TABLET, COATED ORAL DAILY
Qty: 90 TABLET | Refills: 3 | Status: SHIPPED | OUTPATIENT
Start: 2024-04-01

## 2024-04-12 DIAGNOSIS — M50.30 DEGENERATION OF CERVICAL INTERVERTEBRAL DISC: ICD-10-CM

## 2024-04-12 RX ORDER — MELOXICAM 15 MG/1
15 TABLET ORAL DAILY PRN
Qty: 90 TABLET | Refills: 0 | Status: SHIPPED | OUTPATIENT
Start: 2024-04-12

## 2024-04-12 NOTE — TELEPHONE ENCOUNTER
No care due was identified.  [facility] Embedded Care Due Messages. Reference number: [id number].   4/12/2024 12:32:47 AM CDT

## 2024-04-15 DIAGNOSIS — R10.31 ACUTE RIGHT LOWER QUADRANT PAIN: ICD-10-CM

## 2024-04-15 RX ORDER — FAMOTIDINE 20 MG/1
20 TABLET, FILM COATED ORAL 2 TIMES DAILY
Qty: 180 TABLET | Refills: 1 | Status: SHIPPED | OUTPATIENT
Start: 2024-04-15

## 2024-04-15 NOTE — TELEPHONE ENCOUNTER
No care due was identified.  [facility] Embedded Care Due Messages. Reference number: [id number].   4/15/2024 12:34:05 AM CDT

## 2024-04-15 NOTE — TELEPHONE ENCOUNTER
Refill Routing Note   Medication(s) are not appropriate for processing by [facility] for the following reason(s):        No active prescription written by provider    [facility] action(s):  Defer             Appointments  past 12m or future 3m with PCP    Date Provider   Last Visit   10/4/2023 Alba Baum MD   Next Visit   Visit date not found Alba Baum MD   ED visits in past 90 days: 0        Note composed:10:56 AM 04/15/2024

## 2024-04-22 ENCOUNTER — TELEPHONE (OUTPATIENT)
Dept: ADMINISTRATIVE | Facility: CLINIC | Age: 69
End: 2024-04-22
Payer: MEDICARE

## 2024-05-22 ENCOUNTER — TELEPHONE (OUTPATIENT)
Dept: ENDOSCOPY | Facility: HOSPITAL | Age: 69
End: 2024-05-22
Payer: MEDICARE

## 2024-05-25 NOTE — TELEPHONE ENCOUNTER
No care due was identified.  [facility] Embedded Care Due Messages. Reference number: [id number].   5/25/2024 7:02:08 AM CDT

## 2024-05-26 RX ORDER — FLUTICASONE PROPIONATE 50 MCG
SPRAY, SUSPENSION (ML) NASAL
Qty: 48 ML | Refills: 1 | Status: SHIPPED | OUTPATIENT
Start: 2024-05-26

## 2024-05-26 NOTE — TELEPHONE ENCOUNTER
Sabino Rubio  is requesting a refill authorization.  Brief Assessment and Rationale for Refill:  Approve     Medication Therapy Plan:         Comments:     Note composed:9:23 AM 05/26/2024

## 2024-05-27 ENCOUNTER — TELEPHONE (OUTPATIENT)
Dept: ENDOSCOPY | Facility: HOSPITAL | Age: 69
End: 2024-05-27
Payer: MEDICARE

## 2024-05-27 NOTE — TELEPHONE ENCOUNTER
Returned patient call that was left on voicemail no answer left voicemail asking patient to call us back @ [phone]

## 2024-05-29 ENCOUNTER — TELEPHONE (OUTPATIENT)
Dept: ENDOSCOPY | Facility: HOSPITAL | Age: 69
End: 2024-05-29
Payer: MEDICARE

## 2024-05-30 ENCOUNTER — HOSPITAL ENCOUNTER (OUTPATIENT)
Facility: HOSPITAL | Age: 69
Discharge: HOME | End: 2024-05-30
Attending: COLON & RECTAL SURGERY | Admitting: COLON & RECTAL SURGERY
Payer: MEDICARE

## 2024-05-30 ENCOUNTER — ANESTHESIA EVENT (OUTPATIENT)
Dept: ENDOSCOPY | Facility: HOSPITAL | Age: 69
End: 2024-05-30
Payer: MEDICARE

## 2024-05-30 ENCOUNTER — ANESTHESIA (OUTPATIENT)
Dept: ENDOSCOPY | Facility: HOSPITAL | Age: 69
End: 2024-05-30
Payer: MEDICARE

## 2024-05-30 VITALS
HEART RATE: 77 BPM | BODY MASS INDEX: 21.47 KG/M2 | TEMPERATURE: 98 F | HEIGHT: 70 IN | SYSTOLIC BLOOD PRESSURE: 108 MMHG | RESPIRATION RATE: 16 BRPM | DIASTOLIC BLOOD PRESSURE: 70 MMHG | WEIGHT: 150 LBS | OXYGEN SATURATION: 100 %

## 2024-05-30 DIAGNOSIS — K57.30 DIVERTICULAR DISEASE OF COLON: Primary | ICD-10-CM

## 2024-05-30 PROCEDURE — 37000008 HC ANESTHESIA 1ST 15 MINUTES: Performed by: COLON & RECTAL SURGERY

## 2024-05-30 PROCEDURE — E9220 PRA ENDO ANESTHESIA: HCPCS | Mod: ,,, | Performed by: NURSE ANESTHETIST, CERTIFIED REGISTERED

## 2024-05-30 PROCEDURE — 45380 COLONOSCOPY AND BIOPSY: CPT | Mod: 59 | Performed by: COLON & RECTAL SURGERY

## 2024-05-30 PROCEDURE — 88305 TISSUE EXAM BY PATHOLOGIST: CPT | Mod: 26,,, | Performed by: PATHOLOGY

## 2024-05-30 PROCEDURE — 45380 COLONOSCOPY AND BIOPSY: CPT | Mod: 59,,, | Performed by: COLON & RECTAL SURGERY

## 2024-05-30 PROCEDURE — 25000003 PHARM REV CODE 250: Performed by: COLON & RECTAL SURGERY

## 2024-05-30 PROCEDURE — 27201089 HC SNARE, DISP (ANY): Performed by: COLON & RECTAL SURGERY

## 2024-05-30 PROCEDURE — 45385 COLONOSCOPY W/LESION REMOVAL: CPT | Performed by: COLON & RECTAL SURGERY

## 2024-05-30 PROCEDURE — 25000003 PHARM REV CODE 250: Performed by: NURSE ANESTHETIST, CERTIFIED REGISTERED

## 2024-05-30 PROCEDURE — 45385 COLONOSCOPY W/LESION REMOVAL: CPT | Mod: ,,, | Performed by: COLON & RECTAL SURGERY

## 2024-05-30 PROCEDURE — 88305 TISSUE EXAM BY PATHOLOGIST: CPT | Performed by: PATHOLOGY

## 2024-05-30 PROCEDURE — 27201012 HC FORCEPS, HOT/COLD, DISP: Performed by: COLON & RECTAL SURGERY

## 2024-05-30 PROCEDURE — 37000009 HC ANESTHESIA EA ADD 15 MINS: Performed by: COLON & RECTAL SURGERY

## 2024-05-30 PROCEDURE — 63600175 PHARM REV CODE 636 W HCPCS: Performed by: NURSE ANESTHETIST, CERTIFIED REGISTERED

## 2024-05-30 RX ORDER — LIDOCAINE HYDROCHLORIDE 20 MG/ML
INJECTION INTRAVENOUS
Status: DISCONTINUED | OUTPATIENT
Start: 2024-05-30 | End: 2024-05-30

## 2024-05-30 RX ORDER — SODIUM CHLORIDE 9 MG/ML
INJECTION, SOLUTION INTRAVENOUS CONTINUOUS
Status: DISCONTINUED | OUTPATIENT
Start: 2024-05-30 | End: 2024-05-30 | Stop reason: HOSPADM

## 2024-05-30 RX ORDER — PROPOFOL 10 MG/ML
VIAL (ML) INTRAVENOUS
Status: DISCONTINUED | OUTPATIENT
Start: 2024-05-30 | End: 2024-05-30

## 2024-05-30 RX ORDER — PROPOFOL 10 MG/ML
VIAL (ML) INTRAVENOUS CONTINUOUS PRN
Status: DISCONTINUED | OUTPATIENT
Start: 2024-05-30 | End: 2024-05-30

## 2024-05-30 RX ADMIN — PROPOFOL 20 MG: 10 INJECTION, EMULSION INTRAVENOUS at 08:05

## 2024-05-30 RX ADMIN — PROPOFOL 10 MG: 10 INJECTION, EMULSION INTRAVENOUS at 07:05

## 2024-05-30 RX ADMIN — SODIUM CHLORIDE: 0.9 INJECTION, SOLUTION INTRAVENOUS at 07:05

## 2024-05-30 RX ADMIN — PROPOFOL 150 MCG/KG/MIN: 10 INJECTION, EMULSION INTRAVENOUS at 07:05

## 2024-05-30 RX ADMIN — LIDOCAINE HYDROCHLORIDE 50 MG: 20 INJECTION INTRAVENOUS at 07:05

## 2024-05-30 RX ADMIN — PROPOFOL 70 MG: 10 INJECTION, EMULSION INTRAVENOUS at 07:05

## 2024-05-30 NOTE — ANESTHESIA PREPROCEDURE EVALUATION
05/30/2024  Sabino Rubio is a 68 y.o., male.    Active Problem List with Overview Notes    Diagnosis Date Noted    Benign prostatic hyperplasia with nocturia 10/04/2023    Calcified granuloma of lung 10/04/2023    Spondylosis without myelopathy 09/11/2023    DDD (degenerative disc disease), lumbosacral 10/26/2022    Lumbosacral radiculopathy 10/26/2022    Coronary artery disease: CT score 8 2022 02/01/2022    IFG (impaired fasting glucose) 01/05/2022    Diverticulosis: see colonoscopy 2019 01/29/2020    Spermatocele 11/08/2019    Allergic rhinitis 04/24/2019    Arthritis of carpometacarpal (CMC) joint of right thumb 02/25/2019    Other osteoarthritis of spine 01/14/2019    Kidney cyst, acquired: R 1.2 cm 2015; stable 1/19, 2022; enlarged 10/23 09/25/2017    Tubulovillous adenoma: 2013; adenomas 10/22 repeat colonoscopy 2025 09/14/2015    Cervical spondylosis without myelopathy 01/05/2015    Brachial neuritis or radiculitis NOS 01/05/2015     Dx updated per 2019 IMO Load      Restless leg syndrome 12/21/2012     Past Surgical History:   Procedure Laterality Date    APPLICATION OF BONE GRAFT TO FINGER Left 11/8/2021    Procedure: APPLICATION INTEGRA GRAFT, TO FINGER;  Surgeon: Alba Penn MD;  Location: [facility];  Service: Orthopedics;  Laterality: Left;    CARPAL TUNNEL RELEASE      COLONOSCOPY N/A 5/22/2019    Procedure: COLONOSCOPY;  Surgeon: Juancarlos Foley MD;  Location: [address]);  Service: Endoscopy;  Laterality: N/A;    COLONOSCOPY N/A 10/12/2022    Procedure: COLONOSCOPY;  Surgeon: Cherelle Wang MD;  Location: [facility] ENDO (4TH FLR);  Service: Endoscopy;  Laterality: N/A;  vacc-inst portal-am prep-clears 4 hrs prior-tb    EXCISION OF GANGLION OF WRIST Right 6/28/2019    Procedure: EXCISION, GANGLION CYST, WRIST right;  Surgeon: Alba Penn MD;   Location: [facility] OR 1ST FLR;  Service: Orthopedics;  Laterality: Right;  regional MAC, stretcher, supine, hand pan 1 and 2,MINI C-arm, call Arthrex    INJECTION OF ANESTHETIC AGENT AROUND NERVE Bilateral 1/27/2022    Procedure: Block, Nerve MEDIAL BRANCH BLOCK BILATERAL L3,4,5  DIRECT REFERRAL 1 OF 2;  Surgeon: Kaiser Braxton MD;  Location: [facility] PAIN MGT;  Service: Pain Management;  Laterality: Bilateral;    INJECTION OF ANESTHETIC AGENT AROUND NERVE Bilateral 2/10/2022    Procedure: Block, Nerve MEDIAL BRANCH BLOCK BILATERAL L3.4.5  DIRECT REFERRAL 2 OF 2;  Surgeon: Kaiser Braxton MD;  Location: [facility] PAIN MGT;  Service: Pain Management;  Laterality: Bilateral;    INJECTION OF FACET JOINT Bilateral 6/6/2019    Procedure: INJECTION, FACET JOINT INJECTION (LUMBAR BLOCK) BILATERAL L4-L5 AND L5-S1 FACET INJECTIONS;  Surgeon: Kaiser Braxton MD;  Location: [facility] PAIN MGT;  Service: Pain Management;  Laterality: Bilateral;  NEEDS CONSENT    INJECTION, SACROILIAC JOINT Right 12/8/2022    Procedure: INJECTION,SACROILIAC JOINT RIGHT  CONTRAST;  Surgeon: Kaiser Braxton MD;  Location: [facility] PAIN MGT;  Service: Pain Management;  Laterality: Right;    INTERPOSITION ARTHROPLASTY OF CARPOMETACARPAL JOINTS Right 6/28/2019    Procedure: INTERPOSITION ARTHROPLASTY, CMC JOINT right;  Surgeon: Abla Penn MD;  Location: [facility] OR 1ST FLR;  Service: Orthopedics;  Laterality: Right;  regional MAC, stretcher, supine, hand pan 1 and 2,MINI C-arm, call Arthrex    IRRIGATION AND DEBRIDEMENT OF UPPER EXTREMITY Left 11/8/2021    Procedure: IRRIGATION AND DEBRIDEMENT, UPPER EXTREMITY, left index finger;  Surgeon: Alba Penn MD;  Location: [facility] OR;  Service: Orthopedics;  Laterality: Left;    RADIOFREQUENCY ABLATION Right 5/16/2022    Procedure: Radiofrequency Ablation RIGHT L3,4,5;  Surgeon: Kaiser Braxton MD;  Location: [facility] PAIN MGT;  Service: Pain Management;  Laterality: Right;    RADIOFREQUENCY ABLATION Left 6/2/2022     "Procedure: Radiofrequency Ablation LEFT L3,4,5;  Surgeon: Kaiser Braxton MD;  Location: [facility] PAIN MGT;  Service: Pain Management;  Laterality: Left;    RADIOFREQUENCY ABLATION Right 7/17/2023    Procedure: RADIOFREQUENCY ABLATION, RIGHT L3,L4,L5 MEDIAL BRANCH ONE OF TWO;  Surgeon: Kaiser Braxton MD;  Location: [facility] PAIN MGT;  Service: Pain Management;  Laterality: Right;    RADIOFREQUENCY ABLATION Left 9/11/2023    Procedure: RADIOFREQUENCY ABLATION, LEFT L3,L4,L5 MEDIAL BRANCH TWO OF TWO;  Surgeon: Kaiser Braxton MD;  Location: [facility] PAIN MGT;  Service: Pain Management;  Laterality: Left;    REPAIR OF NAIL BED Left 11/8/2021    Procedure: REPAIR, NAIL BED, left index;  Surgeon: Alba Penn MD;  Location: [facility] OR;  Service: Orthopedics;  Laterality: Left;    SPERMATOCELECTOMY Right 11/8/2019    Procedure: EXCISION, SPERMATOCELE;  Surgeon: Sheldon Araya Jr., MD;  [facility];  Service: Urology;  Laterality: Right;  1hr    TRANSFORAMINAL EPIDURAL INJECTION OF STEROID Bilateral 10/27/2022    Procedure: INJECTION, STEROID, EPIDURAL, TRANSFORAMINAL APPROACH, BILATERAL L5-S1 CONTRAST;  Surgeon: Kaiser Braxton MD;  Location: [facility] PAIN MGT;  Service: Pain Management;  Laterality: Bilateral;     Results for orders placed or performed during the hospital encounter of 11/15/23   EKG 12-LEAD - Muse    Collection Time: 11/15/23  4:26 PM    Narrative    Test Reason : R07.89,    Vent. Rate : 071 BPM     Atrial Rate : 071 BPM     P-R Int : 158 ms          QRS Dur : 092 ms      QT Int : 376 ms       P-R-T Axes : 082 063 067 degrees     QTc Int : 408 ms    Normal sinus rhythm  Normal ECG  When compared with ECG of 05-JAN-2022 10:43,  No significant change was found  Confirmed by Scott BARRON MD ([id number]) on 11/16/2023 7:51:01 AM    Referred By: ALBA FULLER           Confirmed By:Scott BARRON MD     Echocardiogram exercise stress test    Height: 5' 9" (1.753 m)   Weight: 69.9 kg (154 lb)   Blood Pressure: Not recorded "    Date of Study: 11/15/23   Ordering Provider: Alba Baum MD   Clinical Indications: Atypical chest pain [R07.89 (ICD-10-CM)]       [facility]  Performing Staff   Cardiology: Sanjuana Daniels MD    Tech: Jayna Cantrell   Support Staff: Kevin Cooper        Reason for Exam  Priority: Routine  Dx: Atypical chest pain [R07.89 (ICD-10-CM)]     View Images Vital Vitrea     Show images for Stress Echo Which stress agent will be used? Treadmill Exercise; Color Flow Doppler? No  Summary         Stress Protocol: The patient exercised for 9 minutes 31 seconds on a high ramp protocol, corresponding to a functional capacity of 16 METS, achieving a peak heart rate of 181 bpm, which is 118 % of the age predicted maximum heart rate. Their exercise capacity was above average. The patient reported no symptoms during the stress test. The test was stopped because the patient experienced leg pain.    ECG Conclusion: The ECG portion of the study is negative for ischemia.    Left Ventricle: The left ventricle is normal in size. Normal wall thickness. Normal wall motion. There is normal systolic function. Ejection fraction by visual approximation is 60%. There is normal diastolic function.    Right Ventricle: Normal right ventricular cavity size. Wall thickness is normal. Right ventricle wall motion  is normal. Systolic function is normal.    Post-stress Impression: The study is negative with no echocardiographic evidence of stress induced ischemia.     Pre-op Assessment    I have reviewed the Patient Summary Reports.    I have reviewed the NPO Status.   I have reviewed the Medications.     Review of Systems  Anesthesia Hx:  No problems with previous Anesthesia                Social:  Non-Smoker       Hematology/Oncology:  Hematology Normal   Oncology Normal                                   EENT/Dental:  EENT/Dental Normal           Cardiovascular:  Cardiovascular Normal       CAD              ECG has been  reviewed.                          Pulmonary:  Pulmonary Normal                       Renal/:    BPH              Hepatic/GI:     GERD             Musculoskeletal:  Arthritis               Neurological:    Neuromuscular Disease,                                   Endocrine:  Endocrine Normal            Dermatological:  Skin Normal    Psych:  Psychiatric Normal                  Physical Exam  General: Well nourished, Cooperative, Alert and Oriented    Airway:  Mallampati: II   Mouth Opening: Normal  TM Distance: Normal  Tongue: Normal  Neck ROM: Normal ROM    Dental:  Intact    Chest/Lungs:  Normal Respiratory Rate    Anesthesia Plan  Type of Anesthesia, risks & benefits discussed:    Anesthesia Type: Gen Natural Airway  Intra-op Monitoring Plan: Standard ASA Monitors  Post Op Pain Control Plan: multimodal analgesia  Induction:  IV  Informed Consent: Informed consent signed with the Patient and all parties understand the risks and agree with anesthesia plan.  All questions answered.   ASA Score: 2  Day of Surgery Review of History & Physical: H&P Update referred to the surgeon/provider.    Ready For Surgery From Anesthesia Perspective.   .

## 2024-05-30 NOTE — ANESTHESIA POSTPROCEDURE EVALUATION
Anesthesia Post Evaluation    Patient: Sabino Rubio    Procedure(s) Performed: Procedure(s) (LRB):  COLONOSCOPY (N/A)    Final Anesthesia Type: general      Patient location during evaluation: GI PACU  Patient participation: Yes- Able to Participate  Level of consciousness: awake and alert and oriented  Post-procedure vital signs: reviewed and stable  Pain management: adequate  Airway patency: patent    PONV status at discharge: No PONV  Anesthetic complications: no      Cardiovascular status: hemodynamically stable  Respiratory status: unassisted, spontaneous ventilation and room air  Hydration status: euvolemic  Follow-up not needed.              Vitals Value Taken Time   /70 05/30/24 0903   Temp 36.6 °C (97.9 °F) 05/30/24 0829   Pulse 77 05/30/24 0903   Resp 16 05/30/24 0903   SpO2 100 % 05/30/24 0903         Event Time   Out of Recovery 09:08:39         Pain/Pete Score: Pete Score: 10 (5/30/2024  8:59 AM)

## 2024-05-30 NOTE — TRANSFER OF CARE
"Anesthesia Transfer of Care Note    Patient: Sabino Rubio    Procedure(s) Performed: Procedure(s) (LRB):  COLONOSCOPY (N/A)    Patient location: GI    Anesthesia Type: general    Transport from OR: Transported from OR on room air with adequate spontaneous ventilation    Post pain: adequate analgesia    Post assessment: no apparent anesthetic complications and tolerated procedure well    Post vital signs: stable    Level of consciousness: awake, alert and oriented    Nausea/Vomiting: no nausea/vomiting    Complications: none    Transfer of care protocol was followed    Last vitals: Visit Vitals  /70 (BP Location: Left arm, Patient Position: Lying)   Pulse 70   Temp 36.6 °C (97.9 °F) (Tympanic)   Resp 14   Ht 5' 10" (1.778 m)   Wt 68 kg (150 lb)   SpO2 98%   BMI 21.52 kg/m²     "

## 2024-05-30 NOTE — H&P
Procedure : Colonoscopy    Indication(s):  Follow-up diverticulitis, hx of colon polyps    Last colonoscopy: 10/2022  - Two 4 to 8 mm polyps in the descending colon, removed with a cold snare. TA  - Three 1 to 3 mm polyps in the sigmoid colon, removed with a cold snare. TA  - Diverticulosis in the sigmoid colon and in the descending colon.   - The examined portion of the ileum was normal.   - Non-bleeding external and internal hemorrhoids.     Review of patient's allergies indicates:  No Known Allergies    Past Medical History:   Diagnosis Date    Allergy     Arthritis     Family history of malignant neoplasm of gastrointestinal tract mat.gf    Family history of prostate cancer: 1/2 brother 09/25/2017    GERD (gastroesophageal reflux disease)     Hyperlipidemia     Kidney cyst, acquired: R 1.2 cm 2015 09/25/2017    Restless leg syndrome     Tubulovillous adenoma 2013 due 2016 09/14/2015       Prior to Admission medications    Medication Sig Start Date End Date Taking? Authorizing Provider   famotidine (PEPCID) 20 MG tablet TAKE 1 TABLET BY MOUTH TWICE A DAY 4/15/24  Yes Alba Baum MD   gabapentin (NEURONTIN) 300 MG capsule TAKE 1 CAPSULE BY MOUTH THREE TIMES A DAY 3/21/24  Yes Shaunna Sanders NP   meloxicam (MOBIC) 15 MG tablet TAKE 1 TABLET (15 MG TOTAL) BY MOUTH DAILY AS NEEDED FOR PAIN (TAKE WITH FOOD OR A MEAL). 4/12/24  Yes Alba Baum MD   rosuvastatin (CRESTOR) 10 MG tablet Take 1 tablet (10 mg total) by mouth once daily. 4/1/24  Yes Alba Baum MD   tamsulosin (FLOMAX) 0.4 mg Cap TAKE 1 CAPSULE BY MOUTH EVERY DAY 3/25/24  Yes Sheldon Araya Jr., MD   aspirin (ECOTRIN) 81 MG EC tablet Take 1 tablet (81 mg total) by mouth once daily. 12/6/22 12/6/23  Alba Baum MD   ciprofloxacin HCl (CIPRO) 500 MG tablet Take 1 tablet (500 mg total) by mouth 2 (two) times daily. 2/19/24   Alba Baum MD   cyclobenzaprine (FLEXERIL) 10 MG tablet Take 1 tablet (10 mg total) by mouth 3  (three) times daily as needed for Muscle spasms. for ten days 12/22/22   Yumi Naik, NP   fluticasone propionate (FLONASE) 50 mcg/actuation nasal spray SPRAY 1 SPRAY (50 MCG TOTAL) INTO INTO EACH NOSTRIL TWICE A DAY 5/26/24   Alba Baum MD   metroNIDAZOLE (FLAGYL) 500 MG tablet Take 1 tablet (500 mg total) by mouth every 8 (eight) hours. No alcohol with this 2/19/24   Alba Baum MD   mupirocin (BACTROBAN) 2 % ointment Apply topically 2 (two) times daily. 7/26/23   Nava Albrecht PA-C   sildenafiL (VIAGRA) 100 MG tablet Take 100 mg by mouth daily as needed for Erectile Dysfunction.    Provider, Historical       Sedation Problems: NO    Family History   Problem Relation Name Age of Onset    Arthritis Mother          probable R.A.    Cancer Father Smoker         esophageal ca and brain tumor    Esophageal cancer Father Smoker     Melanoma Father Smoker     Irritable bowel syndrome Sister      Arthritis Sister      Arthritis Sister          rheumatoid    Cancer Sister          skin    Cancer Brother 1/2         pancreatic cancer    No Known Problems Brother      No Known Problems Brother      No Known Problems Son Jimmie     No Known Problems Son Ra     Cancer Maternal Grandfather          colon    Colon cancer Maternal Grandfather      Diabetes Neg Hx      Heart disease Neg Hx      Cirrhosis Neg Hx      Celiac disease Neg Hx      Crohn's disease Neg Hx      Ulcerative colitis Neg Hx      Stomach cancer Neg Hx      Rectal cancer Neg Hx      Liver cancer Neg Hx      Psoriasis Neg Hx      Lupus Neg Hx         Fam Hx of Sedation Problems: NO    Social History     Socioeconomic History    Marital status: Single    Number of children: 2   Tobacco Use    Smoking status: Never    Smokeless tobacco: Never    Tobacco comments:     The patient works in the [employer] industry.  He is very active at work but does not engage in outside activities.   Substance and Sexual Activity    Alcohol use: Yes      Alcohol/week: 0.0 standard drinks of alcohol     Comment: 2-3 days weekly, up to 2 beers    Drug use: No    Sexual activity: Yes     Partners: Female     Social Determinants of Health     Transportation Needs: No Transportation Needs (5/12/2022)    PRAPARE - Transportation     Lack of Transportation (Medical): No     Lack of Transportation (Non-Medical): No   Physical Activity: Sufficiently Active (5/12/2022)    Exercise Vital Sign     Days of Exercise per Week: 5 days     Minutes of Exercise per Session: 30 min   Stress: No Stress Concern Present (1/29/2020)    [facility] Occupational [employer] - Occupational Stress Questionnaire     Feeling of Stress : Only a little   Housing Stability: Low Risk  (5/12/2022)    Housing Stability Vital Sign     Unable to Pay for Housing in the Last Year: No     Number of Places Lived in the Last Year: 1     Unstable Housing in the Last Year: No       Review of Systems -     Respiratory ROS: no cough, shortness of breath, or wheezing  Cardiovascular ROS: no chest pain or dyspnea on exertion  Gastrointestinal ROS: no abdominal pain, change in bowel habits, or black or bloody stools  Musculoskeletal ROS: negative  Neurological ROS: no TIA or stroke symptoms        Physical Exam:  General: no distress  Head: normocephalic  Airway:  normal oropharynx, airway normal  Neck: supple, symmetrical, trachea midline  Lungs:  normal respiratory effort  Heart: regular rate and rhythm  Abdomen: soft, non-tender non-distented; bowel sounds normal; no masses,  no organomegaly  Extremities: no cyanosis or edema, or clubbing       Deep Sedation: Mallampati Score per anesthesia     SedationPlan :Moderate     ASA : II    Patient is medically cleared for anesthesia.    Anesthesia/Surgery risks, benefits and alternative options discussed and understood by patient/family.

## 2024-05-30 NOTE — PROVATION PATIENT INSTRUCTIONS
Discharge Summary/Instructions after an Endoscopic Procedure  Patient Name: Sabino Rubio  Patient MRN: [id number]  Patient YOB: 1955  Thursday, May 30, 2024  Nick Kramer MD  Dear patient,  As a result of recent federal legislation (The Federal Cures Act), you may   receive lab or pathology results from your procedure in your [facility]   account before your physician is able to contact you. Your physician or   their representative will relay the results to you with their   recommendations at their soonest availability.  Thank you,  RESTRICTIONS:  During your procedure today, you received medications for sedation.  These   medications may affect your judgment, balance and coordination.  Therefore,   for 24 hours, you have the following restrictions:   - DO NOT drive a car, operate machinery, make legal/financial decisions,   sign important papers or drink alcohol.    ACTIVITY:  Today: no heavy lifting, straining or running due to procedural   sedation/anesthesia.  The following day: return to full activity including work.  DIET:  Eat and drink normally unless instructed otherwise.     TREATMENT FOR COMMON SIDE EFFECTS:  - Mild abdominal pain, nausea, belching, bloating or excessive gas:  rest,   eat lightly and use a heating pad.  - Sore Throat: treat with throat lozenges and/or gargle with warm salt   water.  - Because air was used during the procedure, expelling large amounts of air   from your rectum or belching is normal.  - If a bowel prep was taken, you may not have a bowel movement for 1-3 days.    This is normal.  SYMPTOMS TO WATCH FOR AND REPORT TO YOUR PHYSICIAN:  1. Abdominal pain or bloating, other than gas cramps.  2. Chest pain.  3. Back pain.  4. Signs of infection such as: chills or fever occurring within 24 hours   after the procedure.  5. Rectal bleeding, which would show as bright red, maroon, or black stools.   (A tablespoon of blood from the rectum is not serious, especially if    hemorrhoids are present.)  6. Vomiting.  7. Weakness or dizziness.  GO DIRECTLY TO THE NEAREST EMERGENCY ROOM IF YOU HAVE ANY OF THE FOLLOWING:      Difficulty breathing              Chills and/or fever over 101 F   Persistent vomiting and/or vomiting blood   Severe abdominal pain   Severe chest pain   Black, tarry stools   Bleeding- more than one tablespoon   Any other symptom or condition that you feel may need urgent attention  Your doctor recommends these additional instructions:  If any biopsies were taken, your doctors clinic will contact you in 1 to 2   weeks with any results.  - Discharge patient to home.   - High fiber diet.   - Continue present medications.   - Await pathology results.   - Repeat colonoscopy date to be determined after pending pathology results   are reviewed for surveillance based on pathology results.   - Patient has a contact number available for emergencies.  The signs and   symptoms of potential delayed complications were discussed with the   patient.  Return to normal activities tomorrow.  Written discharge   instructions were provided to the patient.  For questions, problems or results please call your physician - Nick Kramer MD at Work:  [phone].  [facility], EMERGENCY ROOM PHONE NUMBER: [phone]  IF A COMPLICATION OR EMERGENCY SITUATION ARISES AND YOU ARE UNABLE TO REACH   YOUR PHYSICIAN - GO DIRECTLY TO THE EMERGENCY ROOM.  Nick Kramer MD  5/30/2024 8:34:01 AM  This report has been verified and signed electronically.  Dear patient,  As a result of recent federal legislation (The Federal Cures Act), you may   receive lab or pathology results from your procedure in your [facility]   account before your physician is able to contact you. Your physician or   their representative will relay the results to you with their   recommendations at their soonest availability.  Thank you,  PROVATION

## 2024-06-02 DIAGNOSIS — M50.30 DEGENERATION OF CERVICAL INTERVERTEBRAL DISC: ICD-10-CM

## 2024-06-03 LAB
FINAL PATHOLOGIC DIAGNOSIS: NORMAL
GROSS: NORMAL
Lab: NORMAL

## 2024-06-03 RX ORDER — GABAPENTIN 300 MG/1
CAPSULE ORAL
Qty: 90 CAPSULE | Refills: 0 | Status: SHIPPED | OUTPATIENT
Start: 2024-06-03

## 2024-06-10 ENCOUNTER — PATIENT MESSAGE (OUTPATIENT)
Dept: INTERNAL MEDICINE | Facility: CLINIC | Age: 69
End: 2024-06-10
Payer: MEDICARE

## 2024-06-18 ENCOUNTER — TELEPHONE (OUTPATIENT)
Dept: SURGERY | Facility: CLINIC | Age: 69
End: 2024-06-18
Payer: MEDICARE

## 2024-06-18 NOTE — TELEPHONE ENCOUNTER
----- Message from Nick Kramer MD sent at 6/17/2024  5:47 PM CDT -----  Pathology reveals hyperplastic polyp(s).  However, patient has a personal history of adenomatous polyps on prior colonoscopy(ies).  Recommend follow-up colonoscopy in 5 years.  Please let patient know.  Thx.

## 2024-07-14 DIAGNOSIS — M50.30 DEGENERATION OF CERVICAL INTERVERTEBRAL DISC: ICD-10-CM

## 2024-07-14 NOTE — TELEPHONE ENCOUNTER
Care Due:                  Date            Visit Type   Department     Provider  --------------------------------------------------------------------------------                                SAME DAY -                              ESTABLISHED   [facility] INTERNAL  Last Visit: 02-      PATIENT      MEDICINE       Meli Rodas  Next Visit: None Scheduled  None         None Found                                                            Last  Test          Frequency    Reason                     Performed    Due Date  --------------------------------------------------------------------------------    CMP.........  12 months..  meloxicam, rosuvastatin..  08- 08-    Lipid Panel.  12 months..  rosuvastatin.............  08- 08-    [employer] Embedded Care Due Messages. Reference number: [id number].   7/14/2024 6:59:16 AM CDT

## 2024-07-15 RX ORDER — MELOXICAM 15 MG/1
15 TABLET ORAL DAILY PRN
Qty: 90 TABLET | Refills: 0 | Status: SHIPPED | OUTPATIENT
Start: 2024-07-15

## 2024-07-24 PROBLEM — M47.26 OSTEOARTHRITIS OF SPINE WITH RADICULOPATHY, LUMBAR REGION: Status: ACTIVE | Noted: 2019-01-14

## 2024-08-26 DIAGNOSIS — M50.30 DEGENERATION OF CERVICAL INTERVERTEBRAL DISC: ICD-10-CM

## 2024-08-26 NOTE — TELEPHONE ENCOUNTER
No care due was identified.  [facility] Embedded Care Due Messages. Reference number: [id number].   8/26/2024 6:42:37 PM CDT

## 2024-08-27 ENCOUNTER — TELEPHONE (OUTPATIENT)
Dept: INTERNAL MEDICINE | Facility: CLINIC | Age: 69
End: 2024-08-27
Payer: MEDICARE

## 2024-08-27 DIAGNOSIS — R53.83 FATIGUE, UNSPECIFIED TYPE: ICD-10-CM

## 2024-08-27 DIAGNOSIS — E78.2 MIXED HYPERLIPIDEMIA: ICD-10-CM

## 2024-08-27 DIAGNOSIS — R73.01 IFG (IMPAIRED FASTING GLUCOSE): Primary | ICD-10-CM

## 2024-08-27 RX ORDER — ASPIRIN 81 MG/1
81 TABLET ORAL DAILY
Qty: 30 TABLET | Refills: 12
Start: 2024-08-27 | End: 2025-08-27

## 2024-08-27 RX ORDER — GABAPENTIN 300 MG/1
CAPSULE ORAL
Qty: 90 CAPSULE | Refills: 0 | Status: SHIPPED | OUTPATIENT
Start: 2024-08-27

## 2024-08-27 NOTE — TELEPHONE ENCOUNTER
He is due for his annual with me by October    Please call to schedule    Labs prior orders in thanks- the labs that I ordered AND the PSA from Dr Araya

## 2024-08-28 DIAGNOSIS — M79.18 MYOFASCIAL PAIN: ICD-10-CM

## 2024-08-28 RX ORDER — CYCLOBENZAPRINE HCL 10 MG
10 TABLET ORAL 3 TIMES DAILY PRN
Qty: 30 TABLET | Refills: 1 | Status: CANCELLED | OUTPATIENT
Start: 2024-08-28

## 2024-09-10 ENCOUNTER — TELEPHONE (OUTPATIENT)
Dept: PAIN MEDICINE | Facility: CLINIC | Age: 69
End: 2024-09-10
Payer: MEDICARE

## 2024-09-10 NOTE — TELEPHONE ENCOUNTER
Staff spoke with patient and informed him that his provider will not be in office. Staff successfully rescheduled patient appointment

## 2024-09-10 NOTE — TELEPHONE ENCOUNTER
----- Message from Alee Jansen sent at 9/10/2024  9:01 AM CDT -----  Contact: Self [phone]  Patient is returning a phone call.    Who left a message for the patient: office per pt    Does patient know what this is regarding:  tomorrows appt?     Would you like a call back, or a response through your [facility] portal?:   call back    Comments:

## 2024-09-13 ENCOUNTER — OFFICE VISIT (OUTPATIENT)
Dept: PAIN MEDICINE | Facility: CLINIC | Age: 69
End: 2024-09-13
Payer: MEDICARE

## 2024-09-13 VITALS
BODY MASS INDEX: 22.11 KG/M2 | OXYGEN SATURATION: 99 % | HEART RATE: 77 BPM | WEIGHT: 154.13 LBS | DIASTOLIC BLOOD PRESSURE: 74 MMHG | TEMPERATURE: 97 F | SYSTOLIC BLOOD PRESSURE: 121 MMHG

## 2024-09-13 DIAGNOSIS — M79.18 MYOFASCIAL PAIN: ICD-10-CM

## 2024-09-13 DIAGNOSIS — M47.816 LUMBAR SPONDYLOSIS: Primary | ICD-10-CM

## 2024-09-13 DIAGNOSIS — M53.3 SACROILIAC JOINT PAIN: ICD-10-CM

## 2024-09-13 DIAGNOSIS — M51.36 DDD (DEGENERATIVE DISC DISEASE), LUMBAR: ICD-10-CM

## 2024-09-13 DIAGNOSIS — M48.061 SPINAL STENOSIS OF LUMBAR REGION WITHOUT NEUROGENIC CLAUDICATION: ICD-10-CM

## 2024-09-13 PROCEDURE — 99999 PR PBB SHADOW E&M-EST. PATIENT-LVL III: CPT | Mod: PBBFAC,,, | Performed by: NURSE PRACTITIONER

## 2024-09-13 RX ORDER — CYCLOBENZAPRINE HCL 10 MG
10 TABLET ORAL 3 TIMES DAILY PRN
Qty: 30 TABLET | Refills: 1 | Status: SHIPPED | OUTPATIENT
Start: 2024-09-13

## 2024-09-13 NOTE — PROGRESS NOTES
Chronic patient Established Note (Follow up visit)      SUBJECTIVE:    Interval History 9/13/2024:  The patient returns to clinic today for follow up of back pain. He has not been seen in a year. He reports increased low back pain over the last 3 weeks. He also reports buttock pain. He does have aching pain into the posterior thighs. He endorses morning stiffness. His pain is worse with moving from sitting to standing. He is taking Gabapentin and Mobic. He ran out of Flexeril which is usually helpful. He is physically active, performing a home exercise routine. He denies any other health changes. His pain today is 5/10.    Interval History 5/23/2023:  The patient returns to clinic today for follow up of low back pain. He reports worsened low back pain. He reports low back and buttock pain. He denies any radicular leg pain. His pain is worse with moving from sitting to standing. He also endorses morning stiffness. He is taking Gabapentin, Flexeril, and Mobic. He does perform a home exercise routine. He denies any other health changes. His pain today is 7/10.    Interval History 12/22/2022:  The patient returns to clinic today for follow up of low back pain. He is s/p bilateral SI joint injections on 12/8/2022. He reports 50% relief of his pain. He continues to report right sided low back and buttock pain. He denies any radicular leg pain. His pain is worse with driving, prolonged sitting, and moving from sitting to standing. He continues to perform a home exercise routine. He is taking Gabapentin, Flexeril, and Mobic. He denies any other health changes. His pain today is 2/10.    Interval History 11/11/2022:  The patient returns to clinic today for follow up of low back pain. He is s/p bilateral L5/S1 TF PERCY on 10/27/2022. He reports limited relief of his pain. He continues to report low back and right hip pain. He reports intermittent radiating pain into his right posterior leg to his knee. He reports intermittent  numbness to his legs. His pain is worse with moving from sitting to standing. He also reports increased pain with prolonged standing. He continues to perform a home exercise routine. He continues to take Gabapentin, Flexeril, and Mobic. He denies any other health changes. His pain today is 2/10.    Interval History 10/10/2022:  The patient returns to clinic today for follow up of low back pain. He is here today for imaging review. He continues to report low back pain that radiates into his right buttock and posterior thigh. His pain is worse with moving from sitting to standing, prolonged standing, and activity. He reports intermittent weakness into his legs with moving from sitting to standing. He has completed physical therapy. He continues to perform a home exercise routine. He continues to take Gabapentin, Flexeril, and Mobic. He denies any bowel or bladder incontinence. His pain today is 2/10.      Interval History 9/13/2022:  Sabino Rubio presents to the clinic for a follow-up appointment for low back pain. He reports neuroma injection at last office visit provided short term relief. He continues reports low back pain that radiates into his right buttock. He denies any radicular leg pain. His pain is worse with moving from sitting to standing, prolonged sitting, and activity. He does endorse morning stiffness. He is currently participating in physical therapy with some benefit. He is taking Gabapentin, Flexeril, and Mobic. He denies any other health changes. His pain today is 5/10.    Interval History 8/2/2022:   Patient returns to clinic today reporting 2/10 pain at rest which increases to 4-6/10 when standing. Pain is primarily low back in nature with radiation to the right buttock. He reports that pain is exacerbated by prolonged immobility specifically when awakening from sleep or sitting for long periods of time. At the last visit, he was prescribed Flexeril, but has noted only minimal benefit from  it. He has been working with PT and plans on starting to go to the gym. Prior RFA's have provided some relief, he reports his first left-sided procedure produced significant relief whereas the second right-sided procedure only produced moderate relief.    Interval History 6/16/2022:  Patient presents today for evaluationfollow-up of their low back pain s/p Bilateral  Lumbar RFA . Per pateint his axial LBP is almost 50-60% better after the RFA but he is still suffering from some pain in Left lower back ,mostly stiffness and spasm .no radicular component  Is associated with LBP. No B/B incontinence/no numbness /motor or sensory deficit.     Pain Disability Index Review:      9/13/2024     8:11 AM 12/22/2022     8:22 AM 11/11/2022     1:54 PM   Last 3 PDI Scores   Pain Disability Index (PDI) 35 32 21       Pain Medications:  Gabapentin  Mobic  Flexeril    Opioid Contract: no     report:  Not applicable    Pain Procedures:   6/6/2019- Bilateral L4/5 and L5/S1 facet joint injection  1/27/2022- Bilateral L3,4,5 MBB  2/10/2022- Bilateral L3,4,5 MBB  5/16/2022- Right L3,4,5 RFA- 60% relief  6/2/2022- Left L3,4,5 RFA- 60% relief  10/27/2022- Bilateral L5/S1 TF PERCY  12/8/2022- Bilateral SI joint injections    Physical Therapy/Home Exercise: yes    Imaging:   MRI Lumbar Spine 10/5/2022:  COMPARISON:  Radiograph 06/16/2022, MRI 01/09/2019.     FINDINGS:  Lumbar spine alignment demonstrates levoscoliosis with straightening of the normal lordosis.  Vertebral body heights are well maintained without evidence for acute fracture.  Schmorl's node extends into the superior endplate of the L3 vertebral body, unchanged when compared to the previous MRI.  Benign osseous hemangioma at the L4 vertebral body.  No marrow signal abnormality to suggest an infiltrative process.     There is advanced degenerative disc space narrowing and desiccation throughout the visualized thoracolumbar spine, progressed when compared to the previous MRI  most notably at the L1-L2 level noting severe associated endplate edema.     Distal spinal cord demonstrates normal contour and signal intensity.  Cauda equina appears normal without findings to suggest arachnoiditis.  Conus medullaris terminates at L1.     Right renal cortical cyst.  SI joints are symmetric.  Paraspinal musculature demonstrates normal bulk and signal intensity.     T12-L1: Circumferential disc bulge encroaches into the bilateral foraminal zones.  Bilateral facet arthropathy and bilateral ligamentum flavum buckling.  Findings contribute to mild spinal canal stenosis and mild right neural foraminal narrowing.     L1-L2: Circumferential disc bulge encroaches into the right neural foramen and likely abuts the right exiting L1 nerve root.  Bilateral facet arthropathy and bilateral ligamentum flavum buckling.  Findings contribute to moderate spinal canal stenosis and severe right and mild left neural foraminal narrowing.     L2-L3: Circumferential disc bulge, bilateral facet arthropathy and bilateral ligamentum flavum buckling.  Findings contribute to mild spinal canal stenosis and mild left neural foraminal narrowing.     L3-L4: Circumferential disc bulge encroaches into the bilateral foraminal zones.  Bilateral facet arthropathy and bilateral ligamentum flavum buckling.  Findings contribute to moderate to severe spinal canal and lateral recess stenosis and mild bilateral neural foraminal narrowing.     L4-L5: Circumferential disc bulge encroaches into the bilateral foraminal zones.  Bilateral facet arthropathy and bilateral ligamentum flavum buckling.  Findings contribute to moderate spinal canal and lateral recess stenosis and moderate to severe left and moderate right neural foraminal narrowing.     L5-S1: Circumferential disc bulge encroaches into the bilateral foraminal zones.  Bilateral facet arthropathy and bilateral ligamentum flavum buckling.  Findings contribute to mild lateral recess stenosis  and moderate to severe bilateral neural foraminal narrowing with suspected impingement of the left exiting L5 nerve root.     Impression:     1. Lumbar levoscoliosis with advanced superimposed degenerative changes most pronounced at L1-L2 and from L3-L4 through L5-S1, progressed when compared to MRI dated 01/09/2019.    Xray Lumbar Spine 6/16/2022:  COMPARISON:  Lumbar spine radiograph from 05/24/2019.     FINDINGS:  Alignment: Mild levocurvature of lumbar spine.  Vertebral bodies adequately aligned on sagittal views.  No dynamic instability.     Vertebrae: Persistent concavity along superior endplate of L3, similar to prior exam.  Remaining vertebral body heights are adequately maintained.  No definite spondylolysis on oblique views.  No suspicious appearing lytic or blastic lesions.     Discs and facets: Multilevel moderate to severe disc space narrowing most prominent at L1-L2, L4-L5, and L5-S1 with sclerotic endplate changes.  Vacuum disc phenomenon present at L1-L2 and L5-S1.  Multilevel anterior osteophyte formation.  Advanced facet arthropathy most prominent in the lower lumbar spine.     Miscellaneous: No additional findings.     Impression:     As above.    MRI Lumbar Spine 1/9/2019:  COMPARISON:  Lumbar spine radiograph 10/03/2017     FINDINGS:  Alignment: Mild straightening of normal lumbar lordosis.     Vertebrae: Mild vertebral height loss and degenerative signal change.  No evidence of acute fracture or infiltrative marrow process.     Discs: Intervertebral disc height loss and degenerative signal change, most prominent within the lower lumbar spine.     Cord: Normal.  Conus terminates at L1-L2.     Degenerative findings:     T12-L1: Mild broad-based disc bulge and facet arthropathy without significant spinal canal stenosis or neural foraminal narrowing.     L1-L2: Mild broad-based disc bulge and facet arthropathy without significant spinal canal stenosis or neural foraminal narrowing.     L2-L3:  Broad-based disc bulge, ligamentum flavum thickening, and facet arthropathy without significant spinal canal stenosis or neural foraminal narrowing.     L3-L4: Broad-based disc bulge, ligamentum flavum thickening, and facet arthropathy resulting in moderate spinal canal stenosis and mild bilateral neural foraminal narrowing.     L4-L5: Broad-based disc bulge, ligamentum flavum thickening, and facet arthropathy resulting in moderate spinal canal stenosis, moderate left neural foraminal narrowing, and mild right neural foraminal narrowing.     L5-S1: Broad-based disc bulge, ligamentum flavum thickening, and facet arthropathy resulting in moderate left and mild right neural foraminal narrowing.     Paraspinal muscles & soft tissues: Unremarkable.     IMPRESSION:      Multilevel degenerative change of the lumbar spine most significant at L3-L4 which demonstrates moderate spinal canal stenosis and mild bilateral neural foraminal narrowing.  Moderate left and mild right neural foraminal narrowing seen at L4-L5 and L5-S1.  Additional details above.    Allergies: Review of patient's allergies indicates:  No Known Allergies    Current Medications:   Current Outpatient Medications   Medication Sig Dispense Refill    aspirin (ECOTRIN) 81 MG EC tablet Take 1 tablet (81 mg total) by mouth once daily. 30 tablet 12    ciprofloxacin HCl (CIPRO) 500 MG tablet Take 1 tablet (500 mg total) by mouth 2 (two) times daily. 20 tablet 0    cyclobenzaprine (FLEXERIL) 10 MG tablet Take 1 tablet (10 mg total) by mouth 3 (three) times daily as needed for Muscle spasms. for ten days 30 tablet 1    famotidine (PEPCID) 20 MG tablet TAKE 1 TABLET BY MOUTH TWICE A  tablet 1    fluticasone propionate (FLONASE) 50 mcg/actuation nasal spray SPRAY 1 SPRAY (50 MCG TOTAL) INTO INTO EACH NOSTRIL TWICE A DAY 48 mL 1    gabapentin (NEURONTIN) 300 MG capsule TAKE 1 CAPSULE BY MOUTH THREE TIMES A DAY 90 capsule 0    meloxicam (MOBIC) 15 MG tablet TAKE 1  TABLET (15 MG TOTAL) BY MOUTH DAILY AS NEEDED FOR PAIN (TAKE WITH FOOD OR A MEAL). 90 tablet 0    metroNIDAZOLE (FLAGYL) 500 MG tablet Take 1 tablet (500 mg total) by mouth every 8 (eight) hours. No alcohol with this 30 tablet 0    mupirocin (BACTROBAN) 2 % ointment Apply topically 2 (two) times daily. 30 g 0    rosuvastatin (CRESTOR) 10 MG tablet Take 1 tablet (10 mg total) by mouth once daily. 90 tablet 3    sildenafiL (VIAGRA) 100 MG tablet Take 100 mg by mouth daily as needed for Erectile Dysfunction.      tamsulosin (FLOMAX) 0.4 mg Cap TAKE 1 CAPSULE BY MOUTH EVERY DAY 90 capsule 3     No current facility-administered medications for this visit.     Facility-Administered Medications Ordered in Other Visits   Medication Dose Route Frequency Provider Last Rate Last Admin    0.9%  NaCl infusion   Intravenous Continuous Cole Fitzpatrick DO           REVIEW OF SYSTEMS:    GENERAL:  No weight loss, malaise or fevers.  HEENT:  Negative for frequent or significant headaches.  NECK:  Negative for lumps, goiter, pain and significant neck swelling.  RESPIRATORY:  Negative for cough, wheezing or shortness of breath.  CARDIOVASCULAR:  Negative for chest pain, leg swelling or palpitations.  GI:  Negative for abdominal discomfort, blood in stools or black stools or change in bowel habits. GERD  MUSCULOSKELETAL:  See HPI.  SKIN:  Negative for lesions, rash, and itching.  PSYCH:  Negative for sleep disturbance, mood disorder and recent psychosocial stressors.  HEMATOLOGY/LYMPHOLOGY:  Negative for prolonged bleeding, bruising easily or swollen nodes.  NEURO:   No history of headaches, syncope, paralysis, seizures or tremors.  All other reviewed and negative other than HPI.    Past Medical History:  Past Medical History:   Diagnosis Date    Allergy     Arthritis     Family history of malignant neoplasm of gastrointestinal tract mat.gf    Family history of prostate cancer: 1/2 brother 09/25/2017    GERD (gastroesophageal reflux  disease)     Hyperlipidemia     Kidney cyst, acquired: R 1.2 cm 2015 09/25/2017    Restless leg syndrome     Tubulovillous adenoma 2013 due 2016 09/14/2015       Past Surgical History:  Past Surgical History:   Procedure Laterality Date    APPLICATION OF BONE GRAFT TO FINGER Left 11/8/2021    Procedure: APPLICATION INTEGRA GRAFT, TO FINGER;  Surgeon: Alba Penn MD;  [facility];  Service: Orthopedics;  Laterality: Left;    CARPAL TUNNEL RELEASE      COLONOSCOPY N/A 5/22/2019    Procedure: COLONOSCOPY;  Surgeon: Juancarlos Foley MD;  Location: [facility] (4TH FLR);  Service: Endoscopy;  Laterality: N/A;    COLONOSCOPY N/A 10/12/2022    Procedure: COLONOSCOPY;  Surgeon: Cherelle Wang MD;  Location: [facility] (4TH FLR);  Service: Endoscopy;  Laterality: N/A;  vacc-inst portal-am prep-clears 4 hrs prior-tb    COLONOSCOPY N/A 5/30/2024    Procedure: COLONOSCOPY;  Surgeon: Nick Kramer MD;  Location: [facility] (4TH FLR);  Service: Endoscopy;  Laterality: N/A;  Prep instructions sent via portal-dw  Peg Prep-dw  5/22/24- LVM for precall - ERW  5/29-received message from pt's wife confirming appt-Kpvt    EXCISION OF GANGLION OF WRIST Right 6/28/2019    Procedure: EXCISION, GANGLION CYST, WRIST right;  Surgeon: Alba Penn MD;  [address]  Service: Orthopedics;  Laterality: Right;  regional MAC, stretcher, supine, hand pan 1 and 2,MINI C-arm, call Arthrex    INJECTION OF ANESTHETIC AGENT AROUND NERVE Bilateral 1/27/2022    Procedure: Block, Nerve MEDIAL BRANCH BLOCK BILATERAL L3,4,5  DIRECT REFERRAL 1 OF 2;  Surgeon: Kaiser Braxton MD;  Location: [facility] PAIN MGT;  Service: Pain Management;  Laterality: Bilateral;    INJECTION OF ANESTHETIC AGENT AROUND NERVE Bilateral 2/10/2022    Procedure: Block, Nerve MEDIAL BRANCH BLOCK BILATERAL L3.4.5  DIRECT REFERRAL 2 OF 2;  Surgeon: Kaiser Braxton MD;  Location: [facility] PAIN MGT;  Service: Pain Management;  Laterality: Bilateral;    INJECTION OF FACET  JOINT Bilateral 6/6/2019    Procedure: INJECTION, FACET JOINT INJECTION (LUMBAR BLOCK) BILATERAL L4-L5 AND L5-S1 FACET INJECTIONS;  Surgeon: Kaiser Braxton MD;  Location: [facility];  Service: Pain Management;  Laterality: Bilateral;  NEEDS CONSENT    INJECTION, SACROILIAC JOINT Right 12/8/2022    Procedure: INJECTION,SACROILIAC JOINT RIGHT  CONTRAST;  Surgeon: Kaiser Braxton MD;  Location: [facility];  Service: Pain Management;  Laterality: Right;    INTERPOSITION ARTHROPLASTY OF CARPOMETACARPAL JOINTS Right 6/28/2019    Procedure: INTERPOSITION ARTHROPLASTY, CMC JOINT right;  Surgeon: Alba Penn MD;  Location: [facility] F[facility];  Service: Orthopedics;  Laterality: Right;  regional MAC, stretcher, supine, hand pan 1 and 2,MINI C-arm, call Arthrex    IRRIGATION AND DEBRIDEMENT OF UPPER EXTREMITY Left 11/8/2021    Procedure: IRRIGATION AND DEBRIDEMENT, UPPER EXTREMITY, left index finger;  Surgeon: Alba Penn MD;  Location: [facility] OR;  Service: Orthopedics;  Laterality: Left;    RADIOFREQUENCY ABLATION Right 5/16/2022    Procedure: Radiofrequency Ablation RIGHT L3,4,5;  Surgeon: Kaiser Braxton MD;  Location: [facility];  Service: Pain Management;  Laterality: Right;    RADIOFREQUENCY ABLATION Left 6/2/2022    Procedure: Radiofrequency Ablation LEFT L3,4,5;  Surgeon: Kaiser Braxton MD;  Location: [facility]IN MGT;  Service: Pain Management;  Laterality: Left;    RADIOFREQUENCY ABLATION Right 7/17/2023    Procedure: RADIOFREQUENCY ABLATION, RIGHT L3,L4,L5 MEDIAL BRANCH ONE OF TWO;  Surgeon: Kaiser Braxton MD;  Location: [facility];  Service: Pain Management;  Laterality: Right;    RADIOFREQUENCY ABLATION Left 9/11/2023    Procedure: RADIOFREQUENCY ABLATION, LEFT L3,L4,L5 MEDIAL BRANCH TWO OF TWO;  Surgeon: Kaiser Braxton MD;  Location: [facility];  Service: Pain Management;  Laterality: Left;    REPAIR OF NAIL BED Left 11/8/2021    Procedure: REPAIR, NAIL BED, left index;  Surgeon: Alba JENNINGS  MD Isamar;  Location: [facility] OR;  Service: Orthopedics;  Laterality: Left;    SPERMATOCELECTOMY Right 11/8/2019    Procedure: EXCISION, SPERMATOCELE;  Surgeon: Sheldon Araya Jr., MD;  [address]  Service: Urology;  Laterality: Right;  1hr    TRANSFORAMINAL EPIDURAL INJECTION OF STEROID Bilateral 10/27/2022    Procedure: INJECTION, STEROID, EPIDURAL, TRANSFORAMINAL APPROACH, BILATERAL L5-S1 CONTRAST;  Surgeon: Kaiser Braxton MD;  Location: [facility] PAIN T;  Service: Pain Management;  Laterality: Bilateral;       Family History:  Family History   Problem Relation Name Age of Onset    Arthritis Mother          probable R.A.    Cancer Father Smoker         esophageal ca and brain tumor    Esophageal cancer Father Smoker     Melanoma Father Smoker     Irritable bowel syndrome Sister      Arthritis Sister      Arthritis Sister          rheumatoid    Cancer Sister          skin    Cancer Brother 1/2         pancreatic cancer    No Known Problems Brother      No Known Problems Brother      No Known Problems Son Jimmie     No Known Problems Son Ra     Cancer Maternal Grandfather          colon    Colon cancer Maternal Grandfather      Diabetes Neg Hx      Heart disease Neg Hx      Cirrhosis Neg Hx      Celiac disease Neg Hx      Crohn's disease Neg Hx      Ulcerative colitis Neg Hx      Stomach cancer Neg Hx      Rectal cancer Neg Hx      Liver cancer Neg Hx      Psoriasis Neg Hx      Lupus Neg Hx         Social History:  Social History     Socioeconomic History    Marital status: Single    Number of children: 2   Tobacco Use    Smoking status: Never    Smokeless tobacco: Never    Tobacco comments:     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   Substance and Sexual Activity    Alcohol use: Yes     Alcohol/week: 0.0 standard drinks of alcohol     Comment: 2-3 days weekly, up to 2 beers    Drug use: No    Sexual activity: Yes      Partners: Female     Social Determinants of Health     Transportation Needs: No Transportation Needs (5/12/2022)    PRAPARE - Transportation     Lack of Transportation (Medical): No     Lack of Transportation (Non-Medical): No   Physical Activity: Sufficiently Active (5/12/2022)    Exercise Vital Sign     Days of Exercise per Week: 5 days     Minutes of Exercise per Session: 30 min   Stress: No Stress Concern Present (1/29/2020)    [employer] [place] [employer] - Occupational Stress Questionnaire     Feeling of Stress : Only a little   Housing Stability: Low Risk  (5/12/2022)    Housing Stability Vital Sign     Unable to Pay for Housing in the Last Year: No     Number of Places Lived in the Last Year: 1     Unstable Housing in the Last Year: No       OBJECTIVE:    /74   Pulse 77   Temp 97.4 °F (36.3 °C)   Wt 69.9 kg (154 lb 1.6 oz)   SpO2 99%   BMI 22.11 kg/m²     PHYSICAL EXAMINATION:    General appearance: Well appearing, in no acute distress, alert and oriented x3.  Psych:  Mood and affect appropriate.  Skin: Skin color, texture, turgor normal, no rashes or lesions, in both upper and lower body.  Head/face:  Atraumatic, normocephalic.   Cor: RRR  Pulm: Symmetric chest rise, no respiratory distress noted.   Back: Straight leg raising in the sitting position is negative to radicular pain bilaterally. There is pain to palpation over the lumbar facet joints bilaterally. Limited ROM with pain on extension. Positive facet loading bilaterally.   Extremities: No deformities, edema, or skin discoloration. Good capillary refill.  Musculoskeletal: There is pain with palpation over bilateral SI joints. FABERs is positive bilaterally. Bilateral lower extremity strength is normal and symmetric.  No atrophy or tone abnormalities are noted.  Neuro: No loss of sensation is noted.  Gait: Antalgic- ambulates without assistance.     ASSESSMENT: 68 y.o. year old male with back pain, consistent with the  followin. Lumbar spondylosis  Procedure Order to Pain Management      2. DDD (degenerative disc disease), lumbar        3. Spinal stenosis of lumbar region without neurogenic claudication        4. Sacroiliac joint pain        5. Myofascial pain  cyclobenzaprine (FLEXERIL) 10 MG tablet            PLAN:     - Previous imaging reviewed today. Labs reviewed.     - Schedule for bilateral L3,4,5 RFA.   The patient did previously have bilateral RFAs from L3 to L5 in the past with 60% relief for 11 month(s). During that time, the patients functional ability improved and was able to be more active without significant limitation by pain. Current pain is axial and non-radiating.     - Consider repeat SI joint injections in the future.     - I have stressed the importance of physical activity and a home exercise plan to help with pain and improve health.    - Continue Gabapentin.     - Flexeril 10 mg TID PRN muscle pain. Refill provided.     - RTC 3 weeks after above procedure.     - Counseled patient regarding the importance of activity modification and physical therapy.    The above plan and management options were discussed at length with patient. Patient is in agreement with the above and verbalized understanding.    Yumi Naik, SUSANA  2024

## 2024-09-13 NOTE — H&P (VIEW-ONLY)
Chronic patient Established Note (Follow up visit)      SUBJECTIVE:    Interval History 9/13/2024:  The patient returns to clinic today for follow up of back pain. He has not been seen in a year. He reports increased low back pain over the last 3 weeks. He also reports buttock pain. He does have aching pain into the posterior thighs. He endorses morning stiffness. His pain is worse with moving from sitting to standing. He is taking Gabapentin and Mobic. He ran out of Flexeril which is usually helpful. He is physically active, performing a home exercise routine. He denies any other health changes. His pain today is 5/10.    Interval History 5/23/2023:  The patient returns to clinic today for follow up of low back pain. He reports worsened low back pain. He reports low back and buttock pain. He denies any radicular leg pain. His pain is worse with moving from sitting to standing. He also endorses morning stiffness. He is taking Gabapentin, Flexeril, and Mobic. He does perform a home exercise routine. He denies any other health changes. His pain today is 7/10.    Interval History 12/22/2022:  The patient returns to clinic today for follow up of low back pain. He is s/p bilateral SI joint injections on 12/8/2022. He reports 50% relief of his pain. He continues to report right sided low back and buttock pain. He denies any radicular leg pain. His pain is worse with driving, prolonged sitting, and moving from sitting to standing. He continues to perform a home exercise routine. He is taking Gabapentin, Flexeril, and Mobic. He denies any other health changes. His pain today is 2/10.    Interval History 11/11/2022:  The patient returns to clinic today for follow up of low back pain. He is s/p bilateral L5/S1 TF PERCY on 10/27/2022. He reports limited relief of his pain. He continues to report low back and right hip pain. He reports intermittent radiating pain into his right posterior leg to his knee. He reports intermittent  numbness to his legs. His pain is worse with moving from sitting to standing. He also reports increased pain with prolonged standing. He continues to perform a home exercise routine. He continues to take Gabapentin, Flexeril, and Mobic. He denies any other health changes. His pain today is 2/10.    Interval History 10/10/2022:  The patient returns to clinic today for follow up of low back pain. He is here today for imaging review. He continues to report low back pain that radiates into his right buttock and posterior thigh. His pain is worse with moving from sitting to standing, prolonged standing, and activity. He reports intermittent weakness into his legs with moving from sitting to standing. He has completed physical therapy. He continues to perform a home exercise routine. He continues to take Gabapentin, Flexeril, and Mobic. He denies any bowel or bladder incontinence. His pain today is 2/10.      Interval History 9/13/2022:  Sabino Rubio presents to the clinic for a follow-up appointment for low back pain. He reports neuroma injection at last office visit provided short term relief. He continues reports low back pain that radiates into his right buttock. He denies any radicular leg pain. His pain is worse with moving from sitting to standing, prolonged sitting, and activity. He does endorse morning stiffness. He is currently participating in physical therapy with some benefit. He is taking Gabapentin, Flexeril, and Mobic. He denies any other health changes. His pain today is 5/10.    Interval History 8/2/2022:   Patient returns to clinic today reporting 2/10 pain at rest which increases to 4-6/10 when standing. Pain is primarily low back in nature with radiation to the right buttock. He reports that pain is exacerbated by prolonged immobility specifically when awakening from sleep or sitting for long periods of time. At the last visit, he was prescribed Flexeril, but has noted only minimal benefit from  it. He has been working with PT and plans on starting to go to the gym. Prior RFA's have provided some relief, he reports his first left-sided procedure produced significant relief whereas the second right-sided procedure only produced moderate relief.    Interval History 6/16/2022:  Patient presents today for evaluationfollow-up of their low back pain s/p Bilateral  Lumbar RFA . Per pateint his axial LBP is almost 50-60% better after the RFA but he is still suffering from some pain in Left lower back ,mostly stiffness and spasm .no radicular component  Is associated with LBP. No B/B incontinence/no numbness /motor or sensory deficit.     Pain Disability Index Review:      9/13/2024     8:11 AM 12/22/2022     8:22 AM 11/11/2022     1:54 PM   Last 3 PDI Scores   Pain Disability Index (PDI) 35 32 21       Pain Medications:  Gabapentin  Mobic  Flexeril    Opioid Contract: no     report:  Not applicable    Pain Procedures:   6/6/2019- Bilateral L4/5 and L5/S1 facet joint injection  1/27/2022- Bilateral L3,4,5 MBB  2/10/2022- Bilateral L3,4,5 MBB  5/16/2022- Right L3,4,5 RFA- 60% relief  6/2/2022- Left L3,4,5 RFA- 60% relief  10/27/2022- Bilateral L5/S1 TF PERCY  12/8/2022- Bilateral SI joint injections    Physical Therapy/Home Exercise: yes    Imaging:   MRI Lumbar Spine 10/5/2022:  COMPARISON:  Radiograph 06/16/2022, MRI 01/09/2019.     FINDINGS:  Lumbar spine alignment demonstrates levoscoliosis with straightening of the normal lordosis.  Vertebral body heights are well maintained without evidence for acute fracture.  Schmorl's node extends into the superior endplate of the L3 vertebral body, unchanged when compared to the previous MRI.  Benign osseous hemangioma at the L4 vertebral body.  No marrow signal abnormality to suggest an infiltrative process.     There is advanced degenerative disc space narrowing and desiccation throughout the visualized thoracolumbar spine, progressed when compared to the previous MRI  most notably at the L1-L2 level noting severe associated endplate edema.     Distal spinal cord demonstrates normal contour and signal intensity.  Cauda equina appears normal without findings to suggest arachnoiditis.  Conus medullaris terminates at L1.     Right renal cortical cyst.  SI joints are symmetric.  Paraspinal musculature demonstrates normal bulk and signal intensity.     T12-L1: Circumferential disc bulge encroaches into the bilateral foraminal zones.  Bilateral facet arthropathy and bilateral ligamentum flavum buckling.  Findings contribute to mild spinal canal stenosis and mild right neural foraminal narrowing.     L1-L2: Circumferential disc bulge encroaches into the right neural foramen and likely abuts the right exiting L1 nerve root.  Bilateral facet arthropathy and bilateral ligamentum flavum buckling.  Findings contribute to moderate spinal canal stenosis and severe right and mild left neural foraminal narrowing.     L2-L3: Circumferential disc bulge, bilateral facet arthropathy and bilateral ligamentum flavum buckling.  Findings contribute to mild spinal canal stenosis and mild left neural foraminal narrowing.     L3-L4: Circumferential disc bulge encroaches into the bilateral foraminal zones.  Bilateral facet arthropathy and bilateral ligamentum flavum buckling.  Findings contribute to moderate to severe spinal canal and lateral recess stenosis and mild bilateral neural foraminal narrowing.     L4-L5: Circumferential disc bulge encroaches into the bilateral foraminal zones.  Bilateral facet arthropathy and bilateral ligamentum flavum buckling.  Findings contribute to moderate spinal canal and lateral recess stenosis and moderate to severe left and moderate right neural foraminal narrowing.     L5-S1: Circumferential disc bulge encroaches into the bilateral foraminal zones.  Bilateral facet arthropathy and bilateral ligamentum flavum buckling.  Findings contribute to mild lateral recess stenosis  and moderate to severe bilateral neural foraminal narrowing with suspected impingement of the left exiting L5 nerve root.     Impression:     1. Lumbar levoscoliosis with advanced superimposed degenerative changes most pronounced at L1-L2 and from L3-L4 through L5-S1, progressed when compared to MRI dated 01/09/2019.    Xray Lumbar Spine 6/16/2022:  COMPARISON:  Lumbar spine radiograph from 05/24/2019.     FINDINGS:  Alignment: Mild levocurvature of lumbar spine.  Vertebral bodies adequately aligned on sagittal views.  No dynamic instability.     Vertebrae: Persistent concavity along superior endplate of L3, similar to prior exam.  Remaining vertebral body heights are adequately maintained.  No definite spondylolysis on oblique views.  No suspicious appearing lytic or blastic lesions.     Discs and facets: Multilevel moderate to severe disc space narrowing most prominent at L1-L2, L4-L5, and L5-S1 with sclerotic endplate changes.  Vacuum disc phenomenon present at L1-L2 and L5-S1.  Multilevel anterior osteophyte formation.  Advanced facet arthropathy most prominent in the lower lumbar spine.     Miscellaneous: No additional findings.     Impression:     As above.    MRI Lumbar Spine 1/9/2019:  COMPARISON:  Lumbar spine radiograph 10/03/2017     FINDINGS:  Alignment: Mild straightening of normal lumbar lordosis.     Vertebrae: Mild vertebral height loss and degenerative signal change.  No evidence of acute fracture or infiltrative marrow process.     Discs: Intervertebral disc height loss and degenerative signal change, most prominent within the lower lumbar spine.     Cord: Normal.  Conus terminates at L1-L2.     Degenerative findings:     T12-L1: Mild broad-based disc bulge and facet arthropathy without significant spinal canal stenosis or neural foraminal narrowing.     L1-L2: Mild broad-based disc bulge and facet arthropathy without significant spinal canal stenosis or neural foraminal narrowing.     L2-L3:  Broad-based disc bulge, ligamentum flavum thickening, and facet arthropathy without significant spinal canal stenosis or neural foraminal narrowing.     L3-L4: Broad-based disc bulge, ligamentum flavum thickening, and facet arthropathy resulting in moderate spinal canal stenosis and mild bilateral neural foraminal narrowing.     L4-L5: Broad-based disc bulge, ligamentum flavum thickening, and facet arthropathy resulting in moderate spinal canal stenosis, moderate left neural foraminal narrowing, and mild right neural foraminal narrowing.     L5-S1: Broad-based disc bulge, ligamentum flavum thickening, and facet arthropathy resulting in moderate left and mild right neural foraminal narrowing.     Paraspinal muscles & soft tissues: Unremarkable.     IMPRESSION:      Multilevel degenerative change of the lumbar spine most significant at L3-L4 which demonstrates moderate spinal canal stenosis and mild bilateral neural foraminal narrowing.  Moderate left and mild right neural foraminal narrowing seen at L4-L5 and L5-S1.  Additional details above.    Allergies: Review of patient's allergies indicates:  No Known Allergies    Current Medications:   Current Outpatient Medications   Medication Sig Dispense Refill    aspirin (ECOTRIN) 81 MG EC tablet Take 1 tablet (81 mg total) by mouth once daily. 30 tablet 12    ciprofloxacin HCl (CIPRO) 500 MG tablet Take 1 tablet (500 mg total) by mouth 2 (two) times daily. 20 tablet 0    cyclobenzaprine (FLEXERIL) 10 MG tablet Take 1 tablet (10 mg total) by mouth 3 (three) times daily as needed for Muscle spasms. for ten days 30 tablet 1    famotidine (PEPCID) 20 MG tablet TAKE 1 TABLET BY MOUTH TWICE A  tablet 1    fluticasone propionate (FLONASE) 50 mcg/actuation nasal spray SPRAY 1 SPRAY (50 MCG TOTAL) INTO INTO EACH NOSTRIL TWICE A DAY 48 mL 1    gabapentin (NEURONTIN) 300 MG capsule TAKE 1 CAPSULE BY MOUTH THREE TIMES A DAY 90 capsule 0    meloxicam (MOBIC) 15 MG tablet TAKE 1  TABLET (15 MG TOTAL) BY MOUTH DAILY AS NEEDED FOR PAIN (TAKE WITH FOOD OR A MEAL). 90 tablet 0    metroNIDAZOLE (FLAGYL) 500 MG tablet Take 1 tablet (500 mg total) by mouth every 8 (eight) hours. No alcohol with this 30 tablet 0    mupirocin (BACTROBAN) 2 % ointment Apply topically 2 (two) times daily. 30 g 0    rosuvastatin (CRESTOR) 10 MG tablet Take 1 tablet (10 mg total) by mouth once daily. 90 tablet 3    sildenafiL (VIAGRA) 100 MG tablet Take 100 mg by mouth daily as needed for Erectile Dysfunction.      tamsulosin (FLOMAX) 0.4 mg Cap TAKE 1 CAPSULE BY MOUTH EVERY DAY 90 capsule 3     No current facility-administered medications for this visit.     Facility-Administered Medications Ordered in Other Visits   Medication Dose Route Frequency Provider Last Rate Last Admin    0.9%  NaCl infusion   Intravenous Continuous Cole Fitzpatrcik DO           REVIEW OF SYSTEMS:    GENERAL:  No weight loss, malaise or fevers.  HEENT:  Negative for frequent or significant headaches.  NECK:  Negative for lumps, goiter, pain and significant neck swelling.  RESPIRATORY:  Negative for cough, wheezing or shortness of breath.  CARDIOVASCULAR:  Negative for chest pain, leg swelling or palpitations.  GI:  Negative for abdominal discomfort, blood in stools or black stools or change in bowel habits. GERD  MUSCULOSKELETAL:  See HPI.  SKIN:  Negative for lesions, rash, and itching.  PSYCH:  Negative for sleep disturbance, mood disorder and recent psychosocial stressors.  HEMATOLOGY/LYMPHOLOGY:  Negative for prolonged bleeding, bruising easily or swollen nodes.  NEURO:   No history of headaches, syncope, paralysis, seizures or tremors.  All other reviewed and negative other than HPI.    Past Medical History:  Past Medical History:   Diagnosis Date    Allergy     Arthritis     Family history of malignant neoplasm of gastrointestinal tract mat.gf    Family history of prostate cancer: 1/2 brother 09/25/2017    GERD (gastroesophageal reflux  disease)     Hyperlipidemia     Kidney cyst, acquired: R 1.2 cm 2015 09/25/2017    Restless leg syndrome     Tubulovillous adenoma 2013 due 2016 09/14/2015       Past Surgical History:  Past Surgical History:   Procedure Laterality Date    APPLICATION OF BONE GRAFT TO FINGER Left 11/8/2021    Procedure: APPLICATION INTEGRA GRAFT, TO FINGER;  Surgeon: Alba Penn MD;  Location: [facility];  Service: Orthopedics;  Laterality: Left;    CARPAL TUNNEL RELEASE      COLONOSCOPY N/A 5/22/2019    Procedure: COLONOSCOPY;  Surgeon: Juancarlos Foley MD;  Location: [facility] (4TH FLR);  Service: Endoscopy;  Laterality: N/A;    COLONOSCOPY N/A 10/12/2022    Procedure: COLONOSCOPY;  Surgeon: Cherelle Wang MD;  Location: [facility] (4TH FLR);  Service: Endoscopy;  Laterality: N/A;  vacc-inst portal-am prep-clears 4 hrs prior-tb    COLONOSCOPY N/A 5/30/2024    Procedure: COLONOSCOPY;  Surgeon: Nick Kramer MD;  Location: [facility] (4TH FLR);  Service: Endoscopy;  Laterality: N/A;  Prep instructions sent via portal-dw  Peg Prep-dw  5/22/24- LVM for precall - ERW  5/29-received message from pt's wife confirming appt-Kpvt    EXCISION OF GANGLION OF WRIST Right 6/28/2019    Procedure: EXCISION, GANGLION CYST, WRIST right;  Surgeon: Alba Penn MD;  [address]  Service: Orthopedics;  Laterality: Right;  regional MAC, stretcher, supine, hand pan 1 and 2,MINI C-arm, call Arthrex    INJECTION OF ANESTHETIC AGENT AROUND NERVE Bilateral 1/27/2022    Procedure: Block, Nerve MEDIAL BRANCH BLOCK BILATERAL L3,4,5  DIRECT REFERRAL 1 OF 2;  Surgeon: Kaiser Braxton MD;  Location: [facility] PAIN MGT;  Service: Pain Management;  Laterality: Bilateral;    INJECTION OF ANESTHETIC AGENT AROUND NERVE Bilateral 2/10/2022    Procedure: Block, Nerve MEDIAL BRANCH BLOCK BILATERAL L3.4.5  DIRECT REFERRAL 2 OF 2;  Surgeon: Kaiser Braxton MD;  Location: [facility] PAIN MGT;  Service: Pain Management;  Laterality: Bilateral;    INJECTION OF FACET  JOINT Bilateral 6/6/2019    Procedure: INJECTION, FACET JOINT INJECTION (LUMBAR BLOCK) BILATERAL L4-L5 AND L5-S1 FACET INJECTIONS;  Surgeon: Kaiser Braxton MD;  Location: [facility];  Service: Pain Management;  Laterality: Bilateral;  NEEDS CONSENT    INJECTION, SACROILIAC JOINT Right 12/8/2022    Procedure: INJECTION,SACROILIAC JOINT RIGHT  CONTRAST;  Surgeon: Kaiser Braxton MD;  Location: [facility];  Service: Pain Management;  Laterality: Right;    INTERPOSITION ARTHROPLASTY OF CARPOMETACARPAL JOINTS Right 6/28/2019    Procedure: INTERPOSITION ARTHROPLASTY, CMC JOINT right;  Surgeon: Alba Penn MD;  Location: [facility] OR [facility] FLR;  Service: Orthopedics;  Laterality: Right;  regional MAC, stretcher, supine, hand pan 1 and 2,MINI C-arm, call Arthrex    IRRIGATION AND DEBRIDEMENT OF UPPER EXTREMITY Left 11/8/2021    Procedure: IRRIGATION AND DEBRIDEMENT, UPPER EXTREMITY, left index finger;  Surgeon: Alba Penn MD;  Location: [facility] OR;  Service: Orthopedics;  Laterality: Left;    RADIOFREQUENCY ABLATION Right 5/16/2022    Procedure: Radiofrequency Ablation RIGHT L3,4,5;  Surgeon: Kaiser Braxton MD;  Location: [facility];  Service: Pain Management;  Laterality: Right;    RADIOFREQUENCY ABLATION Left 6/2/2022    Procedure: Radiofrequency Ablation LEFT L3,4,5;  Surgeon: Kaiser Braxton MD;  Location: [facility];  Service: Pain Management;  Laterality: Left;    RADIOFREQUENCY ABLATION Right 7/17/2023    Procedure: RADIOFREQUENCY ABLATION, RIGHT L3,L4,L5 MEDIAL BRANCH ONE OF TWO;  Surgeon: Kaiser Braxton MD;  Location: [facility];  Service: Pain Management;  Laterality: Right;    RADIOFREQUENCY ABLATION Left 9/11/2023    Procedure: RADIOFREQUENCY ABLATION, LEFT L3,L4,L5 MEDIAL BRANCH TWO OF TWO;  Surgeon: Kaiser Braxton MD;  Location: [facility];  Service: Pain Management;  Laterality: Left;    REPAIR OF NAIL BED Left 11/8/2021    Procedure: REPAIR, NAIL BED, left index;  Surgeon: Alba JENNINGS  MD Isamar;  Location: [facility] OR;  Service: Orthopedics;  Laterality: Left;    SPERMATOCELECTOMY Right 11/8/2019    Procedure: EXCISION, SPERMATOCELE;  Surgeon: Sheldon Araya Jr., MD;  [address]  Service: Urology;  Laterality: Right;  1hr    TRANSFORAMINAL EPIDURAL INJECTION OF STEROID Bilateral 10/27/2022    Procedure: INJECTION, STEROID, EPIDURAL, TRANSFORAMINAL APPROACH, BILATERAL L5-S1 CONTRAST;  Surgeon: Kaiser Braxton MD;  Location: [facility] PAIN T;  Service: Pain Management;  Laterality: Bilateral;       Family History:  Family History   Problem Relation Name Age of Onset    Arthritis Mother          probable R.A.    Cancer Father Smoker         esophageal ca and brain tumor    Esophageal cancer Father Smoker     Melanoma Father Smoker     Irritable bowel syndrome Sister      Arthritis Sister      Arthritis Sister          rheumatoid    Cancer Sister          skin    Cancer Brother 1/2         pancreatic cancer    No Known Problems Brother      No Known Problems Brother      No Known Problems Son Jimmie     No Known Problems Son Ra     Cancer Maternal Grandfather          colon    Colon cancer Maternal Grandfather      Diabetes Neg Hx      Heart disease Neg Hx      Cirrhosis Neg Hx      Celiac disease Neg Hx      Crohn's disease Neg Hx      Ulcerative colitis Neg Hx      Stomach cancer Neg Hx      Rectal cancer Neg Hx      Liver cancer Neg Hx      Psoriasis Neg Hx      Lupus Neg Hx         Social History:  Social History     Socioeconomic History    Marital status: Single    Number of children: 2   Tobacco Use    Smoking status: Never    Smokeless tobacco: Never    Tobacco comments:     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   Substance and Sexual Activity    Alcohol use: Yes     Alcohol/week: 0.0 standard drinks of alcohol     Comment: 2-3 days weekly, up to 2 beers    Drug use: No    Sexual activity: Yes      Partners: Female     Social Determinants of Health     Transportation Needs: No Transportation Needs (5/12/2022)    PRAPARE - Transportation     Lack of Transportation (Medical): No     Lack of Transportation (Non-Medical): No   Physical Activity: Sufficiently Active (5/12/2022)    Exercise Vital Sign     Days of Exercise per Week: 5 days     Minutes of Exercise per Session: 30 min   Stress: No Stress Concern Present (1/29/2020)    [employer] - Occupational Stress Questionnaire     Feeling of Stress : Only a little   Housing Stability: Low Risk  (5/12/2022)    Housing Stability Vital Sign     Unable to Pay for Housing in the Last Year: No     Number of Places Lived in the Last Year: 1     Unstable Housing in the Last Year: No       OBJECTIVE:    /74   Pulse 77   Temp 97.4 °F (36.3 °C)   Wt 69.9 kg (154 lb 1.6 oz)   SpO2 99%   BMI 22.11 kg/m²     PHYSICAL EXAMINATION:    General appearance: Well appearing, in no acute distress, alert and oriented x3.  Psych:  Mood and affect appropriate.  Skin: Skin color, texture, turgor normal, no rashes or lesions, in both upper and lower body.  Head/face:  Atraumatic, normocephalic.   Cor: RRR  Pulm: Symmetric chest rise, no respiratory distress noted.   Back: Straight leg raising in the sitting position is negative to radicular pain bilaterally. There is pain to palpation over the lumbar facet joints bilaterally. Limited ROM with pain on extension. Positive facet loading bilaterally.   Extremities: No deformities, edema, or skin discoloration. Good capillary refill.  Musculoskeletal: There is pain with palpation over bilateral SI joints. FABERs is positive bilaterally. Bilateral lower extremity strength is normal and symmetric.  No atrophy or tone abnormalities are noted.  Neuro: No loss of sensation is noted.  Gait: Antalgic- ambulates without assistance.     ASSESSMENT: 68 y.o. year old male with back pain, consistent with the  followin. Lumbar spondylosis  Procedure Order to Pain Management      2. DDD (degenerative disc disease), lumbar        3. Spinal stenosis of lumbar region without neurogenic claudication        4. Sacroiliac joint pain        5. Myofascial pain  cyclobenzaprine (FLEXERIL) 10 MG tablet            PLAN:     - Previous imaging reviewed today. Labs reviewed.     - Schedule for bilateral L3,4,5 RFA.   The patient did previously have bilateral RFAs from L3 to L5 in the past with 60% relief for 11 month(s). During that time, the patients functional ability improved and was able to be more active without significant limitation by pain. Current pain is axial and non-radiating.     - Consider repeat SI joint injections in the future.     - I have stressed the importance of physical activity and a home exercise plan to help with pain and improve health.    - Continue Gabapentin.     - Flexeril 10 mg TID PRN muscle pain. Refill provided.     - RTC 3 weeks after above procedure.     - Counseled patient regarding the importance of activity modification and physical therapy.    The above plan and management options were discussed at length with patient. Patient is in agreement with the above and verbalized understanding.    Yumi Naik, SUSANA  2024

## 2024-09-25 DIAGNOSIS — Z00.00 ENCOUNTER FOR MEDICARE ANNUAL WELLNESS EXAM: ICD-10-CM

## 2024-09-30 ENCOUNTER — HOSPITAL ENCOUNTER (OUTPATIENT)
Facility: OTHER | Age: 69
Discharge: HOME | End: 2024-09-30
Attending: ANESTHESIOLOGY | Admitting: ANESTHESIOLOGY
Payer: MEDICARE

## 2024-09-30 VITALS
OXYGEN SATURATION: 98 % | DIASTOLIC BLOOD PRESSURE: 83 MMHG | HEART RATE: 75 BPM | RESPIRATION RATE: 16 BRPM | HEIGHT: 70 IN | SYSTOLIC BLOOD PRESSURE: 132 MMHG | WEIGHT: 150 LBS | BODY MASS INDEX: 21.47 KG/M2 | TEMPERATURE: 98 F

## 2024-09-30 DIAGNOSIS — M47.819 SPONDYLOSIS WITHOUT MYELOPATHY: Primary | ICD-10-CM

## 2024-09-30 DIAGNOSIS — G89.29 CHRONIC PAIN: ICD-10-CM

## 2024-09-30 PROCEDURE — 99153 MOD SED SAME PHYS/QHP EA: CPT | Performed by: ANESTHESIOLOGY

## 2024-09-30 PROCEDURE — 99152 MOD SED SAME PHYS/QHP 5/>YRS: CPT | Mod: ,,, | Performed by: ANESTHESIOLOGY

## 2024-09-30 PROCEDURE — 64636 DESTROY L/S FACET JNT ADDL: CPT | Mod: 50 | Performed by: ANESTHESIOLOGY

## 2024-09-30 PROCEDURE — 63600175 PHARM REV CODE 636 W HCPCS: Performed by: ANESTHESIOLOGY

## 2024-09-30 PROCEDURE — 64635 DESTROY LUMB/SAC FACET JNT: CPT | Mod: 50 | Performed by: ANESTHESIOLOGY

## 2024-09-30 PROCEDURE — 64635 DESTROY LUMB/SAC FACET JNT: CPT | Mod: 50,,, | Performed by: ANESTHESIOLOGY

## 2024-09-30 PROCEDURE — 64636 DESTROY L/S FACET JNT ADDL: CPT | Mod: 50,,, | Performed by: ANESTHESIOLOGY

## 2024-09-30 PROCEDURE — 99152 MOD SED SAME PHYS/QHP 5/>YRS: CPT | Performed by: ANESTHESIOLOGY

## 2024-09-30 PROCEDURE — 25000003 PHARM REV CODE 250: Performed by: ANESTHESIOLOGY

## 2024-09-30 RX ORDER — SODIUM CHLORIDE 9 MG/ML
INJECTION, SOLUTION INTRAVENOUS CONTINUOUS
Status: DISCONTINUED | OUTPATIENT
Start: 2024-09-30 | End: 2024-09-30 | Stop reason: HOSPADM

## 2024-09-30 RX ORDER — LIDOCAINE HYDROCHLORIDE 20 MG/ML
INJECTION, SOLUTION INFILTRATION; PERINEURAL
Status: DISCONTINUED | OUTPATIENT
Start: 2024-09-30 | End: 2024-09-30 | Stop reason: HOSPADM

## 2024-09-30 RX ORDER — BUPIVACAINE HYDROCHLORIDE 2.5 MG/ML
INJECTION, SOLUTION EPIDURAL; INFILTRATION; INTRACAUDAL
Status: DISCONTINUED | OUTPATIENT
Start: 2024-09-30 | End: 2024-09-30 | Stop reason: HOSPADM

## 2024-09-30 RX ORDER — MIDAZOLAM HYDROCHLORIDE 1 MG/ML
INJECTION INTRAMUSCULAR; INTRAVENOUS
Status: DISCONTINUED | OUTPATIENT
Start: 2024-09-30 | End: 2024-09-30 | Stop reason: HOSPADM

## 2024-09-30 RX ORDER — FENTANYL CITRATE 50 UG/ML
INJECTION, SOLUTION INTRAMUSCULAR; INTRAVENOUS
Status: DISCONTINUED | OUTPATIENT
Start: 2024-09-30 | End: 2024-09-30 | Stop reason: HOSPADM

## 2024-09-30 RX ORDER — DEXAMETHASONE SODIUM PHOSPHATE 10 MG/ML
INJECTION INTRAMUSCULAR; INTRAVENOUS
Status: DISCONTINUED | OUTPATIENT
Start: 2024-09-30 | End: 2024-09-30 | Stop reason: HOSPADM

## 2024-09-30 NOTE — DISCHARGE INSTRUCTIONS

## 2024-09-30 NOTE — OP NOTE
Therapeutic Lumbar Medial Branch Radiofrequency Ablation under Fluoroscopy     The procedure, risks, benefits, and options were discussed with the patient. There are no contraindications to the procedure. The patent expressed understanding and agreed to the procedure. Informed written consent was obtained prior to the start of the procedure and can be found in the patient's chart.        PATIENT NAME: Sabino Rubio   MRN: [id number]     DATE OF PROCEDURE: 09/30/2024     PROCEDURE:  Bilateral L3, L4, and L5 Lumbar Radiofrequency Ablation under Fluoroscopy    PRE-OP DIAGNOSIS: Lumbar spondylosis [M47.816] Lumbar spondylosis [M47.816]    POST-OP DIAGNOSIS: Same    PHYSICIAN: Kaiser Braxton MD    ASSISTANTS: Rashi Noble MD  [facility]      MEDICATIONS INJECTED:  Preservative-free Decadron 10mg with 9cc of Bupivicaine 0.25%    LOCAL ANESTHETIC INJECTED:   Xylocaine 2%    SEDATION: Versed 2mg and Fentanyl 25mcg                                                                                                                                                                                     Conscious sedation ordered by M.D. Patient re-evaluation prior to administration of conscious sedation. No changes noted in patient's status from initial evaluation. The patient's vital signs were monitored by RN and patient remained hemodynamically stable throughout the procedure.    Event Time In   Sedation Start 0919   Sedation End 0943       ESTIMATED BLOOD LOSS:  None    COMPLICATIONS:  None     INTERVAL HISTORY: Patient has clinical and imaging findings suggestive of facet mediated pain. Patients has completed 2 previous diagnostic medial branch blocks at specified levels with at least 80% relief for the expected duration of the local anesthetic utilized.    TECHNIQUE: Time-out was performed to identify the patient and procedure to be performed. With the patient laying in a prone position, the surgical area was prepped and  draped in the usual sterile fashion using ChloraPrep and fenestrated drape. The levels were determined under fluoroscopic guidance. Skin anesthesia was achieved by injecting Lidocaine 2% over the injection sites. A 20 gauge 10mm curved active tip needle was introduced to the anatomic local of the medial branch at each of the above levels using AP, lateral and/or contralateral oblique fluoroscopic imaging. Then sensory and motor testing was performed to confirm that the needle tips were in the correct location. After negative aspiration for blood or CSF was confirmed, 1 mL of the lidocaine 2% listed above was injected slowly at each site. This was followed by thermal lesioning at 80 degrees celsius for 90 seconds. That was followed by slowly injecting 1.5 mL of the medication mixture listed above at each site. The needles were removed and bleeding was nil. A sterile dressing was applied. No specimens collected. The patient tolerated the procedure well and did not have any procedure related motor deficit at the conclusion of the procedure.    PRE-PROCEDURE PAIN SCORE: 10/10    POST-PROCEDURE PAIN SCORE: 0/10    The patient was monitored after the procedure in the recovery area. They were given post-procedure and discharge instructions to follow at home. The patient was discharged in a stable condition.        Kaiser Braxton MD

## 2024-09-30 NOTE — DISCHARGE SUMMARY
Discharge Note  Short Stay      SUMMARY     Admit Date: 9/30/2024    Attending Physician: Kaiser Braxton      Discharge Physician: Kaiser Braxton      Discharge Date: 9/30/2024 9:21 AM    Procedure(s) (LRB):  RADIOFREQUENCY ABLATION BILATERAL L3, 4, 5 (Bilateral)    Final Diagnosis: Lumbar spondylosis [M47.816]    Disposition: Home or self care    Patient Instructions:   Current Discharge Medication List        CONTINUE these medications which have NOT CHANGED    Details   aspirin (ECOTRIN) 81 MG EC tablet Take 1 tablet (81 mg total) by mouth once daily.  Qty: 30 tablet, Refills: 12      ciprofloxacin HCl (CIPRO) 500 MG tablet Take 1 tablet (500 mg total) by mouth 2 (two) times daily.  Qty: 20 tablet, Refills: 0      cyclobenzaprine (FLEXERIL) 10 MG tablet Take 1 tablet (10 mg total) by mouth 3 (three) times daily as needed for Muscle spasms. for ten days  Qty: 30 tablet, Refills: 1    Associated Diagnoses: Myofascial pain      famotidine (PEPCID) 20 MG tablet TAKE 1 TABLET BY MOUTH TWICE A DAY  Qty: 180 tablet, Refills: 1    Associated Diagnoses: Acute right lower quadrant pain      fluticasone propionate (FLONASE) 50 mcg/actuation nasal spray SPRAY 1 SPRAY (50 MCG TOTAL) INTO INTO EACH NOSTRIL TWICE A DAY  Qty: 48 mL, Refills: 1      gabapentin (NEURONTIN) 300 MG capsule TAKE 1 CAPSULE BY MOUTH THREE TIMES A DAY  Qty: 90 capsule, Refills: 0    Comments: DX Code Needed  .  Associated Diagnoses: Degeneration of cervical intervertebral disc      meloxicam (MOBIC) 15 MG tablet TAKE 1 TABLET (15 MG TOTAL) BY MOUTH DAILY AS NEEDED FOR PAIN (TAKE WITH FOOD OR A MEAL).  Qty: 90 tablet, Refills: 0    Associated Diagnoses: Degeneration of cervical intervertebral disc      metroNIDAZOLE (FLAGYL) 500 MG tablet Take 1 tablet (500 mg total) by mouth every 8 (eight) hours. No alcohol with this  Qty: 30 tablet, Refills: 0      mupirocin (BACTROBAN) 2 % ointment Apply topically 2 (two) times daily.  Qty: 30 g, Refills: 0    Associated  Diagnoses: Abscess of left knee      rosuvastatin (CRESTOR) 10 MG tablet Take 1 tablet (10 mg total) by mouth once daily.  Qty: 90 tablet, Refills: 3      sildenafiL (VIAGRA) 100 MG tablet Take 100 mg by mouth daily as needed for Erectile Dysfunction.      tamsulosin (FLOMAX) 0.4 mg Cap TAKE 1 CAPSULE BY MOUTH EVERY DAY  Qty: 90 capsule, Refills: 3    Comments: PT REQUEST REFILL ; PLEASE REVIEW. THANKS                 Discharge Diagnosis: Lumbar spondylosis [M47.816]  Condition on Discharge: Stable with no complications to procedure   Diet on Discharge: Same as before.  Activity: as per instruction sheet.  Discharge to: Home with a responsible adult.  Follow up: 2-4 weeks       Please call my office or pager at [phone] if experienced any weakness or loss of sensation, fever > 101.5, pain uncontrolled with oral medications, persistent nausea/vomiting/or diarrhea, redness or drainage from the incisions, or any other worrisome concerns. If physician on call was not reached or could not communicate with our office for any reason please go to the nearest emergency department

## 2024-10-07 ENCOUNTER — OFFICE VISIT (OUTPATIENT)
Dept: INTERNAL MEDICINE | Facility: CLINIC | Age: 69
End: 2024-10-07
Payer: MEDICARE

## 2024-10-07 ENCOUNTER — IMMUNIZATION (OUTPATIENT)
Dept: INTERNAL MEDICINE | Facility: CLINIC | Age: 69
End: 2024-10-07
Payer: MEDICARE

## 2024-10-07 VITALS
WEIGHT: 154 LBS | BODY MASS INDEX: 22.05 KG/M2 | DIASTOLIC BLOOD PRESSURE: 65 MMHG | HEIGHT: 70 IN | SYSTOLIC BLOOD PRESSURE: 120 MMHG

## 2024-10-07 DIAGNOSIS — L57.8 ACTINIC DERMATITIS: ICD-10-CM

## 2024-10-07 DIAGNOSIS — E61.1 IRON DEFICIENCY: ICD-10-CM

## 2024-10-07 DIAGNOSIS — D36.9 TUBULOVILLOUS ADENOMA: ICD-10-CM

## 2024-10-07 DIAGNOSIS — J98.4 CALCIFIED GRANULOMA OF LUNG: ICD-10-CM

## 2024-10-07 DIAGNOSIS — M25.559 HIP PAIN, UNSPECIFIED LATERALITY: ICD-10-CM

## 2024-10-07 DIAGNOSIS — Z00.00 ANNUAL PHYSICAL EXAM: Primary | ICD-10-CM

## 2024-10-07 DIAGNOSIS — Z23 NEED FOR VACCINATION: Primary | ICD-10-CM

## 2024-10-07 DIAGNOSIS — D51.9 ANEMIA DUE TO VITAMIN B12 DEFICIENCY, UNSPECIFIED B12 DEFICIENCY TYPE: ICD-10-CM

## 2024-10-07 PROCEDURE — 1159F MED LIST DOCD IN RCRD: CPT | Mod: CPTII,S$GLB,, | Performed by: INTERNAL MEDICINE

## 2024-10-07 PROCEDURE — 3008F BODY MASS INDEX DOCD: CPT | Mod: CPTII,S$GLB,, | Performed by: INTERNAL MEDICINE

## 2024-10-07 PROCEDURE — 3288F FALL RISK ASSESSMENT DOCD: CPT | Mod: CPTII,S$GLB,, | Performed by: INTERNAL MEDICINE

## 2024-10-07 PROCEDURE — 3044F HG A1C LEVEL LT 7.0%: CPT | Mod: CPTII,S$GLB,, | Performed by: INTERNAL MEDICINE

## 2024-10-07 PROCEDURE — 3074F SYST BP LT 130 MM HG: CPT | Mod: CPTII,S$GLB,, | Performed by: INTERNAL MEDICINE

## 2024-10-07 PROCEDURE — 3078F DIAST BP <80 MM HG: CPT | Mod: CPTII,S$GLB,, | Performed by: INTERNAL MEDICINE

## 2024-10-07 PROCEDURE — 90653 IIV ADJUVANT VACCINE IM: CPT | Mod: S$GLB,,, | Performed by: INTERNAL MEDICINE

## 2024-10-07 PROCEDURE — 99397 PER PM REEVAL EST PAT 65+ YR: CPT | Mod: S$GLB,,, | Performed by: INTERNAL MEDICINE

## 2024-10-07 PROCEDURE — 99999 PR PBB SHADOW E&M-EST. PATIENT-LVL III: CPT | Mod: PBBFAC,,, | Performed by: INTERNAL MEDICINE

## 2024-10-07 PROCEDURE — 1101F PT FALLS ASSESS-DOCD LE1/YR: CPT | Mod: CPTII,S$GLB,, | Performed by: INTERNAL MEDICINE

## 2024-10-07 PROCEDURE — G0008 ADMIN INFLUENZA VIRUS VAC: HCPCS | Mod: S$GLB,,, | Performed by: INTERNAL MEDICINE

## 2024-10-07 PROCEDURE — 1126F AMNT PAIN NOTED NONE PRSNT: CPT | Mod: CPTII,S$GLB,, | Performed by: INTERNAL MEDICINE

## 2024-10-07 RX ORDER — ROPINIROLE 1 MG/1
1 TABLET, FILM COATED ORAL NIGHTLY
Qty: 30 TABLET | Refills: 11 | Status: SHIPPED | OUTPATIENT
Start: 2024-10-07 | End: 2025-10-07

## 2024-10-07 NOTE — PROGRESS NOTES
Patient ID: Sabino Rubio is a 68 y.o. male.    Chief Complaint: Annual Exam      Assessment:       1. Annual physical exam    2. Calcified granuloma of lung: seen on CT 1/22    3. Tubulovillous adenoma: 2013; adenomas 10/22 repeat colonoscopy 2025    4. Hip pain, unspecified laterality    5. Iron deficiency    6. Anemia due to vitamin B12 deficiency, unspecified B12 deficiency type    7. Actinic dermatitis          Plan:           1. Annual physical exam    2. Calcified granuloma of lung: seen on CT 1/22    3. Tubulovillous adenoma: 2013; adenomas 10/22 repeat colonoscopy 2025    4. Hip pain, unspecified laterality  -     Ambulatory referral/consult to Sports Medicine; Future; Expected date: 10/14/2024    5. Iron deficiency  -     Ferritin; Future; Expected date: 10/07/2024  -     Iron and TIBC; Future; Expected date: 10/07/2024    6. Anemia due to vitamin B12 deficiency, unspecified B12 deficiency type  -     Vitamin B12; Future; Expected date: 10/07/2024    7. Actinic dermatitis  -     Ambulatory referral/consult to Dermatology; Future; Expected date: 10/14/2024    Other orders  -     rOPINIRole (REQUIP) 1 MG tablet; Take 1 tablet (1 mg total) by mouth every evening.  Dispense: 30 tablet; Refill: 11       Labs and review  Sports Medicine follow-up for hip issues  Trial of Requip, cautions and side effects reviewed  Dermatology consultation  High-dose flu shot recommended today  He will catch up other vaccines including Prevnar 20, shingles vaccine, COVID booster and RSV    Subjective:   PE    In general doing well other than some orthopedic issues.  He has been having ongoing back issues but also has some discomfort into the hips.  No trauma or injury.  No weakness, numbness or tingling.    Recent labs acceptable.  He does not donate blood.  He also has a little bit of restless legs at night, Requip has helped in the past.          Patient Active Problem List   Diagnosis    Restless leg syndrome    Cervical  spondylosis without myelopathy    Brachial neuritis or radiculitis NOS    Tubulovillous adenoma: 2013; adenomas 10/22 repeat colonoscopy 2025    Kidney cyst, acquired: R 1.2 cm 2015; stable 1/19, 2022; enlarged 10/23    Osteoarthritis of spine with radiculopathy, lumbar region    Arthritis of carpometacarpal (CMC) joint of right thumb    Allergic rhinitis    Spermatocele    Diverticulosis: see colonoscopy 2019    IFG (impaired fasting glucose)    Coronary artery disease: CT score 8 2022    DDD (degenerative disc disease), lumbosacral    Lumbosacral radiculopathy    Spondylosis without myelopathy    Benign prostatic hyperplasia with nocturia    Calcified granuloma of lung: seen on CT 1/22        Review of Systems   Constitutional: Negative.    HENT:  Negative for sinus pressure.    Eyes:  Negative for visual disturbance.   Respiratory:  Negative for apnea and shortness of breath.    Cardiovascular:  Negative for chest pain.   Gastrointestinal:  Negative for abdominal pain, constipation and diarrhea.   Genitourinary:  Negative for difficulty urinating, flank pain, frequency, testicular pain and urgency.   Musculoskeletal:  Positive for arthralgias. Negative for back pain.        See HPI   Skin:  Negative for color change and rash.   Neurological: Negative.    Psychiatric/Behavioral: Negative.           Objective:      Physical Exam  Constitutional:       Appearance: He is well-developed.   HENT:      Head: Normocephalic and atraumatic.      Right Ear: External ear normal.      Left Ear: External ear normal.   Eyes:      Extraocular Movements: Extraocular movements intact.      Conjunctiva/sclera: Conjunctivae normal.   Neck:      Thyroid: No thyromegaly.   Cardiovascular:      Rate and Rhythm: Normal rate and regular rhythm.      Heart sounds: No murmur heard.  Pulmonary:      Effort: Pulmonary effort is normal. No respiratory distress.      Breath sounds: Normal breath sounds. No wheezing.   Abdominal:      General:  There is no distension.      Palpations: Abdomen is soft.      Tenderness: There is no abdominal tenderness.   Musculoskeletal:         General: No tenderness.      Cervical back: Normal range of motion and neck supple.      Right lower leg: No edema.      Left lower leg: No edema.   Lymphadenopathy:      Cervical: No cervical adenopathy.   Skin:     General: Skin is warm and dry.      Comments: Actinic changes no dominant lesions   Neurological:      General: No focal deficit present.      Mental Status: He is alert and oriented to person, place, and time.      Cranial Nerves: No cranial nerve deficit.   Psychiatric:         Mood and Affect: Mood normal.         Behavior: Behavior normal.         Thought Content: Thought content normal.         Judgment: Judgment normal.             Health Maintenance Due   Topic Date Due    RSV Vaccine (Age 60+ and Pregnant patients) (1 - Risk 60-74 years 1-dose series) Never done    Shingles Vaccine (3 of 3) 03/02/2022    Pneumococcal Vaccines (Age 65+) (2 of 2 - PPSV23 or PCV20) 03/02/2022    Influenza Vaccine (1) 09/01/2024    COVID-19 Vaccine (4 - 2024-25 season) 09/01/2024

## 2024-10-12 DIAGNOSIS — M50.30 DEGENERATION OF CERVICAL INTERVERTEBRAL DISC: ICD-10-CM

## 2024-10-12 DIAGNOSIS — R10.31 ACUTE RIGHT LOWER QUADRANT PAIN: ICD-10-CM

## 2024-10-12 NOTE — TELEPHONE ENCOUNTER
No care due was identified.  [facility] Embedded Care Due Messages. Reference number: [id number].   10/12/2024 7:27:10 AM CDT

## 2024-10-13 RX ORDER — FAMOTIDINE 20 MG/1
20 TABLET, FILM COATED ORAL 2 TIMES DAILY
Qty: 180 TABLET | Refills: 3 | Status: SHIPPED | OUTPATIENT
Start: 2024-10-13

## 2024-10-13 RX ORDER — MELOXICAM 15 MG/1
15 TABLET ORAL DAILY PRN
Qty: 90 TABLET | Refills: 0 | Status: SHIPPED | OUTPATIENT
Start: 2024-10-13

## 2024-10-13 NOTE — TELEPHONE ENCOUNTER
Refill Routing Note   Medication(s) are not appropriate for processing by [facility] for the following reason(s):        Outside of protocol    [facility] action(s):  Route  Approve             Appointments  past 12m or future 3m with PCP    Date Provider   Last Visit   10/7/2024 Alba Baum MD   Next Visit   Visit date not found Alba Baum MD   ED visits in past 90 days: 0        Note composed:12:05 AM 10/13/2024

## 2024-10-21 ENCOUNTER — OFFICE VISIT (OUTPATIENT)
Dept: PAIN MEDICINE | Facility: CLINIC | Age: 69
End: 2024-10-21
Payer: MEDICARE

## 2024-10-21 VITALS
SYSTOLIC BLOOD PRESSURE: 126 MMHG | WEIGHT: 158.75 LBS | HEART RATE: 73 BPM | DIASTOLIC BLOOD PRESSURE: 83 MMHG | BODY MASS INDEX: 22.73 KG/M2 | RESPIRATION RATE: 12 BRPM | OXYGEN SATURATION: 100 % | HEIGHT: 70 IN

## 2024-10-21 DIAGNOSIS — M51.370 DEGENERATION OF INTERVERTEBRAL DISC OF LUMBOSACRAL REGION WITH DISCOGENIC BACK PAIN: ICD-10-CM

## 2024-10-21 DIAGNOSIS — M47.816 LUMBAR SPONDYLOSIS: Primary | ICD-10-CM

## 2024-10-21 DIAGNOSIS — M46.1 SACROILIITIS: ICD-10-CM

## 2024-10-21 DIAGNOSIS — M50.30 DEGENERATION OF CERVICAL INTERVERTEBRAL DISC: ICD-10-CM

## 2024-10-21 DIAGNOSIS — M54.16 LUMBAR RADICULOPATHY: ICD-10-CM

## 2024-10-21 DIAGNOSIS — M54.17 LUMBOSACRAL RADICULOPATHY: ICD-10-CM

## 2024-10-21 PROCEDURE — 3074F SYST BP LT 130 MM HG: CPT | Mod: CPTII,S$GLB,,

## 2024-10-21 PROCEDURE — 3044F HG A1C LEVEL LT 7.0%: CPT | Mod: CPTII,S$GLB,,

## 2024-10-21 PROCEDURE — 1125F AMNT PAIN NOTED PAIN PRSNT: CPT | Mod: CPTII,S$GLB,,

## 2024-10-21 PROCEDURE — 3008F BODY MASS INDEX DOCD: CPT | Mod: CPTII,S$GLB,,

## 2024-10-21 PROCEDURE — 99999 PR PBB SHADOW E&M-EST. PATIENT-LVL IV: CPT | Mod: PBBFAC,,,

## 2024-10-21 PROCEDURE — 3079F DIAST BP 80-89 MM HG: CPT | Mod: CPTII,S$GLB,,

## 2024-10-21 PROCEDURE — 1101F PT FALLS ASSESS-DOCD LE1/YR: CPT | Mod: CPTII,S$GLB,,

## 2024-10-21 PROCEDURE — 3288F FALL RISK ASSESSMENT DOCD: CPT | Mod: CPTII,S$GLB,,

## 2024-10-21 PROCEDURE — 1159F MED LIST DOCD IN RCRD: CPT | Mod: CPTII,S$GLB,,

## 2024-10-21 PROCEDURE — 1160F RVW MEDS BY RX/DR IN RCRD: CPT | Mod: CPTII,S$GLB,,

## 2024-10-21 PROCEDURE — 99214 OFFICE O/P EST MOD 30 MIN: CPT | Mod: S$GLB,,,

## 2024-10-21 RX ORDER — GABAPENTIN 300 MG/1
300 CAPSULE ORAL 3 TIMES DAILY
Qty: 270 CAPSULE | Refills: 1 | Status: SHIPPED | OUTPATIENT
Start: 2024-10-21

## 2024-10-29 ENCOUNTER — CLINICAL SUPPORT (OUTPATIENT)
Dept: REHABILITATION | Facility: HOSPITAL | Age: 69
End: 2024-10-29
Payer: MEDICARE

## 2024-10-29 DIAGNOSIS — R29.898 DECREASED STRENGTH OF TRUNK AND BACK: Primary | ICD-10-CM

## 2024-10-29 DIAGNOSIS — M46.1 SACROILIITIS: ICD-10-CM

## 2024-10-29 DIAGNOSIS — M47.816 LUMBAR SPONDYLOSIS: ICD-10-CM

## 2024-10-29 DIAGNOSIS — M51.370 DEGENERATION OF INTERVERTEBRAL DISC OF LUMBOSACRAL REGION WITH DISCOGENIC BACK PAIN: ICD-10-CM

## 2024-10-29 PROCEDURE — 97110 THERAPEUTIC EXERCISES: CPT | Performed by: PHYSICAL MEDICINE & REHABILITATION

## 2024-10-29 PROCEDURE — 97162 PT EVAL MOD COMPLEX 30 MIN: CPT | Performed by: PHYSICAL MEDICINE & REHABILITATION

## 2024-10-31 ENCOUNTER — CLINICAL SUPPORT (OUTPATIENT)
Dept: REHABILITATION | Facility: HOSPITAL | Age: 69
End: 2024-10-31
Payer: MEDICARE

## 2024-10-31 DIAGNOSIS — R29.898 DECREASED STRENGTH OF TRUNK AND BACK: Primary | ICD-10-CM

## 2024-10-31 PROCEDURE — 97112 NEUROMUSCULAR REEDUCATION: CPT

## 2024-10-31 PROCEDURE — 97110 THERAPEUTIC EXERCISES: CPT

## 2024-11-04 ENCOUNTER — HOSPITAL ENCOUNTER (OUTPATIENT)
Dept: RADIOLOGY | Facility: HOSPITAL | Age: 69
Discharge: HOME | End: 2024-11-04
Attending: PHYSICIAN ASSISTANT
Payer: MEDICARE

## 2024-11-04 ENCOUNTER — OFFICE VISIT (OUTPATIENT)
Dept: SPORTS MEDICINE | Facility: CLINIC | Age: 69
End: 2024-11-04
Payer: MEDICARE

## 2024-11-04 VITALS
SYSTOLIC BLOOD PRESSURE: 132 MMHG | HEART RATE: 68 BPM | HEIGHT: 70 IN | BODY MASS INDEX: 22.83 KG/M2 | DIASTOLIC BLOOD PRESSURE: 86 MMHG | WEIGHT: 159.5 LBS

## 2024-11-04 DIAGNOSIS — M25.512 LEFT SHOULDER PAIN, UNSPECIFIED CHRONICITY: ICD-10-CM

## 2024-11-04 DIAGNOSIS — M17.12 PRIMARY OSTEOARTHRITIS OF LEFT KNEE: ICD-10-CM

## 2024-11-04 DIAGNOSIS — M25.512 CHRONIC LEFT SHOULDER PAIN: ICD-10-CM

## 2024-11-04 DIAGNOSIS — G89.29 CHRONIC LEFT SHOULDER PAIN: ICD-10-CM

## 2024-11-04 DIAGNOSIS — M75.102 ROTATOR CUFF SYNDROME OF LEFT SHOULDER: ICD-10-CM

## 2024-11-04 DIAGNOSIS — M25.512 LEFT SHOULDER PAIN, UNSPECIFIED CHRONICITY: Primary | ICD-10-CM

## 2024-11-04 PROCEDURE — 1125F AMNT PAIN NOTED PAIN PRSNT: CPT | Mod: CPTII,S$GLB,, | Performed by: PHYSICIAN ASSISTANT

## 2024-11-04 PROCEDURE — 73030 X-RAY EXAM OF SHOULDER: CPT | Mod: TC,LT

## 2024-11-04 PROCEDURE — 3044F HG A1C LEVEL LT 7.0%: CPT | Mod: CPTII,S$GLB,, | Performed by: PHYSICIAN ASSISTANT

## 2024-11-04 PROCEDURE — 3008F BODY MASS INDEX DOCD: CPT | Mod: CPTII,S$GLB,, | Performed by: PHYSICIAN ASSISTANT

## 2024-11-04 PROCEDURE — 73030 X-RAY EXAM OF SHOULDER: CPT | Mod: 26,LT,, | Performed by: RADIOLOGY

## 2024-11-04 PROCEDURE — 3075F SYST BP GE 130 - 139MM HG: CPT | Mod: CPTII,S$GLB,, | Performed by: PHYSICIAN ASSISTANT

## 2024-11-04 PROCEDURE — 3079F DIAST BP 80-89 MM HG: CPT | Mod: CPTII,S$GLB,, | Performed by: PHYSICIAN ASSISTANT

## 2024-11-04 PROCEDURE — 3288F FALL RISK ASSESSMENT DOCD: CPT | Mod: CPTII,S$GLB,, | Performed by: PHYSICIAN ASSISTANT

## 2024-11-04 PROCEDURE — 1159F MED LIST DOCD IN RCRD: CPT | Mod: CPTII,S$GLB,, | Performed by: PHYSICIAN ASSISTANT

## 2024-11-04 PROCEDURE — 1160F RVW MEDS BY RX/DR IN RCRD: CPT | Mod: CPTII,S$GLB,, | Performed by: PHYSICIAN ASSISTANT

## 2024-11-04 PROCEDURE — 99214 OFFICE O/P EST MOD 30 MIN: CPT | Mod: S$GLB,,, | Performed by: PHYSICIAN ASSISTANT

## 2024-11-04 PROCEDURE — 1101F PT FALLS ASSESS-DOCD LE1/YR: CPT | Mod: CPTII,S$GLB,, | Performed by: PHYSICIAN ASSISTANT

## 2024-11-04 PROCEDURE — 99999 PR PBB SHADOW E&M-EST. PATIENT-LVL IV: CPT | Mod: PBBFAC,,, | Performed by: PHYSICIAN ASSISTANT

## 2024-11-04 NOTE — PROGRESS NOTES
CC: Left shoulder pain     68 y.o. RHD Male presents for evaluation for his left shoulder. He was previously seen by Dr. Patel for this shoulder in 2021- MRI demonstrated partial thickness tear of his rotator cuff. He was injected and did formal PT. He had not been having any issues with it until this past weekend. Complaint is left shoulder pain x Saturday. Atraumatic onset. Says he worked in the yard all day Saturday and afterwards he had increased soreness in the shoulder. Denies any distinct pop. Pain localizes throughout the shoulder, mostly over the top. Worse with overhead motion. Pain is disruptive to sleep at night. Better with rest. Denies neck pain or radicular symptoms. Treatment thus far has included activity modifications, rest, and oral medication.  Here today to discuss diagnosis and treatment options.       Pain Score:   4    PAST MEDICAL HISTORY:   Past Medical History:   Diagnosis Date    Allergy     Arthritis     Family history of malignant neoplasm of gastrointestinal tract mat.gf    Family history of prostate cancer: 1/2 brother 09/25/2017    GERD (gastroesophageal reflux disease)     Hyperlipidemia     Kidney cyst, acquired: R 1.2 cm 2015 09/25/2017    Restless leg syndrome     Tubulovillous adenoma 2013 due 2016 09/14/2015       PAST SURGICAL HISTORY:  Past Surgical History:   Procedure Laterality Date    APPLICATION OF BONE GRAFT TO FINGER Left 11/8/2021    Procedure: APPLICATION INTEGRA GRAFT, TO FINGER;  Surgeon: Alba Penn MD;  Location: [facility];  Service: Orthopedics;  Laterality: Left;    CARPAL TUNNEL RELEASE      COLONOSCOPY N/A 5/22/2019    Procedure: COLONOSCOPY;  Surgeon: Juancarlos Foley MD;  Location: [address]);  Service: Endoscopy;  Laterality: N/A;    COLONOSCOPY N/A 10/12/2022    Procedure: COLONOSCOPY;  Surgeon: Cherelle Wang MD;  Location: [facility] (4TH [facility]);  Service: Endoscopy;  Laterality: N/A;  vacc-inst portal-am prep-clears 4 hrs prior-tb     COLONOSCOPY N/A 5/30/2024    Procedure: COLONOSCOPY;  Surgeon: Nick Kramer MD;  Location: [facility] ENDO (4TH FLR);  Service: Endoscopy;  Laterality: N/A;  Prep instructions sent via portal-dw  Peg Prep-dw  5/22/24- LVM for precall - ERW  5/29-received message from pt's wife confirming appt-Kpvt    EXCISION OF GANGLION OF WRIST Right 6/28/2019    Procedure: EXCISION, GANGLION CYST, WRIST right;  Surgeon: Alba Penn MD;  Location: [facility] OR 1ST FLR;  Service: Orthopedics;  Laterality: Right;  regional MAC, stretcher, supine, hand pan 1 and 2,MINI C-arm, call Arthrex    INJECTION OF ANESTHETIC AGENT AROUND NERVE Bilateral 1/27/2022    Procedure: Block, Nerve MEDIAL BRANCH BLOCK BILATERAL L3,4,5  DIRECT REFERRAL 1 OF 2;  Surgeon: Kaiser Braxton MD;  Location: [facility] PAIN MGT;  Service: Pain Management;  Laterality: Bilateral;    INJECTION OF ANESTHETIC AGENT AROUND NERVE Bilateral 2/10/2022    Procedure: Block, Nerve MEDIAL BRANCH BLOCK BILATERAL L3.4.5  DIRECT REFERRAL 2 OF 2;  Surgeon: Kaiser Braxton MD;  Location: [facility] PAIN MGT;  Service: Pain Management;  Laterality: Bilateral;    INJECTION OF FACET JOINT Bilateral 6/6/2019    Procedure: INJECTION, FACET JOINT INJECTION (LUMBAR BLOCK) BILATERAL L4-L5 AND L5-S1 FACET INJECTIONS;  Surgeon: Kaiser Braxton MD;  Location: [facility] PAIN MGT;  Service: Pain Management;  Laterality: Bilateral;  NEEDS CONSENT    INJECTION, SACROILIAC JOINT Right 12/8/2022    Procedure: INJECTION,SACROILIAC JOINT RIGHT  CONTRAST;  Surgeon: Kaiser Braxton MD;  Location: [facility] PAIN MGT;  Service: Pain Management;  Laterality: Right;    INTERPOSITION ARTHROPLASTY OF CARPOMETACARPAL JOINTS Right 6/28/2019    Procedure: INTERPOSITION ARTHROPLASTY, CMC JOINT right;  Surgeon: Alba Penn MD;  Location: [facility] OR 1ST FLR;  Service: Orthopedics;  Laterality: Right;  regional MAC, stretcher, supine, hand pan 1 and 2,MINI C-arm, call Arthrex    IRRIGATION AND DEBRIDEMENT OF UPPER EXTREMITY Left  11/8/2021    Procedure: IRRIGATION AND DEBRIDEMENT, UPPER EXTREMITY, left index finger;  Surgeon: Alba Penn MD;  Location: [facility] OR;  Service: Orthopedics;  Laterality: Left;    RADIOFREQUENCY ABLATION Right 5/16/2022    Procedure: Radiofrequency Ablation RIGHT L3,4,5;  Surgeon: Kaiser Braxton MD;  Location: [facility] PAIN MGT;  Service: Pain Management;  Laterality: Right;    RADIOFREQUENCY ABLATION Left 6/2/2022    Procedure: Radiofrequency Ablation LEFT L3,4,5;  Surgeon: Kaiser Braxton MD;  Location: [facility] PAIN MGT;  Service: Pain Management;  Laterality: Left;    RADIOFREQUENCY ABLATION Right 7/17/2023    Procedure: RADIOFREQUENCY ABLATION, RIGHT L3,L4,L5 MEDIAL BRANCH ONE OF TWO;  Surgeon: Kaiser Braxton MD;  Location: [facility] PAIN MGT;  Service: Pain Management;  Laterality: Right;    RADIOFREQUENCY ABLATION Left 9/11/2023    Procedure: RADIOFREQUENCY ABLATION, LEFT L3,L4,L5 MEDIAL BRANCH TWO OF TWO;  Surgeon: Kaiser Braxton MD;  Location: [facility] PAIN MGT;  Service: Pain Management;  Laterality: Left;    RADIOFREQUENCY ABLATION Bilateral 9/30/2024    Procedure: RADIOFREQUENCY ABLATION BILATERAL L3, 4, 5;  Surgeon: Kaiser Braxton MD;  Location: [facility] PAIN MGT;  Service: Pain Management;  Laterality: Bilateral;  [phone]  3 WK F/U CLAUDIA    REPAIR OF NAIL BED Left 11/8/2021    Procedure: REPAIR, NAIL BED, left index;  Surgeon: Alba Penn MD;  Location: [facility] OR;  Service: Orthopedics;  Laterality: Left;    SPERMATOCELECTOMY Right 11/8/2019    Procedure: EXCISION, SPERMATOCELE;  Surgeon: Sheldon Araya Jr., MD;  Location: [facility] OR 1ST FLR;  Service: Urology;  Laterality: Right;  1hr    TRANSFORAMINAL EPIDURAL INJECTION OF STEROID Bilateral 10/27/2022    Procedure: INJECTION, STEROID, EPIDURAL, TRANSFORAMINAL APPROACH, BILATERAL L5-S1 CONTRAST;  Surgeon: Kaiser Braxton MD;  Location: [facility] PAIN MGT;  Service: Pain Management;  Laterality: Bilateral;       FAMILY HISTORY:  Family History   Problem Relation Name Age  of Onset    Arthritis Mother          probable R.A.    Cancer Father Smoker         esophageal ca and brain tumor    Esophageal cancer Father Smoker     Melanoma Father Smoker     Irritable bowel syndrome Sister      Arthritis Sister      Arthritis Sister          rheumatoid    Cancer Sister          skin    Cancer Brother 1/2         pancreatic cancer    No Known Problems Brother      No Known Problems Brother      No Known Problems Son Jimmie     No Known Problems Son Ra     Cancer Maternal Grandfather          colon    Colon cancer Maternal Grandfather      Diabetes Neg Hx      Heart disease Neg Hx      Cirrhosis Neg Hx      Celiac disease Neg Hx      Crohn's disease Neg Hx      Ulcerative colitis Neg Hx      Stomach cancer Neg Hx      Rectal cancer Neg Hx      Liver cancer Neg Hx      Psoriasis Neg Hx      Lupus Neg Hx         MEDICATIONS:    Current Outpatient Medications:     aspirin (ECOTRIN) 81 MG EC tablet, Take 1 tablet (81 mg total) by mouth once daily., Disp: 30 tablet, Rfl: 12    ciprofloxacin HCl (CIPRO) 500 MG tablet, Take 1 tablet (500 mg total) by mouth 2 (two) times daily., Disp: 20 tablet, Rfl: 0    cyclobenzaprine (FLEXERIL) 10 MG tablet, Take 1 tablet (10 mg total) by mouth 3 (three) times daily as needed for Muscle spasms. for ten days, Disp: 30 tablet, Rfl: 1    famotidine (PEPCID) 20 MG tablet, TAKE 1 TABLET BY MOUTH TWICE A DAY, Disp: 180 tablet, Rfl: 3    fluticasone propionate (FLONASE) 50 mcg/actuation nasal spray, SPRAY 1 SPRAY (50 MCG TOTAL) INTO INTO EACH NOSTRIL TWICE A DAY, Disp: 48 mL, Rfl: 1    gabapentin (NEURONTIN) 300 MG capsule, Take 1 capsule (300 mg total) by mouth 3 (three) times daily., Disp: 270 capsule, Rfl: 1    meloxicam (MOBIC) 15 MG tablet, TAKE 1 TABLET (15 MG TOTAL) BY MOUTH DAILY AS NEEDED FOR PAIN (TAKE WITH FOOD OR A MEAL)., Disp: 90 tablet, Rfl: 0    metroNIDAZOLE (FLAGYL) 500 MG tablet, Take 1 tablet (500 mg total) by mouth every 8 (eight) hours. No alcohol  "with this, Disp: 30 tablet, Rfl: 0    mupirocin (BACTROBAN) 2 % ointment, Apply topically 2 (two) times daily., Disp: 30 g, Rfl: 0    rOPINIRole (REQUIP) 1 MG tablet, Take 1 tablet (1 mg total) by mouth every evening., Disp: 30 tablet, Rfl: 11    rosuvastatin (CRESTOR) 10 MG tablet, Take 1 tablet (10 mg total) by mouth once daily., Disp: 90 tablet, Rfl: 3    sildenafiL (VIAGRA) 100 MG tablet, Take 100 mg by mouth daily as needed for Erectile Dysfunction., Disp: , Rfl:     tamsulosin (FLOMAX) 0.4 mg Cap, TAKE 1 CAPSULE BY MOUTH EVERY DAY, Disp: 90 capsule, Rfl: 3  No current facility-administered medications for this visit.    Facility-Administered Medications Ordered in Other Visits:     0.9%  NaCl infusion, , Intravenous, Continuous, Cole Fitzpatrick, DO    ALLERGIES:  Review of patient's allergies indicates:  No Known Allergies     REVIEW OF SYSTEMS:  Constitution: Negative. Negative for chills, fever and night sweats.    Hematologic/Lymphatic: Negative for bleeding problem. Does not bruise/bleed easily.   Skin: Negative for dry skin, itching and rash.   Musculoskeletal: Negative for falls. Positive for left shoulder pain and muscle weakness.     All other review of symptoms were reviewed and found to be noncontributory.    PHYSICAL EXAMINATION:  Vitals:  /86 (BP Location: Right arm, Patient Position: Sitting)   Pulse 68   Ht 5' 10" (1.778 m)   Wt 72.3 kg (159 lb 8 oz)   BMI 22.89 kg/m²    General: Well-developed well-nourished 68 y.o. malein no acute distress   Cardiovascular: Regular rhythm by palpation of distal pulse, normal color and temperature, no concerning varicosities on symptomatic side   Lungs: No labored breathing or wheezing appreciated   Neuro: Alert and oriented ×3   Psychiatric: well oriented to person, place and time, demonstrates normal mood and affect   Skin: No rashes, lesions or ulcers, normal temperature, turgor, and texture on uninvolved extremity    Ortho/SPM Exam  Examination of " the left shoulder demonstrates active forward elevation to 110, ER with arm at side to 40 IR to L1. Passive FE to 150, ER to 60. Prominent tenderness along the proximal biceps tendon. Negative AC tenderness. 4/5 resisted supraspinatus testing. 4/5 resisted infraspinatus testing. Positive belly press test. Stable shoulder. No midline neck tenderness. Negative Spurling's maneuver.     IMAGING:  Xrays including AP, Outlet and Axillary Lateral of Left shoulder are ordered / images reviewed by me:   No fracture or acute change appreciated. No significant glenohumeral degenerative changes. Mild to moderate AC joint arthritis. Normal acromiohumeral distance.     ASSESSMENT:      ICD-10-CM ICD-9-CM   1. Left shoulder pain, unspecified chronicity  M25.512 719.41   2. Chronic left shoulder pain  M25.512 719.41    G89.29 338.29   3. Primary osteoarthritis of left knee  M17.12 715.16   4. Rotator cuff syndrome of left shoulder  M75.102 726.10       PLAN:     -Findings and treatment options were discussed with the patient. He has a history of a partial thickness cuff tear that improved with CSI and formal therapy. He says this pain feels worse and he is concerned he made the tear worse. Will get CSI for further evaluation prior to CSI.  -We have discussed a variety of treatment options including medications, injections, physical therapy and other alternative treatments. I also explained the indications, risks and benefits of surgery. Given the patient's hx and examination, we should proceed with MRI at this time. Pt agrees with treatment plan.    I made the decision to obtain old records of the patient including previous notes and imaging. I independently reviewed and interpreted lab results today as well as prior imaging.     1. MRI left shoulder to assess for rotator cuff pathology.   2. Ice compress to the affected area 2-3x a day for 15-20 minutes as needed for pain management.  3. Meloxicam 15 mg  daily PRN for pain  management.  5. RTC to see me for follow-up and MRI results.     All of the patient's questions were answered and the patient will contact us if they have any questions or concerns in the interim.

## 2024-11-04 NOTE — PROGRESS NOTES
[facility]      Name: Sabino Rubio  Clinic Number: [id number]    Therapy Diagnosis:   Encounter Diagnosis   Name Primary?    Decreased strength of trunk and back Yes     Physician: Bridgette Rodriguez NP    Visit Date: 11/5/2024    Physician Orders: PT Eval and Treat  Medical Diagnosis from Referral: M47.816 (ICD-10-CM) - Lumbar [id number] (ICD-10-CM) - [id number] (ICD-10-CM) - Degeneration of intervertebral disc of lumbosacral region with discogenic back pain  Evaluation Date: 10/29/2024  Authorization Period Expiration: 12/31/24  Plan of Care Expiration: 1/7/2025  Reassessment Due: 11/29/2024  Visit # / Visits authorized: 3/20  MedX Testing:MedX testing visit 2    Time In: 7:55 AM  Time Out: 8:55 AM  Total Billable Time: 55 minutes  INSURANCE and OUTCOMES: Fee for Service with FOTO Outcomes 1/3    Precautions: Standard    Pattern of pain determined: 4 PEN (gentle extension based exercises started to address mobility issues with traffic light model taught)     Subjective   Sabino reports chronic lower back/glute pain that tends to be worse in the mornings and gets better with movement. He reports aggravating his (L) shoulder shoveling dirt and is awaiting a MRI later this week.    Patient reports tolerating previous visit: No c/o  Patient reports their pain to be 3/10 on a 0-10 scale with 0 being no pain and 10 being the worst pain imaginable.  Pain Location: low back     Occupation: in construction, work part time, lifting materials, drives a van, tries to delegate (self employed)  Leisure: fish, has boat, outdoor stuff, play with grandkids  Pt goals: reduce pain especially in the morning,  grand kids, be able to walk a few miles daily     Objective     Cervical IM Testing Results:    Initial 10/31/24   ROM 3-42 deg   Max Peak Torque 76    Min Peak Torque 29    Flex/Ext Ratio 2.6:1   % below normative data 75%     Outcomes:  Initial score: 59% functional  "ability   Visit 5 score:  Goal: 70% functional ability       Treatment    Sabino received the treatments listed below:    Sabino received neuromuscular education  to isolate and engage spinal stabilization musculature correctly for motor control and coordination to aid in function and posture for 10 minutes on the Medical Medx Machine.  Patient performed MedX dynamic exercise with emphasis on spinal muscular control using pacer throughout  active range of motion. Therapist assisted patient in achieving optimal exertion for neural reeducation and endurance training by using the  Shakir Exertion Rating scale, by instructing the patient to aim for mid range of exertion, performing 15-20 repetitions, slowly, correctly,and safely.          11/5/2024     8:04 AM   [facility] - Short   Visit Number 3   VAS Pain Rating 3   Treadmill Time (in min.) 5 min   Lumbar Stretches - Slouch 10   Extension in Standing 10   Lumbar Weight 38 lbs   Repetitions 20   Rating of Perceived Exertion 2      therapeutic exercises to develop strength, endurance, ROM, flexibility, posture, and core stabilization for 45 minutes including:    Slouch correct  (before getting out of chair and in am) x 10  Gentle ext in standing x 10  Hip ext rotation stretch 20" x 2  Piriformis stretch 20" x 2  Lower trunk rotation x 10  transverse abdominal activation / PPT x 15 + SLR  BFKO with transverse abdominal activation + RTB x 10  + Bridging w/RTB x 15  + SL Clamshells RTB x 15       Peripheral muscle strengthening which included 1 set of 15-20 repetitions at a slow, controlled 10-13 second per rep pace focused on strengthening supporting musculature for improved body mechanics and functional mobility.  Pt and therapist focused on proper form during treatment to ensure optimal strengthening of each targeted muscle group.  Machines were utilized including torso rotation, leg press, hip abd and hip add, leg ext.  Leg curl, triceps, biceps, chest and row " added visit 3       manual therapy techniques:  were applied to the: low back for 00 minutes, including:    cold pack for 5 minutes to low back and L shoulder     Home Exercises Provided and Patient Education Provided   Home exercises include: SOC, EIS, hip ER stretch, piriformis stretch, LTR  Cardio program: visit 5  Lifting education date: visit 11  Posture/Lumbar roll:  recommended  Fridge Magnet Discharge handout (date given):  Equipment at home/gym membership: no    Education provided:   -  cues w/exs  MedX performance  Precor ex performance   HB Protocol    Written Home Exercises Provided: Patient instructed to cont prior HEP.  Exercises were reviewed and Sabino was able to demonstrate them prior to the end of the session.  Sabino demonstrated good  understanding of the education provided.     See EMR under Patient Instructions for exercises provided prior visit.    Assessment   Sabino returns for his third healthy back visit reporting chronic lower back and glute pain. Pain level = minimal currently.  Treatment continued with flexibility, strengthening and neuromuscular reeducation ex's. Added bridging w/band, SL clamshells, RTB for BKFO and increased reps for TrA + SLR. He was able to perform ex's with min cues and without increased pain.  Lumbar MedX resistance was initiated at 38 ft/lbs and he completed 20 reps with a RPE = 2/10.(Cues for pacing and extension hold).  He completed peripheral strengthening ex's without c/o (UE ex's were deferred today due to acute (L) shoulder injury.  Will continue per HB protocol and patient tolerance.    Patient is making  progress towards established goals.  Pt will continue to benefit from skilled outpatient physical therapy to address the deficits stated in the impairment chart, provide pt/family education and to maximize pt's level of independence in the home and community environment.     Anticipated Barriers for therapy: attendance, chronicity of symptoms  Pt's spiritual,  cultural and educational needs considered and pt agreeable to plan of care and goals as stated below:       Goals:   Short term goals:  6 weeks or 10 visits   - Pt will demonstrate increased lumbar MedX ROM by at least 3 degrees from the initial ROM value with improvements noted in functional ROM and ability to perform ADLs. Appropriate and Ongoing  - Pt will demonstrate increased MedX average isometric strength value by 20% from initial test resulting in improved ability to perform bending, lifting, and carrying activities safely, confidently. Appropriate and Ongoing  - Pt will report a reduction in worst pain score by 1-2 points for improved tolerance for 8/10 versus initial 10/10.  Less stiff in am. Appropriate and Ongoing  - Pt able to perform HEP correctly with minimal cueing or supervision from therapist to encourage independent management of symptoms. Appropriate and Ongoing     Long term goals: 10 weeks or 20 visits   - Pt will demonstrate increased lumbar MedX ROM by at least 6 degrees from initial ROM value, resulting in improved ability to perform functional forward bending while standing and sitting. Appropriate and Ongoing  - Pt will demonstrate increased MedX average isometric strength value by 40% from initial test resulting in improved ability to perform bending, lifting, and carrying activities safely and confidently. Appropriate and Ongoing  - Pt to demonstrate ability to independently control and reduce their pain through posture positioning and mechanical movements throughout a typical day. Appropriate and Ongoing  - Pt will demonstrate reduced pain and improved functional outcomes as reported on the FOTO by reaching an intake score of >/= 70% functional ability in order to demonstrate subjective improvement in patient's condition. . Appropriate and Ongoing  - Pt will demonstrate independence with the HEP at discharge. Appropriate and Ongoing  - Pts goals: reduce pain especially in the morning,   grand kids, be able to walk a few miles daily   Appropriate and Ongoing    Plan   Continue with established Plan of Care towards established PT goals.     Therapist: Gordo Jimenez, PTA  11/4/2024

## 2024-11-05 ENCOUNTER — CLINICAL SUPPORT (OUTPATIENT)
Dept: REHABILITATION | Facility: HOSPITAL | Age: 69
End: 2024-11-05
Payer: MEDICARE

## 2024-11-05 DIAGNOSIS — R29.898 DECREASED STRENGTH OF TRUNK AND BACK: Primary | ICD-10-CM

## 2024-11-05 PROCEDURE — 97112 NEUROMUSCULAR REEDUCATION: CPT | Mod: CQ

## 2024-11-05 PROCEDURE — 97110 THERAPEUTIC EXERCISES: CPT | Mod: CQ

## 2024-11-06 NOTE — PROGRESS NOTES
[facility]      Name: Sabino Rubio  Clinic Number: [id number]    Therapy Diagnosis:   Encounter Diagnosis   Name Primary?    Decreased strength of trunk and back Yes     Physician: Bridgette Rodriguez NP    Visit Date: 11/7/2024    Physician Orders: PT Eval and Treat  Medical Diagnosis from Referral: M47.816 (ICD-10-CM) - Lumbar [id number] (ICD-10-CM) - [id number] (ICD-10-CM) - Degeneration of intervertebral disc of lumbosacral region with discogenic back pain  Evaluation Date: 10/29/2024  Authorization Period Expiration: 12/31/2024  Plan of Care Expiration: 1/7/2025  Reassessment Due: 11/29/2024  Visit # / Visits authorized: 4/20  MedX Testing:MedX testing visit 2    Time In: 8:00 AM  Time Out: 9:00 AM  Total Billable Time: 55 minutes  INSURANCE and OUTCOMES: Fee for Service with FOTO Outcomes 1/3    Precautions: Standard    Pattern of pain determined: 4 PEN (gentle extension based exercises started to address mobility issues with traffic light model taught)     Subjective   Sabino reports back pain continues to be worse in the mornings and gets better with movement. He states the stretches are helping. He is scheduled for a MRI on his Left shoulder tomorrow.    Patient reports tolerating previous visit: No c/o  Patient reports their pain to be 2/10 on a 0-10 scale with 0 being no pain and 10 being the worst pain imaginable.  Pain Location: low back     Occupation: in construction, work part time, lifting materials, drives a van, tries to delegate (self employed)  Leisure: fish, has boat, outdoor stuff, play with grandkids  Pt goals: reduce pain especially in the morning,  grand kids, be able to walk a few miles daily     Objective     Cervical IM Testing Results:    Initial 10/31/24   ROM 3-42 deg   Max Peak Torque 76    Min Peak Torque 29    Flex/Ext Ratio 2.6:1   % below normative data 75%     Outcomes:  Initial score: 59% functional ability   Visit 5  "score:  Goal: 70% functional ability       Treatment    Sabino received the treatments listed below:      Sabino received neuromuscular education  to isolate and engage spinal stabilization musculature correctly for motor control and coordination to aid in function and posture for 10 minutes on the Medical Medx Machine.  Patient performed MedX dynamic exercise with emphasis on spinal muscular control using pacer throughout  active range of motion. Therapist assisted patient in achieving optimal exertion for neural reeducation and endurance training by using the  Shakir Exertion Rating scale, by instructing the patient to aim for mid range of exertion, performing 15-20 repetitions, slowly, correctly,and safely.          11/7/2024     8:00 AM   [facility]   Visit Number 4   VAS Pain Rating 2   Treadmill Time (in min.) 5 min   Lumbar Stretches - Slouch Overcorrection 10   Extension in Standing 10   Lumbar Weight 45 lbs   Repetitions 20   Rating of Perceived Exertion 3   Ice - Z Lie (in min.) 5      therapeutic exercises to develop strength, endurance, ROM, flexibility, posture, and core stabilization for 45 minutes including:    Slouch correct  (before getting out of chair and in am) x 10  Gentle ext in standing x 10  Hip ext rotation stretch 20" x 2  Piriformis stretch 20" x 2  Lower trunk rotation x 10  transverse abdominal activation / PPT + SLR x15  BFKO with transverse abdominal activation + RTB x 10  Bridging c/ RTB x 15  SL Clamshells RTB x 15     Peripheral muscle strengthening which included 1 set of 15-20 repetitions at a slow, controlled 10-13 second per rep pace focused on strengthening supporting musculature for improved body mechanics and functional mobility.  Pt and therapist focused on proper form during treatment to ensure optimal strengthening of each targeted muscle group.  Machines were utilized including torso rotation, leg press, hip abd and hip add, leg ext.  Leg curl, triceps, biceps, chest " and row added visit 3       manual therapy techniques:  were applied to the: low back for 00 minutes, including:    cold pack for 5 minutes to low back and L shoulder     Home Exercises Provided and Patient Education Provided   Home exercises include: SOC, EIS, hip ER stretch, piriformis stretch, LTR  Cardio program: visit 5  Lifting education date: visit 11  Posture/Lumbar roll:  recommended  Fridge Magnet Discharge handout (date given):  Equipment at home/gym membership: no    Education provided:   -  cues w/exs  MedX performance  Precor ex performance   HB Protocol    Written Home Exercises Provided: Patient instructed to cont prior HEP.  Exercises were reviewed and Sabino was able to demonstrate them prior to the end of the session.  Sabino demonstrated good  understanding of the education provided.     See EMR under Patient Instructions for exercises provided prior visit.    Assessment   Sabino returns reporting chronic lower back and glute pain rated 2/10 today. Treatment continued with flexibility, strengthening and neuromuscular reeducation ex's. He was able to perform ex's with min cues and without increased pain. Lumbar MedX resistance was increased to 45 ft/lbs and he completed 20 reps with a RPE = 3/10. (Cues for pacing and extension hold). Increase by 10% next visit. He completed peripheral strengthening ex's without c/o increased discomfort (UE ex's were deferred due to acute (L) shoulder injury).  Will continue per HB protocol and patient tolerance.    Patient is making  progress towards established goals.  Pt will continue to benefit from skilled outpatient physical therapy to address the deficits stated in the impairment chart, provide pt/family education and to maximize pt's level of independence in the home and community environment.     Anticipated Barriers for therapy: attendance, chronicity of symptoms  Pt's spiritual, cultural and educational needs considered and pt agreeable to plan of care and  goals as stated below:       Goals:   Short term goals:  6 weeks or 10 visits   - Pt will demonstrate increased lumbar MedX ROM by at least 3 degrees from the initial ROM value with improvements noted in functional ROM and ability to perform ADLs. Appropriate and Ongoing  - Pt will demonstrate increased MedX average isometric strength value by 20% from initial test resulting in improved ability to perform bending, lifting, and carrying activities safely, confidently. Appropriate and Ongoing  - Pt will report a reduction in worst pain score by 1-2 points for improved tolerance for 8/10 versus initial 10/10.  Less stiff in am. Appropriate and Ongoing  - Pt able to perform HEP correctly with minimal cueing or supervision from therapist to encourage independent management of symptoms. Appropriate and Ongoing     Long term goals: 10 weeks or 20 visits   - Pt will demonstrate increased lumbar MedX ROM by at least 6 degrees from initial ROM value, resulting in improved ability to perform functional forward bending while standing and sitting. Appropriate and Ongoing  - Pt will demonstrate increased MedX average isometric strength value by 40% from initial test resulting in improved ability to perform bending, lifting, and carrying activities safely and confidently. Appropriate and Ongoing  - Pt to demonstrate ability to independently control and reduce their pain through posture positioning and mechanical movements throughout a typical day. Appropriate and Ongoing  - Pt will demonstrate reduced pain and improved functional outcomes as reported on the FOTO by reaching an intake score of >/= 70% functional ability in order to demonstrate subjective improvement in patient's condition. . Appropriate and Ongoing  - Pt will demonstrate independence with the HEP at discharge. Appropriate and Ongoing  - Pts goals: reduce pain especially in the morning,  grand kids, be able to walk a few miles daily   Appropriate and  Ongoing    Plan   Continue with established Plan of Care towards established PT goals.     Therapist: Mayda Shepard, PT  11/7/2024

## 2024-11-07 ENCOUNTER — CLINICAL SUPPORT (OUTPATIENT)
Dept: REHABILITATION | Facility: HOSPITAL | Age: 69
End: 2024-11-07
Payer: MEDICARE

## 2024-11-07 DIAGNOSIS — R29.898 DECREASED STRENGTH OF TRUNK AND BACK: Primary | ICD-10-CM

## 2024-11-07 PROCEDURE — 97110 THERAPEUTIC EXERCISES: CPT

## 2024-11-07 PROCEDURE — 97112 NEUROMUSCULAR REEDUCATION: CPT

## 2024-11-08 ENCOUNTER — HOSPITAL ENCOUNTER (OUTPATIENT)
Dept: RADIOLOGY | Facility: HOSPITAL | Age: 69
Discharge: HOME | End: 2024-11-08
Attending: PHYSICIAN ASSISTANT
Payer: MEDICARE

## 2024-11-08 DIAGNOSIS — M25.512 CHRONIC LEFT SHOULDER PAIN: ICD-10-CM

## 2024-11-08 DIAGNOSIS — G89.29 CHRONIC LEFT SHOULDER PAIN: ICD-10-CM

## 2024-11-08 DIAGNOSIS — M75.102 ROTATOR CUFF SYNDROME OF LEFT SHOULDER: ICD-10-CM

## 2024-11-08 PROCEDURE — 73221 MRI JOINT UPR EXTREM W/O DYE: CPT | Mod: TC,LT

## 2024-11-08 PROCEDURE — 73221 MRI JOINT UPR EXTREM W/O DYE: CPT | Mod: 26,LT,, | Performed by: INTERNAL MEDICINE

## 2024-11-12 ENCOUNTER — CLINICAL SUPPORT (OUTPATIENT)
Dept: REHABILITATION | Facility: HOSPITAL | Age: 69
End: 2024-11-12
Payer: MEDICARE

## 2024-11-12 DIAGNOSIS — R29.898 DECREASED STRENGTH OF TRUNK AND BACK: Primary | ICD-10-CM

## 2024-11-12 PROCEDURE — 97112 NEUROMUSCULAR REEDUCATION: CPT | Mod: CQ

## 2024-11-12 PROCEDURE — 97110 THERAPEUTIC EXERCISES: CPT | Mod: CQ

## 2024-11-12 NOTE — PROGRESS NOTES
[facility]      Name: Sabino Rubio  Clinic Number: [id number]    Therapy Diagnosis:   Encounter Diagnosis   Name Primary?    Decreased strength of trunk and back Yes     Physician: Bridgette Rodriguez NP    Visit Date: 11/12/2024    Physician Orders: PT Eval and Treat  Medical Diagnosis from Referral: M47.816 (ICD-10-CM) - Lumbar [id number] (ICD-10-CM) - [id number] (ICD-10-CM) - Degeneration of intervertebral disc of lumbosacral region with discogenic back pain  Evaluation Date: 10/29/2024  Authorization Period Expiration: 12/31/2024  Plan of Care Expiration: 1/7/2025  Reassessment Due: 11/29/2024  Visit # / Visits authorized: 5/20  MedX Testing:MedX testing visit 2    Time In: 7:55 AM  Time Out: 8:50 AM  Total Billable Time: 55 minutes  INSURANCE and OUTCOMES: Fee for Service with FOTO Outcomes 1/3    Precautions: Standard    Pattern of pain determined: 4 PEN (gentle extension based exercises started to address mobility issues with traffic light model taught)     Subjective   Sabino reports back pain continues to be worse in the mornings and gets better with movement.  Mild R side stiffness this morning. Feels HB treatments have been helpful thus far. States that he received his MRI last week which reveals previous rotator cuff tear along with some arthritis. States that he will meet with the PA this week for treatment options.    Patient reports tolerating previous visit: No c/o  Patient reports their pain to be 2/10 on a 0-10 scale with 0 being no pain and 10 being the worst pain imaginable.  Pain Location: low back     Occupation: in construction, work part time, lifting materials, drives a van, tries to delegate (self employed)  Leisure: fish, has boat, outdoor stuff, play with grandkids  Pt goals: reduce pain especially in the morning,  grand kids, be able to walk a few miles daily     Objective     Cervical IM Testing Results:    Initial 10/31/24   ROM  "3-42 deg   Max Peak Torque 76    Min Peak Torque 29    Flex/Ext Ratio 2.6:1   % below normative data 75%     Outcomes:  Initial score: 59% functional ability   Visit 6 score:  Goal: 70% functional ability           Treatment    Sabino received the treatments listed below:      Sabino received neuromuscular education  to isolate and engage spinal stabilization musculature correctly for motor control and coordination to aid in function and posture for 10 minutes on the Medical Medx Machine.  Patient performed MedX dynamic exercise with emphasis on spinal muscular control using pacer throughout  active range of motion. Therapist assisted patient in achieving optimal exertion for neural reeducation and endurance training by using the  Shakir Exertion Rating scale, by instructing the patient to aim for mid range of exertion, performing 15-20 repetitions, slowly, correctly,and safely.           11/12/2024     8:14 AM   [facility] - Short   Visit Number 5   VAS Pain Rating 2   Treadmill Time (in min.) 5 min   Lumbar Stretches - Slouch 10   Extension in Standing 10   Lumbar Weight 50 lbs   Repetitions 20   Rating of Perceived Exertion 3       therapeutic exercises to develop strength, endurance, ROM, flexibility, posture, and core stabilization for 45 minutes including:    Slouch correct  (before getting out of chair and in am) x 10  Gentle ext in standing x 10  Hip ext rotation stretch 20" x 2  Piriformis stretch 20" x 2  Lower trunk rotation x 10  transverse abdominal activation / PPT + SLR x15 + 1#   BFKO with transverse abdominal activation + GTB x 15  Bridging c/ GTB x 15  SL Clamshells GTB x 15  + Paloff press 10# x 15     Peripheral muscle strengthening which included 1 set of 15-20 repetitions at a slow, controlled 10-13 second per rep pace focused on strengthening supporting musculature for improved body mechanics and functional mobility.  Pt and therapist focused on proper form during treatment to ensure " optimal strengthening of each targeted muscle group.  Machines were utilized including torso rotation, leg press, hip abd and hip add, leg ext.  Leg curl, triceps, biceps, chest and row added visit 3       manual therapy techniques:  were applied to the: low back for 00 minutes, including:    cold pack for 5 minutes to low back and L shoulder     Home Exercises Provided and Patient Education Provided   Home exercises include: SOC, EIS, hip ER stretch, piriformis stretch, LTR  Cardio program: visit 5 11/12/24 (Reviewed benefits of cardio with handout provided, patient reports being active with walking activities).  Lifting education date: visit 11  Posture/Lumbar roll:  recommended  Fridge Magnet Discharge handout (date given):  Equipment at home/gym membership: no    Education provided:   -  cues w/exs  MedX performance  Precor ex performance  11/12/24 Availability of Health coaching    Written Home Exercises Provided: Patient instructed to cont prior HEP.  Exercises were reviewed and Sabino was able to demonstrate them prior to the end of the session.  Sabino demonstrated good  understanding of the education provided.     See EMR under Patient Instructions for exercises provided prior visit.    Assessment   Sabino returns reporting chronic lower back and glute pain R> L rated 2/10 today. Treatment continued with flexibility, strengthening and neuromuscular reeducation ex's. Added Paloff press, progressed to 1# resistance with TrA + SLR, GTB for Bridging with band, clamshells and BKFO. He was able to perform ex's with min cues and without increased back or shoulder pain. Lumbar MedX resistance was increased to 50 ft/lbs and he completed 20 reps with a RPE = 3/10. He completed peripheral strengthening ex's without increased discomfort (UE ex's remain deferred due to  (L) shoulder injury).  Will continue per HB protocol and patient tolerance. Reviewed benefits of cardio with handout provided, patient reports being active  with walking activities).    Patient is making  progress towards established goals.  Pt will continue to benefit from skilled outpatient physical therapy to address the deficits stated in the impairment chart, provide pt/family education and to maximize pt's level of independence in the home and community environment.     Anticipated Barriers for therapy: attendance, chronicity of symptoms  Pt's spiritual, cultural and educational needs considered and pt agreeable to plan of care and goals as stated below:     Goals:   Short term goals:  6 weeks or 10 visits   - Pt will demonstrate increased lumbar MedX ROM by at least 3 degrees from the initial ROM value with improvements noted in functional ROM and ability to perform ADLs. Appropriate and Ongoing  - Pt will demonstrate increased MedX average isometric strength value by 20% from initial test resulting in improved ability to perform bending, lifting, and carrying activities safely, confidently. Appropriate and Ongoing  - Pt will report a reduction in worst pain score by 1-2 points for improved tolerance for 8/10 versus initial 10/10.  Less stiff in am. Appropriate and Ongoing  - Pt able to perform HEP correctly with minimal cueing or supervision from therapist to encourage independent management of symptoms. Appropriate and Ongoing     Long term goals: 10 weeks or 20 visits   - Pt will demonstrate increased lumbar MedX ROM by at least 6 degrees from initial ROM value, resulting in improved ability to perform functional forward bending while standing and sitting. Appropriate and Ongoing  - Pt will demonstrate increased MedX average isometric strength value by 40% from initial test resulting in improved ability to perform bending, lifting, and carrying activities safely and confidently. Appropriate and Ongoing  - Pt to demonstrate ability to independently control and reduce their pain through posture positioning and mechanical movements throughout a typical day.  Appropriate and Ongoing  - Pt will demonstrate reduced pain and improved functional outcomes as reported on the FOTO by reaching an intake score of >/= 70% functional ability in order to demonstrate subjective improvement in patient's condition. . Appropriate and Ongoing  - Pt will demonstrate independence with the HEP at discharge. Appropriate and Ongoing  - Pts goals: reduce pain especially in the morning,  grand kids, be able to walk a few miles daily   Appropriate and Ongoing    Plan   Continue with established Plan of Care towards established PT goals.     Therapist: Gordo Jimenez, PTA  11/12/2024

## 2024-11-14 ENCOUNTER — OFFICE VISIT (OUTPATIENT)
Dept: SPORTS MEDICINE | Facility: CLINIC | Age: 69
End: 2024-11-14
Payer: MEDICARE

## 2024-11-14 ENCOUNTER — CLINICAL SUPPORT (OUTPATIENT)
Dept: REHABILITATION | Facility: HOSPITAL | Age: 69
End: 2024-11-14
Payer: MEDICARE

## 2024-11-14 VITALS
HEIGHT: 70 IN | HEART RATE: 80 BPM | DIASTOLIC BLOOD PRESSURE: 75 MMHG | SYSTOLIC BLOOD PRESSURE: 120 MMHG | WEIGHT: 157.06 LBS | BODY MASS INDEX: 22.48 KG/M2

## 2024-11-14 DIAGNOSIS — M75.102 ROTATOR CUFF SYNDROME OF LEFT SHOULDER: Primary | ICD-10-CM

## 2024-11-14 DIAGNOSIS — R29.898 DECREASED STRENGTH OF TRUNK AND BACK: Primary | ICD-10-CM

## 2024-11-14 PROCEDURE — 99999 PR PBB SHADOW E&M-EST. PATIENT-LVL III: CPT | Mod: PBBFAC,,, | Performed by: PHYSICIAN ASSISTANT

## 2024-11-14 PROCEDURE — 97112 NEUROMUSCULAR REEDUCATION: CPT | Mod: CQ

## 2024-11-14 PROCEDURE — 97110 THERAPEUTIC EXERCISES: CPT | Mod: CQ

## 2024-11-14 RX ORDER — ROPIVACAINE HYDROCHLORIDE 2 MG/ML
4 INJECTION, SOLUTION EPIDURAL; INFILTRATION
Status: COMPLETED | OUTPATIENT
Start: 2024-11-14 | End: 2024-11-14

## 2024-11-14 RX ADMIN — ROPIVACAINE HYDROCHLORIDE 4 ML: 2 INJECTION, SOLUTION EPIDURAL; INFILTRATION at 10:11

## 2024-11-14 NOTE — PROGRESS NOTES
[facility]      Name: Sabino Rubio  Clinic Number: [id number]    Therapy Diagnosis:   Encounter Diagnosis   Name Primary?    Decreased strength of trunk and back Yes     Physician: Bridgette Rodriguez NP    Visit Date: 11/14/2024    Physician Orders: PT Eval and Treat  Medical Diagnosis from Referral: M47.816 (ICD-10-CM) - Lumbar [id number] (ICD-10-CM) - [id number] (ICD-10-CM) - Degeneration of intervertebral disc of lumbosacral region with discogenic back pain  Evaluation Date: 10/29/2024  Authorization Period Expiration: 12/31/2024  Plan of Care Expiration: 1/7/2025  Reassessment Due: 11/29/2024  Visit # / Visits authorized: 6/20  MedX Testing:MedX testing visit 2    Time In: 7:55 AM  Time Out: 8:55 AM  Total Billable Time: 55 minutes  INSURANCE and OUTCOMES: Fee for Service with FOTO Outcomes 2/3    Precautions: Standard    Pattern of pain determined: 4 PEN (gentle extension based exercises started to address mobility issues with traffic light model taught)     Subjective   Sabino reports back pain continues to be worse in the mornings and gets better with movement.  Mild R side stiffness this morning. Feels HB treatments have been helpful thus far. States that he is scheduled to meet with the PA today for his (L) shoulder MRI follow up.     Patient reports tolerating previous visit: No c/o  Patient reports their pain to be 2/10 on a 0-10 scale with 0 being no pain and 10 being the worst pain imaginable.  Pain Location: low back     Occupation: in construction, work part time, lifting materials, drives a van, tries to delegate (self employed)  Leisure: fish, has boat, outdoor stuff, play with grandkids  Pt goals: reduce pain especially in the morning,  grand kids, be able to walk a few miles daily     Objective     Cervical IM Testing Results:    Initial 10/31/24   ROM 3-42 deg   Max Peak Torque 76    Min Peak Torque 29    Flex/Ext Ratio 2.6:1   % below  "normative data 75%     Outcomes:  Initial score: 59% functional ability   Visit 6 score: 57%  Goal: 70% functional ability           Treatment    Sabino received the treatments listed below:      Sabino received neuromuscular education  to isolate and engage spinal stabilization musculature correctly for motor control and coordination to aid in function and posture for 10 minutes on the Medical Medx Machine.  Patient performed MedX dynamic exercise with emphasis on spinal muscular control using pacer throughout  active range of motion. Therapist assisted patient in achieving optimal exertion for neural reeducation and endurance training by using the  Shakir Exertion Rating scale, by instructing the patient to aim for mid range of exertion, performing 15-20 repetitions, slowly, correctly,and safely.          11/14/2024     8:09 AM   [facility] - Short   Visit Number 6   VAS Pain Rating 2   Treadmill Time (in min.) 5 min   Lumbar Stretches - Slouch 10   Extension in Standing 10   Lumbar Flexion 42   Lumbar Extension 0   Lumbar Weight 55 lbs   Repetitions 20   Rating of Perceived Exertion 4        therapeutic exercises to develop strength, endurance, ROM, flexibility, posture, and core stabilization for 45 minutes including:    Slouch correct  (before getting out of chair and in am) x 10  Gentle ext in standing x 10  Hip ext rotation stretch 20" x 2  Piriformis stretch 20" x 2  Lower trunk rotation x 10  + R QL stretch in supine (arms overhead) 3 x 15 sec  + Hamstring stretch w/belt 2 x 20 sec  transverse abdominal activation / PPT + SLR x15 + 1.5#   BFKO with transverse abdominal activation + GTB x 15  Bridging c/ GTB x 20  SL Clamshells GTB x 20  Paloff press 15# x 15     Peripheral muscle strengthening which included 1 set of 15-20 repetitions at a slow, controlled 10-13 second per rep pace focused on strengthening supporting musculature for improved body mechanics and functional mobility.  Pt and therapist " focused on proper form during treatment to ensure optimal strengthening of each targeted muscle group.  Machines were utilized including torso rotation, leg press, hip abd and hip add, leg ext.  Leg curl, triceps, biceps, chest and row added visit 3       manual therapy techniques:  were applied to the: low back for 00 minutes, including:    cold pack for 5 minutes to low back and L shoulder     Home Exercises Provided and Patient Education Provided   Home exercises include: SOC, EIS, hip ER stretch, piriformis stretch, LTR, hamstring stretch, QL stretch  Cardio program: visit 5 11/12/24 (Reviewed benefits of cardio with handout provided, patient reports being active with walking activities).  Lifting education date: visit 11  Posture/Lumbar roll:  recommended  Fridge Magnet Discharge handout (date given):  Equipment at home/gym membership: no    Education provided:   -  cues w/exs  MedX performance  Precor ex performance  11/12/24 Availability of Health coaching    Written Home Exercises Provided: Patient instructed to cont prior HEP. Added hamstring stretch and QL stretch 11/14/24  Exercises were reviewed and Sabino was able to demonstrate them prior to the end of the session.  Sabino demonstrated good  understanding of the education provided.     See EMR under Patient Instructions for exercises provided prior visit and todays visit     Assessment   Sabino returns reporting chronic lower back and glute/QL pain R> L rated 2/10 today. Treatment continued with flexibility, strengthening and neuromuscular reeducation ex's. Added Supine and R QL stretch to address R side symptoms which felt good per subjective report,  increased resistance for Paloff press and TrA + SLR, increased reps for Bridging with band and clamshells. He was able to perform ex's with min cues and without increased back or shoulder pain. He did report some R hamstring cramping with BKFO ex and this was alleviated with instruction in hamstring  stretching. Lumbar MedX extension ROM was increased to 0 degrees, resistance was increased to 55 ft/lbs and he completed 20 reps with a RPE = 4/10. He completed peripheral strengthening ex's without increased discomfort (UE ex's remain deferred due to  (L) shoulder injury). Will continue per HB protocol and patient tolerance.       Patient is making  progress towards established goals.  Pt will continue to benefit from skilled outpatient physical therapy to address the deficits stated in the impairment chart, provide pt/family education and to maximize pt's level of independence in the home and community environment.     Anticipated Barriers for therapy: attendance, chronicity of symptoms  Pt's spiritual, cultural and educational needs considered and pt agreeable to plan of care and goals as stated below:     Goals:   Short term goals:  6 weeks or 10 visits   - Pt will demonstrate increased lumbar MedX ROM by at least 3 degrees from the initial ROM value with improvements noted in functional ROM and ability to perform ADLs. Appropriate and Ongoing  - Pt will demonstrate increased MedX average isometric strength value by 20% from initial test resulting in improved ability to perform bending, lifting, and carrying activities safely, confidently. Appropriate and Ongoing  - Pt will report a reduction in worst pain score by 1-2 points for improved tolerance for 8/10 versus initial 10/10.  Less stiff in am. Appropriate and Ongoing  - Pt able to perform HEP correctly with minimal cueing or supervision from therapist to encourage independent management of symptoms. Appropriate and Ongoing     Long term goals: 10 weeks or 20 visits   - Pt will demonstrate increased lumbar MedX ROM by at least 6 degrees from initial ROM value, resulting in improved ability to perform functional forward bending while standing and sitting. Appropriate and Ongoing  - Pt will demonstrate increased MedX average isometric strength value by 40% from  initial test resulting in improved ability to perform bending, lifting, and carrying activities safely and confidently. Appropriate and Ongoing  - Pt to demonstrate ability to independently control and reduce their pain through posture positioning and mechanical movements throughout a typical day. Appropriate and Ongoing  - Pt will demonstrate reduced pain and improved functional outcomes as reported on the FOTO by reaching an intake score of >/= 70% functional ability in order to demonstrate subjective improvement in patient's condition. . Appropriate and Ongoing  - Pt will demonstrate independence with the HEP at discharge. Appropriate and Ongoing  - Pts goals: reduce pain especially in the morning,  grand kids, be able to walk a few miles daily   Appropriate and Ongoing    Plan   Continue with established Plan of Care towards established PT goals.     Therapist: Gordo Jimenez, PTA  11/14/2024

## 2024-11-14 NOTE — PROGRESS NOTES
CC: Left shoulder pain    Sabino Rubio presenting for follow up of his left shoulder MRI. Says the pain has improved a good bit although he does have some residual pain.     Prior Hx:   68 y.o. RHD Male presents for evaluation for his left shoulder. He was previously seen by Dr. Patel for this shoulder in 2021- MRI demonstrated partial thickness tear of his rotator cuff. He was injected and did formal PT. He had not been having any issues with it until this past weekend. Complaint is left shoulder pain x Saturday. Atraumatic onset. Says he worked in the yard all day Saturday and afterwards he had increased soreness in the shoulder. Denies any distinct pop. Pain localizes throughout the shoulder, mostly over the top. Worse with overhead motion. Pain is disruptive to sleep at night. Better with rest. Denies neck pain or radicular symptoms. Treatment thus far has included activity modifications, rest, and oral medication.  Here today to discuss diagnosis and treatment options.       Pain Score:   3    PAST MEDICAL HISTORY:   Past Medical History:   Diagnosis Date    Allergy     Arthritis     Family history of malignant neoplasm of gastrointestinal tract mat.gf    Family history of prostate cancer: 1/2 brother 09/25/2017    GERD (gastroesophageal reflux disease)     Hyperlipidemia     Kidney cyst, acquired: R 1.2 cm 2015 09/25/2017    Restless leg syndrome     Tubulovillous adenoma 2013 due 2016 09/14/2015       PAST SURGICAL HISTORY:  Past Surgical History:   Procedure Laterality Date    APPLICATION OF BONE GRAFT TO FINGER Left 11/8/2021    Procedure: APPLICATION INTEGRA GRAFT, TO FINGER;  Surgeon: Alba Penn MD;  Location: [facility];  Service: Orthopedics;  Laterality: Left;    CARPAL TUNNEL RELEASE      COLONOSCOPY N/A 5/22/2019    Procedure: COLONOSCOPY;  Surgeon: Juancarlos Foley MD;  Location: [facility] ([address] [place] [address]);  Service: Endoscopy;  Laterality: N/A;    COLONOSCOPY N/A 10/12/2022    Procedure:  COLONOSCOPY;  Surgeon: Cherelle Wang MD;  Location: [facility] ENDO (4TH FLR);  Service: Endoscopy;  Laterality: N/A;  vacc-inst portal-am prep-clears 4 hrs prior-tb    COLONOSCOPY N/A 5/30/2024    Procedure: COLONOSCOPY;  Surgeon: Nick Kramer MD;  Location: [facility] ENDO (4TH FLR);  Service: Endoscopy;  Laterality: N/A;  Prep instructions sent via portal-dw  Peg Prep-dw  5/22/24- LVM for precall - ERW  5/29-received message from pt's wife confirming appt-Kpvt    EXCISION OF GANGLION OF WRIST Right 6/28/2019    Procedure: EXCISION, GANGLION CYST, WRIST right;  Surgeon: Alba Penn MD;  [facility];  Service: Orthopedics;  Laterality: Right;  regional MAC, stretcher, supine, hand pan 1 and 2,MINI C-arm, call Arthrex    INJECTION OF ANESTHETIC AGENT AROUND NERVE Bilateral 1/27/2022    Procedure: Block, Nerve MEDIAL BRANCH BLOCK BILATERAL L3,4,5  DIRECT REFERRAL 1 OF 2;  Surgeon: Kaiser Braxton MD;  Location: [facility] PAIN MGT;  Service: Pain Management;  Laterality: Bilateral;    INJECTION OF ANESTHETIC AGENT AROUND NERVE Bilateral 2/10/2022    Procedure: Block, Nerve MEDIAL BRANCH BLOCK BILATERAL L3.4.5  DIRECT REFERRAL 2 OF 2;  Surgeon: Kaiser Braxton MD;  Location: [facility] PAIN MGT;  Service: Pain Management;  Laterality: Bilateral;    INJECTION OF FACET JOINT Bilateral 6/6/2019    Procedure: INJECTION, FACET JOINT INJECTION (LUMBAR BLOCK) BILATERAL L4-L5 AND L5-S1 FACET INJECTIONS;  Surgeon: Kaiser Braxton MD;  Location: [facility] PAIN MGT;  Service: Pain Management;  Laterality: Bilateral;  NEEDS CONSENT    INJECTION, SACROILIAC JOINT Right 12/8/2022    Procedure: INJECTION,SACROILIAC JOINT RIGHT  CONTRAST;  Surgeon: Kaiser Braxton MD;  Location: [facility] PAIN MGT;  Service: Pain Management;  Laterality: Right;    INTERPOSITION ARTHROPLASTY OF CARPOMETACARPAL JOINTS Right 6/28/2019    Procedure: INTERPOSITION ARTHROPLASTY, CMC JOINT right;  Surgeon: Alba Penn MD;  [facility];  Service:  Orthopedics;  Laterality: Right;  regional MAC, stretcher, supine, hand pan 1 and 2,MINI C-arm, call Arthrex    IRRIGATION AND DEBRIDEMENT OF UPPER EXTREMITY Left 11/8/2021    Procedure: IRRIGATION AND DEBRIDEMENT, UPPER EXTREMITY, left index finger;  Surgeon: Alba Penn MD;  Location: [facility] OR;  Service: Orthopedics;  Laterality: Left;    RADIOFREQUENCY ABLATION Right 5/16/2022    Procedure: Radiofrequency Ablation RIGHT L3,4,5;  Surgeon: Kaiser Braxton MD;  Location: [facility] PAIN MGT;  Service: Pain Management;  Laterality: Right;    RADIOFREQUENCY ABLATION Left 6/2/2022    Procedure: Radiofrequency Ablation LEFT L3,4,5;  Surgeon: Kaiser Braxton MD;  Location: [facility] PAIN MGT;  Service: Pain Management;  Laterality: Left;    RADIOFREQUENCY ABLATION Right 7/17/2023    Procedure: RADIOFREQUENCY ABLATION, RIGHT L3,L4,L5 MEDIAL BRANCH ONE OF TWO;  Surgeon: Kaiser Braxton MD;  Location: [facility] PAIN MGT;  Service: Pain Management;  Laterality: Right;    RADIOFREQUENCY ABLATION Left 9/11/2023    Procedure: RADIOFREQUENCY ABLATION, LEFT L3,L4,L5 MEDIAL BRANCH TWO OF TWO;  Surgeon: Kaiser Braxton MD;  Location: [facility] PAIN MGT;  Service: Pain Management;  Laterality: Left;    RADIOFREQUENCY ABLATION Bilateral 9/30/2024    Procedure: RADIOFREQUENCY ABLATION BILATERAL L3, 4, 5;  Surgeon: Kaiser Braxton MD;  Location: [facility] PAIN MGT;  Service: Pain Management;  Laterality: Bilateral;  [phone]  3 WK F/U CLAUDIA    REPAIR OF NAIL BED Left 11/8/2021    Procedure: REPAIR, NAIL BED, left index;  Surgeon: Alba Penn MD;  Location: [facility] OR;  Service: Orthopedics;  Laterality: Left;    SPERMATOCELECTOMY Right 11/8/2019    Procedure: EXCISION, SPERMATOCELE;  Surgeon: Sheldon Araya Jr., MD;  [facility];  Service: Urology;  Laterality: Right;  1hr    TRANSFORAMINAL EPIDURAL INJECTION OF STEROID Bilateral 10/27/2022    Procedure: INJECTION, STEROID, EPIDURAL, TRANSFORAMINAL APPROACH, BILATERAL L5-S1 CONTRAST;   Surgeon: Kaiser Braxton MD;  Location: [facility] PAIN MGT;  Service: Pain Management;  Laterality: Bilateral;       FAMILY HISTORY:  Family History   Problem Relation Name Age of Onset    Arthritis Mother          probable R.A.    Cancer Father Smoker         esophageal ca and brain tumor    Esophageal cancer Father Smoker     Melanoma Father Smoker     Irritable bowel syndrome Sister      Arthritis Sister      Arthritis Sister          rheumatoid    Cancer Sister          skin    Cancer Brother 1/2         pancreatic cancer    No Known Problems Brother      No Known Problems Brother      No Known Problems Son Jimmie     No Known Problems Son Ra     Cancer Maternal Grandfather          colon    Colon cancer Maternal Grandfather      Diabetes Neg Hx      Heart disease Neg Hx      Cirrhosis Neg Hx      Celiac disease Neg Hx      Crohn's disease Neg Hx      Ulcerative colitis Neg Hx      Stomach cancer Neg Hx      Rectal cancer Neg Hx      Liver cancer Neg Hx      Psoriasis Neg Hx      Lupus Neg Hx         MEDICATIONS:    Current Outpatient Medications:     aspirin (ECOTRIN) 81 MG EC tablet, Take 1 tablet (81 mg total) by mouth once daily., Disp: 30 tablet, Rfl: 12    ciprofloxacin HCl (CIPRO) 500 MG tablet, Take 1 tablet (500 mg total) by mouth 2 (two) times daily., Disp: 20 tablet, Rfl: 0    cyclobenzaprine (FLEXERIL) 10 MG tablet, Take 1 tablet (10 mg total) by mouth 3 (three) times daily as needed for Muscle spasms. for ten days, Disp: 30 tablet, Rfl: 1    famotidine (PEPCID) 20 MG tablet, TAKE 1 TABLET BY MOUTH TWICE A DAY, Disp: 180 tablet, Rfl: 3    fluticasone propionate (FLONASE) 50 mcg/actuation nasal spray, SPRAY 1 SPRAY (50 MCG TOTAL) INTO INTO EACH NOSTRIL TWICE A DAY, Disp: 48 mL, Rfl: 1    gabapentin (NEURONTIN) 300 MG capsule, Take 1 capsule (300 mg total) by mouth 3 (three) times daily., Disp: 270 capsule, Rfl: 1    meloxicam (MOBIC) 15 MG tablet, TAKE 1 TABLET (15 MG TOTAL) BY MOUTH DAILY AS NEEDED FOR  "PAIN (TAKE WITH FOOD OR A MEAL)., Disp: 90 tablet, Rfl: 0    metroNIDAZOLE (FLAGYL) 500 MG tablet, Take 1 tablet (500 mg total) by mouth every 8 (eight) hours. No alcohol with this, Disp: 30 tablet, Rfl: 0    mupirocin (BACTROBAN) 2 % ointment, Apply topically 2 (two) times daily., Disp: 30 g, Rfl: 0    rOPINIRole (REQUIP) 1 MG tablet, Take 1 tablet (1 mg total) by mouth every evening., Disp: 30 tablet, Rfl: 11    rosuvastatin (CRESTOR) 10 MG tablet, Take 1 tablet (10 mg total) by mouth once daily., Disp: 90 tablet, Rfl: 3    sildenafiL (VIAGRA) 100 MG tablet, Take 100 mg by mouth daily as needed for Erectile Dysfunction., Disp: , Rfl:     tamsulosin (FLOMAX) 0.4 mg Cap, TAKE 1 CAPSULE BY MOUTH EVERY DAY, Disp: 90 capsule, Rfl: 3  No current facility-administered medications for this visit.    Facility-Administered Medications Ordered in Other Visits:     0.9%  NaCl infusion, , Intravenous, Continuous, Cole Fitzpatrick,     ALLERGIES:  Review of patient's allergies indicates:  No Known Allergies     REVIEW OF SYSTEMS:  Constitution: Negative. Negative for chills, fever and night sweats.    Hematologic/Lymphatic: Negative for bleeding problem. Does not bruise/bleed easily.   Skin: Negative for dry skin, itching and rash.   Musculoskeletal: Negative for falls. Positive for left shoulder pain and muscle weakness.     All other review of symptoms were reviewed and found to be noncontributory.    PHYSICAL EXAMINATION:  Vitals:  /75   Pulse 80   Ht 5' 10" (1.778 m)   Wt 71.2 kg (157 lb 1.2 oz)   BMI 22.54 kg/m²    General: Well-developed well-nourished 68 y.o. malein no acute distress   Cardiovascular: Regular rhythm by palpation of distal pulse, normal color and temperature, no concerning varicosities on symptomatic side   Lungs: No labored breathing or wheezing appreciated   Neuro: Alert and oriented ×3   Psychiatric: well oriented to person, place and time, demonstrates normal mood and affect   Skin: No " rashes, lesions or ulcers, normal temperature, turgor, and texture on uninvolved extremity    Ortho/SPM Exam  Examination of the left shoulder demonstrates active forward elevation to 110, ER with arm at side to 40 IR to L1. Passive FE to 150, ER to 60. Prominent tenderness along the proximal biceps tendon. Negative AC tenderness. 4/5 resisted supraspinatus testing. 4/5 resisted infraspinatus testing. Positive belly press test. Stable shoulder. No midline neck tenderness. Negative Spurling's maneuver.     IMAGING:    MRI of left shoulder:  Sequences are degraded by patient motion artifact.     Coracoacromial arch: Moderate degenerative changes of the acromioclavicular joint with a small effusion/synovitis.  Trace subacromial subdeltoid bursal fluid.     Rotator cuff: Tendinopathy and low-grade intrasubstance tear of subscapularis at the lesser tuberosity attachment measuring 1.2 cm in width (4:22 and 8:23), unchanged.  Supraspinatus tendinopathy with a low-grade articular surface tear anteriorly in the critical zone measuring 0.2 cm in width (4:23 and 7:17), new from prior.  Infraspinatus tendinopathy with delamination but no discrete tear (04:15), unchanged.  Teres minor is intact.  No significant muscle atrophy.     Glenoid labrum: Circumferential degeneration, with nondisplaced tear of the posterosuperior labrum at the chondrolabral junction (08:20).     Biceps tendon: Medial dislocation of the biceps tendon, which courses through the supraspinatus tendon tear.  There is biceps tendinopathy. Biceps tendon is intact at the superior glenoid tubercle.     Bone: No fracture, osteonecrosis, or marrow replacing lesion.  Large subcortical cyst in the greater tuberosity.     Joint: There are regions of high-grade partial to full-thickness cartilage loss over the humeral head and glenoid, with mild subchondral bone marrow edema inferiorly.  Small joint effusion/synovitis.    Xrays including AP, Outlet and Axillary Lateral  of Left shoulder are ordered / images reviewed by me:   No fracture or acute change appreciated. No significant glenohumeral degenerative changes. Mild to moderate AC joint arthritis. Normal acromiohumeral distance.     ASSESSMENT:      ICD-10-CM ICD-9-CM   1. Rotator cuff syndrome of left shoulder  M75.102 726.10         PLAN:     -Findings and treatment options were discussed with the patient. He has a history of a partial thickness cuff tear that improved with CSI and formal therapy. No interval worsening of the shoulder. He has cuff tendinopathy as well as low grade partial thickness tear of the subscap and supraspinatus. Baseline OA as well. Will proceed with CSI and therapy.  -We have discussed a variety of treatment options including medications, injections, physical therapy and other alternative treatments. I also explained the indications, risks and benefits of surgery. Patient chooses to proceed with CSI and physical therapy at this time.    I made the decision to obtain old records of the patient including previous notes and imaging. I independently reviewed and interpreted lab results today as well as prior imaging.     1. Injection Procedure  A time out was performed, including verification of patient ID, procedure, site and side, availability of information and equipment, review of safety issues, and agreement with consent, the procedure site was marked.    After time out was performed, the patient was prepped aseptically with chloraprep swabstick. A diagnostic and therapeutic injection of 1:4cc Kenalog/Ropivicaine was given under sterile technique using a 22g x 1.5 needle from the Posterior  aspect of the left Subacromial in the sitting position.      Sabino Rubio had no adverse reactions to the medication. Pain decreased. He was instructed to apply ice to the joint for 20 minutes and avoid strenuous activities for 24-36 hours following the injection. He was warned of possible blood sugar and/or  blood pressure changes during that time. Following that time, he can resume regular activities.    He was reminded to call the clinic immediately for any adverse side effects as explained in clinic today.    2. Mobic 15 mg 1 time daily PRN for pain management. Patient understands to take with food and/or OTC prilosec to decrease GI side effects.  3. Ice compress to the affected area 2-3x a day for 15-20 minutes as needed for pain management.  4. Ambulatory referral to physical therapy for periscapular/rotator cuff strengthening.   5. RTC to see me prn for follow up.    All of the patient's questions were answered and the patient will contact us if they have any questions or concerns in the interim.

## 2024-11-19 ENCOUNTER — CLINICAL SUPPORT (OUTPATIENT)
Dept: REHABILITATION | Facility: HOSPITAL | Age: 69
End: 2024-11-19
Payer: MEDICARE

## 2024-11-19 DIAGNOSIS — R29.898 DECREASED STRENGTH OF TRUNK AND BACK: Primary | ICD-10-CM

## 2024-11-19 PROCEDURE — 97110 THERAPEUTIC EXERCISES: CPT | Mod: CQ

## 2024-11-19 PROCEDURE — 97112 NEUROMUSCULAR REEDUCATION: CPT | Mod: CQ

## 2024-11-19 NOTE — PROGRESS NOTES
[facility]      Name: Sabino Rubio  Clinic Number: [id number]    Therapy Diagnosis:   Encounter Diagnosis   Name Primary?    Decreased strength of trunk and back Yes     Physician: Bridgette Rodriguez NP    Visit Date: 11/19/2024    Physician Orders: PT Eval and Treat  Medical Diagnosis from Referral: M47.816 (ICD-10-CM) - Lumbar [id number] (ICD-10-CM) - [id number] (ICD-10-CM) - Degeneration of intervertebral disc of lumbosacral region with discogenic back pain  Evaluation Date: 10/29/2024  Authorization Period Expiration: 12/31/2024  Plan of Care Expiration: 1/7/2025  Reassessment Due: 11/29/2024  Visit # / Visits authorized: 7/20  MedX Testing:MedX testing visit 2    Time In: 8:00 AM  Time Out: 8:55 AM  Total Billable Time: 25 minutes ( 1on1 time)  INSURANCE and OUTCOMES: Fee for Service with FOTO Outcomes 2/3    Precautions: Standard    Pattern of pain determined: 4 PEN (gentle extension based exercises started to address mobility issues with traffic light model taught)     Subjective   Sabino reports back pain continues to be worse in the mornings and gets better with movement.  Mild R side stiffness this morning. Feels HB treatments have been helpful thus far. States that he does not have shooting pains into his leg much any more. He reports meeting with the PA for his L shoulder pain and does not need surgery.States that he did receive an injection in his shoulder.    Patient reports tolerating previous visit: No c/o  Patient reports their pain to be 2/10 on a 0-10 scale with 0 being no pain and 10 being the worst pain imaginable.  Pain Location: low back     Occupation: in construction, work part time, lifting materials, drives a van, tries to delegate (self employed)  Leisure: fish, has boat, outdoor stuff, play with grandkids  Pt goals: reduce pain especially in the morning,  grand kids, be able to walk a few miles daily     Objective     Cervical IM  "Testing Results:    Initial 10/31/24   ROM 3-42 deg   Max Peak Torque 76    Min Peak Torque 29    Flex/Ext Ratio 2.6:1   % below normative data 75%     Outcomes:  Initial score: 59% functional ability   Visit 6 score: 57%  Goal: 70% functional ability           Treatment    Sabino received the treatments listed below:      Sabino received neuromuscular education  to isolate and engage spinal stabilization musculature correctly for motor control and coordination to aid in function and posture for 10 minutes on the [employer] Machine.  Patient performed MedX dynamic exercise with emphasis on spinal muscular control using pacer throughout  active range of motion. Therapist assisted patient in achieving optimal exertion for neural reeducation and endurance training by using the  Shakir Exertion Rating scale, by instructing the patient to aim for mid range of exertion, performing 15-20 repetitions, slowly, correctly,and safely.           11/19/2024     8:15 AM   [facility] - [facility]   Visit Number 7   VAS Pain Rating 2   Treadmill Time (in min.) 5 min   Lumbar Stretches - Slouch 10   Extension in Standing 10   Lumbar Weight 59 lbs   Repetitions 15   Rating of Perceived Exertion 4         therapeutic exercises to develop strength, endurance, ROM, flexibility, posture, and core stabilization for 45 minutes including:    Slouch correct  (before getting out of chair and in am) x 10  Gentle ext in standing x 10  Hip ext rotation stretch 20" x 2  Piriformis stretch 20" x 2  Lower trunk rotation x 10  R QL stretch in supine (arms overhead) 3 x 15 sec  Hamstring stretch w/belt 2 x 20 sec  transverse abdominal activation / PPT + SLR x15 + 2#   BFKO with transverse abdominal activation + BTB x 15  Bridging c/ BTB x 20  SL Clamshells BTB  x 20  Paloff press 15# x 15       Peripheral muscle strengthening which included 1 set of 15-20 repetitions at a slow, controlled 10-13 second per rep pace focused on strengthening supporting " musculature for improved body mechanics and functional mobility.  Pt and therapist focused on proper form during treatment to ensure optimal strengthening of each targeted muscle group.  Machines were utilized including torso rotation, leg press, hip abd and hip add, leg ext.  Leg curl, triceps, biceps, chest and row added visit 3     manual therapy techniques:  were applied to the: low back for 00 minutes, including:    cold pack for 5 minutes to low back and L shoulder     Home Exercises Provided and Patient Education Provided   Home exercises include: SOC, EIS, hip ER stretch, piriformis stretch, LTR, hamstring stretch, QL stretch  Cardio program: visit 5 11/12/24 (Reviewed benefits of cardio with handout provided, patient reports being active with walking activities).  Lifting education date: visit 11  Posture/Lumbar roll:  recommended  Fridge Magnet Discharge handout (date given):  Equipment at home/gym membership: no    Education provided:   -  cues w/exs  MedX performance  Precor ex performance  11/12/24 Availability of Health coaching    Written Home Exercises Provided: Patient instructed to cont prior HEP. Added hamstring stretch and QL stretch 11/14/24  Exercises were reviewed and Sabino was able to demonstrate them prior to the end of the session.  Sabino demonstrated good  understanding of the education provided.     See EMR under Patient Instructions for exercises provided prior visit.    Assessment   Sabino returns reporting chronic lower back and glute/QL pain R> L rated 2/10 today. Treatment continued with flexibility, strengthening and neuromuscular reeducation ex's.  He was progressed to BTB for bridging with band, BKFO and clamshells. increased resistance for TrA + SLR.  He was able to perform ex's with min cues and without increased back or shoulder pain. He did report some R hamstring cramping with BKFO ex again and this was alleviated with rest and hamstring stretching. Lumbar MedX resistance was  increased to 59 ft/lbs and he completed 18 reps with a RPE = 4/10. He completed peripheral strengthening ex's without increased discomfort (UE ex's remain deferred due to  (L) shoulder injury). Will continue per HB protocol and patient tolerance.       Patient is making  progress towards established goals.  Pt will continue to benefit from skilled outpatient physical therapy to address the deficits stated in the impairment chart, provide pt/family education and to maximize pt's level of independence in the home and community environment.     Anticipated Barriers for therapy: attendance, chronicity of symptoms  Pt's spiritual, cultural and educational needs considered and pt agreeable to plan of care and goals as stated below:     Goals:   Short term goals:  6 weeks or 10 visits   - Pt will demonstrate increased lumbar MedX ROM by at least 3 degrees from the initial ROM value with improvements noted in functional ROM and ability to perform ADLs. Appropriate and Ongoing  - Pt will demonstrate increased MedX average isometric strength value by 20% from initial test resulting in improved ability to perform bending, lifting, and carrying activities safely, confidently. Appropriate and Ongoing  - Pt will report a reduction in worst pain score by 1-2 points for improved tolerance for 8/10 versus initial 10/10.  Less stiff in am. Appropriate and Ongoing  - Pt able to perform HEP correctly with minimal cueing or supervision from therapist to encourage independent management of symptoms. Appropriate and Ongoing     Long term goals: 10 weeks or 20 visits   - Pt will demonstrate increased lumbar MedX ROM by at least 6 degrees from initial ROM value, resulting in improved ability to perform functional forward bending while standing and sitting. Appropriate and Ongoing  - Pt will demonstrate increased MedX average isometric strength value by 40% from initial test resulting in improved ability to perform bending, lifting, and  carrying activities safely and confidently. Appropriate and Ongoing  - Pt to demonstrate ability to independently control and reduce their pain through posture positioning and mechanical movements throughout a typical day. Appropriate and Ongoing  - Pt will demonstrate reduced pain and improved functional outcomes as reported on the FOTO by reaching an intake score of >/= 70% functional ability in order to demonstrate subjective improvement in patient's condition. . Appropriate and Ongoing  - Pt will demonstrate independence with the HEP at discharge. Appropriate and Ongoing  - Pts goals: reduce pain especially in the morning,  grand kids, be able to walk a few miles daily   Appropriate and Ongoing    Plan   Continue with established Plan of Care towards established PT goals.     Therapist: Gordo Jimenez, PTA  11/19/2024

## 2024-11-25 ENCOUNTER — OFFICE VISIT (OUTPATIENT)
Dept: PAIN MEDICINE | Facility: CLINIC | Age: 69
End: 2024-11-25
Payer: MEDICARE

## 2024-11-25 ENCOUNTER — CLINICAL SUPPORT (OUTPATIENT)
Dept: REHABILITATION | Facility: HOSPITAL | Age: 69
End: 2024-11-25
Payer: MEDICARE

## 2024-11-25 VITALS
DIASTOLIC BLOOD PRESSURE: 71 MMHG | HEART RATE: 76 BPM | HEIGHT: 70 IN | BODY MASS INDEX: 22.41 KG/M2 | WEIGHT: 156.5 LBS | SYSTOLIC BLOOD PRESSURE: 120 MMHG | OXYGEN SATURATION: 100 % | RESPIRATION RATE: 12 BRPM

## 2024-11-25 DIAGNOSIS — M54.17 LUMBOSACRAL RADICULOPATHY: ICD-10-CM

## 2024-11-25 DIAGNOSIS — G89.29 OTHER CHRONIC PAIN: ICD-10-CM

## 2024-11-25 DIAGNOSIS — M46.1 SACROILIITIS: ICD-10-CM

## 2024-11-25 DIAGNOSIS — M79.18 MYOFASCIAL PAIN: ICD-10-CM

## 2024-11-25 DIAGNOSIS — M54.16 LUMBAR RADICULOPATHY: ICD-10-CM

## 2024-11-25 DIAGNOSIS — M53.3 SACROILIAC JOINT PAIN: ICD-10-CM

## 2024-11-25 DIAGNOSIS — R29.898 DECREASED STRENGTH OF TRUNK AND BACK: Primary | ICD-10-CM

## 2024-11-25 DIAGNOSIS — M51.370 DEGENERATION OF INTERVERTEBRAL DISC OF LUMBOSACRAL REGION WITH DISCOGENIC BACK PAIN: ICD-10-CM

## 2024-11-25 DIAGNOSIS — M48.061 SPINAL STENOSIS OF LUMBAR REGION WITHOUT NEUROGENIC CLAUDICATION: Primary | ICD-10-CM

## 2024-11-25 DIAGNOSIS — M47.816 LUMBAR SPONDYLOSIS: ICD-10-CM

## 2024-11-25 PROCEDURE — 97110 THERAPEUTIC EXERCISES: CPT | Mod: CQ

## 2024-11-25 PROCEDURE — 99999 PR PBB SHADOW E&M-EST. PATIENT-LVL IV: CPT | Mod: PBBFAC,,,

## 2024-11-25 PROCEDURE — 97112 NEUROMUSCULAR REEDUCATION: CPT | Mod: CQ

## 2024-11-25 NOTE — PROGRESS NOTES
Interventional Pain Management - Established Visit  Follow-Up       SUBJECTIVE:    Interval History 11/25/2024:  Sabino Rubio returns to clinic for follow-up after bilateral L3, L4, L5 RFA on 9/30/2024. He reports 70% relief. Of note, he states he has been following with Dr Patel for left rotator cuff tear. He as been doing shoulder exercises as tolerated. He does continue with mild lower back stiffness in the morning which has improved with Healthy Back and exercise at the gym 2-3 times per week. He continues Gabapentin and Mobic with good relief. He also takes Flexeril or Tylenol as needed with good relief. He denies any perceived side effects. He continues his home exercise routine. He denies recent health changes. He denies recent falls or trauma. He denies new onset fever/night sweats, urinary incontinence, bowel incontinence, significant weight changes, significant motor weakness or changes, or loss of sensations. His pain today is 3/10.      Interval History 10/21/2024:  Sabino Rubio returns to clinic for follow-up after Bilateral L3, L4, L5 RFA on 9/30/2024. He reports some relief as of today (3 weeks s/p procedure). He continues with some stiffness in the morning. He continues Gabapentin, Mobic and Flexeril with good relief. He denies any perceived side effects. He continues his home exercise routine. He denies recent health changes. He denies recent falls or trauma. He denies new onset fever/night sweats, urinary incontinence, bowel incontinence, significant weight changes, significant motor weakness or changes, or loss of sensations. His pain today is 5/10.      Interval History 9/13/2024:  The patient returns to clinic today for follow up of back pain. He has not been seen in a year. He reports increased low back pain over the last 3 weeks. He also reports buttock pain. He does have aching pain into the posterior thighs. He endorses morning stiffness. His pain is worse with moving from  sitting to standing. He is taking Gabapentin and Mobic. He ran out of Flexeril which is usually helpful. He is physically active, performing a home exercise routine. He denies any other health changes. His pain today is 5/10.    Interval History 5/23/2023:  The patient returns to clinic today for follow up of low back pain. He reports worsened low back pain. He reports low back and buttock pain. He denies any radicular leg pain. His pain is worse with moving from sitting to standing. He also endorses morning stiffness. He is taking Gabapentin, Flexeril, and Mobic. He does perform a home exercise routine. He denies any other health changes. His pain today is 7/10.    Interval History 12/22/2022:  The patient returns to clinic today for follow up of low back pain. He is s/p bilateral SI joint injections on 12/8/2022. He reports 50% relief of his pain. He continues to report right sided low back and buttock pain. He denies any radicular leg pain. His pain is worse with driving, prolonged sitting, and moving from sitting to standing. He continues to perform a home exercise routine. He is taking Gabapentin, Flexeril, and Mobic. He denies any other health changes. His pain today is 2/10.    Interval History 11/11/2022:  The patient returns to clinic today for follow up of low back pain. He is s/p bilateral L5/S1 TF PERCY on 10/27/2022. He reports limited relief of his pain. He continues to report low back and right hip pain. He reports intermittent radiating pain into his right posterior leg to his knee. He reports intermittent numbness to his legs. His pain is worse with moving from sitting to standing. He also reports increased pain with prolonged standing. He continues to perform a home exercise routine. He continues to take Gabapentin, Flexeril, and Mobic. He denies any other health changes. His pain today is 2/10.    Interval History 10/10/2022:  The patient returns to clinic today for follow up of low back pain. He is  here today for imaging review. He continues to report low back pain that radiates into his right buttock and posterior thigh. His pain is worse with moving from sitting to standing, prolonged standing, and activity. He reports intermittent weakness into his legs with moving from sitting to standing. He has completed physical therapy. He continues to perform a home exercise routine. He continues to take Gabapentin, Flexeril, and Mobic. He denies any bowel or bladder incontinence. His pain today is 2/10.      Interval History 9/13/2022:  Sabino Rubio presents to the clinic for a follow-up appointment for low back pain. He reports neuroma injection at last office visit provided short term relief. He continues reports low back pain that radiates into his right buttock. He denies any radicular leg pain. His pain is worse with moving from sitting to standing, prolonged sitting, and activity. He does endorse morning stiffness. He is currently participating in physical therapy with some benefit. He is taking Gabapentin, Flexeril, and Mobic. He denies any other health changes. His pain today is 5/10.    Interval History 8/2/2022:   Patient returns to clinic today reporting 2/10 pain at rest which increases to 4-6/10 when standing. Pain is primarily low back in nature with radiation to the right buttock. He reports that pain is exacerbated by prolonged immobility specifically when awakening from sleep or sitting for long periods of time. At the last visit, he was prescribed Flexeril, but has noted only minimal benefit from it. He has been working with PT and plans on starting to go to the gym. Prior RFA's have provided some relief, he reports his first left-sided procedure produced significant relief whereas the second right-sided procedure only produced moderate relief.    Interval History 6/16/2022:  Patient presents today for evaluationfollow-up of their low back pain s/p Bilateral  Lumbar RFA . Per pateint his axial  LBP is almost 50-60% better after the RFA but he is still suffering from some pain in Left lower back ,mostly stiffness and spasm .no radicular component  Is associated with LBP. No B/B incontinence/no numbness /motor or sensory deficit.     Pain Disability Index Review:      11/25/2024     9:44 AM 10/21/2024    10:44 AM 9/13/2024     8:11 AM   Last 3 PDI Scores   Pain Disability Index (PDI) 42 [phone]       Pain Medications:  Gabapentin  Mobic  Flexeril    Opioid Contract: no     report:  Not applicable    Pain Procedures:   6/6/2019- Bilateral L4/5 and L5/S1 facet joint injection  1/27/2022- Bilateral L3,4,5 MBB  2/10/2022- Bilateral L3,4,5 MBB  5/16/2022- Right L3,4,5 RFA- 60% relief  6/2/2022- Left L3,4,5 RFA- 60% relief  10/27/2022- Bilateral L5/S1 TF PERCY  12/8/2022- Bilateral SI joint injections  9/30/2024 - Bilateral L3,4,5 RFA - 70% relief    Physical Therapy/Home Exercise:   Healthy Back: 10/29/2024-present  HEP: 3-4 times per week at the gym    Imaging:   MRI Lumbar Spine 10/5/2022:  COMPARISON:  Radiograph 06/16/2022, MRI 01/09/2019.     FINDINGS:  Lumbar spine alignment demonstrates levoscoliosis with straightening of the normal lordosis.  Vertebral body heights are well maintained without evidence for acute fracture.  Schmorl's node extends into the superior endplate of the L3 vertebral body, unchanged when compared to the previous MRI.  Benign osseous hemangioma at the L4 vertebral body.  No marrow signal abnormality to suggest an infiltrative process.     There is advanced degenerative disc space narrowing and desiccation throughout the visualized thoracolumbar spine, progressed when compared to the previous MRI most notably at the L1-L2 level noting severe associated endplate edema.     Distal spinal cord demonstrates normal contour and signal intensity.  Cauda equina appears normal without findings to suggest arachnoiditis.  Conus medullaris terminates at L1.     Right renal cortical cyst.  SI  joints are symmetric.  Paraspinal musculature demonstrates normal bulk and signal intensity.     T12-L1: Circumferential disc bulge encroaches into the bilateral foraminal zones.  Bilateral facet arthropathy and bilateral ligamentum flavum buckling.  Findings contribute to mild spinal canal stenosis and mild right neural foraminal narrowing.     L1-L2: Circumferential disc bulge encroaches into the right neural foramen and likely abuts the right exiting L1 nerve root.  Bilateral facet arthropathy and bilateral ligamentum flavum buckling.  Findings contribute to moderate spinal canal stenosis and severe right and mild left neural foraminal narrowing.     L2-L3: Circumferential disc bulge, bilateral facet arthropathy and bilateral ligamentum flavum buckling.  Findings contribute to mild spinal canal stenosis and mild left neural foraminal narrowing.     L3-L4: Circumferential disc bulge encroaches into the bilateral foraminal zones.  Bilateral facet arthropathy and bilateral ligamentum flavum buckling.  Findings contribute to moderate to severe spinal canal and lateral recess stenosis and mild bilateral neural foraminal narrowing.     L4-L5: Circumferential disc bulge encroaches into the bilateral foraminal zones.  Bilateral facet arthropathy and bilateral ligamentum flavum buckling.  Findings contribute to moderate spinal canal and lateral recess stenosis and moderate to severe left and moderate right neural foraminal narrowing.     L5-S1: Circumferential disc bulge encroaches into the bilateral foraminal zones.  Bilateral facet arthropathy and bilateral ligamentum flavum buckling.  Findings contribute to mild lateral recess stenosis and moderate to severe bilateral neural foraminal narrowing with suspected impingement of the left exiting L5 nerve root.     Impression:     1. Lumbar levoscoliosis with advanced superimposed degenerative changes most pronounced at L1-L2 and from L3-L4 through L5-S1, progressed when  compared to MRI dated 01/09/2019.    Xray Lumbar Spine 6/16/2022:  COMPARISON:  Lumbar spine radiograph from 05/24/2019.     FINDINGS:  Alignment: Mild levocurvature of lumbar spine.  Vertebral bodies adequately aligned on sagittal views.  No dynamic instability.     Vertebrae: Persistent concavity along superior endplate of L3, similar to prior exam.  Remaining vertebral body heights are adequately maintained.  No definite spondylolysis on oblique views.  No suspicious appearing lytic or blastic lesions.     Discs and facets: Multilevel moderate to severe disc space narrowing most prominent at L1-L2, L4-L5, and L5-S1 with sclerotic endplate changes.  Vacuum disc phenomenon present at L1-L2 and L5-S1.  Multilevel anterior osteophyte formation.  Advanced facet arthropathy most prominent in the lower lumbar spine.     Miscellaneous: No additional findings.     Impression:     As above.    MRI Lumbar Spine 1/9/2019:  COMPARISON:  Lumbar spine radiograph 10/03/2017     FINDINGS:  Alignment: Mild straightening of normal lumbar lordosis.     Vertebrae: Mild vertebral height loss and degenerative signal change.  No evidence of acute fracture or infiltrative marrow process.     Discs: Intervertebral disc height loss and degenerative signal change, most prominent within the lower lumbar spine.     Cord: Normal.  Conus terminates at L1-L2.     Degenerative findings:     T12-L1: Mild broad-based disc bulge and facet arthropathy without significant spinal canal stenosis or neural foraminal narrowing.     L1-L2: Mild broad-based disc bulge and facet arthropathy without significant spinal canal stenosis or neural foraminal narrowing.     L2-L3: Broad-based disc bulge, ligamentum flavum thickening, and facet arthropathy without significant spinal canal stenosis or neural foraminal narrowing.     L3-L4: Broad-based disc bulge, ligamentum flavum thickening, and facet arthropathy resulting in moderate spinal canal stenosis and mild  bilateral neural foraminal narrowing.     L4-L5: Broad-based disc bulge, ligamentum flavum thickening, and facet arthropathy resulting in moderate spinal canal stenosis, moderate left neural foraminal narrowing, and mild right neural foraminal narrowing.     L5-S1: Broad-based disc bulge, ligamentum flavum thickening, and facet arthropathy resulting in moderate left and mild right neural foraminal narrowing.     Paraspinal muscles & soft tissues: Unremarkable.     IMPRESSION:      Multilevel degenerative change of the lumbar spine most significant at L3-L4 which demonstrates moderate spinal canal stenosis and mild bilateral neural foraminal narrowing.  Moderate left and mild right neural foraminal narrowing seen at L4-L5 and L5-S1.  Additional details above.    Allergies: Review of patient's allergies indicates:  No Known Allergies    Current Medications:   Current Outpatient Medications   Medication Sig Dispense Refill    aspirin (ECOTRIN) 81 MG EC tablet Take 1 tablet (81 mg total) by mouth once daily. 30 tablet 12    ciprofloxacin HCl (CIPRO) 500 MG tablet Take 1 tablet (500 mg total) by mouth 2 (two) times daily. 20 tablet 0    cyclobenzaprine (FLEXERIL) 10 MG tablet Take 1 tablet (10 mg total) by mouth 3 (three) times daily as needed for Muscle spasms. for ten days 30 tablet 1    famotidine (PEPCID) 20 MG tablet TAKE 1 TABLET BY MOUTH TWICE A  tablet 3    fluticasone propionate (FLONASE) 50 mcg/actuation nasal spray SPRAY 1 SPRAY (50 MCG TOTAL) INTO INTO EACH NOSTRIL TWICE A DAY 48 mL 1    gabapentin (NEURONTIN) 300 MG capsule Take 1 capsule (300 mg total) by mouth 3 (three) times daily. 270 capsule 1    meloxicam (MOBIC) 15 MG tablet TAKE 1 TABLET (15 MG TOTAL) BY MOUTH DAILY AS NEEDED FOR PAIN (TAKE WITH FOOD OR A MEAL). 90 tablet 0    metroNIDAZOLE (FLAGYL) 500 MG tablet Take 1 tablet (500 mg total) by mouth every 8 (eight) hours. No alcohol with this 30 tablet 0    mupirocin (BACTROBAN) 2 % ointment  Apply topically 2 (two) times daily. 30 g 0    rOPINIRole (REQUIP) 1 MG tablet Take 1 tablet (1 mg total) by mouth every evening. 30 tablet 11    rosuvastatin (CRESTOR) 10 MG tablet Take 1 tablet (10 mg total) by mouth once daily. 90 tablet 3    sildenafiL (VIAGRA) 100 MG tablet Take 100 mg by mouth daily as needed for Erectile Dysfunction.      tamsulosin (FLOMAX) 0.4 mg Cap TAKE 1 CAPSULE BY MOUTH EVERY DAY 90 capsule 3     No current facility-administered medications for this visit.     Facility-Administered Medications Ordered in Other Visits   Medication Dose Route Frequency Provider Last Rate Last Admin    0.9%  NaCl infusion   Intravenous Continuous Cole Fitzpatrick,            REVIEW OF SYSTEMS:    GENERAL:  No weight loss, malaise or fevers.  HEENT:  Negative for frequent or significant headaches.  NECK:  Negative for lumps, goiter, pain and significant neck swelling.  RESPIRATORY:  Negative for cough, wheezing or shortness of breath.  CARDIOVASCULAR:  Negative for chest pain, leg swelling or palpitations.  GI:  Negative for abdominal discomfort, blood in stools or black stools or change in bowel habits. GERD  MUSCULOSKELETAL:  See HPI.  SKIN:  Negative for lesions, rash, and itching.  PSYCH:  Negative for sleep disturbance, mood disorder and recent psychosocial stressors.  HEMATOLOGY/LYMPHOLOGY:  Negative for prolonged bleeding, bruising easily or swollen nodes.  NEURO:   No history of headaches, syncope, paralysis, seizures or tremors.  All other reviewed and negative other than HPI.    Past Medical History:  Past Medical History:   Diagnosis Date    Allergy     Arthritis     Family history of malignant neoplasm of gastrointestinal tract mat.gf    Family history of prostate cancer: 1/2 brother 09/25/2017    GERD (gastroesophageal reflux disease)     Hyperlipidemia     Kidney cyst, acquired: R 1.2 cm 2015 09/25/2017    Restless leg syndrome     Tubulovillous adenoma 2013 due 2016 09/14/2015       Past  Surgical History:  Past Surgical History:   Procedure Laterality Date    APPLICATION OF BONE GRAFT TO FINGER Left 11/8/2021    Procedure: APPLICATION INTEGRA GRAFT, TO FINGER;  Surgeon: Alba Penn MD;  Location: [facility];  Service: Orthopedics;  Laterality: Left;    CARPAL TUNNEL RELEASE      COLONOSCOPY N/A 5/22/2019    Procedure: COLONOSCOPY;  Surgeon: Juancarlos Foley MD;  Location: [facility] (4TH FLR);  Service: Endoscopy;  Laterality: N/A;    COLONOSCOPY N/A 10/12/2022    Procedure: COLONOSCOPY;  Surgeon: Cherelle Wang MD;  Location: [facility] (4TH FLR);  Service: Endoscopy;  Laterality: N/A;  vacc-inst portal-am prep-clears 4 hrs prior-tb    COLONOSCOPY N/A 5/30/2024    Procedure: COLONOSCOPY;  Surgeon: Nick Kramer MD;  Location: [facility] (4TH FLR);  Service: Endoscopy;  Laterality: N/A;  Prep instructions sent via portal-dw  Peg Prep-dw  5/22/24- LVM for precall - ERW  5/29-received message from pt's wife confirming appt-Kpvt    EXCISION OF GANGLION OF WRIST Right 6/28/2019    Procedure: EXCISION, GANGLION CYST, WRIST right;  Surgeon: Alba Penn MD;  [address]  Service: Orthopedics;  Laterality: Right;  regional MAC, stretcher, supine, hand pan 1 and 2,MINI C-arm, call Arthrex    INJECTION OF ANESTHETIC AGENT AROUND NERVE Bilateral 1/27/2022    Procedure: Block, Nerve MEDIAL BRANCH BLOCK BILATERAL L3,4,5  DIRECT REFERRAL 1 OF 2;  Surgeon: Kaiser Braxton MD;  Location: [facility]IN MGT;  Service: Pain Management;  Laterality: Bilateral;    INJECTION OF ANESTHETIC AGENT AROUND NERVE Bilateral 2/10/2022    Procedure: Block, Nerve MEDIAL BRANCH BLOCK BILATERAL L3.4.5  DIRECT REFERRAL 2 OF 2;  Surgeon: Kaiser Braxton MD;  Location: [facility] PAIN MGT;  Service: Pain Management;  Laterality: Bilateral;    INJECTION OF FACET JOINT Bilateral 6/6/2019    Procedure: INJECTION, FACET JOINT INJECTION (LUMBAR BLOCK) BILATERAL L4-L5 AND L5-S1 FACET INJECTIONS;  Surgeon: Kaiser Braxton MD;  [facility];  Service: Pain Management;  Laterality: Bilateral;  NEEDS CONSENT    INJECTION, SACROILIAC JOINT Right 12/8/2022    Procedure: INJECTION,SACROILIAC JOINT RIGHT  CONTRAST;  Surgeon: Kaiser Braxton MD;  Location: [facility] PAIN MGT;  Service: Pain Management;  Laterality: Right;    INTERPOSITION ARTHROPLASTY OF CARPOMETACARPAL JOINTS Right 6/28/2019    Procedure: INTERPOSITION ARTHROPLASTY, CMC JOINT right;  Surgeon: Alba Penn MD;  [facility];  Service: Orthopedics;  Laterality: Right;  regional MAC, stretcher, supine, hand pan 1 and 2,MINI C-arm, call Arthrex    IRRIGATION AND DEBRIDEMENT OF UPPER EXTREMITY Left 11/8/2021    Procedure: IRRIGATION AND DEBRIDEMENT, UPPER EXTREMITY, left index finger;  Surgeon: Alba Penn MD;  Location: [facility] OR;  Service: Orthopedics;  Laterality: Left;    RADIOFREQUENCY ABLATION Right 5/16/2022    Procedure: Radiofrequency Ablation RIGHT L3,4,5;  Surgeon: Kaiser Braxton MD;  Location: [facility] PAIN MGT;  Service: Pain Management;  Laterality: Right;    RADIOFREQUENCY ABLATION Left 6/2/2022    Procedure: Radiofrequency Ablation LEFT L3,4,5;  Surgeon: Kaiser Braxton MD;  Location: [facility] PAIN MGT;  Service: Pain Management;  Laterality: Left;    RADIOFREQUENCY ABLATION Right 7/17/2023    Procedure: RADIOFREQUENCY ABLATION, RIGHT L3,L4,L5 MEDIAL BRANCH ONE OF TWO;  Surgeon: Kaiser Braxton MD;  Location: [facility] PAIN MGT;  Service: Pain Management;  Laterality: Right;    RADIOFREQUENCY ABLATION Left 9/11/2023    Procedure: RADIOFREQUENCY ABLATION, LEFT L3,L4,L5 MEDIAL BRANCH TWO OF TWO;  Surgeon: Kaiser Braxton MD;  Location: [facility] PAIN MGT;  Service: Pain Management;  Laterality: Left;    RADIOFREQUENCY ABLATION Bilateral 9/30/2024    Procedure: RADIOFREQUENCY ABLATION BILATERAL L3, 4, 5;  Surgeon: Kaiser Braxton MD;  Location: [facility] PAIN MGT;  Service: Pain Management;  Laterality: Bilateral;  [phone]  3 WK F/U CLAUDIA    REPAIR OF NAIL BED Left 11/8/2021     Procedure: REPAIR, NAIL BED, left index;  Surgeon: Alba Penn MD;  Location: [facility] OR;  Service: Orthopedics;  Laterality: Left;    SPERMATOCELECTOMY Right 11/8/2019    Procedure: EXCISION, SPERMATOCELE;  Surgeon: Sheldon Araya Jr., MD;  Location: [facility] OR 1ST FLR;  Service: Urology;  Laterality: Right;  1hr    TRANSFORAMINAL EPIDURAL INJECTION OF STEROID Bilateral 10/27/2022    Procedure: INJECTION, STEROID, EPIDURAL, TRANSFORAMINAL APPROACH, BILATERAL L5-S1 CONTRAST;  Surgeon: Kaiser Braxton MD;  Location: [facility] PAIN MGT;  Service: Pain Management;  Laterality: Bilateral;       Family History:  Family History   Problem Relation Name Age of Onset    Arthritis Mother          probable R.A.    Cancer Father Smoker         esophageal ca and brain tumor    Esophageal cancer Father Smoker     Melanoma Father Smoker     Irritable bowel syndrome Sister      Arthritis Sister      Arthritis Sister          rheumatoid    Cancer Sister          skin    Cancer Brother 1/2         pancreatic cancer    No Known Problems Brother      No Known Problems Brother      No Known Problems Son Jimmie     No Known Problems Son Ra     Cancer Maternal Grandfather          colon    Colon cancer Maternal Grandfather      Diabetes Neg Hx      Heart disease Neg Hx      Cirrhosis Neg Hx      Celiac disease Neg Hx      Crohn's disease Neg Hx      Ulcerative colitis Neg Hx      Stomach cancer Neg Hx      Rectal cancer Neg Hx      Liver cancer Neg Hx      Psoriasis Neg Hx      Lupus Neg Hx         Social History:  Social History     Socioeconomic History    Marital status: Single    Number of children: 2   Tobacco Use    Smoking status: Never    Smokeless tobacco: Never    Tobacco comments:     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   Substance and Sexual Activity    Alcohol use: Yes     Alcohol/week: 0.0 standard drinks of alcohol     Comment: 2-3 days weekly,  "up to 2 beers    Drug use: No    Sexual activity: Yes     Partners: Female     Social Drivers of Health     Financial Resource Strain: Low Risk  (11/3/2024)    Overall Financial Resource Strain (CARDIA)     Difficulty of Paying Living Expenses: Not hard at all   Food Insecurity: No Food Insecurity (11/3/2024)    Hunger Vital Sign     Worried About Running Out of Food in the Last Year: Never true     Ran Out of Food in the Last Year: Never true   Transportation Needs: No Transportation Needs (5/12/2022)    PRAPARE - Transportation     Lack of Transportation (Medical): No     Lack of Transportation (Non-Medical): No   Physical Activity: Unknown (11/3/2024)    Exercise Vital Sign     Days of Exercise per Week: Patient declined   Stress: No Stress Concern Present (11/3/2024)    [facility] - Occupational Stress Questionnaire     Feeling of Stress : Not at all   Housing Stability: Low Risk  (5/12/2022)    Housing Stability Vital Sign     Unable to Pay for Housing in the Last Year: No     Number of Places Lived in the Last Year: 1     Unstable Housing in the Last Year: No       OBJECTIVE:    /71 (BP Location: Left arm, Patient Position: Sitting)   Pulse 76   Resp 12   Ht 5' 10" (1.778 m)   Wt 71 kg (156 lb 8.4 oz)   SpO2 100%   BMI 22.46 kg/m²     PHYSICAL EXAMINATION:    General appearance: Well appearing, in no acute distress, alert and oriented x3.  Psych:  Mood and affect appropriate.  Skin: Skin color, texture, turgor normal, no rashes or lesions, in both upper and lower body.  Head/face:  Atraumatic, normocephalic.   Cor: Rate regular.   Pulm: Symmetric chest rise, no respiratory distress noted.   Back: Straight leg raising in the sitting position is negative to radicular pain bilaterally. Full ROM with mild pain on extension and facet loading bilaterally.   Extremities: No deformities, edema, or skin discoloration. Good capillary refill.  Musculoskeletal: There is mild pain " with palpation over bilateral SI joints.  Bilateral lower extremity strength is normal and symmetric.  No atrophy or tone abnormalities are noted.  Neuro: No loss of sensation is noted.  Gait: Normal    ASSESSMENT: 68 y.o. year old male with back pain, consistent with the followin. Spinal stenosis of lumbar region without neurogenic claudication        2. Sacroiliac joint pain        3. Sacroiliitis        4. Lumbar spondylosis        5. Degeneration of intervertebral disc of lumbosacral region with discogenic back pain        6. Lumbar radiculopathy        7. Lumbosacral radiculopathy        8. Other chronic pain        9. Myofascial pain              PLAN:     - Previous imaging reviewed today. Labs reviewed.     - He is s/p bilateral L3,4,5 RFA with 70% pain relief.     - Consider repeat SI joint injections in the future.     - I have stressed the importance of physical activity and a home exercise plan to help with pain and improve health.    - Continue Healthy [facility] Program    - Continue HEP, encouraged daily participation.    - Continue Gabapentin 300 mg TID. He will call for refills.     - Continue Flexeril 10 mg TID PRN muscle pain. He will call for refills.     - Continue Mobic 15 mg daily per PCP    - RTC as needed. Patient will call for follow-up.    - Counseled patient regarding the importance of activity modification and physical therapy.    The above plan and management options were discussed at length with patient. Patient is in agreement with the above and verbalized understanding.    Bridgette Rodriguez NP  2024

## 2024-11-25 NOTE — PROGRESS NOTES
[facility]      Name: Sabino Rubio  Clinic Number: [id number]    Therapy Diagnosis:   Encounter Diagnosis   Name Primary?    Decreased strength of trunk and back Yes     Physician: Bridgette Rodriguez NP    Visit Date: 11/25/2024    Physician Orders: PT Eval and Treat  Medical Diagnosis from Referral: M47.816 (ICD-10-CM) - Lumbar [id number] (ICD-10-CM) - [id number] (ICD-10-CM) - Degeneration of intervertebral disc of lumbosacral region with discogenic back pain  Evaluation Date: 10/29/2024  Authorization Period Expiration: 12/31/2024  Plan of Care Expiration: 1/7/2025  Reassessment Due: 11/29/2024  Visit # / Visits authorized: 8/20  MedX Testing:MedX testing visit 2    Time In: 8:00 AM  Time Out: 9:00  AM  Total Billable Time: 55 minutes   INSURANCE and OUTCOMES: Fee for Service with FOTO Outcomes 2/3    Precautions: Standard    Pattern of pain determined: 4 PEN (gentle extension based exercises started to address mobility issues with traffic light model taught)     Subjective   Sabino reports back pain continues to be worse in the mornings and gets better with movement.  Min R side stiffness/di this morning.  States that he had to miss previous appt due to taking his mom to the doctor. He reports compliance with HEP and gym based ex's. Feels his L shoulder pain is diminishing after his recent injection.    Patient reports tolerating previous visit: No c/o  Patient reports their pain to be 2/10 on a 0-10 scale with 0 being no pain and 10 being the worst pain imaginable.  Pain Location: low back     Occupation: in construction, work part time, lifting materials, drives a van, tries to delegate (self employed)  Leisure: fish, has boat, outdoor stuff, play with grandkids  Pt goals: reduce pain especially in the morning,  grand kids, be able to walk a few miles daily     Objective     Cervical IM Testing Results:    Initial 10/31/24   ROM 3-42 deg   Max Peak Torque  "76    Min Peak Torque 29    Flex/Ext Ratio 2.6:1   % below normative data 75%     Outcomes:  Initial score: 59% functional ability   Visit 6 score: 57%  Goal: 70% functional ability           Treatment    Sabino received the treatments listed below:      Sabino received neuromuscular education  to isolate and engage spinal stabilization musculature correctly for motor control and coordination to aid in function and posture for 10 minutes on the Medical Medx Machine.  Patient performed MedX dynamic exercise with emphasis on spinal muscular control using pacer throughout  active range of motion. Therapist assisted patient in achieving optimal exertion for neural reeducation and endurance training by using the  Shakir Exertion Rating scale, by instructing the patient to aim for mid range of exertion, performing 15-20 repetitions, slowly, correctly,and safely.            11/25/2024     8:17 AM   [facility] - Short   Visit Number 8   VAS Pain Rating 2   Treadmill Time (in min.) 5 min   Lumbar Stretches - Slouch 10   Extension in Standing 10   Lumbar Flexion 45   Lumbar Extension 0   Lumbar Weight 59 lbs   Repetitions 20   Rating of Perceived Exertion 4          therapeutic exercises to develop strength, endurance, ROM, flexibility, posture, and core stabilization for 45 minutes including:    Slouch correct  (before getting out of chair and in am) x 10  Gentle ext in standing x 10  Hip ext rotation stretch 20" x 2  Piriformis stretch 20" x 2  Lower trunk rotation x 10  R QL stretch in supine (arms overhead) 3 x 15 sec  Hamstring stretch w/belt 2 x 20 sec  transverse abdominal activation / PPT + SLR x10 + 2.5#   BFKO with transverse abdominal activation + Black Tband x 15  Bridging c/ Black Tband x 20  SL Clamshells  Black tband  x 20  Paloff press 15# x 15  +Seated hip er stretch 2 x 20 sec  + Standing R QL stretch 2 x 20 sec        Peripheral muscle strengthening which included 1 set of 15-20 repetitions at a slow, " controlled 10-13 second per rep pace focused on strengthening supporting musculature for improved body mechanics and functional mobility.  Pt and therapist focused on proper form during treatment to ensure optimal strengthening of each targeted muscle group.  Machines were utilized including torso rotation, leg press, hip abd and hip add, leg ext.  Leg curl, triceps, biceps, chest and row added visit 3     manual therapy techniques:  were applied to the: low back for 00 minutes, including:    cold pack for 5 minutes to low back and L shoulder     Home Exercises Provided and Patient Education Provided   Home exercises include: SOC, EIS, hip ER stretch, piriformis stretch, LTR, hamstring stretch, QL stretch  Cardio program: visit 5 11/12/24 (Reviewed benefits of cardio with handout provided, patient reports being active with walking activities).  Lifting education date: visit 11  Posture/Lumbar roll:  recommended  Fridge Magnet Discharge handout (date given):  Equipment at home/gym membership: no    Education provided:   -  cues w/exs  MedX performance  Precor ex performance  11/12/24 Availability of Health coaching    Written Home Exercises Provided: Patient instructed to cont prior HEP. Added Standing QL and seated hip ER stretch 11/25/24  Exercises were reviewed and Sabino was able to demonstrate them prior to the end of the session.  Sabino demonstrated good  understanding of the education provided.     See EMR under Patient Instructions for exercises provided prior visit.    Assessment   Sabino returns reporting chronic lower back and glute/QL pain R> L rated 2/10 today. Symptoms remain generally worse in the mornings and get better with movement. Encouraged stretching prior to going to bed and when he first wakes up. Treatment continued with flexibility, strengthening and neuromuscular reeducation ex's.  He was progressed to [facility] for bridging with band, BKFO and clamshells. increased resistance for TrA + SLR.   Also added seated hip ER/piriformis stretch and standing R QL stretch to address symptoms. He was able to perform ex's with min cues and without increased back or shoulder pain. Lumbar MedX Flexion ROM was increased to 45 degrees, resistance was maintained at 59 ft/lbs and he completed 20 reps with a RPE = 4/10. He completed peripheral strengthening ex's without increased discomfort (UE ex's remain deferred due to  (L) shoulder injury). Will continue per HB protocol and patient tolerance.       Patient is making  progress towards established goals.  Pt will continue to benefit from skilled outpatient physical therapy to address the deficits stated in the impairment chart, provide pt/family education and to maximize pt's level of independence in the home and community environment.     Anticipated Barriers for therapy: attendance, chronicity of symptoms  Pt's spiritual, cultural and educational needs considered and pt agreeable to plan of care and goals as stated below:     Goals:   Short term goals:  6 weeks or 10 visits   - Pt will demonstrate increased lumbar MedX ROM by at least 3 degrees from the initial ROM value with improvements noted in functional ROM and ability to perform ADLs. Appropriate and Ongoing  - Pt will demonstrate increased MedX average isometric strength value by 20% from initial test resulting in improved ability to perform bending, lifting, and carrying activities safely, confidently. Appropriate and Ongoing  - Pt will report a reduction in worst pain score by 1-2 points for improved tolerance for 8/10 versus initial 10/10.  Less stiff in am. Appropriate and Ongoing  - Pt able to perform HEP correctly with minimal cueing or supervision from therapist to encourage independent management of symptoms. Appropriate and Ongoing     Long term goals: 10 weeks or 20 visits   - Pt will demonstrate increased lumbar MedX ROM by at least 6 degrees from initial ROM value, resulting in improved ability to  perform functional forward bending while standing and sitting. Appropriate and Ongoing  - Pt will demonstrate increased MedX average isometric strength value by 40% from initial test resulting in improved ability to perform bending, lifting, and carrying activities safely and confidently. Appropriate and Ongoing  - Pt to demonstrate ability to independently control and reduce their pain through posture positioning and mechanical movements throughout a typical day. Appropriate and Ongoing  - Pt will demonstrate reduced pain and improved functional outcomes as reported on the FOTO by reaching an intake score of >/= 70% functional ability in order to demonstrate subjective improvement in patient's condition. . Appropriate and Ongoing  - Pt will demonstrate independence with the HEP at discharge. Appropriate and Ongoing  - Pts goals: reduce pain especially in the morning,  grand kids, be able to walk a few miles daily   Appropriate and Ongoing    Plan   Continue with established Plan of Care towards established PT goals.     Therapist: Gordo Jimenez, PTA  11/25/2024

## 2024-11-27 ENCOUNTER — CLINICAL SUPPORT (OUTPATIENT)
Dept: REHABILITATION | Facility: HOSPITAL | Age: 69
End: 2024-11-27
Payer: MEDICARE

## 2024-11-27 DIAGNOSIS — R29.898 DECREASED STRENGTH OF TRUNK AND BACK: Primary | ICD-10-CM

## 2024-11-27 PROCEDURE — 97110 THERAPEUTIC EXERCISES: CPT | Mod: CQ

## 2024-11-27 PROCEDURE — 97112 NEUROMUSCULAR REEDUCATION: CPT | Mod: CQ

## 2024-11-27 NOTE — PROGRESS NOTES
[facility]      Name: Sabino Rubio  Clinic Number: [id number]    Therapy Diagnosis:   Encounter Diagnosis   Name Primary?    Decreased strength of trunk and back Yes     Physician: Bridgette Rodriguez NP    Visit Date: 11/27/2024    Physician Orders: PT Eval and Treat  Medical Diagnosis from Referral: M47.816 (ICD-10-CM) - Lumbar [id number] (ICD-10-CM) - [id number] (ICD-10-CM) - Degeneration of intervertebral disc of lumbosacral region with discogenic back pain  Evaluation Date: 10/29/2024  Authorization Period Expiration: 12/31/2024  Plan of Care Expiration: 1/7/2025  Reassessment Due: 11/29/2024  Visit # / Visits authorized: 9/20  MedX Testing:MedX testing visit 2    Time In:8:55 AM  Time Out: 9:55 AM  Total Billable Time: 55 minutes   INSURANCE and OUTCOMES: Fee for Service with FOTO Outcomes 2/3    Precautions: Standard    Pattern of pain determined: 4 PEN (gentle extension based exercises started to address mobility issues with traffic light model taught)     Subjective   Sabino reports back pain continues to be worse in the mornings and gets better with movement.  Mild R side stiffness/discomfort currently.  States that he met with his pain med MD this week who recommended continuation of therapy.    Patient reports tolerating previous visit: Mild muscular soreness.  Patient reports their pain to be 2/10 on a 0-10 scale with 0 being no pain and 10 being the worst pain imaginable.  Pain Location: low back     Occupation: in construction, work part time, lifting materials, drives a van, tries to delegate (self employed)  Leisure: fish, has boat, outdoor stuff, play with grandkids  Pt goals: reduce pain especially in the morning,  grand kids, be able to walk a few miles daily     Objective     Cervical IM Testing Results:    Initial 10/31/24   ROM 3-42 deg   Max Peak Torque 76    Min Peak Torque 29    Flex/Ext Ratio 2.6:1   % below normative data 75%  "    Outcomes:  Initial score: 59% functional ability   Visit 6 score: 57%  Goal: 70% functional ability           Treatment    Sabino received the treatments listed below:      Sbaino received neuromuscular education  to isolate and engage spinal stabilization musculature correctly for motor control and coordination to aid in function and posture for 10 minutes on the Medical Medx Machine.  Patient performed MedX dynamic exercise with emphasis on spinal muscular control using pacer throughout  active range of motion. Therapist assisted patient in achieving optimal exertion for neural reeducation and endurance training by using the  Shakir Exertion Rating scale, by instructing the patient to aim for mid range of exertion, performing 15-20 repetitions, slowly, correctly,and safely.            11/27/2024     9:14 AM   [facility] - Short   Visit Number 9   VAS Pain Rating 2   Treadmill Time (in min.) 5 min   Extension in Standing 10   Lumbar Weight 63 lbs   Repetitions 18   Rating of Perceived Exertion 4           therapeutic exercises to develop strength, endurance, ROM, flexibility, posture, and core stabilization for 45 minutes including:    Slouch correct  (before getting out of chair and in am) x 10--NP  Gentle ext in standing x 10  Hip ext rotation stretch 20" x 2  Piriformis stretch 20" x 2  Lower trunk rotation x 10  R QL stretch in supine (arms overhead) 3 x 15 sec  Hamstring stretch w/belt 2 x 20 sec  transverse abdominal activation / PPT + SLR x10 + 2.5#   BFKO with transverse abdominal activation + Black Tband x 15--NP  Bridging c/ Black Tband x 20  SL Clamshells  Black tband  x 20  + Cat/Cow stretch x 10 (cues)  + Bird dog LE only x 10 (cue level pelvis)  Paloff press 15# x 15  Seated hip er stretch R 2 x 20 sec  Standing R QL stretch at door frame 2 x 20 sec      Peripheral muscle strengthening which included 1 set of 15-20 repetitions at a slow, controlled 10-13 second per rep pace focused on " strengthening supporting musculature for improved body mechanics and functional mobility.  Pt and therapist focused on proper form during treatment to ensure optimal strengthening of each targeted muscle group.  Machines were utilized including torso rotation, leg press, hip abd and hip add, leg ext.  Leg curl, triceps, biceps, chest and row added visit 3     manual therapy techniques:  were applied to the: low back for 00 minutes, including:    cold pack for 5 minutes to low back     Home Exercises Provided and Patient Education Provided   Home exercises include: SOC, EIS, hip ER stretch, piriformis stretch, LTR, hamstring stretch, QL stretch  Cardio program: visit 5 11/12/24 (Reviewed benefits of cardio with handout provided, patient reports being active with walking activities).  Lifting education date: visit 11  Posture/Lumbar roll:  recommended  Fridge Magnet Discharge handout (date given):  Equipment at home/gym membership: no    Education provided:   -  cues w/exs  MedX performance  Precor ex performance  11/12/24 Availability of Health coaching    Written Home Exercises Provided: Patient instructed to cont prior HEP. Added Standing QL and seated hip ER stretch 11/25/24.  Exercises were reviewed and Sabino was able to demonstrate them prior to the end of the session.  Sabino demonstrated good  understanding of the education provided.     See EMR under Patient Instructions for exercises provided prior visit.    Assessment   Sabino returns reporting chronic lower back and glute/QL pain R> L rated 2/10 today but slightly improved.. Symptoms remain generally worse in the mornings and get better with movement. Encouraged stretching prior to going to bed and when he first wakes up. Treatment continued with flexibility, strengthening and neuromuscular reeducation ex's.  He was progressed to perform LE Bird dog ex and also added cat/cow stretch. He was able to perform ex's with min cues and without increased back or  shoulder pain. Lumbar MedX resistance was increased to 63 ft/lbs and he completed 18 reps with a RPE = 4/10. He completed peripheral strengthening ex's without increased discomfort (UE ex's remain deferred due to  (L) shoulder injury). Will continue per HB protocol and patient tolerance.       Patient is making  progress towards established goals.  Pt will continue to benefit from skilled outpatient physical therapy to address the deficits stated in the impairment chart, provide pt/family education and to maximize pt's level of independence in the home and community environment.     Anticipated Barriers for therapy: attendance, chronicity of symptoms  Pt's spiritual, cultural and educational needs considered and pt agreeable to plan of care and goals as stated below:     Goals:   Short term goals:  6 weeks or 10 visits   - Pt will demonstrate increased lumbar MedX ROM by at least 3 degrees from the initial ROM value with improvements noted in functional ROM and ability to perform ADLs. Appropriate and Ongoing  - Pt will demonstrate increased MedX average isometric strength value by 20% from initial test resulting in improved ability to perform bending, lifting, and carrying activities safely, confidently. Appropriate and Ongoing  - Pt will report a reduction in worst pain score by 1-2 points for improved tolerance for 8/10 versus initial 10/10.  Less stiff in am. Appropriate and Ongoing  - Pt able to perform HEP correctly with minimal cueing or supervision from therapist to encourage independent management of symptoms. Appropriate and Ongoing     Long term goals: 10 weeks or 20 visits   - Pt will demonstrate increased lumbar MedX ROM by at least 6 degrees from initial ROM value, resulting in improved ability to perform functional forward bending while standing and sitting. Appropriate and Ongoing  - Pt will demonstrate increased MedX average isometric strength value by 40% from initial test resulting in improved  ability to perform bending, lifting, and carrying activities safely and confidently. Appropriate and Ongoing  - Pt to demonstrate ability to independently control and reduce their pain through posture positioning and mechanical movements throughout a typical day. Appropriate and Ongoing  - Pt will demonstrate reduced pain and improved functional outcomes as reported on the FOTO by reaching an intake score of >/= 70% functional ability in order to demonstrate subjective improvement in patient's condition. . Appropriate and Ongoing  - Pt will demonstrate independence with the HEP at discharge. Appropriate and Ongoing  - Pts goals: reduce pain especially in the morning,  grand kids, be able to walk a few miles daily   Appropriate and Ongoing    Plan   Continue with established Plan of Care towards established PT goals.     Therapist: Gordo Jimenez, PTA  11/27/2024

## 2024-11-28 RX ORDER — FLUTICASONE PROPIONATE 50 MCG
SPRAY, SUSPENSION (ML) NASAL
Qty: 48 G | Refills: 3 | Status: SHIPPED | OUTPATIENT
Start: 2024-11-28

## 2024-11-28 NOTE — TELEPHONE ENCOUNTER
No care due was identified.  [facility] Embedded Care Due Messages. Reference number: [id number].   11/28/2024 12:16:14 AM CST

## 2024-11-28 NOTE — TELEPHONE ENCOUNTER
Refill Decision Note   Sabino Rubio  is requesting a refill authorization.  Brief Assessment and Rationale for Refill:  Approve     Medication Therapy Plan:         Comments:     Note composed:8:51 AM 11/28/2024

## 2024-12-03 ENCOUNTER — CLINICAL SUPPORT (OUTPATIENT)
Dept: REHABILITATION | Facility: HOSPITAL | Age: 69
End: 2024-12-03
Payer: MEDICARE

## 2024-12-03 DIAGNOSIS — R29.898 DECREASED STRENGTH OF TRUNK AND BACK: Primary | ICD-10-CM

## 2024-12-03 PROCEDURE — 97110 THERAPEUTIC EXERCISES: CPT

## 2024-12-03 PROCEDURE — 97112 NEUROMUSCULAR REEDUCATION: CPT

## 2024-12-03 NOTE — PROGRESS NOTES
[facility]      Name: Sabino Rubio  Clinic Number: [id number]    Therapy Diagnosis:   Encounter Diagnosis   Name Primary?    Decreased strength of trunk and back Yes     Physician: Bridgette Rodriguez NP    Visit Date: 12/3/2024    Physician Orders: PT Eval and Treat  Medical Diagnosis from Referral: M47.816 (ICD-10-CM) - Lumbar [id number] (ICD-10-CM) - [id number] (ICD-10-CM) - Degeneration of intervertebral disc of lumbosacral region with discogenic back pain  Evaluation Date: 10/29/2024  Authorization Period Expiration: 12/31/2024  Plan of Care Expiration: 1/7/2025  Reassessment Due: 12/24/2024  Visit # / Visits authorized:10/20  MedX Testing:MedX testing visit 2    Time In: 8:00 AM  Time Out: 9:00 AM  Total Billable Time: 55 minutes   INSURANCE and OUTCOMES: Fee for Service with FOTO Outcomes 2/3    Precautions: Standard    Pattern of pain determined: 4 PEN (gentle extension based exercises started to address mobility issues with traffic light model taught)     Subjective   Sabino arrives with mild pain this morning, feels that morning pain/stiffness is less now as compared to before starting the program.    Patient reports tolerating previous visit: Mild muscular soreness.  Patient reports their pain to be 2/10 on a 0-10 scale with 0 being no pain and 10 being the worst pain imaginable.  Pain Location: low back     Occupation: in construction, work part time, lifting materials, drives a van, tries to delegate (self employed)  Leisure: fish, has boat, outdoor stuff, play with grandkids  Pt goals: reduce pain especially in the morning,  grand kids, be able to walk a few miles daily     Objective     Cervical IM Testing Results:    Initial 10/31/24 Midpoint 12/3/24   ROM 3-42 deg 0-48 deg   Max Peak Torque 76  154   Min Peak Torque 29  89   Flex/Ext Ratio 2.6:1 1.7:1   % below normative data 75% 30%   Strength gain since initial   171%     Outcomes:  Initial  "score: 59% functional ability   Visit 6 score: 57%  Visit 10: 56% functional ability  Goal: 70% functional ability         Treatment    Sabino received the treatments listed below:      Sabino received neuromuscular education    MedX testing performed day 10: Patient  received neuromuscular education to engage spinal musculature correctly for motor control and engagement of musculature for 15 minutes including the MedX exercise component and practice and standard testing. MedX dynamic exercise and baseline isometric test performed with instructions to guide the patient safely through the testing procedure. Patient instructed to perform isometric test correctly and safely while building to an optimal force with a pain-free effort. Patient also instructed that they should feel support/pressure from MedX restraints but no pain/discomfort, and encouraged to report any pain to therapist. Patient demonstrated appropriate understanding of information and tolerance of test.  Education regarding purpose of test, safety during test given, and reviewed possible more soreness and strategies          12/3/2024     8:49 AM   [facility]   Visit Number 10   VAS Pain Rating 1   Treadmill Time (in min.) 5 min   Extension in Standing 10   Lumbar Flexion 48   Lumbar Extension 0   Lumbar Peak Torque 154 ft. lbs.   Min Torque 89   Test Percent Below Normative Data 30 %   Test Percent Gain in Strength from Initial  171 %   Ice - Z Lie (in min.) 5         therapeutic exercises to develop strength, endurance, ROM, flexibility, posture, and core stabilization for 45 minutes including:    Slouch correct  (before getting out of chair and in am) x 10--NP  Gentle ext in standing x 10  Hip ext rotation stretch 20" x 2  Piriformis stretch 20" x 2  Lower trunk rotation x 10  R QL stretch in supine (arms overhead) 3 x 15 sec  Hamstring stretch w/belt 2 x 20 sec  transverse abdominal activation / PPT + SLR x10 + 2.5#   BFKO with transverse " abdominal activation + Black Tband x 15--NP  Bridging c/ Black Tband x 20  SL Clamshells  Black tband  x 20  Cat/Cow stretch x 10 (cues)  Bird dog LE only x 10 (cue level pelvis)  Paloff press 15# x 15  Seated hip er stretch R 2 x 20 sec  Standing R QL stretch at door frame 2 x 20 sec      Peripheral muscle strengthening which included 1 set of 15-20 repetitions at a slow, controlled 10-13 second per rep pace focused on strengthening supporting musculature for improved body mechanics and functional mobility.  Pt and therapist focused on proper form during treatment to ensure optimal strengthening of each targeted muscle group.  Machines were utilized including torso rotation, leg press, hip abd and hip add, leg ext.  Leg curl, triceps, biceps, chest and row added visit 3     manual therapy techniques:  were applied to the: low back for 00 minutes, including:    cold pack for 5 minutes to low back     Home Exercises Provided and Patient Education Provided   Home exercises include: SOC, EIS, hip ER stretch, piriformis stretch, LTR, hamstring stretch, QL stretch  Cardio program: visit 5 11/12/24 (Reviewed benefits of cardio with handout provided, patient reports being active with walking activities).  Lifting education date: visit 11  Posture/Lumbar roll:  recommended  Fridge Magnet Discharge handout (date given):  Equipment at home/gym membership: no    Education provided:   -  cues w/exs  MedX performance  Precor ex performance  11/12/24 Availability of Health coaching    Written Home Exercises Provided: Patient instructed to cont prior HEP. Added Standing QL and seated hip ER stretch 11/25/24.  Exercises were reviewed and Sabino was able to demonstrate them prior to the end of the session.  Sabino demonstrated good  understanding of the education provided.     See EMR under Patient Instructions for exercises provided prior visit.    Assessment   Sabino has attended 10 visits at [facility] which included MD  evaluation, PT evaluation with isometric testing, and physical therapy treatment including HEP instruction, education, aerobic activity, dynamic strengthening on MedX equipment for the spine, and whole body strengthening on MedX equipment with increasing resistance. Patient demonstrates increased ability to reduce symptoms, improve posture, improve ROM, and improve strength, as stated below:    -Improved posture, is using lumbar roll  -Improved ROM, initially on MedX test 3-42 deg and currently 0-48 deg.  -Improved strength at each test point on lumbar MedX isometric test with 171% average improvement noted with reduced pain noted by patient.      Patient is making  progress towards established goals.  Pt will continue to benefit from skilled outpatient physical therapy to address the deficits stated in the impairment chart, provide pt/family education and to maximize pt's level of independence in the home and community environment.     Anticipated Barriers for therapy: attendance, chronicity of symptoms  Pt's spiritual, cultural and educational needs considered and pt agreeable to plan of care and goals as stated below:     Goals:   Short term goals:  6 weeks or 10 visits   - Pt will demonstrate increased lumbar MedX ROM by at least 3 degrees from the initial ROM value with improvements noted in functional ROM and ability to perform ADLs. MET 12/3/24  - Pt will demonstrate increased MedX average isometric strength value by 20% from initial test resulting in improved ability to perform bending, lifting, and carrying activities safely, confidently. MET 12/3/24  - Pt will report a reduction in worst pain score by 1-2 points for improved tolerance for 8/10 versus initial 10/10.  Less stiff in am. MET 12/3/24  - Pt able to perform HEP correctly with minimal cueing or supervision from therapist to encourage independent management of symptoms. MET 12/3/24     Long term goals: 10 weeks or 20 visits   - Pt will demonstrate  increased lumbar MedX ROM by at least 6 degrees from initial ROM value, resulting in improved ability to perform functional forward bending while standing and sitting. Appropriate and Ongoing  - Pt will demonstrate increased MedX average isometric strength value by 40% from initial test resulting in improved ability to perform bending, lifting, and carrying activities safely and confidently. Appropriate and Ongoing  - Pt to demonstrate ability to independently control and reduce their pain through posture positioning and mechanical movements throughout a typical day. Appropriate and Ongoing  - Pt will demonstrate reduced pain and improved functional outcomes as reported on the FOTO by reaching an intake score of >/= 70% functional ability in order to demonstrate subjective improvement in patient's condition. . Appropriate and Ongoing  - Pt will demonstrate independence with the HEP at discharge. Appropriate and Ongoing  - Pts goals: reduce pain especially in the morning,  grand kids, be able to walk a few miles daily   Appropriate and Ongoing    Plan   Continue with established Plan of Care towards established PT goals.     Therapist: Hedy Wiley, PT  12/3/2024

## 2024-12-05 ENCOUNTER — CLINICAL SUPPORT (OUTPATIENT)
Dept: REHABILITATION | Facility: HOSPITAL | Age: 69
End: 2024-12-05
Payer: MEDICARE

## 2024-12-05 ENCOUNTER — DOCUMENTATION ONLY (OUTPATIENT)
Dept: REHABILITATION | Facility: HOSPITAL | Age: 69
End: 2024-12-05
Payer: MEDICARE

## 2024-12-05 DIAGNOSIS — R29.898 DECREASED STRENGTH OF TRUNK AND BACK: Primary | ICD-10-CM

## 2024-12-05 PROCEDURE — 97112 NEUROMUSCULAR REEDUCATION: CPT | Mod: CQ

## 2024-12-05 PROCEDURE — 97110 THERAPEUTIC EXERCISES: CPT | Mod: CQ

## 2024-12-05 NOTE — PROGRESS NOTES
[facility]      Name: Sabino Rubio  Clinic Number: [id number]    Therapy Diagnosis:   Encounter Diagnosis   Name Primary?    Decreased strength of trunk and back Yes     Physician: Bridgette Rodriguez NP    Visit Date: 12/5/2024    Physician Orders: PT Eval and Treat  Medical Diagnosis from Referral: M47.816 (ICD-10-CM) - Lumbar [id number] (ICD-10-CM) - [id number] (ICD-10-CM) - Degeneration of intervertebral disc of lumbosacral region with discogenic back pain  Evaluation Date: 10/29/2024  Authorization Period Expiration: 12/31/2024  Plan of Care Expiration: 1/7/2025  Reassessment Due: 12/24/2024  Visit # / Visits authorized:11/20    MedX Testing:MedX testing visit 2    Time In: 7:55 AM  Time Out:8:55 AM  Total Billable Time: 40 minutes ( 1on1 time)  INSURANCE and OUTCOMES: Fee for Service with FOTO Outcomes 2/3    Precautions: Standard    Pattern of pain determined: 4 PEN (gentle extension based exercises started to address mobility issues with traffic light model taught)     Subjective   Sabino arrives with mild discomfort/stiffness. States that he was encouraged with his strength gains as noted with MedX testing last visit.     Patient reports tolerating previous visit: Mild muscular soreness.  Patient reports their pain to be 1-2/10 on a 0-10 scale with 0 being no pain and 10 being the worst pain imaginable.  Pain Location: low back     Occupation: in construction, work part time, lifting materials, drives a van, tries to delegate (self employed)  Leisure: fish, has boat, outdoor stuff, play with grandkids  Pt goals: reduce pain especially in the morning,  grand kids, be able to walk a few miles daily     Objective     Cervical IM Testing Results:    Initial 10/31/24 Midpoint 12/3/24   ROM 3-42 deg 0-48 deg   Max Peak Torque 76  154   Min Peak Torque 29  89   Flex/Ext Ratio 2.6:1 1.7:1   % below normative data 75% 30%   Strength gain since initial   171%  "    Outcomes:  Initial score: 59% functional ability   Visit 6 score: 57%  Visit 10: 56% functional ability  Goal: 70% functional ability         Treatment    Sabino received the treatments listed below:     Sabino received neuromuscular education  to isolate and engage spinal stabilization musculature correctly for motor control and coordination to aid in function and posture for 10 minutes on the Medical Medx Machine.  Patient performed MedX dynamic exercise with emphasis on spinal muscular control using pacer throughout  active range of motion. Therapist assisted patient in achieving optimal exertion for neural reeducation and endurance training by using the  Shakir Exertion Rating scale, by instructing the patient to aim for mid range of exertion, performing 15-20 repetitions, slowly, correctly,and safely.          12/5/2024     8:34 AM   [facility]   Visit Number 11   VAS Pain Rating 1   Treadmill Time (in min.) 5 min   Extension in Standing 10   Lumbar Weight 63 lbs   Repetitions 20   Rating of Perceived Exertion 4        therapeutic exercises to develop strength, endurance, ROM, flexibility, posture, and core stabilization for 45 minutes including:    Gentle ext in standing x 10  Hip ext rotation stretch 20" x 2  Piriformis stretch 20" x 2  Lower trunk rotation x 10  R QL stretch in supine (arms overhead) 3 x 15 sec  Hamstring stretch w/belt 2 x 20 sec  transverse abdominal activation / PPT + SLR x10 + 2.5#   BFKO with transverse abdominal activation + Black Tband x 15--NP  Bridging c/ Black Tband x 10 + Marching (cues for level pelvis)  SL Clamshells  Black tband  x 20  Cat/Cow stretch x 10 (cues)  Bird dog UE/LE only x 10 (cue level pelvis)  Paloff press 15# x 15   Seated hip er stretch R 2 x 20 sec--NP  Standing R QL stretch at door frame 2 x 20 sec --NP  +Lifting education   Floor<->waist lift   Golfer's lift   Hip hinge     Peripheral muscle strengthening which included 1 set of 15-20 " repetitions at a slow, controlled 10-13 second per rep pace focused on strengthening supporting musculature for improved body mechanics and functional mobility.  Pt and therapist focused on proper form during treatment to ensure optimal strengthening of each targeted muscle group.  Machines were utilized including torso rotation, leg press, hip abd and hip add, leg ext.  Leg curl, triceps, biceps, chest and row added visit 3     manual therapy techniques:  were applied to the: low back for 00 minutes, including:    cold pack for 5 minutes to low back     Home Exercises Provided and Patient Education Provided   Home exercises include: SOC, EIS, hip ER stretch, piriformis stretch, LTR, hamstring stretch, QL stretch  Cardio program: visit 5 11/12/24 (Reviewed benefits of cardio with handout provided, patient reports being active with walking activities).  Lifting education date: visit 11 12/5/24  Posture/Lumbar roll:  recommended  Fridge Magnet Discharge handout (date given):  Equipment at home/gym membership: no    Education provided:   -  cues w/exs  MedX performance  Precor ex performance  11/12/24 Availability of Health coaching    Written Home Exercises Provided: Patient instructed to cont prior HEP. Added Standing QL and seated hip ER stretch 11/25/24.  Exercises were reviewed and Sabino was able to demonstrate them prior to the end of the session.  Sabino demonstrated good  understanding of the education provided.     See EMR under Patient Instructions for exercises provided prior visit.    Assessment   Sabino returns with mild R lower back discomfort/stiffness but improved overall. Treatment continued with flexibility, strengthening and neuromuscular reeducation ex's.  He was progressed to Full Bird dog ex and also progressed to marching with bridging.. He was able to perform ex's with min cues and without increased back or shoulder pain. He was also educated on the do's and don'ts of lifting with good understanding  and ability. Lumbar MedX resistance was maintained at 63 ft/lbs and he completed 20 reps with a RPE = 4/10. He completed peripheral strengthening ex's without increased discomfort (UE ex's remain deferred due to  (L) shoulder injury). Will continue per HB protocol and patient tolerance.       Patient is making  progress towards established goals.  Pt will continue to benefit from skilled outpatient physical therapy to address the deficits stated in the impairment chart, provide pt/family education and to maximize pt's level of independence in the home and community environment.     Anticipated Barriers for therapy: attendance, chronicity of symptoms  Pt's spiritual, cultural and educational needs considered and pt agreeable to plan of care and goals as stated below:     Goals:   Short term goals:  6 weeks or 10 visits   - Pt will demonstrate increased lumbar MedX ROM by at least 3 degrees from the initial ROM value with improvements noted in functional ROM and ability to perform ADLs. MET 12/3/24  - Pt will demonstrate increased MedX average isometric strength value by 20% from initial test resulting in improved ability to perform bending, lifting, and carrying activities safely, confidently. MET 12/3/24  - Pt will report a reduction in worst pain score by 1-2 points for improved tolerance for 8/10 versus initial 10/10.  Less stiff in am. MET 12/3/24  - Pt able to perform HEP correctly with minimal cueing or supervision from therapist to encourage independent management of symptoms. MET 12/3/24     Long term goals: 10 weeks or 20 visits   - Pt will demonstrate increased lumbar MedX ROM by at least 6 degrees from initial ROM value, resulting in improved ability to perform functional forward bending while standing and sitting. Appropriate and Ongoing  - Pt will demonstrate increased MedX average isometric strength value by 40% from initial test resulting in improved ability to perform bending, lifting, and carrying  activities safely and confidently. Appropriate and Ongoing  - Pt to demonstrate ability to independently control and reduce their pain through posture positioning and mechanical movements throughout a typical day. Appropriate and Ongoing  - Pt will demonstrate reduced pain and improved functional outcomes as reported on the FOTO by reaching an intake score of >/= 70% functional ability in order to demonstrate subjective improvement in patient's condition. . Appropriate and Ongoing  - Pt will demonstrate independence with the HEP at discharge. Appropriate and Ongoing  - Pts goals: reduce pain especially in the morning,  grand kids, be able to walk a few miles daily   Appropriate and Ongoing    Plan   Continue with established Plan of Care towards established PT goals.     Therapist: Gordo Jimenez, PTA  12/5/2024

## 2024-12-05 NOTE — PROGRESS NOTES
PT/PTA met face to face to discuss pt's treatment plan and progress towards established goals. Pt will be seen by a physical therapist minimally every 6th visit or every 30 days.       Gordo Jimenez PTA

## 2024-12-10 ENCOUNTER — CLINICAL SUPPORT (OUTPATIENT)
Dept: REHABILITATION | Facility: HOSPITAL | Age: 69
End: 2024-12-10
Payer: MEDICARE

## 2024-12-10 DIAGNOSIS — R29.898 DECREASED STRENGTH OF TRUNK AND BACK: Primary | ICD-10-CM

## 2024-12-10 PROCEDURE — 97110 THERAPEUTIC EXERCISES: CPT | Mod: CQ

## 2024-12-10 PROCEDURE — 97112 NEUROMUSCULAR REEDUCATION: CPT | Mod: CQ

## 2024-12-10 NOTE — PROGRESS NOTES
[facility]      Name: Sabino Rubio  Clinic Number: [id number]    Therapy Diagnosis:   Encounter Diagnosis   Name Primary?    Decreased strength of trunk and back Yes     Physician: Bridgette Rodriguez NP    Visit Date: 12/10/2024    Physician Orders: PT Eval and Treat  Medical Diagnosis from Referral: M47.816 (ICD-10-CM) - Lumbar [id number] (ICD-10-CM) - [id number] (ICD-10-CM) - Degeneration of intervertebral disc of lumbosacral region with discogenic back pain  Evaluation Date: 10/29/2024  Authorization Period Expiration: 12/31/2024  Plan of Care Expiration: 1/7/2025  Reassessment Due: 12/24/2024  Visit # / Visits authorized:12/20    MedX Testing:MedX testing visit 2    Time In: 8:15 AM  Time Out: 9:05 AM  Total Billable Time: 50 minutes    INSURANCE and OUTCOMES: Fee for Service with FOTO Outcomes 2/3    Precautions: Standard    Pattern of pain determined: 4 PEN (gentle extension based exercises started to address mobility issues with traffic light model taught)     Subjective   Sabino arrives with mild discomfort/stiffness presently but improving. States that he has been participating in gym based ex's at the [facility] as well.     Patient reports tolerating previous visit: Mild muscular soreness.  Patient reports their pain to be 1/10 on a 0-10 scale with 0 being no pain and 10 being the worst pain imaginable.  Pain Location: low back     Occupation: in construction, work part time, lifting materials, drives a van, tries to delegate (self employed)  Leisure: fish, has boat, outdoor stuff, play with grandkids  Pt goals: reduce pain especially in the morning,  grand kids, be able to walk a few miles daily     Objective     Cervical IM Testing Results:    Initial 10/31/24 Midpoint 12/3/24   ROM 3-42 deg 0-48 deg   Max Peak Torque 76  154   Min Peak Torque 29  89   Flex/Ext Ratio 2.6:1 1.7:1   % below normative data 75% 30%   Strength gain since initial   171%  "    Outcomes:  Initial score: 59% functional ability   Visit 6 score: 57%  Visit 10: 56% functional ability  Goal: 70% functional ability       Treatment    Sabino received the treatments listed below:     Sabino received neuromuscular education  to isolate and engage spinal stabilization musculature correctly for motor control and coordination to aid in function and posture for 10 minutes on the Medical Medx Machine.  Patient performed MedX dynamic exercise with emphasis on spinal muscular control using pacer throughout  active range of motion. Therapist assisted patient in achieving optimal exertion for neural reeducation and endurance training by using the  Shakir Exertion Rating scale, by instructing the patient to aim for mid range of exertion, performing 15-20 repetitions, slowly, correctly,and safely.           12/10/2024     8:29 AM   [facility] - [facility]   Visit Number 12   VAS Pain Rating 1   Treadmill Time (in min.) 5 min   Extension in Standing 10   Lumbar Flexion 51   Lumbar Extension 0   Lumbar Weight 67 lbs   Repetitions 19   Rating of Perceived Exertion 4        therapeutic exercises to develop strength, endurance, ROM, flexibility, posture, and core stabilization for 40 minutes including:    Gentle ext in standing x 10  Hip ext rotation stretch 20" x 2  Piriformis stretch 20" x 2  Lower trunk rotation x 10  R QL stretch in supine (arms overhead) 3 x 15 sec  Hamstring stretch w/belt 2 x 20 sec  transverse abdominal activation / PPT + SLR x15+ 2.5#   BFKO with transverse abdominal activation + Black Tband x 15--NP  Bridging c/ Black Tband x 10 + leg extension (cues for level pelvis)  SL Clamshells  Black tband  x 20  Cat/Cow stretch x 10 (cues)  Bird dog UE/LE only x 10 (cue level pelvis)  Paloff press 15# x 20   Seated hip er stretch R 2 x 20 sec--NP  Standing R QL stretch at door frame 2 x 20 sec --NP        Peripheral muscle strengthening which included 1 set of 15-20 repetitions at a slow, " controlled 10-13 second per rep pace focused on strengthening supporting musculature for improved body mechanics and functional mobility.  Pt and therapist focused on proper form during treatment to ensure optimal strengthening of each targeted muscle group.  Machines were utilized including torso rotation, leg press, hip abd and hip add, leg ext.  Leg curl, triceps, biceps, chest and row added visit 3     manual therapy techniques:  were applied to the: low back for 00 minutes, including:    cold pack for 5 minutes to low back     Home Exercises Provided and Patient Education Provided   Home exercises include: SOC, EIS, hip ER stretch, piriformis stretch, LTR, hamstring stretch, QL stretch  Cardio program: visit 5 11/12/24 (Reviewed benefits of cardio with handout provided, patient reports being active with walking activities).  Lifting education date: visit 11 12/5/24  Posture/Lumbar roll:  recommended  Fridge Magnet Discharge handout (date given):  Equipment at home/gym membership: no    Education provided:   -  cues w/exs  MedX performance  Precor ex performance  11/12/24 Availability of Health coaching    Written Home Exercises Provided: Patient instructed to cont prior HEP. Added Standing QL and seated hip ER stretch 11/25/24.  Exercises were reviewed and Sabino was able to demonstrate them prior to the end of the session.  Sabino demonstrated good  understanding of the education provided.     See EMR under Patient Instructions for exercises provided prior visit.    Assessment   Sabino returns with mild R lower back discomfort/stiffness but improved overall. Treatment continued with flexibility, strengthening and neuromuscular reeducation ex's.  He was progressed to LE kickouts with bridging (Cues for level pelvis)and increased reps for TrA + SLR and Paloff press. He was able to perform ex's with min cues and without increased back or shoulder pain. Lumbar MedX flexion ROM was increased to 51 degrees, resistance was  increased to 67 ft/lbs and he completed 19 reps with a RPE = 4/10. He completed peripheral strengthening ex's without increased discomfort (UE ex's remain deferred due to  (L) shoulder injury). Will continue per HB protocol and patient tolerance.       Patient is making  progress towards established goals.  Pt will continue to benefit from skilled outpatient physical therapy to address the deficits stated in the impairment chart, provide pt/family education and to maximize pt's level of independence in the home and community environment.     Anticipated Barriers for therapy: attendance, chronicity of symptoms  Pt's spiritual, cultural and educational needs considered and pt agreeable to plan of care and goals as stated below:     Goals:   Short term goals:  6 weeks or 10 visits   - Pt will demonstrate increased lumbar MedX ROM by at least 3 degrees from the initial ROM value with improvements noted in functional ROM and ability to perform ADLs. MET 12/3/24  - Pt will demonstrate increased MedX average isometric strength value by 20% from initial test resulting in improved ability to perform bending, lifting, and carrying activities safely, confidently. MET 12/3/24  - Pt will report a reduction in worst pain score by 1-2 points for improved tolerance for 8/10 versus initial 10/10.  Less stiff in am. MET 12/3/24  - Pt able to perform HEP correctly with minimal cueing or supervision from therapist to encourage independent management of symptoms. MET 12/3/24     Long term goals: 10 weeks or 20 visits   - Pt will demonstrate increased lumbar MedX ROM by at least 6 degrees from initial ROM value, resulting in improved ability to perform functional forward bending while standing and sitting. Appropriate and Ongoing  - Pt will demonstrate increased MedX average isometric strength value by 40% from initial test resulting in improved ability to perform bending, lifting, and carrying activities safely and confidently.  Appropriate and Ongoing  - Pt to demonstrate ability to independently control and reduce their pain through posture positioning and mechanical movements throughout a typical day. Appropriate and Ongoing  - Pt will demonstrate reduced pain and improved functional outcomes as reported on the FOTO by reaching an intake score of >/= 70% functional ability in order to demonstrate subjective improvement in patient's condition. . Appropriate and Ongoing  - Pt will demonstrate independence with the HEP at discharge. Appropriate and Ongoing  - Pts goals: reduce pain especially in the morning,  grand kids, be able to walk a few miles daily   Appropriate and Ongoing    Plan   Continue with established Plan of Care towards established PT goals.     Therapist: Gordo Jimenez, PTA  12/10/2024

## 2024-12-12 ENCOUNTER — CLINICAL SUPPORT (OUTPATIENT)
Dept: REHABILITATION | Facility: HOSPITAL | Age: 69
End: 2024-12-12
Payer: MEDICARE

## 2024-12-12 DIAGNOSIS — R29.898 DECREASED STRENGTH OF TRUNK AND BACK: Primary | ICD-10-CM

## 2024-12-12 PROCEDURE — 97112 NEUROMUSCULAR REEDUCATION: CPT

## 2024-12-12 PROCEDURE — 97110 THERAPEUTIC EXERCISES: CPT

## 2024-12-12 NOTE — PROGRESS NOTES
[facility]      Name: Sabino Rubio  Clinic Number: [id number]    Therapy Diagnosis:   No diagnosis found.    Physician: Bridgette Rodriguez NP    Visit Date: 12/12/2024    Physician Orders: PT Eval and Treat  Medical Diagnosis from Referral: M47.816 (ICD-10-CM) - Lumbar [id number] (ICD-10-CM) - [id number] (ICD-10-CM) - Degeneration of intervertebral disc of lumbosacral region with discogenic back pain  Evaluation Date: 10/29/2024  Authorization Period Expiration: 12/31/2024  Plan of Care Expiration: 1/7/2025  Reassessment Due: 12/24/2024  Visit # / Visits authorized:13/20    MedX Testing:MedX testing visit 2    Time In: 8  Time Out: 855  Total Billable Time: 50 minutes  30 min 1 on 1  INSURANCE and OUTCOMES: Fee for Service with FOTO Outcomes 2/3    Precautions: Standard    Pattern of pain determined: 4 PEN (gentle extension based exercises started to address mobility issues with traffic light model taught)     Subjective   Sabino arrives and reports increased RLB/buttock pain when he woke up this AM.  After walking dog pain reduced to 2/10    Patient reports tolerating previous visit: Mild muscular soreness.  Patient reports their pain to be 2/10 on a 0-10 scale with 0 being no pain and 10 being the worst pain imaginable.  Pain Location: low back     Occupation: in construction, work part time, lifting materials, drives a van, tries to delegate (self employed)  Leisure: fish, has boat, outdoor stuff, play with grandkids  Pt goals: reduce pain especially in the morning,  grand kids, be able to walk a few miles daily     Objective     Cervical IM Testing Results:    Initial 10/31/24 Midpoint 12/3/24   ROM 3-42 deg 0-48 deg   Max Peak Torque 76  154   Min Peak Torque 29  89   Flex/Ext Ratio 2.6:1 1.7:1   % below normative data 75% 30%   Strength gain since initial   171%     Outcomes:  Initial score: 59% functional ability   Visit 6 score: 57%  Visit 10: 56%  "functional ability  Goal: 70% functional ability       Treatment    Sabino received the treatments listed below:     Sabino received neuromuscular education  to isolate and engage spinal stabilization musculature correctly for motor control and coordination to aid in function and posture for 10 minutes on the Medical Medx Machine.  Patient performed MedX dynamic exercise with emphasis on spinal muscular control using pacer throughout  active range of motion. Therapist assisted patient in achieving optimal exertion for neural reeducation and endurance training by using the  Shakir Exertion Rating scale, by instructing the patient to aim for mid range of exertion, performing 15-20 repetitions, slowly, correctly,and safely.           12/12/2024     8:55 AM   [facility]   Visit Number 13   VAS Pain Rating 2   Treadmill Time (in min.) 5 min   Extension in Standing 10   Lumbar Weight 67 lbs   Repetitions 20   Rating of Perceived Exertion 4   Ice - Z Lie (in min.) 5       therapeutic exercises to develop strength, endurance, ROM, flexibility, posture, and core stabilization for 40 minutes including:    Gentle ext in standing x 10  Hip ext rotation stretch 20" x 2  Piriformis stretch 20" x 2  Lower trunk rotation x 10  R QL stretch in supine (arms overhead) 3 x 15 sec  Hamstring stretch w/belt 2 x 20 sec  transverse abdominal activation / PPT + SLR x15+ 3#  BFKO with transverse abdominal activation + Black Tband x 15--NP  Bridging c/ Black Tband x 10 + leg extension (cues for level pelvis)  SL Clamshells  Black tband  x 20  Cat/Cow stretch x 10 (cues)  Bird dog UE/LE only x 10 (cue level pelvis)  Paloff press 15# x 20   Seated hip er stretch R 2 x 20 sec--NP  Standing R QL stretch at door frame 2 x 20 sec --NP        Peripheral muscle strengthening which included 1 set of 15-20 repetitions at a slow, controlled 10-13 second per rep pace focused on strengthening supporting musculature for improved body mechanics and " functional mobility.  Pt and therapist focused on proper form during treatment to ensure optimal strengthening of each targeted muscle group.  Machines were utilized including torso rotation, leg press, hip abd and hip add, leg ext.  Leg curl, triceps, biceps, chest and row added visit 3     manual therapy techniques:  were applied to the: low back for 00 minutes, including:    cold pack for 5 minutes to low back     Home Exercises Provided and Patient Education Provided   Home exercises include: SOC, EIS, hip ER stretch, piriformis stretch, LTR, hamstring stretch, QL stretch  Cardio program: visit 5 11/12/24 (Reviewed benefits of cardio with handout provided, patient reports being active with walking activities).  Lifting education date: visit 11 12/5/24  Posture/Lumbar roll:  recommended  Fridge Magnet Discharge handout (date given):  Equipment at home/gym membership: no    Education provided:   -  cues w/exs  MedX performance  Precor ex performance  11/12/24 Availability of Health coaching    Written Home Exercises Provided: Patient instructed to cont prior HEP. Added Standing QL and seated hip ER stretch 11/25/24.  Exercises were reviewed and Sabino was able to demonstrate them prior to the end of the session.  Sabino demonstrated good  understanding of the education provided.     See EMR under Patient Instructions for exercises provided prior visit.    Assessment   Sabino returns with mild R lower back discomfort/stiffness but improved overall. Treatment continued with flexibility, strengthening and neuromuscular reeducation ex's.  Increased weight for SLR to 3# today.  Lumbar MedX weight maintained at 67 ft/lbs and he completed 20 reps with a RPE = 4/10.  Inc 5% next visit. He completed peripheral strengthening ex's without increased discomfort (UE ex's remain deferred due to  (L) shoulder injury). Will continue per HB protocol and patient tolerance.       Patient is making  progress towards established goals.  Pt  will continue to benefit from skilled outpatient physical therapy to address the deficits stated in the impairment chart, provide pt/family education and to maximize pt's level of independence in the home and community environment.     Anticipated Barriers for therapy: attendance, chronicity of symptoms  Pt's spiritual, cultural and educational needs considered and pt agreeable to plan of care and goals as stated below:     Goals:   Short term goals:  6 weeks or 10 visits   - Pt will demonstrate increased lumbar MedX ROM by at least 3 degrees from the initial ROM value with improvements noted in functional ROM and ability to perform ADLs. MET 12/3/24  - Pt will demonstrate increased MedX average isometric strength value by 20% from initial test resulting in improved ability to perform bending, lifting, and carrying activities safely, confidently. MET 12/3/24  - Pt will report a reduction in worst pain score by 1-2 points for improved tolerance for 8/10 versus initial 10/10.  Less stiff in am. MET 12/3/24  - Pt able to perform HEP correctly with minimal cueing or supervision from therapist to encourage independent management of symptoms. MET 12/3/24     Long term goals: 10 weeks or 20 visits   - Pt will demonstrate increased lumbar MedX ROM by at least 6 degrees from initial ROM value, resulting in improved ability to perform functional forward bending while standing and sitting. Appropriate and Ongoing  - Pt will demonstrate increased MedX average isometric strength value by 40% from initial test resulting in improved ability to perform bending, lifting, and carrying activities safely and confidently. Appropriate and Ongoing  - Pt to demonstrate ability to independently control and reduce their pain through posture positioning and mechanical movements throughout a typical day. Appropriate and Ongoing  - Pt will demonstrate reduced pain and improved functional outcomes as reported on the FOTO by reaching an intake  score of >/= 70% functional ability in order to demonstrate subjective improvement in patient's condition. . Appropriate and Ongoing  - Pt will demonstrate independence with the HEP at discharge. Appropriate and Ongoing  - Pts goals: reduce pain especially in the morning,  grand kids, be able to walk a few miles daily   Appropriate and Ongoing    Plan   Continue with established Plan of Care towards established PT goals.     Therapist: Marleni Malone, PT  12/12/2024

## 2024-12-17 ENCOUNTER — CLINICAL SUPPORT (OUTPATIENT)
Dept: REHABILITATION | Facility: HOSPITAL | Age: 69
End: 2024-12-17
Payer: MEDICARE

## 2024-12-17 DIAGNOSIS — R29.898 DECREASED STRENGTH OF TRUNK AND BACK: Primary | ICD-10-CM

## 2024-12-17 PROCEDURE — 97112 NEUROMUSCULAR REEDUCATION: CPT

## 2024-12-17 PROCEDURE — 97110 THERAPEUTIC EXERCISES: CPT

## 2024-12-17 NOTE — PROGRESS NOTES
[facility]      Name: Sabino Rubio  Clinic Number: [id number]    Therapy Diagnosis:   Encounter Diagnosis   Name Primary?    Decreased strength of trunk and back Yes       Physician: Bridgette Rodriguez NP    Visit Date: 12/17/2024    Physician Orders: PT Eval and Treat  Medical Diagnosis from Referral: M47.816 (ICD-10-CM) - Lumbar [id number] (ICD-10-CM) - [id number] (ICD-10-CM) - Degeneration of intervertebral disc of lumbosacral region with discogenic back pain  Evaluation Date: 10/29/2024  Authorization Period Expiration: 12/31/2024  Plan of Care Expiration: 1/7/2025  Reassessment Due: 12/24/2024  Visit # / Visits authorized:14/20    MedX Testing:MedX testing visit 2    Time In: 8  Time Out: 855  Total Billable Time: 50 minutes  30 min 1 on 1  INSURANCE and OUTCOMES: Fee for Service with FOTO Outcomes 2/3    Precautions: Standard    Pattern of pain determined: 4 PEN (gentle extension based exercises started to address mobility issues with traffic light model taught)     Subjective   Sabino arrives and reports the intense pain which used to make his legs buckle is no longer present.  Pt still reports pain when he wakes up is usually between 3-8/10, until he moves around    Patient reports tolerating previous visit: Mild muscular soreness.  Patient reports their pain to be 2/10 on a 0-10 scale with 0 being no pain and 10 being the worst pain imaginable.  Pain Location: low back     Occupation: in construction, work part time, lifting materials, drives a van, tries to delegate (self employed)  Leisure: fish, has boat, outdoor stuff, play with grandkids  Pt goals: reduce pain especially in the morning,  grand kids, be able to walk a few miles daily     Objective     Cervical IM Testing Results:    Initial 10/31/24 Midpoint 12/3/24   ROM 3-42 deg 0-48 deg   Max Peak Torque 76  154   Min Peak Torque 29  89   Flex/Ext Ratio 2.6:1 1.7:1   % below normative data 75%  "30%   Strength gain since initial   171%     Outcomes:  Initial score: 59% functional ability   Visit 6 score: 57%  Visit 10: 56% functional ability  Goal: 70% functional ability       Treatment    Sabino received the treatments listed below:     Sabino received neuromuscular education  to isolate and engage spinal stabilization musculature correctly for motor control and coordination to aid in function and posture for 10 minutes on the Medical Medx Machine.  Patient performed MedX dynamic exercise with emphasis on spinal muscular control using pacer throughout  active range of motion. Therapist assisted patient in achieving optimal exertion for neural reeducation and endurance training by using the  Shakir Exertion Rating scale, by instructing the patient to aim for mid range of exertion, performing 15-20 repetitions, slowly, correctly,and safely.           12/17/2024     8:40 AM   [facility]   Visit Number 14   VAS Pain Rating 2   Treadmill Time (in min.) 5 min   Extension in Standing 10   Lumbar Weight 71 lbs   Repetitions 17   Rating of Perceived Exertion 4   Ice - Z Lie (in min.) 5       therapeutic exercises to develop strength, endurance, ROM, flexibility, posture, and core stabilization for 40 minutes including:    Gentle ext in standing x 10  Hip ext rotation stretch 20" x 2  Piriformis stretch 20" x 2  Lower trunk rotation x 10  R QL stretch in supine (arms overhead) 3 x 15 sec  Hamstring stretch w/belt 2 x 20 sec  transverse abdominal activation / PPT + SLR x15+ 3#  BFKO with transverse abdominal activation + Black Tband x 15--NP  Bridging c/ Black Tband x 10 + leg extension (cues for level pelvis)  SL Clamshells  Black tband  x 20  Cat/Cow stretch x 10 (cues)  Bird dog UE/LE only x 10 (cue level pelvis)  Paloff press 15# x 20   Seated hip er stretch R 2 x 20 sec--NP  Standing R QL stretch at door frame 2 x 20 sec --NP   Figure four stretch 2x20 seconds     Peripheral muscle strengthening which " included 1 set of 15-20 repetitions at a slow, controlled 10-13 second per rep pace focused on strengthening supporting musculature for improved body mechanics and functional mobility.  Pt and therapist focused on proper form during treatment to ensure optimal strengthening of each targeted muscle group.  Machines were utilized including torso rotation, leg press, hip abd and hip add, leg ext.  Leg curl, triceps, biceps, chest and row added visit 3     manual therapy techniques:  were applied to the: low back for5 minutes, including: thera gun massage to R piriformis/glut   cold pack for 5 minutes to low back     Home Exercises Provided and Patient Education Provided   Home exercises include: SOC, EIS, hip ER stretch, piriformis stretch, LTR, hamstring stretch, QL stretch  Cardio program: visit 5 11/12/24 (Reviewed benefits of cardio with handout provided, patient reports being active with walking activities).  Lifting education date: visit 11 12/5/24  Posture/Lumbar roll:  recommended  Fridge Magnet Discharge handout (date given):  Equipment at home/gym membership: no    Education provided:   -  cues w/exs  MedX performance  Precor ex performance  11/12/24 Availability of Health coaching    Written Home Exercises Provided: Patient instructed to cont prior HEP. Added Standing QL and seated hip ER stretch 11/25/24.  Exercises were reviewed and Sabino was able to demonstrate them prior to the end of the session.  Sabino demonstrated good  understanding of the education provided.     See EMR under Patient Instructions for exercises provided prior visit.    Assessment   Sabino returns with mild R lower back discomfort/stiffness but improved overall. Treatment continued with flexibility, strengthening and neuromuscular reeducation ex's.  Added figure four LE stretch today. Pt reports he feels a good stretch, no pain. Lumbar MedX weight increased to 71ft/lbs with pt completing a 1 min warm up prior.  Pt able to complete 17 reps  at 71ft/lbs with 4/10 exertion level. completed 20 reps with a RPE = 4/10. visit. He completed peripheral strengthening ex's without increased discomfort (UE ex's remain deferred due to  (L) shoulder injury). Will continue per HB protocol and patient tolerance.       Patient is making  progress towards established goals.  Pt will continue to benefit from skilled outpatient physical therapy to address the deficits stated in the impairment chart, provide pt/family education and to maximize pt's level of independence in the home and community environment.     Anticipated Barriers for therapy: attendance, chronicity of symptoms  Pt's spiritual, cultural and educational needs considered and pt agreeable to plan of care and goals as stated below:     Goals:   Short term goals:  6 weeks or 10 visits   - Pt will demonstrate increased lumbar MedX ROM by at least 3 degrees from the initial ROM value with improvements noted in functional ROM and ability to perform ADLs. MET 12/3/24  - Pt will demonstrate increased MedX average isometric strength value by 20% from initial test resulting in improved ability to perform bending, lifting, and carrying activities safely, confidently. MET 12/3/24  - Pt will report a reduction in worst pain score by 1-2 points for improved tolerance for 8/10 versus initial 10/10.  Less stiff in am. MET 12/3/24  - Pt able to perform HEP correctly with minimal cueing or supervision from therapist to encourage independent management of symptoms. MET 12/3/24     Long term goals: 10 weeks or 20 visits   - Pt will demonstrate increased lumbar MedX ROM by at least 6 degrees from initial ROM value, resulting in improved ability to perform functional forward bending while standing and sitting. Appropriate and Ongoing  - Pt will demonstrate increased MedX average isometric strength value by 40% from initial test resulting in improved ability to perform bending, lifting, and carrying activities safely and  confidently. Appropriate and Ongoing  - Pt to demonstrate ability to independently control and reduce their pain through posture positioning and mechanical movements throughout a typical day. Appropriate and Ongoing  - Pt will demonstrate reduced pain and improved functional outcomes as reported on the FOTO by reaching an intake score of >/= 70% functional ability in order to demonstrate subjective improvement in patient's condition. . Appropriate and Ongoing  - Pt will demonstrate independence with the HEP at discharge. Appropriate and Ongoing  - Pts goals: reduce pain especially in the morning,  grand kids, be able to walk a few miles daily   Appropriate and Ongoing    Plan   Continue with established Plan of Care towards established PT goals.     Therapist: Marleni Malone, PT  12/17/2024

## 2024-12-19 ENCOUNTER — CLINICAL SUPPORT (OUTPATIENT)
Dept: REHABILITATION | Facility: HOSPITAL | Age: 69
End: 2024-12-19
Payer: MEDICARE

## 2024-12-19 DIAGNOSIS — R29.898 DECREASED STRENGTH OF TRUNK AND BACK: Primary | ICD-10-CM

## 2024-12-19 PROCEDURE — 97112 NEUROMUSCULAR REEDUCATION: CPT | Mod: CQ

## 2024-12-19 PROCEDURE — 97110 THERAPEUTIC EXERCISES: CPT | Mod: CQ

## 2024-12-19 NOTE — PROGRESS NOTES
[facility]      Name: Sabino Rubio  Clinic Number: [id number]    Therapy Diagnosis:   Encounter Diagnosis   Name Primary?    Decreased strength of trunk and back Yes       Physician: Bridgette Rodriguez NP    Visit Date: 12/19/2024    Physician Orders: PT Eval and Treat  Medical Diagnosis from Referral: M47.816 (ICD-10-CM) - Lumbar [id number] (ICD-10-CM) - [id number] (ICD-10-CM) - Degeneration of intervertebral disc of lumbosacral region with discogenic back pain  Evaluation Date: 10/29/2024  Authorization Period Expiration: 12/31/2024  Plan of Care Expiration: 1/7/2025  Reassessment Due: 12/24/2024  Visit # / Visits authorized:15/20    MedX Testing:MedX testing visit 2    Time In: 8:00 AM  Time Out: 8: 55 AM  Total Billable Time: 25 minutes  1 on 1 time  INSURANCE and OUTCOMES: Fee for Service with FOTO Outcomes 2/3    Precautions: Standard    Pattern of pain determined: 4 PEN (gentle extension based exercises started to address mobility issues with traffic light model taught)     Subjective   Sabino arrives reporting some continued tendency for increased R lower back/glute symptoms in the mornings which can be moderate and diminishes to mild with stretching and activities. He reports not much change with previous manual techniques.    Patient reports tolerating previous visit: Mild muscular soreness.  Patient reports their pain to be 2/10 on a 0-10 scale with 0 being no pain and 10 being the worst pain imaginable.  Pain Location: low back     Occupation: in construction, work part time, lifting materials, drives a van, tries to delegate (self employed)  Leisure: fish, has boat, outdoor stuff, play with grandkids  Pt goals: reduce pain especially in the morning,  grand kids, be able to walk a few miles daily     Objective     Cervical IM Testing Results:    Initial 10/31/24 Midpoint 12/3/24   ROM 3-42 deg 0-48 deg   Max Peak Torque 76  154   Min Peak Torque 29   "89   Flex/Ext Ratio 2.6:1 1.7:1   % below normative data 75% 30%   Strength gain since initial   171%     Outcomes:  Initial score: 59% functional ability   Visit 6 score: 57%  Visit 10: 56% functional ability  Goal: 70% functional ability       Treatment    Sabino received the treatments listed below:     Sabino received neuromuscular education  to isolate and engage spinal stabilization musculature correctly for motor control and coordination to aid in function and posture for 10 minutes on the Medical Medx Machine.  Patient performed MedX dynamic exercise with emphasis on spinal muscular control using pacer throughout  active range of motion. Therapist assisted patient in achieving optimal exertion for neural reeducation and endurance training by using the  Shakir Exertion Rating scale, by instructing the patient to aim for mid range of exertion, performing 15-20 repetitions, slowly, correctly,and safely.           12/19/2024     8:15 AM   [facility] - Short   Visit Number 15   VAS Pain Rating 2   Treadmill Time (in min.) 5 min   Extension in Standing 10   Lumbar Weight 71 lbs   Repetitions 18   Rating of Perceived Exertion 4        therapeutic exercises to develop strength, endurance, ROM, flexibility, posture, and core stabilization for 40 minutes including:    Gentle ext in standing x 10  Hip ext rotation stretch 20" x 2  Piriformis stretch 20" x 2 figure four   Lower trunk rotation x 10  R QL stretch in supine (arms overhead) 3 x 15 sec  Hamstring stretch w/belt 2 x 20 sec  transverse abdominal activation / PPT + SLR x15+ 3#  BFKO with transverse abdominal activation + Black Tband x 15--NP  Bridging c/ Black Tband x 10 + leg extension (cues for level pelvis)  SL Clamshells  Black tband  x 20  Cat/Cow stretch x 10 (cues)  Bird dog UE/LE x 10 (cue level pelvis)  Paloff press 15# x 15  Seated hip er stretch R 2 x 20 sec--NP  Standing R QL stretch at door frame 2 x 20 sec --NP  +shuttle kick backs with 1 black " band x 10        Peripheral muscle strengthening which included 1 set of 15-20 repetitions at a slow, controlled 10-13 second per rep pace focused on strengthening supporting musculature for improved body mechanics and functional mobility.  Pt and therapist focused on proper form during treatment to ensure optimal strengthening of each targeted muscle group.  Machines were utilized including torso rotation, leg press, hip abd and hip add, leg ext.  Leg curl, triceps, biceps, chest and row added visit 3     manual therapy techniques:  were applied to the: low back for 5minutes, including: Long axis distraction R LE    cold pack for 5 minutes to low back     Home Exercises Provided and Patient Education Provided   Home exercises include: SOC, EIS, hip ER stretch, piriformis stretch, LTR, hamstring stretch, QL stretch  Cardio program: visit 5 11/12/24 (Reviewed benefits of cardio with handout provided, patient reports being active with walking activities).  Lifting education date: visit 11 12/5/24  Posture/Lumbar roll:  recommended  Fridge Magnet Discharge handout (date given):  Equipment at home/gym membership: no    Education provided:   -  cues w/exs  MedX performance  Precor ex performance  11/12/24 Availability of Health coaching    Written Home Exercises Provided: Patient instructed to cont prior HEP. Added Standing QL and seated hip ER stretch 11/25/24.  Exercises were reviewed and Sabino was able to demonstrate them prior to the end of the session.  Sabino demonstrated good  understanding of the education provided.     See EMR under Patient Instructions for exercises provided prior visit.    Assessment   Sabino returns with mild R lower back discomfort/stiffness but improved overall. Increased symptoms in the mornings that gets better with stretching and movement.Treatment continued with flexibility, strengthening and neuromuscular reeducation ex's.  Added shuttle kick backs for additional glute strengthening which  he was able to perform without c/o. Continued manual techniques to include: long axis distraction. Lumbar MedX weight maintained at 71ft/lbs and he completed 18 reps with a RPE = 4/10. . He completed peripheral strengthening ex's without increased discomfort (UE ex's remain deferred due to  (L) shoulder injury). Will continue per HB protocol and patient tolerance.       Patient is making  progress towards established goals.  Pt will continue to benefit from skilled outpatient physical therapy to address the deficits stated in the impairment chart, provide pt/family education and to maximize pt's level of independence in the home and community environment.     Anticipated Barriers for therapy: attendance, chronicity of symptoms  Pt's spiritual, cultural and educational needs considered and pt agreeable to plan of care and goals as stated below:     Goals:   Short term goals:  6 weeks or 10 visits   - Pt will demonstrate increased lumbar MedX ROM by at least 3 degrees from the initial ROM value with improvements noted in functional ROM and ability to perform ADLs. MET 12/3/24  - Pt will demonstrate increased MedX average isometric strength value by 20% from initial test resulting in improved ability to perform bending, lifting, and carrying activities safely, confidently. MET 12/3/24  - Pt will report a reduction in worst pain score by 1-2 points for improved tolerance for 8/10 versus initial 10/10.  Less stiff in am. MET 12/3/24  - Pt able to perform HEP correctly with minimal cueing or supervision from therapist to encourage independent management of symptoms. MET 12/3/24     Long term goals: 10 weeks or 20 visits   - Pt will demonstrate increased lumbar MedX ROM by at least 6 degrees from initial ROM value, resulting in improved ability to perform functional forward bending while standing and sitting. Appropriate and Ongoing  - Pt will demonstrate increased MedX average isometric strength value by 40% from initial  test resulting in improved ability to perform bending, lifting, and carrying activities safely and confidently. Appropriate and Ongoing  - Pt to demonstrate ability to independently control and reduce their pain through posture positioning and mechanical movements throughout a typical day. Appropriate and Ongoing  - Pt will demonstrate reduced pain and improved functional outcomes as reported on the FOTO by reaching an intake score of >/= 70% functional ability in order to demonstrate subjective improvement in patient's condition. . Appropriate and Ongoing  - Pt will demonstrate independence with the HEP at discharge. Appropriate and Ongoing  - Pts goals: reduce pain especially in the morning,  grand kids, be able to walk a few miles daily   Appropriate and Ongoing    Plan   Continue with established Plan of Care towards established PT goals.     Therapist: Gordo Jimenez, PTA  12/19/2024

## 2024-12-24 ENCOUNTER — CLINICAL SUPPORT (OUTPATIENT)
Dept: REHABILITATION | Facility: HOSPITAL | Age: 69
End: 2024-12-24
Payer: MEDICARE

## 2024-12-24 DIAGNOSIS — R29.898 DECREASED STRENGTH OF TRUNK AND BACK: Primary | ICD-10-CM

## 2024-12-24 PROCEDURE — 97112 NEUROMUSCULAR REEDUCATION: CPT

## 2024-12-24 PROCEDURE — 97110 THERAPEUTIC EXERCISES: CPT

## 2024-12-24 NOTE — PROGRESS NOTES
[facility]      Name: Sabino Rubio  Clinic Number: [id number]    Therapy Diagnosis:   Encounter Diagnosis   Name Primary?    Decreased strength of trunk and back Yes       Physician: Bridgette Rodriguez NP    Visit Date: 12/24/2024    Physician Orders: PT Eval and Treat  Medical Diagnosis from Referral: M47.816 (ICD-10-CM) - Lumbar [id number] (ICD-10-CM) - [id number] (ICD-10-CM) - Degeneration of intervertebral disc of lumbosacral region with discogenic back pain  Evaluation Date: 10/29/2024  Authorization Period Expiration: 12/31/2024  Plan of Care Expiration: 1/7/2025  Reassessment Due: 12/24/2024  Visit # / Visits authorized:16/20    MedX Testing:MedX testing visit 2    Time In: 8:00 AM  Time Out: 9:00 AM  Total Billable Time: 50 min  INSURANCE and OUTCOMES: Fee for Service with FOTO Outcomes 2/3    Precautions: Standard    Pattern of pain determined: 4 PEN (gentle extension based exercises started to address mobility issues with traffic light model taught)     Subjective   Sabino arrives with min stiffness in back, some mild pain in buttocks and hips but severity/intensity of pain is improved.     Patient reports tolerating previous visit: Mild muscular soreness.  Patient reports their pain to be 2/10 on a 0-10 scale with 0 being no pain and 10 being the worst pain imaginable.  Pain Location: low back     Occupation: in construction, work part time, lifting materials, drives a van, tries to delegate (self employed)  Leisure: fish, has boat, outdoor stuff, play with grandkids  Pt goals: reduce pain especially in the morning,  grand kids, be able to walk a few miles daily     Objective     Cervical IM Testing Results:    Initial 10/31/24 Midpoint 12/3/24   ROM 3-42 deg 0-48 deg   Max Peak Torque 76  154   Min Peak Torque 29  89   Flex/Ext Ratio 2.6:1 1.7:1   % below normative data 75% 30%   Strength gain since initial   171%     Outcomes:  Initial score: 59%  "functional ability   Visit 6 score: 57%  Visit 10: 56% functional ability  Goal: 70% functional ability       Treatment    Sabino received the treatments listed below:     Sabino received neuromuscular education  to isolate and engage spinal stabilization musculature correctly for motor control and coordination to aid in function and posture for 10 minutes on the Medical Medx Machine.  Patient performed MedX dynamic exercise with emphasis on spinal muscular control using pacer throughout  active range of motion. Therapist assisted patient in achieving optimal exertion for neural reeducation and endurance training by using the  Shakir Exertion Rating scale, by instructing the patient to aim for mid range of exertion, performing 15-20 repetitions, slowly, correctly,and safely.         therapeutic exercises to develop strength, endurance, ROM, flexibility, posture, and core stabilization for 40 minutes including:    Gentle ext in standing x 10  Hip ext rotation stretch 20" x 2  Piriformis stretch 20" x 2 figure four   Lower trunk rotation x 10  R QL stretch in supine (arms overhead) 3 x 15 sec  Hamstring stretch w/belt 2 x 20 sec  transverse abdominal activation / PPT + SLR x15+ 3#  BFKO with transverse abdominal activation + Black Tband x 15--NP  Bridging c/ Black Tband x 10 + leg extension (cues for level pelvis)  SL Clamshells  Black tband  x 20  Cat/Cow stretch x 10 (cues)  Bird dog UE/LE x 10 (cue level pelvis)  Paloff press 15# x 15  Seated hip er stretch R 2 x 20 sec--NP  Standing R QL stretch at door frame 2 x 20 sec --NP  shuttle kick backs with 1 black band x 10        Peripheral muscle strengthening which included 1 set of 15-20 repetitions at a slow, controlled 10-13 second per rep pace focused on strengthening supporting musculature for improved body mechanics and functional mobility.  Pt and therapist focused on proper form during treatment to ensure optimal strengthening of each targeted muscle group.  " Machines were utilized including torso rotation, leg press, hip abd and hip add, leg ext.  Leg curl, triceps, biceps, chest and row added visit 3     manual therapy techniques:  were applied to the: low back for 5minutes, including: Long axis distraction R LE    cold pack for 5 minutes to low back     Home Exercises Provided and Patient Education Provided   Home exercises include: SOC, EIS, hip ER stretch, piriformis stretch, LTR, hamstring stretch, QL stretch  Cardio program: visit 5 11/12/24 (Reviewed benefits of cardio with handout provided, patient reports being active with walking activities).  Lifting education date: visit 11 12/5/24  Posture/Lumbar roll:  recommended  Fridge Magnet Discharge handout (date given):  Equipment at home/gym membership: no    Education provided:   -  cues w/exs  MedX performance  Precor ex performance  11/12/24 Availability of Health coaching    Written Home Exercises Provided: Patient instructed to cont prior HEP. Added Standing QL and seated hip ER stretch 11/25/24.  Exercises were reviewed and Sabino was able to demonstrate them prior to the end of the session.  Sabino demonstrated good  understanding of the education provided.     See EMR under Patient Instructions for exercises provided prior visit.    Assessment   Sabino returns with mild R lower back discomfort/stiffness which is improved with stretching in the morning. Treatment continued with flexibility, strengthening and neuromuscular reeducation ex's.   Lumbar MedX weight maintained at 71ft/lbs and he completed 18 reps with a RPE = 4/10. . He completed peripheral strengthening ex's without increased discomfort (UE ex's remain deferred due to  (L) shoulder injury). Will continue per HB protocol and patient tolerance.       Patient is making  progress towards established goals.  Pt will continue to benefit from skilled outpatient physical therapy to address the deficits stated in the impairment chart, provide pt/family  education and to maximize pt's level of independence in the home and community environment.     Anticipated Barriers for therapy: attendance, chronicity of symptoms  Pt's spiritual, cultural and educational needs considered and pt agreeable to plan of care and goals as stated below:     Goals:   Short term goals:  6 weeks or 10 visits   - Pt will demonstrate increased lumbar MedX ROM by at least 3 degrees from the initial ROM value with improvements noted in functional ROM and ability to perform ADLs. MET 12/3/24  - Pt will demonstrate increased MedX average isometric strength value by 20% from initial test resulting in improved ability to perform bending, lifting, and carrying activities safely, confidently. MET 12/3/24  - Pt will report a reduction in worst pain score by 1-2 points for improved tolerance for 8/10 versus initial 10/10.  Less stiff in am. MET 12/3/24  - Pt able to perform HEP correctly with minimal cueing or supervision from therapist to encourage independent management of symptoms. MET 12/3/24     Long term goals: 10 weeks or 20 visits   - Pt will demonstrate increased lumbar MedX ROM by at least 6 degrees from initial ROM value, resulting in improved ability to perform functional forward bending while standing and sitting. Appropriate and Ongoing  - Pt will demonstrate increased MedX average isometric strength value by 40% from initial test resulting in improved ability to perform bending, lifting, and carrying activities safely and confidently. Appropriate and Ongoing  - Pt to demonstrate ability to independently control and reduce their pain through posture positioning and mechanical movements throughout a typical day. Appropriate and Ongoing  - Pt will demonstrate reduced pain and improved functional outcomes as reported on the FOTO by reaching an intake score of >/= 70% functional ability in order to demonstrate subjective improvement in patient's condition. . Appropriate and Ongoing  - Pt  will demonstrate independence with the HEP at discharge. Appropriate and Ongoing  - Pts goals: reduce pain especially in the morning,  grand kids, be able to walk a few miles daily   Appropriate and Ongoing    Plan   Continue with established Plan of Care towards established PT goals.     Therapist: Hedy Wiley, PT  12/24/2024

## 2024-12-31 ENCOUNTER — CLINICAL SUPPORT (OUTPATIENT)
Dept: REHABILITATION | Facility: HOSPITAL | Age: 69
End: 2024-12-31
Payer: MEDICARE

## 2024-12-31 DIAGNOSIS — R29.898 DECREASED STRENGTH OF TRUNK AND BACK: Primary | ICD-10-CM

## 2024-12-31 PROCEDURE — 97112 NEUROMUSCULAR REEDUCATION: CPT

## 2024-12-31 PROCEDURE — 97110 THERAPEUTIC EXERCISES: CPT

## 2024-12-31 NOTE — PROGRESS NOTES
[facility]      Name: Sabino Rubio  Clinic Number: [id number]    Therapy Diagnosis:   Encounter Diagnosis   Name Primary?    Decreased strength of trunk and back Yes       Physician: Bridgette Rodriguez NP    Visit Date: 12/31/2024    Physician Orders: PT Eval and Treat  Medical Diagnosis from Referral: M47.816 (ICD-10-CM) - Lumbar [id number] (ICD-10-CM) - [id number] (ICD-10-CM) - Degeneration of intervertebral disc of lumbosacral region with discogenic back pain  Evaluation Date: 10/29/2024  Authorization Period Expiration: 12/31/2024  Plan of Care Expiration: 1/7/2025  Reassessment Due: 12/24/2024  Visit # / Visits authorized:17/20    MedX Testing:MedX testing visit 2    Time In: 8:00 AM  Time Out: 9:00 AM  Total Billable Time: 30 min 1:1  INSURANCE and OUTCOMES: Fee for Service with FOTO Outcomes 2/3    Precautions: Standard    Pattern of pain determined: 4 PEN (gentle extension based exercises started to address mobility issues with traffic light model taught)     Subjective   Sabino arrives with min stiffness in back, some mild pain in buttocks and hips but severity/intensity of pain is improved.     Patient reports tolerating previous visit: Mild muscular soreness.  Patient reports their pain to be 2/10 on a 0-10 scale with 0 being no pain and 10 being the worst pain imaginable.  Pain Location: low back     Occupation: in construction, work part time, lifting materials, drives a van, tries to delegate (self employed)  Leisure: fish, has boat, outdoor stuff, play with grandkids  Pt goals: reduce pain especially in the morning,  grand kids, be able to walk a few miles daily     Objective     Lumbar IM Testing Results:    Initial 10/31/24 Midpoint 12/3/24   ROM 3-42 deg 0-48 deg   Max Peak Torque 76  154   Min Peak Torque 29  89   Flex/Ext Ratio 2.6:1 1.7:1   % below normative data 75% 30%   Strength gain since initial   171%     Outcomes:  Initial score: 59%  "functional ability   Visit 6 score: 57%  Visit 10: 56% functional ability  Goal: 70% functional ability       Treatment    Sabino received the treatments listed below:     Sabino received neuromuscular education  to isolate and engage spinal stabilization musculature correctly for motor control and coordination to aid in function and posture for 10 minutes on the Medical Medx Machine.  Patient performed MedX dynamic exercise with emphasis on spinal muscular control using pacer throughout  active range of motion. Therapist assisted patient in achieving optimal exertion for neural reeducation and endurance training by using the  Shakir Exertion Rating scale, by instructing the patient to aim for mid range of exertion, performing 15-20 repetitions, slowly, correctly,and safely.           12/31/2024     8:56 AM   [facility]   Visit Number 17   VAS Pain Rating 2   Treadmill Time (in min.) 5 min   Extension in Standing 10   Lumbar Weight 75 lbs   Repetitions 18   Rating of Perceived Exertion 3         therapeutic exercises to develop strength, endurance, ROM, flexibility, posture, and core stabilization for 40 minutes including:    Gentle ext in standing x 10  Hip ext rotation stretch 20" x 2  Piriformis stretch 20" x 2 figure four   Lower trunk rotation x 10  R QL stretch in supine (arms overhead) 3 x 15 sec  Hamstring stretch w/belt 2 x 20 sec  transverse abdominal activation / PPT + SLR x15+ 3#  BFKO with transverse abdominal activation + Black Tband x 15--NP  Bridging c/ Black Tband x 10 + leg extension (cues for level pelvis)  SL Clamshells  Black tband  x 20  Cat/Cow stretch x 10 (cues)  Bird dog UE/LE x 10 (cue level pelvis)  Paloff press 25# x 15  Seated hip er stretch R 2 x 20 sec--NP  Standing R QL stretch at door frame 2 x 20 sec --NP  shuttle kick backs with 1 black band x 10        Peripheral muscle strengthening which included 1 set of 15-20 repetitions at a slow, controlled 10-13 second per rep pace " focused on strengthening supporting musculature for improved body mechanics and functional mobility.  Pt and therapist focused on proper form during treatment to ensure optimal strengthening of each targeted muscle group.  Machines were utilized including torso rotation, leg press, hip abd and hip add, leg ext.  Leg curl, triceps, biceps, chest and row added visit 3     manual therapy techniques:  were applied to the: low back for 5minutes, including: Long axis distraction R LE    cold pack for 5 minutes to low back     Home Exercises Provided and Patient Education Provided   Home exercises include: SOC, EIS, hip ER stretch, piriformis stretch, LTR, hamstring stretch, QL stretch  Cardio program: visit 5 11/12/24 (Reviewed benefits of cardio with handout provided, patient reports being active with walking activities).  Lifting education date: visit 11 12/5/24  Posture/Lumbar roll:  recommended  Fridge Magnet Discharge handout (date given):  Equipment at home/gym membership: no    Education provided:   -  cues w/exs  MedX performance  Precor ex performance  11/12/24 Availability of Health coaching    Written Home Exercises Provided: Patient instructed to cont prior HEP. Added Standing QL and seated hip ER stretch 11/25/24.  Exercises were reviewed and Sabino was able to demonstrate them prior to the end of the session.  Sabino demonstrated good  understanding of the education provided.     See EMR under Patient Instructions for exercises provided prior visit.    Assessment   Sabino returns with mild R lower back discomfort/stiffness which is improved with stretching in the morning. Treatment continued with flexibility, strengthening and neuromuscular reeducation ex's.   Lumbar MedX weight increased to 75 ft/lbs and he completed 18 reps with a RPE = 3/10. . He completed peripheral strengthening ex's without increased discomfort (UE ex's remain deferred due to  (L) shoulder injury). Will continue per HB protocol and patient  tolerance.       Patient is making  progress towards established goals.  Pt will continue to benefit from skilled outpatient physical therapy to address the deficits stated in the impairment chart, provide pt/family education and to maximize pt's level of independence in the home and community environment.     Anticipated Barriers for therapy: attendance, chronicity of symptoms  Pt's spiritual, cultural and educational needs considered and pt agreeable to plan of care and goals as stated below:     Goals:   Short term goals:  6 weeks or 10 visits   - Pt will demonstrate increased lumbar MedX ROM by at least 3 degrees from the initial ROM value with improvements noted in functional ROM and ability to perform ADLs. MET 12/3/24  - Pt will demonstrate increased MedX average isometric strength value by 20% from initial test resulting in improved ability to perform bending, lifting, and carrying activities safely, confidently. MET 12/3/24  - Pt will report a reduction in worst pain score by 1-2 points for improved tolerance for 8/10 versus initial 10/10.  Less stiff in am. MET 12/3/24  - Pt able to perform HEP correctly with minimal cueing or supervision from therapist to encourage independent management of symptoms. MET 12/3/24     Long term goals: 10 weeks or 20 visits   - Pt will demonstrate increased lumbar MedX ROM by at least 6 degrees from initial ROM value, resulting in improved ability to perform functional forward bending while standing and sitting. Appropriate and Ongoing  - Pt will demonstrate increased MedX average isometric strength value by 40% from initial test resulting in improved ability to perform bending, lifting, and carrying activities safely and confidently. Appropriate and Ongoing  - Pt to demonstrate ability to independently control and reduce their pain through posture positioning and mechanical movements throughout a typical day. Appropriate and Ongoing  - Pt will demonstrate reduced pain and  improved functional outcomes as reported on the FOTO by reaching an intake score of >/= 70% functional ability in order to demonstrate subjective improvement in patient's condition. . Appropriate and Ongoing  - Pt will demonstrate independence with the HEP at discharge. Appropriate and Ongoing  - Pts goals: reduce pain especially in the morning,  grand kids, be able to walk a few miles daily   Appropriate and Ongoing    Plan   Continue with established Plan of Care towards established PT goals.     Therapist: Hedy Wiley, PT  12/31/2024

## 2025-01-02 ENCOUNTER — CLINICAL SUPPORT (OUTPATIENT)
Dept: REHABILITATION | Facility: HOSPITAL | Age: 70
End: 2025-01-02
Payer: MEDICARE

## 2025-01-02 DIAGNOSIS — R29.898 DECREASED STRENGTH OF TRUNK AND BACK: Primary | ICD-10-CM

## 2025-01-02 PROCEDURE — 97110 THERAPEUTIC EXERCISES: CPT | Mod: CQ

## 2025-01-02 PROCEDURE — 97112 NEUROMUSCULAR REEDUCATION: CPT | Mod: CQ

## 2025-01-02 NOTE — PROGRESS NOTES
[facility]      Name: Sabino Rubio  Clinic Number: [id number]    Therapy Diagnosis:   Encounter Diagnosis   Name Primary?    Decreased strength of trunk and back Yes       Physician: Bridgette Rodriguez NP    Visit Date: 1/2/2025    Physician Orders: PT Eval and Treat  Medical Diagnosis from Referral: M47.816 (ICD-10-CM) - Lumbar [id number] (ICD-10-CM) - [id number] (ICD-10-CM) - Degeneration of intervertebral disc of lumbosacral region with discogenic back pain  Evaluation Date: 10/29/2024  Authorization Period Expiration: 12/31/2025  Plan of Care Expiration: 1/7/2025  Reassessment Due: 1/30/2025 ( A reassessment was performed by Hedy Wiley PT this visit 1/2/25)  Visit # / Visits authorized:18/20    MedX Testing:MedX testing visit 2    Time In: 7:55 AM  Time Out: 8:50 AM  Total Billable Time: 50 min    INSURANCE and OUTCOMES: Fee for Service with FOTO Outcomes 2/3    Precautions: Standard    Pattern of pain determined: 4 PEN (gentle extension based exercises started to address mobility issues with traffic light model taught)     Subjective   Sabino arrives with min stiffness in back, some mild pain in buttocks and hips but severity/intensity of pain is improved.     Patient reports tolerating previous visit: Mild muscular soreness.  Patient reports their pain to be 2/10 on a 0-10 scale with 0 being no pain and 10 being the worst pain imaginable.  Pain Location: low back     Occupation: in construction, work part time, lifting materials, drives a van, tries to delegate (self employed)  Leisure: fish, has boat, outdoor stuff, play with grandkids  Pt goals: reduce pain especially in the morning,  grand kids, be able to walk a few miles daily     Objective     MOVEMENT LOSS - Lumbar    Norms ROM Loss Initial 1/2/25   Flexion Fingers touch toes, sacral angle >/= 70 deg, uniform spinal curvature, posterior weight shift  within functional limits and touches toes,  "pain upon returning WFL   Extension ASIS surpasses toes, spine of scapulae surpasses heels, uniform spinal curve major loss Min loss   Side glide Right   major loss Min loss   Side glide Left   major loss WFL   Rotation Right PT observes contralateral shoulder major loss WFL   Rotation Left PT observes contralateral shoulder major loss WFL        Lumbar IM Testing Results:    Initial 10/31/24 Midpoint 12/3/24   ROM 3-42 deg 0-48 deg   Max Peak Torque 76  154   Min Peak Torque 29  89   Flex/Ext Ratio 2.6:1 1.7:1   % below normative data 75% 30%   Strength gain since initial   171%     Outcomes:  Initial score: 59% functional ability   Visit 6 score: 57%  Visit 10: 56% functional ability  Goal: 70% functional ability       Treatment    Sabino received the treatments listed below:     Sabino received neuromuscular education  to isolate and engage spinal stabilization musculature correctly for motor control and coordination to aid in function and posture for 10 minutes on the [employer] Machine.  Patient performed MedX dynamic exercise with emphasis on spinal muscular control using pacer throughout  active range of motion. Therapist assisted patient in achieving optimal exertion for neural reeducation and endurance training by using the  Shakir Exertion Rating scale, by instructing the patient to aim for mid range of exertion, performing 15-20 repetitions, slowly, correctly,and safely.            1/2/2025     8:47 AM   [facility] - [facility]   Visit Number 18   VAS Pain Rating 2   Treadmill Time (in min.) 5 min   Extension in Standing 10   Lumbar Weight 75 lbs   Repetitions 20   Rating of Perceived Exertion 4      therapeutic exercises to develop strength, endurance, ROM, flexibility, posture, and core stabilization for 40 minutes including:    "Go-To Fridge Magnet HEP"  Gentle ext in standing x 10  Hip ext rotation stretch 20" x 2  Piriformis stretch 20" x 2 figure four   Lower trunk rotation x 10  R QL stretch in " supine (arms overhead) 3 x 15 sec  transverse abdominal activation / PPT + SLR x15+ 3#  Bridging c/ Black Tband x 10 + Kickouts (cues for level pelvis)  SL Clamshells  Black tband  x 20  Cat/Cow stretch x 10 (cues)  Bird dog UE/LE x 10 (cue level pelvis)  Paloff press with Black Band (HEP)  x 15  Extension in standing x 10    Ex's not performed   Hamstring stretch w/belt 2 x 20 sec   BFKO with transverse abdominal activation + Black Tband x 15--NP  shuttle kick backs with 1 black band x 10  Seated hip er stretch R 2 x 20 sec--NP  Standing R QL stretch at door frame 2 x 20 sec --NP      Peripheral muscle strengthening which included 1 set of 15-20 repetitions at a slow, controlled 10-13 second per rep pace focused on strengthening supporting musculature for improved body mechanics and functional mobility.  Pt and therapist focused on proper form during treatment to ensure optimal strengthening of each targeted muscle group.  Machines were utilized including torso rotation, leg press, hip abd and hip add, leg ext.  Leg curl, triceps, biceps, chest and row added visit 3     manual therapy techniques:  were applied to the: low back for 00minutes, including: Long axis distraction R LE    cold pack for 5 minutes to low back     Home Exercises Provided and Patient Education Provided   Home exercises include: SOC, EIS, hip ER stretch, piriformis stretch, LTR, hamstring stretch, QL stretch  Cardio program: visit 5 11/12/24 (Reviewed benefits of cardio with handout provided, patient reports being active with walking activities).  Lifting education date: visit 11 12/5/24  Posture/Lumbar roll:  recommended  Fridge Magnet Discharge handout (date given): 1/2/25  Equipment at home/gym membership: no    Education provided:   -  cues w/exs  MedX performance  Precor ex performance  11/12/24 Availability of Health coaching    Written Home Exercises Provided: Patient instructed to cont prior HEP. Added Standing QL and seated hip ER  "stretch 11/25/24.  Exercises were reviewed and aSbino was able to demonstrate them prior to the end of the session.  Sabino demonstrated good  understanding of the education provided.     See EMR under Patient Instructions for exercises provided prior visit.    Assessment   Sabino returns with mild R lower back discomfort/stiffness which is improved with stretching in the morning. Treatment continued with flexibility, strengthening and neuromuscular reeducation ex's. He was educated in and performs his "Go-To [employer]idge Magnet HEP" which he was able to perform without c/o and good understanding.   Lumbar MedX resistance was maintained at 75 ft/lbs and he completed 20 reps with a RPE = 4/10. He completed peripheral strengthening ex's without increased discomfort (UE ex's remain deferred due to  (L) shoulder injury). Will continue per HB protocol and patient tolerance.       Patient is making  progress towards established goals.  Pt will continue to benefit from skilled outpatient physical therapy to address the deficits stated in the impairment chart, provide pt/family education and to maximize pt's level of independence in the home and community environment.     Anticipated Barriers for therapy: attendance, chronicity of symptoms  Pt's spiritual, cultural and educational needs considered and pt agreeable to plan of care and goals as stated below:     Goals:   Short term goals:  6 weeks or 10 visits   - Pt will demonstrate increased lumbar MedX ROM by at least 3 degrees from the initial ROM value with improvements noted in functional ROM and ability to perform ADLs. MET 12/3/24  - Pt will demonstrate increased MedX average isometric strength value by 20% from initial test resulting in improved ability to perform bending, lifting, and carrying activities safely, confidently. MET 12/3/24  - Pt will report a reduction in worst pain score by 1-2 points for improved tolerance for 8/10 versus initial 10/10.  Less stiff in am. MET " 12/3/24  - Pt able to perform HEP correctly with minimal cueing or supervision from therapist to encourage independent management of symptoms. MET 12/3/24     Long term goals: 10 weeks or 20 visits   - Pt will demonstrate increased lumbar MedX ROM by at least 6 degrees from initial ROM value, resulting in improved ability to perform functional forward bending while standing and sitting. Appropriate and Ongoing  - Pt will demonstrate increased MedX average isometric strength value by 40% from initial test resulting in improved ability to perform bending, lifting, and carrying activities safely and confidently. Appropriate and Ongoing  - Pt to demonstrate ability to independently control and reduce their pain through posture positioning and mechanical movements throughout a typical day. Appropriate and Ongoing  - Pt will demonstrate reduced pain and improved functional outcomes as reported on the FOTO by reaching an intake score of >/= 70% functional ability in order to demonstrate subjective improvement in patient's condition. . Appropriate and Ongoing  - Pt will demonstrate independence with the HEP at discharge. Appropriate and Ongoing  - Pts goals: reduce pain especially in the morning,  grand kids, be able to walk a few miles daily   Appropriate and Ongoing    Plan   Continue with established Plan of Care towards established PT goals.     Therapist: Gordo Jimenez, PTA  1/2/2025

## 2025-01-02 NOTE — PATIENT INSTRUCTIONS
"HEALTHY BACK TOOLS        KEEP YOUR SPINE FEELING FINE   HEALTHY HABITS   Do your "GO TO" stretches 2/day   Get a good night's REST   Watch your POSTURE in sitting/standing Drink PLENTY of water   Use a lumbar roll Eat LOTS of fruits & vegetables   GET UP often (walk and/or stretch) Manage your STRESS   Make your workplace IDEAL FOR YOU  Don't smoke   Lift correctly EXERCISE                           WHAT TO DO WHEN SYMPTOMS FLARE UP  Back and neck pain may occasionally flare up.  If you experience a flare   up, remember your tools. Be encouraged, by remembering that flare-ups will   usually pass.   My Tools:    ~Use your "Go To" Stretches/Positions   ~Keep Moving-pain usually gets better if you move  ~Z lie (with or without ice)  10 min several times a day until symptoms reduce  ~Slowly resume normal activities   ~Practice Deep Breathing and Relaxation techniques                                                 MY EXERCISE PLAN  GO TO STRETCHES  2/day (like brushing your teeth) STRENGTHENING  2-3 times/week CARDIO PROGRAM  150 min/week   Knees side to side x 10 Bridging with kickouts + band around knees x 10 Walking or biking   Crossed knee push/pull and across stretching 3x 10 sec Clamshells w/black band x 20      Cat/cow stretch (On hands and knees) x 10 Straight leg raise with ball push x 15     Bird dog x 10 Standing press outs with band in door x 15     Standing back bend x 10          "

## 2025-01-07 ENCOUNTER — CLINICAL SUPPORT (OUTPATIENT)
Dept: REHABILITATION | Facility: HOSPITAL | Age: 70
End: 2025-01-07
Payer: MEDICARE

## 2025-01-07 DIAGNOSIS — R29.898 DECREASED STRENGTH OF TRUNK AND BACK: Primary | ICD-10-CM

## 2025-01-07 PROCEDURE — 97110 THERAPEUTIC EXERCISES: CPT

## 2025-01-07 PROCEDURE — 97112 NEUROMUSCULAR REEDUCATION: CPT

## 2025-01-07 NOTE — PROGRESS NOTES
[facility]      Name: Sabino Rubio  Clinic Number: [id number]    Therapy Diagnosis:   Encounter Diagnosis   Name Primary?    Decreased strength of trunk and back Yes       Physician: Bridgette Rodriguez NP    Visit Date: 1/7/2025    Physician Orders: PT Eval and Treat  Medical Diagnosis from Referral: M47.816 (ICD-10-CM) - Lumbar [id number] (ICD-10-CM) - [id number] (ICD-10-CM) - Degeneration of intervertebral disc of lumbosacral region with discogenic back pain  Evaluation Date: 10/29/2024  Authorization Period Expiration: 12/31/2025  Plan of Care Expiration: 1/7/2025  Reassessment Due: 1/30/2025 ( A reassessment was performed by Hedy Wiley PT this visit 1/2/25)  Visit # / Visits authorized:19/20    MedX Testing:MedX testing visit 2    Time In: 8  Time Out: 850  Total Billable Time: 45 min    INSURANCE and OUTCOMES: Fee for Service with FOTO Outcomes 2/3    Precautions: Standard    Pattern of pain determined: 4 PEN (gentle extension based exercises started to address mobility issues with traffic light model taught)     Subjective   Sabino arrives with min stiffness in back, pt reports pain decreases with movement    Patient reports tolerating previous visit: Mild muscular soreness.  Patient reports their pain to be 3/10 on a 0-10 scale with 0 being no pain and 10 being the worst pain imaginable.  Pain Location: low back     Occupation: in construction, work part time, lifting materials, drives a van, tries to delegate (self employed)  Leisure: fish, has boat, outdoor stuff, play with grandkids  Pt goals: reduce pain especially in the morning,  grand kids, be able to walk a few miles daily     Objective     MOVEMENT LOSS - Lumbar    Norms ROM Loss Initial 1/2/25   Flexion Fingers touch toes, sacral angle >/= 70 deg, uniform spinal curvature, posterior weight shift  within functional limits and touches toes, pain upon returning WFL   Extension ASIS surpasses  "toes, spine of scapulae surpasses heels, uniform spinal curve major loss Min loss   Side glide Right   major loss Min loss   Side glide Left   major loss WFL   Rotation Right PT observes contralateral shoulder major loss WFL   Rotation Left PT observes contralateral shoulder major loss WFL        Lumbar IM Testing Results:    Initial 10/31/24 Midpoint 12/3/24   ROM 3-42 deg 0-48 deg   Max Peak Torque 76  154   Min Peak Torque 29  89   Flex/Ext Ratio 2.6:1 1.7:1   % below normative data 75% 30%   Strength gain since initial   171%     Outcomes:  Initial score: 59% functional ability   Visit 6 score: 57%  Visit 10: 56% functional ability  Goal: 70% functional ability       Treatment    Sabino received the treatments listed below:     Sabino received neuromuscular education  to isolate and engage spinal stabilization musculature correctly for motor control and coordination to aid in function and posture for 10 minutes on the "[employer] Machine.  Patient performed MedX dynamic exercise with emphasis on spinal muscular control using pacer throughout  active range of motion. Therapist assisted patient in achieving optimal exertion for neural reeducation and endurance training by using the  Shakir Exertion Rating scale, by instructing the patient to aim for mid range of exertion, performing 15-20 repetitions, slowly, correctly,and safely.            1/7/2025     8:23 AM   [facility]   Visit Number 19   VAS Pain Rating 3   Treadmill Time (in min.) 5 min   Extension in Standing 10   Lumbar Weight 79 lbs   Repetitions 18   Rating of Perceived Exertion 5   Ice - Z Lie (in min.) 5       therapeutic exercises to develop strength, endurance, ROM, flexibility, posture, and core stabilization for 35 minutes including:    "Go-To Fridge Magnet HEP"  Gentle ext in standing x 10  Hip ext rotation stretch 20" x 2  Piriformis stretch 20" x 2 figure four   Lower trunk rotation x 10  R QL stretch in supine (arms overhead) 3 x 15 " sec  transverse abdominal activation / PPT + SLR x15+ 3#  Bridging c/ Black Tband x 10 + Kickouts (cues for level pelvis)  SL Clamshells  Black tband  x 20  Cat/Cow stretch x 10 (cues)  Bird dog UE/LE x 10 (cue level pelvis)  Paloff press with Black Band (HEP)  x 15  Extension in standing x 10    Ex's not performed   Hamstring stretch w/belt 2 x 20 sec   BFKO with transverse abdominal activation + Black Tband x 15--NP  shuttle kick backs with 1 black band x 10  Seated hip er stretch R 2 x 20 sec--NP  Standing R QL stretch at door frame 2 x 20 sec --NP      Peripheral muscle strengthening which included 1 set of 15-20 repetitions at a slow, controlled 10-13 second per rep pace focused on strengthening supporting musculature for improved body mechanics and functional mobility.  Pt and therapist focused on proper form during treatment to ensure optimal strengthening of each targeted muscle group.  Machines were utilized including torso rotation, leg press, hip abd and hip add, leg ext.  Leg curl, triceps, biceps, chest and row added visit 3     manual therapy techniques:  were applied to the: low back for 00minutes, including: Long axis distraction R LE    cold pack for 5 minutes to low back     Home Exercises Provided and Patient Education Provided   Home exercises include: SOC, EIS, hip ER stretch, piriformis stretch, LTR, hamstring stretch, QL stretch  Cardio program: visit 5 11/12/24 (Reviewed benefits of cardio with handout provided, patient reports being active with walking activities).  Lifting education date: visit 11 12/5/24  Posture/Lumbar roll:  recommended  Fridge Magnet Discharge handout (date given): 1/2/25  Equipment at home/gym membership: no    Education provided:   -  cues w/exs  MedX performance  Precor ex performance  11/12/24 Availability of Health coaching    Written Home Exercises Provided: Patient instructed to cont prior HEP. Added Standing QL and seated hip ER stretch 11/25/24.  Exercises were  reviewed and Sabino was able to demonstrate them prior to the end of the session.  Sabino demonstrated good  understanding of the education provided.     See EMR under Patient Instructions for exercises provided prior visit.    Assessment   Sabino returns with mild R lower back discomfort/stiffness which is improved with stretching in the morning. Treatment continued with flexibility, strengthening and neuromuscular reeducation ex's.   Lumbar MedX resistance was increased to 79ft/lbs ft/lbs and he completed 18 reps with a RPE = 4/10. He completed peripheral strengthening ex's without increased discomfort (UE ex's remain deferred due to  (L) shoulder injury). Will continue per HB protocol and patient tolerance.       Patient is making  progress towards established goals.  Pt will continue to benefit from skilled outpatient physical therapy to address the deficits stated in the impairment chart, provide pt/family education and to maximize pt's level of independence in the home and community environment.     Anticipated Barriers for therapy: attendance, chronicity of symptoms  Pt's spiritual, cultural and educational needs considered and pt agreeable to plan of care and goals as stated below:     Goals:   Short term goals:  6 weeks or 10 visits   - Pt will demonstrate increased lumbar MedX ROM by at least 3 degrees from the initial ROM value with improvements noted in functional ROM and ability to perform ADLs. MET 12/3/24  - Pt will demonstrate increased MedX average isometric strength value by 20% from initial test resulting in improved ability to perform bending, lifting, and carrying activities safely, confidently. MET 12/3/24  - Pt will report a reduction in worst pain score by 1-2 points for improved tolerance for 8/10 versus initial 10/10.  Less stiff in am. MET 12/3/24  - Pt able to perform HEP correctly with minimal cueing or supervision from therapist to encourage independent management of symptoms. MET 12/3/24      Long term goals: 10 weeks or 20 visits   - Pt will demonstrate increased lumbar MedX ROM by at least 6 degrees from initial ROM value, resulting in improved ability to perform functional forward bending while standing and sitting. Appropriate and Ongoing  - Pt will demonstrate increased MedX average isometric strength value by 40% from initial test resulting in improved ability to perform bending, lifting, and carrying activities safely and confidently. Appropriate and Ongoing  - Pt to demonstrate ability to independently control and reduce their pain through posture positioning and mechanical movements throughout a typical day. Appropriate and Ongoing  - Pt will demonstrate reduced pain and improved functional outcomes as reported on the FOTO by reaching an intake score of >/= 70% functional ability in order to demonstrate subjective improvement in patient's condition. . Appropriate and Ongoing  - Pt will demonstrate independence with the HEP at discharge. Appropriate and Ongoing  - Pts goals: reduce pain especially in the morning,  grand kids, be able to walk a few miles daily   Appropriate and Ongoing    Plan   Continue with established Plan of Care towards established PT goals.     Therapist: Marleni Malone, PT  1/7/2025

## 2025-01-08 DIAGNOSIS — M50.30 DEGENERATION OF CERVICAL INTERVERTEBRAL DISC: ICD-10-CM

## 2025-01-08 RX ORDER — MELOXICAM 15 MG/1
15 TABLET ORAL DAILY PRN
Qty: 90 TABLET | Refills: 0 | Status: SHIPPED | OUTPATIENT
Start: 2025-01-08

## 2025-01-08 NOTE — TELEPHONE ENCOUNTER
No care due was identified.  [facility] Embedded Care Due Messages. Reference number: [id number].   1/08/2025 12:18:44 AM CST

## 2025-01-09 ENCOUNTER — CLINICAL SUPPORT (OUTPATIENT)
Dept: REHABILITATION | Facility: HOSPITAL | Age: 70
End: 2025-01-09
Payer: MEDICARE

## 2025-01-09 DIAGNOSIS — R29.898 DECREASED STRENGTH OF TRUNK AND BACK: Primary | ICD-10-CM

## 2025-01-09 PROCEDURE — 97110 THERAPEUTIC EXERCISES: CPT

## 2025-01-09 PROCEDURE — 97750 PHYSICAL PERFORMANCE TEST: CPT

## 2025-01-09 NOTE — PROGRESS NOTES
[facility]      Name: Sabino Rubio  Clinic Number: [id number]    Therapy Diagnosis:   Encounter Diagnosis   Name Primary?    Decreased strength of trunk and back Yes         Physician: Bridgette Rodriguez NP    Visit Date: 1/9/2025    Physician Orders: PT Eval and Treat  Medical Diagnosis from Referral: M47.816 (ICD-10-CM) - Lumbar iaolxfyoceoY41.1 (ICD-10-CM) - FqonufnlnvpuL95.370 (ICD-10-CM) - Degeneration of intervertebral disc of lumbosacral region with discogenic back pain  Evaluation Date: 10/29/2024  Authorization Period Expiration: 12/31/2025  Plan of Care Expiration: 1/7/2025  Reassessment Due: 1/30/2025 ( A reassessment was performed by Hedy Wiley, PT this visit 1/2/25)  Visit # / Visits authorized:20/20    MedX Testing:MedX testing visit 2    Time In: 8  Time Out: 850  Total Billable Time: 45 min  30 min 1 on 1  INSURANCE and OUTCOMES: Fee for Service with FOTO Outcomes 3/3    Precautions: Standard    Pattern of pain determined: 4 PEN (gentle extension based exercises started to address mobility issues with traffic light model taught)     Subjective   Sabino arrives with min stiffness in back, pt reports pain decreases with movement.  Pt states although his pain hasn't decreased on a daily basis, the intensity and shooting pains he used to experience are no longer present.    Patient reports tolerating previous visit: Mild muscular soreness.  Patient reports their pain to be 2/10 on a 0-10 scale with 0 being no pain and 10 being the worst pain imaginable.  Pain Location: low back     Occupation: in construction, work part time, lifting materials, drives a van, tries to delegate (self employed)  Leisure: fish, has boat, outdoor stuff, play with grandkids  Pt goals: reduce pain especially in the morning,  grand kids, be able to walk a few miles daily     Objective     MOVEMENT LOSS - Lumbar    Norms ROM Loss Initial 1/2/25   Flexion Fingers touch toes, sacral  angle >/= 70 deg, uniform spinal curvature, posterior weight shift  within functional limits and touches toes, pain upon returning WFL   Extension ASIS surpasses toes, spine of scapulae surpasses heels, uniform spinal curve major loss Min loss   Side glide Right   major loss Min loss   Side glide Left   major loss WFL   Rotation Right PT observes contralateral shoulder major loss WFL   Rotation Left PT observes contralateral shoulder major loss WFL        Lumbar IM Testing Results:    Initial 10/31/24 Midpoint 12/3/24 1/9/25   ROM 3-42 deg 0-48 deg 0-54   Max Peak Torque 76  154 141   Min Peak Torque 29  89 67   Flex/Ext Ratio 2.6:1 1.7:1 2.10   % below normative data 75% 30% 41%   Strength gain since initial   171% 137     Outcomes:  Initial score: 59% functional ability   Visit 6 score: 57%  Visit 10: 56% functional ability Visit 20 57%  Goal: 70% functional ability       Treatment    Sabino received the treatments listed below:     Sabino received neuromuscular education  to isolate and engage spinal stabilization musculature correctly for motor control and coordination to aid in function and posture for 10 minutes on the Soundflavor Machine.  Patient performed MedX dynamic exercise with emphasis on spinal muscular control using pacer throughout  active range of motion. Therapist assisted patient in achieving optimal exertion for neural reeducation and endurance training by using the  Shakir Exertion Rating scale, by instructing the patient to aim for mid range of exertion, performing 15-20 repetitions, slowly, correctly,and safely.            1/9/2025     8:50 AM   Healthy[facility] Therapy   Visit Number 20   VAS Pain Rating 2   Treadmill Time (in min.) 5 min   Extension in Standing 10   Lumbar Flexion 54   Lumbar Extension 0   Lumbar Peak Torque 141 ft. lbs.   Min Torque 67   Test Percent Below Normative Data 41 %   Test Percent Gain in Strength from Initial  137 %   Ice - Z Lie (in min.) 5         therapeutic exercises  "to develop strength, endurance, ROM, flexibility, posture, and core stabilization for 40minutes including:    "Go-To Fridge Magnet HEP"  Gentle ext in standing x 10  Hip ext rotation stretch 20" x 2  Piriformis stretch 20" x 2 figure four   Lower trunk rotation x 10  R QL stretch in supine (arms overhead) 3 x 15 sec  transverse abdominal activation / PPT + SLR x15+ 3#  Bridging c/ Black Tband x 10 + Kickouts (cues for level pelvis)  SL Clamshells  Black tband  x 20  Cat/Cow stretch x 10 (cues)  Bird dog UE/LE x 10 (cue level pelvis)  Paloff press with Black Band (HEP)  x 15  Extension in standing x 10    Ex's not performed   Hamstring stretch w/belt 2 x 20 sec   BFKO with transverse abdominal activation + Black Tband x 15--NP  shuttle kick backs with 1 black band x 10  Seated hip er stretch R 2 x 20 sec--NP  Standing R QL stretch at door frame 2 x 20 sec --NP      Peripheral muscle strengthening which included 1 set of 15-20 repetitions at a slow, controlled 10-13 second per rep pace focused on strengthening supporting musculature for improved body mechanics and functional mobility.  Pt and therapist focused on proper form during treatment to ensure optimal strengthening of each targeted muscle group.  Machines were utilized including torso rotation, leg press, hip abd and hip add, leg ext.  Leg curl, triceps, biceps, chest and row added visit 3     manual therapy techniques:  were applied to the: low back for 00minutes, including: Long axis distraction R LE    cold pack for 5 minutes to low back     Home Exercises Provided and Patient Education Provided   Home exercises include: SOC, EIS, hip ER stretch, piriformis stretch, LTR, hamstring stretch, QL stretch  Cardio program: visit 5 11/12/24 (Reviewed benefits of cardio with handout provided, patient reports being active with walking activities).  Lifting education date: visit 11 12/5/24  Posture/Lumbar roll:  recommended  Fridge Magnet Discharge handout (date " given): 1/2/25  Equipment at home/gym membership: no    Education provided:   -  cues w/exs  MedX performance  Precor ex performance  11/12/24 Availability of Health coaching    Written Home Exercises Provided: Patient instructed to cont prior HEP. Added Standing QL and seated hip ER stretch 11/25/24.  Exercises were reviewed and Sabino was able to demonstrate them prior to the end of the session.  Sabino demonstrated good  understanding of the education provided.     See EMR under Patient Instructions for exercises provided prior visit.    Assessment   Patient has attended 20 visits of the Healthy Back program focusing aerobic training, isometric testing with dynamic strengthening on MedX machine for spine, whole body strengthening on peripheral strengthening equipment, HEP, and patient education. Patient has completed the Healthy Back Program and is ready to be transitioned into wellness program. Educated on the importance of wellness program and attending weekly in order to maintain strength. Stressed the importance of continuing exercise and maintaining proper body mechanics and ergonomics in order to fully participate in activities of daily living, work, and leisure activities. At this time, patient demonstrates improvement in ability to reduce symptoms, improved posture, improved spinal ROM and improved strength. Discharge handout with HEP given and reviewed all information given. Patient able to demonstrate and verbalize understanding. Patient does not plan to attend wellness and is appropriate for transition.       -Improved 15 ROM, initially on MedX test 3-42 and currently 0-54.  -Improved strength at each test point on lumbar med ex IM test with 137% average improvement with reduced pain noted by patient.  -Initial outcome tool score 59 and current outcome tool score 57 indicating reduced pain and improved function.           Goals:   Short term goals:  6 weeks or 10 visits   - Pt will demonstrate increased  lumbar MedX ROM by at least 3 degrees from the initial ROM value with improvements noted in functional ROM and ability to perform ADLs. MET 12/3/24  - Pt will demonstrate increased MedX average isometric strength value by 20% from initial test resulting in improved ability to perform bending, lifting, and carrying activities safely, confidently. MET 12/3/24  - Pt will report a reduction in worst pain score by 1-2 points for improved tolerance for 8/10 versus initial 10/10.  Less stiff in am. MET 12/3/24  - Pt able to perform HEP correctly with minimal cueing or supervision from therapist to encourage independent management of symptoms. MET 12/3/24     Long term goals: 10 weeks or 20 visits   - Pt will demonstrate increased lumbar MedX ROM by at least 6 degrees from initial ROM value, resulting in improved ability to perform functional forward bending while standing and sitting. MET  - Pt will demonstrate increased MedX average isometric strength value by 40% from initial test resulting in improved ability to perform bending, lifting, and carrying activities safely and confidently.MET  - Pt to demonstrate ability to independently control and reduce their pain through posture positioning and mechanical movements throughout a typical day.MET  - Pt will demonstrate reduced pain and improved functional outcomes as reported on the FOTO by reaching an intake score of >/= 70% functional ability in order to demonstrate subjective improvement in patient's condition. . Not MET  - Pt will demonstrate independence with the HEP at discharge. MET  - Pts goals: reduce pain especially in the morning,  grand kids, be able to walk a few miles daily  MET    Plan   Continue with exercise at the gym    Therapist: Marleni Maloen, PT  1/9/2025

## 2025-01-30 DIAGNOSIS — Z00.00 ENCOUNTER FOR MEDICARE ANNUAL WELLNESS EXAM: ICD-10-CM

## 2025-02-18 ENCOUNTER — PATIENT MESSAGE (OUTPATIENT)
Dept: DERMATOLOGY | Facility: CLINIC | Age: 70
End: 2025-02-18
Payer: MEDICARE

## 2025-02-18 ENCOUNTER — TELEPHONE (OUTPATIENT)
Dept: DERMATOLOGY | Facility: CLINIC | Age: 70
End: 2025-02-18
Payer: MEDICARE

## 2025-02-24 ENCOUNTER — OFFICE VISIT (OUTPATIENT)
Dept: DERMATOLOGY | Facility: CLINIC | Age: 70
End: 2025-02-24
Payer: MEDICARE

## 2025-02-24 DIAGNOSIS — L81.4 LENTIGINES: ICD-10-CM

## 2025-02-24 DIAGNOSIS — L82.1 SEBORRHEIC KERATOSES: ICD-10-CM

## 2025-02-24 DIAGNOSIS — D22.9 MULTIPLE BENIGN NEVI: ICD-10-CM

## 2025-02-24 DIAGNOSIS — D36.9 ANGIOFIBROMA: ICD-10-CM

## 2025-02-24 DIAGNOSIS — L72.0 EPIDERMAL INCLUSION CYST: Primary | ICD-10-CM

## 2025-02-24 DIAGNOSIS — D18.01 CHERRY ANGIOMA: ICD-10-CM

## 2025-02-24 DIAGNOSIS — L73.8 SEBACEOUS HYPERPLASIA OF FACE: ICD-10-CM

## 2025-02-24 DIAGNOSIS — Z12.83 SCREENING EXAM FOR SKIN CANCER: ICD-10-CM

## 2025-02-24 PROCEDURE — 1159F MED LIST DOCD IN RCRD: CPT | Mod: CPTII,S$GLB,, | Performed by: STUDENT IN AN ORGANIZED HEALTH CARE EDUCATION/TRAINING PROGRAM

## 2025-02-24 PROCEDURE — 3288F FALL RISK ASSESSMENT DOCD: CPT | Mod: CPTII,S$GLB,, | Performed by: STUDENT IN AN ORGANIZED HEALTH CARE EDUCATION/TRAINING PROGRAM

## 2025-02-24 PROCEDURE — 99213 OFFICE O/P EST LOW 20 MIN: CPT | Mod: S$GLB,,, | Performed by: STUDENT IN AN ORGANIZED HEALTH CARE EDUCATION/TRAINING PROGRAM

## 2025-02-24 PROCEDURE — 1125F AMNT PAIN NOTED PAIN PRSNT: CPT | Mod: CPTII,S$GLB,, | Performed by: STUDENT IN AN ORGANIZED HEALTH CARE EDUCATION/TRAINING PROGRAM

## 2025-02-24 PROCEDURE — 99999 PR PBB SHADOW E&M-EST. PATIENT-LVL III: CPT | Mod: PBBFAC,,, | Performed by: STUDENT IN AN ORGANIZED HEALTH CARE EDUCATION/TRAINING PROGRAM

## 2025-02-24 PROCEDURE — 1160F RVW MEDS BY RX/DR IN RCRD: CPT | Mod: CPTII,S$GLB,, | Performed by: STUDENT IN AN ORGANIZED HEALTH CARE EDUCATION/TRAINING PROGRAM

## 2025-02-24 PROCEDURE — 1101F PT FALLS ASSESS-DOCD LE1/YR: CPT | Mod: CPTII,S$GLB,, | Performed by: STUDENT IN AN ORGANIZED HEALTH CARE EDUCATION/TRAINING PROGRAM

## 2025-02-24 NOTE — PROGRESS NOTES
Subjective:      Patient ID:  Sabino Rubio is a 69 y.o. male who presents for   Chief Complaint   Patient presents with    Skin Check     TBSE      Sabino Rubio is a 69 y.o. male who presents for: FBSE screening exam for skin cancer.    Last office visit 08/08/2023 with Dr. Chowdary for a FBSE with no significant findings.     The patient has the following lesions of concern:  Location: left temple   Duration: 3 months   Symptoms: none   Relieving factors/Previous treatments: none     Patient with new area of concern:   Location: right upper posterior thigh   Duration: 6 months   Previous treatments: none   Symptoms: sensitive to touch.     Pertinent history:  History of blistering sunburns: Yes  History of tanning bed use: No  Family history of melanoma: Yes (father and 1st sister) - pt seems unsure of type  Personal history of mole removal: Yes (left upper arm on 04/16/2019 by Dr. Dubois, benign)   Personal history of skin cancer: No        Review of Systems   Skin:  Positive for activity-related sunscreen use and wears hat. Negative for daily sunscreen use and recent sunburn.   Hematologic/Lymphatic: Bruises/bleeds easily (Bruises).       Objective:   Physical Exam   Constitutional: He appears well-developed and well-nourished. No distress.   Neurological: He is alert and oriented to person, place, and time. He is not disoriented.   Psychiatric: He has a normal mood and affect.   Skin:   Areas Examined (abnormalities noted in diagram):   Scalp / Hair Palpated and Inspected  Head / Face Inspection Performed  Neck Inspection Performed  Chest / Axilla Inspection Performed  Abdomen Inspection Performed  Back Inspection Performed  RUE Inspected  LUE Inspection Performed  RLE Inspected  LLE Inspection Performed  Nails and Digits Inspection Performed  Gland Inspection Performed                 Diagram Legend     Erythematous scaling macule/papule c/w actinic keratosis       Vascular papule c/w angioma       Pigmented verrucoid papule/plaque c/w seborrheic keratosis      Yellow umbilicated papule c/w sebaceous hyperplasia      Irregularly shaped tan macule c/w lentigo     1-2 mm smooth white papules consistent with Milia      Movable subcutaneous cyst with punctum c/w epidermal inclusion cyst      Subcutaneous movable cyst c/w pilar cyst      Firm pink to brown papule c/w dermatofibroma      Pedunculated fleshy papule(s) c/w skin tag(s)      Evenly pigmented macule c/w junctional nevus     Mildly variegated pigmented, slightly irregular-bordered macule c/w mildly atypical nevus      Flesh colored to evenly pigmented papule c/w intradermal nevus       Pink pearly papule/plaque c/w basal cell carcinoma      Erythematous hyperkeratotic cursted plaque c/w SCC      Surgical scar with no sign of skin cancer recurrence      Open and closed comedones      Inflammatory papules and pustules      Verrucoid papule consistent consistent with wart     Erythematous eczematous patches and plaques     Dystrophic onycholytic nail with subungual debris c/w onychomycosis     Umbilicated papule    Erythematous-base heme-crusted tan verrucoid plaque consistent with inflamed seborrheic keratosis     Erythematous Silvery Scaling Plaque c/w Psoriasis     See annotation      Assessment / Plan:        Seborrheic keratoses  These are benign inherited growths without a malignant potential. Reassurance given to patient. No treatment is necessary.     Multiple benign nevi  Reassurance, examined with dermoscopy    Lentigines  This is a benign hyperpigmented sun induced lesion. Recommend daily sun protection/avoidance and use of at least SPF 30, broad spectrum sunscreen (OTC drug) will reduce the number of new lesions. Treatment of these benign lesions are considered cosmetic.    Sebaceous hyperplasia of face  Reassurance given to patient. No treatment is necessary.   Treatment of benign, asymptomatic lesions may be considered cosmetic.    Cherry  angioma  This is a benign vascular lesion. Reassurance given. No treatment required.     Angiofibroma  Reassurance    Epidermal Inclusion Cyst   1 cm R posterior thigh  Reassurance given to patient. No treatment is necessary.   Discussed treatment options - excision vs observation. Cysts may recur with excision. Pt will defer treatment at this time.     Screening exam for skin cancer  Total body skin examination performed today including at least 12 points as noted in physical examination. No lesions suspicious for malignancy noted.    Recommend daily sun protection/avoidance, use of at least SPF 30, broad spectrum sunscreen (OTC drug), skin self examinations, and routine physician surveillance to optimize early detection    RTC 1 year, sooner prn

## 2025-02-25 ENCOUNTER — PATIENT MESSAGE (OUTPATIENT)
Dept: REHABILITATION | Facility: HOSPITAL | Age: 70
End: 2025-02-25
Payer: MEDICARE

## 2025-02-25 ENCOUNTER — PATIENT MESSAGE (OUTPATIENT)
Dept: DERMATOLOGY | Facility: CLINIC | Age: 70
End: 2025-02-25
Payer: MEDICARE

## 2025-02-25 RX ORDER — ROSUVASTATIN CALCIUM 10 MG/1
10 TABLET, COATED ORAL
Qty: 90 TABLET | Refills: 2 | Status: SHIPPED | OUTPATIENT
Start: 2025-02-25

## 2025-02-25 NOTE — TELEPHONE ENCOUNTER
No care due was identified.  [facility] Embedded Care Due Messages. Reference number: [id number].   2/25/2025 12:11:51 AM CST

## 2025-02-25 NOTE — TELEPHONE ENCOUNTER
Refill Decision Note   Sabino Rubio  is requesting a refill authorization.  Brief Assessment and Rationale for Refill:  Approve     Medication Therapy Plan:         Comments:     Note composed:12:34 AM 02/25/2025

## 2025-02-26 RX ORDER — DOXYCYCLINE 100 MG/1
CAPSULE ORAL
Qty: 14 CAPSULE | Refills: 0 | Status: SHIPPED | OUTPATIENT
Start: 2025-02-26

## 2025-02-26 NOTE — TELEPHONE ENCOUNTER
Kings Thompson,  Please let him know for inflamed/painful cysts I recommend doxycycline 100 mg twice daily w food and water for 1 week to try to improve symptoms  We can't cut out if it is red/inflamed, needs to calm down first  And yes can offer him next available excision slot to get cut out, thanks  
none

## 2025-07-16 DIAGNOSIS — M50.30 DEGENERATION OF CERVICAL INTERVERTEBRAL DISC: ICD-10-CM

## 2025-07-17 ENCOUNTER — TELEPHONE (OUTPATIENT)
Dept: FAMILY MEDICINE | Facility: CLINIC | Age: 70
End: 2025-07-17
Payer: MEDICARE

## 2025-07-17 DIAGNOSIS — R73.01 IFG (IMPAIRED FASTING GLUCOSE): Primary | ICD-10-CM

## 2025-07-17 DIAGNOSIS — E78.2 MIXED HYPERLIPIDEMIA: ICD-10-CM

## 2025-07-17 RX ORDER — MELOXICAM 15 MG/1
15 TABLET ORAL DAILY PRN
Qty: 90 TABLET | Refills: 0 | Status: SHIPPED | OUTPATIENT
Start: 2025-07-17

## 2025-07-17 NOTE — TELEPHONE ENCOUNTER
Care Due:                  Date            Visit Type   Department     Provider  --------------------------------------------------------------------------------                                EP -                              PRIMARY      [facility]  Last Visit: 10-      CARE ([facility])   MEDICINE       Alba Baum  Next Visit: None Scheduled  None         None Found                                                            Last  Test          Frequency    Reason                     Performed    Due Date  --------------------------------------------------------------------------------    Office Visit  12 months..  meloxicam................  10-   10-    CBC.........  12 months..  meloxicam................  10-   09-    CMP.........  12 months..  famotidine, meloxicam,     10-   09-                             rosuvastatin.............    Lipid Panel.  12 months..  rosuvastatin.............  10-   09-    [facility] Embedded Care Due Messages. Reference number: [id number].   7/16/2025 7:00:33 PM CDT

## 2025-07-17 NOTE — TELEPHONE ENCOUNTER
He needs an appointment for annual exam with me around October or November of this year, please contact him to schedule, usual labs prior, thank you

## 2025-07-21 ENCOUNTER — OFFICE VISIT (OUTPATIENT)
Facility: CLINIC | Age: 70
End: 2025-07-21
Payer: MEDICARE

## 2025-07-21 ENCOUNTER — HOSPITAL ENCOUNTER (OUTPATIENT)
Dept: RADIOLOGY | Facility: HOSPITAL | Age: 70
Discharge: HOME | End: 2025-07-21
Attending: PHYSICIAN ASSISTANT
Payer: MEDICARE

## 2025-07-21 VITALS
WEIGHT: 150.88 LBS | BODY MASS INDEX: 21.65 KG/M2 | DIASTOLIC BLOOD PRESSURE: 85 MMHG | HEART RATE: 80 BPM | SYSTOLIC BLOOD PRESSURE: 133 MMHG

## 2025-07-21 DIAGNOSIS — M25.511 RIGHT SHOULDER PAIN, UNSPECIFIED CHRONICITY: ICD-10-CM

## 2025-07-21 DIAGNOSIS — M77.8 RIGHT SHOULDER TENDINITIS: Primary | ICD-10-CM

## 2025-07-21 PROCEDURE — 1159F MED LIST DOCD IN RCRD: CPT | Mod: CPTII,S$GLB,, | Performed by: PHYSICIAN ASSISTANT

## 2025-07-21 PROCEDURE — 99214 OFFICE O/P EST MOD 30 MIN: CPT | Mod: S$GLB,,, | Performed by: PHYSICIAN ASSISTANT

## 2025-07-21 PROCEDURE — 1125F AMNT PAIN NOTED PAIN PRSNT: CPT | Mod: CPTII,S$GLB,, | Performed by: PHYSICIAN ASSISTANT

## 2025-07-21 PROCEDURE — 3008F BODY MASS INDEX DOCD: CPT | Mod: CPTII,S$GLB,, | Performed by: PHYSICIAN ASSISTANT

## 2025-07-21 PROCEDURE — 1160F RVW MEDS BY RX/DR IN RCRD: CPT | Mod: CPTII,S$GLB,, | Performed by: PHYSICIAN ASSISTANT

## 2025-07-21 PROCEDURE — 73030 X-RAY EXAM OF SHOULDER: CPT | Mod: TC,RT

## 2025-07-21 PROCEDURE — 73030 X-RAY EXAM OF SHOULDER: CPT | Mod: 26,RT,, | Performed by: RADIOLOGY

## 2025-07-21 PROCEDURE — 3075F SYST BP GE 130 - 139MM HG: CPT | Mod: CPTII,S$GLB,, | Performed by: PHYSICIAN ASSISTANT

## 2025-07-21 PROCEDURE — 3079F DIAST BP 80-89 MM HG: CPT | Mod: CPTII,S$GLB,, | Performed by: PHYSICIAN ASSISTANT

## 2025-07-21 PROCEDURE — 3288F FALL RISK ASSESSMENT DOCD: CPT | Mod: CPTII,S$GLB,, | Performed by: PHYSICIAN ASSISTANT

## 2025-07-21 PROCEDURE — 1101F PT FALLS ASSESS-DOCD LE1/YR: CPT | Mod: CPTII,S$GLB,, | Performed by: PHYSICIAN ASSISTANT

## 2025-07-21 PROCEDURE — 99999 PR PBB SHADOW E&M-EST. PATIENT-LVL IV: CPT | Mod: PBBFAC,,, | Performed by: PHYSICIAN ASSISTANT

## 2025-07-21 NOTE — PROGRESS NOTES
Subjective:      Patient ID: Sabino Rubio is a 69 y.o. male.    Chief Complaint: Pain of the Right Shoulder      HPI:     History of Present Illness    CHIEF COMPLAINT:  - Right shoulder pain    HPI:  Sabino presents with right shoulder pain that began approximately 2 weeks ago after moving materials and loading lumber into a dumpster. Pain has progressively worsened since onset, with him reporting constant discomfort. He has trouble sleeping due to the pain. Pain is exacerbated by attempting to raise his right arm. He has difficulty lifting his right arm and is not using it much at work.    He recalls having injections in his left shoulder in 2017 for a previous issue. He mentions a history of a slight tear and an issue with the bicep in the past but is uncertain which shoulder was affected. Denies any neck pain or loss of sensation    He denies being diabetic.    PREVIOUS TREATMENTS:  - Injections in the left shoulder: 2017, provided some benefit      WORK STATUS:  - Works in construction for 45 years  - Still working but limiting use of right arm due to pain  - Started a new job recently  - Work involves physical activity such as loading lumber into a dumpster      ROS:  Constitutional: +sleep disturbances  Musculoskeletal: +joint pain, +limb pain, +pain with movement, +limited movement          PAST MEDICAL HISTORY:    Past Medical History:   Diagnosis Date    Allergy     Arthritis     Family history of malignant neoplasm of gastrointestinal tract mat.gf    Family history of prostate cancer: 1/2 brother 09/25/2017    GERD (gastroesophageal reflux disease)     Hyperlipidemia     Kidney cyst, acquired: R 1.2 cm 2015 09/25/2017    Restless leg syndrome     Tubulovillous adenoma 2013 due 2016 09/14/2015     PAST SURGICAL HISTORY:    Past Surgical History:   Procedure Laterality Date    APPLICATION OF BONE GRAFT TO FINGER Left 11/8/2021    Procedure: APPLICATION INTEGRA GRAFT, TO FINGER;  Surgeon: Alba JENNINGS  MD Isamar;  Location: [facility] OR;  Service: Orthopedics;  Laterality: Left;    CARPAL TUNNEL RELEASE      COLONOSCOPY N/A 5/22/2019    Procedure: COLONOSCOPY;  Surgeon: Juancarlos Foley MD;  Location: [facility] ENDO (4TH FLR);  Service: Endoscopy;  Laterality: N/A;    COLONOSCOPY N/A 10/12/2022    Procedure: COLONOSCOPY;  Surgeon: Cherelle Wang MD;  Location: [facility] ENDO (4TH FLR);  Service: Endoscopy;  Laterality: N/A;  vacc-inst portal-am prep-clears 4 hrs prior-tb    COLONOSCOPY N/A 5/30/2024    Procedure: COLONOSCOPY;  Surgeon: Nick Kramer MD;  Location: [facility] ENDO (4TH FLR);  Service: Endoscopy;  Laterality: N/A;  Prep instructions sent via portal-dw  Peg Prep-dw  5/22/24- LVM for precall - ERW  5/29-received message from pt's wife confirming appt-Kpvt    EXCISION OF GANGLION OF WRIST Right 6/28/2019    Procedure: EXCISION, GANGLION CYST, WRIST right;  Surgeon: Alba Penn MD;  Location: [facility] 1ST FLR;  Service: Orthopedics;  Laterality: Right;  regional MAC, stretcher, supine, hand pan 1 and 2,MINI C-arm, call Arthrex    INJECTION OF ANESTHETIC AGENT AROUND NERVE Bilateral 1/27/2022    Procedure: Block, Nerve MEDIAL BRANCH BLOCK BILATERAL L3,4,5  DIRECT REFERRAL 1 OF 2;  Surgeon: Kaiser Braxton MD;  Location: [facility] PAIN MGT;  Service: Pain Management;  Laterality: Bilateral;    INJECTION OF ANESTHETIC AGENT AROUND NERVE Bilateral 2/10/2022    Procedure: Block, Nerve MEDIAL BRANCH BLOCK BILATERAL L3.4.5  DIRECT REFERRAL 2 OF 2;  Surgeon: Kaiser Braxton MD;  Location: [facility] PAIN MGT;  Service: Pain Management;  Laterality: Bilateral;    INJECTION OF FACET JOINT Bilateral 6/6/2019    Procedure: INJECTION, FACET JOINT INJECTION (LUMBAR BLOCK) BILATERAL L4-L5 AND L5-S1 FACET INJECTIONS;  Surgeon: Kaiser Braxton MD;  Location: [facility] PAIN MGT;  Service: Pain Management;  Laterality: Bilateral;  NEEDS CONSENT    INJECTION, SACROILIAC JOINT Right 12/8/2022    Procedure: INJECTION,SACROILIAC JOINT RIGHT  CONTRAST;  Surgeon:  Kaiser Braxton MD;  Location: [facility] PAIN MGT;  Service: Pain Management;  Laterality: Right;    INTERPOSITION ARTHROPLASTY OF CARPOMETACARPAL JOINTS Right 6/28/2019    Procedure: INTERPOSITION ARTHROPLASTY, CMC JOINT right;  Surgeon: Alba Penn MD;  [facility];  Service: Orthopedics;  Laterality: Right;  regional MAC, stretcher, supine, hand pan 1 and 2,MINI C-arm, call Arthrex    IRRIGATION AND DEBRIDEMENT OF UPPER EXTREMITY Left 11/8/2021    Procedure: IRRIGATION AND DEBRIDEMENT, UPPER EXTREMITY, left index finger;  Surgeon: Alba Penn MD;  Location: [facility] OR;  Service: Orthopedics;  Laterality: Left;    RADIOFREQUENCY ABLATION Right 5/16/2022    Procedure: Radiofrequency Ablation RIGHT L3,4,5;  Surgeon: Kaiser Braxton MD;  Location: [facility] PAIN MGT;  Service: Pain Management;  Laterality: Right;    RADIOFREQUENCY ABLATION Left 6/2/2022    Procedure: Radiofrequency Ablation LEFT L3,4,5;  Surgeon: Kaiser Braxton MD;  Location: [facility] PAIN MGT;  Service: Pain Management;  Laterality: Left;    RADIOFREQUENCY ABLATION Right 7/17/2023    Procedure: RADIOFREQUENCY ABLATION, RIGHT L3,L4,L5 MEDIAL BRANCH ONE OF TWO;  Surgeon: Kaiser Braxton MD;  Location: [facility] PAIN MGT;  Service: Pain Management;  Laterality: Right;    RADIOFREQUENCY ABLATION Left 9/11/2023    Procedure: RADIOFREQUENCY ABLATION, LEFT L3,L4,L5 MEDIAL BRANCH TWO OF TWO;  Surgeon: Kaiser Braxton MD;  Location: [facility] PAIN MGT;  Service: Pain Management;  Laterality: Left;    RADIOFREQUENCY ABLATION Bilateral 9/30/2024    Procedure: RADIOFREQUENCY ABLATION BILATERAL L3, 4, 5;  Surgeon: Kaiser Braxton MD;  Location: [facility] PAIN MGT;  Service: Pain Management;  Laterality: Bilateral;  [phone]  3 WK F/U CLAUDIA    REPAIR OF NAIL BED Left 11/8/2021    Procedure: REPAIR, NAIL BED, left index;  Surgeon: Alba Penn MD;  Location: [facility] OR;  Service: Orthopedics;  Laterality: Left;    SPERMATOCELECTOMY Right 11/8/2019    Procedure:  EXCISION, SPERMATOCELE;  Surgeon: Sheldon Araya Jr., MD;  [facility];  Service: Urology;  Laterality: Right;  1hr    TRANSFORAMINAL EPIDURAL INJECTION OF STEROID Bilateral 10/27/2022    Procedure: INJECTION, STEROID, EPIDURAL, TRANSFORAMINAL APPROACH, BILATERAL L5-S1 CONTRAST;  Surgeon: Kaiser Braxton MD;  Location: [facility] MGT;  Service: Pain Management;  Laterality: Bilateral;     FAMILY HISTORY:    Family History   Problem Relation Name Age of Onset    Arthritis Mother          probable R.A.    Cancer Father Smoker         esophageal ca and brain tumor    Esophageal cancer Father Smoker     Melanoma Father Smoker     Melanoma Sister      Irritable bowel syndrome Sister      Arthritis Sister      Arthritis Sister          rheumatoid    Cancer Sister          skin    Cancer Brother 1/2         pancreatic cancer    No Known Problems Brother      No Known Problems Brother      Cancer Maternal Grandfather          colon    Colon cancer Maternal Grandfather      No Known Problems Son Jimmie     No Known Problems Joni Knapp     Diabetes Neg Hx      Heart disease Neg Hx      Cirrhosis Neg Hx      Celiac disease Neg Hx      Crohn's disease Neg Hx      Ulcerative colitis Neg Hx      Stomach cancer Neg Hx      Rectal cancer Neg Hx      Liver cancer Neg Hx      Psoriasis Neg Hx      Lupus Neg Hx       SOCIAL HISTORY:    Social History     Occupational History    Not on file   Tobacco Use    Smoking status: Never    Smokeless tobacco: Never    Tobacco comments:     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   Substance and Sexual Activity    Alcohol use: Yes     Alcohol/week: 0.0 standard drinks of alcohol     Comment: 2-3 days weekly, up to 2 beers    Drug use: No    Sexual activity: Yes     Partners: Female        MEDICATIONS: Current Medications[1]  ALLERGIES: Review of patient's allergies indicates:  No Known Allergies    Review of  Systems:  Constitution: Negative for chills, fever and night sweats.   HENT: Negative for congestion and headaches.    Eyes: Negative for blurred vision or vision loss.  Cardiovascular: Negative for chest pain and syncope.   Respiratory: Negative for cough and shortness of breath.    Endocrine: Negative for polydipsia, polyphagia and polyuria.   Hematologic/Lymphatic: Negative for bleeding problem. Does not bruise/bleed easily.   Skin: Negative for dry skin, itching and rash.   Musculoskeletal: See HPI.   Gastrointestinal: Negative for abdominal pain and bowel incontinence.   Genitourinary: Negative for bladder incontinence and nocturia.   Neurological: Negative for disturbances in coordination, loss of balance and seizures.   Psychiatric/Behavioral: Negative for depression. The patient does not have insomnia.    Allergic/Immunologic: Negative for hives and persistent infections.          Objective:        Vitals:    07/21/25 1049   BP: 133/85   Pulse: 80       PHYSICAL EXAM:  General: Alert & oriented x3, well-developed and well-nourished, in no acute distress, sitting comfortably in the exam room.  Skin: Warm and dry. Capillary refill less than 2 seconds.   Head: Normocephalic and atraumatic.   Eyes: Sclera appear normal.   Nose: No deformities seen.   Ears: No deformities seen.   Neck: No tracheal deviation present.   Pulmonary/Chest: Breathing unlabored.   Neurological: Alert and oriented to person, place, and time.   Psychiatric: Mood is pleasant and affect appropriate.     RIGHT SHOULDER     OBSERVATION:     Swelling  none  Deformity  none   Discoloration  none   Scapular winging none   Scars   none  Atrophy  none    TENDERNESS                Clavicle   Negative         AC Jt.    Negative        SC Jt.    Negative          Acromion:  Negative        Scapular Spine Negative   Supraspinatus  Negative       Infraspinatus  Negative   LH Biceps   Negative   Greater Tub.  Negative   Trapezius  Negative   Cervical  spine  Negative        ROM: (* = with pain)              FE    170°       ER at 0°    60°        ER at 90° ABD  90°       IR at 90°  ABD   NA         IR (spine level)   T10         STRENGTH: (* = with pain)    SCAPTION   5/5        IR    5/5       ER    5/5       BICEPS   5/5       Deltoid    5/5         SPECIAL TESTS: Painful side   Empty can test  Positive  Full can test   ,ildly positive  Resisted internal rotation Negative  Resisted external rotation Positive  Neer's test   Positive  William'-Alec test Positive  Drop Arm                                Negative  Cross Body Adduction            Negative  Marquette's                                 Positive  Speed's                                  Positive  Lift Off                                     Positive  Belly Press   Negative           STABILITY TESTING        Translation       Anterior  Normal      Posterior  Normal     Sulcus   < 10mm     Signs    Apprehension   neg   Relocation   no change        Jerk test  neg            Neurovascular Exam Bilateral UEs:  Sensation intact to light touch in the distal median, radial, and ulnar nerve distributions bilaterally.  Capillary refill intact <2 seconds in all digits bilaterally     NECK:  Painless FROM and spinous processes non-tender. Negative Spurlings sign.      Imaging:   X-Rays Right shoulder shows no acute fracture or dislocation--small calcification just superior to glenoid        Assessment:       1. Right shoulder tendinitis    2. Right shoulder pain, unspecified chronicity          Plan:       Orders Placed This Encounter    X-ray Shoulder 2 or More Views Right    MRI Shoulder Without Contrast Right    Ambulatory referral/consult to Orthopedics       Left Shoulder possible rotator cuff tear  =MRI Right shoulder to r/o RTC tear  =F/U with Dr Patel s/p MRI  =Patient wishes to hold off on doing a steroid injection at this time  =Patient defers Physical Therapy at this time      All questions were  answered and patient is agreeable to the above plan. The patient will contact us if they have any questions or concerns in the interim.      aR Michael PA-C  [facility]    This note was generated with the assistance of ambient listening technology. Verbal consent was obtained by the patient and accompanying visitor(s) for the recording of patient appointment to facilitate this note. I attest to having reviewed and edited the generated note for accuracy, though some syntax or spelling errors may persist. Please contact the author of this note for any clarification.               [1]   Current Outpatient Medications:     aspirin (ECOTRIN) 81 MG EC tablet, Take 1 tablet (81 mg total) by mouth once daily., Disp: 30 tablet, Rfl: 12    ciprofloxacin HCl (CIPRO) 500 MG tablet, Take 1 tablet (500 mg total) by mouth 2 (two) times daily., Disp: 20 tablet, Rfl: 0    cyclobenzaprine (FLEXERIL) 10 MG tablet, Take 1 tablet (10 mg total) by mouth 3 (three) times daily as needed for Muscle spasms. for ten days, Disp: 30 tablet, Rfl: 1    doxycycline (MONODOX) 100 MG capsule, Take 1 pill by mouth twice daily with food and water, Disp: 14 capsule, Rfl: 0    famotidine (PEPCID) 20 MG tablet, TAKE 1 TABLET BY MOUTH TWICE A DAY, Disp: 180 tablet, Rfl: 3    fluticasone propionate (FLONASE) 50 mcg/actuation nasal spray, SPRAY 1 SPRAY (50 MCG TOTAL) INTO INTO EACH NOSTRIL TWICE A DAY, Disp: 48 g, Rfl: 3    gabapentin (NEURONTIN) 300 MG capsule, Take 1 capsule (300 mg total) by mouth 3 (three) times daily., Disp: 270 capsule, Rfl: 1    meloxicam (MOBIC) 15 MG tablet, Take 1 tablet (15 mg total) by mouth daily as needed for Pain (Take with food or a meal)., Disp: 90 tablet, Rfl: 0    metroNIDAZOLE (FLAGYL) 500 MG tablet, Take 1 tablet (500 mg total) by mouth every 8 (eight) hours. No alcohol with this, Disp: 30 tablet, Rfl: 0    mupirocin (BACTROBAN) 2 % ointment, Apply topically 2 (two) times daily., Disp: 30  g, Rfl: 0    rOPINIRole (REQUIP) 1 MG tablet, Take 1 tablet (1 mg total) by mouth every evening., Disp: 30 tablet, Rfl: 11    rosuvastatin (CRESTOR) 10 MG tablet, TAKE 1 TABLET BY MOUTH EVERY DAY, Disp: 90 tablet, Rfl: 2    sildenafiL (VIAGRA) 100 MG tablet, Take 100 mg by mouth daily as needed for Erectile Dysfunction., Disp: , Rfl:     tamsulosin (FLOMAX) 0.4 mg Cap, TAKE 1 CAPSULE BY MOUTH EVERY DAY, Disp: 90 capsule, Rfl: 3  No current facility-administered medications for this visit.    Facility-Administered Medications Ordered in Other Visits:     0.9%  NaCl infusion, , Intravenous, Continuous, Cole Fitzpatrick, DO

## 2025-07-23 ENCOUNTER — HOSPITAL ENCOUNTER (OUTPATIENT)
Dept: RADIOLOGY | Facility: HOSPITAL | Age: 70
Discharge: HOME | End: 2025-07-23
Attending: PHYSICIAN ASSISTANT
Payer: MEDICARE

## 2025-07-23 DIAGNOSIS — M77.8 RIGHT SHOULDER TENDINITIS: ICD-10-CM

## 2025-07-23 PROCEDURE — 73221 MRI JOINT UPR EXTREM W/O DYE: CPT | Mod: TC,RT

## 2025-07-23 PROCEDURE — 73221 MRI JOINT UPR EXTREM W/O DYE: CPT | Mod: 26,RT,, | Performed by: RADIOLOGY

## 2025-07-24 ENCOUNTER — OFFICE VISIT (OUTPATIENT)
Dept: SPORTS MEDICINE | Facility: CLINIC | Age: 70
End: 2025-07-24
Payer: MEDICARE

## 2025-07-24 VITALS
BODY MASS INDEX: 21.94 KG/M2 | HEIGHT: 70 IN | WEIGHT: 153.25 LBS | HEART RATE: 85 BPM | SYSTOLIC BLOOD PRESSURE: 118 MMHG | DIASTOLIC BLOOD PRESSURE: 71 MMHG

## 2025-07-24 DIAGNOSIS — M19.011 OSTEOARTHRITIS OF GLENOHUMERAL JOINT, RIGHT: ICD-10-CM

## 2025-07-24 DIAGNOSIS — M19.011 OSTEOARTHRITIS OF RIGHT AC (ACROMIOCLAVICULAR) JOINT: ICD-10-CM

## 2025-07-24 DIAGNOSIS — M75.41 SUBACROMIAL IMPINGEMENT OF RIGHT SHOULDER: ICD-10-CM

## 2025-07-24 DIAGNOSIS — M75.111 PARTIAL NONTRAUMATIC RUPTURE OF RIGHT ROTATOR CUFF: ICD-10-CM

## 2025-07-24 DIAGNOSIS — M75.51 SUBACROMIAL BURSITIS OF RIGHT SHOULDER JOINT: ICD-10-CM

## 2025-07-24 DIAGNOSIS — M25.511 ACUTE PAIN OF RIGHT SHOULDER: Primary | ICD-10-CM

## 2025-07-24 DIAGNOSIS — S46.219A BICEPS TENDON TEAR: ICD-10-CM

## 2025-07-24 PROCEDURE — 99999 PR PBB SHADOW E&M-EST. PATIENT-LVL IV: CPT | Mod: PBBFAC,,, | Performed by: PHYSICIAN ASSISTANT

## 2025-07-24 RX ORDER — BUPIVACAINE HYDROCHLORIDE 2.5 MG/ML
2 INJECTION, SOLUTION INFILTRATION; PERINEURAL
Status: DISCONTINUED | OUTPATIENT
Start: 2025-07-24 | End: 2025-07-24 | Stop reason: HOSPADM

## 2025-07-24 RX ORDER — METHYLPREDNISOLONE ACETATE 40 MG/ML
40 INJECTION, SUSPENSION INTRA-ARTICULAR; INTRALESIONAL; INTRAMUSCULAR; SOFT TISSUE
Status: DISCONTINUED | OUTPATIENT
Start: 2025-07-24 | End: 2025-07-24 | Stop reason: HOSPADM

## 2025-07-24 RX ADMIN — METHYLPREDNISOLONE ACETATE 40 MG: 40 INJECTION, SUSPENSION INTRA-ARTICULAR; INTRALESIONAL; INTRAMUSCULAR; SOFT TISSUE at 02:07

## 2025-07-24 RX ADMIN — BUPIVACAINE HYDROCHLORIDE 2 ML: 2.5 INJECTION, SOLUTION INFILTRATION; PERINEURAL at 02:07

## 2025-07-24 NOTE — PROGRESS NOTES
NEW PATIENT    HISTORY OF PRESENT ILLNESS   History of Present Illness    CHIEF COMPLAINT:  - Right shoulder pain    HPI:  Sabino presents with right shoulder pain that began approximately two weeks ago after loading a 20-yard dumpster with lumber. He denies a specific incident of injury or feeling a pop, but notes the pain developed shortly after the strenuous activity. Pain interferes with his sleep. He has a history of left shoulder issues, for which he has previously seen Dr. Patel and Olga. He received a shot in the left shoulder in the past, which seemed to help, and now only has occasional discomfort in that shoulder.    He reports chronic pain, particularly back issues, stating he experiences constant pain. Last fall, he underwent PT for his back for about 12 weeks, which initially helped. However, after joining a gym and working out, he pulled a muscle and his back pain returned to its previous state.    He denies any previous problems with his right shoulder before this incident.    PREVIOUS TREATMENTS:  - Corticosteroid injection in the left shoulder: Received in the past, provided relief  - PT for back issues: Approximately 12 weeks at [facility] last fall, provided some relief    WORK STATUS:  - Work involves physical labor  - Loaded a 20-yard dumpster with lumber two weeks ago, leading to shoulder pain  - Chronic back pain may affect work capabilities               PAST MEDICAL HISTORY    Past Medical History:   Diagnosis Date    Allergy     Arthritis     Family history of malignant neoplasm of gastrointestinal tract mat.gf    Family history of prostate cancer: 1/2 brother 09/25/2017    GERD (gastroesophageal reflux disease)     Hyperlipidemia     Kidney cyst, acquired: R 1.2 cm 2015 09/25/2017    Restless leg syndrome     Tubulovillous adenoma 2013 due 2016 09/14/2015       PAST SURGICAL HISTORY     Past Surgical History:   Procedure Laterality Date    APPLICATION OF BONE GRAFT TO FINGER Left  11/8/2021    Procedure: APPLICATION INTEGRA GRAFT, TO FINGER;  Surgeon: Alba Penn MD;  Location: [facility];  Service: Orthopedics;  Laterality: Left;    CARPAL TUNNEL RELEASE      COLONOSCOPY N/A 5/22/2019    Procedure: COLONOSCOPY;  Surgeon: Juancarlos Foley MD;  Location: [facility] (4TH FLR);  Service: Endoscopy;  Laterality: N/A;    COLONOSCOPY N/A 10/12/2022    Procedure: COLONOSCOPY;  Surgeon: Cherelle Wang MD;  Location: [facility] (4TH FLR);  Service: Endoscopy;  Laterality: N/A;  vacc-inst portal-am prep-clears 4 hrs prior-tb    COLONOSCOPY N/A 5/30/2024    Procedure: COLONOSCOPY;  Surgeon: Nick Kramer MD;  Location: [facility] (4TH FLR);  Service: Endoscopy;  Laterality: N/A;  Prep instructions sent via portal-dw  Peg Prep-dw  5/22/24- LVM for precall - ERW  5/29-received message from pt's wife confirming appt-Kpvt    EXCISION OF GANGLION OF WRIST Right 6/28/2019    Procedure: EXCISION, GANGLION CYST, WRIST right;  Surgeon: Alba Penn MD;  Location: [facility] 1ST FLR;  Service: Orthopedics;  Laterality: Right;  regional MAC, stretcher, supine, hand pan 1 and 2,MINI C-arm, call Arthrex    INJECTION OF ANESTHETIC AGENT AROUND NERVE Bilateral 1/27/2022    Procedure: Block, Nerve MEDIAL BRANCH BLOCK BILATERAL L3,4,5  DIRECT REFERRAL 1 OF 2;  Surgeon: Kaiser Braxton MD;  Location: [facility] PAIN MGT;  Service: Pain Management;  Laterality: Bilateral;    INJECTION OF ANESTHETIC AGENT AROUND NERVE Bilateral 2/10/2022    Procedure: Block, Nerve MEDIAL BRANCH BLOCK BILATERAL L3.4.5  DIRECT REFERRAL 2 OF 2;  Surgeon: Kaiser Braxton MD;  Location: [facility] PAIN MGT;  Service: Pain Management;  Laterality: Bilateral;    INJECTION OF FACET JOINT Bilateral 6/6/2019    Procedure: INJECTION, FACET JOINT INJECTION (LUMBAR BLOCK) BILATERAL L4-L5 AND L5-S1 FACET INJECTIONS;  Surgeon: Kaiser Braxton MD;  Location: [facility] PAIN [facility]GT;  Service: Pain Management;  Laterality: Bilateral;  NEEDS CONSENT    INJECTION, SACROILIAC JOINT  Right 12/8/2022    Procedure: INJECTION,SACROILIAC JOINT RIGHT  CONTRAST;  Surgeon: Kaiser Braxton MD;  Location: [facility]IN [facility]GT;  Service: Pain Management;  Laterality: Right;    INTERPOSITION ARTHROPLASTY OF CARPOMETACARPAL JOINTS Right 6/28/2019    Procedure: INTERPOSITION ARTHROPLASTY, CMC JOINT right;  Surgeon: Alba Penn MD;  Location: [facility] OR 1ST FLR;  Service: Orthopedics;  Laterality: Right;  regional MAC, stretcher, supine, hand pan 1 and 2,MINI C-arm, call Arthrex    IRRIGATION AND DEBRIDEMENT OF UPPER EXTREMITY Left 11/8/2021    Procedure: IRRIGATION AND DEBRIDEMENT, UPPER EXTREMITY, left index finger;  Surgeon: Alba Penn MD;  Location: [facility];  Service: Orthopedics;  Laterality: Left;    RADIOFREQUENCY ABLATION Right 5/16/2022    Procedure: Radiofrequency Ablation RIGHT L3,4,5;  Surgeon: Kaiser Braxton MD;  Location: [facility];  Service: Pain Management;  Laterality: Right;    RADIOFREQUENCY ABLATION Left 6/2/2022    Procedure: Radiofrequency Ablation LEFT L3,4,5;  Surgeon: Kaiser Braxton MD;  Location: [facility]IN [facility]GT;  Service: Pain Management;  Laterality: Left;    RADIOFREQUENCY ABLATION Right 7/17/2023    Procedure: RADIOFREQUENCY ABLATION, RIGHT L3,L4,L5 MEDIAL BRANCH ONE OF TWO;  Surgeon: Kaiser Braxton MD;  Location: [facility];  Service: Pain Management;  Laterality: Right;    RADIOFREQUENCY ABLATION Left 9/11/2023    Procedure: RADIOFREQUENCY ABLATION, LEFT L3,L4,L5 MEDIAL BRANCH TWO OF TWO;  Surgeon: Kaiser Braxton MD;  Location: [facility] PAIN MGT;  Service: Pain Management;  Laterality: Left;    RADIOFREQUENCY ABLATION Bilateral 9/30/2024    Procedure: RADIOFREQUENCY ABLATION BILATERAL L3, 4, 5;  Surgeon: Kaiser Braxton MD;  Location: [facility] PAIN [facility]GT;  Service: Pain Management;  Laterality: Bilateral;  [phone]  3 WK F/U CLAUDIA    REPAIR OF NAIL BED Left 11/8/2021    Procedure: REPAIR, NAIL BED, left index;  Surgeon: Alba Penn MD;  Location: [facility] OR;  Service:  Orthopedics;  Laterality: Left;    SPERMATOCELECTOMY Right 11/8/2019    Procedure: EXCISION, SPERMATOCELE;  Surgeon: Sheldon Araya Jr., MD;  Location: [facility] OR [address] [place] [address];  Service: Urology;  Laterality: Right;  1hr    TRANSFORAMINAL EPIDURAL INJECTION OF STEROID Bilateral 10/27/2022    Procedure: INJECTION, STEROID, EPIDURAL, TRANSFORAMINAL APPROACH, BILATERAL L5-S1 CONTRAST;  Surgeon: Kaiser Braxton MD;  Location: [facility];  Service: Pain Management;  Laterality: Bilateral;       FAMILY HISTORY    Family History   Problem Relation Name Age of Onset    Arthritis Mother          probable R.A.    Cancer Father Smoker         esophageal ca and brain tumor    Esophageal cancer Father Smoker     Melanoma Father Smoker     Melanoma Sister      Irritable bowel syndrome Sister      Arthritis Sister      Arthritis Sister          rheumatoid    Cancer Sister          skin    Cancer Brother 1/2         pancreatic cancer    No Known Problems Brother      No Known Problems Brother      Cancer Maternal Grandfather          colon    Colon cancer Maternal Grandfather      No Known Problems Son Jimmie     No Known Problems Joni Knapp     Diabetes Neg Hx      Heart disease Neg Hx      Cirrhosis Neg Hx      Celiac disease Neg Hx      Crohn's disease Neg Hx      Ulcerative colitis Neg Hx      Stomach cancer Neg Hx      Rectal cancer Neg Hx      Liver cancer Neg Hx      Psoriasis Neg Hx      Lupus Neg Hx         SOCIAL HISTORY    Social History[1]    MEDICATIONS    Current Medications[2]    ALLERGIES     Review of patient's allergies indicates:  No Known Allergies      REVIEW OF SYSTEMS   Constitution: Negative. Negative for chills, fever and night sweats.   HENT: Negative for congestion and headaches.    Eyes: Negative for blurred vision, left vision loss and right vision loss.   Cardiovascular: Negative for chest pain and syncope.   Respiratory: Negative for cough and shortness of breath.    Endocrine: Negative for polydipsia,  "polyphagia and polyuria.   Hematologic/Lymphatic: Negative for bleeding problem. Does not bruise/bleed easily.   Skin: Negative for dry skin, itching and rash.   Musculoskeletal: Negative for falls. Positive for right shoulder pain.  Gastrointestinal: Negative for abdominal pain and bowel incontinence.   Genitourinary: Negative for bladder incontinence and nocturia.   Neurological: Negative for disturbances in coordination, loss of balance and seizures.   Psychiatric/Behavioral: Negative for depression. The patient does not have insomnia.    Allergic/Immunologic: Negative for hives and persistent infections.     PHYSICAL EXAMINATION    Vitals: /71   Pulse 85   Ht 5' 10" (1.778 m)   Wt 69.5 kg (153 lb 3.5 oz)   BMI 21.98 kg/m²     General: The patient appears active and healthy with no apparent physical problems.  No disturbance of mood or affect is demonstrated. Alert and Oriented.    HEENT: Eyes normal, pupils equally round, nose normal.    Resp: Equal and symmetrical chest rises. No wheezing    CV: Regular rate    Neck: Supple; nonpainful range of motion.    Extremities: no cyanosis, clubbing, edema, or diffuse swelling.  Palpable pulses, good capillary refill of the digits.  No coolness, discoloration, edema or obvious varicosities.    Skin: no lesions noted.    Lymphatic: no detected adenopathy in the upper or lower extremities.    Neurologic: normal mental status, normal reflexes, normal gait and balance.  Patient is alert and oriented to person, place and time.  No flaccidity or spasticity is noted.  No motor or sensory deficits are noted.  Light touch is intact    Orthopaedic: SHOULDER EXAM - RIGHT    Inspection:   Normal skin color and appearance with no scars.  No muscle atrophy noted.  No scapular winging.      Palpation: No tenderness of the acromioclavicular joint, lateral edge of the acromion, biceps tendon, trapezius muscle or scapulothoracic bursa.      ROM:      PROM:     FE - 120°    " Abd/ER -  450°  Abd/IR -  30°   Add/ER -  45°         Tests:     + William, + Neer's, - Cross Arm Adduction, - Wood, - Yerguson, - Speed. - Belly Press,  + Jobes, - Lift Off    Stability: - sulcus, - apprehension, - relocation, - load and shift, - DLS      Motor:  Rotator cuff strength is 5/5 supraspinatus, 5/5 infraspinatus, 5/5 subscapularis. Biceps, triceps and deltoid strength is 5/5.      Neuro     Distally there are no paresthesias, and sensation is intact to light touch in the median, ulnar, and radial distributions.  Reflexes are 2/2 biceps, triceps and brachioradialis.        IMAGING    MRI Shoulder Without Contrast Right  Narrative: EXAMINATION:  MRI SHOULDER WITHOUT CONTRAST RIGHT    CLINICAL HISTORY:  possible rotator cuff tear;  Other enthesopathies, not elsewhere classified    TECHNIQUE:  MRI right shoulder performed without contrast per routine protocol.    COMPARISON:  Shoulder radiograph 07/21/2025    FINDINGS:  Rotator cuff: Supraspinatus tendon is thickened with abnormal signal consistent with tendinosis with a high-grade partial tear of the conjoined supra and infraspinatus tendon area of the footprint, about 18 mm transverse diameter.  No tendon retraction.  There is tendinosis of the residual infraspinatus tendon.  Tendinosis with partial-thickness tear of the articular side of the subscapularis tendon.  Teres minor tendon is intact.  Muscle bulk is maintained.    Labrum: Diffusely degenerated/frayed.    Biceps: Longitudinal biceps tendon tear with high-grade tendinosis of the intra-articular portion.  Tendons sheath fluid along the bicipital groove compatible with synovitis.    Bone: Subcortical subcentimeter cystic change in the superolateral humeral head, degenerative.  There is no fracture.  Bone marrow signal is unremarkable.    Acromioclavicular joint: Advanced AC joint arthropathy with significant osseous hypertrophy and reactive edema..    Cartilage: Partial-thickness chondral  fissuring overlying the glenoid.  No underlying bone marrow edema.    Miscellaneous: Small volume joint effusion/synovitis. Trace subacromial subdeltoid bursal fluid.  Impression: Supraspinatus tendinosis with high-grade partial tear of the conjoined supra and infraspinatus tendon at the footprint.    Tendinosis of the residual infraspinatus tendon.    Superior subscapularis tendinosis with high-grade partial-thickness, articular surface tear.    High-grade biceps tendinosis with longitudinal tear.    Moderate glenohumeral joint osteoarthritis.    Advanced AC joint arthropathy.    Electronically signed by resident: Arturo Gonzalez  Date:    07/23/2025  Time:    11:33    Electronically signed by: Shabnam Chavez MD  Date:    07/23/2025  Time:    15:31    X-ray Shoulder 2 or More Views Right  Order: [id number]   Status: Final result       Next appt: 10/14/2025 at 08:00 AM in Lab ([facility], [facility])       Dx: Right shoulder pain, unspecified ...    Test Result Released: Yes (seen)    0 Result Notes  Details    Reading Physician Reading Date Result Priority   Juan Lee III, MD  [phone]  [phone]  7/21/2025 Routine     Narrative & Impression  EXAMINATION:  XR SHOULDER COMPLETE 2 OR MORE VIEWS RIGHT     CLINICAL HISTORY:  Pain in right shoulder     FINDINGS:  Shoulder complete three views right.     There is DJD of the AC joint.  No fracture, dislocation, or bone destruction seen.  No acute trauma seen.        Electronically signed by:Juan Lee MD  Date:                                            07/21/2025  Time:                                           11:14        Exam Ended: 07/21/25 11:05 CDT Last Resulted: 07/21/25 11:14 CDT       IMPRESSION       ICD-10-CM ICD-9-CM   1. Acute pain of right shoulder  M25.511 719.41   2. Partial nontraumatic rupture of right rotator cuff  M75.111 726.13   3. Biceps tendon tear  S46.219A 840.8   4. Osteoarthritis of right AC (acromioclavicular) joint   M19.011 715.91   5. Osteoarthritis of glenohumeral joint, right  M19.011 715.91   6. Subacromial impingement of right shoulder  M75.41 726.19   7. Subacromial bursitis of right shoulder joint  M75.51 726.19       MEDICATIONS PRESCRIBED      None today    RECOMMENDATIONS     MRI images and report have been reviewed and discussed with the patient in depth today; high-grade partial-thickness rotator cuff tear with underlying glenohumeral joint osteoarthritis, interstitial split tear of the biceps tendon with surrounding fluid in the bicipital groove, acromioclavicular joint osteoarthritis    Assessment & Plan    PLAN SUMMARY:  - Referred to PT for shoulder strengthening exercises  - Corticosteroid injection administered in right shoulder  - Discussed potential future surgery options if conservative treatment fails  - Follow-up in 1 month    PROCEDURES:  - Performed corticosteroid injection in the right shoulder after discussing treatment options.  - Discussed potential future surgery to repair the bicep, repair the rotator cuff, and clean up damaged cartilage if conservative treatment fails.  - Explained risks of steroid injection, including inability to perform surgery for 3 months post-injection if needed.    REFERRALS:  - Referred to PT for shoulder strengthening exercises.    FOLLOW UP:  - Follow up in 1 month.         Sports Medicine US - Guidance for Needle Placement    Date/Time: 7/24/2025 2:30 PM    Performed by: Kike Amato PA-C  Authorized by: Kike Amato PA-C  Preparation: Patient was prepped and draped in the usual sterile fashion.  Local anesthesia used: yes    Anesthesia:  Local anesthesia used: yes  Local Anesthetic: co-phenylcaine spray    Sedation:  Patient sedated: no    Patient tolerance: patient tolerated the procedure well with no immediate complications      Large Joint Aspiration/Injection: R subacromial bursa    Date/Time: 7/24/2025 2:30 PM    Performed by: Kike Amato,  STEPHANIE  Authorized by: Kike Amato PA-C    Site marked: the procedure site was marked    Timeout: prior to procedure the correct patient, procedure, and site was verified    Prep: patient was prepped and draped in usual sterile fashion      Local anesthesia used?: Yes    Local anesthetic:  Co-phenylcaine spray    Details:  Needle Size:  22 G  Ultrasonic Guidance for needle placement?: Yes (Ultrasound guidance was used for needle localization.  Images were saved and stored for documentation.  Dynamic visualization of the needle was continuous throughout the procedure and maintained accurate placement)    Images are saved and documented.  Approach:  Lateral  Location:  Shoulder  Site:  R subacromial bursa  Medications:  40 mg methylPREDNISolone acetate 40 mg/mL; 2 mL BUPivacaine 0.25% (2.5 mg/ml) 0.25 % (2.5 mg/mL)  Patient tolerance:  Patient tolerated the procedure well with no immediate complications        All of their questions were answered.  They will call the clinic with any questions or concerns in the interim.     Should the patient's symptoms worsen, persist, or fail to improve they should return for reevaluation and I would be happy to see them back anytime.                  Kike Amato PA-C       Please be aware that this note has been generated with the assistance of [facility] voice-to-text.  Please excuse any spelling or grammatical errors.     Thank you for choosing Kike Amato PA-C for your sports medicine care. It is our goal to provide you with exceptional care that will help keep you healthy, active, and get you back in the game.     If you felt that you received exemplary care today, please consider leaving feedback for Mr. Lm PA-C on [employer] at [email or web address]       Please do not hesitate to reach out to us via email, phone, or MyChart with any questions, concerns, or feedback.       This note was generated with the assistance of [facility]  listening technology. Verbal consent was obtained by the patient and accompanying visitor(s) for the recording of patient appointment to facilitate this note. I attest to having reviewed and edited the generated note for accuracy, though some syntax or spelling errors may persist. Please contact the author of this note for any clarification.           [1]   Social History  Socioeconomic History    Marital status: Single    Number of children: 2   Tobacco Use    Smoking status: Never    Smokeless tobacco: Never    Tobacco comments:     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   Substance and Sexual Activity    Alcohol use: Yes     Alcohol/week: 0.0 standard drinks of alcohol     Comment: 2-3 days weekly, up to 2 beers    Drug use: No    Sexual activity: Yes     Partners: Female     Social Drivers of Health     Financial Resource Strain: Low Risk  (7/20/2025)    Overall Financial Resource Strain (CARDIA)     Difficulty of Paying Living Expenses: Not hard at all   Food Insecurity: No Food Insecurity (7/20/2025)    Hunger Vital Sign     Worried About Running Out of Food in the Last Year: Never true     Ran Out of Food in the Last Year: Never true   Transportation Needs: No Transportation Needs (7/20/2025)    PRAPARE - Transportation     Lack of Transportation (Medical): No     Lack of Transportation (Non-Medical): No   Physical Activity: Sufficiently Active (7/20/2025)    Exercise Vital Sign     Days of Exercise per Week: 4 days     Minutes of Exercise per Session: 50 min   Stress: No Stress Concern Present (7/20/2025)    [place] [employer] - Occupational Stress Questionnaire     Feeling of Stress : Not at all   Housing Stability: Low Risk  (7/20/2025)    Housing Stability Vital Sign     Unable to Pay for Housing in the Last Year: No     Number of Times Moved in the Last Year: 0     Homeless in the Last Year: No   [2]    Current Outpatient Medications:     aspirin (ECOTRIN) 81 MG EC tablet, Take 1 tablet (81 mg total) by mouth once daily., Disp: 30 tablet, Rfl: 12    ciprofloxacin HCl (CIPRO) 500 MG tablet, Take 1 tablet (500 mg total) by mouth 2 (two) times daily., Disp: 20 tablet, Rfl: 0    cyclobenzaprine (FLEXERIL) 10 MG tablet, Take 1 tablet (10 mg total) by mouth 3 (three) times daily as needed for Muscle spasms. for ten days, Disp: 30 tablet, Rfl: 1    doxycycline (MONODOX) 100 MG capsule, Take 1 pill by mouth twice daily with food and water, Disp: 14 capsule, Rfl: 0    famotidine (PEPCID) 20 MG tablet, TAKE 1 TABLET BY MOUTH TWICE A DAY, Disp: 180 tablet, Rfl: 3    fluticasone propionate (FLONASE) 50 mcg/actuation nasal spray, SPRAY 1 SPRAY (50 MCG TOTAL) INTO INTO EACH NOSTRIL TWICE A DAY, Disp: 48 g, Rfl: 3    gabapentin (NEURONTIN) 300 MG capsule, Take 1 capsule (300 mg total) by mouth 3 (three) times daily., Disp: 270 capsule, Rfl: 1    meloxicam (MOBIC) 15 MG tablet, Take 1 tablet (15 mg total) by mouth daily as needed for Pain (Take with food or a meal)., Disp: 90 tablet, Rfl: 0    metroNIDAZOLE (FLAGYL) 500 MG tablet, Take 1 tablet (500 mg total) by mouth every 8 (eight) hours. No alcohol with this, Disp: 30 tablet, Rfl: 0    mupirocin (BACTROBAN) 2 % ointment, Apply topically 2 (two) times daily., Disp: 30 g, Rfl: 0    rOPINIRole (REQUIP) 1 MG tablet, Take 1 tablet (1 mg total) by mouth every evening., Disp: 30 tablet, Rfl: 11    rosuvastatin (CRESTOR) 10 MG tablet, TAKE 1 TABLET BY MOUTH EVERY DAY, Disp: 90 tablet, Rfl: 2    sildenafiL (VIAGRA) 100 MG tablet, Take 100 mg by mouth daily as needed for Erectile Dysfunction., Disp: , Rfl:     tamsulosin (FLOMAX) 0.4 mg Cap, TAKE 1 CAPSULE BY MOUTH EVERY DAY, Disp: 90 capsule, Rfl: 3  No current facility-administered medications for this visit.    Facility-Administered Medications Ordered in Other Visits:     0.9%  NaCl infusion, , Intravenous, Continuous, Cole Fitzpatrick  BRITTNEE DO

## 2025-08-01 DIAGNOSIS — M50.30 DEGENERATION OF CERVICAL INTERVERTEBRAL DISC: ICD-10-CM

## 2025-08-01 RX ORDER — MELOXICAM 15 MG/1
15 TABLET ORAL DAILY PRN
Qty: 30 TABLET | Refills: 0 | Status: SHIPPED | OUTPATIENT
Start: 2025-08-01

## 2025-08-01 NOTE — TELEPHONE ENCOUNTER
No care due was identified.  [facility] Embedded Care Due Messages. Reference number: [id number].   8/01/2025 1:15:38 PM CDT

## 2025-08-20 DIAGNOSIS — R10.31 ACUTE RIGHT LOWER QUADRANT PAIN: ICD-10-CM

## 2025-08-20 RX ORDER — FAMOTIDINE 20 MG/1
20 TABLET, FILM COATED ORAL 2 TIMES DAILY
Qty: 180 TABLET | Refills: 0 | Status: SHIPPED | OUTPATIENT
Start: 2025-08-20

## 2025-08-25 ENCOUNTER — PATIENT MESSAGE (OUTPATIENT)
Dept: SURGERY | Facility: CLINIC | Age: 70
End: 2025-08-25
Payer: MEDICARE

## 2025-09-02 ENCOUNTER — OFFICE VISIT (OUTPATIENT)
Dept: UROLOGY | Facility: CLINIC | Age: 70
End: 2025-09-02
Payer: MEDICARE

## 2025-09-02 VITALS
WEIGHT: 152.75 LBS | DIASTOLIC BLOOD PRESSURE: 74 MMHG | HEART RATE: 72 BPM | RESPIRATION RATE: 16 BRPM | BODY MASS INDEX: 21.92 KG/M2 | SYSTOLIC BLOOD PRESSURE: 137 MMHG

## 2025-09-02 DIAGNOSIS — N13.8 BPH WITH OBSTRUCTION/LOWER URINARY TRACT SYMPTOMS: Primary | ICD-10-CM

## 2025-09-02 DIAGNOSIS — N40.1 BPH WITH OBSTRUCTION/LOWER URINARY TRACT SYMPTOMS: Primary | ICD-10-CM

## 2025-09-02 DIAGNOSIS — N52.9 ERECTILE DYSFUNCTION, UNSPECIFIED ERECTILE DYSFUNCTION TYPE: ICD-10-CM

## 2025-09-02 DIAGNOSIS — N28.1 KIDNEY CYST, ACQUIRED: ICD-10-CM

## 2025-09-02 DIAGNOSIS — Z12.5 PROSTATE CANCER SCREENING: ICD-10-CM

## 2025-09-02 PROCEDURE — 99999 PR PBB SHADOW E&M-EST. PATIENT-LVL III: CPT | Mod: PBBFAC,,,

## 2025-09-02 PROCEDURE — 3078F DIAST BP <80 MM HG: CPT | Mod: CPTII,S$GLB,,

## 2025-09-02 PROCEDURE — 1126F AMNT PAIN NOTED NONE PRSNT: CPT | Mod: CPTII,S$GLB,,

## 2025-09-02 PROCEDURE — 1160F RVW MEDS BY RX/DR IN RCRD: CPT | Mod: CPTII,S$GLB,,

## 2025-09-02 PROCEDURE — 3008F BODY MASS INDEX DOCD: CPT | Mod: CPTII,S$GLB,,

## 2025-09-02 PROCEDURE — 99214 OFFICE O/P EST MOD 30 MIN: CPT | Mod: S$GLB,,,

## 2025-09-02 PROCEDURE — 3075F SYST BP GE 130 - 139MM HG: CPT | Mod: CPTII,S$GLB,,

## 2025-09-02 PROCEDURE — 1159F MED LIST DOCD IN RCRD: CPT | Mod: CPTII,S$GLB,,

## 2025-09-02 RX ORDER — SILDENAFIL 100 MG/1
100 TABLET, FILM COATED ORAL DAILY PRN
Qty: 10 TABLET | Refills: 11 | Status: SHIPPED | OUTPATIENT
Start: 2025-09-02 | End: 2026-09-02

## 2025-09-02 RX ORDER — TAMSULOSIN HYDROCHLORIDE 0.4 MG/1
0.4 CAPSULE ORAL NIGHTLY
Qty: 90 CAPSULE | Refills: 3 | Status: SHIPPED | OUTPATIENT
Start: 2025-09-02 | End: 2026-09-02

## 2025-09-03 ENCOUNTER — HOSPITAL ENCOUNTER (OUTPATIENT)
Dept: RADIOLOGY | Facility: HOSPITAL | Age: 70
Discharge: HOME | End: 2025-09-03
Payer: MEDICARE

## 2025-09-03 DIAGNOSIS — N28.1 KIDNEY CYST, ACQUIRED: ICD-10-CM

## 2025-09-03 PROCEDURE — 76770 US EXAM ABDO BACK WALL COMP: CPT | Mod: 26,,, | Performed by: INTERNAL MEDICINE

## 2025-09-03 PROCEDURE — 76770 US EXAM ABDO BACK WALL COMP: CPT | Mod: TC

## (undated) DEVICE — SUT MONOCRYL 4-0 PS-2

## (undated) DEVICE — SEE MEDLINE ITEM 157131

## (undated) DEVICE — BANDAGE CONFORM 3IN STRL

## (undated) DEVICE — TRAY MINOR ORTHO

## (undated) DEVICE — SUT 4/0 18IN ETHILON BL P3

## (undated) DEVICE — DRAIN PENROSE XRAY 18 X 1/4 ST

## (undated) DEVICE — DRAPE C ARM 42 X 120 10/BX

## (undated) DEVICE — SUT FIBERWIRE 4-0 18 IN TAP

## (undated) DEVICE — SPONGE DERMACEA 4X4IN 12PLY

## (undated) DEVICE — APPLICATOR CHLORAPREP ORN 26ML

## (undated) DEVICE — DRESSING LEUKOPLAST FLEX 1X3IN

## (undated) DEVICE — SEE MEDLINE ITEM 157173

## (undated) DEVICE — DRESSING N ADH OIL EMUL 3X3

## (undated) DEVICE — IMMOBILIZER HAND

## (undated) DEVICE — PAD CAST SPECIALIST STRL 4

## (undated) DEVICE — SUT X411H ETHIBOND 2-0

## (undated) DEVICE — BANDAGE ESMARK 6X12

## (undated) DEVICE — CORD BIPOLAR 12 FOOT

## (undated) DEVICE — SUT ETHILON 4-0 PS2 18 BLK

## (undated) DEVICE — RETRIEVER SUTURE HEWSON DISP

## (undated) DEVICE — BLADE SCALP OPHTL BEVEL STR

## (undated) DEVICE — SEE MEDLINE ITEM 152622

## (undated) DEVICE — PADDING 4X4YD SPECIALIST100

## (undated) DEVICE — TOURNIQUET SB QC DP 18X4IN

## (undated) DEVICE — SEE MEDLINE ITEM 146270

## (undated) DEVICE — NDL 22GA X1 1/2 REG BEVEL

## (undated) DEVICE — BANDAGE ELASTIC ACE 2IN 10/CA

## (undated) DEVICE — DRAPE STERI-DRAPE 1000 17X11IN

## (undated) DEVICE — ADHESIVE DERMABOND ADVANCED

## (undated) DEVICE — GAUZE SPONGE 4X4 12PLY

## (undated) DEVICE — DRAPE STERI INSTRUMENT 1018

## (undated) DEVICE — SPLINT PLASTER F.S 4INX15IN

## (undated) DEVICE — SUT CTD VICRYL VIL BR SH 27

## (undated) DEVICE — SUT MONOCRYL 4-0 UD P-3 18

## (undated) DEVICE — SEE MEDLINE ITEM 157148

## (undated) DEVICE — SEE MEDLINE ITEM 146268

## (undated) DEVICE — TOURNIQUET SB QC SP 18X4IN

## (undated) DEVICE — SUT CTD VICRYL 4-0 P-3 18IN

## (undated) DEVICE — GAUZE DERMACEA LOW PLY 2X4YRDS

## (undated) DEVICE — GLOVE BIOGEL SKINSENSE PI 7.0

## (undated) DEVICE — PEN LIGHTS DIAGNOSTIC C-LINE

## (undated) DEVICE — PADDING CAST 4IN SPECIALIST

## (undated) DEVICE — CLOSURE SKIN STERI STRIP 1/2X4

## (undated) DEVICE — GAUZE FLUFF XXLG 36X36 2 PLY

## (undated) DEVICE — PANTIES FEMININE NAPKIN LG/XLG

## (undated) DEVICE — SEE MEDLINE ITEM 146308

## (undated) DEVICE — ELECTRODE REM PLYHSV RETURN 9

## (undated) DEVICE — PAD CAST 2 IN X 4YDS STERILE

## (undated) DEVICE — CLIPPER BLADE MOD 4406 (CAREF)

## (undated) DEVICE — SPONGE LAP 18X18 PREWASHED

## (undated) DEVICE — FORCEP STRAIGHT DISP

## (undated) DEVICE — Device

## (undated) DEVICE — TRAY MINOR GEN SURG

## (undated) DEVICE — DRESSING ADAPTIC TOUCH 3X4

## (undated) DEVICE — SEE MEDLINE ITEM 152522

## (undated) DEVICE — BANDAGE MATRIX HK LOOP 2IN 5YD

## (undated) DEVICE — GLOVE BIOGEL ECLIPSE SZ 7

## (undated) DEVICE — DRESSING SPONGE 8PLY 4X4 STRL